# Patient Record
Sex: FEMALE | Race: WHITE | NOT HISPANIC OR LATINO | Employment: OTHER | URBAN - METROPOLITAN AREA
[De-identification: names, ages, dates, MRNs, and addresses within clinical notes are randomized per-mention and may not be internally consistent; named-entity substitution may affect disease eponyms.]

---

## 2023-08-16 ENCOUNTER — TRANSFERRED RECORDS (OUTPATIENT)
Dept: MULTI SPECIALTY CLINIC | Facility: CLINIC | Age: 78
End: 2023-08-16

## 2024-01-22 ENCOUNTER — OFFICE VISIT (OUTPATIENT)
Dept: FAMILY MEDICINE | Facility: CLINIC | Age: 79
End: 2024-01-22
Payer: MEDICARE

## 2024-01-22 VITALS
OXYGEN SATURATION: 99 % | RESPIRATION RATE: 20 BRPM | WEIGHT: 132 LBS | HEART RATE: 93 BPM | TEMPERATURE: 97.7 F | BODY MASS INDEX: 25.91 KG/M2 | SYSTOLIC BLOOD PRESSURE: 126 MMHG | HEIGHT: 60 IN | DIASTOLIC BLOOD PRESSURE: 77 MMHG

## 2024-01-22 DIAGNOSIS — F41.1 GAD (GENERALIZED ANXIETY DISORDER): ICD-10-CM

## 2024-01-22 DIAGNOSIS — Z23 NEED FOR VACCINATION: ICD-10-CM

## 2024-01-22 DIAGNOSIS — D50.8 OTHER IRON DEFICIENCY ANEMIA: ICD-10-CM

## 2024-01-22 DIAGNOSIS — E03.8 OTHER SPECIFIED HYPOTHYROIDISM: ICD-10-CM

## 2024-01-22 DIAGNOSIS — K58.0 IRRITABLE BOWEL SYNDROME WITH DIARRHEA: ICD-10-CM

## 2024-01-22 DIAGNOSIS — Z23 NEED FOR PNEUMOCOCCAL VACCINATION: ICD-10-CM

## 2024-01-22 DIAGNOSIS — R60.0 LOWER EXTREMITY EDEMA: ICD-10-CM

## 2024-01-22 DIAGNOSIS — J43.9 PULMONARY EMPHYSEMA, UNSPECIFIED EMPHYSEMA TYPE (H): Primary | ICD-10-CM

## 2024-01-22 DIAGNOSIS — Z99.81 OXYGEN DEPENDENT: ICD-10-CM

## 2024-01-22 DIAGNOSIS — F01.50 VASCULAR DEMENTIA WITHOUT BEHAVIORAL DISTURBANCE, PSYCHOTIC DISTURBANCE, MOOD DISTURBANCE, OR ANXIETY, UNSPECIFIED DEMENTIA SEVERITY (H): ICD-10-CM

## 2024-01-22 DIAGNOSIS — I10 PRIMARY HYPERTENSION: ICD-10-CM

## 2024-01-22 DIAGNOSIS — R53.81 PHYSICAL DECONDITIONING: ICD-10-CM

## 2024-01-22 PROBLEM — M17.10 ARTHRITIS OF KNEE: Status: ACTIVE | Noted: 2019-06-04

## 2024-01-22 PROBLEM — F41.9 ANXIETY AND DEPRESSION: Status: RESOLVED | Noted: 2023-01-10 | Resolved: 2024-01-22

## 2024-01-22 PROBLEM — D50.9 IRON DEFICIENCY ANEMIA: Status: ACTIVE | Noted: 2023-01-10

## 2024-01-22 PROBLEM — Z91.81 AT RISK FOR FALLS: Status: ACTIVE | Noted: 2023-08-09

## 2024-01-22 PROBLEM — R41.840 ATTENTION DISTURBANCE: Status: ACTIVE | Noted: 2020-01-22

## 2024-01-22 PROBLEM — F41.9 ANXIETY AND DEPRESSION: Status: ACTIVE | Noted: 2023-01-10

## 2024-01-22 PROBLEM — F03.90 DEMENTIA (H): Status: ACTIVE | Noted: 2023-11-28

## 2024-01-22 PROBLEM — R54 FRAIL ELDERLY: Status: ACTIVE | Noted: 2023-12-12

## 2024-01-22 PROBLEM — J44.9 CHRONIC OBSTRUCTIVE PULMONARY DISEASE (H): Status: ACTIVE | Noted: 2023-01-10

## 2024-01-22 PROBLEM — F32.A ANXIETY AND DEPRESSION: Status: RESOLVED | Noted: 2023-01-10 | Resolved: 2024-01-22

## 2024-01-22 PROBLEM — F32.A ANXIETY AND DEPRESSION: Status: ACTIVE | Noted: 2023-01-10

## 2024-01-22 PROBLEM — K21.9 GERD (GASTROESOPHAGEAL REFLUX DISEASE): Status: ACTIVE | Noted: 2023-03-15

## 2024-01-22 LAB
ALBUMIN SERPL BCG-MCNC: 3.9 G/DL (ref 3.5–5.2)
ALP SERPL-CCNC: 65 U/L (ref 40–150)
ALT SERPL W P-5'-P-CCNC: 11 U/L (ref 0–50)
ANION GAP SERPL CALCULATED.3IONS-SCNC: 12 MMOL/L (ref 7–15)
AST SERPL W P-5'-P-CCNC: 23 U/L (ref 0–45)
BASOPHILS # BLD AUTO: 0.1 10E3/UL (ref 0–0.2)
BASOPHILS NFR BLD AUTO: 1 %
BILIRUB SERPL-MCNC: <0.2 MG/DL
BUN SERPL-MCNC: 14 MG/DL (ref 8–23)
CALCIUM SERPL-MCNC: 9.2 MG/DL (ref 8.8–10.2)
CHLORIDE SERPL-SCNC: 104 MMOL/L (ref 98–107)
CREAT SERPL-MCNC: 0.93 MG/DL (ref 0.51–0.95)
DEPRECATED HCO3 PLAS-SCNC: 22 MMOL/L (ref 22–29)
EGFRCR SERPLBLD CKD-EPI 2021: 62 ML/MIN/1.73M2
EOSINOPHIL # BLD AUTO: 0.6 10E3/UL (ref 0–0.7)
EOSINOPHIL NFR BLD AUTO: 7 %
ERYTHROCYTE [DISTWIDTH] IN BLOOD BY AUTOMATED COUNT: 17.5 % (ref 10–15)
GLUCOSE SERPL-MCNC: 88 MG/DL (ref 70–99)
HCT VFR BLD AUTO: 32.4 % (ref 35–47)
HGB BLD-MCNC: 9 G/DL (ref 11.7–15.7)
IMM GRANULOCYTES # BLD: 0.1 10E3/UL
IMM GRANULOCYTES NFR BLD: 1 %
LYMPHOCYTES # BLD AUTO: 1.2 10E3/UL (ref 0.8–5.3)
LYMPHOCYTES NFR BLD AUTO: 14 %
MCH RBC QN AUTO: 23.4 PG (ref 26.5–33)
MCHC RBC AUTO-ENTMCNC: 27.8 G/DL (ref 31.5–36.5)
MCV RBC AUTO: 84 FL (ref 78–100)
MONOCYTES # BLD AUTO: 0.6 10E3/UL (ref 0–1.3)
MONOCYTES NFR BLD AUTO: 7 %
NEUTROPHILS # BLD AUTO: 6 10E3/UL (ref 1.6–8.3)
NEUTROPHILS NFR BLD AUTO: 70 %
PLATELET # BLD AUTO: 360 10E3/UL (ref 150–450)
POTASSIUM SERPL-SCNC: 3.8 MMOL/L (ref 3.4–5.3)
PROT SERPL-MCNC: 6.6 G/DL (ref 6.4–8.3)
RBC # BLD AUTO: 3.85 10E6/UL (ref 3.8–5.2)
SODIUM SERPL-SCNC: 138 MMOL/L (ref 135–145)
TSH SERPL DL<=0.005 MIU/L-ACNC: 2.05 UIU/ML (ref 0.3–4.2)
WBC # BLD AUTO: 8.6 10E3/UL (ref 4–11)

## 2024-01-22 PROCEDURE — 99204 OFFICE O/P NEW MOD 45 MIN: CPT | Mod: 25 | Performed by: FAMILY MEDICINE

## 2024-01-22 PROCEDURE — 36415 COLL VENOUS BLD VENIPUNCTURE: CPT | Performed by: FAMILY MEDICINE

## 2024-01-22 PROCEDURE — 84443 ASSAY THYROID STIM HORMONE: CPT | Performed by: FAMILY MEDICINE

## 2024-01-22 PROCEDURE — 80053 COMPREHEN METABOLIC PANEL: CPT | Performed by: FAMILY MEDICINE

## 2024-01-22 PROCEDURE — 85025 COMPLETE CBC W/AUTO DIFF WBC: CPT | Performed by: FAMILY MEDICINE

## 2024-01-22 PROCEDURE — G0009 ADMIN PNEUMOCOCCAL VACCINE: HCPCS | Performed by: FAMILY MEDICINE

## 2024-01-22 PROCEDURE — 90677 PCV20 VACCINE IM: CPT | Performed by: FAMILY MEDICINE

## 2024-01-22 RX ORDER — FUROSEMIDE 20 MG
20 TABLET ORAL DAILY
COMMUNITY
Start: 2023-12-13 | End: 2024-01-22

## 2024-01-22 RX ORDER — BUSPIRONE HYDROCHLORIDE 10 MG/1
10 TABLET ORAL 3 TIMES DAILY
Status: ON HOLD | COMMUNITY
Start: 2010-12-22 | End: 2024-02-25

## 2024-01-22 RX ORDER — LEVOTHYROXINE SODIUM 50 UG/1
50 TABLET ORAL DAILY
Status: ON HOLD | COMMUNITY
Start: 2022-05-20 | End: 2024-02-25

## 2024-01-22 RX ORDER — OLMESARTAN MEDOXOMIL 20 MG/1
20 TABLET ORAL DAILY
Status: ON HOLD | COMMUNITY
Start: 2023-03-07 | End: 2024-02-25

## 2024-01-22 RX ORDER — TRIAMCINOLONE ACETONIDE 1 MG/G
CREAM TOPICAL 2 TIMES DAILY PRN
Status: ON HOLD | COMMUNITY
Start: 2023-12-13 | End: 2024-02-25

## 2024-01-22 RX ORDER — ALBUTEROL SULFATE 0.83 MG/ML
2.5 SOLUTION RESPIRATORY (INHALATION) 2 TIMES DAILY PRN
Status: ON HOLD | COMMUNITY
Start: 2023-11-20 | End: 2024-02-25

## 2024-01-22 RX ORDER — CALCIUM POLYCARBOPHIL 625 MG 625 MG/1
2 TABLET ORAL DAILY
Qty: 90 TABLET | Refills: 3 | Status: ON HOLD | OUTPATIENT
Start: 2024-01-22 | End: 2024-02-25

## 2024-01-22 RX ORDER — FLUOXETINE 40 MG/1
40 CAPSULE ORAL DAILY
Qty: 90 CAPSULE | Refills: 3 | Status: ON HOLD | OUTPATIENT
Start: 2024-01-22 | End: 2024-02-25

## 2024-01-22 RX ORDER — MIRTAZAPINE 15 MG/1
15 TABLET, FILM COATED ORAL AT BEDTIME
COMMUNITY
End: 2024-01-22

## 2024-01-22 RX ORDER — PANTOPRAZOLE SODIUM 40 MG/1
40 TABLET, DELAYED RELEASE ORAL
Status: ON HOLD | COMMUNITY
Start: 2022-08-22 | End: 2024-02-25

## 2024-01-22 RX ORDER — FERROUS GLUCONATE 324(38)MG
324 TABLET ORAL
Qty: 90 TABLET | Refills: 3 | Status: ON HOLD | OUTPATIENT
Start: 2024-01-22 | End: 2024-02-25

## 2024-01-22 RX ORDER — FUROSEMIDE 20 MG
TABLET ORAL
Qty: 30 TABLET | Refills: 1 | Status: ON HOLD | OUTPATIENT
Start: 2024-01-22 | End: 2024-02-19

## 2024-01-22 RX ORDER — LATANOPROST 50 UG/ML
1 SOLUTION/ DROPS OPHTHALMIC AT BEDTIME
Status: ON HOLD | COMMUNITY
Start: 2023-03-07 | End: 2024-02-25

## 2024-01-22 ASSESSMENT — PAIN SCALES - GENERAL: PAINLEVEL: NO PAIN (0)

## 2024-01-22 NOTE — PROGRESS NOTES
Assessment & Plan     Pulmonary emphysema, unspecified emphysema type (H)    - FLUoxetine (PROZAC) 40 MG capsule; Take 1 capsule (40 mg) by mouth daily  - Comprehensive metabolic panel (BMP + Alb, Alk Phos, ALT, AST, Total. Bili, TP); Future  - Comprehensive metabolic panel (BMP + Alb, Alk Phos, ALT, AST, Total. Bili, TP)    Other iron deficiency anemia    - CBC with platelets and differential; Future  - CBC with platelets and differential  - ferrous gluconate (FERGON) 324 (38 Fe) MG tablet; Take 1 tablet (324 mg) by mouth daily (with breakfast)    Lower extremity edema  - furosemide (LASIX) 20 MG tablet; One tab daily as needed for lower ext edema    AMRIT (generalized anxiety disorder)      Irritable bowel syndrome with diarrhea    - calcium polycarbophil (FIBERCON) 625 MG tablet; Take 2 tablets (1,250 mg) by mouth daily    Need for vaccination      Need for pneumococcal vaccination    - SCREENING QUESTIONS FOR ADULT IMMUNIZATIONS    Vascular dementia without behavioral disturbance, psychotic disturbance, mood disturbance, or anxiety, unspecified dementia severity (H)    Physical deconditioning    Oxygen dependent    Patient came in with daughter to establish care.  History of COPD, history of dementia, patient was admitted to the hospital on December 6, 2023 for pneumonia.  Prior to that November 28, 2023 was admitted for COPD exacerbation.  She was discharged on oxygen.  She has been 3 weeks TCU, currently, she lives at the assisted living.    Patient continues to have lower extremity weakness, she continues to use oxygen.  She is follow-up with PT.  Reviewed her past medical history, reviewed her discharge admission summaries.  Reviewed her workup.  Reviewed and up dated her medication.  In addition, daughter has FMLA form need to be filled out.    She had a 2D echo November 24, 2023 which showed ejection fraction 50 to 55%.  Lung CT scan showed severe emphysema.    Patient lower extremity edema being resolved,  advised with elevation,  She will take furosemide as needed only.  Generalized anxiety disorder she will continue with fluoxetine at 40 mg.  Continue with buspirone 15 mg twice daily, or 10 mg tid.  Discussed with patient and her daughter, buspirone could be causing dizziness, and diarrhea she may need to taper it down in the future.  Mirtazapine has been discontinued.her     Increase fiber intake, for IBS- diarrhea.    History of anemia, started iron supplement.\    Over 45 minutes spent reviewing chart, reviewing test results, talking with and examining patient, formulating plan, and documentation on the day of the encounter.  Reviewing chart, medications  Filling FMLA form.    Patient continue with oxygen, at 1.5 L.  She will follow-up in 3 months to assess her oxygen requirement and need    BMI  Estimated body mass index is 25.78 kg/m  as calculated from the following:    Height as of this encounter: 1.524 m (5').    Weight as of this encounter: 59.9 kg (132 lb).       Work on weight loss  Regular exercise    Pato Friedman is a 78 year old, presenting for the following health issues:  Establish Care        1/22/2024     2:25 PM   Additional Questions   Roomed by Venkata HALE CMA   Accompanied by Daughters         1/22/2024     2:25 PM   Patient Reported Additional Medications   Patient reports taking the following new medications None     Via the Health Maintenance questionnaire, the patient has reported the following services have been completed -DEXA, this information has been sent to the abstraction team.  History of Present Illness       Reason for visit:  New patient and review oxygen and other issues    She eats 0-1 servings of fruits and vegetables daily.She consumes 1 sweetened beverage(s) daily.She exercises with enough effort to increase her heart rate 9 or less minutes per day.  She exercises with enough effort to increase her heart rate 3 or less days per week.   She is taking medications  regularly.         Review of Systems  Constitutional, HEENT, cardiovascular, pulmonary, GI, , musculoskeletal, neuro, skin, endocrine and psych systems are negative, except as otherwise noted.      Objective    /77 (BP Location: Right arm, Patient Position: Chair, Cuff Size: Adult Regular)   Pulse 93   Temp 97.7  F (36.5  C) (Oral)   Resp 20   Ht 1.524 m (5')   Wt 59.9 kg (132 lb)   SpO2 99%   BMI 25.78 kg/m    Body mass index is 25.78 kg/m .  Physical Exam   GENERAL: alert and no distress  NECK: no adenopathy, no asymmetry, masses, or scars  RESP: lungs clear to auscultation - no rales, rhonchi or wheezes  CV: regular rate and rhythm, normal S1 S2, no S3 or S4, no murmur, click or rub, no peripheral edema  ABDOMEN: soft, nontender, no hepatosplenomegaly, no masses and bowel sounds normal  MS: trace edema to bilaterally  SKIN: dry scaling rash, and scratches marks.  PSYCH: mentation appears normal, affect normal/bright    CBC RESULTS:   Recent Labs   Lab Test 01/22/24  1544   WBC 8.6   RBC 3.85   HGB 9.0*   HCT 32.4*   MCV 84   MCH 23.4*   MCHC 27.8*   RDW 17.5*         Orders Placed This Encounter   Procedures    REVIEW OF HEALTH MAINTENANCE PROTOCOL ORDERS    SCREENING QUESTIONS FOR ADULT IMMUNIZATIONS    Pneumococcal 20 Valent Conjugate (Prevnar 20)    Comprehensive metabolic panel (BMP + Alb, Alk Phos, ALT, AST, Total. Bili, TP)    CBC with platelets and differential    CBC with platelets and differential          Signed Electronically by: Gomez Louis MD

## 2024-01-23 ENCOUNTER — MYC MEDICAL ADVICE (OUTPATIENT)
Dept: FAMILY MEDICINE | Facility: CLINIC | Age: 79
End: 2024-01-23
Payer: MEDICARE

## 2024-01-23 DIAGNOSIS — F01.A0 MILD VASCULAR DEMENTIA, UNSPECIFIED WHETHER BEHAVIORAL, PSYCHOTIC, OR MOOD DISTURBANCE OR ANXIETY (H): Primary | ICD-10-CM

## 2024-01-23 RX ORDER — DONEPEZIL HYDROCHLORIDE 10 MG/1
10 TABLET, FILM COATED ORAL AT BEDTIME
Qty: 90 TABLET | Refills: 1 | Status: ON HOLD | OUTPATIENT
Start: 2024-01-23 | End: 2024-02-25

## 2024-01-23 NOTE — TELEPHONE ENCOUNTER
Barbara (consent on file) calling to check status of this message.     Lois Younger RN on 1/23/2024 at 11:37 AM

## 2024-01-24 ENCOUNTER — TELEPHONE (OUTPATIENT)
Dept: FAMILY MEDICINE | Facility: CLINIC | Age: 79
End: 2024-01-24
Payer: MEDICARE

## 2024-01-24 DIAGNOSIS — M81.0 AGE-RELATED OSTEOPOROSIS WITHOUT CURRENT PATHOLOGICAL FRACTURE: Primary | ICD-10-CM

## 2024-01-24 DIAGNOSIS — Z99.81 OXYGEN DEPENDENT: ICD-10-CM

## 2024-01-24 DIAGNOSIS — J43.9 PULMONARY EMPHYSEMA, UNSPECIFIED EMPHYSEMA TYPE (H): Primary | ICD-10-CM

## 2024-01-24 NOTE — TELEPHONE ENCOUNTER
Pt daughter (Barbara, consent to communicate) calling to state that pt is wanting to continue prolia injection. [place] been taking prolia for many years per Barbara. Barbara states DEXA scan was completed 2023.     Prolia injection is overdue and used to get them every 6 months.     Routing to RN team to advise if prolia can be completed in clinic.     Lucinda Lowe, RN on 1/24/2024 at 5:12 PM

## 2024-01-24 NOTE — TELEPHONE ENCOUNTER
Routing to team to fax new signed order to fax number below.     Lucinda Lowe RN on 1/24/2024 at 5:07 PM

## 2024-01-24 NOTE — TELEPHONE ENCOUNTER
custodial nurse calling to request orders for continuous oxygen    Patient currently has order for 3L continuously     Per visit with PCP on 1/22/24 patient should be on 1.5L continuously     custodial is needing very specific orders - cannot be a range     Cued if appropriate    Please route back to team to fax orders to [phone]    Albina Hawkins RN  [facility]

## 2024-01-25 DIAGNOSIS — M81.0 AGE-RELATED OSTEOPOROSIS WITHOUT CURRENT PATHOLOGICAL FRACTURE: Primary | ICD-10-CM

## 2024-01-25 RX ORDER — CHOLECALCIFEROL (VITAMIN D3) 50 MCG
1 TABLET ORAL DAILY
Qty: 90 TABLET | Refills: 3 | Status: ON HOLD | OUTPATIENT
Start: 2024-01-25 | End: 2024-02-25

## 2024-01-25 NOTE — TELEPHONE ENCOUNTER
Printed order and placed in Dr Louis sign me folder for him to sign and date.    GUDELIA Lopez  [facility]

## 2024-01-25 NOTE — TELEPHONE ENCOUNTER
RN called and spoke with patients daughter.    RN scheduled prolia injection and follow up labs.    RN confirmed patient has not had and is not planning any dental work in the past/upcoming month.    Miki Schmid RN, BSN, PHN  [facility]

## 2024-01-25 NOTE — TELEPHONE ENCOUNTER
Keri at [facility] calling stating it appears a DME order was sent to them. She stated this order will work for administration of oxygen at [place]. She just wanted to make sure that  home medical will not be delivering supplies as patient uses [facility].       RN stated usually DME will call beforehand to confirm supplies are needed and delivery address/time. Keri stated there is not always a RN on site. RN called  home medical supply and they confirmed that they do not have a delivery set up for oxygen/supplies.       Allison Christopher RN on 1/25/2024 at 1:43 PM

## 2024-01-25 NOTE — TELEPHONE ENCOUNTER
Routing to Dr. Louis.    Willing to Manage Prolia?    Please enter CAM order and Labs and RN will peruse Auth and scheduling patient    Please let team know timing of labs.    Miki Schmid, RN, BSN, PHN  [facility]

## 2024-01-25 NOTE — TELEPHONE ENCOUNTER
Daughter calling back  She is unsure of the exact date and is unable to access those records anymore.    She states Pt was getting Prolia every 6 months but it has been at least a year since she has gotten an injection.    She was getting them at [facility] in [facility] - moved here March 2023- hasn't had a shot since she has been living in [place] [facility] in [facility] submitted a claim for Prolia and Barbara said it had been approved, this was September 5th 2023-     Dexascan was in August 2023        Chante, RN    Triage Nurse  [facility]

## 2024-01-25 NOTE — TELEPHONE ENCOUNTER
Ordered    Labs ordered,    Need labs 14 days, after injection ( After injections )  Already have labs done

## 2024-01-25 NOTE — TELEPHONE ENCOUNTER
RN left  for patients daughter requesting call back to clinic.    When Daughter Calls back,  Please ask    When was patients last injection and where was it given?        Once we have that information, please route to NE triage.    Ne nurse  will route to Dr. Louis to see if he is willing to manage medication.    Miki Schmid RN, BSN, PHN  [facility]

## 2024-01-25 NOTE — TELEPHONE ENCOUNTER
RN awaiting to hear back from Team for authorization.    Once Authorization is confirmed, RN will call to schedule.    Miki Schmid RN, BSN, PHN  [facility]

## 2024-01-27 ENCOUNTER — MYC MEDICAL ADVICE (OUTPATIENT)
Dept: FAMILY MEDICINE | Facility: CLINIC | Age: 79
End: 2024-01-27
Payer: MEDICARE

## 2024-01-29 ENCOUNTER — TELEPHONE (OUTPATIENT)
Dept: FAMILY MEDICINE | Facility: CLINIC | Age: 79
End: 2024-01-29
Payer: MEDICARE

## 2024-01-29 NOTE — TELEPHONE ENCOUNTER
Faxed forms to Cristine.    Placed in stat scanning on Team Vickey.    GUDELIA Lopez  [facility]

## 2024-01-29 NOTE — TELEPHONE ENCOUNTER
"Patient called today checking up on whether form has been faxed yet.  I found the forms on TC desk but they did not look like they had been faxed yet.  I read what Dr Louis wrote for the \"frequency of flare ups\" to patient and she is ok with the way it was written.   I told patient I would be faxing the form today.    Susan Ramirez [facility]    "

## 2024-01-29 NOTE — TELEPHONE ENCOUNTER
Forms received from Cristine/ [facility] for Dr Louis.  Forms placed in provider 'sign me' folder.  Please fax forms to [phone] after completion.    GUDELIA Lopez  [facility]

## 2024-02-01 ENCOUNTER — HOSPITAL ENCOUNTER (INPATIENT)
Facility: CLINIC | Age: 79
LOS: 25 days | Discharge: NURSING, REHAB OR LONG-TERM CARE | DRG: 193 | End: 2024-02-26
Attending: EMERGENCY MEDICINE | Admitting: HOSPITALIST
Payer: MEDICARE

## 2024-02-01 ENCOUNTER — MYC MEDICAL ADVICE (OUTPATIENT)
Dept: FAMILY MEDICINE | Facility: CLINIC | Age: 79
End: 2024-02-01

## 2024-02-01 ENCOUNTER — NURSE TRIAGE (OUTPATIENT)
Dept: NURSING | Facility: CLINIC | Age: 79
End: 2024-02-01

## 2024-02-01 ENCOUNTER — TELEPHONE (OUTPATIENT)
Dept: FAMILY MEDICINE | Facility: CLINIC | Age: 79
End: 2024-02-01

## 2024-02-01 ENCOUNTER — NURSE TRIAGE (OUTPATIENT)
Dept: FAMILY MEDICINE | Facility: CLINIC | Age: 79
End: 2024-02-01

## 2024-02-01 ENCOUNTER — APPOINTMENT (OUTPATIENT)
Dept: CT IMAGING | Facility: CLINIC | Age: 79
DRG: 193 | End: 2024-02-01
Attending: EMERGENCY MEDICINE
Payer: MEDICARE

## 2024-02-01 DIAGNOSIS — Z91.81 AT RISK FOR FALLS: ICD-10-CM

## 2024-02-01 DIAGNOSIS — K58.0 IRRITABLE BOWEL SYNDROME WITH DIARRHEA: ICD-10-CM

## 2024-02-01 DIAGNOSIS — M81.0 AGE-RELATED OSTEOPOROSIS WITHOUT CURRENT PATHOLOGICAL FRACTURE: ICD-10-CM

## 2024-02-01 DIAGNOSIS — R79.89 TROPONIN LEVEL ELEVATED: ICD-10-CM

## 2024-02-01 DIAGNOSIS — M54.9 BACK PAIN, UNSPECIFIED BACK LOCATION, UNSPECIFIED BACK PAIN LATERALITY, UNSPECIFIED CHRONICITY: ICD-10-CM

## 2024-02-01 DIAGNOSIS — J02.9 SORE THROAT: ICD-10-CM

## 2024-02-01 DIAGNOSIS — H04.123 DRY EYES: ICD-10-CM

## 2024-02-01 DIAGNOSIS — E87.6 HYPOKALEMIA: ICD-10-CM

## 2024-02-01 DIAGNOSIS — J18.9 COMMUNITY ACQUIRED PNEUMONIA OF LEFT UPPER LOBE OF LUNG: ICD-10-CM

## 2024-02-01 DIAGNOSIS — R60.0 LOWER EXTREMITY EDEMA: ICD-10-CM

## 2024-02-01 DIAGNOSIS — K21.9 GASTROESOPHAGEAL REFLUX DISEASE, UNSPECIFIED WHETHER ESOPHAGITIS PRESENT: ICD-10-CM

## 2024-02-01 DIAGNOSIS — K59.00 CONSTIPATION, UNSPECIFIED CONSTIPATION TYPE: ICD-10-CM

## 2024-02-01 DIAGNOSIS — E03.9 HYPOTHYROIDISM, UNSPECIFIED TYPE: ICD-10-CM

## 2024-02-01 DIAGNOSIS — R11.2 NAUSEA AND VOMITING, UNSPECIFIED VOMITING TYPE: ICD-10-CM

## 2024-02-01 DIAGNOSIS — F01.A0 MILD VASCULAR DEMENTIA, UNSPECIFIED WHETHER BEHAVIORAL, PSYCHOTIC, OR MOOD DISTURBANCE OR ANXIETY (H): ICD-10-CM

## 2024-02-01 DIAGNOSIS — L29.9 ITCHING: ICD-10-CM

## 2024-02-01 DIAGNOSIS — R54 FRAIL ELDERLY: ICD-10-CM

## 2024-02-01 DIAGNOSIS — G47.00 INSOMNIA, UNSPECIFIED TYPE: ICD-10-CM

## 2024-02-01 DIAGNOSIS — R41.840 ATTENTION DISTURBANCE: ICD-10-CM

## 2024-02-01 DIAGNOSIS — F41.1 GAD (GENERALIZED ANXIETY DISORDER): Primary | ICD-10-CM

## 2024-02-01 DIAGNOSIS — M17.10 ARTHRITIS OF KNEE: ICD-10-CM

## 2024-02-01 DIAGNOSIS — J43.9 PULMONARY EMPHYSEMA, UNSPECIFIED EMPHYSEMA TYPE (H): ICD-10-CM

## 2024-02-01 LAB
ALBUMIN SERPL BCG-MCNC: 3.5 G/DL (ref 3.5–5.2)
ALP SERPL-CCNC: 47 U/L (ref 40–150)
ALT SERPL W P-5'-P-CCNC: <5 U/L (ref 0–50)
ANION GAP SERPL CALCULATED.3IONS-SCNC: 15 MMOL/L (ref 7–15)
AST SERPL W P-5'-P-CCNC: 22 U/L (ref 0–45)
ATRIAL RATE - MUSE: 86 BPM
BASOPHILS # BLD AUTO: 0.1 10E3/UL (ref 0–0.2)
BASOPHILS NFR BLD AUTO: 1 %
BILIRUB SERPL-MCNC: 0.2 MG/DL
BUN SERPL-MCNC: 10.1 MG/DL (ref 8–23)
CALCIUM SERPL-MCNC: 7.7 MG/DL (ref 8.8–10.2)
CHLORIDE SERPL-SCNC: 104 MMOL/L (ref 98–107)
CREAT SERPL-MCNC: 0.7 MG/DL (ref 0.51–0.95)
DEPRECATED HCO3 PLAS-SCNC: 18 MMOL/L (ref 22–29)
DIASTOLIC BLOOD PRESSURE - MUSE: NORMAL MMHG
EGFRCR SERPLBLD CKD-EPI 2021: 87 ML/MIN/1.73M2
EOSINOPHIL # BLD AUTO: 0.5 10E3/UL (ref 0–0.7)
EOSINOPHIL NFR BLD AUTO: 5 %
ERYTHROCYTE [DISTWIDTH] IN BLOOD BY AUTOMATED COUNT: 17.7 % (ref 10–15)
FLUAV RNA SPEC QL NAA+PROBE: NEGATIVE
FLUBV RNA RESP QL NAA+PROBE: NEGATIVE
GLUCOSE SERPL-MCNC: 70 MG/DL (ref 70–99)
HCT VFR BLD AUTO: 32.5 % (ref 35–47)
HGB BLD-MCNC: 9.7 G/DL (ref 11.7–15.7)
IMM GRANULOCYTES # BLD: 0.1 10E3/UL
IMM GRANULOCYTES NFR BLD: 1 %
INTERPRETATION ECG - MUSE: NORMAL
LIPASE SERPL-CCNC: 20 U/L (ref 13–60)
LYMPHOCYTES # BLD AUTO: 1.1 10E3/UL (ref 0.8–5.3)
LYMPHOCYTES NFR BLD AUTO: 12 %
MCH RBC QN AUTO: 24.1 PG (ref 26.5–33)
MCHC RBC AUTO-ENTMCNC: 29.8 G/DL (ref 31.5–36.5)
MCV RBC AUTO: 81 FL (ref 78–100)
MONOCYTES # BLD AUTO: 0.8 10E3/UL (ref 0–1.3)
MONOCYTES NFR BLD AUTO: 8 %
NEUTROPHILS # BLD AUTO: 6.5 10E3/UL (ref 1.6–8.3)
NEUTROPHILS NFR BLD AUTO: 73 %
NRBC # BLD AUTO: 0 10E3/UL
NRBC BLD AUTO-RTO: 0 /100
P AXIS - MUSE: NORMAL DEGREES
PLATELET # BLD AUTO: 333 10E3/UL (ref 150–450)
POTASSIUM SERPL-SCNC: 2.8 MMOL/L (ref 3.4–5.3)
PR INTERVAL - MUSE: NORMAL MS
PROCALCITONIN SERPL IA-MCNC: 0.04 NG/ML
PROT SERPL-MCNC: 5.5 G/DL (ref 6.4–8.3)
QRS DURATION - MUSE: 68 MS
QT - MUSE: 428 MS
QTC - MUSE: 526 MS
R AXIS - MUSE: 74 DEGREES
RBC # BLD AUTO: 4.03 10E6/UL (ref 3.8–5.2)
RSV RNA SPEC NAA+PROBE: NEGATIVE
SARS-COV-2 RNA RESP QL NAA+PROBE: NEGATIVE
SODIUM SERPL-SCNC: 137 MMOL/L (ref 135–145)
SYSTOLIC BLOOD PRESSURE - MUSE: NORMAL MMHG
T AXIS - MUSE: 59 DEGREES
TROPONIN T SERPL HS-MCNC: 45 NG/L
TSH SERPL DL<=0.005 MIU/L-ACNC: 2.04 UIU/ML (ref 0.3–4.2)
VENTRICULAR RATE- MUSE: 91 BPM
WBC # BLD AUTO: 9 10E3/UL (ref 4–11)

## 2024-02-01 PROCEDURE — 96374 THER/PROPH/DIAG INJ IV PUSH: CPT | Mod: 59 | Performed by: EMERGENCY MEDICINE

## 2024-02-01 PROCEDURE — 80053 COMPREHEN METABOLIC PANEL: CPT | Performed by: EMERGENCY MEDICINE

## 2024-02-01 PROCEDURE — 71260 CT THORAX DX C+: CPT | Mod: MG

## 2024-02-01 PROCEDURE — 74177 CT ABD & PELVIS W/CONTRAST: CPT | Mod: 26 | Performed by: RADIOLOGY

## 2024-02-01 PROCEDURE — 85025 COMPLETE CBC W/AUTO DIFF WBC: CPT | Performed by: EMERGENCY MEDICINE

## 2024-02-01 PROCEDURE — 120N000002 HC R&B MED SURG/OB UMMC

## 2024-02-01 PROCEDURE — 71260 CT THORAX DX C+: CPT | Mod: 26 | Performed by: RADIOLOGY

## 2024-02-01 PROCEDURE — 250N000011 HC RX IP 250 OP 636: Performed by: EMERGENCY MEDICINE

## 2024-02-01 PROCEDURE — 36415 COLL VENOUS BLD VENIPUNCTURE: CPT | Performed by: EMERGENCY MEDICINE

## 2024-02-01 PROCEDURE — 99285 EMERGENCY DEPT VISIT HI MDM: CPT | Mod: 25 | Performed by: EMERGENCY MEDICINE

## 2024-02-01 PROCEDURE — 84484 ASSAY OF TROPONIN QUANT: CPT | Performed by: EMERGENCY MEDICINE

## 2024-02-01 PROCEDURE — 83690 ASSAY OF LIPASE: CPT | Performed by: EMERGENCY MEDICINE

## 2024-02-01 PROCEDURE — 83735 ASSAY OF MAGNESIUM: CPT | Performed by: EMERGENCY MEDICINE

## 2024-02-01 PROCEDURE — 87637 SARSCOV2&INF A&B&RSV AMP PRB: CPT | Performed by: EMERGENCY MEDICINE

## 2024-02-01 PROCEDURE — G1010 CDSM STANSON: HCPCS | Performed by: RADIOLOGY

## 2024-02-01 PROCEDURE — 93005 ELECTROCARDIOGRAM TRACING: CPT | Performed by: EMERGENCY MEDICINE

## 2024-02-01 PROCEDURE — 93010 ELECTROCARDIOGRAM REPORT: CPT | Performed by: EMERGENCY MEDICINE

## 2024-02-01 PROCEDURE — 84145 PROCALCITONIN (PCT): CPT | Performed by: EMERGENCY MEDICINE

## 2024-02-01 PROCEDURE — 84443 ASSAY THYROID STIM HORMONE: CPT | Performed by: EMERGENCY MEDICINE

## 2024-02-01 RX ORDER — POTASSIUM CHLORIDE 20MEQ/15ML
20 LIQUID (ML) ORAL ONCE
Status: COMPLETED | OUTPATIENT
Start: 2024-02-01 | End: 2024-02-02

## 2024-02-01 RX ORDER — IOPAMIDOL 755 MG/ML
81 INJECTION, SOLUTION INTRAVASCULAR ONCE
Status: COMPLETED | OUTPATIENT
Start: 2024-02-01 | End: 2024-02-01

## 2024-02-01 RX ORDER — CEFTRIAXONE 1 G/1
1 INJECTION, POWDER, FOR SOLUTION INTRAMUSCULAR; INTRAVENOUS ONCE
Qty: 10 ML | Refills: 0 | Status: COMPLETED | OUTPATIENT
Start: 2024-02-02 | End: 2024-02-02

## 2024-02-01 RX ORDER — ONDANSETRON 2 MG/ML
4 INJECTION INTRAMUSCULAR; INTRAVENOUS
Status: COMPLETED | OUTPATIENT
Start: 2024-02-01 | End: 2024-02-01

## 2024-02-01 RX ORDER — POTASSIUM CHLORIDE 7.45 MG/ML
10 INJECTION INTRAVENOUS
Status: COMPLETED | OUTPATIENT
Start: 2024-02-02 | End: 2024-02-02

## 2024-02-01 RX ADMIN — ONDANSETRON 4 MG: 2 INJECTION INTRAMUSCULAR; INTRAVENOUS at 21:26

## 2024-02-01 RX ADMIN — IOPAMIDOL 81 ML: 755 INJECTION, SOLUTION INTRAVENOUS at 22:30

## 2024-02-01 ASSESSMENT — ACTIVITIES OF DAILY LIVING (ADL)
ADLS_ACUITY_SCORE: 35
ADLS_ACUITY_SCORE: 35

## 2024-02-01 NOTE — TELEPHONE ENCOUNTER
Provider Response to 2nd Level Triage Request    I have reviewed the RN documentation. My recommendation is:  Refer to ED    Due to severe abdominal pain and diaphoresis, it is highly recommended that patient be evaluated in emergency department right away.     I understand that they have so far declined ED evaluation.  Notify daughter/patient that if case is emergent they will be sent from clinic to the ED.     Flower Bennett PA-C on 2/1/2024 at 3:39 PM     Yes

## 2024-02-01 NOTE — TELEPHONE ENCOUNTER
Flower     Pt scheduled to see you tomorrow. Unsure who scheduled her but I wanted you to know about the situation.     Thanks,  JIMI Del Rio  [facility]

## 2024-02-01 NOTE — TELEPHONE ENCOUNTER
Attempted to call patient daughter (Barbara, consent to communicate on file) with number on file to relay provider's message below with no answer, left voicemail to call clinic back at [phone].    Lucinda Lowe, RN on 2/1/2024 at 3:45 PM

## 2024-02-01 NOTE — TELEPHONE ENCOUNTER
"Patient daughter and patient called to talk to a nurse about concerning symptoms.     Onset of abdominal pain was 1/25/24 and worsening. Pain is noted in upper stomach, and pain is intermittent. Patient has not described sensation of pain and has a hard time explaining characteristics. Unsure about alleviating factors. Unsure of aggravating symptoms. Patient does not know if there is a particular time during the day when it is worse. Current pain level is 8/10.     Per patient she is not able to eat, does not have an appetite and when she does eat she feels full quickly. Per pt dtr \"lost 5 lbs this week\". Patient has nausea but has not vomited and \"I wish I could just throw up\". Patient is also experiencing sweating but no fever 98.7F. Patient stated continues to have normal BM's but since starting iron pill on the 25th stools did get a bit darker but this is an expected effect of med.     Pt has hx of non bleeding Seven's gastric ulcers and endorses hx of IBS-D.     Per protocol to go to ED. Dtr does not want to take pt into ED due to recently having been to the ED and experience was \"horrible\". I edu on resources such as certain imaging and possible scopes that clinics and UC cannot do. Dtr verbalized understanding and stated she would still like to keep appt for tomorrow and if symptoms get worse tonight she will take pt to ED if she has to.     Reason for Disposition   MILD TO MODERATE pain and not relieved by antacid medicine    Protocols used: Abdominal Pain - Upper-A-OH      Eliane Cason RN  [facility]   "

## 2024-02-02 LAB
ALBUMIN UR-MCNC: 30 MG/DL
ANION GAP SERPL CALCULATED.3IONS-SCNC: 13 MMOL/L (ref 7–15)
APPEARANCE UR: CLEAR
BACTERIA #/AREA URNS HPF: ABNORMAL /HPF
BASOPHILS # BLD AUTO: 0.1 10E3/UL (ref 0–0.2)
BASOPHILS NFR BLD AUTO: 1 %
BILIRUB UR QL STRIP: NEGATIVE
BUN SERPL-MCNC: 9.3 MG/DL (ref 8–23)
CALCIUM SERPL-MCNC: 9.3 MG/DL (ref 8.8–10.2)
CHLORIDE SERPL-SCNC: 101 MMOL/L (ref 98–107)
COLOR UR AUTO: ABNORMAL
CREAT SERPL-MCNC: 0.83 MG/DL (ref 0.51–0.95)
DEPRECATED HCO3 PLAS-SCNC: 23 MMOL/L (ref 22–29)
EGFRCR SERPLBLD CKD-EPI 2021: 71 ML/MIN/1.73M2
EOSINOPHIL # BLD AUTO: 0.5 10E3/UL (ref 0–0.7)
EOSINOPHIL NFR BLD AUTO: 6 %
ERYTHROCYTE [DISTWIDTH] IN BLOOD BY AUTOMATED COUNT: 17.8 % (ref 10–15)
GLUCOSE SERPL-MCNC: 118 MG/DL (ref 70–99)
GLUCOSE UR STRIP-MCNC: NEGATIVE MG/DL
HCT VFR BLD AUTO: 36.5 % (ref 35–47)
HGB BLD-MCNC: 10.8 G/DL (ref 11.7–15.7)
HGB UR QL STRIP: ABNORMAL
IMM GRANULOCYTES # BLD: 0.1 10E3/UL
IMM GRANULOCYTES NFR BLD: 1 %
IRON BINDING CAPACITY (ROCHE): ABNORMAL
IRON SATN MFR SERPL: ABNORMAL %
IRON SERPL-MCNC: 26 UG/DL (ref 37–145)
KETONES UR STRIP-MCNC: 20 MG/DL
LEUKOCYTE ESTERASE UR QL STRIP: ABNORMAL
LYMPHOCYTES # BLD AUTO: 1 10E3/UL (ref 0.8–5.3)
LYMPHOCYTES NFR BLD AUTO: 13 %
MAGNESIUM SERPL-MCNC: 1.6 MG/DL (ref 1.7–2.3)
MAGNESIUM SERPL-MCNC: 2.9 MG/DL (ref 1.7–2.3)
MCH RBC QN AUTO: 24.5 PG (ref 26.5–33)
MCHC RBC AUTO-ENTMCNC: 29.6 G/DL (ref 31.5–36.5)
MCV RBC AUTO: 83 FL (ref 78–100)
MONOCYTES # BLD AUTO: 0.6 10E3/UL (ref 0–1.3)
MONOCYTES NFR BLD AUTO: 8 %
MUCOUS THREADS #/AREA URNS LPF: PRESENT /LPF
NEUTROPHILS # BLD AUTO: 5.8 10E3/UL (ref 1.6–8.3)
NEUTROPHILS NFR BLD AUTO: 71 %
NITRATE UR QL: NEGATIVE
NRBC # BLD AUTO: 0 10E3/UL
NRBC BLD AUTO-RTO: 0 /100
PH UR STRIP: 6.5 [PH] (ref 5–7)
PLATELET # BLD AUTO: 347 10E3/UL (ref 150–450)
POTASSIUM SERPL-SCNC: 4.7 MMOL/L (ref 3.4–5.3)
RBC # BLD AUTO: 4.41 10E6/UL (ref 3.8–5.2)
RBC URINE: 9 /HPF
SODIUM SERPL-SCNC: 137 MMOL/L (ref 135–145)
SP GR UR STRIP: 1.03 (ref 1–1.03)
SQUAMOUS EPITHELIAL: 2 /HPF
TROPONIN T SERPL HS-MCNC: 51 NG/L
TROPONIN T SERPL HS-MCNC: 51 NG/L
UROBILINOGEN UR STRIP-MCNC: NORMAL MG/DL
WBC # BLD AUTO: 8 10E3/UL (ref 4–11)
WBC URINE: 17 /HPF

## 2024-02-02 PROCEDURE — 250N000011 HC RX IP 250 OP 636: Performed by: HOSPITALIST

## 2024-02-02 PROCEDURE — 87086 URINE CULTURE/COLONY COUNT: CPT | Performed by: EMERGENCY MEDICINE

## 2024-02-02 PROCEDURE — 120N000002 HC R&B MED SURG/OB UMMC

## 2024-02-02 PROCEDURE — 36415 COLL VENOUS BLD VENIPUNCTURE: CPT | Performed by: HOSPITALIST

## 2024-02-02 PROCEDURE — 83735 ASSAY OF MAGNESIUM: CPT | Performed by: STUDENT IN AN ORGANIZED HEALTH CARE EDUCATION/TRAINING PROGRAM

## 2024-02-02 PROCEDURE — 250N000011 HC RX IP 250 OP 636: Performed by: EMERGENCY MEDICINE

## 2024-02-02 PROCEDURE — 83550 IRON BINDING TEST: CPT | Performed by: HOSPITALIST

## 2024-02-02 PROCEDURE — 85025 COMPLETE CBC W/AUTO DIFF WBC: CPT | Performed by: HOSPITALIST

## 2024-02-02 PROCEDURE — 82374 ASSAY BLOOD CARBON DIOXIDE: CPT | Performed by: HOSPITALIST

## 2024-02-02 PROCEDURE — 250N000013 HC RX MED GY IP 250 OP 250 PS 637: Performed by: HOSPITALIST

## 2024-02-02 PROCEDURE — 99223 1ST HOSP IP/OBS HIGH 75: CPT | Mod: AI | Performed by: HOSPITALIST

## 2024-02-02 PROCEDURE — 87186 SC STD MICRODIL/AGAR DIL: CPT | Performed by: EMERGENCY MEDICINE

## 2024-02-02 PROCEDURE — 81001 URINALYSIS AUTO W/SCOPE: CPT | Performed by: EMERGENCY MEDICINE

## 2024-02-02 PROCEDURE — 250N000013 HC RX MED GY IP 250 OP 250 PS 637: Performed by: EMERGENCY MEDICINE

## 2024-02-02 PROCEDURE — 84484 ASSAY OF TROPONIN QUANT: CPT | Performed by: EMERGENCY MEDICINE

## 2024-02-02 PROCEDURE — 36415 COLL VENOUS BLD VENIPUNCTURE: CPT | Performed by: EMERGENCY MEDICINE

## 2024-02-02 PROCEDURE — 258N000003 HC RX IP 258 OP 636: Performed by: EMERGENCY MEDICINE

## 2024-02-02 RX ORDER — LOSARTAN POTASSIUM 50 MG/1
50 TABLET ORAL DAILY
Status: DISCONTINUED | OUTPATIENT
Start: 2024-02-02 | End: 2024-02-16

## 2024-02-02 RX ORDER — PIPERACILLIN SODIUM, TAZOBACTAM SODIUM 2; .25 G/10ML; G/10ML
2.25 INJECTION, POWDER, LYOPHILIZED, FOR SOLUTION INTRAVENOUS EVERY 8 HOURS
Status: DISCONTINUED | OUTPATIENT
Start: 2024-02-02 | End: 2024-02-02

## 2024-02-02 RX ORDER — ALBUTEROL SULFATE 0.83 MG/ML
2.5 SOLUTION RESPIRATORY (INHALATION)
Status: DISCONTINUED | OUTPATIENT
Start: 2024-02-02 | End: 2024-02-26 | Stop reason: HOSPADM

## 2024-02-02 RX ORDER — LEVOTHYROXINE SODIUM 50 UG/1
50 TABLET ORAL
Status: DISCONTINUED | OUTPATIENT
Start: 2024-02-02 | End: 2024-02-26 | Stop reason: HOSPADM

## 2024-02-02 RX ORDER — ACETAMINOPHEN 325 MG/1
650 TABLET ORAL EVERY 4 HOURS PRN
Status: DISCONTINUED | OUTPATIENT
Start: 2024-02-02 | End: 2024-02-22

## 2024-02-02 RX ORDER — POTASSIUM CHLORIDE 20MEQ/15ML
40 LIQUID (ML) ORAL ONCE
Status: COMPLETED | OUTPATIENT
Start: 2024-02-02 | End: 2024-02-02

## 2024-02-02 RX ORDER — OLMESARTAN MEDOXOMIL 20 MG/1
20 TABLET ORAL DAILY
Status: DISCONTINUED | OUTPATIENT
Start: 2024-02-02 | End: 2024-02-02

## 2024-02-02 RX ORDER — BUSPIRONE HYDROCHLORIDE 10 MG/1
10 TABLET ORAL 3 TIMES DAILY
Status: DISCONTINUED | OUTPATIENT
Start: 2024-02-02 | End: 2024-02-26 | Stop reason: HOSPADM

## 2024-02-02 RX ORDER — ACETAMINOPHEN 650 MG/1
650 SUPPOSITORY RECTAL EVERY 4 HOURS PRN
Status: DISCONTINUED | OUTPATIENT
Start: 2024-02-02 | End: 2024-02-22

## 2024-02-02 RX ORDER — LATANOPROST 50 UG/ML
1 SOLUTION/ DROPS OPHTHALMIC AT BEDTIME
Status: DISCONTINUED | OUTPATIENT
Start: 2024-02-02 | End: 2024-02-26 | Stop reason: HOSPADM

## 2024-02-02 RX ORDER — PANTOPRAZOLE SODIUM 40 MG/1
40 TABLET, DELAYED RELEASE ORAL
Status: DISCONTINUED | OUTPATIENT
Start: 2024-02-02 | End: 2024-02-14

## 2024-02-02 RX ORDER — ENOXAPARIN SODIUM 100 MG/ML
40 INJECTION SUBCUTANEOUS EVERY 24 HOURS
Status: DISCONTINUED | OUTPATIENT
Start: 2024-02-02 | End: 2024-02-12

## 2024-02-02 RX ORDER — ONDANSETRON 2 MG/ML
4 INJECTION INTRAMUSCULAR; INTRAVENOUS EVERY 6 HOURS PRN
Status: DISCONTINUED | OUTPATIENT
Start: 2024-02-02 | End: 2024-02-10

## 2024-02-02 RX ORDER — TRIAMCINOLONE ACETONIDE 1 MG/G
CREAM TOPICAL 2 TIMES DAILY PRN
Status: DISCONTINUED | OUTPATIENT
Start: 2024-02-02 | End: 2024-02-26 | Stop reason: HOSPADM

## 2024-02-02 RX ORDER — CALCIUM CARBONATE 500 MG/1
1000 TABLET, CHEWABLE ORAL 4 TIMES DAILY PRN
Status: DISCONTINUED | OUTPATIENT
Start: 2024-02-02 | End: 2024-02-26 | Stop reason: HOSPADM

## 2024-02-02 RX ORDER — LIDOCAINE 40 MG/G
CREAM TOPICAL
Status: DISCONTINUED | OUTPATIENT
Start: 2024-02-02 | End: 2024-02-26 | Stop reason: HOSPADM

## 2024-02-02 RX ORDER — MAGNESIUM SULFATE HEPTAHYDRATE 40 MG/ML
4 INJECTION, SOLUTION INTRAVENOUS ONCE
Status: COMPLETED | OUTPATIENT
Start: 2024-02-02 | End: 2024-02-02

## 2024-02-02 RX ORDER — DONEPEZIL HYDROCHLORIDE 5 MG/1
10 TABLET, FILM COATED ORAL AT BEDTIME
Status: DISCONTINUED | OUTPATIENT
Start: 2024-02-02 | End: 2024-02-26 | Stop reason: HOSPADM

## 2024-02-02 RX ORDER — FLUTICASONE FUROATE AND VILANTEROL 200; 25 UG/1; UG/1
1 POWDER RESPIRATORY (INHALATION) DAILY
Status: DISCONTINUED | OUTPATIENT
Start: 2024-02-02 | End: 2024-02-12

## 2024-02-02 RX ORDER — AMOXICILLIN 250 MG
1 CAPSULE ORAL 2 TIMES DAILY PRN
Status: DISCONTINUED | OUTPATIENT
Start: 2024-02-02 | End: 2024-02-26 | Stop reason: HOSPADM

## 2024-02-02 RX ORDER — CALCIUM POLYCARBOPHIL 625 MG 625 MG/1
1250 TABLET ORAL DAILY
Status: DISCONTINUED | OUTPATIENT
Start: 2024-02-02 | End: 2024-02-16

## 2024-02-02 RX ORDER — CEFTRIAXONE 1 G/1
1 INJECTION, POWDER, FOR SOLUTION INTRAMUSCULAR; INTRAVENOUS EVERY 24 HOURS
Qty: 50 ML | Refills: 0 | Status: DISCONTINUED | OUTPATIENT
Start: 2024-02-03 | End: 2024-02-04

## 2024-02-02 RX ORDER — AMOXICILLIN 250 MG
2 CAPSULE ORAL 2 TIMES DAILY PRN
Status: DISCONTINUED | OUTPATIENT
Start: 2024-02-02 | End: 2024-02-26 | Stop reason: HOSPADM

## 2024-02-02 RX ORDER — ONDANSETRON 4 MG/1
4 TABLET, ORALLY DISINTEGRATING ORAL EVERY 6 HOURS PRN
Status: DISCONTINUED | OUTPATIENT
Start: 2024-02-02 | End: 2024-02-10

## 2024-02-02 RX ADMIN — BUSPIRONE HYDROCHLORIDE 10 MG: 10 TABLET ORAL at 13:52

## 2024-02-02 RX ADMIN — CEFTRIAXONE SODIUM 1 G: 1 INJECTION, POWDER, FOR SOLUTION INTRAMUSCULAR; INTRAVENOUS at 00:26

## 2024-02-02 RX ADMIN — POTASSIUM CHLORIDE 10 MEQ: 7.46 INJECTION, SOLUTION INTRAVENOUS at 00:26

## 2024-02-02 RX ADMIN — CALCIUM POLYCARBOPHIL 1250 MG: 625 TABLET, FILM COATED ORAL at 09:58

## 2024-02-02 RX ADMIN — BUSPIRONE HYDROCHLORIDE 10 MG: 10 TABLET ORAL at 08:42

## 2024-02-02 RX ADMIN — ONDANSETRON 4 MG: 2 INJECTION INTRAMUSCULAR; INTRAVENOUS at 06:06

## 2024-02-02 RX ADMIN — PANTOPRAZOLE SODIUM 40 MG: 40 TABLET, DELAYED RELEASE ORAL at 08:42

## 2024-02-02 RX ADMIN — FLUTICASONE FUROATE AND VILANTEROL TRIFENATATE 1 PUFF: 200; 25 POWDER RESPIRATORY (INHALATION) at 09:59

## 2024-02-02 RX ADMIN — ONDANSETRON 4 MG: 4 TABLET, ORALLY DISINTEGRATING ORAL at 22:50

## 2024-02-02 RX ADMIN — LEVOTHYROXINE SODIUM 50 MCG: 50 TABLET ORAL at 08:42

## 2024-02-02 RX ADMIN — DONEPEZIL HYDROCHLORIDE 10 MG: 10 TABLET, FILM COATED ORAL at 06:49

## 2024-02-02 RX ADMIN — DONEPEZIL HYDROCHLORIDE 10 MG: 10 TABLET, FILM COATED ORAL at 21:15

## 2024-02-02 RX ADMIN — POTASSIUM CHLORIDE 40 MEQ: 1.5 SOLUTION ORAL at 06:40

## 2024-02-02 RX ADMIN — FLUOXETINE HYDROCHLORIDE 40 MG: 20 CAPSULE ORAL at 08:42

## 2024-02-02 RX ADMIN — BUSPIRONE HYDROCHLORIDE 10 MG: 10 TABLET ORAL at 21:15

## 2024-02-02 RX ADMIN — POTASSIUM CHLORIDE 20 MEQ: 1.5 SOLUTION ORAL at 00:26

## 2024-02-02 RX ADMIN — MAGNESIUM SULFATE 4 G: 4 INJECTION INTRAVENOUS at 05:57

## 2024-02-02 RX ADMIN — LOSARTAN POTASSIUM 50 MG: 50 TABLET, FILM COATED ORAL at 08:42

## 2024-02-02 RX ADMIN — ENOXAPARIN SODIUM 40 MG: 40 INJECTION SUBCUTANEOUS at 08:42

## 2024-02-02 RX ADMIN — LATANOPROST 1 DROP: 50 SOLUTION OPHTHALMIC at 21:21

## 2024-02-02 RX ADMIN — AZITHROMYCIN MONOHYDRATE 500 MG: 500 INJECTION, POWDER, LYOPHILIZED, FOR SOLUTION INTRAVENOUS at 05:56

## 2024-02-02 RX ADMIN — POTASSIUM CHLORIDE 10 MEQ: 7.46 INJECTION, SOLUTION INTRAVENOUS at 02:13

## 2024-02-02 ASSESSMENT — ACTIVITIES OF DAILY LIVING (ADL)
ADLS_ACUITY_SCORE: 30
ADLS_ACUITY_SCORE: 28
ADLS_ACUITY_SCORE: 35
ADLS_ACUITY_SCORE: 30
ADLS_ACUITY_SCORE: 35
ADLS_ACUITY_SCORE: 35
ADLS_ACUITY_SCORE: 33
ADLS_ACUITY_SCORE: 30
ADLS_ACUITY_SCORE: 35
ADLS_ACUITY_SCORE: 29

## 2024-02-02 NOTE — TELEPHONE ENCOUNTER
"Patient's daughter calling. Consent to communicate is in the chart. Patient is with her. Patient has an appointment tomorrow morning. Daughter received a call asking for a call back.     \"Provider Response to 2nd Level Triage Request     I have reviewed the RN documentation. My recommendation is:  Refer to ED     Due to severe abdominal pain and diaphoresis, it is highly recommended that patient be evaluated in emergency department right away.      I understand that they have so far declined ED evaluation.  Notify daughter/patient that if case is emergent they will be sent from clinic to the ED.      Flower Bennett PA-C on 2/1/2024 at 3:39 PM\"    Patient's daughter states that she and her sister will take patient to [facility] emergency department.     Felicia Norton RN  [facility]  February 1, 2024, 6:32 PM    Reason for Disposition   Health Information question, no triage required and triager able to answer question   [1] Other NON-URGENT information for PCP AND [2] does not require PCP response    Additional Information   Negative: [1] Caller is not with the adult (patient) AND [2] reporting urgent symptoms   Negative: Lab result questions   Negative: Medication questions   Negative: Caller can't be reached by phone   Negative: Caller has already spoken to PCP or another triager   Negative: RN needs further essential information from caller in order to complete triage   Negative: Requesting regular office appointment   Negative: [1] Caller requesting NON-URGENT health information AND [2] PCP's office is the best resource   Negative: Lab calling with strep throat test results and triager can call in prescription   Negative: Lab calling with urinalysis test results and triager can call in prescription   Negative: Medication questions   Negative: Medication renewal and refill questions   Negative: Pre-operative or pre-procedural questions   Negative: ED call to PCP (i.e., primary care provider; doctor, NP, " or PA)   Negative: Doctor (or NP/PA) call to PCP   Negative: Call about patient who is currently hospitalized   Negative: Lab or radiology calling with CRITICAL test results   Negative: [1] Follow-up call from patient regarding patient's clinical status AND [2] information urgent   Negative: [1] Caller requests to speak ONLY to PCP AND [2] URGENT question   Negative: [1] Caller requests to speak to PCP now AND [2] won't tell us reason for call  (Exception: If 10 pm to 6 am, caller must first discuss reason for the call.)   Negative: Notification of hospital admission   Negative: Notification of death   Negative: Caller requesting lab results  (Exception: Routine or non-urgent lab result.)   Negative: Lab or radiology calling with test results   Negative: [1] Follow-up call from patient regarding patient's clinical status AND [2] information NON-URGENT   Negative: [1] Caller requests to speak ONLY to PCP AND [2] NON-URGENT question   Negative: Caller requesting an appointment, triage offered and declined   Negative: Caller requesting routine or non-urgent lab result    Protocols used: Information Only Call - No Triage-A-AH, PCP Call - No Triage-A-

## 2024-02-02 NOTE — PROGRESS NOTES
6MS ADMISSION    D: Patient admitted/transferred from [place] via wheelchair for Hypokalemia and Community acquired pneumonia of left upper lobe of lung.     I: Upon arrival to the unit patient was oriented to room, unit, and call light. Patient s height, weight, and vital signs were obtained. Allergies reviewed and allergy band applied. Provider notified of patient s arrival on the unit. Adult AVS completed. Head to toe assessment completed. Education assessment completed. Care plan initiated.    A: Vital signs stable upon admission. Patient rates pain at 0/10. Two RN skin assessment completed Yes. Second RN was Mimi Ann. Significant Skin Findings include multiple scabs on bilateral upper/lower legs and upper back. Patient states she picks at her skin. Bed Algorithm can be found in PCS flow sheets (Support Surface Algorithm) and on IP [facility] NURSE RESOURCE TAB, was this used during this assessment; No. Was a pulsate mattress ordered; No.    P: Continue to monitor patient and intervene as needed. Continue with plan of care. Notify provider with any concerns or changes in patient status.

## 2024-02-02 NOTE — ED TRIAGE NOTES
Patient presents to ED with CC of upper abdominal pain x 2 days.   +nausea   denies diarrhea , chest pain, sob  .

## 2024-02-02 NOTE — TELEPHONE ENCOUNTER
RN replied to patient via [facility]. See message for details.     Miki Schmid RN, BSN, PHN  [facility] [place]

## 2024-02-02 NOTE — ED PROVIDER NOTES
[place] [facility]MENT ([facility])    2/01/24       ED PROVIDER NOTE   History     Chief Complaint   Patient presents with    Abdominal Pain     The history is provided by the patient and medical records.     Idalia Bob is a 79 year old female with history of COPD, left lower lobe Pseudomonas who presents with abdominal pain  x 2 days with nausea. No chest pain, shortness of breath, or diarrhea. Denies fevers. By chart review patient had recent long hospital stay in December for shortness of breath and weakness related to pneumonia.  Patient was doing relatively well at home however has had increased episodes over the last 2 days, prompting evaluation in the emergency department.  Patient is not tolerating p.o. intake at home well due to nausea and vomiting.    Past Medical History  History reviewed. No pertinent past medical history.  Past Surgical History:   Procedure Laterality Date    ESOPHAGOSCOPY, GASTROSCOPY, DUODENOSCOPY (EGD), COMBINED N/A 2/7/2024    Procedure: ESOPHAGOGASTRODUODENOSCOPY, WITH BIOPSY;  Surgeon: Felton James MD;  Location:  GI     No current outpatient medications on file.    Allergies   Allergen Reactions    Aspirin Nausea and Nausea and Vomiting     Stomach ache    Penicillins Itching     Has tolerated ceftriaxone, piperacillin, and amoxicillin     Family History  History reviewed. No pertinent family history.  Social History   Social History     Tobacco Use    Smoking status: Former     Packs/day: 1.00     Years: 40.00     Additional pack years: 0.00     Total pack years: 40.00     Types: Cigarettes     Passive exposure: Past    Smokeless tobacco: Never   Vaping Use    Vaping Use: Never used   Substance Use Topics    Alcohol use: Not Currently    Drug use: Never      Past medical history, past surgical history, medications, allergies, family history, and social history were reviewed with the patient. No additional pertinent items.      A  medically appropriate review of systems was performed with pertinent positives and negatives noted in the HPI, and all other systems negative.    Physical Exam   BP: 133/89  Pulse: 106  Temp: 97.4  F (36.3  C)  Resp: 17  Height: 152.4 cm (5')  Weight: 57.3 kg (126 lb 6.4 oz)  SpO2: 99 %  Physical Exam  Vitals and nursing note reviewed.   Constitutional:       General: She is not in acute distress.     Appearance: Normal appearance. She is well-developed. She is not diaphoretic.   HENT:      Head: Normocephalic and atraumatic.      Mouth/Throat:      Mouth: Mucous membranes are moist.   Eyes:      General: No scleral icterus.     Conjunctiva/sclera: Conjunctivae normal.   Cardiovascular:      Rate and Rhythm: Regular rhythm. Tachycardia present.      Heart sounds: Normal heart sounds.   Pulmonary:      Effort: Pulmonary effort is normal. No respiratory distress.   Abdominal:      General: Abdomen is flat. There is distension.      Tenderness: There is no abdominal tenderness. There is no guarding or rebound.   Musculoskeletal:      Cervical back: Neck supple.   Skin:     General: Skin is warm.      Findings: No rash.   Neurological:      General: No focal deficit present.      Mental Status: She is alert and oriented to person, place, and time.   Psychiatric:         Mood and Affect: Mood normal.         Behavior: Behavior normal.           ED Course, Procedures, & Data      Procedures            EKG Interpretation:      Interpreted by Valentin Arriaza MD  Time reviewed: 2100  Symptoms at time of EKG: chest pain   Rhythm: irregularly irregular (afib)  Rate: normal  Axis: normal  Ectopy: none  Conduction: normal  ST Segments/ T Waves: No ST-T wave changes  Q Waves: none  Comparison to prior: No old EKG available    Clinical Impression: atrial fibrillation, normal rate, no ST elevations                 Results for orders placed or performed during the hospital encounter of 02/01/24   UPPER GI ENDOSCOPY     Status:  None   Result Value Ref Range    Upper GI Endoscopy       [address] [place] [address] [phone]     Endoscopy Department  _______________________________________________________________________________  Patient Name: Idalia Bob         Procedure Date: 2/7/2024 9:49 AM  MRN: [id number]                       Account Number: [id number]  YOB: 1945              Admit Type: Inpatient  Age: 79                               Room: [place] [address]  Gender: Female                        Note Status: Finalized  Attending MD: BLAYNE BURKETT MD,   Total Sedation Time:   _______________________________________________________________________________     Procedure:             Upper GI endoscopy  Indications:           Generalized abdominal pain, Early satiety, Nausea,                          Weight loss, diarrhea  Providers:             BLAYNE BURKETT MD, Ting Belle RN  Referring MD:            Medicines:             Midazolam 2 mg IV, Fentanyl 100 micrograms  IV,                          Benzocaine spray  Complications:         No immediate complications.  _______________________________________________________________________________  Procedure:             Pre-Anesthesia Assessment:                         - Prior to the procedure, a History and Physical was                          performed, and patient medications and allergies were                          reviewed. The patient is competent. The risks and                          benefits of the procedure and the sedation options and                          risks were discussed with the patient. All questions                          were answered and informed consent was obtained.                          Patient identification and proposed procedure were                          verified by the physician and the nurse in the                          pre-procedure area in  the endoscopy suite. Mental                          Status Examination: alert and oriented. Airway                           Examination: normal oropharyngeal airway and neck                          mobility. Respiratory Examination: clear to                          auscultation. CV Examination: normal. Prophylactic                          Antibiotics: The patient does not require prophylactic                          antibiotics. Prior Anticoagulants: The patient has                          taken no anticoagulant or antiplatelet agents. ASA                          Grade Assessment: III - A patient with severe systemic                          disease. After reviewing the risks and benefits, the                          patient was deemed in satisfactory condition to                          undergo the procedure. The anesthesia plan was to use                          moderate sedation / analgesia (conscious sedation).                          Immediately prior to administration of medications,                          the patient was re-assessed for adequacy to receive                           sedatives. The heart rate, respiratory rate, oxygen                          saturations, blood pressure, adequacy of pulmonary                          ventilation, and response to care were monitored                          throughout the procedure. The physical status of the                          patient was re-assessed after the procedure.                         After obtaining informed consent, the endoscope was                          passed under direct vision. Throughout the procedure,                          the patient's blood pressure, pulse, and oxygen                          saturations were monitored continuously. The Endoscope                          was introduced through the mouth, and advanced to the                          second part of duodenum. The upper GI endoscopy was                           accomplished without difficulty. The patient tolerated                          the procedure well.                                                                                    Findings:       The gastroesophageal flap valve was visualized endoscopically and        classified as Hill Grade IV (no fold, wide open lumen, hiatal hernia        present).       Esophagogastric landmarks were identified: the Z-line was found at 34        cm, the gastroesophageal junction was found at 34 cm and the site of the        diaphragmatic hiatus was found at 42 cm from the incisors.       An 8 cm hiatal hernia was present.       The examined esophagus was otherwise normal.       Localized moderate mucosal changes characterized by congestion and        erythema with mild luminal deformity were found in the gastric antrum.        Biopsies were taken with a cold forceps for histology. Verification of        patient identification for the specimen was done. Estimated blood loss        was minimal.       The exam of the stomach was otherwise normal.       Biopsies were taken with a cold forceps in the gastric body for        Helicobacter pylori testing . Verification of patient identification for        the specimen was done. Estimated blood loss was minimal.       The duodenal bulb, first portion of the duodenum, second portion of the        duodenum and area of the papilla were normal.       Biopsies for histology were taken with a cold forceps in the duodenal        bulb and in the second portion of the duodenum for evaluation of celiac        disease. Verification of patient identification for the specimen was        done. Estimated blood loss was minimal.                                                                                   Moderate Sedation:       Moderate (conscious) sedation was administered by the nurse and        supervised by the endoscopist. The patient's oxygen saturation, heart        rate, blood  pressure and response to care were monitored. Total        physician intraservice time was 34 minutes.  Impression:            - Large 8 cm hiatal hernia. This could contribute to                          symptoms o f nausea and early satiety.                         - Gastroesophageal flap valve classified as Hill Grade                          IV (no fold, wide open lumen, hiatal hernia present).                         - Otherwise normal esophagus.                         - Congestion and erythema with mild luminal deformity                          mucosa in the antrum. Biopsied. This could represent                          mild gastric antral vascular ectasia though other                          causes such as gastritis or dysplasia/neoplasia should                          also be evaluated on biopsies.                         - Exam of the stomach was otherwise normal. No                          evidence of gastric outlet obstruction.                         - Biopsies were taken with a cold forceps for                          Helicobacter pylori testing.                         - Normal duodenal bulb, first portion of the duodenum,                          second portion of the duodenu m and area of the papilla.                         - Biopsies were taken with a cold forceps for                          evaluation of celiac disease.  Recommendation:        - Return patient to [facility] for ongoing care.                         - Advance diet as tolerated.                         - Continue present medications.                         - Await pathology results.                         - Continue with proton pump inhibitor therapy and                          antiemetics. If symptoms persist, could consider                          surgical consult for the hiatal hernia though unclear                          that she is an optimal surgical candidate given                          multiple medical  co-morbidities.                                                                                       _____________________________  BLAYNE BURKETT MD  2/7/2024 10:56:29 AM  I was physically present for the entire viewing portion of the exam.  ______________ ____________  Signature of teaching physician  [id number] BURKETT MD  Number of Addenda: 0    Note Initiated On: 2/7/2024 9:49 AM  Scope In:  Scope Out:     FLEXIBLE SIGMOIDOSCOPY     Status: None   Result Value Ref Range    Flex Sig       [facility] [address] [place] [address] [phone]     Endoscopy Department  _______________________________________________________________________________  Patient Name: Idalia Bob         Procedure Date: 2/7/2024 9:45 AM  MRN: [id number]                       Account Number: [id number]  YOB: 1945              Admit Type: Inpatient  Age: 79                               Room: [place] [address]  Gender: Female                        Note Status: Finalized  Attending MD: BLAYNE BURKETT MD,   Total Sedation Time:   _______________________________________________________________________________     Procedure:             Flexible Sigmoidoscopy  Indications:           Diarrhea, mucous in the stool, weight loss, abdominal                          pain  Providers:             BLAYNE BURKETT MD, Ting Belle, RN  Referring MD:            Medicines:             See the other procedure note for documentation of th e                          administered medications  Complications:         No immediate complications.  _______________________________________________________________________________  Procedure:             Pre-Anesthesia Assessment:                         - Prior to the procedure, a History and Physical was                          performed, and patient medications and allergies were                          reviewed.  The patient is competent. The risks and                          benefits of the procedure and the sedation options and                          risks were discussed with the patient. All questions                          were answered and informed consent was obtained.                          Patient identification and proposed procedure were                          verified by the physician and the nurse in the                          pre-procedure area in the endoscopy suite. Mental                          Status Examination: alert and oriented. Airway                           Examination: normal oropharyngeal airway and neck                          mobility. Respiratory Examination: clear to                          auscultation. CV Examination: normal. Prophylactic                          Antibiotics: The patient does not require prophylactic                          antibiotics. Prior Anticoagulants: The patient has                          taken no anticoagulant or antiplatelet agents. ASA                          Grade Assessment: III - A patient with severe systemic                          disease. After reviewing the risks and benefits, the                          patient was deemed in satisfactory condition to                          undergo the procedure. The anesthesia plan was to use                          moderate sedation / analgesia (conscious sedation).                          Immediately prior to administration of medications,                          the patient was re-assessed for adequacy to receive                           sedatives. The heart rate, respiratory rate, oxygen                          saturations, blood pressure, adequacy of pulmonary                          ventilation, and response to care were monitored                          throughout the procedure. The physical status of the                          patient was re-assessed after the procedure.                          After obtaining informed consent, the scope was passed                          under direct vision. The Colonoscope was introduced                          through the anus and advanced to the splenic flexure.                          The flexible sigmoidoscopy was accomplished without                          difficulty. The patient tolerated the procedure well.                          The quality of the bowel preparation was good.                                                                                   Findings:       The perianal and digital rectal examinations were normal.       Multiple smal l-mouthed diverticula were found in the sigmoid colon.       An area of mildly congested mucosa was found in the rectum, in the        sigmoid colon and in the descending colon.       Biopsies were taken with a cold forceps from left colon and rectum for        histology, evalaute for microscopic colitis or proctitis. Verification        of patient identification for the specimen was done. Estimated blood        loss was minimal.       A localized area of flat, well circumscribed 3cm area moderately altered        vascular, erythematous and granular mucosa was found at 5 cm proximal to        the anus. Biopsies were taken with a cold forceps for histology.        Verification of patient identification for the specimen was done.        Estimated blood loss was minimal.       No additional abnormalities were found on retroflexion.                                                                                   Moderate Sedation:       See the other procedure note for documentation of mo derate sedation with        intraservice time.  Impression:            - Diverticulosis in the sigmoid colon.                         - Congested mucosa in the rectum, in the sigmoid colon                          and in the descending colon.                         - Biopsies were taken with a cold forceps from left                           colon and rectum histology, evalaute for microscopic                          colitis or proctitis..                         - Altered vascular, erythematous and granular mucosa                          at 5 cm proximal to the anus. Biopsied to evalaute for                          inflammation, adenoma, dysplasia. It is possible this                          finding is related to the mucous in the stool.  Recommendation:        - Return patient to hospital [facility] for ongoing care.                         - Advance diet as tolerated.                         - Await pathology results.                         - If the rectal lesion is noted to be adeno ma or have                          dysplasia then I recommend consideration of full                          colonoscopy with EMR or ESD of the rectal lesion. This                          could be done as an outpatient, hospital setting with                          moderate sedation.                         - Try adding a powder fiber supplement such as                          psyllium. If you use a commercial product that                          contains psyllium, follow the package directions. If                          you are not used to taking psyllium, it is best to                          begin with a low dose (such as 1/2 tsp. in an 8 oz.                          glass of water once a day), then gradually increase                          the dose as needed. It is very safe and not absorbed                          so you can increase over time up to approximately 1                          tablespoon twice daily.                                                                                        _____________________________  BLAYNE BURKETT MD  2/7/2024 11:07:48 AM  I was physically present for the entire viewing portion of the exam.  __________________________  Signature of teaching physician  Nina/Brandin  MD KWADWO  Number of Addenda: 0    Note Initiated On: 2/7/2024 9:45 AM  Scope In:  Scope Out:     CT Chest/Abdomen/Pelvis w Contrast     Status: None    Narrative    EXAM: CT CHEST/ABDOMEN/PELVIS W CONTRAST  LOCATION: [facility]  DATE: 2/1/2024    INDICATION: chest and abd pain, 2 days duration, mostly mid upper abdomen radiating towards chest; hx of lower lobe pseudomonas in december  COMPARISON: None.  TECHNIQUE: CT scan of the chest, abdomen, and pelvis was performed following injection of IV contrast. Multiplanar reformats were obtained. Dose reduction techniques were used.   CONTRAST: IsoVue 370: 81mL    FINDINGS:   LUNGS AND PLEURA: Advanced centrilobular emphysema. Airspace consolidation within the posterior aspect of the left upper lobe along the major fissure minimal scarring left base. Fat-containing Bochdalek hernia right lung base.    MEDIASTINUM/AXILLAE: A few borderline-enlarged mediastinal nodes. Large esophageal hiatal hernia.    CORONARY ARTERY CALCIFICATION: Moderate.    HEPATOBILIARY: Normal.    PANCREAS: Normal.    SPLEEN: Normal.    ADRENAL GLANDS: Slight nodular thickening both adrenal glands. No discrete lesions.    KIDNEYS/BLADDER: Normal. No stones or hydronephrosis.    BOWEL: Small left inguinal hernia containing a knuckle of nondilated small bowel. No evidence for bowel obstruction. No bowel wall thickening or inflammatory changes.    LYMPH NODES: Normal.    VASCULATURE: Atherosclerotic disease abdominal aorta and iliac arteries.    PELVIC ORGANS: Normal.    MUSCULOSKELETAL: Mild scoliosis. Degenerative disc disease lumbar spine.      Impression    IMPRESSION:  1.  Airspace consolidation left upper lobe concerning for pneumonia. Follow-up CT exam after therapy recommended to confirm resolution and exclude underlying mass.  2.  Advanced emphysema.  3.  Large esophageal hiatal hernia.  4.  Atherosclerotic disease.  5.  Small left inguinal  hernia.   XR Chest 1 View     Status: None    Narrative    Examination:  XR CHEST 1 VIEW    Date:  2/11/2024 12:30 PM     Clinical Information: increased SOB, cough     Comparison: 2/1/2024.    Findings:   AP upright chest radiograph. Stable cardiac silhouette. Interval  reduced left lung aeration with increased diffuse mixed left-sided  pulmonary opacities. Increased streaky right perihilar and basilar  opacities likely representing edema/atelectasis. New small left  pleural effusion. No right pleural effusion or pneumothorax. No acute  osseous abnormalities.      Impression    Impression:  1. Interval reduced left lung aeration with increased diffuse mixed  left-sided pulmonary opacities, possibly edema/atelectasis or  infection.  2. Small left pleural effusion.     CARLEEN DON MD         SYSTEM ID:  [id number]   CT Chest w/o Contrast     Status: None    Narrative    EXAMINATION: Chest CT  2/12/2024 8:45 AM    CLINICAL HISTORY: Worsening dyspnea, reevaluate L lung opacity vs new  process    COMPARISON: CT, 2/1/2024.    TECHNIQUE: CT imaging obtained through the chest without contrast.  Axial, coronal, and sagittal reconstructions and axial MIP reformatted  images are reviewed.     CONTRAST: No contrast    FINDINGS:  Lungs: The trachea and central airways are patent. Extensive  emphysematous changes. Bilateral small pleural effusions, new compared  to prior. Increased consolidative changes in the bilateral upper  lobes. Septal thickening also noted bilaterally suggestive of edema.    Mediastinum: The visualized thyroid gland is unremarkable. The heart  size is within normal limits. No pericardial effusion. The ascending  aorta and main pulmonary artery diameters are within normal limits.  Coronary artery calcifications. Normal appearance and configuration of  the great vessels off of the aortic arch. Enlarged pretracheal lymph  node image 22, series 2 mm up to 1.2 cm in short axis. Other prominent  aortopulmonary  window lymph nodes just under a centimeter in short  axis. Moderate sliding hiatal hernia.    Bones and soft tissues: No suspicious bone findings. Chest wall  subcutaneous edema.    Upper Abdomen: Unremarkable appearance of the adrenal glands.      Impression    IMPRESSION:   1. Increased consolidative changes in bilateral upper lobes. Bilateral  new small pleural effusions. Findings are suggestive of worsening  infection with or without superimposed atelectasis. Mild associated  pulmonary edema as well.  2. Enlarged mediastinal lymph nodes, most likely reactive.    I have personally reviewed the examination and initial interpretation  and I agree with the findings.    SYLVIA GREEN MD         SYSTEM ID:  [id number]   US Lower Extremity Venous Duplex Bilateral     Status: Abnormal   Result Value Ref Range    Radiologist flags DVT (Urgent)     Narrative    EXAMINATION: DOPPLER VENOUS ULTRASOUND OF BILATERAL LOWER EXTREMITIES,  2/12/2024 8:01 PM     COMPARISON: None.    HISTORY: Evaluate for clot    TECHNIQUE:  Gray-scale evaluation with compression, spectral flow and  color Doppler assessment of the deep venous system of both legs from  groin to knee, and then at the ankles.    FINDINGS:  Right lower extremity: the common femoral, femoral, popliteal  demonstrate normal compressibility and blood flow. The peroneal vein  is not visualized. Occlusive thrombus in the posterior tibial vein in  the proximal-mid calf.    Left lower extremity: the common femoral, femoral, popliteal and  posterior tibial veins demonstrate normal compressibility and blood  flow. The peroneal vein is not visualized.    Avascular hypoechoic collection in the left popliteal fossa measuring  5.5 x 1.9 x 2.4 cm.      Impression    IMPRESSION:  1. Occlusive DVT in the right posterior tibial vein at the  proximal-mid calf.  2.  No evidence of deep venous thrombosis in the left lower extremity.  3. Left popliteal cyst.    [Urgent Result:  DVT]    Finding was identified on 2/12/2024 8:23 PM.     Dr. Torrez was contacted by Dr. Holbrook at 2/12/2024 8:31 PM and  verbalized understanding of the urgent finding.     I have personally reviewed the examination and initial interpretation  and I agree with the findings.    NIELS ROMERO MD         SYSTEM ID:  [id number]   Comprehensive metabolic panel     Status: Abnormal   Result Value Ref Range    Sodium 137 135 - 145 mmol/L    Potassium 2.8 (L) 3.4 - 5.3 mmol/L    Carbon Dioxide (CO2) 18 (L) 22 - 29 mmol/L    Anion Gap 15 7 - 15 mmol/L    Urea Nitrogen 10.1 8.0 - 23.0 mg/dL    Creatinine 0.70 0.51 - 0.95 mg/dL    GFR Estimate 87 >60 mL/min/1.73m2    Calcium 7.7 (L) 8.8 - 10.2 mg/dL    Chloride 104 98 - 107 mmol/L    Glucose 70 70 - 99 mg/dL    Alkaline Phosphatase 47 40 - 150 U/L    AST 22 0 - 45 U/L    ALT <5 0 - 50 U/L    Protein Total 5.5 (L) 6.4 - 8.3 g/dL    Albumin 3.5 3.5 - 5.2 g/dL    Bilirubin Total 0.2 <=1.2 mg/dL   Lipase     Status: Normal   Result Value Ref Range    Lipase 20 13 - 60 U/L   Troponin T, High Sensitivity     Status: Abnormal   Result Value Ref Range    Troponin T, High Sensitivity 45 (H) <=14 ng/L   TSH with free T4 reflex     Status: Normal   Result Value Ref Range    TSH 2.04 0.30 - 4.20 uIU/mL   UA with Microscopic reflex to Culture     Status: Abnormal    Specimen: Urine, Clean Catch   Result Value Ref Range    Color Urine Light Yellow Colorless, Straw, Light Yellow, Yellow    Appearance Urine Clear Clear    Glucose Urine Negative Negative mg/dL    Bilirubin Urine Negative Negative    Ketones Urine 20 (A) Negative mg/dL    Specific Gravity Urine 1.029 1.003 - 1.035    Blood Urine Trace (A) Negative    pH Urine 6.5 5.0 - 7.0    Protein Albumin Urine 30 (A) Negative mg/dL    Urobilinogen Urine Normal Normal, 2.0 mg/dL    Nitrite Urine Negative Negative    Leukocyte Esterase Urine Large (A) Negative    Bacteria Urine Moderate (A) None Seen /HPF    Mucus Urine Present (A) None Seen  /LPF    RBC Urine 9 (H) <=2 /HPF    WBC Urine 17 (H) <=5 /HPF    Squamous Epithelials Urine 2 (H) <=1 /HPF    Narrative    Urine Culture ordered based on laboratory criteria   Procalcitonin     Status: Normal   Result Value Ref Range    Procalcitonin 0.04 <0.50 ng/mL   Asymptomatic Influenza A/B, RSV, & SARS-CoV2 PCR (COVID-19) Nasopharyngeal     Status: Normal    Specimen: Nasopharyngeal; Swab   Result Value Ref Range    Influenza A PCR Negative Negative    Influenza B PCR Negative Negative    RSV PCR Negative Negative    SARS CoV2 PCR Negative Negative    Narrative    Testing was performed using the Xpert Xpress CoV2/Flu/RSV Assay on the Cepheid GeneXpert Instrument. This test should be ordered for the detection of SARS-CoV-2, influenza, and RSV viruses in individuals who meet clinical and/or epidemiological criteria. Test performance is unknown in asymptomatic patients. This test is for in vitro diagnostic use under the FDA EUA for laboratories certified under CLIA to perform high or moderate complexity testing. This test has not been FDA cleared or approved. A negative result does not rule out the presence of PCR inhibitors in the specimen or target RNA in concentration below the limit of detection for the assay. If only one viral target is positive but coinfection with multiple targets is suspected, the sample should be re-tested with another FDA cleared, approved, or authorized test, if coinfection would change clinical management. This test was validated by the [facility]. These laboratories are certified under the Clinical Laboratory Improvement Amendments of 1988 (CLIA-88) as qualified to perform high complexity laboratory testing.   CBC with platelets and differential     Status: Abnormal   Result Value Ref Range    WBC Count 9.0 4.0 - 11.0 10e3/uL    RBC Count 4.03 3.80 - 5.20 10e6/uL    Hemoglobin 9.7 (L) 11.7 - 15.7 g/dL    Hematocrit 32.5 (L) 35.0 - 47.0 %    MCV 81 78 - 100 fL     MCH 24.1 (L) 26.5 - 33.0 pg    MCHC 29.8 (L) 31.5 - 36.5 g/dL    RDW 17.7 (H) 10.0 - 15.0 %    Platelet Count 333 150 - 450 10e3/uL    % Neutrophils 73 %    % Lymphocytes 12 %    % Monocytes 8 %    % Eosinophils 5 %    % Basophils 1 %    % Immature Granulocytes 1 %    NRBCs per 100 WBC 0 <1 /100    Absolute Neutrophils 6.5 1.6 - 8.3 10e3/uL    Absolute Lymphocytes 1.1 0.8 - 5.3 10e3/uL    Absolute Monocytes 0.8 0.0 - 1.3 10e3/uL    Absolute Eosinophils 0.5 0.0 - 0.7 10e3/uL    Absolute Basophils 0.1 0.0 - 0.2 10e3/uL    Absolute Immature Granulocytes 0.1 <=0.4 10e3/uL    Absolute NRBCs 0.0 10e3/uL   Troponin T, High Sensitivity     Status: Abnormal   Result Value Ref Range    Troponin T, High Sensitivity 51 (H) <=14 ng/L   Magnesium     Status: Abnormal   Result Value Ref Range    Magnesium 1.6 (L) 1.7 - 2.3 mg/dL   Troponin T, High Sensitivity     Status: Abnormal   Result Value Ref Range    Troponin T, High Sensitivity 51 (H) <=14 ng/L   Iron and iron binding capacity     Status: Abnormal   Result Value Ref Range    Iron 26 (L) 37 - 145 ug/dL    Iron Binding Capacity      Iron Sat Index     Basic metabolic panel     Status: Abnormal   Result Value Ref Range    Sodium 137 135 - 145 mmol/L    Potassium 4.7 3.4 - 5.3 mmol/L    Chloride 101 98 - 107 mmol/L    Carbon Dioxide (CO2) 23 22 - 29 mmol/L    Anion Gap 13 7 - 15 mmol/L    Urea Nitrogen 9.3 8.0 - 23.0 mg/dL    Creatinine 0.83 0.51 - 0.95 mg/dL    GFR Estimate 71 >60 mL/min/1.73m2    Calcium 9.3 8.8 - 10.2 mg/dL    Glucose 118 (H) 70 - 99 mg/dL   CBC with platelets and differential     Status: Abnormal   Result Value Ref Range    WBC Count 8.0 4.0 - 11.0 10e3/uL    RBC Count 4.41 3.80 - 5.20 10e6/uL    Hemoglobin 10.8 (L) 11.7 - 15.7 g/dL    Hematocrit 36.5 35.0 - 47.0 %    MCV 83 78 - 100 fL    MCH 24.5 (L) 26.5 - 33.0 pg    MCHC 29.6 (L) 31.5 - 36.5 g/dL    RDW 17.8 (H) 10.0 - 15.0 %    Platelet Count 347 150 - 450 10e3/uL    % Neutrophils 71 %    %  Lymphocytes 13 %    % Monocytes 8 %    % Eosinophils 6 %    % Basophils 1 %    % Immature Granulocytes 1 %    NRBCs per 100 WBC 0 <1 /100    Absolute Neutrophils 5.8 1.6 - 8.3 10e3/uL    Absolute Lymphocytes 1.0 0.8 - 5.3 10e3/uL    Absolute Monocytes 0.6 0.0 - 1.3 10e3/uL    Absolute Eosinophils 0.5 0.0 - 0.7 10e3/uL    Absolute Basophils 0.1 0.0 - 0.2 10e3/uL    Absolute Immature Granulocytes 0.1 <=0.4 10e3/uL    Absolute NRBCs 0.0 10e3/uL   Magnesium     Status: Abnormal   Result Value Ref Range    Magnesium 2.9 (H) 1.7 - 2.3 mg/dL   Magnesium     Status: Normal   Result Value Ref Range    Magnesium 2.0 1.7 - 2.3 mg/dL   Basic metabolic panel     Status: Abnormal   Result Value Ref Range    Sodium 134 (L) 135 - 145 mmol/L    Potassium 4.2 3.4 - 5.3 mmol/L    Chloride 98 98 - 107 mmol/L    Carbon Dioxide (CO2) 28 22 - 29 mmol/L    Anion Gap 8 7 - 15 mmol/L    Urea Nitrogen 7.7 (L) 8.0 - 23.0 mg/dL    Creatinine 0.87 0.51 - 0.95 mg/dL    GFR Estimate 67 >60 mL/min/1.73m2    Calcium 8.6 (L) 8.8 - 10.2 mg/dL    Glucose 100 (H) 70 - 99 mg/dL   CBC with platelets     Status: Abnormal   Result Value Ref Range    WBC Count 8.1 4.0 - 11.0 10e3/uL    RBC Count 3.98 3.80 - 5.20 10e6/uL    Hemoglobin 9.6 (L) 11.7 - 15.7 g/dL    Hematocrit 33.3 (L) 35.0 - 47.0 %    MCV 84 78 - 100 fL    MCH 24.1 (L) 26.5 - 33.0 pg    MCHC 28.8 (L) 31.5 - 36.5 g/dL    RDW 17.8 (H) 10.0 - 15.0 %    Platelet Count 309 150 - 450 10e3/uL   CRP inflammation     Status: Abnormal   Result Value Ref Range    CRP Inflammation 7.49 (H) <5.00 mg/L   Ferritin     Status: Normal   Result Value Ref Range    Ferritin 85 11 - 328 ng/mL   Histoplasma Galactomannan Antigen Quant by EIA, Urine     Status: None   Result Value Ref Range    Histoplasma Galactomannan Ag Quant, Urn Not Detected ng/mL    Histoplasma Galactomannan Ag Interp, Urn Not Detected Not Detected   Magnesium     Status: Normal   Result Value Ref Range    Magnesium 1.8 1.7 - 2.3 mg/dL   Basic  metabolic panel     Status: Abnormal   Result Value Ref Range    Sodium 134 (L) 135 - 145 mmol/L    Potassium 4.0 3.4 - 5.3 mmol/L    Chloride 97 (L) 98 - 107 mmol/L    Carbon Dioxide (CO2) 27 22 - 29 mmol/L    Anion Gap 10 7 - 15 mmol/L    Urea Nitrogen 5.8 (L) 8.0 - 23.0 mg/dL    Creatinine 0.84 0.51 - 0.95 mg/dL    GFR Estimate 70 >60 mL/min/1.73m2    Calcium 8.7 (L) 8.8 - 10.2 mg/dL    Glucose 86 70 - 99 mg/dL   CBC with platelets     Status: Abnormal   Result Value Ref Range    WBC Count 8.0 4.0 - 11.0 10e3/uL    RBC Count 3.96 3.80 - 5.20 10e6/uL    Hemoglobin 9.7 (L) 11.7 - 15.7 g/dL    Hematocrit 33.0 (L) 35.0 - 47.0 %    MCV 83 78 - 100 fL    MCH 24.5 (L) 26.5 - 33.0 pg    MCHC 29.4 (L) 31.5 - 36.5 g/dL    RDW 17.7 (H) 10.0 - 15.0 %    Platelet Count 318 150 - 450 10e3/uL   1,3 Beta D glucan fungitell     Status: None   Result Value Ref Range    (1,3)-Beta-D-Glucan <31 pg/mL    B-D GLUCAN INTERPRETATION (1,3) Negative Negative   Calprotectin Feces     Status: Abnormal   Result Value Ref Range    Calprotectin Feces 110.0 (H) 0.0 - 49.9 mg/kg   Magnesium     Status: Normal   Result Value Ref Range    Magnesium 2.2 1.7 - 2.3 mg/dL   Basic metabolic panel     Status: Abnormal   Result Value Ref Range    Sodium 134 (L) 135 - 145 mmol/L    Potassium 3.9 3.4 - 5.3 mmol/L    Chloride 97 (L) 98 - 107 mmol/L    Carbon Dioxide (CO2) 27 22 - 29 mmol/L    Anion Gap 10 7 - 15 mmol/L    Urea Nitrogen 8.5 8.0 - 23.0 mg/dL    Creatinine 0.83 0.51 - 0.95 mg/dL    GFR Estimate 71 >60 mL/min/1.73m2    Calcium 8.9 8.8 - 10.2 mg/dL    Glucose 89 70 - 99 mg/dL   CBC with platelets     Status: Abnormal   Result Value Ref Range    WBC Count 7.8 4.0 - 11.0 10e3/uL    RBC Count 3.94 3.80 - 5.20 10e6/uL    Hemoglobin 9.6 (L) 11.7 - 15.7 g/dL    Hematocrit 32.8 (L) 35.0 - 47.0 %    MCV 83 78 - 100 fL    MCH 24.4 (L) 26.5 - 33.0 pg    MCHC 29.3 (L) 31.5 - 36.5 g/dL    RDW 18.2 (H) 10.0 - 15.0 %    Platelet Count 332 150 - 450 10e3/uL    Creatinine     Status: Normal   Result Value Ref Range    Creatinine 0.83 0.51 - 0.95 mg/dL    GFR Estimate 71 >60 mL/min/1.73m2   IgG     Status: Abnormal   Result Value Ref Range    Immunoglobulin G 609 (L) 610 - 1,616 mg/dL   IgE     Status: Normal   Result Value Ref Range    Immunoglobulin E 20 0 - 114 kU/L   Allergen aspergillus fumigatus IgE     Status: Abnormal   Result Value Ref Range    Aspergillis fumigatus IgE 0.18 (H) <0.10 KU(A)/L    Narrative    ImmunoCAP Specific IgE Blood Test Quantitative Scoring  <0.10 kU(A)/L        Absent/undetectable  0.10-0.69 kU(A)/L    Low  0.70-3.49 kU(A)/L    Moderate  3.50-17.50 kU(A)/L   High  >17.50 kU(A)/L      Very High  Please note: In general, low IgE antibody levels indicate a low probability of clinical disease, whereas high antibody levels to an allergen show good correlation with clinical disease.   Basic metabolic panel     Status: Abnormal   Result Value Ref Range    Sodium 134 (L) 135 - 145 mmol/L    Potassium 3.9 3.4 - 5.3 mmol/L    Chloride 98 98 - 107 mmol/L    Carbon Dioxide (CO2) 28 22 - 29 mmol/L    Anion Gap 8 7 - 15 mmol/L    Urea Nitrogen 10.0 8.0 - 23.0 mg/dL    Creatinine 0.83 0.51 - 0.95 mg/dL    GFR Estimate 71 >60 mL/min/1.73m2    Calcium 9.2 8.8 - 10.2 mg/dL    Glucose 124 (H) 70 - 99 mg/dL   CBC with platelets     Status: Abnormal   Result Value Ref Range    WBC Count 7.1 4.0 - 11.0 10e3/uL    RBC Count 3.88 3.80 - 5.20 10e6/uL    Hemoglobin 9.5 (L) 11.7 - 15.7 g/dL    Hematocrit 31.9 (L) 35.0 - 47.0 %    MCV 82 78 - 100 fL    MCH 24.5 (L) 26.5 - 33.0 pg    MCHC 29.8 (L) 31.5 - 36.5 g/dL    RDW 18.2 (H) 10.0 - 15.0 %    Platelet Count 311 150 - 450 10e3/uL   Magnesium     Status: Normal   Result Value Ref Range    Magnesium 2.2 1.7 - 2.3 mg/dL   Potassium     Status: Normal   Result Value Ref Range    Potassium 4.1 3.4 - 5.3 mmol/L   Magnesium     Status: Normal   Result Value Ref Range    Magnesium 2.2 1.7 - 2.3 mg/dL   Platelet count      Status: Normal   Result Value Ref Range    Platelet Count 297 150 - 450 10e3/uL   Creatinine     Status: Normal   Result Value Ref Range    Creatinine 0.79 0.51 - 0.95 mg/dL    GFR Estimate 75 >60 mL/min/1.73m2   Potassium     Status: Normal   Result Value Ref Range    Potassium 3.9 3.4 - 5.3 mmol/L   Magnesium     Status: Abnormal   Result Value Ref Range    Magnesium 1.6 (L) 1.7 - 2.3 mg/dL   Magnesium     Status: Normal   Result Value Ref Range    Magnesium 2.3 1.7 - 2.3 mg/dL   Extra Tube     Status: None    Narrative    The following orders were created for panel order Extra Tube.  Procedure                               Abnormality         Status                     ---------                               -----------         ------                     Extra Purple Top Tube[[id number]]                            Final result                 Please view results for these tests on the individual orders.   Extra Purple Top Tube     Status: None   Result Value Ref Range    Hold Specimen JIC    CBC with platelets     Status: Abnormal   Result Value Ref Range    WBC Count 9.7 4.0 - 11.0 10e3/uL    RBC Count 4.01 3.80 - 5.20 10e6/uL    Hemoglobin 9.7 (L) 11.7 - 15.7 g/dL    Hematocrit 33.4 (L) 35.0 - 47.0 %    MCV 83 78 - 100 fL    MCH 24.2 (L) 26.5 - 33.0 pg    MCHC 29.0 (L) 31.5 - 36.5 g/dL    RDW 18.2 (H) 10.0 - 15.0 %    Platelet Count 340 150 - 450 10e3/uL   Basic metabolic panel     Status: Abnormal   Result Value Ref Range    Sodium 134 (L) 135 - 145 mmol/L    Potassium 3.6 3.4 - 5.3 mmol/L    Chloride 95 (L) 98 - 107 mmol/L    Carbon Dioxide (CO2) 26 22 - 29 mmol/L    Anion Gap 13 7 - 15 mmol/L    Urea Nitrogen 6.6 (L) 8.0 - 23.0 mg/dL    Creatinine 0.79 0.51 - 0.95 mg/dL    GFR Estimate 75 >60 mL/min/1.73m2    Calcium 9.2 8.8 - 10.2 mg/dL    Glucose 105 (H) 70 - 99 mg/dL   Magnesium     Status: Normal   Result Value Ref Range    Magnesium 1.7 1.7 - 2.3 mg/dL   Extra Tube *Canceled*     Status: None ()     Narrative    The following orders were created for panel order Extra Tube.  Procedure                               Abnormality         Status                     ---------                               -----------         ------                     Extra Green Top (Lithium...[[id number]]                                                   Please view results for these tests on the individual orders.   Extra Tube     Status: None    Narrative    The following orders were created for panel order Extra Tube.  Procedure                               Abnormality         Status                     ---------                               -----------         ------                     Extra Purple Top Tube[[id number]]                            Final result                 Please view results for these tests on the individual orders.   Extra Purple Top Tube     Status: None   Result Value Ref Range    Hold Specimen [facility]    Platelet count     Status: Normal   Result Value Ref Range    Platelet Count 269 150 - 450 10e3/uL   Creatinine     Status: Normal   Result Value Ref Range    Creatinine 0.80 0.51 - 0.95 mg/dL    GFR Estimate 74 >60 mL/min/1.73m2   Magnesium     Status: Abnormal   Result Value Ref Range    Magnesium 1.6 (L) 1.7 - 2.3 mg/dL   Basic metabolic panel     Status: Abnormal   Result Value Ref Range    Sodium 134 (L) 135 - 145 mmol/L    Potassium 4.3 3.4 - 5.3 mmol/L    Chloride 97 (L) 98 - 107 mmol/L    Carbon Dioxide (CO2) 23 22 - 29 mmol/L    Anion Gap 14 7 - 15 mmol/L    Urea Nitrogen 6.8 (L) 8.0 - 23.0 mg/dL    Creatinine 0.80 0.51 - 0.95 mg/dL    GFR Estimate 74 >60 mL/min/1.73m2    Calcium 9.1 8.8 - 10.2 mg/dL    Glucose 144 (H) 70 - 99 mg/dL   CBC with platelets     Status: Abnormal   Result Value Ref Range    WBC Count 9.2 4.0 - 11.0 10e3/uL    RBC Count 3.37 (L) 3.80 - 5.20 10e6/uL    Hemoglobin 8.3 (L) 11.7 - 15.7 g/dL    Hematocrit 28.7 (L) 35.0 - 47.0 %    MCV 85 78 - 100 fL    MCH 24.6 (L) 26.5 - 33.0  pg    MCHC 28.9 (L) 31.5 - 36.5 g/dL    RDW 18.5 (H) 10.0 - 15.0 %    Platelet Count 269 150 - 450 10e3/uL   CRP inflammation     Status: Abnormal   Result Value Ref Range    CRP Inflammation 64.83 (H) <5.00 mg/L   Glucose by meter     Status: Abnormal   Result Value Ref Range    GLUCOSE BY METER POCT 119 (H) 70 - 99 mg/dL   Magnesium     Status: Abnormal   Result Value Ref Range    Magnesium 2.4 (H) 1.7 - 2.3 mg/dL   Basic metabolic panel     Status: Abnormal   Result Value Ref Range    Sodium 131 (L) 135 - 145 mmol/L    Potassium 5.4 (H) 3.4 - 5.3 mmol/L    Chloride 95 (L) 98 - 107 mmol/L    Carbon Dioxide (CO2) 26 22 - 29 mmol/L    Anion Gap 10 7 - 15 mmol/L    Urea Nitrogen 7.4 (L) 8.0 - 23.0 mg/dL    Creatinine 0.92 0.51 - 0.95 mg/dL    GFR Estimate 63 >60 mL/min/1.73m2    Calcium 9.1 8.8 - 10.2 mg/dL    Glucose 123 (H) 70 - 99 mg/dL   Phosphorus     Status: Normal   Result Value Ref Range    Phosphorus 3.3 2.5 - 4.5 mg/dL   CBC with platelets and differential     Status: Abnormal   Result Value Ref Range    WBC Count 9.2 4.0 - 11.0 10e3/uL    RBC Count 3.50 (L) 3.80 - 5.20 10e6/uL    Hemoglobin 8.5 (L) 11.7 - 15.7 g/dL    Hematocrit 28.7 (L) 35.0 - 47.0 %    MCV 82 78 - 100 fL    MCH 24.3 (L) 26.5 - 33.0 pg    MCHC 29.6 (L) 31.5 - 36.5 g/dL    RDW 18.5 (H) 10.0 - 15.0 %    Platelet Count 324 150 - 450 10e3/uL    % Neutrophils 82 %    % Lymphocytes 6 %    % Monocytes 10 %    % Eosinophils 1 %    % Basophils 0 %    % Immature Granulocytes 1 %    NRBCs per 100 WBC 0 <1 /100    Absolute Neutrophils 7.4 1.6 - 8.3 10e3/uL    Absolute Lymphocytes 0.6 (L) 0.8 - 5.3 10e3/uL    Absolute Monocytes 0.9 0.0 - 1.3 10e3/uL    Absolute Eosinophils 0.1 0.0 - 0.7 10e3/uL    Absolute Basophils 0.0 0.0 - 0.2 10e3/uL    Absolute Immature Granulocytes 0.1 <=0.4 10e3/uL    Absolute NRBCs 0.0 10e3/uL   Procalcitonin     Status: Normal   Result Value Ref Range    Procalcitonin 0.11 <0.50 ng/mL   Nt probnp inpatient     Status:  Abnormal   Result Value Ref Range    N terminal Pro BNP Inpatient 18,456 (H) 0 - 1,800 pg/mL   Potassium     Status: Normal   Result Value Ref Range    Potassium 4.8 3.4 - 5.3 mmol/L   Renal panel     Status: Abnormal   Result Value Ref Range    Sodium 134 (L) 135 - 145 mmol/L    Potassium 4.2 3.4 - 5.3 mmol/L    Chloride 92 (L) 98 - 107 mmol/L    Carbon Dioxide (CO2) 28 22 - 29 mmol/L    Anion Gap 14 7 - 15 mmol/L    Glucose 97 70 - 99 mg/dL    Urea Nitrogen 9.3 8.0 - 23.0 mg/dL    Creatinine 0.97 (H) 0.51 - 0.95 mg/dL    GFR Estimate 59 (L) >60 mL/min/1.73m2    Calcium 9.0 8.8 - 10.2 mg/dL    Albumin 3.5 3.5 - 5.2 g/dL    Phosphorus 4.2 2.5 - 4.5 mg/dL   Magnesium     Status: Normal   Result Value Ref Range    Magnesium 1.7 1.7 - 2.3 mg/dL   CBC with platelets and differential     Status: Abnormal   Result Value Ref Range    WBC Count 10.1 4.0 - 11.0 10e3/uL    RBC Count 3.79 (L) 3.80 - 5.20 10e6/uL    Hemoglobin 9.2 (L) 11.7 - 15.7 g/dL    Hematocrit 30.5 (L) 35.0 - 47.0 %    MCV 81 78 - 100 fL    MCH 24.3 (L) 26.5 - 33.0 pg    MCHC 30.2 (L) 31.5 - 36.5 g/dL    RDW 18.5 (H) 10.0 - 15.0 %    Platelet Count      % Neutrophils 77 %    % Lymphocytes 10 %    % Monocytes 10 %    % Eosinophils 1 %    % Basophils 1 %    % Immature Granulocytes 1 %    NRBCs per 100 WBC 0 <1 /100    Absolute Neutrophils 7.9 1.6 - 8.3 10e3/uL    Absolute Lymphocytes 1.0 0.8 - 5.3 10e3/uL    Absolute Monocytes 1.0 0.0 - 1.3 10e3/uL    Absolute Eosinophils 0.1 0.0 - 0.7 10e3/uL    Absolute Basophils 0.1 0.0 - 0.2 10e3/uL    Absolute Immature Granulocytes 0.1 <=0.4 10e3/uL    Absolute NRBCs 0.0 10e3/uL   Platelet count     Status: Normal   Result Value Ref Range    Platelet Count 341 150 - 450 10e3/uL   Creatinine     Status: Normal   Result Value Ref Range    Creatinine 0.90 0.51 - 0.95 mg/dL    GFR Estimate 64 >60 mL/min/1.73m2   Potassium     Status: Normal   Result Value Ref Range    Potassium 3.9 3.4 - 5.3 mmol/L   Magnesium     Status:  Abnormal   Result Value Ref Range    Magnesium 2.5 (H) 1.7 - 2.3 mg/dL   Low Molecular Weight Heparin Anti Xa Level     Status: Normal   Result Value Ref Range    Anti Xa Low Molecular Weight 0.50 For Reference Range, See Comment IU/mL    Narrative    If collected 4-6 hours after administration:  Adults:  If administered only once daily with a dose of 1.5 mg/k.0-2.0 IU/mL.  If administered twice daily with a dose of 1 mg/k.50-1.0 IU/mL.  Pediatrics:   If administered twice daily: 0.50-1.0 IU/mL.   Sodium     Status: Abnormal   Result Value Ref Range    Sodium 133 (L) 135 - 145 mmol/L   Lipase     Status: Abnormal   Result Value Ref Range    Lipase 9 (L) 13 - 60 U/L   Hepatic panel     Status: Abnormal   Result Value Ref Range    Protein Total 5.6 (L) 6.4 - 8.3 g/dL    Albumin 3.0 (L) 3.5 - 5.2 g/dL    Bilirubin Total 0.4 <=1.2 mg/dL    Alkaline Phosphatase 71 40 - 150 U/L    AST 27 0 - 45 U/L    ALT 28 0 - 50 U/L    Bilirubin Direct <0.20 0.00 - 0.30 mg/dL   EKG 12 lead     Status: None   Result Value Ref Range    Systolic Blood Pressure  mmHg    Diastolic Blood Pressure  mmHg    Ventricular Rate 91 BPM    Atrial Rate 86 BPM    SD Interval  ms    QRS Duration 68 ms     ms    QTc 526 ms    P Axis  degrees    R AXIS 74 degrees    T Axis 59 degrees    Interpretation ECG       Atrial fibrillation  Nonspecific ST and T wave abnormality  Abnormal ECG  Unconfirmed report - interpretation of this ECG is computer generated - see medical record for final interpretation  Confirmed by - EMERGENCY ROOM, PHYSICIAN ([id number]),  DOMINGO PETERSEN ([id number]) on 2024 9:20:46 PM     EKG 12-lead, complete     Status: None   Result Value Ref Range    Systolic Blood Pressure  mmHg    Diastolic Blood Pressure  mmHg    Ventricular Rate 112 BPM    Atrial Rate 112 BPM    SD Interval 156 ms    QRS Duration 68 ms     ms    QTc 453 ms    P Axis 93 degrees    R AXIS 100 degrees    T Axis 67 degrees    Interpretation ECG        ** Suspect arm lead reversal, interpretation assumes no reversal  Sinus tachycardia  Rightward axis  Abnormal ECG  Confirmed by MD MARLENA, RAFITA ([id number]) on 2024 10:26:56 AM     Echo Complete     Status: None   Result Value Ref Range    LVEF  55-60%     Narrative    [id number]  [id number]  [id number]  [id number]^LINDSEY^ARMEN^E     [facility],[place]  Echocardiography Laboratory  [address] [place], [place] [address]     Name: JULIANO HEALY  MRN: [id number]  : 1945  Study Date: 2024 02:29 PM  Age: 79 yrs  Gender: Female  Patient Location: [facility]  Reason For Study: Dyspnea  Ordering Physician: ARMEN PORTILLO  Performed By: Carolina Child     BSA: 1.5 m2  Height: 60 in  Weight: 123 lb  HR: 114  BP: 169/100 mmHg  ______________________________________________________________________________  Procedure  Complete Portable Echo Adult.  ______________________________________________________________________________  Interpretation Summary  A large left pleural effusion is present.  Global and regional left ventricular function is normal with an EF of 55-60%.  Global right ventricular function is normal. The right ventricle is normal  size.  Moderate tricuspid insufficiency is present. The etiology is RA enlargement.  The estimated PA systolic pressure is 60 mmHg.  IVC diameter and respiratory changes fall into an intermediate range  suggesting an RA pressure of 8 mmHg.  There is no prior study for direct comparison.  ______________________________________________________________________________  Left Ventricle  Global and regional left ventricular function is normal with an EF of 55-60%.  Left ventricular size is normal. Relative wall thickness is increased  consistent with concentric remodeling. Left ventricular diastolic function is  normal.     Right Ventricle  Global right ventricular function is normal. The right ventricle is normal  size.     Atria  Mild to moderate  biatrial enlargement is present.     Mitral Valve  Mild mitral annular calcification is present. Trace mitral insufficiency is  present.     Aortic Valve  Mild aortic valve calcification is present. The valve leaflets are not well  visualized. On Doppler interrogation, there is no significant stenosis or  regurgitation.     Tricuspid Valve  Moderate tricuspid insufficiency is present. The right ventricular systolic  pressure is approximated at 51.6 mmHg plus the right atrial pressure. The  etiology is RA enlargement.     Pulmonic Valve  Mild pulmonic insufficiency is present.     Vessels  The aorta root is normal. The thoracic aorta is normal. IVC diameter and  respiratory changes fall into an intermediate range suggesting an RA pressure  of 8 mmHg.     Pericardium  Trivial pericardial effusion is present. Chamber compression is not present;  there is no evidence for tamponade.     Miscellaneous  A left pleural effusion is present.     Compared to Previous Study  There is no prior study for direct comparison.  ______________________________________________________________________________  MMode/2D Measurements & Calculations  IVSd: 0.82 cm  LVIDd: 4.2 cm  LVIDs: 3.2 cm  LVPWd: 1.1 cm  FS: 24.8 %  LV mass(C)d: 131.0 grams  LV mass(C)dI: 86.3 grams/m2  Ao root diam: 2.9 cm  asc Aorta Diam: 3.0 cm  LVOT diam: 1.9 cm  LVOT area: 2.8 cm2  Ao root diam index Ht(cm/m): 1.9  Ao root diam index BSA (cm/m2): 1.9  Asc Ao diam index BSA (cm/m2): 2.0  Asc Ao diam index Ht(cm/m): 2.0  LA Volume (BP): 57.0 ml     LA Volume Index (BP): 37.5 ml/m2  RV Base: 3.7 cm  RWT: 0.53  TAPSE: 1.3 cm     Doppler Measurements & Calculations  MV E max gregory: 92.5 cm/sec  MV A max gregory: 101.0 cm/sec  MV E/A: 0.92  MV dec slope: 825.0 cm/sec2  MV dec time: 0.11 sec  Ao V2 max: 166.0 cm/sec  Ao max P.0 mmHg  Ao V2 mean: 124.0 cm/sec  Ao mean P.0 mmHg  Ao V2 VTI: 29.3 cm  GENEVIEVE(I,D): 2.1 cm2  GENEVIEVE(V,D): 2.2 cm2  LV V1 max P.4 mmHg  LV V1 max:  126.0 cm/sec  LV V1 VTI: 21.8 cm  SV(LVOT): 61.8 ml  SI(LVOT): 40.7 ml/m2     PA acc time: 0.08 sec  PI end-d bryson: 122.0 cm/sec  TR max bryson: 359.0 cm/sec  TR max P.6 mmHg  AV Bryson Ratio (DI): 0.76  GENEVIEVE Index (cm2/m2): 1.4  E/E' av.6  Lateral E/e': 7.5  Medial E/e': 11.6  RV S Bryson: 10.1 cm/sec     ______________________________________________________________________________  Report approved by: Ely Johns 2024 07:27 PM         Surgical Pathology Exam     Status: None   Result Value Ref Range    Case Report       Surgical Pathology Report                         Case: [id number]                                  Authorizing Provider:  Felton James      Collected:           2024 10:11 AM                                 MD Nazario                                                                     Ordering Location:     [facility]          Received:            2024 10:50 AM                                 Endoscopy                                                                    Pathologist:           Maicol Loaiza MD                                                                 Specimens:   A) - Small Intestine, Duodenum, Duodenal biopsy                                                     B) - Stomach, Antrum, Gastric biopsy                                                                C) - Stomach, Body, Gastric biopsy                                                                   D) - Large Intestine, Colon, left                                                                   E) - Rectum, patch at 5 cm                                                                          F) - Rectum, Rectal biopsy                                                                 Final Diagnosis       A.  DUODENUM, BIOPSY:  -Duodenal mucosa with intact villous architecture and no  "significant histologic abnormality  -Negative for celiac sprue and peptic duodenitis    B. GASTRIC ANTRUM, BIOPSY:  -Gastric antral mucosa with reactive gastropathy  -Negative for intestinal metaplasia and dysplasia  -No H. pylori-like organisms identified on routine stain    C. GASTRIC BODY,BIOPSY:  -Gastric oxyntic mucosa with reactive gastropathy  -Negative for intestinal metaplasia and dysplasia  -No H. pylori-like organisms identified on routine stain    D.  LEFT COLON, BIOPSY:  -Colonic mucosa with no significant histologic abnormality  -Negative for active inflammation, lymphocytic colitis and dysplasia    E.  RECTAL PATCH AT 5 CM, BIOPSY:  -Colorectal mucosa with active inflammation, surface erosion, focal atypia favor reactive in nature  -Negative for dysplasia and malignancy    F.  RECTUM, BIOPSY:  -Colorectal mucosa with no significant histologic abnormality  -Negative for active inflammation, lymphocytic colitis and dysplasia      Comment       Part E from the rectal patch shows surface damage characterized by reactive changes, mucin loss, fibrin and active inflammation compatible with site of erosion/superficial ulcer.  No evidence of viral cytopathic effect is seen on routine stain nor is there evidence of significant glandular architectural distortion or granuloma formation.  This histologic pattern could be seen in the setting of solitary rectal ulcer/mucosal prolapse, recommend clinical and endoscopic correlation.  Intradepartmental consultation obtained.      Clinical Information       Fecal urgency      Gross Description       A(1). Small Intestine, Duodenum, Duodenal biopsy:  The specimen is received in formalin with proper patient identification, labeled \"duodenum biopsy\".  The specimen consists of 4 pieces of tan soft tissue ranging from 0.2 to 0.3 cm in greatest dimension.  It is entirely submitted in cassette A1.   B(2). Stomach, Antrum, Gastric biopsy:  The specimen is received in formalin " "with proper patient identification, labeled \"stomach antrum biopsy\".  The specimen consists of 6 pieces of tan soft tissue ranging from 0.2 to 0.5 cm in greatest dimension.  It is entirely submitted in cassette B1.   C(3). Stomach, Body, Gastric biopsy:  The specimen is received in formalin with proper patient identification, labeled \"stomach body biopsy\".  The specimen consists of 3 pieces of tan soft tissue ranging from 0.2 to 0.4 cm in greatest dimension.  It is entirely submitted in cassette C1.   D(4). Large Intestine, Colon, left:  The specimen is received in formalin with proper patient identification, labeled \"left colon biopsy\".  The specimen consists of 7 pieces of tan soft tissue ranging from 0.2 to 0.7 cm in greatest dimension.  It is entirely submitted in cassette D1.   E(5). Rectum, patch at 5 cm:  The specimen is received in formalin with proper patient identification, labeled \"rectum biopsy patch at 5 cm\".  The specimen consists of 2 pieces of tan soft tissue ranging from 0.2 to 0.3 cm in greatest dimension.  It is entirely submitted in cassette E1.   F(6). Rectum, Rectal biopsy:  The specimen is received in formalin with proper patient identification, labeled \"rectum biopsy\".  The specimen consists of 4 pieces of tan soft tissue ranging from 0.2 to 0.4 cm in greatest dimension.  It is entirely submitted in cassette F1.       Microscopic Description       Microscopic examination was performed.        Performing Labs       The technical component of this testing was completed at [facility]      Case Images     Urine Culture     Status: Abnormal    Specimen: Urine, Clean Catch   Result Value Ref Range    Culture 10,000-50,000 CFU/mL Enterococcus faecalis (A)        Susceptibility    Enterococcus faecalis - MELISSA     Ampicillin  Susceptible ug/mL     Vancomycin  Susceptible ug/mL     Nitrofurantoin  Susceptible ug/mL   C. difficile Toxin B PCR " with reflex to C. difficile Antigen and Toxins A/B EIA     Status: Normal    Specimen: Per Rectum; Stool   Result Value Ref Range    C Difficile Toxin B by PCR Negative Negative    Narrative    The Omni-ID Xpert C. difficile Assay, performed on the [employer], is a qualitative in vitro diagnostic test for rapid detection of toxin B gene sequences from unformed (liquid or soft) stool specimens collected from patients suspected of having Clostridioides difficile infection (CDI). The test utilizes automated real-time polymerase chain reaction (PCR) to detect toxin gene sequences associated with toxin producing C. difficile. The Xpert C. difficile Assay is intended as an aid in the diagnosis of CDI.   Enteric Bacteria and Virus Panel PCR     Status: Normal    Specimen: Per Rectum; Stool   Result Value Ref Range    Campylobacter species Negative Negative    Salmonella species Negative Negative    Vibrio species Negative Negative    Vibrio cholerae Negative Negative    Yersinia enterocolitica Negative Negative    Enteropathogenic E. coli (EPEC) Negative Negative, NA    Shiga-like toxin-producing E. coli (STEC) Negative Negative    Shigella/Enteroinvasive E. coli (EIEC) Negative Negative    Cryptosporidium species Negative Negative    Giardia lamblia Negative Negative    Norovirus Gl/Gll Negative Negative    Rotavirus A Negative Negative    Plesiomonas shigelloides Negative Negative    Enteroaggregative E. coli (EAEC) Negative Negative    Enterotoxigenic E. coli (ETEC) Negative Negative    E. coli O157 NA Negative, NA    Cyclospora cayetanensis Negative Negative    Entamoeba histolytica Negative Negative    Adenovirus F40/41 Negative Negative    Astrovirus Negative Negative    Sapovirus Negative Negative    Narrative    Assay performed using the FDA-cleared Progression Labs GI Panel from [employer]  A negative result should not rule out infection in patients with a probability for  gastrointestinal infection. The assay does not test for all potential infectious agents of diarrheal disease.  Positive results do not distinguish between a viable or replicating organism and the presence of a nonviable organism or nucleic acid, nor do they exclude the possibility of coinfection by organisms not in the panel.  Results are intended to aid in the diagnosis of illness and are meant to be used in conjunction with other clinical findings.  This test has been verified and is performed by the Infectious Diseases Diagnostic Laboratory at [facility]. This laboratory is certified under the Clinical Laboratory Improvement Amendments of 1988 (CLIA-88) as qualified to perform high complexity clinical laboratory testing.   Blastomyces Agn Quant EIA Non Blood     Status: None    Specimen: Urine, Clean Catch   Result Value Ref Range    See Scanned Result BLASTOMYCES AGN QUANT EIA NON BLOOD-Scanned    Aspergillus Galactomannan Antigen     Status: None    Specimen: Arm, Left; Blood   Result Value Ref Range    Aspergillus Galactomannan Index 0.06     Aspergillus Galact AG Negative Negative   Legionella pneumophila antigen urine     Status: Normal    Specimen: Urine, Midstream   Result Value Ref Range    Legionella pneumophila serogroup 1 urinary antigen Negative Negative   MRSA MSSA PCR, Nasal Swab     Status: None    Specimen: Nares, Bilateral; Swab   Result Value Ref Range    MRSA Target DNA Negative Negative    SA Target DNA Negative     Narrative    The [employer]  Xpert SA Nasal Complete assay performed in the "[employer]  Dx System is a qualitative in vitro diagnostic test designed for rapid detection of Staphylococcus aureus (SA) and methicillin-resistant Staphylococcus aureus (MRSA) from nasal swabs in patients at risk for nasal colonization. The test utilizes automated real-time polymerase chain reaction (PCR) to detect MRSA/SA DNA. The Xpert SA Nasal Complete assay is intended to aid in the prevention and  control of MRSA/SA infections in healthcare settings. The assay is not intended to diagnose, guide or monitor treatment for MRSA/SA infections, or provide results of susceptibility to methicillin. A negative result does not preclude MRSA/SA nasal colonization.    CBC with Platelets & Differential     Status: Abnormal    Narrative    The following orders were created for panel order CBC with Platelets & Differential.  Procedure                               Abnormality         Status                     ---------                               -----------         ------                     CBC with platelets and d...[[id number]]  Abnormal            Final result                 Please view results for these tests on the individual orders.   CBC with platelets differential     Status: Abnormal    Narrative    The following orders were created for panel order CBC with platelets differential.  Procedure                               Abnormality         Status                     ---------                               -----------         ------                     CBC with platelets and d...[[id number]]  Abnormal            Final result                 Please view results for these tests on the individual orders.   CBC with Platelets & Differential     Status: Abnormal    Narrative    The following orders were created for panel order CBC with Platelets & Differential.  Procedure                               Abnormality         Status                     ---------                               -----------         ------                     CBC with platelets and d...[[id number]]  Abnormal            Final result                 Please view results for these tests on the individual orders.   CBC with Platelets & Differential     Status: Abnormal    Narrative    The following orders were created for panel order CBC with Platelets & Differential.  Procedure                               Abnormality         Status                      ---------                               -----------         ------                     CBC with platelets and d...[[id number]]  Abnormal            Final result                 Please view results for these tests on the individual orders.     Medications   albuterol (PROVENTIL) neb solution 2.5 mg (2.5 mg Nebulization $Given 2/13/24 0304)   busPIRone (BUSPAR) tablet 10 mg (10 mg Oral $Given 2/14/24 1542)   calcium polycarbophil (FIBERCON) tablet 1,250 mg ( Oral Automatically Held 2/17/24 0800)   donepezil (ARICEPT) tablet 10 mg (10 mg Oral $Given 2/13/24 2131)   FLUoxetine (PROzac) capsule 40 mg (40 mg Oral $Given 2/14/24 0820)   latanoprost (XALATAN) 0.005 % ophthalmic solution 1 drop (1 drop Both Eyes $Given 2/13/24 2131)   levothyroxine (SYNTHROID/LEVOTHROID) tablet 50 mcg (50 mcg Oral $Given 2/14/24 0820)   triamcinolone (KENALOG) 0.1 % cream ( Topical $Given 2/13/24 1627)   lidocaine 1 % 0.1-1 mL ( Other Unhold 2/7/24 1207)   lidocaine (LMX4) cream ( Topical Unhold 2/7/24 1207)   sodium chloride (PF) 0.9% PF flush 3 mL (3 mLs Intracatheter $Given 2/14/24 1303)   sodium chloride (PF) 0.9% PF flush 3 mL ( Intracatheter Unhold 2/7/24 1207)   senna-docusate (SENOKOT-S/PERICOLACE) 8.6-50 MG per tablet 1 tablet ( Oral Unhold 2/7/24 1207)     Or   senna-docusate (SENOKOT-S/PERICOLACE) 8.6-50 MG per tablet 2 tablet ( Oral Unhold 2/7/24 1207)   calcium carbonate (TUMS) chewable tablet 1,000 mg (1,000 mg Oral $Given 2/9/24 1653)   acetaminophen (TYLENOL) tablet 650 mg (325 mg Oral $Given 2/14/24 1542)     Or   acetaminophen (TYLENOL) Suppository 650 mg ( Rectal See Alternative 2/14/24 8442)   melatonin tablet 1 mg (1 mg Oral $Given 2/12/24 2131)   losartan (COZAAR) tablet 50 mg ( Oral Automatically Held 2/17/24 0800)   dicyclomine (BENTYL) capsule 10 mg (10 mg Oral $Given 2/14/24 1254)   dextrose 5% in lactated ringers + KCl 20 mEq/L infusion ( Intravenous Held by provider 2/12/24 0853)   diphenhydrAMINE-zinc  acetate (BENADRYL) 2-0.1 % cream ( Topical $Given 2/14/24 1254)   prochlorperazine (COMPAZINE) injection 5 mg (5 mg Intravenous $Given 2/14/24 1611)     Or   prochlorperazine (COMPAZINE) tablet 5 mg ( Oral See Alternative 2/14/24 1611)     Or   prochlorperazine (COMPAZINE) suppository 12.5 mg ( Rectal See Alternative 2/14/24 1611)   ondansetron (ZOFRAN ODT) ODT tab 4 mg ( Oral See Alternative 2/14/24 1254)     Or   ondansetron (ZOFRAN) injection 4 mg (4 mg Intravenous $Given 2/14/24 1254)   hydrOXYzine HCl (ATARAX) tablet 10 mg (10 mg Oral $Given 2/14/24 1254)   ceFEPIme (MAXIPIME) 2 g vial to attach to  mL bag for ADULTS or 50 mL bag for PEDS (2 g Intravenous $New Bag 2/14/24 1303)   ipratropium - albuterol 0.5 mg/2.5 mg/3 mL (DUONEB) neb solution 3 mL (3 mLs Nebulization $Given 2/14/24 2002)   budesonide (PULMICORT) neb solution 1 mg (1 mg Nebulization $Given 2/14/24 2002)   OLANZapine zydis (zyPREXA) ODT half-tab 2.5 mg (2.5 mg Oral $Given 2/13/24 2131)   pantoprazole (PROTONIX) IV push injection 40 mg (has no administration in time range)   enoxaparin ANTICOAGULANT (LOVENOX) injection 50 mg (has no administration in time range)   LORazepam (ATIVAN) 2 MG/ML (HIGH CONC) oral solution 1 mg (1 mg Oral $Given 2/14/24 1611)   ondansetron (ZOFRAN) injection 4 mg (4 mg Intravenous $Given 2/1/24 2126)   sodium chloride (PF) 0.9% PF flush 70 mL (70 mLs Intravenous $Given 2/1/24 2230)   iopamidol (ISOVUE-370) solution 81 mL (81 mLs Intravenous $Given 2/1/24 2230)   cefTRIAXone (ROCEPHIN) 1 g vial to attach to  mL bag for ADULTS or NS 50 mL bag for PEDS (0 g Intravenous Stopped 2/2/24 0100)   azithromycin (ZITHROMAX) 500 mg in sodium chloride 0.9 % 250 mL intermittent infusion (500 mg Intravenous $New Bag 2/2/24 0556)   potassium chloride (KAYCIEL) solution 20 mEq (20 mEq Oral $Given 2/2/24 0026)   potassium chloride 10 mEq in 100 mL sterile water infusion (0 mEq Intravenous Stopped 2/2/24 0313)   magnesium  sulfate 4 g in 100 mL sterile water intermittent infusion (4 g Intravenous $New Bag 2/2/24 0557)   potassium chloride (KAYCIEL) solution 40 mEq (40 mEq Oral $Given 2/2/24 0640)   magnesium sulfate 2 g in 50 mL sterile water intermittent infusion (2 g Intravenous $New Bag 2/8/24 1232)   magnesium sulfate 2 g in 50 mL sterile water intermittent infusion (2 g Intravenous $New Bag 2/10/24 2249)   magnesium sulfate 2 g in 50 mL sterile water intermittent infusion (2 g Intravenous $New Bag 2/11/24 1741)   furosemide (LASIX) injection 40 mg (40 mg Intravenous $Given 2/12/24 1054)   furosemide (LASIX) injection 40 mg (40 mg Intravenous $Given 2/12/24 2131)   diphenhydrAMINE (BENADRYL) capsule 25 mg (25 mg Oral $Given 2/13/24 0314)   magnesium sulfate 2 g in 50 mL sterile water intermittent infusion (2 g Intravenous $New Bag 2/13/24 1206)   LORazepam (ATIVAN) half-tab 0.25 mg (0.25 mg Oral $Given 2/14/24 1030)     Labs Ordered and Resulted from Time of ED Arrival to Time of ED Departure   COMPREHENSIVE METABOLIC PANEL - Abnormal       Result Value    Sodium 137      Potassium 2.8 (*)     Carbon Dioxide (CO2) 18 (*)     Anion Gap 15      Urea Nitrogen 10.1      Creatinine 0.70      GFR Estimate 87      Calcium 7.7 (*)     Chloride 104      Glucose 70      Alkaline Phosphatase 47      AST 22      ALT <5      Protein Total 5.5 (*)     Albumin 3.5      Bilirubin Total 0.2     TROPONIN T, HIGH SENSITIVITY - Abnormal    Troponin T, High Sensitivity 45 (*)    CBC WITH PLATELETS AND DIFFERENTIAL - Abnormal    WBC Count 9.0      RBC Count 4.03      Hemoglobin 9.7 (*)     Hematocrit 32.5 (*)     MCV 81      MCH 24.1 (*)     MCHC 29.8 (*)     RDW 17.7 (*)     Platelet Count 333      % Neutrophils 73      % Lymphocytes 12      % Monocytes 8      % Eosinophils 5      % Basophils 1      % Immature Granulocytes 1      NRBCs per 100 WBC 0      Absolute Neutrophils 6.5      Absolute Lymphocytes 1.1      Absolute Monocytes 0.8      Absolute  Eosinophils 0.5      Absolute Basophils 0.1      Absolute Immature Granulocytes 0.1      Absolute NRBCs 0.0     TROPONIN T, HIGH SENSITIVITY - Abnormal    Troponin T, High Sensitivity 51 (*)    MAGNESIUM - Abnormal    Magnesium 1.6 (*)    TROPONIN T, HIGH SENSITIVITY - Abnormal    Troponin T, High Sensitivity 51 (*)    IRON AND IRON BINDING CAPACITY - Abnormal    Iron 26 (*)     Iron Binding Capacity        Iron Sat Index       BASIC METABOLIC PANEL - Abnormal    Sodium 137      Potassium 4.7      Chloride 101      Carbon Dioxide (CO2) 23      Anion Gap 13      Urea Nitrogen 9.3      Creatinine 0.83      GFR Estimate 71      Calcium 9.3      Glucose 118 (*)    CBC WITH PLATELETS AND DIFFERENTIAL - Abnormal    WBC Count 8.0      RBC Count 4.41      Hemoglobin 10.8 (*)     Hematocrit 36.5      MCV 83      MCH 24.5 (*)     MCHC 29.6 (*)     RDW 17.8 (*)     Platelet Count 347      % Neutrophils 71      % Lymphocytes 13      % Monocytes 8      % Eosinophils 6      % Basophils 1      % Immature Granulocytes 1      NRBCs per 100 WBC 0      Absolute Neutrophils 5.8      Absolute Lymphocytes 1.0      Absolute Monocytes 0.6      Absolute Eosinophils 0.5      Absolute Basophils 0.1      Absolute Immature Granulocytes 0.1      Absolute NRBCs 0.0     LIPASE - Normal    Lipase 20     TSH WITH FREE T4 REFLEX - Normal    TSH 2.04     PROCALCITONIN - Normal    Procalcitonin 0.04     INFLUENZA A/B, RSV, & SARS-COV2 PCR - Normal    Influenza A PCR Negative      Influenza B PCR Negative      RSV PCR Negative      SARS CoV2 PCR Negative       US Lower Extremity Venous Duplex Bilateral   Final Result   Abnormal   IMPRESSION:   1. Occlusive DVT in the right posterior tibial vein at the   proximal-mid calf.   2.  No evidence of deep venous thrombosis in the left lower extremity.   3. Left popliteal cyst.      [Urgent Result: DVT]      Finding was identified on 2/12/2024 8:23 PM.       Dr. Torrez was contacted by Dr. Holbrook at 2/12/2024 8:31  PM and   verbalized understanding of the urgent finding.       I have personally reviewed the examination and initial interpretation   and I agree with the findings.      NIELS ROMERO MD            SYSTEM ID:  [id number]      Echo Complete   Final Result      CT Chest w/o Contrast   Final Result   IMPRESSION:    1. Increased consolidative changes in bilateral upper lobes. Bilateral   new small pleural effusions. Findings are suggestive of worsening   infection with or without superimposed atelectasis. Mild associated   pulmonary edema as well.   2. Enlarged mediastinal lymph nodes, most likely reactive.      I have personally reviewed the examination and initial interpretation   and I agree with the findings.      SYLVIA GREEN MD            SYSTEM ID:  [id number]      XR Chest 1 View   Final Result   Impression:   1. Interval reduced left lung aeration with increased diffuse mixed   left-sided pulmonary opacities, possibly edema/atelectasis or   infection.   2. Small left pleural effusion.       CARLEEN DON MD            SYSTEM ID:  [id number]      CT Chest/Abdomen/Pelvis w Contrast   Final Result   IMPRESSION:   1.  Airspace consolidation left upper lobe concerning for pneumonia. Follow-up CT exam after therapy recommended to confirm resolution and exclude underlying mass.   2.  Advanced emphysema.   3.  Large esophageal hiatal hernia.   4.  Atherosclerotic disease.   5.  Small left inguinal hernia.             Critical care was not performed.     Medical Decision Making  The patient's presentation was of moderate complexity (an undiagnosed new problem with uncertain diagnosis).    The patient's evaluation involved:  review of external note(s) from 1 sources (see separate area of note for details)  review of 3+ test result(s) ordered prior to this encounter (see separate area of note for details)  ordering and/or review of 3+ test(s) in this encounter (see separate area of note for details)    The patient's  management necessitated high risk (a decision regarding hospitalization).    Assessment & Plan    Idalia Bob is a 79 year old female with history of COPD, left lower lobe Pseudomonas who presents with abdominal pain  x 2 days with nausea and vomiting.  Lab workup obtained and demonstrated hypokalemia which makes sense in the setting of recent vomiting and decreased p.o. intake.  CT abdomen and pelvis obtained due to concern for nausea and vomiting, was negative for acute intra-abdominal pathology however there was a noted consolidation in the left upper lobe concerning for pneumonia.  Patient started on antibiotics and admitted to the hospital for further management. Initial trop 45 with subsequent at 51; checked third troponin which came back at 51, likely not related to NSTEMI and more likely secondary to underlying process. Discussed with medicine. EKG non-ischemic.     I have reviewed the nursing notes. I have reviewed the findings, diagnosis, plan and need for follow up with the patient.    Current Discharge Medication List          Final diagnoses:   Community acquired pneumonia of left upper lobe of lung   Hypokalemia   Nausea and vomiting, unspecified vomiting type   Troponin level elevated       Valentin Arriaza MD  [facility] EMERGENCY DEPARTMENT  2/1/2024     Valentin Arriaza MD  02/14/24 [id number]

## 2024-02-02 NOTE — TELEPHONE ENCOUNTER
RN sees patient went to ED yesterday 2/1/24 and is currently admitted.     Allison Christopher RN on 2/2/2024 at 3:19 PM

## 2024-02-03 LAB
ADV 40+41 DNA STL QL NAA+NON-PROBE: NEGATIVE
ANION GAP SERPL CALCULATED.3IONS-SCNC: 8 MMOL/L (ref 7–15)
ASTRO TYP 1-8 RNA STL QL NAA+NON-PROBE: NEGATIVE
BUN SERPL-MCNC: 7.7 MG/DL (ref 8–23)
C CAYETANENSIS DNA STL QL NAA+NON-PROBE: NEGATIVE
C DIFF TOX B STL QL: NEGATIVE
CALCIUM SERPL-MCNC: 8.6 MG/DL (ref 8.8–10.2)
CAMPYLOBACTER DNA SPEC NAA+PROBE: NEGATIVE
CHLORIDE SERPL-SCNC: 98 MMOL/L (ref 98–107)
CREAT SERPL-MCNC: 0.87 MG/DL (ref 0.51–0.95)
CRP SERPL-MCNC: 7.49 MG/L
CRYPTOSP DNA STL QL NAA+NON-PROBE: NEGATIVE
DEPRECATED HCO3 PLAS-SCNC: 28 MMOL/L (ref 22–29)
E COLI O157 DNA STL QL NAA+NON-PROBE: NORMAL
E HISTOLYT DNA STL QL NAA+NON-PROBE: NEGATIVE
EAEC ASTA GENE ISLT QL NAA+PROBE: NEGATIVE
EC STX1+STX2 GENES STL QL NAA+NON-PROBE: NEGATIVE
EGFRCR SERPLBLD CKD-EPI 2021: 67 ML/MIN/1.73M2
EPEC EAE GENE STL QL NAA+NON-PROBE: NEGATIVE
ERYTHROCYTE [DISTWIDTH] IN BLOOD BY AUTOMATED COUNT: 17.8 % (ref 10–15)
ETEC LTA+ST1A+ST1B TOX ST NAA+NON-PROBE: NEGATIVE
FERRITIN SERPL-MCNC: 85 NG/ML (ref 11–328)
G LAMBLIA DNA STL QL NAA+NON-PROBE: NEGATIVE
GLUCOSE SERPL-MCNC: 100 MG/DL (ref 70–99)
HCT VFR BLD AUTO: 33.3 % (ref 35–47)
HGB BLD-MCNC: 9.6 G/DL (ref 11.7–15.7)
MAGNESIUM SERPL-MCNC: 2 MG/DL (ref 1.7–2.3)
MCH RBC QN AUTO: 24.1 PG (ref 26.5–33)
MCHC RBC AUTO-ENTMCNC: 28.8 G/DL (ref 31.5–36.5)
MCV RBC AUTO: 84 FL (ref 78–100)
NOROVIRUS GI+II RNA STL QL NAA+NON-PROBE: NEGATIVE
P SHIGELLOIDES DNA STL QL NAA+NON-PROBE: NEGATIVE
PLATELET # BLD AUTO: 309 10E3/UL (ref 150–450)
POTASSIUM SERPL-SCNC: 4.2 MMOL/L (ref 3.4–5.3)
RBC # BLD AUTO: 3.98 10E6/UL (ref 3.8–5.2)
RVA RNA STL QL NAA+NON-PROBE: NEGATIVE
SALMONELLA SP RPOD STL QL NAA+PROBE: NEGATIVE
SAPO I+II+IV+V RNA STL QL NAA+NON-PROBE: NEGATIVE
SHIGELLA SP+EIEC IPAH ST NAA+NON-PROBE: NEGATIVE
SODIUM SERPL-SCNC: 134 MMOL/L (ref 135–145)
V CHOLERAE DNA SPEC QL NAA+PROBE: NEGATIVE
VIBRIO DNA SPEC NAA+PROBE: NEGATIVE
WBC # BLD AUTO: 8.1 10E3/UL (ref 4–11)
Y ENTEROCOL DNA STL QL NAA+PROBE: NEGATIVE

## 2024-02-03 PROCEDURE — 99233 SBSQ HOSP IP/OBS HIGH 50: CPT | Performed by: STUDENT IN AN ORGANIZED HEALTH CARE EDUCATION/TRAINING PROGRAM

## 2024-02-03 PROCEDURE — 999N000040 HC STATISTIC CONSULT NO CHARGE VASC ACCESS

## 2024-02-03 PROCEDURE — 999N000127 HC STATISTIC PERIPHERAL IV START W US GUIDANCE

## 2024-02-03 PROCEDURE — 250N000011 HC RX IP 250 OP 636: Performed by: HOSPITALIST

## 2024-02-03 PROCEDURE — 80048 BASIC METABOLIC PNL TOTAL CA: CPT | Performed by: STUDENT IN AN ORGANIZED HEALTH CARE EDUCATION/TRAINING PROGRAM

## 2024-02-03 PROCEDURE — 87493 C DIFF AMPLIFIED PROBE: CPT | Performed by: STUDENT IN AN ORGANIZED HEALTH CARE EDUCATION/TRAINING PROGRAM

## 2024-02-03 PROCEDURE — 250N000013 HC RX MED GY IP 250 OP 250 PS 637: Performed by: HOSPITALIST

## 2024-02-03 PROCEDURE — 86140 C-REACTIVE PROTEIN: CPT | Performed by: STUDENT IN AN ORGANIZED HEALTH CARE EDUCATION/TRAINING PROGRAM

## 2024-02-03 PROCEDURE — 87507 IADNA-DNA/RNA PROBE TQ 12-25: CPT | Performed by: STUDENT IN AN ORGANIZED HEALTH CARE EDUCATION/TRAINING PROGRAM

## 2024-02-03 PROCEDURE — 36415 COLL VENOUS BLD VENIPUNCTURE: CPT | Performed by: STUDENT IN AN ORGANIZED HEALTH CARE EDUCATION/TRAINING PROGRAM

## 2024-02-03 PROCEDURE — 85027 COMPLETE CBC AUTOMATED: CPT | Performed by: STUDENT IN AN ORGANIZED HEALTH CARE EDUCATION/TRAINING PROGRAM

## 2024-02-03 PROCEDURE — 120N000002 HC R&B MED SURG/OB UMMC

## 2024-02-03 PROCEDURE — 258N000003 HC RX IP 258 OP 636: Performed by: HOSPITALIST

## 2024-02-03 PROCEDURE — 82728 ASSAY OF FERRITIN: CPT | Performed by: STUDENT IN AN ORGANIZED HEALTH CARE EDUCATION/TRAINING PROGRAM

## 2024-02-03 PROCEDURE — 83735 ASSAY OF MAGNESIUM: CPT | Performed by: STUDENT IN AN ORGANIZED HEALTH CARE EDUCATION/TRAINING PROGRAM

## 2024-02-03 RX ADMIN — BUSPIRONE HYDROCHLORIDE 10 MG: 10 TABLET ORAL at 21:03

## 2024-02-03 RX ADMIN — ACETAMINOPHEN 650 MG: 325 TABLET, FILM COATED ORAL at 19:09

## 2024-02-03 RX ADMIN — CEFTRIAXONE SODIUM 1 G: 1 INJECTION, POWDER, FOR SOLUTION INTRAMUSCULAR; INTRAVENOUS at 00:50

## 2024-02-03 RX ADMIN — ACETAMINOPHEN 650 MG: 325 TABLET, FILM COATED ORAL at 15:59

## 2024-02-03 RX ADMIN — CALCIUM POLYCARBOPHIL 1250 MG: 625 TABLET, FILM COATED ORAL at 07:50

## 2024-02-03 RX ADMIN — ONDANSETRON 4 MG: 4 TABLET, ORALLY DISINTEGRATING ORAL at 08:05

## 2024-02-03 RX ADMIN — BUSPIRONE HYDROCHLORIDE 10 MG: 10 TABLET ORAL at 14:44

## 2024-02-03 RX ADMIN — DONEPEZIL HYDROCHLORIDE 10 MG: 10 TABLET, FILM COATED ORAL at 21:03

## 2024-02-03 RX ADMIN — LATANOPROST 1 DROP: 50 SOLUTION OPHTHALMIC at 21:04

## 2024-02-03 RX ADMIN — LOSARTAN POTASSIUM 50 MG: 50 TABLET, FILM COATED ORAL at 07:52

## 2024-02-03 RX ADMIN — LEVOTHYROXINE SODIUM 50 MCG: 50 TABLET ORAL at 07:51

## 2024-02-03 RX ADMIN — FLUOXETINE HYDROCHLORIDE 40 MG: 20 CAPSULE ORAL at 07:51

## 2024-02-03 RX ADMIN — PANTOPRAZOLE SODIUM 40 MG: 40 TABLET, DELAYED RELEASE ORAL at 07:51

## 2024-02-03 RX ADMIN — BUSPIRONE HYDROCHLORIDE 10 MG: 10 TABLET ORAL at 07:51

## 2024-02-03 RX ADMIN — FLUTICASONE FUROATE AND VILANTEROL TRIFENATATE 1 PUFF: 200; 25 POWDER RESPIRATORY (INHALATION) at 07:50

## 2024-02-03 RX ADMIN — AZITHROMYCIN MONOHYDRATE 500 MG: 500 INJECTION, POWDER, LYOPHILIZED, FOR SOLUTION INTRAVENOUS at 06:03

## 2024-02-03 RX ADMIN — UMECLIDINIUM 1 PUFF: 62.5 AEROSOL, POWDER ORAL at 07:50

## 2024-02-03 RX ADMIN — ENOXAPARIN SODIUM 40 MG: 40 INJECTION SUBCUTANEOUS at 07:50

## 2024-02-03 ASSESSMENT — ACTIVITIES OF DAILY LIVING (ADL)
ADLS_ACUITY_SCORE: 28
ADLS_ACUITY_SCORE: 30
ADLS_ACUITY_SCORE: 28
ADLS_ACUITY_SCORE: 30
ADLS_ACUITY_SCORE: 30
DEPENDENT_IADLS:: CLEANING;COOKING;LAUNDRY;SHOPPING;MEAL PREPARATION;TRANSPORTATION
ADLS_ACUITY_SCORE: 30
ADLS_ACUITY_SCORE: 28

## 2024-02-03 NOTE — PLAN OF CARE
Goal Outcome Evaluation:      Plan of Care Reviewed With: patient, child    Overall Patient Progress: no changeOverall Patient Progress: no change     SW will continue to work with IDT to determine discharge needs and facilitate placement.

## 2024-02-03 NOTE — PROGRESS NOTES
[facility]    Medicine Progress Note - Hospitalist Service, GOLD TEAM 21    Date of Admission:  2/1/2024    Assessment & Plan      Idalia Bob is a 79 year old female admitted on 2/1/2024. Pt has history of COPD, pneumonia, hypertension, dementia and hypothyroidism among others, presented with abdominal pain and found to have consolidation on CT.     Left upper lobe pneumonia vs persistent consolidation vs mass  COPD    Patient with pseudomonal upper lobe pneumonia in December and has had ongoing consolidation since.  While this finding was present on imaging she has a paucity of other pathology consistent with pneumonia.  Certainly this could be related to delayed clearance of the consolidation versus another underlying process such as a mass.  She does have a history of tobacco use and has been losing weight in recent months.    -Repeat CRP.  Procalcitonin was negative at the time of admission.  Given the fact that she had a recent pseudomonal infection but has not been being treated for Pseudomonas during this hospitalization, I do not think that she likely actually has typical pneumonia..  Differential diagnosis includes mass, fungal infection, nontuberculous mycobacterial, or other cause.   Of note, she did have negative fungal cultures of sputum in December.   -Continue azithromycin and ceftriaxone for now but likely stop tomorrow  -Continue home COPD regimen  -Will get previous imaging pushed to PACS  -Plan for CT-guided biopsy versus BAL versus outpatient pulmonary follow-up.      Elevated Troponin    Likely type II MI in the setting of acute illness.  No rising troponin, continue to follow clinically      Hypokalemia  Hypomagnesemia    In the setting of poor oral intake as well as PPI use.  Will continue to follow closely.        Iron deficiency anemia    Not tolerating Iron replacement.  Add on ferritin given low iron saturation     Abdominal Pain  Loose  stools  Tenesmus  Unintentional weight loss    This seems to be the most problematic problem for the patient at present and she has multiple times a day where she has a bowel movement with very little solid output and significant mount of mucus.  No significant bleeding.  She has been losing weight in recent months.    Differential diagnosis includes atypical infection, late onset inflammatory bowel disease, colon cancer, and others.  CT was reassuring however this does not exclude many of these conditions    -Send C. difficile and enteric panel  -Consider endoscopy either inpatient or outpatient    Hypertension  Takes Olmesartan 20 mg daily.  - Not available, pharmacy will start alternate per [facility] formulary    Anxiety and depression  Takes BuSpar, Prozac  -Continue home regimen     Hypothyroidism     Takes Synthroid 50 mcg. TSH normal  -Continue    Dementia   Takes Aricept   -Continue          Diet: Combination Diet Regular Diet Adult    DVT Prophylaxis: Enoxaparin (Lovenox) SQ  Ruiz Catheter: Not present  Lines: None     Cardiac Monitoring: None  Code Status: Full Code      Clinically Significant Risk Factors        # Hypokalemia: Lowest K = 2.8 mmol/L in last 2 days, will replace as needed   # Hypocalcemia: Lowest Ca = 7.7 mg/dL in last 2 days, will monitor and replace as appropriate   # Hypomagnesemia: Lowest Mg = 1.6 mg/dL in last 2 days, will replace as needed       # Hypertension: Noted on problem list     # Dementia: noted on problem list               Disposition Plan      Expected Discharge Date: 02/05/2024                    Pavel Kumar MD  Hospitalist Service, GOLD TEAM 21  [facility]  Securely message with [employer] (more info)  Text page via [facility] Paging/Directory   See signed in provider for up to date coverage information  ______________________________________________________________________    Interval History     Continues to have significant  mucousy stools.  No significant appetite.  States her breathing is at baseline    Physical Exam   Vital Signs: Temp: 97.6  F (36.4  C) Temp src: Oral BP: (!) 150/71 Pulse: 79   Resp: 16 SpO2: 98 % O2 Device: Nasal cannula Oxygen Delivery: 1 LPM  Weight: 126 lbs 6.4 oz    General Appearance: Lying in bed, comfortable  Respiratory: Diminished air entry with early inspiratory crackles  Cardiovascular: Normal rate, regular rhythm.  No murmurs, rubs, gallops  GI: Soft, mildly distended, nontender.  No hepatomegaly or mass  Skin: No rash  Other: No lower extremity edema    Medical Decision Making             Data     I have personally reviewed the following data over the past 24 hrs:    8.1  \   9.6 (L)   / 309     134 (L) 98 7.7 (L) /  100 (H)   4.2 28 0.87 \       Imaging results reviewed over the past 24 hrs:   No results found for this or any previous visit (from the past 24 hour(s)).

## 2024-02-03 NOTE — PLAN OF CARE
Shift: 6329-0268     VS: BP (!) 146/80 (BP Location: Left arm)   Pulse 82   Temp 98.2  F (36.8  C) (Oral)   Resp 16   Wt 57.3 kg (126 lb 6.4 oz)   SpO2 98%   BMI 24.69 kg/m        Pain: C/o lower abdominal pain; pain managed with PRN Tylenol and warm packs.   Neuro: A&Ox2; family at bedside. Able to make needs known.   Resp:On 1 liter of oxygen; patient gets short of breath and desats during activity/repositioning.   Diet:  Regular; poor appetite. Refused to eat throughout shift. Had one ensure family brought from home. C/o nausea; prn Zofran utilized.      Activity: SBA with walker/gait belt.   Output: Continent of bowel and bladder.   Additional info: Patient is negative for C-diff. No acute events this shift. Call light within reach. Bed alarm in place for safety.         Plan: continue with POC

## 2024-02-03 NOTE — CONSULTS
Care Management Initial Consult    General Information  Assessment completed with: Patient, Children, [place]  Type of CM/SW Visit: Initial Assessment  Primary Care Provider verified and updated as needed: Yes   Readmission within the last 30 days: no previous admission in last 30 days   Reason for Consult: utilization management concerns  Advance Care Planning: Advance Care Planning Reviewed: verified with patient, no concerns identified          Communication Assessment  Patient's communication style: spoken language (English or Bilingual)    Hearing Difficulty or Deaf: no   Wear Glasses or Blind: yes    Cognitive  Cognitive/Neuro/Behavioral: .WDL except, orientation  Level of Consciousness: alert  Arousal Level: opens eyes spontaneously  Orientation: person, place  Mood/Behavior: calm, cooperative  Best Language: 0 - No aphasia  Speech: clear, spontaneous    Living Environment:   People in home: spouse, facility resident     Current living Arrangements: assisted living  Name of Facility: [facility]   Able to return to prior arrangements: yes       Family/Social Support:  Care provided by: self, other (see comments), child(yany) (facility staff)  Provides care for: no one, unable/limited ability to care for self  Marital Status:   , Children, Facility resident(s)/Staff          Description of Support System: Supportive, Involved    Support Assessment: Adequate family and caregiver support, Adequate social supports    Current Resources:   Patient receiving home care services: Yes  Skilled Home Care Services: Physical Therapy  Community Resources: None  Equipment currently used at home: walker, standard, wheelchair, manual, shower chair, raised toilet seat  Supplies currently used at home: Oxygen Tubing/Supplies    Employment/Financial:  Employment Status: retired     Financial Concerns: none   Referral to Financial Worker: No       Does the patient's insurance plan have a 3 day  qualifying hospital stay waiver?  Yes     Which insurance plan 3 day waiver is available? ACO REACH    Will the waiver be used for post-acute placement? Undetermined at this time    Lifestyle & Psychosocial Needs:  Social Determinants of Health     Food Insecurity: Low Risk  (1/22/2024)    Food Insecurity     Within the past 12 months, did you worry that your food would run out before you got money to buy more?: No     Within the past 12 months, did the food you bought just not last and you didn t have money to get more?: No   Depression: Not at risk (1/22/2024)    PHQ-2     PHQ-2 Score: 0   Housing Stability: Low Risk  (1/22/2024)    Housing Stability     Do you have housing? : Yes     Are you worried about losing your housing?: No   Tobacco Use: Medium Risk (2/1/2024)    Patient History     Smoking Tobacco Use: Former     Smokeless Tobacco Use: Never     Passive Exposure: Past   Financial Resource Strain: Low Risk  (1/22/2024)    Financial Resource Strain     Within the past 12 months, have you or your family members you live with been unable to get utilities (heat, electricity) when it was really needed?: No   Alcohol Use: Not on file   Transportation Needs: Low Risk  (1/22/2024)    Transportation Needs     Within the past 12 months, has lack of transportation kept you from medical appointments, getting your medicines, non-medical meetings or appointments, work, or from getting things that you need?: No   Physical Activity: Not on file   Interpersonal Safety: Not on file   Stress: Not on file   Social Connections: Not on file       Functional Status:  Prior to admission patient needed assistance:   Dependent ADLs:: Ambulation-walker, Bathing  Dependent IADLs:: Cleaning, Cooking, Laundry, Shopping, Meal Preparation, Transportation  Assesssment of Functional Status: Not at baseline with mobility, Not at baseline with ADL Functioning    Mental Health Status:  Mental Health Status: Past Concern  Mental Health  "Management: Medication    Chemical Dependency Status:  Chemical Dependency Status: Past Concern  Chemical Dependency Management: Other (see comment) (Independently decided to make a change)          Values/Beliefs:  Spiritual, Cultural Beliefs, Scientology Practices, Values that affect care: no       Cultural/Scientology Practices Patient Routinely Participates In: prayer       Additional Information:    SW met with patient and her daughter, Barbara, at bedside to complete CMA for elevated risk score. Patient states she is a resident at [facility] assisted living and has been living there for about a year with her . Since December, patient states she has lost some independence due to her health and it has been more difficult to get around and take care of herself. Barbara says that she or her sister (Corie) regularly go in to assist patient in taking a shower. Patient says that facility staff cook and clean for her and her .     When patient was last discharged from the hospital, Barbara says that in home PT through [facility] was set up to regularly come meet with patient. Patient says that she hasn't quite felt stronger yet from that PT because of her ongoing struggles with pneumonia.     Patient expressed no concerns with finances or need for outside community resources. Patient shared that 5-6 years ago, she struggled with her alcohol use but solved her issues on her own when her sister became sick and she had to take care of her. Patient says she regularly takes anti depressants and expressed no further concerns with her mental health other than \"feeling down\" about her physical limitations.     PCP was updated per conversation with patient. Barbara says that patient already has a HCD on file with [facility]. Barbara and patient both requested that any conversations regarding discharge planning involved Barbara and/or her sister Corie.     SW will continue to follow patient for further " discharge planning.     Meli Miramontes, MSW  2/3/2024       Social Work and Care Management Department       SEARCHABLE in [employer] - search SOCIAL WORK       [place] (0800 - 1630) Saturday and Sunday     Units: 4A, 4C, & 4E Pager: [phone]     Units: 5A & 5C Pager: [phone]     Units: 6A & 6B   Pager: [phone]     Units: 6C Pager: [phone]     Units: 7A & 7B  Pager: [phone]     Units: 7C Pager: [phone]     Unit: [place] ED Pager: [phone]      [facility] (0314-2096) Saturday and Sunday      Units: 5 Ortho, 5 Med/Surg & WB ED  Pager:[phone]     Units: 6 Med/Surg, 8A, & 10A ICU  Pager: [phone]        After hours (1630 - 0000) Saturday & Sunday; (1799-6127) Mon-Fri; (7508-5340) [facility] Recognized Holidays     Units: ALL  Pager: [phone]

## 2024-02-03 NOTE — PLAN OF CARE
4036-1650    Pt alert and oriented times 2 and able to make needs known     On 1 L NC and R PIV SL     C/O Nausea and prn Zofran given one time.     Able to use bathroom frequently and stand by assist.     No c/o of pain and continue with POC    Blood pressure 131/65, pulse 83, temperature 97.5  F (36.4  C), temperature source Oral, resp. rate 16, weight 57.3 kg (126 lb 6.4 oz), SpO2 97%.

## 2024-02-04 LAB
ANION GAP SERPL CALCULATED.3IONS-SCNC: 10 MMOL/L (ref 7–15)
BACTERIA UR CULT: ABNORMAL
BUN SERPL-MCNC: 5.8 MG/DL (ref 8–23)
CALCIUM SERPL-MCNC: 8.7 MG/DL (ref 8.8–10.2)
CHLORIDE SERPL-SCNC: 97 MMOL/L (ref 98–107)
CREAT SERPL-MCNC: 0.84 MG/DL (ref 0.51–0.95)
DEPRECATED HCO3 PLAS-SCNC: 27 MMOL/L (ref 22–29)
EGFRCR SERPLBLD CKD-EPI 2021: 70 ML/MIN/1.73M2
ERYTHROCYTE [DISTWIDTH] IN BLOOD BY AUTOMATED COUNT: 17.7 % (ref 10–15)
GLUCOSE SERPL-MCNC: 86 MG/DL (ref 70–99)
HCT VFR BLD AUTO: 33 % (ref 35–47)
HGB BLD-MCNC: 9.7 G/DL (ref 11.7–15.7)
MAGNESIUM SERPL-MCNC: 1.8 MG/DL (ref 1.7–2.3)
MCH RBC QN AUTO: 24.5 PG (ref 26.5–33)
MCHC RBC AUTO-ENTMCNC: 29.4 G/DL (ref 31.5–36.5)
MCV RBC AUTO: 83 FL (ref 78–100)
PLATELET # BLD AUTO: 318 10E3/UL (ref 150–450)
POTASSIUM SERPL-SCNC: 4 MMOL/L (ref 3.4–5.3)
RBC # BLD AUTO: 3.96 10E6/UL (ref 3.8–5.2)
SODIUM SERPL-SCNC: 134 MMOL/L (ref 135–145)
WBC # BLD AUTO: 8 10E3/UL (ref 4–11)

## 2024-02-04 PROCEDURE — 250N000011 HC RX IP 250 OP 636: Performed by: HOSPITALIST

## 2024-02-04 PROCEDURE — 85041 AUTOMATED RBC COUNT: CPT | Performed by: STUDENT IN AN ORGANIZED HEALTH CARE EDUCATION/TRAINING PROGRAM

## 2024-02-04 PROCEDURE — 999N000040 HC STATISTIC CONSULT NO CHARGE VASC ACCESS

## 2024-02-04 PROCEDURE — 87449 NOS EACH ORGANISM AG IA: CPT | Performed by: STUDENT IN AN ORGANIZED HEALTH CARE EDUCATION/TRAINING PROGRAM

## 2024-02-04 PROCEDURE — 87305 ASPERGILLUS AG IA: CPT | Performed by: STUDENT IN AN ORGANIZED HEALTH CARE EDUCATION/TRAINING PROGRAM

## 2024-02-04 PROCEDURE — 250N000013 HC RX MED GY IP 250 OP 250 PS 637: Performed by: HOSPITALIST

## 2024-02-04 PROCEDURE — 83735 ASSAY OF MAGNESIUM: CPT | Performed by: STUDENT IN AN ORGANIZED HEALTH CARE EDUCATION/TRAINING PROGRAM

## 2024-02-04 PROCEDURE — 80048 BASIC METABOLIC PNL TOTAL CA: CPT | Performed by: STUDENT IN AN ORGANIZED HEALTH CARE EDUCATION/TRAINING PROGRAM

## 2024-02-04 PROCEDURE — 83993 ASSAY FOR CALPROTECTIN FECAL: CPT | Performed by: STUDENT IN AN ORGANIZED HEALTH CARE EDUCATION/TRAINING PROGRAM

## 2024-02-04 PROCEDURE — 36415 COLL VENOUS BLD VENIPUNCTURE: CPT | Performed by: STUDENT IN AN ORGANIZED HEALTH CARE EDUCATION/TRAINING PROGRAM

## 2024-02-04 PROCEDURE — 999N000007 HC SITE CHECK

## 2024-02-04 PROCEDURE — 250N000013 HC RX MED GY IP 250 OP 250 PS 637: Performed by: STUDENT IN AN ORGANIZED HEALTH CARE EDUCATION/TRAINING PROGRAM

## 2024-02-04 PROCEDURE — 87385 HISTOPLASMA CAPSUL AG IA: CPT | Performed by: STUDENT IN AN ORGANIZED HEALTH CARE EDUCATION/TRAINING PROGRAM

## 2024-02-04 PROCEDURE — 258N000003 HC RX IP 258 OP 636: Performed by: HOSPITALIST

## 2024-02-04 PROCEDURE — 99233 SBSQ HOSP IP/OBS HIGH 50: CPT | Performed by: STUDENT IN AN ORGANIZED HEALTH CARE EDUCATION/TRAINING PROGRAM

## 2024-02-04 PROCEDURE — 120N000002 HC R&B MED SURG/OB UMMC

## 2024-02-04 RX ORDER — AMOXICILLIN 500 MG/1
500 CAPSULE ORAL EVERY 12 HOURS SCHEDULED
Status: DISCONTINUED | OUTPATIENT
Start: 2024-02-04 | End: 2024-02-08

## 2024-02-04 RX ADMIN — BUSPIRONE HYDROCHLORIDE 10 MG: 10 TABLET ORAL at 08:42

## 2024-02-04 RX ADMIN — CALCIUM POLYCARBOPHIL 1250 MG: 625 TABLET, FILM COATED ORAL at 08:42

## 2024-02-04 RX ADMIN — AMOXICILLIN 500 MG: 500 CAPSULE ORAL at 11:41

## 2024-02-04 RX ADMIN — ONDANSETRON 4 MG: 4 TABLET, ORALLY DISINTEGRATING ORAL at 03:37

## 2024-02-04 RX ADMIN — LOSARTAN POTASSIUM 50 MG: 50 TABLET, FILM COATED ORAL at 08:42

## 2024-02-04 RX ADMIN — CEFTRIAXONE SODIUM 1 G: 1 INJECTION, POWDER, FOR SOLUTION INTRAMUSCULAR; INTRAVENOUS at 00:01

## 2024-02-04 RX ADMIN — BUSPIRONE HYDROCHLORIDE 10 MG: 10 TABLET ORAL at 14:01

## 2024-02-04 RX ADMIN — FLUTICASONE FUROATE AND VILANTEROL TRIFENATATE 1 PUFF: 200; 25 POWDER RESPIRATORY (INHALATION) at 08:45

## 2024-02-04 RX ADMIN — LEVOTHYROXINE SODIUM 50 MCG: 50 TABLET ORAL at 08:42

## 2024-02-04 RX ADMIN — TRIAMCINOLONE ACETONIDE: 1 CREAM TOPICAL at 08:55

## 2024-02-04 RX ADMIN — BUSPIRONE HYDROCHLORIDE 10 MG: 10 TABLET ORAL at 21:44

## 2024-02-04 RX ADMIN — TRIAMCINOLONE ACETONIDE: 1 CREAM TOPICAL at 18:08

## 2024-02-04 RX ADMIN — AZITHROMYCIN MONOHYDRATE 500 MG: 500 INJECTION, POWDER, LYOPHILIZED, FOR SOLUTION INTRAVENOUS at 05:44

## 2024-02-04 RX ADMIN — ACETAMINOPHEN 650 MG: 325 TABLET, FILM COATED ORAL at 18:08

## 2024-02-04 RX ADMIN — UMECLIDINIUM 1 PUFF: 62.5 AEROSOL, POWDER ORAL at 08:45

## 2024-02-04 RX ADMIN — LATANOPROST 1 DROP: 50 SOLUTION OPHTHALMIC at 21:44

## 2024-02-04 RX ADMIN — FLUOXETINE HYDROCHLORIDE 40 MG: 20 CAPSULE ORAL at 08:42

## 2024-02-04 RX ADMIN — PANTOPRAZOLE SODIUM 40 MG: 40 TABLET, DELAYED RELEASE ORAL at 08:42

## 2024-02-04 RX ADMIN — AMOXICILLIN 500 MG: 500 CAPSULE ORAL at 21:47

## 2024-02-04 RX ADMIN — ACETAMINOPHEN 650 MG: 325 TABLET, FILM COATED ORAL at 06:28

## 2024-02-04 RX ADMIN — ENOXAPARIN SODIUM 40 MG: 40 INJECTION SUBCUTANEOUS at 08:42

## 2024-02-04 RX ADMIN — DONEPEZIL HYDROCHLORIDE 10 MG: 10 TABLET, FILM COATED ORAL at 21:44

## 2024-02-04 ASSESSMENT — ACTIVITIES OF DAILY LIVING (ADL)
ADLS_ACUITY_SCORE: 30

## 2024-02-04 NOTE — PROGRESS NOTES
"Care Management Follow Up    Length of Stay (days): 3    Expected Discharge Date: 02/05/2024     Concerns to be Addressed: adjustment to diagnosis/illness; discharge planning  Patient plan of care discussed at interdisciplinary rounds: No - only discussed anticipated weekend/Monday discharges    Anticipated Discharge Disposition:  Assisted Living Facility     Anticipated Discharge Services:  [facility]  Anticipated Discharge DME:  No new DME anticipated    Patient/family educated on Medicare website which has current facility and service quality ratings:  not by this writer  Education Provided on the Discharge Plan:  not by this writer  Patient/Family in Agreement with the Plan: JOAO    Referrals Placed by CM/SW:  None currently  Private pay costs discussed: Not applicable    Additional Information:  Per discussion in rounds yesterday (2/3), provider had mentioned ALEX possibly today (2/4).  Will receive updated ALEX later this AM during rounds.    Review of CMA note from Meli SHARMA filed on 2/3/24 indicates pt may be open to home care for PT and that pt has home O2 at baseline.  Additionally, pt is from an [place].    8:51am - Called [facility] at [phone], spoke w/ answering , Erick.  Attempted to confirm services and updated rep to ALEX possibly today.  Rep took down this writer's contact info and pt's name, stated he would pass the request/ALEX update along and that this writer should expect a call back.    9:02am - Called [facility] at number listed on website: [phone].  Went to  for Saúl Ashlee (out of office until 2/12, will not receive ).  Her  suggested calling Corina at [phone].  Subsequently called Corina, it went to , which stated she is the .  Did not leave  at this time.    Received call from \"answering service\", was transferred to on-call nurse w/ [facility], requested to confirm services and provided ALEX possibly today, she " "suggested reaching out tomorrow to confirm services, because \"We don't do that today\".    Further review of contact info on [facility]'s website indicates Corina is likely the best contact at this time to coordinate w/ for discharge back to [facility].    9:30am - Pt was not discussed in weekend rounds this AM, indicating pt is not expected to discharge today.    To Do During Weekday:  - Contact [facility] to coordinate pt's return when she is ready for discharge and to get fax number to send orders to, if applicable  - Contact [facility] to confirm services/resume them  - Ensure pt has portable oxygen tank available for discharge transport in the event pt needs it for transport home  - Assess for any additional needs that may develop          Care mgmt will continue to follow.    Edinson Martin RNCC    Social Work and Care Management Department     SEARCHABLE in [facility] - search CARE COORDINATOR     [place] (0501-6282) Saturday & Sunday; (2288-7636) [facility] Recognized Holidays    Units: 5A & 5C  Pager: [phone]    Units: 6B, 6C & 6D    Pager: [phone]    Units: 7A, 7B, & 7C   Pager: [phone]    Units: 6A & ICU   Pager: [phone]    Units: 5 Ortho, 5MS & WB ED Pager: [phone]    Units: 6MS, 8A & 10 ICU  Pager [phone]    "

## 2024-02-04 NOTE — PLAN OF CARE
BP (!) 146/70 (BP Location: Left arm)   Pulse 84   Temp 97.5  F (36.4  C) (Oral)   Resp 18   Wt 57.3 kg (126 lb 6.4 oz)   SpO2 96%   BMI 24.69 kg/m       Alert x 4 with intermittent confusion. Stand by assist with walker. C/o abd pain. Warm packs applied to abdomen. Scabs on bilateral legs. Denies itching. Poor appetite. Has Ensure.

## 2024-02-04 NOTE — PROGRESS NOTES
Shift: 3873-3230     VS: /72 (BP Location: Left arm)   Pulse 95   Temp 98  F (36.7  C) (Oral)   Resp 16   Wt 57.3 kg (126 lb 6.4 oz)   SpO2 95%   BMI 24.69 kg/m        Pain: C/o lower abdominal pain; warm packs applied for comfort.   Neuro: A&O; intermittent confusion. Family at bedside. Able to make needs known.   Resp:On 1 liter of oxygen; patient gets short of breath and desats during activity. No cough noted.   Diet: Regular; poor appetite. Refused to eat throughout shift.  Tolerating ensure supplement drinks. Denies n/v.      Activity: SBA with walker.   Output: Continent of bowel and bladder. LBM today.   Additional info: Unable to collect sputum this shift. No acute events this shift. Call light within reach. Bed alarm in place for safety.          Plan: TBD; continue with POC

## 2024-02-04 NOTE — PLAN OF CARE
Goal Outcome Evaluation:      Plan of Care Reviewed With: patient    Overall Patient Progress: no changeOverall Patient Progress: no change    Pt alert and oriented times 2 and able to make needs known      On 1 L NC and R PIV SL      Denies chest pain, numbness and tingling     stand by assist and able to use bathroom frequently but no pee at this this time     No c/o of pain, no acute event happened at this time, call light with in reach and continue with POC    Blood pressure 135/75, pulse 93, temperature 98  F (36.7  C), temperature source Oral, resp. rate 18, weight 57.3 kg (126 lb 6.4 oz), SpO2 97%.

## 2024-02-04 NOTE — PROGRESS NOTES
[facility]    Medicine Progress Note - Hospitalist Service, GOLD TEAM 21    Date of Admission:  2/1/2024    Assessment & Plan   Idalia Bob is a 79 year old female admitted on 2/1/2024. Pt has history of COPD, pneumonia, hypertension, dementia and hypothyroidism among others, presented with abdominal pain and found to have consolidation on CT as well as frequent mucousy bowel movements and anorexia    Left upper lobe pneumonia vs persistent consolidation vs mass  COPD  Chronic hypoxemic respiratory failure    Patient with pseudomonal upper lobe pneumonia in December and has had ongoing consolidation since.  While this finding was present on imaging she has a paucity of other pathology consistent with pneumonia at the present time.  She appears to be breathing at her baseline.  It is possible that this is simply a slow to resolve infiltrate or there could be an underlying mass.    -Stop azithromycin and ceftriaxone  -Continue home oxygen  -Get images pushed from [facility] to PACS  -Continue home COPD regimen  -Follow-up Fungitell, galactomannan, and sputum cultures.  -Consider transcutaneous biopsy versus BAL versus bronchoscopy; once images are pushed we will get a formal pulmonary consult tomorrow    Abdominal Pain  Loose stools with mucus  Tenesmus  Unintentional weight loss  Anorexia    The patient has had weeks of worsening anorexia along with crampy abdominal pain but no evidence of gastrointestinal bleeding.  She has frequent (many many times per day) mucousy bowel movements with very little stool production.  C. difficile and enteric panel are negative.  While her symptoms could simply be related to a benign etiology such as irritable bowel syndrome I am a bit concerned about etiologies such as colonic ischemia, cancer, late onset inflammatory bowel disease, or another cause.    -Plan to discuss with GI regarding next steps either inpatient or  outpatient; likely requires either full colonoscopy or flexible sigmoidoscopy  -Encourage oral intake, start supplements  -Physical and Occupational Therapy consults    Urinary tract infection    Urine culture with 10-50,000 colonies of Enterococcus faecalis.    -Start amoxicillin and monitor for tolerance given potential history of not anaphylactic penicillin allergy; will treat for total of 5 days    Elevated Troponin    Likely type II MI in the setting of acute illness.  Continue to follow      Hypokalemia  Hypomagnesemia    In the setting of poor oral intake as well as PPI use.  Will continue to follow closely.        Iron deficiency anemia    Not tolerating Iron replacement.  Her ferritin is 85 which likely reflects a degree of iron deficiency given some presence of inflammation.    -Consider IV iron       Hypertension  Continue losartan 50 mg p.o. daily as a substitute for her home medication    Anxiety and depression  Takes BuSpar, Prozac  -Continue home regimen     Hypothyroidism     Takes Synthroid 50 mcg. TSH normal  -Continue    Dementia   - continue donepezil          Diet: Combination Diet Regular Diet Adult    DVT Prophylaxis: Enoxaparin (Lovenox) SQ  Ruiz Catheter: Not present  Lines: None     Cardiac Monitoring: None  Code Status: Full Code      Clinically Significant Risk Factors                  # Hypertension: Noted on problem list     # Dementia: noted on problem list        # Financial/Environmental Concerns: none         Disposition Plan     Expected Discharge Date: 02/05/2024      Destination: home with help/services              Pavel Kumar MD  Hospitalist Service, GOLD TEAM [address] [place] [address]  Securely message with [employer] (more info)  Text page via [facility] Paging/Directory   See signed in provider for up to date coverage information  ______________________________________________________________________    Interval History   No major events.   States she feels about the same, continues to have significant anorexia with crampy abdominal pain, having frequent mucousy bowel movements.  Breathing feels about the same and is at baseline.    Physical Exam   Vital Signs: Temp: 97.5  F (36.4  C) Temp src: Oral BP: (!) 146/70 Pulse: 84   Resp: 18 SpO2: 96 % O2 Device: Nasal cannula Oxygen Delivery: 1 LPM  Weight: 126 lbs 6.4 oz    General Appearance: Lying in bed, concerned but not uncomfortable  Respiratory: Diminished air entry bilaterally with a few basilar early aspiratory crackles  Cardiovascular: Normal rate, regular rhythm.  No murmurs, rubs, gallops  GI: Soft, nontender, nondistended.  No hepatosplenomegaly or mass  Skin: No rash  Other: Trace lower extremity edema    Medical Decision Making             Data     I have personally reviewed the following data over the past 24 hrs:    8.0  \   9.7 (L)   / 318     134 (L) 97 (L) 5.8 (L) /  86   4.0 27 0.84 \     Procal: N/A CRP: N/A Lactic Acid: N/A       Ferritin:  N/A % Retic:  N/A LDH:  N/A

## 2024-02-05 LAB
1,3 BETA GLUCAN SER-MCNC: <31 PG/ML
ANION GAP SERPL CALCULATED.3IONS-SCNC: 10 MMOL/L (ref 7–15)
BUN SERPL-MCNC: 8.5 MG/DL (ref 8–23)
CALCIUM SERPL-MCNC: 8.9 MG/DL (ref 8.8–10.2)
CALPROTECTIN STL-MCNT: 110 MG/KG (ref 0–49.9)
CHLORIDE SERPL-SCNC: 97 MMOL/L (ref 98–107)
CREAT SERPL-MCNC: 0.83 MG/DL (ref 0.51–0.95)
CREAT SERPL-MCNC: 0.83 MG/DL (ref 0.51–0.95)
DEPRECATED HCO3 PLAS-SCNC: 27 MMOL/L (ref 22–29)
EGFRCR SERPLBLD CKD-EPI 2021: 71 ML/MIN/1.73M2
EGFRCR SERPLBLD CKD-EPI 2021: 71 ML/MIN/1.73M2
ERYTHROCYTE [DISTWIDTH] IN BLOOD BY AUTOMATED COUNT: 18.2 % (ref 10–15)
GLUCOSE SERPL-MCNC: 89 MG/DL (ref 70–99)
H CAPSUL AG UR QL IA: NOT DETECTED
H CAPSUL AG UR-MCNC: NOT DETECTED NG/ML
HCT VFR BLD AUTO: 32.8 % (ref 35–47)
HGB BLD-MCNC: 9.6 G/DL (ref 11.7–15.7)
MAGNESIUM SERPL-MCNC: 2.2 MG/DL (ref 1.7–2.3)
MCH RBC QN AUTO: 24.4 PG (ref 26.5–33)
MCHC RBC AUTO-ENTMCNC: 29.3 G/DL (ref 31.5–36.5)
MCV RBC AUTO: 83 FL (ref 78–100)
OBSERVATION IMP: NEGATIVE
PLATELET # BLD AUTO: 332 10E3/UL (ref 150–450)
POTASSIUM SERPL-SCNC: 3.9 MMOL/L (ref 3.4–5.3)
RBC # BLD AUTO: 3.94 10E6/UL (ref 3.8–5.2)
SODIUM SERPL-SCNC: 134 MMOL/L (ref 135–145)
WBC # BLD AUTO: 7.8 10E3/UL (ref 4–11)

## 2024-02-05 PROCEDURE — 83735 ASSAY OF MAGNESIUM: CPT | Performed by: STUDENT IN AN ORGANIZED HEALTH CARE EDUCATION/TRAINING PROGRAM

## 2024-02-05 PROCEDURE — 99223 1ST HOSP IP/OBS HIGH 75: CPT | Performed by: INTERNAL MEDICINE

## 2024-02-05 PROCEDURE — 99223 1ST HOSP IP/OBS HIGH 75: CPT | Mod: GC | Performed by: INTERNAL MEDICINE

## 2024-02-05 PROCEDURE — 36415 COLL VENOUS BLD VENIPUNCTURE: CPT | Performed by: STUDENT IN AN ORGANIZED HEALTH CARE EDUCATION/TRAINING PROGRAM

## 2024-02-05 PROCEDURE — 99233 SBSQ HOSP IP/OBS HIGH 50: CPT | Performed by: STUDENT IN AN ORGANIZED HEALTH CARE EDUCATION/TRAINING PROGRAM

## 2024-02-05 PROCEDURE — 85027 COMPLETE CBC AUTOMATED: CPT | Performed by: STUDENT IN AN ORGANIZED HEALTH CARE EDUCATION/TRAINING PROGRAM

## 2024-02-05 PROCEDURE — 120N000002 HC R&B MED SURG/OB UMMC

## 2024-02-05 PROCEDURE — 250N000013 HC RX MED GY IP 250 OP 250 PS 637: Performed by: STUDENT IN AN ORGANIZED HEALTH CARE EDUCATION/TRAINING PROGRAM

## 2024-02-05 PROCEDURE — 250N000011 HC RX IP 250 OP 636: Performed by: HOSPITALIST

## 2024-02-05 PROCEDURE — 250N000011 HC RX IP 250 OP 636: Performed by: STUDENT IN AN ORGANIZED HEALTH CARE EDUCATION/TRAINING PROGRAM

## 2024-02-05 PROCEDURE — 80048 BASIC METABOLIC PNL TOTAL CA: CPT | Performed by: STUDENT IN AN ORGANIZED HEALTH CARE EDUCATION/TRAINING PROGRAM

## 2024-02-05 PROCEDURE — 250N000013 HC RX MED GY IP 250 OP 250 PS 637: Performed by: HOSPITALIST

## 2024-02-05 RX ORDER — DICYCLOMINE HYDROCHLORIDE 10 MG/1
10 CAPSULE ORAL EVERY 6 HOURS PRN
Status: DISCONTINUED | OUTPATIENT
Start: 2024-02-05 | End: 2024-02-26 | Stop reason: HOSPADM

## 2024-02-05 RX ORDER — DEXTROSE, SODIUM CHLORIDE, SODIUM LACTATE, POTASSIUM CHLORIDE, AND CALCIUM CHLORIDE 5; .6; .31; .03; .02 G/100ML; G/100ML; G/100ML; G/100ML; G/100ML
INJECTION, SOLUTION INTRAVENOUS CONTINUOUS
Status: DISCONTINUED | OUTPATIENT
Start: 2024-02-05 | End: 2024-02-09

## 2024-02-05 RX ADMIN — LOSARTAN POTASSIUM 50 MG: 50 TABLET, FILM COATED ORAL at 08:06

## 2024-02-05 RX ADMIN — FLUTICASONE FUROATE AND VILANTEROL TRIFENATATE 1 PUFF: 200; 25 POWDER RESPIRATORY (INHALATION) at 08:08

## 2024-02-05 RX ADMIN — DICYCLOMINE HYDROCHLORIDE 10 MG: 10 CAPSULE ORAL at 14:21

## 2024-02-05 RX ADMIN — LATANOPROST 1 DROP: 50 SOLUTION OPHTHALMIC at 22:17

## 2024-02-05 RX ADMIN — BUSPIRONE HYDROCHLORIDE 10 MG: 10 TABLET ORAL at 20:32

## 2024-02-05 RX ADMIN — ACETAMINOPHEN 650 MG: 325 TABLET, FILM COATED ORAL at 20:32

## 2024-02-05 RX ADMIN — SODIUM CHLORIDE, SODIUM LACTATE, POTASSIUM CHLORIDE, CALCIUM CHLORIDE AND DEXTROSE MONOHYDRATE: 5; 600; 310; 30; 20 INJECTION, SOLUTION INTRAVENOUS at 20:33

## 2024-02-05 RX ADMIN — UMECLIDINIUM 1 PUFF: 62.5 AEROSOL, POWDER ORAL at 08:08

## 2024-02-05 RX ADMIN — PANTOPRAZOLE SODIUM 40 MG: 40 TABLET, DELAYED RELEASE ORAL at 08:05

## 2024-02-05 RX ADMIN — CALCIUM POLYCARBOPHIL 1250 MG: 625 TABLET, FILM COATED ORAL at 08:05

## 2024-02-05 RX ADMIN — AMOXICILLIN 500 MG: 500 CAPSULE ORAL at 20:32

## 2024-02-05 RX ADMIN — DICYCLOMINE HYDROCHLORIDE 10 MG: 10 CAPSULE ORAL at 20:32

## 2024-02-05 RX ADMIN — AMOXICILLIN 500 MG: 500 CAPSULE ORAL at 08:04

## 2024-02-05 RX ADMIN — ONDANSETRON 4 MG: 4 TABLET, ORALLY DISINTEGRATING ORAL at 22:17

## 2024-02-05 RX ADMIN — BUSPIRONE HYDROCHLORIDE 10 MG: 10 TABLET ORAL at 13:22

## 2024-02-05 RX ADMIN — ENOXAPARIN SODIUM 40 MG: 40 INJECTION SUBCUTANEOUS at 08:07

## 2024-02-05 RX ADMIN — FLUOXETINE HYDROCHLORIDE 40 MG: 20 CAPSULE ORAL at 08:05

## 2024-02-05 RX ADMIN — LEVOTHYROXINE SODIUM 50 MCG: 50 TABLET ORAL at 08:06

## 2024-02-05 RX ADMIN — DONEPEZIL HYDROCHLORIDE 10 MG: 10 TABLET, FILM COATED ORAL at 22:17

## 2024-02-05 RX ADMIN — ACETAMINOPHEN 650 MG: 325 TABLET, FILM COATED ORAL at 15:06

## 2024-02-05 RX ADMIN — BUSPIRONE HYDROCHLORIDE 10 MG: 10 TABLET ORAL at 08:07

## 2024-02-05 RX ADMIN — ONDANSETRON 4 MG: 4 TABLET, ORALLY DISINTEGRATING ORAL at 10:26

## 2024-02-05 RX ADMIN — SODIUM CHLORIDE, SODIUM LACTATE, POTASSIUM CHLORIDE, CALCIUM CHLORIDE AND DEXTROSE MONOHYDRATE: 5; 600; 310; 30; 20 INJECTION, SOLUTION INTRAVENOUS at 20:32

## 2024-02-05 ASSESSMENT — ACTIVITIES OF DAILY LIVING (ADL)
ADLS_ACUITY_SCORE: 30
ADLS_ACUITY_SCORE: 33
ADLS_ACUITY_SCORE: 33
ADLS_ACUITY_SCORE: 30
ADLS_ACUITY_SCORE: 30
ADLS_ACUITY_SCORE: 33
ADLS_ACUITY_SCORE: 30
ADLS_ACUITY_SCORE: 33
ADLS_ACUITY_SCORE: 30

## 2024-02-05 NOTE — PROGRESS NOTES
"Care Management Follow Up    Length of Stay (days): 4    Expected Discharge Date: 2024     Concerns to be Addressed: adjustment to diagnosis/illness     Patient plan of care discussed at interdisciplinary rounds: Yes    Anticipated Discharge Disposition:  Home to [facility]     Anticipated Discharge Services:    Anticipated Discharge DME:      Patient/family educated on Medicare website which has current facility and service quality ratings:    Education Provided on the Discharge Plan:    Patient/Family in Agreement with the Plan: yes    Referrals Placed by CM/SW:    Private pay costs discussed: Not applicable    Additional Information:    Per patient care rounds Idalia will either discharge home to [facility] today or tomorrow.  PT/OT rec's are pending.    Per Weekend RNCC:    \"To Do During Weekday:  - Contact [facility] to coordinate pt's return when she is ready for discharge and to get fax number to send orders to, if applicable  - Contact [facility] to confirm services/resume them  - Ensure pt has portable oxygen tank available for discharge transport in the event pt needs it for transport home\"    Call to [facility] who confirmed that she is open to them for home care.  Her  phone number is [phone], orders to be faxed to [phone].    Call to [facility], nurse line is [phone], fax [phone].  Spoke with Albina.  Idalia's oxygen is through [facility].  She said that her daughters are very attentive and transport her in their cars when they go off site.    Idalia's Contacts:    Oxygen  [facility]  [phone]     [facility]  [phone]   : [phone]  fax [phone].    [phone]  Fax [phone]      Call to [facility] and they will deliver a tank today.  Takes up to 3 hours to deliver, so by 2:30 pm.    11:57 AM  Taylor, RNCC received voice mail that [facility] closed her to home care.  " Call to [facility] CM and they are keeping her open and planning to do KATIE when she discharges.    1:19 PM  Voice mail was transferred to me from the  voice mail that they need information in order to evaluate her before she returns.  Returned call and left voice mail Explained that we do not get discharge orders until day of discharge.   Explained that right now she is a SBA of 1 with walker.  Explained that PT will eval to see if she can be independent with walker.    Spoke with therapy and she refused PT today they will eval tomorrow morning.    Care Management to continue to working with patient/family toward safe discharge plan.      Emani Tao RN  Inpatient Care Coordinator  6 Med Surg  [phone], pager [phone]      For weekend social work needs, contact information below and available on [facility]:      Weekend [place] Pager ED, 5 MS, 5 ortho : [phone]   Weekend 6MS, 8A, 10ICU- Pager: [phone]     For weekend RN care coordinator needs (home discharge with needs including home care, assisted living facility returns, durable medical equip, IV antibiotics)   5 med/surg, 5 ortho, ED pager: [phone]  6 med/surg, 8 med/surg, 10 ICU pager: [phone]

## 2024-02-05 NOTE — CONSULTS
GASTROENTEROLOGY CONSULTATION      Date of Admission:  2/1/2024  Requesting physician: Pavel Kumar MD            Reason for Consultation:   mucousy stools, abd pain            ASSESSMENT AND RECOMMENDATIONS:   Assessment:  Idalia Bob is a 79 year old female who presented on 2/1/2024 for upper abdominal pain and found to have lung consolidation concerning for pneumonia. The patient has a history of COPD on home oxygen, pneumonia, hypertension, dementia and hypothyroidism. GI was consulted for abdominal pian and frequent mucous stools.        # Mucous stools  # Bowel urgency and frequency   # Lower abdominal pain   Increased BM urgency with frequency with associated lower abdominal pain. She had a yellow/brown BM without blood at my visit today. No evidence of bowel inflammation on CT. CRP mildly elevated which could be due to pneumonia. Question if urgency could be due to gastroenteritis vs antibiotic induced dysbiosis. Negative enteric panel and C diff. IBD is possible but usually does not have an onset in the eighth decade of life.     # Nausea   # A large hiatal hernia   # Poor appetite   Unclear if nausea is related to gastroenteritis, antibiotics vs the large hiatal hernia (without volvulus) as shown on CT. Low suspicion for gastric volvulus in the absence of epigastric pain. Per medicine team, there is a concern for lung malignancy given ongoing consolidation on CT.        Recommendations  - Continue supportive care with anti-nausea medication  - We will arrange for GI follow up in 4 weeks. If symptoms do not improve, we will consider endoscopic procedures in the outpatient setting.       Thank you for involving us in this patient's care. Please do not hesitate to contact the GI service with any questions or concerns.     Pt care plan discussed with Dr. James, GI staff physician.      Lisa Gil MD PhD  GI Fellow   [phone]    -------------------------------------------------------------------------------------------------------------------           History of Present Illness:   Idalia Bob is a 79 year old female who presented on 2/1/2024 for upper abdominal pain and found to have lung consolidation concerning for pneumonia. The patient has a history of COPD on home oxygen, pneumonia, hypertension, dementia and hypothyroidism. GI was consulted for abdominal pian and frequent mucous stools.     At my visit today, she reports not feeling well due to stool urgency, frequency and associated lower abdominal cramping in the last few days. She does have memory problems related to dementia. So she does not seem to remember where she was hospitalized recently and where she is now. So I do not seem to get a clear history regarding her abdominal symptoms. She told me that she came to the hospital this time is because of pneumonia however reviewing of ED and admission notes showed that her CC was abdominal pain. She told me that she had dark stools and the stool that she had today was witnessed by me and was brown. She also told me that she has not been able to eat for 4 days because she has no appetite and not nausea. Later at my visit, she appeared quite nauseous. She told me the nausea was new and not chronic. Denies epigastric pain or heart burn.     No FHx of colon cancer. She reports that she had colonoscopy many years ago but does not remember where and when.               Past Medical History:   Reviewed and edited as appropriate  History reviewed. No pertinent past medical history.         Past Surgical History:   Reviewed and edited as appropriate   History reviewed. No pertinent surgical history.         Social History:   Reviewed and edited as appropriate  Social History     Socioeconomic History    Marital status:      Spouse name: Not on file    Number of children: Not on file    Years of education: Not on file     Highest education level: Not on file   Occupational History    Not on file   Tobacco Use    Smoking status: Former     Packs/day: 1.00     Years: 40.00     Additional pack years: 0.00     Total pack years: 40.00     Types: Cigarettes     Passive exposure: Past    Smokeless tobacco: Never   Vaping Use    Vaping Use: Never used   Substance and Sexual Activity    Alcohol use: Not Currently    Drug use: Never    Sexual activity: Not Currently   Other Topics Concern    Not on file   Social History Narrative    Not on file     Social Determinants of Health     Financial Resource Strain: Low Risk  (1/22/2024)    Financial Resource Strain     Within the past 12 months, have you or your family members you live with been unable to get utilities (heat, electricity) when it was really needed?: No   Food Insecurity: Low Risk  (1/22/2024)    Food Insecurity     Within the past 12 months, did you worry that your food would run out before you got money to buy more?: No     Within the past 12 months, did the food you bought just not last and you didn t have money to get more?: No   Transportation Needs: Low Risk  (1/22/2024)    Transportation Needs     Within the past 12 months, has lack of transportation kept you from medical appointments, getting your medicines, non-medical meetings or appointments, work, or from getting things that you need?: No   Physical Activity: Not on file   Stress: Not on file   Social Connections: Not on file   Interpersonal Safety: Not on file   Housing Stability: Low Risk  (1/22/2024)    Housing Stability     Do you have housing? : Yes     Are you worried about losing your housing?: No            Family History:   Reviewed and edited as appropriate  History reviewed. No pertinent family history.   No known history of colorectal cancer, liver disease, or inflammatory bowel disease.         Allergies:   Reviewed and edited as appropriate     Allergies   Allergen Reactions    Aspirin Nausea and Nausea and  Vomiting     Stomach ache    Penicillins Itching            Medications:     Facility-Administered Medications Prior to Admission   Medication Dose Route Frequency Provider Last Rate Last Admin    [START ON 2/8/2024] denosumab (PROLIA) injection 60 mg  60 mg Subcutaneous Once Gomez Louis MD         Medications Prior to Admission   Medication Sig Dispense Refill Last Dose    acetylcysteine (N-ACETYL CYSTEINE) 600 MG CAPS capsule Take 1,200 mg by mouth 2 times daily       albuterol (PROVENTIL) (2.5 MG/3ML) 0.083% neb solution Take 2.5 mg by nebulization 2 times daily as needed (COPD)       busPIRone (BUSPAR) 10 MG tablet Take 10 mg by mouth 3 times daily       calcium polycarbophil (FIBERCON) 625 MG tablet Take 2 tablets (1,250 mg) by mouth daily 90 tablet 3     denosumab (PROLIA) 60 MG/ML SOSY injection Inject 60 mg Subcutaneous every 6 months       donepezil (ARICEPT) 10 MG tablet Take 1 tablet (10 mg) by mouth at bedtime 90 tablet 1     ferrous gluconate (FERGON) 324 (38 Fe) MG tablet Take 1 tablet (324 mg) by mouth daily (with breakfast) 90 tablet 3     FLUoxetine (PROZAC) 40 MG capsule Take 1 capsule (40 mg) by mouth daily 90 capsule 3     Fluticasone-Umeclidin-Vilanterol (TRELEGY ELLIPTA) 200-62.5-25 MCG/ACT oral inhaler Inhale 1 puff into the lungs daily       furosemide (LASIX) 20 MG tablet One tab daily as needed for lower ext edema 30 tablet 1     latanoprost (XALATAN) 0.005 % ophthalmic solution Place 1 drop into both eyes at bedtime       levothyroxine (SYNTHROID/LEVOTHROID) 50 MCG tablet Take 50 mcg by mouth daily       olmesartan (BENICAR) 20 MG tablet Take 20 mg by mouth       pantoprazole (PROTONIX) 40 MG EC tablet Take 40 mg by mouth       triamcinolone (KENALOG) 0.1 % external cream Apply topically 2 times daily       vitamin D3 (CHOLECALCIFEROL) 50 mcg (2000 units) tablet Take 1 tablet (50 mcg) by mouth daily 90 tablet 3              Review of Systems:     A complete 10 point review of systems  was performed and is negative except as noted in the HPI           Physical Exam:   BP (!) 153/85 (BP Location: Left arm)   Pulse 87   Temp 98.1  F (36.7  C) (Oral)   Resp 16   Wt 57.3 kg (126 lb 6.4 oz)   SpO2 98%   BMI 24.69 kg/m    Wt:   Wt Readings from Last 2 Encounters:   02/02/24 57.3 kg (126 lb 6.4 oz)   01/22/24 59.9 kg (132 lb)      Constitutional: In mild distress,lying in bed  Eyes: Sclera anicteric  Ears/nose/mouth/throat: Moist mucus membranes, hearing intact  Neck: supple  CV: No edema  Respiratory: Breathing on NC, prolonged expiration   Abd: Soft, tender in lower abdomen, nondistended, bowel sounds present  Skin: warm, perfused, no jaundice  Neuro: AAO x 3  Psych: Normal affect  MSK: No gross deformities, walk with a walker          Data:   Labs and imaging below were independently reviewed and interpreted    BMP  Recent Labs   Lab 02/05/24  0518 02/04/24  0649 02/03/24  0856 02/02/24  0715   * 134* 134* 137   POTASSIUM 3.9 4.0 4.2 4.7   CHLORIDE 97* 97* 98 101   AYUSH 8.9 8.7* 8.6* 9.3   CO2 27 27 28 23   BUN 8.5 5.8* 7.7* 9.3   CR 0.83  0.83 0.84 0.87 0.83   GLC 89 86 100* 118*     CBC  Recent Labs   Lab 02/05/24  0518 02/04/24  0649 02/03/24  0856 02/02/24  0715   WBC 7.8 8.0 8.1 8.0   RBC 3.94 3.96 3.98 4.41   HGB 9.6* 9.7* 9.6* 10.8*   HCT 32.8* 33.0* 33.3* 36.5   MCV 83 83 84 83   MCH 24.4* 24.5* 24.1* 24.5*   MCHC 29.3* 29.4* 28.8* 29.6*   RDW 18.2* 17.7* 17.8* 17.8*    318 309 347     INRNo lab results found in last 7 days.  LFTs  Recent Labs   Lab 02/01/24  2120   ALKPHOS 47   AST 22   ALT <5   BILITOTAL 0.2   PROTTOTAL 5.5*   ALBUMIN 3.5      PANC  Recent Labs   Lab 02/01/24 2120   LIPASE 20       Imaging: Reviewed    Endoscopy: NA

## 2024-02-05 NOTE — PROGRESS NOTES
"VS: BP (!) 153/85 (BP Location: Left arm)   Pulse 87   Temp 98.1  F (36.7  C) (Oral)   Resp 16   Wt 57.3 kg (126 lb 6.4 oz)   SpO2 98%   BMI 24.69 kg/m     O2:  1 Liter O2/ 95 percent    Output:  Independent to Toilet    Last BM:  02/05/2024   Activity:  Independent / SBA / Walker    Up for meals?  Yes    Skin:  No wounds and or Injuries    Pain:  Yes    CMS:  A&OX 3-4   Dressing:  None    Diet:  Regular    LDA:  Right PIV SL   Equipment:  Personal Belongings    Plan:  POC   Additional Info:  Desats with Activity / SOB        Lower Abdominal pain and Nausea; received Zofran.   Patient stated that they feel much better encouraged to order lunch.   Patient did not eat breakfast / appetite is very poor  Patient stated; I have not consumed  any food in the past 3 to 4 day \" I have no appetite\" patient is becoming very weak. Patient is up to toilet 4 to 5 times during AM shift  BM yellowish mucous.   "

## 2024-02-05 NOTE — PROGRESS NOTES
[facility]    Medicine Progress Note - Hospitalist Service, GOLD TEAM 21    Date of Admission:  2/1/2024    Assessment & Plan   Idalia Bob is a 79 year old female admitted on 2/1/2024. Pt has history of COPD, pneumonia, hypertension, dementia and hypothyroidism among others, presented with abdominal pain and found to have consolidation on CT as well as frequent mucousy bowel movements and anorexia.    Left upper lobe pneumonia vs persistent consolidation vs mass  COPD  Chronic hypoxemic respiratory failure    Patient with pseudomonal upper lobe pneumonia in December and has had ongoing consolidation since.  While this finding was present on imaging she has a paucity of other pathology consistent with pneumonia at the present time.  She appears to be breathing at her baseline.  It is possible that this is simply a slow to resolve infiltrate or another process.    -Stop azithromycin and ceftriaxone  -Continue home oxygen  -David consultation today.  -Continue home COPD regimen  -Follow-up Fungitell, galactomannan, and sputum cultures.  -Consider either following for resolution as an outpatient, bronchoscopy, or sampling percutaneously; discussed with pulmonology    Abdominal Pain  Loose stools with mucus  Tenesmus  Unintentional weight loss  Anorexia    The patient has had weeks of worsening anorexia along with crampy abdominal pain but no evidence of gastrointestinal bleeding.  She has frequent (many many times per day) mucousy bowel movements with very little stool production.  C. difficile and enteric panel are negative.  She does have normocytic anemia and a hiatal hernia.    -Appreciate GI consultation  -Continue to evaluate, likely needs upper and or lower endoscopies either outpatient or in the hospital.  Her mild cognitive impairment could limit the success of a prep as an outpatient  -Encourage oral intake, start supplements  -Add Bentyl  -Physical and  Occupational Therapy consults    Urinary tract infection    Urine culture with 10-50,000 colonies of Enterococcus faecalis.    -Start amoxicillin and monitor for tolerance given potential history of not anaphylactic penicillin allergy; will treat for total of 5 days (through 2/9)    Elevated Troponin    Likely type II MI in the setting of acute illness.  Continue to follow      Hypokalemia  Hypomagnesemia    In the setting of poor oral intake as well as PPI use.  Will continue to follow closely.        Iron deficiency anemia    Not tolerating Iron replacement.  Her ferritin is 85 which likely reflects a degree of iron deficiency given some presence of inflammation.    -Consider IV iron depending on GI workup       Hypertension  Continue losartan 50 mg p.o. daily as a substitute for her home medication    Anxiety and depression  Takes BuSpar, Prozac  -Continue home regimen     Hypothyroidism     Takes Synthroid 50 mcg. TSH normal  -Continue    Dementia   - continue donepezil          Diet: Combination Diet Regular Diet Adult  Snacks/Supplements Adult: Ensure Enlive; Between Meals    DVT Prophylaxis: Enoxaparin (Lovenox) SQ  Ruiz Catheter: Not present  Lines: None     Cardiac Monitoring: None  Code Status: Full Code      Clinically Significant Risk Factors                  # Hypertension: Noted on problem list     # Dementia: noted on problem list        # Financial/Environmental Concerns: none         Disposition Plan      Expected Discharge Date: 02/07/2024      Destination: home with help/services              Pavel Kumar MD  Hospitalist Service, GOLD TEAM [address] [place] [address]  Securely message with [employer] (more info)  Text page via [facility] Paging/Directory   See signed in provider for up to date coverage information  ______________________________________________________________________    Interval History   No major events, continuing to have frequent mucousy stools.   Breathing feels okay.  Continues to have significant cramping abdominal pain.    Physical Exam   Vital Signs: Temp: 98.1  F (36.7  C) Temp src: Oral BP: (!) 153/85 Pulse: 87   Resp: 16 SpO2: 98 % O2 Device: Nasal cannula Oxygen Delivery: 1 LPM  Weight: 126 lbs 6.4 oz    General Appearance: Lying in bed  Respiratory: Diminished air entry bilaterally, no wheezing  Cardiovascular: Normal rate, regular rhythm.  No murmurs, rubs, gallop  GI: Soft, very mild tenderness in the lower quadrants, no distention  Skin: No rash  Other: No lower extremity edema    Medical Decision Making       MANAGEMENT DISCUSSED with the following over the past 24 hours: GI and pulmonary       Data     I have personally reviewed the following data over the past 24 hrs:    7.8  \   9.6 (L)   / 332     134 (L) 97 (L) 8.5 /  89   3.9 27 0.83; 0.83 \

## 2024-02-05 NOTE — PLAN OF CARE
[id number]   Blood pressure (!) 149/82, pulse 82, temperature 98.1  F (36.7  C), temperature source Oral, resp. rate 18, weight 57.3 kg (126 lb 6.4 oz), SpO2 98%.      Pt alert and oriented times 2, able to use bathroom and make needs known      Pt use bathroom frequently and able to use call light for help.     On 1 L NC and R PIV SL      Denies chest pain, numbness and tingling     stand by assist, all visible skin intact.     Could not get sputum culture during this shift.      No c/o of pain, no acute event happened at this time, call light with in reach and continue with POC     Problem: Adult Inpatient Plan of Care  Goal: Plan of Care Review  Description: The Plan of Care Review/Shift note should be completed every shift.  The Outcome Evaluation is a brief statement about your assessment that the patient is improving, declining, or no change.  This information will be displayed automatically on your shift  note.  Flowsheets (Taken 2/5/2024 0321)  Plan of Care Reviewed With: patient  Overall Patient Progress: improving  Goal: Absence of Hospital-Acquired Illness or Injury  Intervention: Identify and Manage Fall Risk  Recent Flowsheet Documentation  Taken 2/4/2024 2343 by Kervin Huggins, RN  Safety Promotion/Fall Prevention: activity supervised  Intervention: Prevent Infection  Recent Flowsheet Documentation  Taken 2/4/2024 2343 by Kervin Huggins, RN  Infection Prevention: hand hygiene promoted     Problem: Fall Injury Risk  Goal: Absence of Fall and Fall-Related Injury  Intervention: Promote Injury-Free Environment  Recent Flowsheet Documentation  Taken 2/4/2024 2343 by Kervin Huggins, RN  Safety Promotion/Fall Prevention: activity supervised

## 2024-02-05 NOTE — PROGRESS NOTES
"CLINICAL NUTRITION SERVICES - ASSESSMENT NOTE     Nutrition Prescription    RECOMMENDATIONS FOR MDs/PROVIDERS TO ORDER:  - Bedside and I are concerned that pt continues to have minimal po intake with continued frequent small BMS or only mucous.  If discharged we are concerned she may be readmitted. With h/o antibiotics and possible disruption to microbiota (mentioned by GI provider) would recommend probiotic such as Culturelle BID or florastor q day.  If pt's abdominal pain is more crampy in nature, she may benefit from bentyl and possibly simethicone if also having some gas pains.     Malnutrition Status:    Severe malnutrition in the context of acute illness    Recommendations already ordered by Registered Dietitian (RD):  - Adjust Vanilla Ensure Enlive to 1x/d w/ breakfast (encourage staff to use with Rx pass), trial cherry Gelatein Plus (high protein jello) @ 2pm plus additional Ensure or Gelatein prn.   - Patient care order that pt should be sitting upright with all po intake and for 1 hr after d/t large hiatal hernia and reflux.   - Collaborate with other providers--attending and bedside RN.    Future/Additional Recommendations:  Follow intake, GI Sx with use of bentyl, acceptance of ONS.      Spoke with pt and bedside RN.  Later called and spoke with daughter Barbara and her concerns about her mom's poor intake and ungoing abdominal pain.      REASON FOR ASSESSMENT  Idalia Bob is a/an 79 year old female assessed by the dietitian for Provider Order - \"Patient/Family request.\"  Weekend RD deferred consult until Monday as working remotely and pt w/ dementia.      Per chart review, PMH includes COPD, pneumonia, hypertension, dementia and hypothyroidism among others, presented with abdominal pain and found to have consolidation on CT as well as frequent mucousy bowel movements and anorexia     NUTRITION HISTORY  Pt notes long h/o of IBS--when asked whether it was diarrhea/constipation or a combination, she " thought it was a combination.  She states she's never had Sx with such frequent loose stools as she is having now.  She lives in an [employer] and goes to the congregate dining for meals.  She had some chocolate Ensure from home in the room but didn't think she was taking anything like it at home.      CURRENT NUTRITION ORDERS  Diet: Regular  ONS: Ensure Enlive--vanilla between meals started 2/4  Intake/Tolerance: Pt has taken about 50% of one ensure throughout the morning and didn't want to order breakfast.    Pt has 3 other unopened vanilla Ensure enlive on tray table.     Is only willing to try beef broth and jello with a late lunch.  Pt ate 0% of meals/po offered since admission.  She hasn't had breakfast ordered over the past 3 days and apparently didn't eat any of the lunch or dinner ordered.     GI Noted pt has had antibiotics for PNA and/or urinary infections for the past several months.  Pt having frequent small stools--described as green/black over w/e.  Formed/soft w/ mucuous 2/3.  Then loose 2/4 overnight, soft in AM.  Now only passing yellow mucous. GI consult noted w/ info only from pt who has dementia. Mentioned possible loose stools related to antibiotics and dysbiosis.   Diarrhea was noted with 11/20/23 admission as well.     LABS  Labs reviewed  Na 134 likely with low solute load, Low BUN w/ low protein intake.   Urinary Ketones: 20    MEDICATIONS  Medications reviewed    ANTHROPOMETRICS  Height: 5'  Most Recent Weight: 57.3 kg (126 lb 6.4 oz)    IBW: 45.5 kg (Pt @ 126% IBW)  BMI: Normal BMI  Weight History:   Wt Readings from Last 20 Encounters:    02/02/24 57.3 kg (126 lb 6.4 oz) Standing   01/22/24 59.9 kg (132 lb)    12/06/23 63 kg (139 lb) [facility]   12/04/23 63.5 g (139 lg 14.4 oz [facility]    11/26/23 67.4 kg (148 lb 8 oz) [facility]   11/18/23 59 kg (130 lb) Clinic    08/09/23 61.7 kg (136 lb) clinic     Weight assessment: Limited weight hx.  Weights not noted with provider  Geriatric visits to care home.  9% wt loss in past month based on available weights--although suspect hospital weights were affected by bed scales or IVFs. Wt loss of 2.9% within past 3 months.   Per daughter, pt's weight is usually 130-135 lb. Daughter Barbara weighed her mom last week and said it was about 133 lb.  This would make a wt loss of 5.3% in 1 week--significant.     Dosing Weight: 48.5 kg (adjusted wt based on 2/2 wt and IBW)    ASSESSED NUTRITION NEEDS  Estimated Energy Needs: 1211 - 1454 kcals/day (25 - 30 kcals/kg)  Justification: Maintenance  Estimated Protein Needs: 49 -58 grams protein/day (1 - 1.2 grams of pro/kg)  Justification: Repletion-- to maintain LBM  Estimated Fluid Needs: 1 mL/kcal   Justification: Maintenance and Per provider pending fluid status    PHYSICAL FINDINGS  See malnutrition section below.    MALNUTRITION  % Intake: </= 50% for >/= 5 days (severe)  % Weight Loss: >2% in 1 week (severe)--(5.3%)  Subcutaneous Fat Loss: Unable to assess--pt fully clothed with long sleeves and unable to  loss w/ advanced age vs illness  Muscle Loss: Unable to assess--pt fully clothed with long sleeves and unable to  loss w/ advanced age vs illness  Fluid Accumulation/Edema: None noted per RN charting  Malnutrition Diagnosis: Severe malnutrition in the context of acute illness    NUTRITION DIAGNOSIS  Inadequate oral intake related to poor appetite, reflux, nausea, abdominal pain and frequent stooling as evidenced by minimal po intake since admission and elevated urinary ketones on 2/2.       INTERVENTIONS  Implementation  Nutrition Education: Education--encouraged pt to try to stay more upright especially with any po intake, and for one hr after d/t her HH and reflux.  Encouraged use of Ensure w/ Rx pass instead of water to increase po intake.  Discussed poor intake and daughter's concerns.  Noted that antibiotics can exacerbate pre-existing IBS Sx.  She was just told by RN that bentyl was added  "and I explained that this may help with abdominal from cramping.   Collaboration with other providers  Medical food supplement therapy     Goals  Patient to consume % of nutritionally adequate meal trays TID, or the equivalent with supplements/snacks.     Monitoring/Evaluation  Progress toward goals will be monitored and evaluated per protocol.  Janae Case) Juan AGUILA, LD   6B and 8A Med/surg (M-F)   Weekday Vocera contact: \"6 Med Surg Clinical Dietitian\" or \"8 Med Surg Clinical Dietitian\"  M-F Pager: [phone]  Weekend/holiday pager: [phone]    "

## 2024-02-05 NOTE — CONSULTS
Pulmonary consult    HPI:  78 YO with history of COPD, HTN, hypothyroidism and dementia admitted with abdominal pain and diarrhea.  Admitted with left sided pneumonia in 11-23; sputum culture was positive for Pseudomonas; was initially treated with ceftriaxone until sputum culture returned positive for Pseudomonas, was then transitioned to Cefepime/levofloxacin and finally discharged on Levofloxacin.  Re-admitted on 11-28 and then again on 12-6 to 12-13 with SOB and fatigue and continued infiltrate on imaging; was treated with cefepime/doxycycline and then transitioned over to Zosyn, per discharge summary was then sent out with 5 additional days of Augmentin. Asked to see patient for persistent NIRAV infiltrate seen on chest CT. Admitted currently with abdominal cramping and mucous stools.  Denies any cough or SOB at present.  No sputum production not coughing up any blood.  Recent weight loss of 10 pounds over the past few weeks, weight stable prior to that.  No prior history of pneumonia, no asthma, chronic cough, or frequent bronchitis in younger years.  History of GERD but no history of aspiration.  On Trelegy and albuterol prn.  Was discharged on oxygen at 3L/NC from admission in 11-23, now decreased to 1.5 L.  Able to walk in assisted living facility, can bathe and dress at her own pace. Quit tobacco, 1 pk per day for 40 years.  On Trelegy and albuterol at home.  Sister with family history of lung cancer.    PMH:  All: ASA, penicillins  Med Prob:  COPD, hypothyroidism, GERD, dementia, HTN  PSH:  None  Meds:  Reviewed in EPIC    SH:  Quit tobacco in past, 40 pk yr history   FH: Twin Sister with lung cancer    ROS:  10 point ROS performed. All systems negative except as stated in HPI    PE:  Gen:  Sitting up in bed. Comfortable.  No SOB  V: AF P82-95  -153/72-88 1L 98%  HEENT:  PERRL, EOMIB, sclera clear, no sinus tenderness, no JVD  Lungs: Diffusely decreased BS, no wheezes, no rhonchi  Ht: RRR, no  mgr  Abd: Hyperactive BS, mild diffuse tenderness  Ext: no edema    Labs:  WBC 7.8, Hb 9.6, plt 332.  CRP 7.49.  Pro-calc 0.04    CT chest:  Posterior segment NIRAV infiltrate, difficult to follow airway proximally.  No adenopathy. Scattered nodularity on right.  In comparison to CT from 11-23    I am unable to locate her previous CXR and chest CT      Assessment and Plan:  80 YO with history of COPD with emphysema seen on CT.  Recent admissions for pneumonia in NIRAV with sputum positive for Pseudomonas- adequately treated with Levofloxacin.  Per chart, NIRAV infiltrate has not resolved (I am not able to personally review the old images to assess the response or lack thereof in the NIRAV infiltrate).  If the infiltrate has not resolved the concerns are for an untreated pathogen- fungus, Nocardia, atypical Mycobacteria; organizing pneumonia due to recent infection, hypogammaglobinemia with lack of full clearance, or an endobronchial lesion- foreign body or mass.  These wear discussed with the patient and her daughter at the bedside. We discussed the option of bronchoscopy to exclude and endobronchial lesion, obtain direct cultures, and possibly perform transbronchial biopsy.  Per the past hospitalization it states the patient wanted to be a do not intubate; I discussed this with the patient and she is agreeable to to intubation if necessary after a bronchoscopy.  Needs to have GI issues resolving prior to undergoing bronchoscopy.  Can be done as an inpatient or outpatient.    I will further discuss with her after I review the images.      Priya Schulz MD  [phone]

## 2024-02-05 NOTE — PLAN OF CARE
Goal Outcome Evaluation:      Plan of Care Reviewed With: patient, child    Overall Patient Progress: no changeOverall Patient Progress: no change    Outcome Evaluation: Adjusted ONS--ensure q day, trial Gelatein 20. Encourage pt to stay upright to avoid nausea from hiatal hernia, reflux.

## 2024-02-06 ENCOUNTER — APPOINTMENT (OUTPATIENT)
Dept: PHYSICAL THERAPY | Facility: CLINIC | Age: 79
DRG: 193 | End: 2024-02-06
Attending: STUDENT IN AN ORGANIZED HEALTH CARE EDUCATION/TRAINING PROGRAM
Payer: MEDICARE

## 2024-02-06 LAB
ANION GAP SERPL CALCULATED.3IONS-SCNC: 8 MMOL/L (ref 7–15)
BUN SERPL-MCNC: 10 MG/DL (ref 8–23)
CALCIUM SERPL-MCNC: 9.2 MG/DL (ref 8.8–10.2)
CHLORIDE SERPL-SCNC: 98 MMOL/L (ref 98–107)
CREAT SERPL-MCNC: 0.83 MG/DL (ref 0.51–0.95)
DEPRECATED HCO3 PLAS-SCNC: 28 MMOL/L (ref 22–29)
EGFRCR SERPLBLD CKD-EPI 2021: 71 ML/MIN/1.73M2
ERYTHROCYTE [DISTWIDTH] IN BLOOD BY AUTOMATED COUNT: 18.2 % (ref 10–15)
GALACTOMANNAN AG SERPL QL IA: NEGATIVE
GALACTOMANNAN AG SPEC IA-ACNC: 0.06
GLUCOSE SERPL-MCNC: 124 MG/DL (ref 70–99)
HCT VFR BLD AUTO: 31.9 % (ref 35–47)
HGB BLD-MCNC: 9.5 G/DL (ref 11.7–15.7)
IGG SERPL-MCNC: 609 MG/DL (ref 610–1616)
MAGNESIUM SERPL-MCNC: 2.2 MG/DL (ref 1.7–2.3)
MCH RBC QN AUTO: 24.5 PG (ref 26.5–33)
MCHC RBC AUTO-ENTMCNC: 29.8 G/DL (ref 31.5–36.5)
MCV RBC AUTO: 82 FL (ref 78–100)
PLATELET # BLD AUTO: 311 10E3/UL (ref 150–450)
POTASSIUM SERPL-SCNC: 3.9 MMOL/L (ref 3.4–5.3)
RBC # BLD AUTO: 3.88 10E6/UL (ref 3.8–5.2)
SODIUM SERPL-SCNC: 134 MMOL/L (ref 135–145)
WBC # BLD AUTO: 7.1 10E3/UL (ref 4–11)

## 2024-02-06 PROCEDURE — 36415 COLL VENOUS BLD VENIPUNCTURE: CPT | Performed by: STUDENT IN AN ORGANIZED HEALTH CARE EDUCATION/TRAINING PROGRAM

## 2024-02-06 PROCEDURE — 97530 THERAPEUTIC ACTIVITIES: CPT | Mod: GP | Performed by: PHYSICAL THERAPIST

## 2024-02-06 PROCEDURE — 86003 ALLG SPEC IGE CRUDE XTRC EA: CPT | Performed by: INTERNAL MEDICINE

## 2024-02-06 PROCEDURE — 97161 PT EVAL LOW COMPLEX 20 MIN: CPT | Mod: GP | Performed by: PHYSICAL THERAPIST

## 2024-02-06 PROCEDURE — 250N000013 HC RX MED GY IP 250 OP 250 PS 637: Performed by: STUDENT IN AN ORGANIZED HEALTH CARE EDUCATION/TRAINING PROGRAM

## 2024-02-06 PROCEDURE — 80048 BASIC METABOLIC PNL TOTAL CA: CPT | Performed by: STUDENT IN AN ORGANIZED HEALTH CARE EDUCATION/TRAINING PROGRAM

## 2024-02-06 PROCEDURE — 250N000011 HC RX IP 250 OP 636: Performed by: STUDENT IN AN ORGANIZED HEALTH CARE EDUCATION/TRAINING PROGRAM

## 2024-02-06 PROCEDURE — 120N000002 HC R&B MED SURG/OB UMMC

## 2024-02-06 PROCEDURE — 82785 ASSAY OF IGE: CPT | Performed by: INTERNAL MEDICINE

## 2024-02-06 PROCEDURE — 250N000011 HC RX IP 250 OP 636: Performed by: HOSPITALIST

## 2024-02-06 PROCEDURE — 85027 COMPLETE CBC AUTOMATED: CPT | Performed by: STUDENT IN AN ORGANIZED HEALTH CARE EDUCATION/TRAINING PROGRAM

## 2024-02-06 PROCEDURE — 250N000013 HC RX MED GY IP 250 OP 250 PS 637: Performed by: HOSPITALIST

## 2024-02-06 PROCEDURE — 99232 SBSQ HOSP IP/OBS MODERATE 35: CPT | Performed by: STUDENT IN AN ORGANIZED HEALTH CARE EDUCATION/TRAINING PROGRAM

## 2024-02-06 PROCEDURE — 82784 ASSAY IGA/IGD/IGG/IGM EACH: CPT | Performed by: INTERNAL MEDICINE

## 2024-02-06 PROCEDURE — 83735 ASSAY OF MAGNESIUM: CPT | Performed by: STUDENT IN AN ORGANIZED HEALTH CARE EDUCATION/TRAINING PROGRAM

## 2024-02-06 RX ADMIN — ONDANSETRON 4 MG: 4 TABLET, ORALLY DISINTEGRATING ORAL at 08:12

## 2024-02-06 RX ADMIN — BUSPIRONE HYDROCHLORIDE 10 MG: 10 TABLET ORAL at 21:19

## 2024-02-06 RX ADMIN — ONDANSETRON 4 MG: 4 TABLET, ORALLY DISINTEGRATING ORAL at 02:24

## 2024-02-06 RX ADMIN — BUSPIRONE HYDROCHLORIDE 10 MG: 10 TABLET ORAL at 14:49

## 2024-02-06 RX ADMIN — AMOXICILLIN 500 MG: 500 CAPSULE ORAL at 21:19

## 2024-02-06 RX ADMIN — PANTOPRAZOLE SODIUM 40 MG: 40 TABLET, DELAYED RELEASE ORAL at 08:15

## 2024-02-06 RX ADMIN — DICYCLOMINE HYDROCHLORIDE 10 MG: 10 CAPSULE ORAL at 08:12

## 2024-02-06 RX ADMIN — ENOXAPARIN SODIUM 40 MG: 40 INJECTION SUBCUTANEOUS at 08:11

## 2024-02-06 RX ADMIN — Medication 1 MG: at 21:19

## 2024-02-06 RX ADMIN — LOSARTAN POTASSIUM 50 MG: 50 TABLET, FILM COATED ORAL at 08:15

## 2024-02-06 RX ADMIN — LEVOTHYROXINE SODIUM 50 MCG: 50 TABLET ORAL at 08:15

## 2024-02-06 RX ADMIN — LATANOPROST 1 DROP: 50 SOLUTION OPHTHALMIC at 21:19

## 2024-02-06 RX ADMIN — DICYCLOMINE HYDROCHLORIDE 10 MG: 10 CAPSULE ORAL at 14:49

## 2024-02-06 RX ADMIN — BUSPIRONE HYDROCHLORIDE 10 MG: 10 TABLET ORAL at 08:15

## 2024-02-06 RX ADMIN — Medication 1 MG: at 03:07

## 2024-02-06 RX ADMIN — ACETAMINOPHEN 650 MG: 325 TABLET, FILM COATED ORAL at 03:07

## 2024-02-06 RX ADMIN — AMOXICILLIN 500 MG: 500 CAPSULE ORAL at 08:15

## 2024-02-06 RX ADMIN — ACETAMINOPHEN 650 MG: 325 TABLET, FILM COATED ORAL at 12:00

## 2024-02-06 RX ADMIN — DONEPEZIL HYDROCHLORIDE 10 MG: 10 TABLET, FILM COATED ORAL at 21:19

## 2024-02-06 RX ADMIN — FLUOXETINE HYDROCHLORIDE 40 MG: 20 CAPSULE ORAL at 08:15

## 2024-02-06 RX ADMIN — SODIUM CHLORIDE, SODIUM LACTATE, POTASSIUM CHLORIDE, CALCIUM CHLORIDE AND DEXTROSE MONOHYDRATE: 5; 600; 310; 30; 20 INJECTION, SOLUTION INTRAVENOUS at 12:00

## 2024-02-06 RX ADMIN — TRIAMCINOLONE ACETONIDE: 1 CREAM TOPICAL at 08:18

## 2024-02-06 RX ADMIN — FLUTICASONE FUROATE AND VILANTEROL TRIFENATATE 1 PUFF: 200; 25 POWDER RESPIRATORY (INHALATION) at 08:11

## 2024-02-06 RX ADMIN — ACETAMINOPHEN 650 MG: 325 TABLET, FILM COATED ORAL at 21:19

## 2024-02-06 RX ADMIN — DICYCLOMINE HYDROCHLORIDE 10 MG: 10 CAPSULE ORAL at 21:19

## 2024-02-06 RX ADMIN — UMECLIDINIUM 1 PUFF: 62.5 AEROSOL, POWDER ORAL at 08:11

## 2024-02-06 RX ADMIN — ACETAMINOPHEN 650 MG: 325 TABLET, FILM COATED ORAL at 17:41

## 2024-02-06 ASSESSMENT — ACTIVITIES OF DAILY LIVING (ADL)
ADLS_ACUITY_SCORE: 35
ADLS_ACUITY_SCORE: 33
ADLS_ACUITY_SCORE: 35
ADLS_ACUITY_SCORE: 33
ADLS_ACUITY_SCORE: 35
ADLS_ACUITY_SCORE: 33

## 2024-02-06 NOTE — PLAN OF CARE
Occupational Therapy: Orders received. Chart reviewed and discussed with care team.? Occupational Therapy not indicated due to pt near baseline, deconditioned and limited by pain. Pt gets assist for IADL. Pt appropriate for single discipline to follow while IP.? Defer discharge recommendations to physical therapy.? Will complete orders.

## 2024-02-06 NOTE — PROGRESS NOTES
GASTROENTEROLOGY PROGRESS NOTE    Date: 02/06/2024     ASSESSMENT:  Idalia Bob is a 79 year old female who presented on 2/1/2024 for upper abdominal pain and found to have lung consolidation concerning for pneumonia. The patient has a history of COPD on home oxygen, pneumonia, hypertension, dementia and hypothyroidism. GI was consulted for lower abdominal pian and frequent mucous stools.         # Mucous stools  # Bowel urgency and frequency   # Lower abdominal pain   Increased BM urgency with frequency with associated lower abdominal pain. She had a yellow/brown BM without blood at my visit today. No evidence of bowel inflammation on CT. CRP mildly elevated which could be due to pneumonia. Question if urgency could be due to gastroenteritis vs antibiotic induced dysbiosis. Negative enteric panel and C diff. IBD is possible but usually does not have an onset in the eighth decade of life. Given lack of improvement, will plan for flex sigmoidoscope to evaluate for etiology.      # Nausea   # A large hiatal hernia   # Poor appetite with weight loss  Unclear if nausea is related to gastroenteritis, antibiotics vs the large hiatal hernia (without volvulus) as shown on CT. Low suspicion for gastric volvulus in the absence of epigastric pain and vomiting with eating. However, given persistent nausea and poor appetite, will plan for EGD. Family is concerned about cancer and agree that EGD will be helpful in r/o UGI malignancy.     I discussed both EGD/Flex sig extensively with patient, her daughters Corie and Barbara and granddaughter with regards to risks and benefits. They all agree to the procedures.       RECOMMENDATIONS:  - EGD and Flex Sig at [facility] at 10am, 2/7   - NPO at [facility] and two water tap enema at 7:30 and 8:00 am 2/7 (ordered)  - Hold Lovenox (ordered)  - Discussed with unit charge RN about transports needs        Thank you for involving us in this patient's care. Please do not hesitate to contact the  GI service with any questions or concerns.      Pt care plan discussed with Dr. James, GI staff physician.      Lisa Gli MD, PhD  Gastroenterology Fellow  Division of Gastroenterology, Hepatology and Nutrition  [facility]  Pager: [phone]  _______________________________________________________________      Subjective:  RN notes reviewed. She continues to have frequent bowel movements 5-6 times overnight with urgency. She continues to feel no appetite and has poor oral intake.     Objective:  Blood pressure (!) 150/86, pulse 85, temperature 98.1  F (36.7  C), temperature source Oral, resp. rate 16, weight 57.3 kg (126 lb 6.4 oz), SpO2 98%.    Gen: A&Ox3, NAD  HEENT: sclera anicteric  CV: RRR  Lungs: breathing comfortably on 1L of O2  Abd:  soft, tender in lower abdomen nondistended, bowel sounds present  Skin: no jaundice, no stigmata of chronic liver disease  MS: appropriate muscle mass for age  Neuro: non focal       LABS:  BMP  Recent Labs   Lab 02/06/24  0653 02/05/24  0518 02/04/24  0649 02/03/24  0856   * 134* 134* 134*   POTASSIUM 3.9 3.9 4.0 4.2   CHLORIDE 98 97* 97* 98   AUYSH 9.2 8.9 8.7* 8.6*   CO2 28 27 27 28   BUN 10.0 8.5 5.8* 7.7*   CR 0.83 0.83  0.83 0.84 0.87   * 89 86 100*     CBC  Recent Labs   Lab 02/06/24  0653 02/05/24  0518 02/04/24  0649 02/03/24  0856   WBC 7.1 7.8 8.0 8.1   RBC 3.88 3.94 3.96 3.98   HGB 9.5* 9.6* 9.7* 9.6*   HCT 31.9* 32.8* 33.0* 33.3*   MCV 82 83 83 84   MCH 24.5* 24.4* 24.5* 24.1*   MCHC 29.8* 29.3* 29.4* 28.8*   RDW 18.2* 18.2* 17.7* 17.8*    332 318 309     INRNo lab results found in last 7 days.  LFTs  Recent Labs   Lab 02/01/24 2120   ALKPHOS 47   AST 22   ALT <5   BILITOTAL 0.2   PROTTOTAL 5.5*   ALBUMIN 3.5      PANC  Recent Labs   Lab 02/01/24 2120   LIPASE 20       IMAGING:  Reviewed

## 2024-02-06 NOTE — PHARMACY-CONSULT NOTE
"Writer contacted pharmacist regarding amoxicillin given with penicillin allergy. Pharmacist stated \" she has tolerated a few doses with no reaction by now it is most likely not a true allergy\".     Writer spoke with patient and patient stated \" Im not sure I have taken so many pills I don't know if I am or not\"        Writer continue with plan of care and monitor for adverse reaction.     "

## 2024-02-06 NOTE — PROGRESS NOTES
Care Management Follow Up    Length of Stay (days): 5    Expected Discharge Date: 02/07/2024     Concerns to be Addressed: adjustment to diagnosis/illness     Patient plan of care discussed at interdisciplinary rounds: Yes    Anticipated Discharge Disposition:  residential     Anticipated Discharge Services:  home care  Anticipated Discharge DME:      Patient/family educated on Medicare website which has current facility and service quality ratings:    Education Provided on the Discharge Plan:    Patient/Family in Agreement with the Plan: yes    Referrals Placed by CM/SW:    Private pay costs discussed: Not applicable    Additional Information:    Called nurse line is [phone] at [facility] and updated on plan for upper endoscopy today.  Will  update tomorrow when ALEX is known.    Call to [facility] to update that Idalia is not leaving today.  Left voice mail on [place] [facility] care nurse's phone. [phone] .    Confirmed with bedside nurse that portable O2 tank was delivered yesterday by [place] [id number].    Spoke with [facility] ([facility]) and she lives in her own apartment and will need to be independent with her walker.    Call to PT as they have SBA with walker. They will assess her today for independence with walker.    Care Management to continue to working with patient/family toward safe discharge plan.       Emani Tao, RN  Inpatient Care Coordinator  6 Med Surg  [phone], pager [phone]      For weekend social work needs, contact information below and available on [facility]:     SW Weekend [place] Pager ED, 5 MS, 5 ortho : [phone]   Weekend 6MS, 8A, 10ICU- Pager: [phone]     For weekend RN care coordinator needs (home discharge with needs including home care, assisted living facility returns, durable medical equip, IV antibiotics)   5 med/surg, 5 ortho, ED pager: [phone]  6 med/surg, 8 med/surg, 10 ICU pager: [phone]

## 2024-02-06 NOTE — PLAN OF CARE
Problem: Adult Inpatient Plan of Care  Goal: Plan of Care Review  Description: The Plan of Care Review/Shift note should be completed every shift.  The Outcome Evaluation is a brief statement about your assessment that the patient is improving, declining, or no change.  This information will be displayed automatically on your shift  note.  Outcome: Progressing  Flowsheets (Taken 2/5/2024 2128)  Outcome Evaluation: Pt still unable to eat meals without stomach getting up set. Gelattein 20 and ensure supplementation ordered. Pt taking bentyl and tylenol for abdominal discomfort and cramping. Iv fluids started this shift. Pt given bedside commode for overnight and bed alarm on as pt has frequent loose stools. Rn managed labs next am. pt on home oxygen dose at 1L via N/C. No c/o SOB no c/o chest pain.  Plan of Care Reviewed With: patient  Overall Patient Progress: no change     Problem: Adult Inpatient Plan of Care  Goal: Absence of Hospital-Acquired Illness or Injury  Intervention: Identify and Manage Fall Risk  Recent Flowsheet Documentation  Taken 2/5/2024 1610 by Evelin Alvares RN  Safety Promotion/Fall Prevention:   activity supervised   assistive device/personal items within reach   clutter free environment maintained   increase visualization of patient   nonskid shoes/slippers when out of bed   patient and family education     Problem: Adult Inpatient Plan of Care  Goal: Absence of Hospital-Acquired Illness or Injury  Intervention: Prevent Infection  Recent Flowsheet Documentation  Taken 2/5/2024 1610 by Evelin Alvares, RN  Infection Prevention:   single patient room provided   rest/sleep promoted   hand hygiene promoted     Problem: Adult Inpatient Plan of Care  Goal: Absence of Hospital-Acquired Illness or Injury  Intervention: Prevent Skin Injury  Recent Flowsheet Documentation  Taken 2/5/2024 1610 by Evelin Alvares RN  Body Position: position changed independently     Problem: Fall Injury Risk  Goal:  Absence of Fall and Fall-Related Injury  Intervention: Promote Injury-Free Environment  Recent Flowsheet Documentation  Taken 2/5/2024 1610 by Evelin Alvares, RN  Safety Promotion/Fall Prevention:   activity supervised   assistive device/personal items within reach   clutter free environment maintained   increase visualization of patient   nonskid shoes/slippers when out of bed   patient and family education     Problem: Fall Injury Risk  Goal: Absence of Fall and Fall-Related Injury  Intervention: Identify and Manage Contributors  Recent Flowsheet Documentation  Taken 2/5/2024 1610 by Evelin Alvares, RN  Medication Review/Management: medications reviewed   Goal Outcome Evaluation:      Plan of Care Reviewed With: patient    Overall Patient Progress: no changeOverall Patient Progress: no change    Outcome Evaluation: Pt still unable to eat meals without stomach getting up set. Gelattein 20 and ensure supplementation ordered. Pt taking bentyl and tylenol for abdominal discomfort and cramping. PRN zophran for nausea. Iv fluids started this shift. Pt given bedside commode for overnight and bed alarm on as pt has frequent loose stools. Rn managed labs next am. pt on home oxygen dose at 1L via N/C. No c/o SOB no c/o chest pain. Patient is A&O x 3 forgetful.

## 2024-02-06 NOTE — PLAN OF CARE
Shift: 1213-3943     VS: BP (!) 150/86 (BP Location: Left arm)   Pulse 85   Temp 98.1  F (36.7  C) (Oral)   Resp 16   Wt 57.3 kg (126 lb 6.4 oz)   SpO2 98%   BMI 24.69 kg/m        Pain: C/o lower abdominal pain; warm packs applied for comfort.   Neuro: A&O; intermittent confusion. Family at bedside. Able to make needs known.   Resp:On 1 liter of oxygen; patient gets short of breath and desats during activity. No cough noted. Denies chest pain.   Diet: Regular; poor appetite. Tolerating ensure supplement drinks. C/o nausea; gave PRN Zofran once during shift.        Activity: SBA with walker.   Output: Continent of bowel and bladder. LBM today.   Additional info: No acute events this shift. Call light within reach. Bed alarm in place for safety.       NPO starting 02/07/24 @ 0001.     Plan:  EGD procedure tomorrow morning at [place] unit 3C; EMT ride set for 0815. Plan is to have patient return back to unit after procedure.

## 2024-02-06 NOTE — PROGRESS NOTES
[facility]  Medicine Progress Note - Hospitalist Service, GOLD TEAM 21    Date of Admission:  2/1/2024    Assessment & Plan   Idalia Bob is a 79 year old female admitted on 2/1/2024. Pt has history of COPD, pneumonia, hypertension, dementia and hypothyroidism among others, presented with abdominal pain and found to have consolidation on CT as well as frequent mucousy bowel movements and anorexia.    Changes today:  - Gi planning EGD/flex sig tomorrow  - R arm swelling by PIV, remove and replace IV  - pulmonary to clarify plan re: bronch timing     Left upper lobe pneumonia vs persistent consolidation vs mass  COPD  Chronic hypoxemic respiratory failure  Patient with pseudomonal upper lobe pneumonia in December and has had ongoing consolidation since.  While this finding was present on imaging she has a paucity of other pathology consistent with pneumonia at the present time.  She appears to be breathing at her baseline.  It is possible that this is simply a slow to resolve infiltrate or another process.  -s/p azithromycin and ceftriaxone  -Continue home oxygen  -Continue home COPD regimen  -negative Fungitell, galactomannan, and sputum cultures.  -pulm following, recommending bronch inpatient vs outpatient pending course     Abdominal Pain  Loose stools with mucus  Tenesmus  Unintentional weight loss  Anorexia  The patient has had weeks of worsening anorexia along with crampy abdominal pain but no evidence of gastrointestinal bleeding.  She has frequent (many many times per day) mucousy bowel movements with very little stool production.  C. difficile and enteric panel are negative.  She does have normocytic anemia and a hiatal hernia.  -Appreciate GI consultation  -Encourage oral intake, start supplements  -Add Bentyl  -Physical and Occupational Therapy consults     Urinary tract infection  Urine culture with 10-50,000 colonies of Enterococcus faecalis.    -Start  amoxicillin and monitor for tolerance given potential history of not anaphylactic penicillin allergy; will treat for total of 5 days (through 2/9)     Elevated Troponin  Likely type II MI in the setting of acute illness.  Continue to follow     Hypokalemia  Hypomagnesemia  In the setting of poor oral intake as well as PPI use.  Will continue to follow closely.       Iron deficiency anemia    Not tolerating Iron replacement.  Her ferritin is 85 which likely reflects a degree of iron deficiency given some presence of inflammation.  -Consider IV iron depending on GI workup     Hypertension  Continue losartan 50 mg p.o. daily as a substitute for her home medication     Anxiety and depression  Takes BuSpar, Prozac  -Continue home regimen     Hypothyroidism     Takes Synthroid 50 mcg. TSH normal  -Continue     Dementia   - continue donepezil        Diet: Combination Diet Regular Diet Adult  Snacks/Supplements Adult: Ensure Enlive; With Meals  Snacks/Supplements Adult: Gelatein Plus; Between Meals  NPO per Anesthesia Guidelines for Procedure/Surgery Except for: Meds    DVT Prophylaxis: hold for procedure  Ruiz Catheter: Not present  Lines: None     Cardiac Monitoring: None  Code Status: Full Code      Clinically Significant Risk Factors                  # Hypertension: Noted on problem list     # Dementia: noted on problem list     # Severe Malnutrition: based on nutrition assessment    # Financial/Environmental Concerns: none         Disposition Plan     Expected Discharge Date: 02/07/2024      Destination: home with help/services              Erick Mccall MD  Hospitalist Service, GOLD TEAM [address] [place] [address]  Securely message with [employer] (more info)  Text page via [facility] Paging/Directory   See signed in provider for up to date coverage information  ______________________________________________________________________    Interval History   Feeling alright this morning.  Discussed potential planning for GI procedure and patient was agreeable. No other concerns other than R forearm swelling/redness/hematoma that is new overnight.    Physical Exam   Vital Signs: Temp: 98.2  F (36.8  C) Temp src: Oral BP: (!) 143/84 Pulse: 89   Resp: 18 SpO2: 98 % O2 Device: None (Room air) Oxygen Delivery: 1 LPM  Weight: 126 lbs 6.4 oz    General Appearance: Alert, interactive, no distress  Respiratory: normal wob on 1L  Cardiovascular: rr no m  GI: s, nt, nd, bs present  Skin: 4cm by 2 cm ecchymosis over R forearm near PIV, sensation and movement intact. Normal perfusion distal.     Medical Decision Making       45 MINUTES SPENT BY ME on the date of service doing chart review, history, exam, documentation & further activities per the note.      Data

## 2024-02-07 LAB
A FUMIGATUS IGE QN: 0.18 KU(A)/L
FLEXIBLE SIGMOIDOSCOPY: NORMAL
IGE SERPL-ACNC: 20 KU/L (ref 0–114)
MAGNESIUM SERPL-MCNC: 2.2 MG/DL (ref 1.7–2.3)
POTASSIUM SERPL-SCNC: 4.1 MMOL/L (ref 3.4–5.3)
SCANNED LAB RESULT: NORMAL
UPPER GI ENDOSCOPY: NORMAL

## 2024-02-07 PROCEDURE — 99153 MOD SED SAME PHYS/QHP EA: CPT | Performed by: INTERNAL MEDICINE

## 2024-02-07 PROCEDURE — 0DB78ZX EXCISION OF STOMACH, PYLORUS, VIA NATURAL OR ARTIFICIAL OPENING ENDOSCOPIC, DIAGNOSTIC: ICD-10-PCS | Performed by: INTERNAL MEDICINE

## 2024-02-07 PROCEDURE — 36415 COLL VENOUS BLD VENIPUNCTURE: CPT | Performed by: STUDENT IN AN ORGANIZED HEALTH CARE EDUCATION/TRAINING PROGRAM

## 2024-02-07 PROCEDURE — 120N000002 HC R&B MED SURG/OB UMMC

## 2024-02-07 PROCEDURE — 88305 TISSUE EXAM BY PATHOLOGIST: CPT | Mod: 26 | Performed by: PATHOLOGY

## 2024-02-07 PROCEDURE — 88305 TISSUE EXAM BY PATHOLOGIST: CPT | Mod: TC | Performed by: INTERNAL MEDICINE

## 2024-02-07 PROCEDURE — 250N000011 HC RX IP 250 OP 636: Performed by: STUDENT IN AN ORGANIZED HEALTH CARE EDUCATION/TRAINING PROGRAM

## 2024-02-07 PROCEDURE — 250N000013 HC RX MED GY IP 250 OP 250 PS 637: Performed by: STUDENT IN AN ORGANIZED HEALTH CARE EDUCATION/TRAINING PROGRAM

## 2024-02-07 PROCEDURE — 250N000009 HC RX 250: Performed by: INTERNAL MEDICINE

## 2024-02-07 PROCEDURE — 83735 ASSAY OF MAGNESIUM: CPT | Performed by: STUDENT IN AN ORGANIZED HEALTH CARE EDUCATION/TRAINING PROGRAM

## 2024-02-07 PROCEDURE — 43239 EGD BIOPSY SINGLE/MULTIPLE: CPT | Performed by: INTERNAL MEDICINE

## 2024-02-07 PROCEDURE — G0500 MOD SEDAT ENDO SERVICE >5YRS: HCPCS | Performed by: INTERNAL MEDICINE

## 2024-02-07 PROCEDURE — 250N000011 HC RX IP 250 OP 636: Performed by: INTERNAL MEDICINE

## 2024-02-07 PROCEDURE — 0DBP8ZX EXCISION OF RECTUM, VIA NATURAL OR ARTIFICIAL OPENING ENDOSCOPIC, DIAGNOSTIC: ICD-10-PCS | Performed by: INTERNAL MEDICINE

## 2024-02-07 PROCEDURE — 0DB98ZX EXCISION OF DUODENUM, VIA NATURAL OR ARTIFICIAL OPENING ENDOSCOPIC, DIAGNOSTIC: ICD-10-PCS | Performed by: INTERNAL MEDICINE

## 2024-02-07 PROCEDURE — 250N000013 HC RX MED GY IP 250 OP 250 PS 637: Performed by: HOSPITALIST

## 2024-02-07 PROCEDURE — 0DBG8ZX EXCISION OF LEFT LARGE INTESTINE, VIA NATURAL OR ARTIFICIAL OPENING ENDOSCOPIC, DIAGNOSTIC: ICD-10-PCS | Performed by: INTERNAL MEDICINE

## 2024-02-07 PROCEDURE — 99232 SBSQ HOSP IP/OBS MODERATE 35: CPT | Performed by: INTERNAL MEDICINE

## 2024-02-07 PROCEDURE — 84132 ASSAY OF SERUM POTASSIUM: CPT | Performed by: STUDENT IN AN ORGANIZED HEALTH CARE EDUCATION/TRAINING PROGRAM

## 2024-02-07 PROCEDURE — 250N000011 HC RX IP 250 OP 636: Performed by: HOSPITALIST

## 2024-02-07 PROCEDURE — 0DB68ZX EXCISION OF STOMACH, VIA NATURAL OR ARTIFICIAL OPENING ENDOSCOPIC, DIAGNOSTIC: ICD-10-PCS | Performed by: INTERNAL MEDICINE

## 2024-02-07 PROCEDURE — 45331 SIGMOIDOSCOPY AND BIOPSY: CPT | Performed by: INTERNAL MEDICINE

## 2024-02-07 PROCEDURE — 99231 SBSQ HOSP IP/OBS SF/LOW 25: CPT | Performed by: STUDENT IN AN ORGANIZED HEALTH CARE EDUCATION/TRAINING PROGRAM

## 2024-02-07 RX ORDER — FENTANYL CITRATE 50 UG/ML
INJECTION, SOLUTION INTRAMUSCULAR; INTRAVENOUS PRN
Status: DISCONTINUED | OUTPATIENT
Start: 2024-02-07 | End: 2024-02-07 | Stop reason: HOSPADM

## 2024-02-07 RX ADMIN — FLUTICASONE FUROATE AND VILANTEROL TRIFENATATE 1 PUFF: 200; 25 POWDER RESPIRATORY (INHALATION) at 07:57

## 2024-02-07 RX ADMIN — DONEPEZIL HYDROCHLORIDE 10 MG: 10 TABLET, FILM COATED ORAL at 21:53

## 2024-02-07 RX ADMIN — BUSPIRONE HYDROCHLORIDE 10 MG: 10 TABLET ORAL at 21:53

## 2024-02-07 RX ADMIN — CALCIUM POLYCARBOPHIL 1250 MG: 625 TABLET, FILM COATED ORAL at 07:56

## 2024-02-07 RX ADMIN — PANTOPRAZOLE SODIUM 40 MG: 40 TABLET, DELAYED RELEASE ORAL at 07:56

## 2024-02-07 RX ADMIN — AMOXICILLIN 500 MG: 500 CAPSULE ORAL at 21:53

## 2024-02-07 RX ADMIN — FLUOXETINE HYDROCHLORIDE 40 MG: 20 CAPSULE ORAL at 07:57

## 2024-02-07 RX ADMIN — AMOXICILLIN 500 MG: 500 CAPSULE ORAL at 07:56

## 2024-02-07 RX ADMIN — LEVOTHYROXINE SODIUM 50 MCG: 50 TABLET ORAL at 07:56

## 2024-02-07 RX ADMIN — LATANOPROST 1 DROP: 50 SOLUTION OPHTHALMIC at 21:53

## 2024-02-07 RX ADMIN — LOSARTAN POTASSIUM 50 MG: 50 TABLET, FILM COATED ORAL at 07:56

## 2024-02-07 RX ADMIN — DICYCLOMINE HYDROCHLORIDE 10 MG: 10 CAPSULE ORAL at 18:49

## 2024-02-07 RX ADMIN — ONDANSETRON 4 MG: 4 TABLET, ORALLY DISINTEGRATING ORAL at 18:49

## 2024-02-07 RX ADMIN — SODIUM CHLORIDE, SODIUM LACTATE, POTASSIUM CHLORIDE, CALCIUM CHLORIDE AND DEXTROSE MONOHYDRATE: 5; 600; 310; 30; 20 INJECTION, SOLUTION INTRAVENOUS at 13:41

## 2024-02-07 RX ADMIN — Medication 1 MG: at 21:53

## 2024-02-07 RX ADMIN — SODIUM CHLORIDE, SODIUM LACTATE, POTASSIUM CHLORIDE, CALCIUM CHLORIDE AND DEXTROSE MONOHYDRATE: 5; 600; 310; 30; 20 INJECTION, SOLUTION INTRAVENOUS at 01:20

## 2024-02-07 RX ADMIN — BUSPIRONE HYDROCHLORIDE 10 MG: 10 TABLET ORAL at 13:41

## 2024-02-07 RX ADMIN — UMECLIDINIUM 1 PUFF: 62.5 AEROSOL, POWDER ORAL at 07:57

## 2024-02-07 RX ADMIN — ONDANSETRON 4 MG: 4 TABLET, ORALLY DISINTEGRATING ORAL at 04:49

## 2024-02-07 RX ADMIN — BUSPIRONE HYDROCHLORIDE 10 MG: 10 TABLET ORAL at 07:57

## 2024-02-07 ASSESSMENT — ACTIVITIES OF DAILY LIVING (ADL)
ADLS_ACUITY_SCORE: 35

## 2024-02-07 NOTE — OR NURSING
EGD and Flex Sig with biopsies performed under moderate sedation, patient tolerated well. Transferred to  and report given to recovery RN. [facility] JIMI Su called, awaiting return call for report.

## 2024-02-07 NOTE — PROGRESS NOTES
[facility]  Medicine Progress Note - Hospitalist Service, GOLD TEAM 21    Date of Admission:  2/1/2024  Assessment & Plan   Idalia Bob is a 79 year old female admitted on 2/1/2024. Pt has history of COPD, pneumonia, hypertension, dementia and hypothyroidism among others, presented with abdominal pain and found to have consolidation on CT as well as frequent mucousy bowel movements and anorexia.    Changes today:  - Gi EGD/flex sig today with large hiatal hernia and ? GAVE/gastritis and biopsied rectal lesion  - continue PPI and adat  - pulmonary to clarify plan re: bronch timing - deferring to outpatient     Left upper lobe pneumonia vs persistent consolidation vs mass  COPD  Chronic hypoxemic respiratory failure  Patient with pseudomonal upper lobe pneumonia in December and has had ongoing consolidation since.  While this finding was present on imaging she has a paucity of other pathology consistent with pneumonia at the present time.  She appears to be breathing at her baseline.  It is possible that this is simply a slow to resolve infiltrate or another process.  -s/p azithromycin and ceftriaxone  -Continue home oxygen  -Continue home COPD regimen  -negative Fungitell, galactomannan, and sputum cultures.  -pulm following, recommending bronch with biopsy     Abdominal Pain  Loose stools with mucus  Tenesmus  Unintentional weight loss  Anorexia  The patient has had weeks of worsening anorexia along with crampy abdominal pain but no evidence of gastrointestinal bleeding.  She has frequent (many many times per day) mucousy bowel movements with very little stool production.  C. difficile and enteric panel are negative.  She does have normocytic anemia and a hiatal hernia.  -Appreciate GI consultation  -Encourage oral intake, start supplements  -Add Bentyl  -Physical and Occupational Therapy consults     Urinary tract infection  Urine culture with 10-50,000 colonies  of Enterococcus faecalis.    -amoxicillin and monitor for tolerance given potential history of not anaphylactic penicillin allergy; will treat for total of 5 days (through 2/9)     Elevated Troponin  Likely type II MI in the setting of acute illness.  Continue to follow     Hypokalemia  Hypomagnesemia  In the setting of poor oral intake as well as PPI use.  Will continue to follow closely.       Iron deficiency anemia    Not tolerating Iron replacement.  Her ferritin is 85 which likely reflects a degree of iron deficiency given some presence of inflammation.  -Consider IV iron depending on GI workup     Hypertension  Continue losartan 50 mg p.o. daily as a substitute for her home medication     Anxiety and depression  Takes BuSpar, Prozac  -Continue home regimen     Hypothyroidism     Takes Synthroid 50 mcg. TSH normal  -Continue     Dementia   - continue donepezil        Diet: Snacks/Supplements Adult: Ensure Enlive; With Meals  Snacks/Supplements Adult: Gelatein Plus; Between Meals  Regular Diet Adult    DVT Prophylaxis: hold for procedure  Ruiz Catheter: Not present  Lines: None     Cardiac Monitoring: None  Code Status: Full Code      Clinically Significant Risk Factors                  # Hypertension: Noted on problem list     # Dementia: noted on problem list     # Severe Malnutrition: based on nutrition assessment    # Financial/Environmental Concerns: none         Disposition Plan     Expected Discharge Date: 02/07/2024      Destination: home with help/services              Erick Mccall MD  Hospitalist Service, GOLD TEAM [address] [place] [address]  Securely message with [facility] (more info)  Text page via [facility] Paging/Directory   See signed in provider for up to date coverage information  ______________________________________________________________________    Interval History   Feeling alright postprocedure, though limited appetite/energy. Family updated at  bedside.   Physical Exam   Vital Signs: Temp: 97.6  F (36.4  C) Temp src: Oral BP: (!) 141/80 Pulse: 80   Resp: 16 SpO2: 100 % O2 Device: Nasal cannula Oxygen Delivery: 2 LPM  Weight: 126 lbs 6.4 oz    General Appearance: Alert, interactive, no distress  Respiratory: normal wob on 1L  Cardiovascular: rr no m  GI: s, nt, nd, bs present    Medical Decision Making       45 MINUTES SPENT BY ME on the date of service doing chart review, history, exam, documentation & further activities per the note.      Data

## 2024-02-07 NOTE — PROGRESS NOTES
02/06/24 1638   Appointment Info   Signing Clinician's Name / Credentials (PT) Jessica Zimmerman, PT       Present no   Language English   Living Environment   People in Home alone   Current Living Arrangements assisted living   Home Accessibility wheelchair accessible   Transportation Anticipated family or friend will provide   Self-Care   Usual Activity Tolerance fair   Current Activity Tolerance poor   Regular Exercise Other (see comments)  (Was going to OP PT prior to admission)   Equipment Currently Used at Home walker, rolling   Fall history within last six months no   Activity/Exercise/Self-Care Comment FROYLAN provides meals and was assisting with bringing her to dinning room.  Pt daughter assist pt with showering but pt is able to complete dressing independently.   General Information   Onset of Illness/Injury or Date of Surgery 02/01/24   Referring Physician Pavel Kumar MD   Patient/Family Therapy Goals Statement (PT) Goal not verbalized.   Pertinent History of Current Problem (include personal factors and/or comorbidities that impact the POC) Per chart pt is a 79 year old female admitted on 2/1/2024. Pt has history of COPD, pneumonia, hypertension, dementia and hypothyroidism among others, presented with abdominal pain and found to have consolidation on CT as well as frequent mucousy bowel movements and anorexia.   Existing Precautions/Restrictions fall   General Observations Pt sitting up at EOB at start of PT evaluation, agreeable to PT eval.   Cognition   Affect/Mental Status (Cognition) WNL   Orientation Status (Cognition) other (see comments)  (Not formally tested)   Follows Commands (Cognition) WNL   Pain Assessment   Patient Currently in Pain Yes, see Vital Sign flowsheet   Integumentary/Edema   Integumentary/Edema other (describe)   Integumentary/Edema Comments Multiple abrasions that are scabbed over along bilateral LE's.   Posture    Posture Forward head  position;Protracted shoulders   Range of Motion (ROM)   Range of Motion ROM is WFL   Strength (Manual Muscle Testing)   Strength (Manual Muscle Testing) Deficits observed during functional mobility   Bed Mobility   Comment, (Bed Mobility) Bed mobility not observed, pt sitting at EOB at start and end of PT session.   Transfers   Comment, (Transfers) Sit to/from standing from EOB with FWW and SBA x 1.   Gait/Stairs (Locomotion)   Comment, (Gait/Stairs) Pt ambulated approximately 15' with FWW and CGA x 1.   Balance   Balance Comments Pt demonstrated good sitting balance at EOB and fair standing balance with AD.   Sensory Examination   Sensory Perception Comments Not formally tested.   Clinical Impression   Criteria for Skilled Therapeutic Intervention Yes, treatment indicated   PT Diagnosis (PT) Decreased strength, deconditioned, and impaired functional mobility.   Influenced by the following impairments Deconditioned, abdominal pain   Functional limitations due to impairments Impaired bed mobility, transfers, and gait.   Clinical Presentation (PT Evaluation Complexity) stable   Clinical Presentation Rationale Per clincial judgement.   Clinical Decision Making (Complexity) low complexity   Planned Therapy Interventions (PT) bed mobility training;gait training;transfer training;strengthening   Risk & Benefits of therapy have been explained evaluation/treatment results reviewed;care plan/treatment goals reviewed;risks/benefits reviewed;patient   PT Total Evaluation Time   PT Eval, Low Complexity Minutes (19630) 11   Physical Therapy Goals   PT Frequency Daily   PT Predicted Duration/Target Date for Goal Attainment 02/13/24   PT Goals Bed Mobility;Transfers;Gait;PT Goal 1   PT: Bed Mobility Independent;Supine to/from sit   PT: Transfers Modified independent;Sit to/from stand;Bed to/from chair;Assistive device   PT: Gait Modified independent;Rolling walker;150 feet   PT: Goal 1 Pt will be independent with LE strengthening  HEP.   Therapeutic Activity   Therapeutic Activities: dynamic activities to improve functional performance Minutes (37831) 24   Symptoms Noted During/After Treatment Fatigue;Increased pain   Treatment Detail/Skilled Intervention Pt completed 3 additional STS from EOB and toilet with FWW and CGA to SBA x 1.  Pt demonstrated steady transfer with no LOB.  Pt reported needing to use the bathroom and has not been wearing O2 to bathroom, currently on 1L.  Pt ambulated to/from bathrom with FWW and CGA to SBA x 1, no LOB observed.  Pt needed min assist with donning/doffing briefs and pants.  Pt was able to complete pericares independently.  Pt O2 sats decreased to 81% on RA, educated pt that she needs to wear oxygen when ambulating to/from bathroom or to use bedside commode.  Pt declined bed side commode and prefers to use bathroom, extension tubing added to her oxygen to all her to use the bathoom.  Pt ambulated to/from bathroom again to make sure line will reach, O2 sats greater then 90% on 1L.  Pt reported feeling fatigued after activity.  Pt sitting at EOB at end of PT session with all needs med and family in room.   PT Discharge Planning   PT Plan Continue skilled PT for bed mobility, transfers, gait, and strengthening.   PT Discharge Recommendation (DC Rec) home with home care physical therapy   PT Rationale for DC Rec Anticipate pt will able to discharge to home and would benefit from home PT for strengthening and endurance.   PT Brief overview of current status Pt is SBA for mobility with FWW.   Total Session Time   Timed Code Treatment Minutes 24   Total Session Time (sum of timed and untimed services) 35

## 2024-02-07 NOTE — PROGRESS NOTES
VS: Blood pressure 127/74, pulse 98, temperature 98.3  F (36.8  C), temperature source Oral, resp. rate 16, weight 57.3 kg (126 lb 6.4 oz), SpO2 97%.     O2:  1 Liter O2 with sats > 95%   Output:  SBA to the BR   Last BM:  02/07/2024   Activity:  SBA  with Walker    Up for meals?  Yes    Skin: Grossly intact   Pain:  Complained of abdominal pain and was medicated with Bentyl.   CMS:  A&OX 3-4   Dressing:  None    Diet:  Regular with thins. Was NPO after midnight   LDA:  Right PIV SL infusing maintenance fluids   Equipment:  Personal Belongings    Plan:  POC   Additional Info:  Desats with Activity / SOB              Has a planned EGD and flex sig at [place] at 1030. Has been NPO since MN. Tap water enema given at 0645 and 0745 with transport planned at ~0815.

## 2024-02-07 NOTE — PROGRESS NOTES
I have seen and evaluated the patient today.  There are no significant changes in the patient's history or physical exam from the prior date of service.  Procedure was discussed with the patient, also previously discussed with both daughters.  Plan is for upper endoscopy and flexible sigmoidoscopy to evaluate ongoing GI symptoms.  Sedation risk is above average due to pulmonary issues.  Patient and family have been in agreement to proceed.  The patient is stable and appropriate to undergo the proposed endoscopic procedure today.  Felton James MD

## 2024-02-07 NOTE — PROGRESS NOTES
Care Management Follow Up    Length of Stay (days): 6    Expected Discharge Date: 02/07/2024     Concerns to be Addressed: adjustment to diagnosis/illness     Patient plan of care discussed at interdisciplinary rounds: Yes    Anticipated Discharge Disposition: snf      Anticipated Discharge Services:  home care  Anticipated Discharge DME:      Patient/family educated on Medicare website which has current facility and service quality ratings:    Education Provided on the Discharge Plan:    Patient/Family in Agreement with the Plan: yes    Referrals Placed by CM/SW:    Private pay costs discussed: Not applicable    Additional Information:       Called [facility] nurse line [phone] at [facility] and updated left voice mail with request for return call. Return call.  Updated that patient will not be discharging today.     Call to [facility] and spoke with JIMI Figueroa Case Manager, [phone], updated that Idalia is getting upper Endoscopy today, may discharge tomorrow.    Call to PT as they have SBA with walker. They will assess her today for independence with walker.     Emani Tao RN  Inpatient Care Coordinator  6 Med Surg  [phone], pager [phone]      For weekend social work needs, contact information below and available on [employer]:     SW Weekend [place] Pager ED, 5 MS, 5 ortho : [phone]  SW Weekend 6MS, 8A, 10ICU- Pager: [phone]     For weekend RN care coordinator needs (home discharge with needs including home care, assisted living facility returns, durable medical equip, IV antibiotics)   5 med/surg, 5 ortho, ED pager: [phone]  6 med/surg, 8 med/surg, 10 ICU pager: [phone]

## 2024-02-07 NOTE — PROGRESS NOTES
Pulmonary Progress note    HPI:  78 YO with history of COPD, HTN, hypothyroidism and dementia admitted with abdominal pain and diarrhea.  Admitted with left sided pneumonia in 11-23; sputum culture was positive for Pseudomonas; was initially treated with ceftriaxone until sputum culture returned positive for Pseudomonas, was then transitioned to Cefepime/levofloxacin and finally discharged on Levofloxacin.  Re-admitted on 11-28 and then again on 12-6 to 12-13 with SOB and fatigue and continued infiltrate on imaging; was treated with cefepime/doxycycline and then transitioned over to Zosyn, per discharge summary was then sent out with 5 additional days of Augmentin. Asked to see patient for persistent NIRAV infiltrate seen on chest CT. Admitted currently with abdominal cramping and mucous stools.  Denies any cough or SOB at present.  No sputum production not coughing up any blood.  Recent weight loss of 10 pounds over the past few weeks, weight stable prior to that.  No prior history of pneumonia, no asthma, chronic cough, or frequent bronchitis in younger years.  History of GERD but no history of aspiration.  On Trelegy and albuterol prn.  Was discharged on oxygen at 3L/NC from admission in 11-23, now decreased to 1.5 L.  Able to walk in assisted living facility, can bathe and dress at her own pace. Quit tobacco, 1 pk per day for 40 years.  On Trelegy and albuterol at home.  Sister with family history of lung cancer.    24 hour events:  Continued nausea and mucus stools; states is difficult to eat anything without an increase in her symptoms.  Is scheduled for a flex-sig this morning.  Some cough but no SOB.      PE:  Gen:  Laying in bed. Comfortable.  No SOB, speaking in full sentences  V: AF P 94-98  -143/73-84 1L 97%  HEENT:  PERRL, EOMIB, sclera clear, no sinus tenderness, no JVD  Lungs: Diffusely decreased BS, no wheezes, no rhonchi  Ht: RRR, no mgr  Abd: Hyperactive BS, mild diffuse tenderness  Ext: no  edema    Labs:  None today    CT chest:  Posterior segment NIRAV infiltrate, difficult to follow airway proximally.  No adenopathy. Scattered nodularity on right.  In comparison to CT from 11-23    I am still unable to locate her previous CXR and chest CT      Assessment and Plan:  78 YO with history of COPD with emphysema seen on CT.  Recent admissions for pneumonia in NIRAV with sputum positive for Pseudomonas- adequately treated with Levofloxacin.  Per chart, NIRAV infiltrate has not resolved (I am not able to personally review the old images to assess the response or lack thereof in the NIRAV infiltrate).  If the infiltrate has not resolved the concerns are for an untreated pathogen- fungus, Nocardia, atypical Mycobacteria; organizing pneumonia due to recent infection, hypogammaglobinemia with lack of full clearance, or an endobronchial lesion- foreign body or mass.  These wear discussed with the patient and her daughter at the bedside. We discussed the option of bronchoscopy to exclude and endobronchial lesion, obtain direct cultures, and possibly perform transbronchial biopsy.  Per the past hospitalization it states the patient wanted to be a do not intubate; I discussed this with the patient and she is agreeable to to intubation if necessary after a bronchoscopy.  Needs to have GI issues resolving prior to undergoing bronchoscopy.  Can be done as an inpatient or outpatient.    With concerns of possible endobronchial lesion and non-resolving infiltrate patient may need endobronchial biopsies in addition to BAL; with non-resolving infiltrate it would be optimal to also perform TBBX.  As she continues to have GI symptoms and bronchoscopy is not emergent, I think it is best to perform the procedure as an outpatient at the [facility] due to need for biopsies.  I will contact my  to contact the patient after discharge to set-up a bronchoscopy to be done by inpatient consult service.      Priya Schulz MD  [phone]

## 2024-02-08 ENCOUNTER — APPOINTMENT (OUTPATIENT)
Dept: PHYSICAL THERAPY | Facility: CLINIC | Age: 79
DRG: 193 | End: 2024-02-08
Payer: MEDICARE

## 2024-02-08 LAB
CREAT SERPL-MCNC: 0.79 MG/DL (ref 0.51–0.95)
EGFRCR SERPLBLD CKD-EPI 2021: 75 ML/MIN/1.73M2
MAGNESIUM SERPL-MCNC: 1.6 MG/DL (ref 1.7–2.3)
PATH REPORT.COMMENTS IMP SPEC: NORMAL
PATH REPORT.FINAL DX SPEC: NORMAL
PATH REPORT.GROSS SPEC: NORMAL
PATH REPORT.MICROSCOPIC SPEC OTHER STN: NORMAL
PATH REPORT.RELEVANT HX SPEC: NORMAL
PHOTO IMAGE: NORMAL
PLATELET # BLD AUTO: 297 10E3/UL (ref 150–450)
POTASSIUM SERPL-SCNC: 3.9 MMOL/L (ref 3.4–5.3)

## 2024-02-08 PROCEDURE — 97530 THERAPEUTIC ACTIVITIES: CPT | Mod: GP

## 2024-02-08 PROCEDURE — 250N000013 HC RX MED GY IP 250 OP 250 PS 637: Performed by: STUDENT IN AN ORGANIZED HEALTH CARE EDUCATION/TRAINING PROGRAM

## 2024-02-08 PROCEDURE — 120N000002 HC R&B MED SURG/OB UMMC

## 2024-02-08 PROCEDURE — 250N000011 HC RX IP 250 OP 636: Performed by: STUDENT IN AN ORGANIZED HEALTH CARE EDUCATION/TRAINING PROGRAM

## 2024-02-08 PROCEDURE — 82565 ASSAY OF CREATININE: CPT | Performed by: HOSPITALIST

## 2024-02-08 PROCEDURE — 85049 AUTOMATED PLATELET COUNT: CPT | Performed by: HOSPITALIST

## 2024-02-08 PROCEDURE — 99233 SBSQ HOSP IP/OBS HIGH 50: CPT | Performed by: STUDENT IN AN ORGANIZED HEALTH CARE EDUCATION/TRAINING PROGRAM

## 2024-02-08 PROCEDURE — 99232 SBSQ HOSP IP/OBS MODERATE 35: CPT | Mod: GC | Performed by: INTERNAL MEDICINE

## 2024-02-08 PROCEDURE — 36415 COLL VENOUS BLD VENIPUNCTURE: CPT | Performed by: HOSPITALIST

## 2024-02-08 PROCEDURE — 250N000011 HC RX IP 250 OP 636: Performed by: HOSPITALIST

## 2024-02-08 PROCEDURE — 250N000013 HC RX MED GY IP 250 OP 250 PS 637: Performed by: INTERNAL MEDICINE

## 2024-02-08 PROCEDURE — 84132 ASSAY OF SERUM POTASSIUM: CPT | Performed by: STUDENT IN AN ORGANIZED HEALTH CARE EDUCATION/TRAINING PROGRAM

## 2024-02-08 PROCEDURE — 83735 ASSAY OF MAGNESIUM: CPT | Performed by: STUDENT IN AN ORGANIZED HEALTH CARE EDUCATION/TRAINING PROGRAM

## 2024-02-08 PROCEDURE — 250N000013 HC RX MED GY IP 250 OP 250 PS 637: Performed by: HOSPITALIST

## 2024-02-08 RX ORDER — PSYLLIUM SEED (WITH DEXTROSE)
2 POWDER (GRAM) ORAL DAILY
Status: DISCONTINUED | OUTPATIENT
Start: 2024-02-08 | End: 2024-02-08

## 2024-02-08 RX ORDER — LOPERAMIDE HCL 2 MG
2 CAPSULE ORAL 2 TIMES DAILY
Status: DISCONTINUED | OUTPATIENT
Start: 2024-02-08 | End: 2024-02-14

## 2024-02-08 RX ORDER — LOPERAMIDE HCL 2 MG
2 CAPSULE ORAL 4 TIMES DAILY PRN
Status: DISCONTINUED | OUTPATIENT
Start: 2024-02-08 | End: 2024-02-08

## 2024-02-08 RX ORDER — MAGNESIUM SULFATE HEPTAHYDRATE 40 MG/ML
2 INJECTION, SOLUTION INTRAVENOUS ONCE
Status: COMPLETED | OUTPATIENT
Start: 2024-02-08 | End: 2024-02-08

## 2024-02-08 RX ADMIN — Medication 1 CAPSULE: at 15:52

## 2024-02-08 RX ADMIN — TRIAMCINOLONE ACETONIDE: 1 CREAM TOPICAL at 16:00

## 2024-02-08 RX ADMIN — BUSPIRONE HYDROCHLORIDE 10 MG: 10 TABLET ORAL at 14:57

## 2024-02-08 RX ADMIN — AMOXICILLIN 500 MG: 500 CAPSULE ORAL at 07:56

## 2024-02-08 RX ADMIN — LATANOPROST 1 DROP: 50 SOLUTION OPHTHALMIC at 21:56

## 2024-02-08 RX ADMIN — FLUOXETINE HYDROCHLORIDE 40 MG: 20 CAPSULE ORAL at 07:56

## 2024-02-08 RX ADMIN — TRIAMCINOLONE ACETONIDE: 1 CREAM TOPICAL at 14:50

## 2024-02-08 RX ADMIN — LOSARTAN POTASSIUM 50 MG: 50 TABLET, FILM COATED ORAL at 07:56

## 2024-02-08 RX ADMIN — DICYCLOMINE HYDROCHLORIDE 10 MG: 10 CAPSULE ORAL at 07:56

## 2024-02-08 RX ADMIN — ACETAMINOPHEN 650 MG: 325 TABLET, FILM COATED ORAL at 21:54

## 2024-02-08 RX ADMIN — SODIUM CHLORIDE, SODIUM LACTATE, POTASSIUM CHLORIDE, CALCIUM CHLORIDE AND DEXTROSE MONOHYDRATE: 5; 600; 310; 30; 20 INJECTION, SOLUTION INTRAVENOUS at 03:17

## 2024-02-08 RX ADMIN — LOPERAMIDE HYDROCHLORIDE 2 MG: 2 CAPSULE ORAL at 11:45

## 2024-02-08 RX ADMIN — LOPERAMIDE HYDROCHLORIDE 2 MG: 2 CAPSULE ORAL at 20:01

## 2024-02-08 RX ADMIN — SODIUM CHLORIDE, SODIUM LACTATE, POTASSIUM CHLORIDE, CALCIUM CHLORIDE AND DEXTROSE MONOHYDRATE: 5; 600; 310; 30; 20 INJECTION, SOLUTION INTRAVENOUS at 20:00

## 2024-02-08 RX ADMIN — DICYCLOMINE HYDROCHLORIDE 10 MG: 10 CAPSULE ORAL at 17:12

## 2024-02-08 RX ADMIN — ACETAMINOPHEN 650 MG: 325 TABLET, FILM COATED ORAL at 17:11

## 2024-02-08 RX ADMIN — PANTOPRAZOLE SODIUM 40 MG: 40 TABLET, DELAYED RELEASE ORAL at 07:56

## 2024-02-08 RX ADMIN — FLUTICASONE FUROATE AND VILANTEROL TRIFENATATE 1 PUFF: 200; 25 POWDER RESPIRATORY (INHALATION) at 07:55

## 2024-02-08 RX ADMIN — LEVOTHYROXINE SODIUM 50 MCG: 50 TABLET ORAL at 07:56

## 2024-02-08 RX ADMIN — ACETAMINOPHEN 650 MG: 325 TABLET, FILM COATED ORAL at 07:56

## 2024-02-08 RX ADMIN — ONDANSETRON 4 MG: 2 INJECTION INTRAMUSCULAR; INTRAVENOUS at 19:56

## 2024-02-08 RX ADMIN — BUSPIRONE HYDROCHLORIDE 10 MG: 10 TABLET ORAL at 20:01

## 2024-02-08 RX ADMIN — BUSPIRONE HYDROCHLORIDE 10 MG: 10 TABLET ORAL at 07:56

## 2024-02-08 RX ADMIN — Medication 2 WAFER: at 11:46

## 2024-02-08 RX ADMIN — UMECLIDINIUM 1 PUFF: 62.5 AEROSOL, POWDER ORAL at 07:55

## 2024-02-08 RX ADMIN — DONEPEZIL HYDROCHLORIDE 10 MG: 10 TABLET, FILM COATED ORAL at 21:54

## 2024-02-08 RX ADMIN — MAGNESIUM SULFATE HEPTAHYDRATE 2 G: 40 INJECTION, SOLUTION INTRAVENOUS at 12:32

## 2024-02-08 RX ADMIN — ONDANSETRON 4 MG: 4 TABLET, ORALLY DISINTEGRATING ORAL at 07:56

## 2024-02-08 RX ADMIN — DICYCLOMINE HYDROCHLORIDE 10 MG: 10 CAPSULE ORAL at 21:54

## 2024-02-08 ASSESSMENT — ACTIVITIES OF DAILY LIVING (ADL)
ADLS_ACUITY_SCORE: 35

## 2024-02-08 NOTE — PROGRESS NOTES
GASTROENTEROLOGY PROGRESS NOTE    Date: 02/08/2024     ASSESSMENT:  Idalia Bob is a 79 year old female who presented on 2/1/2024 for upper abdominal pain and found to have lung consolidation concerning for pneumonia. The patient has a history of COPD on home oxygen, pneumonia, hypertension, dementia and hypothyroidism. GI was consulted for lower abdominal pian and frequent mucous stools.         # Mucous stools  # Bowel urgency and frequency   # Lower abdominal pain   Increased BM urgency with frequency with associated lower abdominal pain. No evidence of bowel inflammation on CT. CRP mildly elevated which could be due to pneumonia.  Stool studies were negative for infection.  Flex sig 2/7 showed active inflammation in the rectum patch with reactive changes and negative for malignancy.  This inflammation could explain stool frequency and mucus discharge however it should be self-limited.      # Nausea   # A large hiatal hernia   # Anorexia  Unclear if nausea is related to gastroenteritis, antibiotics vs the large hiatal hernia (without volvulus) as shown on CT. EGD 2/7 demonstrated a large hiatal hernia and reactive gastropathy in the stomach.  Discussed with the patient and daughter Corie that these changes could be from medication, nonspecific viral infection.  Gastropathy typically does not cause anorexia. The hiatal hernia also should not cause anorexia.  Will defer to primary team to investigate for other etiologies of anorexia.       RECOMMENDATIONS:  -Imodium 2 mg twice daily and Metamucil daily for stool frequency.  Hold if constipation  -Ongoing supportive care      GI will sign off.      Thank you for involving us in this patient's care. Please do not hesitate to contact the GI service with any questions or concerns.      Pt care plan discussed with Dr. James, GI staff physician.      Lisa Gil MD, PhD  Gastroenterology Fellow  Division of Gastroenterology, Hepatology and  Nutrition  [facility]  Pager: [phone]  _______________________________________________________________      Subjective:  Daughter Corie is at bedside with patient this morning.  Idalia is trying to drink Ensure and continues to endorse no appetite at all.  Discussion of food makes her feel bad.  Continue to have frequent mucus stools.    Objective:  Blood pressure 115/66, pulse 86, temperature 98  F (36.7  C), temperature source Oral, resp. rate 18, weight 57.3 kg (126 lb 6.4 oz), SpO2 99%.    Gen: A&Ox3, NAD  HEENT: sclera anicteric  CV: RRR  Lungs: breathing comfortably on 1L of O2  Abd:  soft, tender in lower abdomen nondistended, bowel sounds present  Skin: no jaundice, no stigmata of chronic liver disease  MS: appropriate muscle mass for age  Neuro: non focal       LABS:  BMP  Recent Labs   Lab 02/08/24  0544 02/07/24  0612 02/06/24  0653 02/05/24  0518 02/04/24  0649 02/03/24  0856   NA  --   --  134* 134* 134* 134*   POTASSIUM 3.9 4.1 3.9 3.9 4.0 4.2   CHLORIDE  --   --  98 97* 97* 98   AYUSH  --   --  9.2 8.9 8.7* 8.6*   CO2  --   --  28 27 27 28   BUN  --   --  10.0 8.5 5.8* 7.7*   CR 0.79  --  0.83 0.83  0.83 0.84 0.87   GLC  --   --  124* 89 86 100*     CBC  Recent Labs   Lab 02/08/24  0837 02/06/24  0653 02/05/24  0518 02/04/24  0649 02/03/24  0856   WBC  --  7.1 7.8 8.0 8.1   RBC  --  3.88 3.94 3.96 3.98   HGB  --  9.5* 9.6* 9.7* 9.6*   HCT  --  31.9* 32.8* 33.0* 33.3*   MCV  --  82 83 83 84   MCH  --  24.5* 24.4* 24.5* 24.1*   MCHC  --  29.8* 29.3* 29.4* 28.8*   RDW  --  18.2* 18.2* 17.7* 17.8*    311 332 318 309     INRNo lab results found in last 7 days.  LFTs  Recent Labs   Lab 02/01/24 2120   ALKPHOS 47   AST 22   ALT <5   BILITOTAL 0.2   PROTTOTAL 5.5*   ALBUMIN 3.5      PANC  Recent Labs   Lab 02/01/24 2120   LIPASE 20

## 2024-02-08 NOTE — PLAN OF CARE
Please refer to flowsheet for full patient assessment and MAR for all medications administered during shift.        Pt A&O w intermittent confusion, VSS, Fall precautions maintained with bed alarm on, bed locked and in lowest position. No complaint of pain. POC discussed, questions encouraged and answered. No acute events during shift. Frequent round and checks done. POC continues.

## 2024-02-08 NOTE — PROGRESS NOTES
CLINICAL NUTRITION SERVICES - BRIEF NOTE      Nutrition Prescription     RECOMMENDATIONS FOR MDs/PROVIDERS TO ORDER:  Continue to encourage oral intake     Recommendations already ordered by Registered Dietitian (RD):  - Increase Ensure Enlive frequency to BID  - Calorie counts 2/8-2/10     Future/Additional Recommendations:  Monitor calorie counts, nutrition POC, pt need for/agreeableness to TF, wt trends.    *Please see full assessment note from 2/5/24    New Findings:  Met with pt and daughter Corie in room today. Pt's appetite continues to be poor and has been for ~3 weeks now. Pt has been having 1.5 Ensures/day and 1/2 of the gelatein cup, but usually not much other intake. Discussed importance of nutrition, and potential need for a FT. Discussed tube types and when they are used. Pt would prefer not to have a FT, but if her intake has not improved by Monday, she would consider having FT placed. RD discussed initiating calorie counts and increasing food/ONS intake to optimize nutrition.    Interventions  Medical food supplement therapy  Nutrition education for nutrition relationship to health/disease    RD to follow per protocol.    VANESSA Sanford, RD, LD  6B and 8A Med/surg (M-F) Pager: [phone]  Weekend/holiday pager: [phone]

## 2024-02-08 NOTE — PLAN OF CARE
Shift: 1880-1002     VS: /66 (BP Location: Right arm)   Pulse 86   Temp 98  F (36.7  C) (Oral)   Resp 18   Wt 57.3 kg (126 lb 6.4 oz)   SpO2 99%   BMI 24.69 kg/m        Pain: C/o abdominal pain; warm packs applied for comfort. PRN Tylenol and Bentyl given.   Neuro: A&O; intermittent confusion. Family at bedside. Able to make needs known.   Resp:On 1 liter of oxygen at baseline. No cough noted. Denies chest pain. SOB with activity.   Diet: Regular; poor appetite. Tolerating ensure supplement drinks. C/o nausea; gave PRN Zofran once during shift.        Activity: SBA with walker.   Output: Continent of bowel and bladder. LBM 2/7. Mucoid, loose stool.   Additional info: No acute events this shift. Call light within reach. Bed alarm in place for safety.            Plan: TBD; continue with POC.

## 2024-02-08 NOTE — PROGRESS NOTES
[facility]  Medicine Progress Note - Hospitalist Service, GOLD TEAM 21    Date of Admission:  2/1/2024  Assessment & Plan   Idalia Bob is a 79 year old female admitted on 2/1/2024. Pt has history of COPD, pneumonia, hypertension, dementia and hypothyroidism among others, presented with abdominal pain and found to have consolidation on CT as well as frequent mucousy bowel movements and anorexia.    Changes today:  - discussed with GI - unlikely that pathology seen on endoscopy/flex sig is causing her nausea/pain.  Trial PPI, nausea meds, immodium, and psyllium.  Pulm to consider biopsy in next 1-2 weeks  - dietician to see to discuss nutrition next steps  - patient to benefit from palliative for GOC/FT discussion  - discontinue amox for UTI (day 7 of total abx today)     Left upper lobe pneumonia vs persistent consolidation vs mass  COPD  Chronic hypoxemic respiratory failure  Patient with pseudomonal upper lobe pneumonia in December and has had ongoing consolidation since.  While this finding was present on imaging she has a paucity of other pathology consistent with pneumonia at the present time.  She appears to be breathing at her baseline.  It is possible that this is simply a slow to resolve infiltrate or another process.  -s/p azithromycin and ceftriaxone  -Continue home oxygen  -Continue home COPD regimen  -negative Fungitell, galactomannan, and sputum cultures.  -pulm following, recommending bronch with biopsy in next 1-2 weeks     Abdominal Pain  Loose stools with mucus  Tenesmus  Unintentional weight loss  Anorexia  The patient has had weeks of worsening anorexia along with crampy abdominal pain but no evidence of gastrointestinal bleeding.  She has frequent (many many times per day) mucousy bowel movements with very little stool production.  C. difficile and enteric panel are negative.  She does have normocytic anemia and a hiatal hernia.  -Appreciate GI  consultation  -Encourage oral intake, start supplements  -Add Bentyl  -Physical and Occupational Therapy consults     Urinary tract infection  Urine culture with 10-50,000 colonies of Enterococcus faecalis.    -s/p treatment     Elevated Troponin  Likely type II MI in the setting of acute illness.  Continue to follow     Hypokalemia  Hypomagnesemia  In the setting of poor oral intake as well as PPI use.  Will continue to follow closely.       Iron deficiency anemia    Not tolerating Iron replacement.  Her ferritin is 85 which likely reflects a degree of iron deficiency given some presence of inflammation.  -Consider IV iron depending on GI workup     Hypertension  Continue losartan 50 mg p.o. daily as a substitute for her home medication     Anxiety and depression  Takes BuSpar, Prozac  -Continue home regimen     Hypothyroidism     Takes Synthroid 50 mcg. TSH normal  -Continue     Dementia   - continue donepezil        Diet: Snacks/Supplements Adult: Gelatein Plus; Between Meals  Regular Diet Adult  Snacks/Supplements Adult: Ensure Enlive; With Meals  Calorie Counts    DVT Prophylaxis: hold for procedure  Ruiz Catheter: Not present  Lines: None     Cardiac Monitoring: None  Code Status: Full Code      Clinically Significant Risk Factors            # Hypomagnesemia: Lowest Mg = 1.6 mg/dL in last 2 days, will replace as needed       # Hypertension: Noted on problem list     # Dementia: noted on problem list     # Severe Malnutrition: based on nutrition assessment    # Financial/Environmental Concerns: none       Disposition Plan    pending stool and dietician recs         Erick Mccall MD  Hospitalist Service, GOLD TEAM 21  [facility]  Securely message with [employer] (more info)  Text page via [employer] Paging/Directory   See signed in provider for up to date coverage information  ______________________________________________________________________    Interval  History   Feeling alright but having frequent mucousy stools (every 10 minutes). Still with no appetite and ongoing nausea.  Physical Exam   Vital Signs: Temp: 98  F (36.7  C) Temp src: Oral BP: 115/66 Pulse: 86   Resp: 18 SpO2: 99 % O2 Device: Nasal cannula Oxygen Delivery: 2 LPM  Weight: 126 lbs 6.4 oz    General Appearance: Alert, interactive, no distress  Respiratory: normal wob on 1L  Cardiovascular: rr no m  GI: s, nt, nd, bs present    Medical Decision Making       50 MINUTES SPENT BY ME on the date of service doing chart review, history, exam, documentation & further activities per the note.      Data

## 2024-02-08 NOTE — PHARMACY-ADMISSION MEDICATION HISTORY
Pharmacist Admission Medication History    Admission medication history is complete. The information provided in this note is only as accurate as the sources available at the time of the update.    Information Source(s): Patient and Family member (daughter Barbara) via phone, medication fill history and clinic note on 1/22/24    Pertinent Information:   Patient stated that I should call her daughter to get the home medication list.  Buspirone: either 10 mg po tid or 15 mg po bid. Pt was on 15 mg po bid at home.  Denosumab injection: pt is about 6 month overdue.    Changes made to PTA medication list:  Added: None  Deleted: None  Changed: None    Allergies reviewed with patient and updates made in EHR: yes    Medication History Completed By: Fara Mcdonald [facility] 2/7/2024 6:28 PM    Prior to Admission medications    Medication Sig Last Dose Taking? Auth Provider Long Term End Date   acetylcysteine (N-ACETYL CYSTEINE) 600 MG CAPS capsule Take 1,200 mg by mouth 2 times daily Past Week Yes Reported, Patient     albuterol (PROVENTIL) (2.5 MG/3ML) 0.083% neb solution Take 2.5 mg by nebulization 2 times daily as needed (COPD) Past Week Yes Reported, Patient Yes    busPIRone (BUSPAR) 10 MG tablet Take 10 mg by mouth 3 times daily Either 10 mg po tid or 15 mg po bid. Pt was on 15 mg po bid at home. Past Week Yes Reported, Patient     calcium polycarbophil (FIBERCON) 625 MG tablet Take 2 tablets (1,250 mg) by mouth daily Past Week Yes Gomez Louis MD     denosumab (PROLIA) 60 MG/ML SOSY injection Inject 60 mg Subcutaneous every 6 months overdue Unknown Yes Reported, Patient Yes    donepezil (ARICEPT) 10 MG tablet Take 1 tablet (10 mg) by mouth at bedtime Past Week Yes Gomez Louis MD     ferrous gluconate (FERGON) 324 (38 Fe) MG tablet Take 1 tablet (324 mg) by mouth daily (with breakfast) Past Week Yes Gomez Louis MD     FLUoxetine (PROZAC) 40 MG capsule Take 1 capsule (40 mg) by mouth daily Past Week Yes Gomez Louis MD  Yes    Fluticasone-Umeclidin-Vilanterol (TRELEGY ELLIPTA) 200-62.5-25 MCG/ACT oral inhaler Inhale 1 puff into the lungs daily Past Week Yes Reported, Patient     furosemide (LASIX) 20 MG tablet One tab daily as needed for lower ext edema Past Month Yes Gomez Louis MD Yes    latanoprost (XALATAN) 0.005 % ophthalmic solution Place 1 drop into both eyes at bedtime Past Week Yes Reported, Patient     levothyroxine (SYNTHROID/LEVOTHROID) 50 MCG tablet Take 50 mcg by mouth daily Past Week Yes Reported, Patient     olmesartan (BENICAR) 20 MG tablet Take 20 mg by mouth daily Past Week Yes Reported, Patient     pantoprazole (PROTONIX) 40 MG EC tablet Take 40 mg by mouth Past Week Yes Reported, Patient     triamcinolone (KENALOG) 0.1 % external cream Apply topically 2 times daily as needed for irritation Past Week Yes Reported, Patient     vitamin D3 (CHOLECALCIFEROL) 50 mcg (2000 units) tablet Take 1 tablet (50 mcg) by mouth daily Past Week Yes Gomez Louis MD

## 2024-02-08 NOTE — PLAN OF CARE
VS: BP (!) 149/91 (BP Location: Right arm)   Pulse 93   Temp 97.9  F (36.6  C) (Oral)   Resp 18   Wt 57.3 kg (126 lb 6.4 oz)   SpO2 100%   BMI 24.69 kg/m       O2: On R/A   Output: Voids adequately   Last BM: Today   Activity: SBA   Up for meals? Yes   Skin: Bruised   Pain: Mild pain, tolerable per pt   CMS: Intact   Dressing: N/A   Diet: Reg   LDA: L PIV   Equipment: Walker, IV   Plan: Continue current POC   Additional Info:

## 2024-02-09 LAB
ANION GAP SERPL CALCULATED.3IONS-SCNC: 13 MMOL/L (ref 7–15)
BUN SERPL-MCNC: 6.6 MG/DL (ref 8–23)
CALCIUM SERPL-MCNC: 9.2 MG/DL (ref 8.8–10.2)
CHLORIDE SERPL-SCNC: 95 MMOL/L (ref 98–107)
CREAT SERPL-MCNC: 0.79 MG/DL (ref 0.51–0.95)
DEPRECATED HCO3 PLAS-SCNC: 26 MMOL/L (ref 22–29)
EGFRCR SERPLBLD CKD-EPI 2021: 75 ML/MIN/1.73M2
ERYTHROCYTE [DISTWIDTH] IN BLOOD BY AUTOMATED COUNT: 18.2 % (ref 10–15)
GLUCOSE SERPL-MCNC: 105 MG/DL (ref 70–99)
HCT VFR BLD AUTO: 33.4 % (ref 35–47)
HGB BLD-MCNC: 9.7 G/DL (ref 11.7–15.7)
HOLD SPECIMEN: NORMAL
MAGNESIUM SERPL-MCNC: 2.3 MG/DL (ref 1.7–2.3)
MCH RBC QN AUTO: 24.2 PG (ref 26.5–33)
MCHC RBC AUTO-ENTMCNC: 29 G/DL (ref 31.5–36.5)
MCV RBC AUTO: 83 FL (ref 78–100)
PLATELET # BLD AUTO: 340 10E3/UL (ref 150–450)
POTASSIUM SERPL-SCNC: 3.6 MMOL/L (ref 3.4–5.3)
RBC # BLD AUTO: 4.01 10E6/UL (ref 3.8–5.2)
SODIUM SERPL-SCNC: 134 MMOL/L (ref 135–145)
WBC # BLD AUTO: 9.7 10E3/UL (ref 4–11)

## 2024-02-09 PROCEDURE — 80048 BASIC METABOLIC PNL TOTAL CA: CPT | Performed by: STUDENT IN AN ORGANIZED HEALTH CARE EDUCATION/TRAINING PROGRAM

## 2024-02-09 PROCEDURE — 85027 COMPLETE CBC AUTOMATED: CPT | Performed by: STUDENT IN AN ORGANIZED HEALTH CARE EDUCATION/TRAINING PROGRAM

## 2024-02-09 PROCEDURE — 258N000001 HC RX 258: Performed by: STUDENT IN AN ORGANIZED HEALTH CARE EDUCATION/TRAINING PROGRAM

## 2024-02-09 PROCEDURE — 250N000013 HC RX MED GY IP 250 OP 250 PS 637: Performed by: HOSPITALIST

## 2024-02-09 PROCEDURE — 250N000011 HC RX IP 250 OP 636: Performed by: HOSPITALIST

## 2024-02-09 PROCEDURE — 250N000013 HC RX MED GY IP 250 OP 250 PS 637: Performed by: INTERNAL MEDICINE

## 2024-02-09 PROCEDURE — 250N000013 HC RX MED GY IP 250 OP 250 PS 637: Performed by: STUDENT IN AN ORGANIZED HEALTH CARE EDUCATION/TRAINING PROGRAM

## 2024-02-09 PROCEDURE — 120N000002 HC R&B MED SURG/OB UMMC

## 2024-02-09 PROCEDURE — 83735 ASSAY OF MAGNESIUM: CPT | Performed by: STUDENT IN AN ORGANIZED HEALTH CARE EDUCATION/TRAINING PROGRAM

## 2024-02-09 PROCEDURE — 250N000011 HC RX IP 250 OP 636: Performed by: STUDENT IN AN ORGANIZED HEALTH CARE EDUCATION/TRAINING PROGRAM

## 2024-02-09 PROCEDURE — 36415 COLL VENOUS BLD VENIPUNCTURE: CPT | Performed by: STUDENT IN AN ORGANIZED HEALTH CARE EDUCATION/TRAINING PROGRAM

## 2024-02-09 PROCEDURE — 99232 SBSQ HOSP IP/OBS MODERATE 35: CPT | Performed by: STUDENT IN AN ORGANIZED HEALTH CARE EDUCATION/TRAINING PROGRAM

## 2024-02-09 RX ORDER — DEXTROSE MONOHYDRATE, SODIUM CHLORIDE, SODIUM LACTATE, POTASSIUM CHLORIDE, CALCIUM CHLORIDE 5; 600; 310; 179; 20 G/100ML; MG/100ML; MG/100ML; MG/100ML; MG/100ML
INJECTION, SOLUTION INTRAVENOUS
Status: COMPLETED
Start: 2024-02-09 | End: 2024-02-09

## 2024-02-09 RX ORDER — DIPHENHYDRAMINE HYDROCHLORIDE, ZINC ACETATE 2; .1 G/100G; G/100G
CREAM TOPICAL 3 TIMES DAILY PRN
Status: DISCONTINUED | OUTPATIENT
Start: 2024-02-09 | End: 2024-02-26 | Stop reason: HOSPADM

## 2024-02-09 RX ORDER — DEXTROSE MONOHYDRATE, SODIUM CHLORIDE, SODIUM LACTATE, POTASSIUM CHLORIDE, CALCIUM CHLORIDE 5; 600; 310; 179; 20 G/100ML; MG/100ML; MG/100ML; MG/100ML; MG/100ML
INJECTION, SOLUTION INTRAVENOUS CONTINUOUS
Status: DISCONTINUED | OUTPATIENT
Start: 2024-02-09 | End: 2024-02-17

## 2024-02-09 RX ADMIN — FLUOXETINE HYDROCHLORIDE 40 MG: 20 CAPSULE ORAL at 08:56

## 2024-02-09 RX ADMIN — Medication 1 CAPSULE: at 08:57

## 2024-02-09 RX ADMIN — ACETAMINOPHEN 650 MG: 325 TABLET, FILM COATED ORAL at 17:53

## 2024-02-09 RX ADMIN — DEXTROSE MONOHYDRATE, SODIUM CHLORIDE, SODIUM LACTATE, POTASSIUM CHLORIDE, CALCIUM CHLORIDE: 5; 600; 310; 179; 20 INJECTION, SOLUTION INTRAVENOUS at 21:41

## 2024-02-09 RX ADMIN — LOPERAMIDE HYDROCHLORIDE 2 MG: 2 CAPSULE ORAL at 19:25

## 2024-02-09 RX ADMIN — ACETAMINOPHEN 650 MG: 325 TABLET, FILM COATED ORAL at 08:56

## 2024-02-09 RX ADMIN — BUSPIRONE HYDROCHLORIDE 10 MG: 10 TABLET ORAL at 08:57

## 2024-02-09 RX ADMIN — PANTOPRAZOLE SODIUM 40 MG: 40 TABLET, DELAYED RELEASE ORAL at 08:56

## 2024-02-09 RX ADMIN — LOSARTAN POTASSIUM 50 MG: 50 TABLET, FILM COATED ORAL at 08:57

## 2024-02-09 RX ADMIN — LEVOTHYROXINE SODIUM 50 MCG: 50 TABLET ORAL at 08:57

## 2024-02-09 RX ADMIN — BUSPIRONE HYDROCHLORIDE 10 MG: 10 TABLET ORAL at 13:44

## 2024-02-09 RX ADMIN — CALCIUM CARBONATE (ANTACID) CHEW TAB 500 MG 1000 MG: 500 CHEW TAB at 16:53

## 2024-02-09 RX ADMIN — CALCIUM POLYCARBOPHIL 1250 MG: 625 TABLET, FILM COATED ORAL at 08:55

## 2024-02-09 RX ADMIN — ENOXAPARIN SODIUM 40 MG: 40 INJECTION SUBCUTANEOUS at 08:57

## 2024-02-09 RX ADMIN — LOPERAMIDE HYDROCHLORIDE 2 MG: 2 CAPSULE ORAL at 08:57

## 2024-02-09 RX ADMIN — FLUTICASONE FUROATE AND VILANTEROL TRIFENATATE 1 PUFF: 200; 25 POWDER RESPIRATORY (INHALATION) at 08:58

## 2024-02-09 RX ADMIN — TRIAMCINOLONE ACETONIDE: 1 CREAM TOPICAL at 21:21

## 2024-02-09 RX ADMIN — UMECLIDINIUM 1 PUFF: 62.5 AEROSOL, POWDER ORAL at 08:58

## 2024-02-09 RX ADMIN — ACETAMINOPHEN 650 MG: 325 TABLET, FILM COATED ORAL at 13:44

## 2024-02-09 RX ADMIN — ONDANSETRON 4 MG: 4 TABLET, ORALLY DISINTEGRATING ORAL at 14:53

## 2024-02-09 RX ADMIN — DONEPEZIL HYDROCHLORIDE 10 MG: 10 TABLET, FILM COATED ORAL at 21:19

## 2024-02-09 RX ADMIN — LATANOPROST 1 DROP: 50 SOLUTION OPHTHALMIC at 21:19

## 2024-02-09 RX ADMIN — BUSPIRONE HYDROCHLORIDE 10 MG: 10 TABLET ORAL at 19:25

## 2024-02-09 ASSESSMENT — ACTIVITIES OF DAILY LIVING (ADL)
ADLS_ACUITY_SCORE: 35

## 2024-02-09 NOTE — PROGRESS NOTES
[facility]  Medicine Progress Note - Hospitalist Service, GOLD TEAM 21    Date of Admission:  2/1/2024  Assessment & Plan   Idalia Bob is a 79 year old female admitted on 2/1/2024. Pt has history of COPD, pneumonia, hypertension, dementia and hypothyroidism among others, presented with abdominal pain and found to have consolidation on CT as well as frequent mucousy bowel movements and anorexia.    Changes today:  - discussed with GI and pulm - given ongoing symptoms of unclear etiology vs multifactorial, would be reasonable to pursue bronchoscopy with biopsy to help guide next steps and GOC/FT discussion   - bronch tentatively scheduled for 10am Monday morning, plan for NPO night before and hold lovenox evening before  - patient to benefit from palliative for GOC/FT discussion, plan for consult in next 1-2 days  - holding IVF this morning, but will likely need to restart if PO is poor throughout the day     Left upper lobe pneumonia vs persistent consolidation vs mass  COPD  Chronic hypoxemic respiratory failure  Patient with pseudomonal upper lobe pneumonia in December and has had ongoing consolidation since.  While this finding was present on imaging she has a paucity of other pathology consistent with pneumonia at the present time.  She appears to be breathing at her baseline.  It is possible that this is simply a slow to resolve infiltrate or another process.  -s/p azithromycin and ceftriaxone  -Continue home oxygen  -Continue home COPD regimen  -negative Fungitell, galactomannan, and sputum cultures.  -pulm following     Abdominal Pain  Loose stools with mucus  Tenesmus  Unintentional weight loss  Anorexia  The patient has had weeks of worsening anorexia along with crampy abdominal pain but no evidence of gastrointestinal bleeding.  She has frequent (many many times per day) mucousy bowel movements with very little stool production.  C. difficile and enteric  panel are negative.  She does have normocytic anemia and a hiatal hernia. S/p flex sig and endoscopy 2/7. Noted hiatal hernia, mild inflammatory changes in distal colon/rectum s/p biopsy  -Appreciate GI consultation  -Encourage oral intake, supplements  -Add Bentyl, psyllium, immodium  -Physical and Occupational Therapy consults     Urinary tract infection  Urine culture with 10-50,000 colonies of Enterococcus faecalis.    -s/p treatment     Elevated Troponin  Likely type II MI in the setting of acute illness.  Continue to follow     Hypokalemia  Hypomagnesemia  In the setting of poor oral intake as well as PPI use.  Will continue to follow closely.       Iron deficiency anemia    Not tolerating Iron replacement.  Her ferritin is 85 which likely reflects a degree of iron deficiency given some presence of inflammation.  -Consider IV iron depending on GI workup     Hypertension  Continue losartan 50 mg p.o. daily as a substitute for her home medication     Anxiety and depression  Takes BuSpar, Prozac  -Continue home regimen     Hypothyroidism     Takes Synthroid 50 mcg. TSH normal  -Continue     Dementia   - continue donepezil        Diet: Snacks/Supplements Adult: Gelatein Plus; Between Meals  Regular Diet Adult  Snacks/Supplements Adult: Ensure Enlive; With Meals  Calorie Counts    DVT Prophylaxis: hold for procedure  Ruiz Catheter: Not present  Lines: None     Cardiac Monitoring: None  Code Status: Full Code      Clinically Significant Risk Factors            # Hypomagnesemia: Lowest Mg = 1.6 mg/dL in last 2 days, will replace as needed       # Hypertension: Noted on problem list     # Dementia: noted on problem list     # Severe Malnutrition: based on nutrition assessment    # Financial/Environmental Concerns: none       Disposition Plan    pending stool and dietician recs         Erick Mccall MD  Hospitalist Service, GOLD TEAM 21  [facility]  Securely  message with [employer] M (more info)  Text page via [employer] Paging/Directory   See signed in provider for up to date coverage information  ______________________________________________________________________    Interval History   Ongoing abdominal pain and mild nausea. Stool frequency has decreased some, but had one more watery stool this am. Still with no appetite. Breathing remains stable    Physical Exam   Vital Signs: Temp: 97.6  F (36.4  C) Temp src: Oral BP: (!) 166/103 Pulse: 84   Resp: 19 SpO2: 95 % O2 Device: Nasal cannula Oxygen Delivery: 1 LPM  Weight: 126 lbs 6.4 oz    General Appearance: Alert, interactive, no distress  Respiratory: normal wob on 1L  Cardiovascular: rr no m  GI: s, nt, nd, bs present    Medical Decision Making       50 MINUTES SPENT BY ME on the date of service doing chart review, history, exam, documentation & further activities per the note.      Data

## 2024-02-09 NOTE — PROGRESS NOTES
Pulmonary brief note    Ms. Bob is scheduled for bronchoscopy with BAL and TBBX of the posterior segment of the NIRAV on Monday at 1000 to evaluate for an endobronchial lesion or organizing pneumonia as a cause for the persistent NIRAV infiltrate.  She needs to be NPO after midnight on Sunday.      Today she continues complain of abdominal cramping and pain, unable to take PO.  I discussed with her that if her abdominal symptoms continue the bronchoscopy could be delayed to a later date.    Priya Schulz MD  [phone]

## 2024-02-09 NOTE — PLAN OF CARE
Pt alert and ox4, vss. ON 1L o2 ( baseline),     C/o nausea, well managed with Zofran.   Poor appetite, no eating at dinner time.   Calorie counted x3 days.     Cont of B/B, frequently get up for B/B, using Bedside commode, SBA with walker to transfer.     L pIV, infusing.     Cont of POC.

## 2024-02-09 NOTE — PROGRESS NOTES
Care Management Follow Up    Length of Stay (days): 8    Expected Discharge Date:       Concerns to be Addressed: adjustment to diagnosis/illness     Patient plan of care discussed at interdisciplinary rounds: Yes    Anticipated Discharge Disposition: Assisted Living     Anticipated Discharge Services: None (Home Care)  Anticipated Discharge DME:      Patient/family educated on Medicare website which has current facility and service quality ratings:    Education Provided on the Discharge Plan:    Patient/Family in Agreement with the Plan: yes    Referrals Placed by CM/SW:    Private pay costs discussed: Not applicable    Additional Information:    Call  to  [facility] nurse line [phone] and updated on plan for procedure with biopsy on Monday so not discharging until Tuesday at the earliest.     Call to [facility] and spoke with JIMI Figueroa Case Manager, [phone] . Updated on delay of discharge.    Emani Tao RN  Inpatient Care Coordinator  6 Med Surg  [phone], pager [phone]      For weekend social work needs, contact information below and [facility]:     SW Weekend [place] Pager ED, 5 MS, 5 ortho : [phone]   Weekend 6MS, 8A, 10ICU- Pager: [phone]     For weekend RN care coordinator needs (home discharge with needs including home care, assisted living facility returns, durable medical equip, IV antibiotics)   5 med/surg, 5 ortho, ED pager: [phone]  6 med/surg, 8 med/surg, 10 ICU pager: [phone]

## 2024-02-09 NOTE — PROGRESS NOTES
[id number]    Pt alert and oriented times 4 and stand by assist, use commode at bed time and frequently using it. On 1 L NC and L PIV infusing with D5%potasium chloride at 75ml/hr   Continent bowel and bladder. Pt is on calorie count for three days and did not ate during this shift.     Blood pressure (!) 156/92, pulse 95, temperature 98.5  F (36.9  C), temperature source Oral, resp. rate 16, weight 57.3 kg (126 lb 6.4 oz), SpO2 96%.

## 2024-02-09 NOTE — PLAN OF CARE
Major Shift Events:    - alert and oriented x3-4, vss. ON 1L o2 ( baseline),   -C/o nausea, well managed with Zofran,Poor appetite, Calorie counted x3 days,Cont of B/B, frequently get up for B/B, using Bedside commode, SBA with walker to transfer.   L pIV, infusing.   - 10:00 am received a phone call from nursing aid patient is saying she's bleeding from rectum, I went to assess the patient rectum and patient is not bleeding actively, it appears to me that due to diarrhea patient has excoriated rectum which scant dried blood is noted. Placed 4x4 gauze padding , well continue to monitor and assess. Hemoglobin is 9.5 taken 9/6/24- MD notified about above findings at 1051 am  - seen and examined by hospitalist  - declined to eat as per daughter at the bedside she patient was able to drink about half half of the ensure daughter offered  Plan: caloric count          : Palliative consult ?          : monitoring and recheck for magnesium          : scheduled for bronchoscopy on Monday at 1000 to evaluate for an endobronchial lesion   For vital signs and complete assessments, please see documentation flowsheets.   Goal Outcome Evaluation:      Plan of Care Reviewed With: patient    Overall Patient Progress: no changeOverall Patient Progress: no change

## 2024-02-10 ENCOUNTER — APPOINTMENT (OUTPATIENT)
Dept: PHYSICAL THERAPY | Facility: CLINIC | Age: 79
DRG: 193 | End: 2024-02-10
Payer: MEDICARE

## 2024-02-10 LAB
HOLD SPECIMEN: NORMAL
MAGNESIUM SERPL-MCNC: 1.7 MG/DL (ref 1.7–2.3)

## 2024-02-10 PROCEDURE — 120N000002 HC R&B MED SURG/OB UMMC

## 2024-02-10 PROCEDURE — 36415 COLL VENOUS BLD VENIPUNCTURE: CPT | Performed by: STUDENT IN AN ORGANIZED HEALTH CARE EDUCATION/TRAINING PROGRAM

## 2024-02-10 PROCEDURE — 250N000013 HC RX MED GY IP 250 OP 250 PS 637: Performed by: HOSPITALIST

## 2024-02-10 PROCEDURE — 250N000009 HC RX 250: Performed by: HOSPITALIST

## 2024-02-10 PROCEDURE — 250N000011 HC RX IP 250 OP 636: Performed by: HOSPITALIST

## 2024-02-10 PROCEDURE — 94640 AIRWAY INHALATION TREATMENT: CPT | Mod: 76

## 2024-02-10 PROCEDURE — 99232 SBSQ HOSP IP/OBS MODERATE 35: CPT | Performed by: STUDENT IN AN ORGANIZED HEALTH CARE EDUCATION/TRAINING PROGRAM

## 2024-02-10 PROCEDURE — 250N000013 HC RX MED GY IP 250 OP 250 PS 637: Performed by: STUDENT IN AN ORGANIZED HEALTH CARE EDUCATION/TRAINING PROGRAM

## 2024-02-10 PROCEDURE — 83735 ASSAY OF MAGNESIUM: CPT | Performed by: STUDENT IN AN ORGANIZED HEALTH CARE EDUCATION/TRAINING PROGRAM

## 2024-02-10 PROCEDURE — 999N000157 HC STATISTIC RCP TIME EA 10 MIN

## 2024-02-10 PROCEDURE — 258N000001 HC RX 258: Performed by: STUDENT IN AN ORGANIZED HEALTH CARE EDUCATION/TRAINING PROGRAM

## 2024-02-10 PROCEDURE — 250N000013 HC RX MED GY IP 250 OP 250 PS 637: Performed by: INTERNAL MEDICINE

## 2024-02-10 PROCEDURE — 250N000011 HC RX IP 250 OP 636: Performed by: STUDENT IN AN ORGANIZED HEALTH CARE EDUCATION/TRAINING PROGRAM

## 2024-02-10 PROCEDURE — 97530 THERAPEUTIC ACTIVITIES: CPT | Mod: GP

## 2024-02-10 RX ORDER — ONDANSETRON 2 MG/ML
4 INJECTION INTRAMUSCULAR; INTRAVENOUS EVERY 6 HOURS PRN
Status: DISCONTINUED | OUTPATIENT
Start: 2024-02-10 | End: 2024-02-26 | Stop reason: HOSPADM

## 2024-02-10 RX ORDER — MAGNESIUM SULFATE HEPTAHYDRATE 40 MG/ML
2 INJECTION, SOLUTION INTRAVENOUS ONCE
Status: COMPLETED | OUTPATIENT
Start: 2024-02-10 | End: 2024-02-10

## 2024-02-10 RX ORDER — PROCHLORPERAZINE 25 MG
12.5 SUPPOSITORY, RECTAL RECTAL EVERY 12 HOURS PRN
Status: DISCONTINUED | OUTPATIENT
Start: 2024-02-10 | End: 2024-02-26 | Stop reason: HOSPADM

## 2024-02-10 RX ORDER — PROCHLORPERAZINE MALEATE 5 MG
5 TABLET ORAL EVERY 6 HOURS PRN
Status: DISCONTINUED | OUTPATIENT
Start: 2024-02-10 | End: 2024-02-26 | Stop reason: HOSPADM

## 2024-02-10 RX ORDER — ONDANSETRON 4 MG/1
4 TABLET, ORALLY DISINTEGRATING ORAL EVERY 6 HOURS PRN
Status: DISCONTINUED | OUTPATIENT
Start: 2024-02-10 | End: 2024-02-26 | Stop reason: HOSPADM

## 2024-02-10 RX ADMIN — BUSPIRONE HYDROCHLORIDE 10 MG: 10 TABLET ORAL at 14:56

## 2024-02-10 RX ADMIN — Medication 1 MG: at 22:49

## 2024-02-10 RX ADMIN — DICYCLOMINE HYDROCHLORIDE 10 MG: 10 CAPSULE ORAL at 09:59

## 2024-02-10 RX ADMIN — ONDANSETRON 4 MG: 4 TABLET, ORALLY DISINTEGRATING ORAL at 04:39

## 2024-02-10 RX ADMIN — LOPERAMIDE HYDROCHLORIDE 2 MG: 2 CAPSULE ORAL at 20:50

## 2024-02-10 RX ADMIN — BUSPIRONE HYDROCHLORIDE 10 MG: 10 TABLET ORAL at 08:22

## 2024-02-10 RX ADMIN — LOSARTAN POTASSIUM 50 MG: 50 TABLET, FILM COATED ORAL at 08:21

## 2024-02-10 RX ADMIN — ENOXAPARIN SODIUM 40 MG: 40 INJECTION SUBCUTANEOUS at 08:23

## 2024-02-10 RX ADMIN — LATANOPROST 1 DROP: 50 SOLUTION OPHTHALMIC at 22:21

## 2024-02-10 RX ADMIN — ACETAMINOPHEN 650 MG: 325 TABLET, FILM COATED ORAL at 09:59

## 2024-02-10 RX ADMIN — DEXTROSE MONOHYDRATE, SODIUM CHLORIDE, SODIUM LACTATE, POTASSIUM CHLORIDE, CALCIUM CHLORIDE: 5; 600; 310; 179; 20 INJECTION, SOLUTION INTRAVENOUS at 09:02

## 2024-02-10 RX ADMIN — ACETAMINOPHEN 650 MG: 325 TABLET, FILM COATED ORAL at 14:56

## 2024-02-10 RX ADMIN — LEVOTHYROXINE SODIUM 50 MCG: 50 TABLET ORAL at 08:22

## 2024-02-10 RX ADMIN — PANTOPRAZOLE SODIUM 40 MG: 40 TABLET, DELAYED RELEASE ORAL at 08:20

## 2024-02-10 RX ADMIN — FLUTICASONE FUROATE AND VILANTEROL TRIFENATATE 1 PUFF: 200; 25 POWDER RESPIRATORY (INHALATION) at 08:23

## 2024-02-10 RX ADMIN — ALBUTEROL SULFATE 2.5 MG: 2.5 SOLUTION RESPIRATORY (INHALATION) at 21:34

## 2024-02-10 RX ADMIN — LOPERAMIDE HYDROCHLORIDE 2 MG: 2 CAPSULE ORAL at 08:22

## 2024-02-10 RX ADMIN — CALCIUM POLYCARBOPHIL 1250 MG: 625 TABLET, FILM COATED ORAL at 08:21

## 2024-02-10 RX ADMIN — Medication 1 CAPSULE: at 14:56

## 2024-02-10 RX ADMIN — UMECLIDINIUM 1 PUFF: 62.5 AEROSOL, POWDER ORAL at 08:23

## 2024-02-10 RX ADMIN — DICYCLOMINE HYDROCHLORIDE 10 MG: 10 CAPSULE ORAL at 18:52

## 2024-02-10 RX ADMIN — MAGNESIUM SULFATE HEPTAHYDRATE 2 G: 40 INJECTION, SOLUTION INTRAVENOUS at 22:49

## 2024-02-10 RX ADMIN — DONEPEZIL HYDROCHLORIDE 10 MG: 10 TABLET, FILM COATED ORAL at 22:20

## 2024-02-10 RX ADMIN — ACETAMINOPHEN 650 MG: 325 TABLET, FILM COATED ORAL at 20:50

## 2024-02-10 RX ADMIN — BUSPIRONE HYDROCHLORIDE 10 MG: 10 TABLET ORAL at 20:50

## 2024-02-10 RX ADMIN — FLUOXETINE HYDROCHLORIDE 40 MG: 20 CAPSULE ORAL at 08:21

## 2024-02-10 RX ADMIN — ALBUTEROL SULFATE 2.5 MG: 2.5 SOLUTION RESPIRATORY (INHALATION) at 02:12

## 2024-02-10 RX ADMIN — Medication 1 CAPSULE: at 08:22

## 2024-02-10 RX ADMIN — ONDANSETRON 4 MG: 4 TABLET, ORALLY DISINTEGRATING ORAL at 09:59

## 2024-02-10 RX ADMIN — ONDANSETRON 4 MG: 4 TABLET, ORALLY DISINTEGRATING ORAL at 21:01

## 2024-02-10 ASSESSMENT — ACTIVITIES OF DAILY LIVING (ADL)
ADLS_ACUITY_SCORE: 35

## 2024-02-10 NOTE — PROGRESS NOTES
VS:  BP (!) 143/103 (BP Location: Right arm)   Pulse 89   Temp 97.8  F (36.6  C) (Oral)   Resp 18   Wt 57.3 kg (126 lb 6.4 oz)   SpO2 96%   BMI 24.69 kg/m     O2:  1 Liter O2   Output:  Commode Bedside    Last BM:  02/09/2024   Activity:  SBA / Walker    Up for meals?  No meal intake    Skin:  No wounds and or Injuries    Pain: Yes    CMS:  A&OX 3-4   Dressing:  None    Diet:  Regular / Patient refuses to eat    LDA:  Right PIV /SL   Equipment:  Personal Belongings    Plan:  POC   Additional Info:  Desats with Activity / SOB      Poor appetite, Calorie counted X4 no calories consumed     Patient complained of Nausea during entire day shift/ No vomit    Nurse assessed for bleeding from rectum noted no bleeding or blood    No BM and or Diarrhea  today only urine     Received   PRN Zofran X2, Tylenol X2, and

## 2024-02-10 NOTE — PROGRESS NOTES
[facility]  Medicine Progress Note - Hospitalist Service, GOLD TEAM 21    Date of Admission:  2/1/2024  Assessment & Plan   Idalia Bob is a 79 year old female admitted on 2/1/2024. Pt has history of COPD, pneumonia, hypertension, dementia and hypothyroidism among others, presented with abdominal pain and found to have consolidation on CT as well as frequent mucousy bowel movements and anorexia.    Changes today:  - discussed with GI and pulm - given ongoing symptoms of unclear etiology vs multifactorial, would be reasonable to pursue bronchoscopy with biopsy to help guide next steps and GOC/FT discussion   - bronch scheduled for 10am Monday morning, plan for NPO night before and hold lovenox evening before  - patient to benefit from palliative for GOC/FT discussion  - will try to expand anti-nausea regimen with hopes of improving PO  - continue IVF with limited PO     Left upper lobe pneumonia vs persistent consolidation vs mass  COPD  Chronic hypoxemic respiratory failure  Patient with pseudomonal upper lobe pneumonia in December and has had ongoing consolidation since.  While this finding was present on imaging she has a paucity of other pathology consistent with pneumonia at the present time.  She appears to be breathing at her baseline.  It is possible that this is simply a slow to resolve infiltrate or another process.  Bronch with biopsy scheduled for 2/12  -s/p azithromycin and ceftriaxone  -Continue home oxygen  -Continue home COPD regimen  -negative Fungitell, galactomannan, and sputum cultures.  -pulm following     Abdominal Pain  Loose stools with mucus  Tenesmus  Unintentional weight loss  Anorexia  The patient has had weeks of worsening anorexia along with crampy abdominal pain but no evidence of gastrointestinal bleeding.  She has frequent (many many times per day) mucousy bowel movements with very little stool production.  C. difficile and enteric  panel are negative.  She does have normocytic anemia and a hiatal hernia. S/p flex sig and endoscopy 2/7. Noted hiatal hernia, mild inflammatory changes in distal colon/rectum s/p biopsy. If symptoms persist beyond 8 weeks repeat flex sig should be completed to eval for chronic inflammatory process per GI.  -Appreciate GI consultation  -Encourage oral intake, supplements  -Add Bentyl, psyllium, immodium  -Physical and Occupational Therapy consults     Urinary tract infection  Urine culture with 10-50,000 colonies of Enterococcus faecalis.    -s/p treatment     Elevated Troponin  Likely type II MI in the setting of acute illness.  Continue to follow     Hypokalemia  Hypomagnesemia  In the setting of poor oral intake as well as PPI use.  Will continue to follow closely.       Iron deficiency anemia    Not tolerating Iron replacement.  Her ferritin is 85 which likely reflects a degree of iron deficiency given some presence of inflammation.     Hypertension  Continue losartan 50 mg p.o. daily as a substitute for her home medication     Anxiety and depression  Takes BuSpar, Prozac  -Continue home regimen     Hypothyroidism     Takes Synthroid 50 mcg. TSH normal  -Continue     Dementia   - continue donepezil        Diet: Snacks/Supplements Adult: Gelatein Plus; Between Meals  Regular Diet Adult  Snacks/Supplements Adult: Ensure Enlive; With Meals  Calorie Counts    DVT Prophylaxis: hold for procedure  Ruiz Catheter: Not present  Lines: None     Cardiac Monitoring: None  Code Status: Full Code      Clinically Significant Risk Factors                  # Hypertension: Noted on problem list     # Dementia: noted on problem list     # Severe Malnutrition: based on nutrition assessment    # Financial/Environmental Concerns: none       Disposition Plan    pending stool and dietician recs         Erick Mccall MD  Hospitalist Service, GOLD TEAM 21  [facility]  Securely  message with Rosa M (more info)  Text page via [facility] Paging/Directory   See signed in provider for up to date coverage information  ______________________________________________________________________    Interval History   Ongoing abdominal pain and nausea. Stool frequency has decreased. Still with no appetite. Breathing remains stable. Daughter at bedside and updated on plan of care. In agreement for bronch and broadening anti-nauea approach.    Physical Exam   Vital Signs: Temp: 97.8  F (36.6  C) Temp src: Oral BP: (!) 143/103 Pulse: 89   Resp: 18 SpO2: 96 % O2 Device: Nasal cannula Oxygen Delivery: 2 LPM  Weight: 126 lbs 6.4 oz    General Appearance: Alert, interactive, no distress  Respiratory: normal wob on 1L  Cardiovascular: rr no m  GI: s, nt, nd, bs present    Medical Decision Making       40 MINUTES SPENT BY ME on the date of service doing chart review, history, exam, documentation & further activities per the note.      Data

## 2024-02-10 NOTE — PROGRESS NOTES
"  CLINICAL NUTRITION SERVICES - BRIEF NOTE    Diet: regular  ONS: Ensure Enlive BID w/ meals. Gelatein 20 q day w/ lunch.  Intake:     Calorie counts for Thursday/Friday:  2/8: drank 2 Ensure Enlive and 50% of one Gelatein Plus and 1 bite of pizza. 3 supplements provided 850 kcal and 60 g protein. Although question if Ensure enlive was counted twice.     2/9: Per RN, daughter was only able to get her to drink 50% of one Ensure Enlive.  Provided 175 kcal and 10 g protein.     Monitoring/Evaluation  Progress toward goals will be monitored and evaluated per protocol.     Janae Case) Juan RD, LD   6B and 8A Med/surg (M-F)   Weekday Vocera contact: \"6 Med Surg Clinical Dietitian\" or \"8 Med Surg Clinical Dietitian\"  M-F Pager: [phone]  Weekend/holiday pager: [phone]    "

## 2024-02-10 NOTE — PLAN OF CARE
Problem: Oral Intake Inadequate  Goal: Improved Oral Intake  Outcome: Not Progressing     Problem: Pain Acute  Goal: Optimal Pain Control and Function  Outcome: Progressing  Intervention: Prevent or Manage Pain  Recent Flowsheet Documentation  Taken 2/10/2024 0000 by Caitie Eid RN  Medication Review/Management: medications reviewed  Intervention: Optimize Psychosocial Wellbeing  Recent Flowsheet Documentation  Taken 2/10/2024 0000 by Caitie Eid RN  Supportive Measures: relaxation techniques promoted   Goal Outcome Evaluation:    [id number]    Patient calm and cooperative on shift. Slept all night on shift. No acute events. Encouraging patient to eat, but did not eat on my shift as it was over night.

## 2024-02-11 ENCOUNTER — APPOINTMENT (OUTPATIENT)
Dept: GENERAL RADIOLOGY | Facility: CLINIC | Age: 79
DRG: 193 | End: 2024-02-11
Attending: STUDENT IN AN ORGANIZED HEALTH CARE EDUCATION/TRAINING PROGRAM
Payer: MEDICARE

## 2024-02-11 LAB
ANION GAP SERPL CALCULATED.3IONS-SCNC: 14 MMOL/L (ref 7–15)
BUN SERPL-MCNC: 6.8 MG/DL (ref 8–23)
CALCIUM SERPL-MCNC: 9.1 MG/DL (ref 8.8–10.2)
CHLORIDE SERPL-SCNC: 97 MMOL/L (ref 98–107)
CREAT SERPL-MCNC: 0.8 MG/DL (ref 0.51–0.95)
CREAT SERPL-MCNC: 0.8 MG/DL (ref 0.51–0.95)
CRP SERPL-MCNC: 64.83 MG/L
DEPRECATED HCO3 PLAS-SCNC: 23 MMOL/L (ref 22–29)
EGFRCR SERPLBLD CKD-EPI 2021: 74 ML/MIN/1.73M2
EGFRCR SERPLBLD CKD-EPI 2021: 74 ML/MIN/1.73M2
ERYTHROCYTE [DISTWIDTH] IN BLOOD BY AUTOMATED COUNT: 18.5 % (ref 10–15)
GLUCOSE BLDC GLUCOMTR-MCNC: 119 MG/DL (ref 70–99)
GLUCOSE SERPL-MCNC: 144 MG/DL (ref 70–99)
HCT VFR BLD AUTO: 28.7 % (ref 35–47)
HGB BLD-MCNC: 8.3 G/DL (ref 11.7–15.7)
MAGNESIUM SERPL-MCNC: 1.6 MG/DL (ref 1.7–2.3)
MCH RBC QN AUTO: 24.6 PG (ref 26.5–33)
MCHC RBC AUTO-ENTMCNC: 28.9 G/DL (ref 31.5–36.5)
MCV RBC AUTO: 85 FL (ref 78–100)
PLATELET # BLD AUTO: 269 10E3/UL (ref 150–450)
PLATELET # BLD AUTO: 269 10E3/UL (ref 150–450)
POTASSIUM SERPL-SCNC: 4.3 MMOL/L (ref 3.4–5.3)
RBC # BLD AUTO: 3.37 10E6/UL (ref 3.8–5.2)
SODIUM SERPL-SCNC: 134 MMOL/L (ref 135–145)
WBC # BLD AUTO: 9.2 10E3/UL (ref 4–11)

## 2024-02-11 PROCEDURE — 85027 COMPLETE CBC AUTOMATED: CPT | Performed by: STUDENT IN AN ORGANIZED HEALTH CARE EDUCATION/TRAINING PROGRAM

## 2024-02-11 PROCEDURE — 250N000013 HC RX MED GY IP 250 OP 250 PS 637: Performed by: PHYSICIAN ASSISTANT

## 2024-02-11 PROCEDURE — 86140 C-REACTIVE PROTEIN: CPT | Performed by: STUDENT IN AN ORGANIZED HEALTH CARE EDUCATION/TRAINING PROGRAM

## 2024-02-11 PROCEDURE — 250N000013 HC RX MED GY IP 250 OP 250 PS 637: Performed by: INTERNAL MEDICINE

## 2024-02-11 PROCEDURE — 250N000011 HC RX IP 250 OP 636: Performed by: STUDENT IN AN ORGANIZED HEALTH CARE EDUCATION/TRAINING PROGRAM

## 2024-02-11 PROCEDURE — 99233 SBSQ HOSP IP/OBS HIGH 50: CPT | Performed by: STUDENT IN AN ORGANIZED HEALTH CARE EDUCATION/TRAINING PROGRAM

## 2024-02-11 PROCEDURE — 250N000013 HC RX MED GY IP 250 OP 250 PS 637: Performed by: STUDENT IN AN ORGANIZED HEALTH CARE EDUCATION/TRAINING PROGRAM

## 2024-02-11 PROCEDURE — 120N000002 HC R&B MED SURG/OB UMMC

## 2024-02-11 PROCEDURE — 83735 ASSAY OF MAGNESIUM: CPT | Performed by: STUDENT IN AN ORGANIZED HEALTH CARE EDUCATION/TRAINING PROGRAM

## 2024-02-11 PROCEDURE — 71045 X-RAY EXAM CHEST 1 VIEW: CPT

## 2024-02-11 PROCEDURE — 85049 AUTOMATED PLATELET COUNT: CPT | Performed by: HOSPITALIST

## 2024-02-11 PROCEDURE — 999N000157 HC STATISTIC RCP TIME EA 10 MIN

## 2024-02-11 PROCEDURE — 71045 X-RAY EXAM CHEST 1 VIEW: CPT | Mod: 26 | Performed by: RADIOLOGY

## 2024-02-11 PROCEDURE — 94640 AIRWAY INHALATION TREATMENT: CPT | Mod: 76

## 2024-02-11 PROCEDURE — 36415 COLL VENOUS BLD VENIPUNCTURE: CPT | Performed by: HOSPITALIST

## 2024-02-11 PROCEDURE — 80048 BASIC METABOLIC PNL TOTAL CA: CPT | Performed by: STUDENT IN AN ORGANIZED HEALTH CARE EDUCATION/TRAINING PROGRAM

## 2024-02-11 PROCEDURE — 94640 AIRWAY INHALATION TREATMENT: CPT

## 2024-02-11 PROCEDURE — 258N000001 HC RX 258: Performed by: STUDENT IN AN ORGANIZED HEALTH CARE EDUCATION/TRAINING PROGRAM

## 2024-02-11 PROCEDURE — 250N000009 HC RX 250: Performed by: HOSPITALIST

## 2024-02-11 PROCEDURE — 82565 ASSAY OF CREATININE: CPT | Performed by: HOSPITALIST

## 2024-02-11 PROCEDURE — 250N000013 HC RX MED GY IP 250 OP 250 PS 637: Performed by: HOSPITALIST

## 2024-02-11 RX ORDER — HYDROXYZINE HYDROCHLORIDE 10 MG/1
10 TABLET, FILM COATED ORAL 3 TIMES DAILY PRN
Status: DISCONTINUED | OUTPATIENT
Start: 2024-02-11 | End: 2024-02-26 | Stop reason: HOSPADM

## 2024-02-11 RX ORDER — MAGNESIUM SULFATE HEPTAHYDRATE 40 MG/ML
2 INJECTION, SOLUTION INTRAVENOUS ONCE
Status: COMPLETED | OUTPATIENT
Start: 2024-02-11 | End: 2024-02-11

## 2024-02-11 RX ADMIN — DICYCLOMINE HYDROCHLORIDE 10 MG: 10 CAPSULE ORAL at 17:41

## 2024-02-11 RX ADMIN — LOPERAMIDE HYDROCHLORIDE 2 MG: 2 CAPSULE ORAL at 19:47

## 2024-02-11 RX ADMIN — ALBUTEROL SULFATE 2.5 MG: 2.5 SOLUTION RESPIRATORY (INHALATION) at 13:44

## 2024-02-11 RX ADMIN — MAGNESIUM SULFATE HEPTAHYDRATE 2 G: 40 INJECTION, SOLUTION INTRAVENOUS at 17:41

## 2024-02-11 RX ADMIN — PANTOPRAZOLE SODIUM 40 MG: 40 TABLET, DELAYED RELEASE ORAL at 09:07

## 2024-02-11 RX ADMIN — LATANOPROST 1 DROP: 50 SOLUTION OPHTHALMIC at 21:56

## 2024-02-11 RX ADMIN — ONDANSETRON 4 MG: 4 TABLET, ORALLY DISINTEGRATING ORAL at 09:54

## 2024-02-11 RX ADMIN — ALBUTEROL SULFATE 2.5 MG: 2.5 SOLUTION RESPIRATORY (INHALATION) at 19:00

## 2024-02-11 RX ADMIN — ALBUTEROL SULFATE 2.5 MG: 2.5 SOLUTION RESPIRATORY (INHALATION) at 03:35

## 2024-02-11 RX ADMIN — BUSPIRONE HYDROCHLORIDE 10 MG: 10 TABLET ORAL at 14:16

## 2024-02-11 RX ADMIN — DIPHENHYDRAMINE HYDROCHLORIDE, ZINC ACETATE: 2; .1 CREAM TOPICAL at 19:48

## 2024-02-11 RX ADMIN — FLUOXETINE HYDROCHLORIDE 40 MG: 20 CAPSULE ORAL at 09:04

## 2024-02-11 RX ADMIN — DEXTROSE MONOHYDRATE, SODIUM CHLORIDE, SODIUM LACTATE, POTASSIUM CHLORIDE, CALCIUM CHLORIDE: 5; 600; 310; 179; 20 INJECTION, SOLUTION INTRAVENOUS at 00:42

## 2024-02-11 RX ADMIN — DONEPEZIL HYDROCHLORIDE 10 MG: 10 TABLET, FILM COATED ORAL at 21:56

## 2024-02-11 RX ADMIN — UMECLIDINIUM 1 PUFF: 62.5 AEROSOL, POWDER ORAL at 09:08

## 2024-02-11 RX ADMIN — ALBUTEROL SULFATE 2.5 MG: 2.5 SOLUTION RESPIRATORY (INHALATION) at 06:25

## 2024-02-11 RX ADMIN — ENOXAPARIN SODIUM 40 MG: 40 INJECTION SUBCUTANEOUS at 09:07

## 2024-02-11 RX ADMIN — LOPERAMIDE HYDROCHLORIDE 2 MG: 2 CAPSULE ORAL at 09:06

## 2024-02-11 RX ADMIN — ALBUTEROL SULFATE 2.5 MG: 2.5 SOLUTION RESPIRATORY (INHALATION) at 10:13

## 2024-02-11 RX ADMIN — BUSPIRONE HYDROCHLORIDE 10 MG: 10 TABLET ORAL at 09:07

## 2024-02-11 RX ADMIN — BUSPIRONE HYDROCHLORIDE 10 MG: 10 TABLET ORAL at 19:47

## 2024-02-11 RX ADMIN — DICYCLOMINE HYDROCHLORIDE 10 MG: 10 CAPSULE ORAL at 09:55

## 2024-02-11 RX ADMIN — LOSARTAN POTASSIUM 50 MG: 50 TABLET, FILM COATED ORAL at 09:06

## 2024-02-11 RX ADMIN — ACETAMINOPHEN 650 MG: 325 TABLET, FILM COATED ORAL at 09:54

## 2024-02-11 RX ADMIN — ACETAMINOPHEN 650 MG: 325 TABLET, FILM COATED ORAL at 17:56

## 2024-02-11 RX ADMIN — FLUTICASONE FUROATE AND VILANTEROL TRIFENATATE 1 PUFF: 200; 25 POWDER RESPIRATORY (INHALATION) at 09:08

## 2024-02-11 RX ADMIN — CALCIUM POLYCARBOPHIL 1250 MG: 625 TABLET, FILM COATED ORAL at 09:05

## 2024-02-11 RX ADMIN — HYDROXYZINE HYDROCHLORIDE 10 MG: 10 TABLET ORAL at 21:56

## 2024-02-11 RX ADMIN — Medication 1 CAPSULE: at 14:16

## 2024-02-11 RX ADMIN — LEVOTHYROXINE SODIUM 50 MCG: 50 TABLET ORAL at 09:05

## 2024-02-11 ASSESSMENT — ACTIVITIES OF DAILY LIVING (ADL)
ADLS_ACUITY_SCORE: 41
ADLS_ACUITY_SCORE: 35
ADLS_ACUITY_SCORE: 41
ADLS_ACUITY_SCORE: 35
ADLS_ACUITY_SCORE: 35
ADLS_ACUITY_SCORE: 41
ADLS_ACUITY_SCORE: 35
ADLS_ACUITY_SCORE: 41

## 2024-02-11 NOTE — PLAN OF CARE
Problem: Adult Inpatient Plan of Care  Goal: Plan of Care Review  Description: The Plan of Care Review/Shift note should be completed every shift.  The Outcome Evaluation is a brief statement about your assessment that the patient is improving, declining, or no change.  This information will be displayed automatically on your shift  note.  Recent Flowsheet Documentation  Taken 2/11/2024 0026 by Evelin Alvares RN  Outcome Evaluation: Pt is still unable to eat meals and is getting tylenol bentyl for abdominal pain and zophran for nausea. pt did not eat any supplements. 2l NC for SOB while up and moving BP high this shift no c/o headache. While patient is lying in bed HR remains normal. magnesium replaced this shift. Patient has purewick in for sleep overnight  Plan of Care Reviewed With: patient  Overall Patient Progress: declining  Goal: Absence of Hospital-Acquired Illness or Injury  Intervention: Identify and Manage Fall Risk  Recent Flowsheet Documentation  Taken 2/10/2024 1620 by Evelin Alvares, RN  Safety Promotion/Fall Prevention:   activity supervised   clutter free environment maintained   increased rounding and observation   increase visualization of patient   lighting adjusted   mobility aid in reach   nonskid shoes/slippers when out of bed   safety round/check completed  Intervention: Prevent Infection  Recent Flowsheet Documentation  Taken 2/10/2024 1620 by Evelin Alvares RN  Infection Prevention: hand hygiene promoted     Problem: Adult Inpatient Plan of Care  Goal: Plan of Care Review  Description: The Plan of Care Review/Shift note should be completed every shift.  The Outcome Evaluation is a brief statement about your assessment that the patient is improving, declining, or no change.  This information will be displayed automatically on your shift  note.  Recent Flowsheet Documentation  Taken 2/11/2024 0026 by Evelin Alvares, RN  Outcome Evaluation: Pt is still unable to eat meals and is getting  tylenol bentyl for abdominal pain and zophran for nausea. pt did not eat any supplements. 2l NC for SOB while up and moving BP high this shift no c/o headache. While patient is lying in bed HR remains normal. magnesium replaced this shift. Patient has purewick in for sleep overnight  Plan of Care Reviewed With: patient  Overall Patient Progress: declining  Goal: Absence of Hospital-Acquired Illness or Injury  Intervention: Identify and Manage Fall Risk  Recent Flowsheet Documentation  Taken 2/10/2024 1620 by Evelin Alvares, RN  Safety Promotion/Fall Prevention:   activity supervised   clutter free environment maintained   increased rounding and observation   increase visualization of patient   lighting adjusted   mobility aid in reach   nonskid shoes/slippers when out of bed   safety round/check completed  Intervention: Prevent Infection  Recent Flowsheet Documentation  Taken 2/10/2024 1620 by Evelin Alvares, RN  Infection Prevention: hand hygiene promoted   Goal Outcome Evaluation:      Plan of Care Reviewed With: patient    Overall Patient Progress: decliningOverall Patient Progress: declining    Outcome Evaluation: Pt is still unable to eat meals and is getting tylenol bentyl for abdominal pain and zophran for nausea. pt did not eat any supplements. 2l NC for SOB while up and moving BP high this shift no c/o headache. While patient is lying in bed HR remains normal. magnesium replaced this shift. Patient has purewick in for sleep overnight

## 2024-02-11 NOTE — PLAN OF CARE
Problem: Adult Inpatient Plan of Care  Goal: Absence of Hospital-Acquired Illness or Injury  Intervention: Identify and Manage Fall Risk  Recent Flowsheet Documentation  Taken 2/11/2024 0100 by Caitie Eid RN  Safety Promotion/Fall Prevention:   activity supervised   clutter free environment maintained   increased rounding and observation   increase visualization of patient   lighting adjusted   mobility aid in reach   nonskid shoes/slippers when out of bed   safety round/check completed  Intervention: Prevent Skin Injury  Recent Flowsheet Documentation  Taken 2/11/2024 0100 by Caitie Eid RN  Body Position: position changed independently  Intervention: Prevent and Manage VTE (Venous Thromboembolism) Risk  Recent Flowsheet Documentation  Taken 2/11/2024 0100 by Caitie Eid RN  VTE Prevention/Management: SCDs (sequential compression devices) off  Intervention: Prevent Infection  Recent Flowsheet Documentation  Taken 2/11/2024 0100 by Caitie Eid RN  Infection Prevention: hand hygiene promoted     Problem: Oral Intake Inadequate  Goal: Improved Oral Intake  2/11/2024 0405 by Caitie Eid RN  Outcome: Not Progressing  2/11/2024 0404 by Caitie Eid RN  Outcome: Not Progressing     Problem: Breathing Pattern Ineffective  Goal: Effective Breathing Pattern  Outcome: Not Progressing  Intervention: Promote Improved Breathing Pattern  Recent Flowsheet Documentation  Taken 2/11/2024 0100 by Caitie Eid RN  Supportive Measures: relaxation techniques promoted  Head of Bed (HOB) Positioning: HOB at 20-30 degrees   Goal Outcome Evaluation:    [id number]    Patient had an acute drop in O2 in the low 80s around 0330 this morning, woke very nervous felt like she couldn't breath and called RT to do a nebulizer. She felt better after treatment. O2 sating <93%. Very poor intake, on calorie counts. Did not have much nausea on shift this evening- did not complain of any. Uses  commode at bedside for toilet. On 1 L NC.

## 2024-02-11 NOTE — PROGRESS NOTES
VS:  BP (!) 147/90 (BP Location: Right arm)   Pulse 91   Temp 97.9  F (36.6  C) (Oral)   Resp 16   Ht 1.524 m (5')   Wt 55.8 kg (123 lb)   SpO2 95%   BMI 24.02 kg/m     O2:  2 Liter O2   Output:  Bedside Commode    Last BM:  02/09/2024 / Continent of Bowel and Bladder    Activity:  Walker/ SBA   Up for meals?  Refuses to Eat    Skin:  Scattered Bruising on upper and lower extremities   Pain:  Yes    CMS:  A&OX 3-4   Dressing:  None    Diet:  Refuses to order meals or eat   LDA:  Right PIV / Infusing    Equipment:  Personal Belongings    Plan:  POC   Additional Info:  Generalized Weakness / Refuses to eat       Patient is on Calorie Count Zero calories consumed during Day Shift     Cough    Patient complained of Nausea during entire day shift/ No vomit     No BM and or Diarrhea  today only urine     Xray at 1230     Received PRN   Zofran X 2  Tylenol X 2  Dicyclomine X 1  Neb X 3

## 2024-02-11 NOTE — PROGRESS NOTES
[facility]  Medicine Progress Note - Hospitalist Service, GOLD TEAM 21    Date of Admission:  2/1/2024  Assessment & Plan   Idalia Bob is a 79 year old female admitted on 2/1/2024. Pt has history of COPD, pneumonia, hypertension, dementia and hypothyroidism among others, presented with abdominal pain and found to have consolidation on CT as well as frequent mucousy bowel movements and anorexia.    Changes today:  - with 1-2 episodes acute SOB overnight, obtain XR chest and follow up labs   - XR with possible worsening infiltrate and CRP noted to be elevated without other infectious symptoms/signs. Would have very low threshold to restart ABX (was recently on amox for UTI) to cover aspiration vs recurrent PNA  - bronch scheduled for 10am Monday morning, plan for NPO night before and hold lovenox evening before  - patient to benefit from palliative for GOC/FT discussion - consulted, would also discuss symptoms management, geriatric depression  - pain, nausea, and stool output all improved today. Still with anorexia  - continue IVF with limited PO     Left upper lobe pneumonia vs persistent consolidation vs mass  COPD  Chronic hypoxemic respiratory failure  Patient with pseudomonal upper lobe pneumonia in December and has had ongoing consolidation since.  While this finding was present on imaging she has a paucity of other pathology consistent with pneumonia at the present time.  She appears to be breathing at her baseline.  It is possible that this is simply a slow to resolve infiltrate or another process.  Bronch with biopsy scheduled for 2/12  -s/p azithromycin and ceftriaxone  -Continue home oxygen  -Continue home COPD regimen  -negative Fungitell, galactomannan, and sputum cultures.  -pulm following     Abdominal Pain  Loose stools with mucus  Tenesmus  Unintentional weight loss  Anorexia  The patient has had weeks of worsening anorexia along with crampy  abdominal pain but no evidence of gastrointestinal bleeding.  She has frequent (many many times per day) mucousy bowel movements with very little stool production.  C. difficile and enteric panel are negative.  She does have normocytic anemia and a hiatal hernia. S/p flex sig and endoscopy 2/7. Noted hiatal hernia, mild inflammatory changes in distal colon/rectum s/p biopsy. If symptoms persist beyond 8 weeks repeat flex sig should be completed to eval for chronic inflammatory process per GI.  -Appreciate GI consultation  -Encourage oral intake, supplements  -Add Bentyl, psyllium, immodium  -Physical and Occupational Therapy consults     Urinary tract infection  Urine culture with 10-50,000 colonies of Enterococcus faecalis.    -s/p treatment     Elevated Troponin  Likely type II MI in the setting of acute illness.  Continue to follow     Hypokalemia  Hypomagnesemia  In the setting of poor oral intake as well as PPI use.  Will continue to follow closely.       Iron deficiency anemia    Not tolerating Iron replacement.  Her ferritin is 85 which likely reflects a degree of iron deficiency given some presence of inflammation.     Hypertension  Continue losartan 50 mg p.o. daily as a substitute for her home medication     Anxiety and depression  Takes BuSpar, Prozac  -Continue home regimen     Hypothyroidism     Takes Synthroid 50 mcg. TSH normal  -Continue     Dementia   - continue donepezil        Diet: Snacks/Supplements Adult: Gelatein Plus; Between Meals  Regular Diet Adult  Snacks/Supplements Adult: Ensure Enlive; With Meals  NPO per Anesthesia Guidelines for Procedure/Surgery Except for: Meds    DVT Prophylaxis: hold for procedure  Ruiz Catheter: Not present  Lines: None     Cardiac Monitoring: None  Code Status: Full Code      Clinically Significant Risk Factors            # Hypomagnesemia: Lowest Mg = 1.6 mg/dL in last 2 days, will replace as needed       # Hypertension: Noted on problem list     # Dementia:  noted on problem list     # Severe Malnutrition: based on nutrition assessment    # Financial/Environmental Concerns: none       Disposition Plan    pending stool and dietician recs         Erick Mccall MD  Hospitalist Service, GOLD TEAM 21  [facility]  Securely message with [employer] (more info)  Text page via [facility] Paging/Directory   See signed in provider for up to date coverage information  ______________________________________________________________________    Interval History   Improved abdominal pain, loose stools, and nausea. Still with no appetite. Breathing mildly worse this morning with one episode of hypoxia following taking some water last evening. Daughters at bedside and updated on plan of care. In agreement for bronch and palliative consult    Physical Exam   Vital Signs: Temp: 97.9  F (36.6  C) Temp src: Oral BP: (!) 143/94 Pulse: 91   Resp: 16 SpO2: 95 % O2 Device: Nasal cannula Oxygen Delivery: 2 LPM  Weight: 123 lbs 0 oz    General Appearance: Alert, interactive, no distress  Respiratory: normal wob on 2L, GI sounds in thorax, otherwise clear with moderate aeration on R and reduced on L  Cardiovascular: rr no m  GI: s, nt, nd, bs present    Medical Decision Making       50 MINUTES SPENT BY ME on the date of service doing chart review, history, exam, documentation & further activities per the note.      Data

## 2024-02-12 ENCOUNTER — APPOINTMENT (OUTPATIENT)
Dept: CARDIOLOGY | Facility: CLINIC | Age: 79
DRG: 193 | End: 2024-02-12
Attending: STUDENT IN AN ORGANIZED HEALTH CARE EDUCATION/TRAINING PROGRAM
Payer: MEDICARE

## 2024-02-12 ENCOUNTER — APPOINTMENT (OUTPATIENT)
Dept: ULTRASOUND IMAGING | Facility: CLINIC | Age: 79
DRG: 193 | End: 2024-02-12
Attending: STUDENT IN AN ORGANIZED HEALTH CARE EDUCATION/TRAINING PROGRAM
Payer: MEDICARE

## 2024-02-12 ENCOUNTER — APPOINTMENT (OUTPATIENT)
Dept: CT IMAGING | Facility: CLINIC | Age: 79
DRG: 193 | End: 2024-02-12
Attending: STUDENT IN AN ORGANIZED HEALTH CARE EDUCATION/TRAINING PROGRAM
Payer: MEDICARE

## 2024-02-12 LAB
ANION GAP SERPL CALCULATED.3IONS-SCNC: 10 MMOL/L (ref 7–15)
BASOPHILS # BLD AUTO: 0 10E3/UL (ref 0–0.2)
BASOPHILS NFR BLD AUTO: 0 %
BUN SERPL-MCNC: 7.4 MG/DL (ref 8–23)
CALCIUM SERPL-MCNC: 9.1 MG/DL (ref 8.8–10.2)
CHLORIDE SERPL-SCNC: 95 MMOL/L (ref 98–107)
CREAT SERPL-MCNC: 0.92 MG/DL (ref 0.51–0.95)
DEPRECATED HCO3 PLAS-SCNC: 26 MMOL/L (ref 22–29)
EGFRCR SERPLBLD CKD-EPI 2021: 63 ML/MIN/1.73M2
EOSINOPHIL # BLD AUTO: 0.1 10E3/UL (ref 0–0.7)
EOSINOPHIL NFR BLD AUTO: 1 %
ERYTHROCYTE [DISTWIDTH] IN BLOOD BY AUTOMATED COUNT: 18.5 % (ref 10–15)
GLUCOSE SERPL-MCNC: 123 MG/DL (ref 70–99)
HCT VFR BLD AUTO: 28.7 % (ref 35–47)
HGB BLD-MCNC: 8.5 G/DL (ref 11.7–15.7)
IMM GRANULOCYTES # BLD: 0.1 10E3/UL
IMM GRANULOCYTES NFR BLD: 1 %
LVEF ECHO: NORMAL
LYMPHOCYTES # BLD AUTO: 0.6 10E3/UL (ref 0.8–5.3)
LYMPHOCYTES NFR BLD AUTO: 6 %
MAGNESIUM SERPL-MCNC: 2.4 MG/DL (ref 1.7–2.3)
MCH RBC QN AUTO: 24.3 PG (ref 26.5–33)
MCHC RBC AUTO-ENTMCNC: 29.6 G/DL (ref 31.5–36.5)
MCV RBC AUTO: 82 FL (ref 78–100)
MONOCYTES # BLD AUTO: 0.9 10E3/UL (ref 0–1.3)
MONOCYTES NFR BLD AUTO: 10 %
NEUTROPHILS # BLD AUTO: 7.4 10E3/UL (ref 1.6–8.3)
NEUTROPHILS NFR BLD AUTO: 82 %
NRBC # BLD AUTO: 0 10E3/UL
NRBC BLD AUTO-RTO: 0 /100
NT-PROBNP SERPL-MCNC: ABNORMAL PG/ML (ref 0–1800)
PHOSPHATE SERPL-MCNC: 3.3 MG/DL (ref 2.5–4.5)
PLATELET # BLD AUTO: 324 10E3/UL (ref 150–450)
POTASSIUM SERPL-SCNC: 4.8 MMOL/L (ref 3.4–5.3)
POTASSIUM SERPL-SCNC: 5.4 MMOL/L (ref 3.4–5.3)
PROCALCITONIN SERPL IA-MCNC: 0.11 NG/ML
RADIOLOGIST FLAGS: ABNORMAL
RBC # BLD AUTO: 3.5 10E6/UL (ref 3.8–5.2)
SODIUM SERPL-SCNC: 131 MMOL/L (ref 135–145)
WBC # BLD AUTO: 9.2 10E3/UL (ref 4–11)

## 2024-02-12 PROCEDURE — 99233 SBSQ HOSP IP/OBS HIGH 50: CPT | Performed by: STUDENT IN AN ORGANIZED HEALTH CARE EDUCATION/TRAINING PROGRAM

## 2024-02-12 PROCEDURE — 80048 BASIC METABOLIC PNL TOTAL CA: CPT | Performed by: STUDENT IN AN ORGANIZED HEALTH CARE EDUCATION/TRAINING PROGRAM

## 2024-02-12 PROCEDURE — 250N000009 HC RX 250: Performed by: HOSPITALIST

## 2024-02-12 PROCEDURE — 999N000157 HC STATISTIC RCP TIME EA 10 MIN

## 2024-02-12 PROCEDURE — 93306 TTE W/DOPPLER COMPLETE: CPT | Mod: 26 | Performed by: STUDENT IN AN ORGANIZED HEALTH CARE EDUCATION/TRAINING PROGRAM

## 2024-02-12 PROCEDURE — 93970 EXTREMITY STUDY: CPT | Mod: 26 | Performed by: RADIOLOGY

## 2024-02-12 PROCEDURE — 120N000002 HC R&B MED SURG/OB UMMC

## 2024-02-12 PROCEDURE — 250N000009 HC RX 250: Performed by: STUDENT IN AN ORGANIZED HEALTH CARE EDUCATION/TRAINING PROGRAM

## 2024-02-12 PROCEDURE — 84145 PROCALCITONIN (PCT): CPT | Performed by: STUDENT IN AN ORGANIZED HEALTH CARE EDUCATION/TRAINING PROGRAM

## 2024-02-12 PROCEDURE — G1010 CDSM STANSON: HCPCS | Mod: GC | Performed by: RADIOLOGY

## 2024-02-12 PROCEDURE — 93010 ELECTROCARDIOGRAM REPORT: CPT | Performed by: INTERNAL MEDICINE

## 2024-02-12 PROCEDURE — 85025 COMPLETE CBC W/AUTO DIFF WBC: CPT | Performed by: STUDENT IN AN ORGANIZED HEALTH CARE EDUCATION/TRAINING PROGRAM

## 2024-02-12 PROCEDURE — 36415 COLL VENOUS BLD VENIPUNCTURE: CPT | Performed by: STUDENT IN AN ORGANIZED HEALTH CARE EDUCATION/TRAINING PROGRAM

## 2024-02-12 PROCEDURE — 94640 AIRWAY INHALATION TREATMENT: CPT | Mod: 76

## 2024-02-12 PROCEDURE — 93005 ELECTROCARDIOGRAM TRACING: CPT

## 2024-02-12 PROCEDURE — 99232 SBSQ HOSP IP/OBS MODERATE 35: CPT | Mod: GC | Performed by: STUDENT IN AN ORGANIZED HEALTH CARE EDUCATION/TRAINING PROGRAM

## 2024-02-12 PROCEDURE — 250N000013 HC RX MED GY IP 250 OP 250 PS 637: Performed by: HOSPITALIST

## 2024-02-12 PROCEDURE — 258N000001 HC RX 258: Performed by: STUDENT IN AN ORGANIZED HEALTH CARE EDUCATION/TRAINING PROGRAM

## 2024-02-12 PROCEDURE — 99222 1ST HOSP IP/OBS MODERATE 55: CPT

## 2024-02-12 PROCEDURE — 250N000011 HC RX IP 250 OP 636: Performed by: INTERNAL MEDICINE

## 2024-02-12 PROCEDURE — 83735 ASSAY OF MAGNESIUM: CPT | Performed by: STUDENT IN AN ORGANIZED HEALTH CARE EDUCATION/TRAINING PROGRAM

## 2024-02-12 PROCEDURE — 84132 ASSAY OF SERUM POTASSIUM: CPT | Performed by: STUDENT IN AN ORGANIZED HEALTH CARE EDUCATION/TRAINING PROGRAM

## 2024-02-12 PROCEDURE — 71250 CT THORAX DX C-: CPT | Mod: 26 | Performed by: RADIOLOGY

## 2024-02-12 PROCEDURE — 94640 AIRWAY INHALATION TREATMENT: CPT

## 2024-02-12 PROCEDURE — 250N000011 HC RX IP 250 OP 636: Performed by: STUDENT IN AN ORGANIZED HEALTH CARE EDUCATION/TRAINING PROGRAM

## 2024-02-12 PROCEDURE — 250N000013 HC RX MED GY IP 250 OP 250 PS 637: Performed by: INTERNAL MEDICINE

## 2024-02-12 PROCEDURE — 71250 CT THORAX DX C-: CPT | Mod: MF

## 2024-02-12 PROCEDURE — 250N000013 HC RX MED GY IP 250 OP 250 PS 637: Performed by: STUDENT IN AN ORGANIZED HEALTH CARE EDUCATION/TRAINING PROGRAM

## 2024-02-12 PROCEDURE — 83880 ASSAY OF NATRIURETIC PEPTIDE: CPT | Performed by: STUDENT IN AN ORGANIZED HEALTH CARE EDUCATION/TRAINING PROGRAM

## 2024-02-12 PROCEDURE — 93306 TTE W/DOPPLER COMPLETE: CPT

## 2024-02-12 PROCEDURE — 93970 EXTREMITY STUDY: CPT

## 2024-02-12 PROCEDURE — 84100 ASSAY OF PHOSPHORUS: CPT | Performed by: STUDENT IN AN ORGANIZED HEALTH CARE EDUCATION/TRAINING PROGRAM

## 2024-02-12 RX ORDER — BUDESONIDE 1 MG/2ML
1 INHALANT ORAL 2 TIMES DAILY
Status: DISCONTINUED | OUTPATIENT
Start: 2024-02-12 | End: 2024-02-26 | Stop reason: HOSPADM

## 2024-02-12 RX ORDER — VANCOMYCIN HYDROCHLORIDE 1 G/200ML
1000 INJECTION, SOLUTION INTRAVENOUS EVERY 24 HOURS
Status: DISCONTINUED | OUTPATIENT
Start: 2024-02-12 | End: 2024-02-13

## 2024-02-12 RX ORDER — IPRATROPIUM BROMIDE AND ALBUTEROL SULFATE 2.5; .5 MG/3ML; MG/3ML
3 SOLUTION RESPIRATORY (INHALATION)
Status: DISCONTINUED | OUTPATIENT
Start: 2024-02-12 | End: 2024-02-26 | Stop reason: HOSPADM

## 2024-02-12 RX ORDER — FUROSEMIDE 10 MG/ML
40 INJECTION INTRAMUSCULAR; INTRAVENOUS ONCE
Status: COMPLETED | OUTPATIENT
Start: 2024-02-12 | End: 2024-02-12

## 2024-02-12 RX ORDER — CEFEPIME HYDROCHLORIDE 2 G/1
2 INJECTION, POWDER, FOR SOLUTION INTRAVENOUS EVERY 12 HOURS
Status: DISCONTINUED | OUTPATIENT
Start: 2024-02-12 | End: 2024-02-15

## 2024-02-12 RX ORDER — ENOXAPARIN SODIUM 100 MG/ML
60 INJECTION SUBCUTANEOUS EVERY 12 HOURS
Status: DISCONTINUED | OUTPATIENT
Start: 2024-02-12 | End: 2024-02-12

## 2024-02-12 RX ORDER — ENOXAPARIN SODIUM 100 MG/ML
40 INJECTION SUBCUTANEOUS EVERY 12 HOURS
Status: DISCONTINUED | OUTPATIENT
Start: 2024-02-12 | End: 2024-02-14

## 2024-02-12 RX ADMIN — UMECLIDINIUM 1 PUFF: 62.5 AEROSOL, POWDER ORAL at 08:59

## 2024-02-12 RX ADMIN — ALBUTEROL SULFATE 2.5 MG: 2.5 SOLUTION RESPIRATORY (INHALATION) at 01:05

## 2024-02-12 RX ADMIN — LOPERAMIDE HYDROCHLORIDE 2 MG: 2 CAPSULE ORAL at 09:04

## 2024-02-12 RX ADMIN — FLUTICASONE FUROATE AND VILANTEROL TRIFENATATE 1 PUFF: 200; 25 POWDER RESPIRATORY (INHALATION) at 08:59

## 2024-02-12 RX ADMIN — CEFEPIME HYDROCHLORIDE 2 G: 2 INJECTION, POWDER, FOR SOLUTION INTRAVENOUS at 14:13

## 2024-02-12 RX ADMIN — PANTOPRAZOLE SODIUM 40 MG: 40 TABLET, DELAYED RELEASE ORAL at 09:03

## 2024-02-12 RX ADMIN — ENOXAPARIN SODIUM 40 MG: 40 INJECTION SUBCUTANEOUS at 21:32

## 2024-02-12 RX ADMIN — FUROSEMIDE 40 MG: 10 INJECTION, SOLUTION INTRAMUSCULAR; INTRAVENOUS at 10:54

## 2024-02-12 RX ADMIN — VANCOMYCIN HYDROCHLORIDE 1000 MG: 1 INJECTION, SOLUTION INTRAVENOUS at 14:54

## 2024-02-12 RX ADMIN — ALBUTEROL SULFATE 2.5 MG: 2.5 SOLUTION RESPIRATORY (INHALATION) at 05:15

## 2024-02-12 RX ADMIN — LEVOTHYROXINE SODIUM 50 MCG: 50 TABLET ORAL at 09:05

## 2024-02-12 RX ADMIN — FLUOXETINE HYDROCHLORIDE 40 MG: 20 CAPSULE ORAL at 09:03

## 2024-02-12 RX ADMIN — FUROSEMIDE 40 MG: 10 INJECTION, SOLUTION INTRAMUSCULAR; INTRAVENOUS at 21:31

## 2024-02-12 RX ADMIN — BUDESONIDE 1 MG: 1 INHALANT ORAL at 20:25

## 2024-02-12 RX ADMIN — CALCIUM POLYCARBOPHIL 1250 MG: 625 TABLET, FILM COATED ORAL at 09:13

## 2024-02-12 RX ADMIN — DEXTROSE MONOHYDRATE, SODIUM CHLORIDE, SODIUM LACTATE, POTASSIUM CHLORIDE, CALCIUM CHLORIDE: 5; 600; 310; 179; 20 INJECTION, SOLUTION INTRAVENOUS at 00:38

## 2024-02-12 RX ADMIN — LATANOPROST 1 DROP: 50 SOLUTION OPHTHALMIC at 21:31

## 2024-02-12 RX ADMIN — LOSARTAN POTASSIUM 50 MG: 50 TABLET, FILM COATED ORAL at 09:05

## 2024-02-12 RX ADMIN — BUSPIRONE HYDROCHLORIDE 10 MG: 10 TABLET ORAL at 21:31

## 2024-02-12 RX ADMIN — BUSPIRONE HYDROCHLORIDE 10 MG: 10 TABLET ORAL at 14:11

## 2024-02-12 RX ADMIN — IPRATROPIUM BROMIDE AND ALBUTEROL SULFATE 3 ML: .5; 3 SOLUTION RESPIRATORY (INHALATION) at 20:25

## 2024-02-12 RX ADMIN — Medication 1 CAPSULE: at 09:14

## 2024-02-12 RX ADMIN — Medication 1 MG: at 21:31

## 2024-02-12 RX ADMIN — DONEPEZIL HYDROCHLORIDE 10 MG: 10 TABLET, FILM COATED ORAL at 21:31

## 2024-02-12 RX ADMIN — BUSPIRONE HYDROCHLORIDE 10 MG: 10 TABLET ORAL at 09:05

## 2024-02-12 RX ADMIN — ALBUTEROL SULFATE 2.5 MG: 2.5 SOLUTION RESPIRATORY (INHALATION) at 16:49

## 2024-02-12 ASSESSMENT — ACTIVITIES OF DAILY LIVING (ADL)
ADLS_ACUITY_SCORE: 41

## 2024-02-12 NOTE — PROGRESS NOTES
Care Management Follow Up    Length of Stay (days): 11    Expected Discharge Date:       Concerns to be Addressed: adjustment to diagnosis/illness     Patient plan of care discussed at interdisciplinary rounds: Yes    Anticipated Discharge Disposition: Assisted Living     Anticipated Discharge Services: None (Home Care)  Anticipated Discharge DME:      Patient/family educated on Medicare website which has current facility and service quality ratings:    Education Provided on the Discharge Plan:    Patient/Family in Agreement with the Plan: yes    Referrals Placed by CM/SW:    Private pay costs discussed: Not applicable    Additional Information:    Per patient care rounds Idalia is medically stable enough for bronch with biopsy today so it was cancelled.     Called to update the [facility] that there is no ALEX at this time.    Recommendations at this time are TCU.  Since she is not med ready and her prognosis is not known will hold off sending referrals at this time as recommendations may change.    Patient contacts:    [facility] ([facility])  nurse line [phone]     [facility]   JIMI Figueroa Case Manager   [phone] .    Care Management to continue to working with patient/family toward safe discharge plan.     Emani Tao, RN  Inpatient Care Coordinator  6 Med Surg  [phone], pager [phone]      For weekend social work needs, contact information below and available on [facility]:     SW Weekend [place] Pager ED, 5 MS, 5 ortho : [phone]  SW Weekend 6MS, 8A, 10ICU- Pager: [phone]     For weekend RN care coordinator needs (home discharge with needs including home care, assisted living facility returns, durable medical equip, IV antibiotics)   5 med/surg, 5 ortho, ED pager: [phone]  6 med/surg, 8 med/surg, 10 ICU pager: [phone]

## 2024-02-12 NOTE — PROGRESS NOTES
VS:  BP (!) 169/100   Pulse 111   Temp 98.2  F (36.8  C) (Oral)   Resp 18   Ht 1.524 m (5')   Wt 55.8 kg (123 lb)   SpO2 97%   BMI 24.02 kg/m     O2:  2 Liter O2   Output:  Bedside Commode     Last BM:  02/09/2024   Activity:  Walker / SBA   Up for meals?  Patient Refuses to Eat   Skin:  Scattered Bruising Upper and Lower Extremitites    Pain:  Yes    CMS:  A&OX 4   Dressing:  None    Diet:  Patient Refuses to Eat    LDA:  Right PIV/ SL   Equipment:  Personal Belongings    Plan:  POC   Additional Info:  Illness Anxiety, and Depression / Generalized Weakness from refusal of food       Patient is on Calorie Count   Zero calories consumed during day shift  Cough  No BM and or Diarrhea     CT Scan  [facility]    Received PRN   Zofran X 2  Tylenol X 2  Dicyclomine X 1  Neb X 3

## 2024-02-12 NOTE — PROGRESS NOTES
"CLINICAL NUTRITION SERVICES - REASSESSMENT NOTE     Nutrition Prescription      Malnutrition Status:    Severe malnutrition in the context of acute illness    Recommendations already ordered by Registered Dietitian (RD):  - Continue current plan.   - Await further work up to determine whether pt would wish possible enteral nutrition. Do not feel she would tolerate nasal FT placement but if FT is attempted recommend post pyloric d/t HH and reflux.     Future/Additional Recommendations:  - Await further work up to determine whether pt would wish possible enteral nutrition. Do not feel she would tolerate nasal FT placement but if FT is attempted recommend post pyloric d/t HH and reflux. Unclear if her HH would make NJ placement with cortrak more challenging.        EVALUATION OF THE PROGRESS TOWARD GOALS   Diet: Regular  ONS: Ensure BID, Gelatein 20 q day.  Intake: poor intake continues and minimal.      Calorie counts:  2/8--2 Ensure Enlive, 50% of Gelatein. One bite of pizza.  2/9--50% of EE all day.  2/10--no intake noted. 0% of two Ensure Enlive supplements.     NEW FINDINGS   -General: Noted POC: Life-prolonging with limits, pending further workup. Noted that pt may not want to pursue more aggressive nutrition plan such as enteral feeding if further work up of lung was not good. Bronch still pending d/t resp status.  DVT in leg also found.   -Wt:   02/13/24 0304 61.4 kg (135 lb 5.8 oz) Bed scale   02/10/24 2100 55.8 kg (123 lb) Standing scale   02/02/24 1915 57.3 kg (126 lb 6.4 oz) Standing scale     Suspect large jump in weight with bed scale related, at least in part to error.  Although, per initial assessment: \"Per daughter, pt's weight is usually 130-135 lb. Daughter Barbara weighed her mom last week and said it was about 133 lb. This would make a wt loss of 5.3% in 1 week--significant.\"     Weights PTA:   01/22/24 59.9 kg (132 lb)     12/06/23 63 kg (139 lb) [facility]   12/04/23 63.5 g (139 lg 14.4 oz " [facility]    11/26/23 67.4 kg (148 lb 8 oz) [facility]   11/18/23 59 kg (130 lb) Clinic    08/09/23 61.7 kg (136 lb) clinic     -Labs:   Latest Reference Range & Units 02/12/24 10:19 02/12/24 20:36 02/13/24 08:06   Sodium 135 - 145 mmol/L 131 (L)  134 (L)   Potassium 3.4 - 5.3 mmol/L 5.4 (H) 4.8 4.2   Chloride 98 - 107 mmol/L 95 (L)  92 (L)   Carbon Dioxide (CO2) 22 - 29 mmol/L 26  28   Urea Nitrogen 8.0 - 23.0 mg/dL 7.4 (L)  9.3   Creatinine 0.51 - 0.95 mg/dL 0.92  0.97 (H)   GFR Estimate >60 mL/min/1.73m2 63  59 (L)   Calcium 8.8 - 10.2 mg/dL 9.1  9.0   Anion Gap 7 - 15 mmol/L 10  14   Magnesium 1.7 - 2.3 mg/dL 2.4 (H)  1.7   Phosphorus 2.5 - 4.5 mg/dL 3.3  4.2   (L): Data is abnormally low  (H): Data is abnormally high    Low Na likely with minimal solute load.    -GI: Mucous per rectum has stopped today.  Only had passed mucous once or twice yesterday.     -Meds: noted    -Diet/SLP: pt couldn't tolerate much orally but no outward signs of aspiration per bedside eval.  Pt not able currently to tolerate barium for any kind of VSS to r/o silent aspiration.    MALNUTRITION  % Intake: </= 50% for >/= 5 days (severe)  % Weight Loss: >2% in 1 week (severe)--(5.3%)  Subcutaneous Fat Loss: Unable to assess--quite nauseated. lMuscle Loss: Unable to assess--quite nausated.  Fluid Accumulation/Edema: mild (+2 BUE)  Malnutrition Diagnosis: Severe malnutrition in the context of acute illness    Previous Goals   Patient to consume % of nutritionally adequate meal trays TID, or the equivalent with supplements/snacks.   Evaluation: Not met    Previous Nutrition Diagnosis  Inadequate oral intake related to poor appetite, reflux, nausea, abdominal pain and frequent stooling as evidenced by minimal po intake since admission and elevated urinary ketones on 2/2.   Evaluation: Declining    CURRENT NUTRITION DIAGNOSIS  Inadequate oral intake related to poor appetite, reflux, nausea, abdominal pain and frequent stooling  "as evidenced by minimal po intake since admission and elevated urinary ketones on 2/2.     INTERVENTIONS  Implementation  - Continue current nutrition POC.  - Will offer TF recommendations once further w/up available.     Goals  Food as desired vs nutrition support pending further w/up.    Monitoring/Evaluation  Progress toward goals will be monitored and evaluated per protocol.    Janae Martinez (Becky) RD, LD   6B and 8A Med/surg (M-F)   Weekday Vocera contact: \"6 Med Surg Clinical Dietitian\" or \"8 Med Surg Clinical Dietitian\"  M-F Pager: [phone]  Weekend/holiday pager: [phone]      "

## 2024-02-12 NOTE — PHARMACY-VANCOMYCIN DOSING SERVICE
Pharmacy Vancomycin Initial Note  Date of Service 2024  Patient's  1945  79 year old, female    Indication: Healthcare-Associated Pneumonia    Current estimated CrCl = Estimated Creatinine Clearance: 38.2 mL/min (based on SCr of 0.92 mg/dL).    Creatinine for last 3 days  2024:  7:48 AM Creatinine 0.80 mg/dL;  7:48 AM Creatinine 0.80 mg/dL  2024: 10:19 AM Creatinine 0.92 mg/dL    Recent Vancomycin Level(s) for last 3 days  No results found for requested labs within last 3 days.      Vancomycin IV Administrations (past 72 hours)        No vancomycin orders with administrations in past 72 hours.                    Nephrotoxins and other renal medications (From now, onward)      Start     Dose/Rate Route Frequency Ordered Stop    24 1300  vancomycin (VANCOCIN) 1,000 mg in 200 mL dextrose intermittent infusion         1,000 mg  200 mL/hr over 1 Hours Intravenous EVERY 24 HOURS 24 1205              Contrast Orders - past 72 hours (72h ago, onward)      None            [employer] Prediction of Planned Initial Vancomycin Regimen  Loading dose: N/A  Regimen: 1000 mg IV every 24 hours.  Start time: 12:04 on 2024  Exposure target: AUC24 (range)400-600 mg/L.hr   AUC24,ss: 471 mg/L.hr  Probability of AUC24 > 400: 69 %  Ctrough,ss: 14 mg/L  Probability of Ctrough,ss > 20: 17 %  Probability of nephrotoxicity (Lodise MAY ): 9 %          Plan:  Start vancomycin  1000 mg IV q24h.   Vancomycin monitoring method: AUC  Vancomycin therapeutic monitoring goal: 400-600 mg*h/L  Pharmacy will check vancomycin levels as appropriate in 1-3 Days.    Serum creatinine levels will be ordered daily for the first week of therapy and at least twice weekly for subsequent weeks.      Lyric Pompa, LTAC, located within [facility]

## 2024-02-12 NOTE — PROGRESS NOTES
[facility] Pulmonology Follow up    Idalia Bob  MRN: [id number]  : 1945    Date of Admission: 2024  Date of Service: 2024    Assessment:  BL pleural effusions, pulm edema suggestive of hypervolemia  Pseudomonas PNA- NIRAV consolidation  GI upset  COPD    Ms. Bob is an 79 year old woman with history of COPD with history of Pseudomonas PNA in 2023, readmission ,  for SOB, fatigue, and continued infiltrate. She received multiple antibiotic courses, but persistent infiltrate remained. Untreated pathogens include atypicals, ROBERT or an inflammatory process such as post-infectious organizing pneumonia or endobronchial lesions. Initial plan included BAL with tbbx on , however with increased SOB and oxygen needs, more diagnostics were obtained and bronch was deferred until clinical improvement. Some hesitation to perform transbronchial biopsies given significant emphysema, we will continue to reassess indications and safety for BAL. Unclear what pulmonary process may be contributing to GI upset, but will keep differential broad.    Recommendations:  -- repeat CT chest on  given worsening respiratory status  -- given increased pleural effusions and pulm edema agree with diuresis and TTE  -- HAP coverage is reasonable while additional workup is in process  -- will schedule nebulizers and stop inhaler (unlikely to be beneficial with current significant dyspnea)  -- if respiratory status improves in coming days, we can reconsider bronchoscopy  -- consider SLP eval for aspiration, though hasn't been taking much PO    Subjective:  breathing feels difficult, worse when lying down and before movement. Oxygen numbers have been fluctuating, hasn't had large desaturations but up titrations for work of breathing. Daughters at bedside.    Review of systems: focused ROS performed and noted as above.    Objective:  BP (!) 151/98 (BP Location: Right arm)   Pulse 100   Temp 98.7   F (37.1  C) (Oral)   Resp 18   Ht 1.524 m (5')   Wt 55.8 kg (123 lb)   SpO2 96%   BMI 24.02 kg/m      Gen: frail appearing, lying in bed  HEENT: MMM, NC in place  CV: RRR, pitting LE edema  Pulm: mild increased work of breathing, crackles BL bases, no wheezing or rhonchi  GI: soft, not distended  Integ: no rashes or lesions appreciated on exposed skin  Neuro: speech clear, alert and oriented  Psych: calm, appropriate    Data:  I have personally reviewed new CT chest, labs including BNP [id number]    Ene Morgan DO  Pulmonary fellow  Patient discussed and seen with Dr. Finney.

## 2024-02-12 NOTE — PLAN OF CARE
Problem: Adult Inpatient Plan of Care  Goal: Plan of Care Review  Description: The Plan of Care Review/Shift note should be completed every shift.  The Outcome Evaluation is a brief statement about your assessment that the patient is improving, declining, or no change.  This information will be displayed automatically on your shift  note.  Recent Flowsheet Documentation  Taken 2/12/2024 0024 by Evelin Alvares, RN  Outcome Evaluation: Pt is having trouble breathing getting PRN nebulizers more frequently. PRN bentyl, tylenol for abdominal pain. PRN aterax for intense itching. Patient has extreme SOB while ambulating and moving. BP elivated and tacheycardic provider notified no new orders. Bronchoscopy for 2/12/24.  Plan of Care Reviewed With: patient  Overall Patient Progress: declining  Goal: Absence of Hospital-Acquired Illness or Injury  Intervention: Identify and Manage Fall Risk  Recent Flowsheet Documentation  Taken 2/11/2024 1548 by Evelin Alvares, RN  Safety Promotion/Fall Prevention:   activity supervised   clutter free environment maintained   increased rounding and observation   increase visualization of patient   lighting adjusted   mobility aid in reach   nonskid shoes/slippers when out of bed   safety round/check completed  Intervention: Prevent Skin Injury  Recent Flowsheet Documentation  Taken 2/11/2024 1548 by Evelin Alvares, RN  Body Position: position changed independently  Intervention: Prevent Infection  Recent Flowsheet Documentation  Taken 2/11/2024 1548 by Evelin Alvares, RN  Infection Prevention:   single patient room provided   rest/sleep promoted   hand hygiene promoted   Goal Outcome Evaluation:      Plan of Care Reviewed With: patient    Overall Patient Progress: decliningOverall Patient Progress: declining    Outcome Evaluation: Pt is having trouble breathing getting PRN nebulizers more frequently. PRN bentyl, tylenol for abdominal pain. PRN aterax for intense itching. Patient has  extreme SOB while ambulating and moving. BP elivated and tacheycardic provider notified no new orders. Bronchoscopy for 2/12/24. Pt is still not eating well and is receiving continuous fluids.

## 2024-02-12 NOTE — PROGRESS NOTES
Provider Notified: Julianna Rodríguez     Writer stated patient is extremely itchy PRN cream not working. Pt getting more frequent neb treatments, and magnesium replaced this shift  other wise no new medication     Provider prescribed Aterax 10mg PRN

## 2024-02-12 NOTE — PROGRESS NOTES
"Provider notified : Erick Mccall MD   Writer stated :Pt /100 P117 Writer requested PRN BP medication     Provider replied \"yes, Ill see if we're missing an outpt med\"                 "

## 2024-02-12 NOTE — CONSULTS
Palliative Care Consultation Note  [facility]      Patient: Idalia Bob  Date of Admission:  2/1/2024    Requesting Clinician / Team: Medicine   Reason for consult: Symptom management  Goals of care     Recommendations & Counseling     GOALS OF CARE:   Life-prolonging with limits   Briefly discussed goals of care with Idalia and her two daughters Barbara and Corie Friedman has known persistent LLL infiltrate of known etiology and remain on 2-3L via NC. Now, ongoing workup; both Idalia and family requested to revisit goals of care discussion pending further workup.  Primary team considered broch today and was held due to ongoing dyspnea      ADVANCE CARE PLANNING:  No health care directive on file. Per  informed consent policy, next of kin should be involved if patient becomes unable.  There is no POLST form on file, defer to patient and/or next of kin for decisions   Code status: Full Code    MEDICAL MANAGEMENT:   #Acute abdominal pain etiology unclear. GI following. Hx hiatal hernia   Abdominal pain; improved today.   Continue Acetaminophen (Tylenol) 650mg q4hrs prn (did not require any prn pain medication in the last 24 hrs)  Consider oxycodone (Roxicodone) IR 2.5 mg q4hrs prn (only if pain worsen)  Heat and Ice     #Nausea with vomiting; unknown etiology  -Pharmacologic management   Ondansetron 4mg ODT q6hrs prn   IV Ondansetron 4mg q6hrs prn   IV Prochlorperazine 5mg q6hrs prn   Prochlorperazine 5mg po q6hrs prn   Consider Zyprexa 2.5mg po daily     -Nonpharmacologic management  Small, frequent intake  Assess and avoid aggravating factors  Accupressure (C-band)  Aromatherapy, alcohol swabs known to be effective    #Anorexia; decreased appetite associated with abdominal pain. Unclear etiology  Idalia reports no appetite in the last 3 weeks  Would also like to review further options pending better clinical picture of above symptoms    PSYCHOSOCIAL/SPIRITUAL  SUPPORT:  Family: Daughters; Barbara Bob and Corie Bob  Yu community: Religious   Spiritual:     Palliative Care will continue to follow Idalia. Thank you for the consult and allowing us to aid in the care of Idalia Bob.    These recommendations have been discussed with Medicine team, family at bedside, primary RN.    KIZZY Miller CNP  Securely message with Vocera (more info)  Text page via [facility] Paging/Directory       Assessment      Idalia Bob is a 79 year old female with a past medical history of COPD, pneumonia, hypertension, dementia and hypothyroidism presented on 02/01/2024 with abdominal pain and found to have consolidation on CT as well as frequent mucousy bowel movements and anorexia. Palliative Care consulted for goals of care discussion as well as symptom management for abdominal pain, nausea with vomiting, and anorexia.     Today, the patient was seen for:  Goals care discussion   Palliative Care encounter   Patient/family support   Symptom management (pain, nausea, anorexia)    Palliative Care Summary:   Patient case discussed with Carina Amaya PA-C    Idalia is a pleasant 79 years old female who unfortunately suffers from multiple symptoms as mentioned above. Met with Idalia and 2 of her daughters Corie and Barbara at bedside. I have introduced myself and the Palliative Care role. I explained the composition of the palliative care team. Palliative care helps patients and families navigate their care while focusing on the whole person; providing emotional, social and spiritual support  Palliative care often assists with symptom management, information sharing about what to expect from the illness, available treatment options and what effect those options may have on the disease course, and provide effective communication and caring support. and Introduced the role of palliative care as an interdisciplinary team.This morning, she endorses increased fatigue, dyspnea on  exertion, and no appetite. Her abdominal pain and nausea improved. Idalia and her family are hopeful to have more answers regarding her symptoms pending further workup including echocardiogram, ultrasound, and CT scan.     Prognosis, Goals, & Planning:    Functional Status just prior to this current hospitalization:  Has been hospitalized around November x3 and required hospitalization for 3 weeks due to pneumonia     Patient's decision making preferences: shared with support from loved ones        Patient has decision-making capacity today for complex decisions: Intact, hx dementia             Coping, Meaning, & Spirituality:   Mood, coping, and/or meaning in the context of serious illness were addressed today: Yes    Social:   Living situation: Resides in assisted living   Important relationships/caregivers: Family and grandchildren   Occupation: Retired, 25 years ago.  [employer]ail; [email or web address]  Areas of fulfillment/bee: Crafting, bingo    Medications:  I have reviewed this patient's medication profile and medications from this hospitalization.    Notable medications:  - Tylenol 650mg q4hrs prn for mild pain   - Hydroxyzine 10mg TID prn for itching   - Ondansetron 4mg ODT q6hrs prn   - IV Ondansetron 4mg q6hrs prn   - IV Prochlorperazine 5mg q6hrs prn   - Prochlorperazine 5mg po q6hrs prn   - Donepezil 10mg at bedtime   - BusPIRone 10mg TID  - Fluoxetine 40mg daily   - Loperamide 2mg BID     ROS:  Comprehensive ROS is reviewed and is negative except as here & per HPI:     Physical Exam   Vital Signs with Ranges  Temp:  [98.1  F (36.7  C)-98.3  F (36.8  C)] 98.2  F (36.8  C)  Pulse:  [109-122] 111  Resp:  [18-20] 18  BP: (132-169)/() 169/100  SpO2:  [94 %-97 %] 97 %  123 lbs 0 oz    PHYSICAL EXAM:  Constitutional: Unwell appearing, pale  Respiratory: Acute respiratory distress, unable to speak in full sentences  Abdomen: Soft, mild tenderness, no distention  NEURO: Alert, awake, no focal deficit  JOINT/EXTREMITIES:  Able to move all extremities    Data reviewed:    CMP  Recent Labs   Lab 02/12/24  1019 02/11/24  2038 02/11/24  0748   *  --  134*   POTASSIUM 5.4*  --  4.3   CHLORIDE 95*  --  97*   CO2 26  --  23   ANIONGAP 10  --  14   * 119* 144*   BUN 7.4*  --  6.8*   CR 0.92  --  0.80  0.80   GFRESTIMATED 63  --  74  74   AYUSH 9.1  --  9.1   MAG 2.4*  --  1.6*   PHOS 3.3  --   --      CBC  Recent Labs   Lab 02/12/24  1019 02/11/24  0748   WBC 9.2 9.2   RBC 3.50* 3.37*   HGB 8.5* 8.3*   HCT 28.7* 28.7*   MCV 82 85   MCH 24.3* 24.6*   MCHC 29.6* 28.9*   RDW 18.5* 18.5*    269  269     Repeat CT chest on 2/12  IMPRESSION:   1. Increased consolidative changes in bilateral upper lobes. Bilateral  new small pleural effusions. Findings are suggestive of worsening  infection with or without superimposed atelectasis. Mild associated  pulmonary edema as well.  2. Enlarged mediastinal lymph nodes, most likely reactive.      Medical Decision Making       MANAGEMENT DISCUSSED with the following over the past 24 hours: Medicine, nursing staff    NOTE(S)/MEDICAL RECORDS REVIEWED over the past 24 hours: GI, pulmonary, medicine, nursing staff   65 MINUTES SPENT BY ME on the date of service doing chart review, history, exam, documentation & further activities per the note.

## 2024-02-12 NOTE — PROGRESS NOTES
[facility] [place]    Medicine Progress Note - Hospitalist Service, GOLD TEAM 21    Date of Admission:  2/1/2024    Assessment & Plan   Idalia Bob is a 79 year old female admitted on 2/1/2024. Pt has history of COPD, pneumonia, hypertension, dementia and hypothyroidism among others, presented with abdominal pain and found to have consolidation on CT as well as frequent mucousy bowel movements and anorexia. Known persistent LLL infiltrate of uncertain etiology. She remains on 2-3NC with symptomatic dyspnea and continues to feel generally unwell.      Changes today:  - Ongoing dyspnea requiring frequent nebs overnight. Up to 3LNC this AM. Discussed with pulm fellow and we will hold off on transfer to  for bronch/EBUS for now given concern for her ability to tolerate this. They will reassess in the afternoon to determine whether bronch with BAL may be reasonable.   - Non-contrast CT chest w increased consolidative changes in the bilateral upper lobes, mild associated edema.   - Lasix 40mg IV BID; monitor K   - Restart antibiotics including vanc/cefepime to cover HAP, narrow as able. MRSA nasal swab pending. Hold off on atypical coverage pending urine legionella.      Left upper lobe pneumonia vs persistent consolidation vs mass  COPD  Acute on chronic hypoxemic respiratory failure  Patient with pseudomonal upper lobe pneumonia in December and has had ongoing consolidation since.  While this finding was present on imaging she has a paucity of other pathology consistent with pneumonia at the present time. She is now having increasing dyspnea x 24 hours, on 3LNC. Repeat CT with worsening upper lobe infiltrates and edema. Differential diagnosis includes ongoing bacterial process despite appropriate CAP treatment vs edema vs ILD such as . Initial plan for bronchoscopy/EBUS 2/12 but holding off for now given increasing O2 requirement and symptoms. Consideration for PE as  contributing, though feel this is less likely with infiltrates present on imaging to account for her symptoms.  -s/p azithromycin and ceftriaxone  - Start vanc/cefepime on 2/12; narrow pending pulm evaluation, MRSA nasal swab, and urine legionella. ?plan for BAL.   - Note she is not on atypical coverage currently, low threshold for Doxycyline and stop vanc if she decompensates.   - Follow up urine legionella  - Lasix 40mg IV BID today; reassess ongoing need for diuresis in the AM  - Strict I/O's   -Continue home oxygen  -Continue home COPD regimen  -negative Fungitell, galactomannan, and sputum cultures.  -pulm following  -No clinical evidence of wheezing or COPD exacerbation currently; low threshold for steroids if she decompensates given question   - Additional workup of pulmonary edema to include EKG, TTE and NTproBNP   - Lower extremity DVT US pending      Abdominal Pain, improved   Loose stools with mucus  Tenesmus  Unintentional weight loss  Anorexia  The patient has had weeks of worsening anorexia along with crampy abdominal pain but no evidence of gastrointestinal bleeding.  She has frequent (many many times per day) mucousy bowel movements with very little stool production.  C. difficile and enteric panel are negative.  She does have normocytic anemia and a hiatal hernia. S/p flex sig and endoscopy 2/7. Noted hiatal hernia, mild inflammatory changes in distal colon/rectum s/p biopsy. If symptoms persist beyond 8 weeks repeat flex sig should be completed to eval for chronic inflammatory process per GI.  -Appreciate GI consultation; since signed off  -Encourage oral intake, supplements  -Add Bentyl, psyllium, immodium  -Physical and Occupational Therapy consults  - Stopped D5 with K on 2/12 given need for diuresis; low threshold to resume if she develops hypoglycemia   - Encourage PO and trial Zofran prior to trial of PO   - Consider Marinol if ongoing issues with appetite     Urinary tract  infection  Urine culture with 10-50,000 colonies of Enterococcus faecalis.    -s/p treatment     Elevated Troponin  Likely type II MI in the setting of acute illness. Plateaued at 51. No chest pain. Repeat EKG on 2/12 without acute ischemic changes.   - TTE per above     Hypokalemia  Hypomagnesemia  In the setting of poor oral intake as well as PPI use.  Will continue to follow closely.       Iron deficiency anemia    Not tolerating Iron replacement.  Her ferritin is 85 which likely reflects a degree of iron deficiency given some presence of inflammation.     Hypertension  Continue losartan 50 mg p.o. daily as a substitute for her home medication     Anxiety and depression  Takes BuSpar, Prozac  -Continue home regimen     Hypothyroidism     Takes Synthroid 50 mcg. TSH normal  -Continue     Dementia   - continue donepezil          Diet: Snacks/Supplements Adult: Gelatein Plus; Between Meals  Snacks/Supplements Adult: Ensure Enlive; With Meals  Regular Diet Adult    DVT Prophylaxis: Lovenox  Ruiz Catheter: Not present  Lines: None     Cardiac Monitoring: None  Code Status: Full Code      Clinically Significant Risk Factors            # Hypomagnesemia: Lowest Mg = 1.6 mg/dL in last 2 days, will replace as needed       # Hypertension: Noted on problem list     # Dementia: noted on problem list     # Severe Malnutrition: based on nutrition assessment    # Financial/Environmental Concerns: none         Disposition Plan             Geetha Tong,   Hospitalist Service, GOLD TEAM 21  [facility]  Securely message with [employer] (more info)  Text page via [facility] Paging/Directory   See signed in provider for up to date coverage information  ______________________________________________________________________    Interval History   No acute events overnight. She is noted to have ongoing dyspnea, worse with minimal exertion, requriing frequent nebs and is now up to 3LNC. She  describes worsening dyspnea when she transfers, improves at rest. Intermittent dry cough with position changes. She denies sputum production or wheezing.     She continues to feel nauseated with minimal appetite, though notes her abdominal pain is improved.     Physical Exam   Vital Signs: Temp: 98.3  F (36.8  C) Temp src: Oral BP: 132/87 Pulse: 109   Resp: 20 SpO2: 95 % O2 Device: Nasal cannula Oxygen Delivery: 3 LPM  Weight: 123 lbs 0 oz    General: appears mildly uncomfortable up to wheelchair.   HEENT: glasses in place, NC in place. MMM  CV: RRR, Extremities are warm and well perfused.   LUNGS: Mild increased WOB, decreased breath sounds bilateral lower and middle lung fields without obvious crackles or wheezing.   ABD: Soft, NT/ND.   SKIN: Warm, well perfused. Scattered ecchymosis and excoriations present over the bilateral lower extremities.   MSK: No deformities. Trace pedal edema. No clubbing, cyanosis.   NEURO: Alert and appropriate. No focal deficits.    Medical Decision Making       60 MINUTES SPENT BY ME on the date of service doing chart review, history, exam, documentation & further activities per the note.      Data     I have personally reviewed the following data over the past 24 hrs:    9.2  \   8.5 (L)   / 324     131 (L) 95 (L) 7.4 (L) /  123 (H)   5.4 (H) 26 0.92 \     Procal: N/A CRP: N/A Lactic Acid: N/A         Imaging results reviewed over the past 24 hrs:   Recent Results (from the past 24 hour(s))   XR Chest 1 View    Narrative    Examination:  XR CHEST 1 VIEW    Date:  2/11/2024 12:30 PM     Clinical Information: increased SOB, cough     Comparison: 2/1/2024.    Findings:   AP upright chest radiograph. Stable cardiac silhouette. Interval  reduced left lung aeration with increased diffuse mixed left-sided  pulmonary opacities. Increased streaky right perihilar and basilar  opacities likely representing edema/atelectasis. New small left  pleural effusion. No right pleural effusion or  pneumothorax. No acute  osseous abnormalities.      Impression    Impression:  1. Interval reduced left lung aeration with increased diffuse mixed  left-sided pulmonary opacities, possibly edema/atelectasis or  infection.  2. Small left pleural effusion.     CARLEEN DON MD         SYSTEM ID:  [id number]   CT Chest w/o Contrast    Narrative    EXAMINATION: Chest CT  2/12/2024 8:45 AM    CLINICAL HISTORY: Worsening dyspnea, reevaluate L lung opacity vs new  process    COMPARISON: CT, 2/1/2024.    TECHNIQUE: CT imaging obtained through the chest without contrast.  Axial, coronal, and sagittal reconstructions and axial MIP reformatted  images are reviewed.     CONTRAST: No contrast    FINDINGS:  Lungs: The trachea and central airways are patent. Extensive  emphysematous changes. Bilateral small pleural effusions, new compared  to prior. Increased consolidative changes in the bilateral upper  lobes. Septal thickening also noted bilaterally suggestive of edema.    Mediastinum: The visualized thyroid gland is unremarkable. The heart  size is within normal limits. No pericardial effusion. The ascending  aorta and main pulmonary artery diameters are within normal limits.  Coronary artery calcifications. Normal appearance and configuration of  the great vessels off of the aortic arch. Enlarged pretracheal lymph  node image 22, series 2 mm up to 1.2 cm in short axis. Other prominent  aortopulmonary window lymph nodes just under a centimeter in short  axis. Moderate sliding hiatal hernia.    Bones and soft tissues: No suspicious bone findings. Chest wall  subcutaneous edema.    Upper Abdomen: Unremarkable appearance of the adrenal glands.      Impression    IMPRESSION:   1. Increased consolidative changes in bilateral upper lobes. Bilateral  new small pleural effusions. Findings are suggestive of worsening  infection with or without superimposed atelectasis. Mild associated  pulmonary edema as well.  2. Enlarged mediastinal  lymph nodes, most likely reactive.    I have personally reviewed the examination and initial interpretation  and I agree with the findings.    SYLVIA GREEN MD         SYSTEM ID:  [id number]

## 2024-02-13 ENCOUNTER — APPOINTMENT (OUTPATIENT)
Dept: SPEECH THERAPY | Facility: CLINIC | Age: 79
DRG: 193 | End: 2024-02-13
Attending: STUDENT IN AN ORGANIZED HEALTH CARE EDUCATION/TRAINING PROGRAM
Payer: MEDICARE

## 2024-02-13 LAB
ALBUMIN SERPL BCG-MCNC: 3.5 G/DL (ref 3.5–5.2)
ANION GAP SERPL CALCULATED.3IONS-SCNC: 14 MMOL/L (ref 7–15)
ATRIAL RATE - MUSE: 112 BPM
BASOPHILS # BLD AUTO: 0.1 10E3/UL (ref 0–0.2)
BASOPHILS NFR BLD AUTO: 1 %
BUN SERPL-MCNC: 9.3 MG/DL (ref 8–23)
CALCIUM SERPL-MCNC: 9 MG/DL (ref 8.8–10.2)
CHLORIDE SERPL-SCNC: 92 MMOL/L (ref 98–107)
CREAT SERPL-MCNC: 0.97 MG/DL (ref 0.51–0.95)
DEPRECATED HCO3 PLAS-SCNC: 28 MMOL/L (ref 22–29)
DIASTOLIC BLOOD PRESSURE - MUSE: NORMAL MMHG
EGFRCR SERPLBLD CKD-EPI 2021: 59 ML/MIN/1.73M2
EOSINOPHIL # BLD AUTO: 0.1 10E3/UL (ref 0–0.7)
EOSINOPHIL NFR BLD AUTO: 1 %
ERYTHROCYTE [DISTWIDTH] IN BLOOD BY AUTOMATED COUNT: 18.5 % (ref 10–15)
GLUCOSE SERPL-MCNC: 97 MG/DL (ref 70–99)
HCT VFR BLD AUTO: 30.5 % (ref 35–47)
HGB BLD-MCNC: 9.2 G/DL (ref 11.7–15.7)
IMM GRANULOCYTES # BLD: 0.1 10E3/UL
IMM GRANULOCYTES NFR BLD: 1 %
INTERPRETATION ECG - MUSE: NORMAL
L PNEUMO1 AG UR QL IA: NEGATIVE
LYMPHOCYTES # BLD AUTO: 1 10E3/UL (ref 0.8–5.3)
LYMPHOCYTES NFR BLD AUTO: 10 %
MAGNESIUM SERPL-MCNC: 1.7 MG/DL (ref 1.7–2.3)
MCH RBC QN AUTO: 24.3 PG (ref 26.5–33)
MCHC RBC AUTO-ENTMCNC: 30.2 G/DL (ref 31.5–36.5)
MCV RBC AUTO: 81 FL (ref 78–100)
MONOCYTES # BLD AUTO: 1 10E3/UL (ref 0–1.3)
MONOCYTES NFR BLD AUTO: 10 %
MRSA DNA SPEC QL NAA+PROBE: NEGATIVE
NEUTROPHILS # BLD AUTO: 7.9 10E3/UL (ref 1.6–8.3)
NEUTROPHILS NFR BLD AUTO: 77 %
NRBC # BLD AUTO: 0 10E3/UL
NRBC BLD AUTO-RTO: 0 /100
P AXIS - MUSE: 93 DEGREES
PHOSPHATE SERPL-MCNC: 4.2 MG/DL (ref 2.5–4.5)
PLATELET # BLD AUTO: ABNORMAL 10*3/UL
POTASSIUM SERPL-SCNC: 4.2 MMOL/L (ref 3.4–5.3)
PR INTERVAL - MUSE: 156 MS
QRS DURATION - MUSE: 68 MS
QT - MUSE: 332 MS
QTC - MUSE: 453 MS
R AXIS - MUSE: 100 DEGREES
RBC # BLD AUTO: 3.79 10E6/UL (ref 3.8–5.2)
SA TARGET DNA: NEGATIVE
SODIUM SERPL-SCNC: 134 MMOL/L (ref 135–145)
SYSTOLIC BLOOD PRESSURE - MUSE: NORMAL MMHG
T AXIS - MUSE: 67 DEGREES
VENTRICULAR RATE- MUSE: 112 BPM
WBC # BLD AUTO: 10.1 10E3/UL (ref 4–11)

## 2024-02-13 PROCEDURE — 999N000157 HC STATISTIC RCP TIME EA 10 MIN

## 2024-02-13 PROCEDURE — 80069 RENAL FUNCTION PANEL: CPT | Performed by: STUDENT IN AN ORGANIZED HEALTH CARE EDUCATION/TRAINING PROGRAM

## 2024-02-13 PROCEDURE — 250N000011 HC RX IP 250 OP 636: Performed by: STUDENT IN AN ORGANIZED HEALTH CARE EDUCATION/TRAINING PROGRAM

## 2024-02-13 PROCEDURE — 250N000013 HC RX MED GY IP 250 OP 250 PS 637: Performed by: PHYSICIAN ASSISTANT

## 2024-02-13 PROCEDURE — 87899 AGENT NOS ASSAY W/OPTIC: CPT | Performed by: STUDENT IN AN ORGANIZED HEALTH CARE EDUCATION/TRAINING PROGRAM

## 2024-02-13 PROCEDURE — 120N000002 HC R&B MED SURG/OB UMMC

## 2024-02-13 PROCEDURE — 250N000013 HC RX MED GY IP 250 OP 250 PS 637: Performed by: HOSPITALIST

## 2024-02-13 PROCEDURE — 250N000009 HC RX 250: Performed by: HOSPITALIST

## 2024-02-13 PROCEDURE — 250N000009 HC RX 250: Performed by: STUDENT IN AN ORGANIZED HEALTH CARE EDUCATION/TRAINING PROGRAM

## 2024-02-13 PROCEDURE — 250N000013 HC RX MED GY IP 250 OP 250 PS 637: Performed by: INTERNAL MEDICINE

## 2024-02-13 PROCEDURE — 87641 MR-STAPH DNA AMP PROBE: CPT | Performed by: STUDENT IN AN ORGANIZED HEALTH CARE EDUCATION/TRAINING PROGRAM

## 2024-02-13 PROCEDURE — 99233 SBSQ HOSP IP/OBS HIGH 50: CPT | Performed by: STUDENT IN AN ORGANIZED HEALTH CARE EDUCATION/TRAINING PROGRAM

## 2024-02-13 PROCEDURE — 94640 AIRWAY INHALATION TREATMENT: CPT | Mod: 76

## 2024-02-13 PROCEDURE — 36415 COLL VENOUS BLD VENIPUNCTURE: CPT | Performed by: STUDENT IN AN ORGANIZED HEALTH CARE EDUCATION/TRAINING PROGRAM

## 2024-02-13 PROCEDURE — 85048 AUTOMATED LEUKOCYTE COUNT: CPT | Performed by: STUDENT IN AN ORGANIZED HEALTH CARE EDUCATION/TRAINING PROGRAM

## 2024-02-13 PROCEDURE — 250N000011 HC RX IP 250 OP 636: Performed by: INTERNAL MEDICINE

## 2024-02-13 PROCEDURE — 250N000013 HC RX MED GY IP 250 OP 250 PS 637

## 2024-02-13 PROCEDURE — 99233 SBSQ HOSP IP/OBS HIGH 50: CPT

## 2024-02-13 PROCEDURE — 92610 EVALUATE SWALLOWING FUNCTION: CPT | Mod: GN

## 2024-02-13 PROCEDURE — 83735 ASSAY OF MAGNESIUM: CPT | Performed by: STUDENT IN AN ORGANIZED HEALTH CARE EDUCATION/TRAINING PROGRAM

## 2024-02-13 PROCEDURE — 94640 AIRWAY INHALATION TREATMENT: CPT

## 2024-02-13 PROCEDURE — 250N000013 HC RX MED GY IP 250 OP 250 PS 637: Performed by: STUDENT IN AN ORGANIZED HEALTH CARE EDUCATION/TRAINING PROGRAM

## 2024-02-13 RX ORDER — DIPHENHYDRAMINE HCL 25 MG
25 CAPSULE ORAL ONCE
Status: COMPLETED | OUTPATIENT
Start: 2024-02-13 | End: 2024-02-13

## 2024-02-13 RX ORDER — MAGNESIUM SULFATE HEPTAHYDRATE 40 MG/ML
2 INJECTION, SOLUTION INTRAVENOUS ONCE
Status: COMPLETED | OUTPATIENT
Start: 2024-02-13 | End: 2024-02-13

## 2024-02-13 RX ORDER — DOXYCYCLINE 100 MG/1
100 CAPSULE ORAL EVERY 12 HOURS SCHEDULED
Status: DISCONTINUED | OUTPATIENT
Start: 2024-02-13 | End: 2024-02-13

## 2024-02-13 RX ORDER — OLANZAPINE 2.5 MG/1
2.5 TABLET, FILM COATED ORAL AT BEDTIME
Status: DISCONTINUED | OUTPATIENT
Start: 2024-02-13 | End: 2024-02-13

## 2024-02-13 RX ORDER — FUROSEMIDE 10 MG/ML
40 INJECTION INTRAMUSCULAR; INTRAVENOUS EVERY 8 HOURS
Qty: 8 ML | Refills: 0 | Status: DISCONTINUED | OUTPATIENT
Start: 2024-02-13 | End: 2024-02-13

## 2024-02-13 RX ADMIN — TRIAMCINOLONE ACETONIDE: 1 CREAM TOPICAL at 16:27

## 2024-02-13 RX ADMIN — Medication 1 CAPSULE: at 09:10

## 2024-02-13 RX ADMIN — DIPHENHYDRAMINE HYDROCHLORIDE, ZINC ACETATE: 2; .1 CREAM TOPICAL at 17:42

## 2024-02-13 RX ADMIN — ENOXAPARIN SODIUM 40 MG: 40 INJECTION SUBCUTANEOUS at 20:36

## 2024-02-13 RX ADMIN — DONEPEZIL HYDROCHLORIDE 10 MG: 10 TABLET, FILM COATED ORAL at 21:31

## 2024-02-13 RX ADMIN — HYDROXYZINE HYDROCHLORIDE 10 MG: 10 TABLET ORAL at 23:27

## 2024-02-13 RX ADMIN — BUSPIRONE HYDROCHLORIDE 10 MG: 10 TABLET ORAL at 14:25

## 2024-02-13 RX ADMIN — IPRATROPIUM BROMIDE AND ALBUTEROL SULFATE 3 ML: .5; 3 SOLUTION RESPIRATORY (INHALATION) at 21:41

## 2024-02-13 RX ADMIN — ONDANSETRON 4 MG: 2 INJECTION INTRAMUSCULAR; INTRAVENOUS at 18:25

## 2024-02-13 RX ADMIN — BUSPIRONE HYDROCHLORIDE 10 MG: 10 TABLET ORAL at 09:10

## 2024-02-13 RX ADMIN — DIPHENHYDRAMINE HYDROCHLORIDE, ZINC ACETATE: 2; .1 CREAM TOPICAL at 12:06

## 2024-02-13 RX ADMIN — CEFEPIME HYDROCHLORIDE 2 G: 2 INJECTION, POWDER, FOR SOLUTION INTRAVENOUS at 23:30

## 2024-02-13 RX ADMIN — CALCIUM POLYCARBOPHIL 1250 MG: 625 TABLET, FILM COATED ORAL at 09:10

## 2024-02-13 RX ADMIN — MAGNESIUM SULFATE HEPTAHYDRATE 2 G: 40 INJECTION, SOLUTION INTRAVENOUS at 12:06

## 2024-02-13 RX ADMIN — PANTOPRAZOLE SODIUM 40 MG: 40 TABLET, DELAYED RELEASE ORAL at 09:10

## 2024-02-13 RX ADMIN — ALBUTEROL SULFATE 2.5 MG: 2.5 SOLUTION RESPIRATORY (INHALATION) at 03:04

## 2024-02-13 RX ADMIN — ENOXAPARIN SODIUM 40 MG: 40 INJECTION SUBCUTANEOUS at 09:10

## 2024-02-13 RX ADMIN — VANCOMYCIN HYDROCHLORIDE 1000 MG: 1 INJECTION, SOLUTION INTRAVENOUS at 14:25

## 2024-02-13 RX ADMIN — BUSPIRONE HYDROCHLORIDE 10 MG: 10 TABLET ORAL at 20:36

## 2024-02-13 RX ADMIN — OLANZAPINE 2.5 MG: 5 TABLET, ORALLY DISINTEGRATING ORAL at 21:31

## 2024-02-13 RX ADMIN — DIPHENHYDRAMINE HYDROCHLORIDE, ZINC ACETATE: 2; .1 CREAM TOPICAL at 03:21

## 2024-02-13 RX ADMIN — DIPHENHYDRAMINE HYDROCHLORIDE 25 MG: 25 CAPSULE ORAL at 03:14

## 2024-02-13 RX ADMIN — ACETAMINOPHEN 650 MG: 325 TABLET, FILM COATED ORAL at 18:21

## 2024-02-13 RX ADMIN — HYDROXYZINE HYDROCHLORIDE 10 MG: 10 TABLET ORAL at 16:27

## 2024-02-13 RX ADMIN — IPRATROPIUM BROMIDE AND ALBUTEROL SULFATE 3 ML: .5; 3 SOLUTION RESPIRATORY (INHALATION) at 11:47

## 2024-02-13 RX ADMIN — IPRATROPIUM BROMIDE AND ALBUTEROL SULFATE 3 ML: .5; 3 SOLUTION RESPIRATORY (INHALATION) at 15:51

## 2024-02-13 RX ADMIN — FUROSEMIDE 40 MG: 10 INJECTION, SOLUTION INTRAMUSCULAR; INTRAVENOUS at 09:10

## 2024-02-13 RX ADMIN — LOPERAMIDE HYDROCHLORIDE 2 MG: 2 CAPSULE ORAL at 09:10

## 2024-02-13 RX ADMIN — FLUOXETINE HYDROCHLORIDE 40 MG: 20 CAPSULE ORAL at 09:10

## 2024-02-13 RX ADMIN — LATANOPROST 1 DROP: 50 SOLUTION OPHTHALMIC at 21:31

## 2024-02-13 RX ADMIN — LEVOTHYROXINE SODIUM 50 MCG: 50 TABLET ORAL at 09:10

## 2024-02-13 RX ADMIN — CEFEPIME HYDROCHLORIDE 2 G: 2 INJECTION, POWDER, FOR SOLUTION INTRAVENOUS at 13:02

## 2024-02-13 RX ADMIN — BUDESONIDE 1 MG: 1 INHALANT ORAL at 07:39

## 2024-02-13 RX ADMIN — BUDESONIDE 1 MG: 1 INHALANT ORAL at 21:41

## 2024-02-13 RX ADMIN — CEFEPIME HYDROCHLORIDE 2 G: 2 INJECTION, POWDER, FOR SOLUTION INTRAVENOUS at 00:09

## 2024-02-13 RX ADMIN — HYDROXYZINE HYDROCHLORIDE 10 MG: 10 TABLET ORAL at 11:09

## 2024-02-13 RX ADMIN — ONDANSETRON 4 MG: 2 INJECTION INTRAMUSCULAR; INTRAVENOUS at 11:09

## 2024-02-13 RX ADMIN — IPRATROPIUM BROMIDE AND ALBUTEROL SULFATE 3 ML: .5; 3 SOLUTION RESPIRATORY (INHALATION) at 07:39

## 2024-02-13 RX ADMIN — Medication 1 CAPSULE: at 13:01

## 2024-02-13 ASSESSMENT — ACTIVITIES OF DAILY LIVING (ADL)
ADLS_ACUITY_SCORE: 45
ADLS_ACUITY_SCORE: 41
ADLS_ACUITY_SCORE: 45

## 2024-02-13 NOTE — PLAN OF CARE
Problem: Adult Inpatient Plan of Care  Goal: Plan of Care Review  Description: The Plan of Care Review/Shift note should be completed every shift.  The Outcome Evaluation is a brief statement about your assessment that the patient is improving, declining, or no change.  This information will be displayed automatically on your shift  note.  Recent Flowsheet Documentation  Taken 2/12/2024 2332 by Evelin Alvares, RN  Outcome Evaluation: Pt did have ultrasound done this shift DVT found lovonox given. Pt c/o0 SOB and is needing more nebs now schudiled. Pt using PURE wick as she is too week and gets too SOB with activity. Pt 1-2L sating 90-93%. lasix given no c/o chest pain. BP remains high as well as pulse. Continuing POC  Plan of Care Reviewed With: patient  Overall Patient Progress: no change  Goal: Absence of Hospital-Acquired Illness or Injury  Intervention: Identify and Manage Fall Risk  Recent Flowsheet Documentation  Taken 2/12/2024 1552 by Evelin Alvares, RN  Safety Promotion/Fall Prevention:   activity supervised   clutter free environment maintained   increased rounding and observation   increase visualization of patient   lighting adjusted   mobility aid in reach   nonskid shoes/slippers when out of bed   safety round/check completed  Intervention: Prevent Skin Injury  Recent Flowsheet Documentation  Taken 2/12/2024 1552 by Evelin Alvares, RN  Body Position: position changed independently  Intervention: Prevent Infection  Recent Flowsheet Documentation  Taken 2/12/2024 1552 by Evelin Alvares, RN  Infection Prevention:   single patient room provided   rest/sleep promoted   hand hygiene promoted   Goal Outcome Evaluation:      Plan of Care Reviewed With: patient    Overall Patient Progress: no changeOverall Patient Progress: no change    Outcome Evaluation: Pt did have ultrasound done this shift DVT found lovonox given. Pt c/o0 SOB and is needing more nebs now schudiled. Pt using PURE wick as she is too week  and gets too SOB with activity. Pt 1-2L sating 90-93%. lasix given no c/o chest pain. BP remains high as well as pulse. Continuing POC

## 2024-02-13 NOTE — PROGRESS NOTES
PALLIATIVE CARE PROGRESS NOTE  [facility]     Patient Name: Idalia Bob  Date of Admission: 2/1/2024   Today the patient was seen for: Symptom Management      Recommendations & Counseling     GOALS OF CARE:   Life-prolonging with limits, pending further workup      ADVANCE CARE PLANNING:  No health care directive on file. Per  informed consent policy, next of kin should be involved if patient becomes unable.  There is no POLST form on file, defer to patient and/or next of kin for decisions   Code status: Full Code     MEDICAL MANAGEMENT:   #Acute abdominal pain, resolved  Continue Acetaminophen (Tylenol) 650mg q4hrs prn (did not require any prn pain medication in the last 24 hrs)    #Nausea; unclear if nausea is related to large hiatal hernia vs gastroenteritis. Last seen by GI 2/8  Nausea symptom associated with oral intake.   Continue ondansetron 4mg ODT q6hrs prn  Continue IV ondansetron 4mg q6hrs prn   Continue IV Prochlorperazine 5mg q6hrs prn   Continue prochlorperazine 5mg po q6hrs prn   Start Zyprexa 2.5mg ODT daily at bedtime (ordered for you)  EKG on 2/12 reviewed, QTc 453, plan to repeat EKG in a.m. per primary     -Nonpharmacologic management  Small, frequent intake  Assess and avoid aggravating factors  Accupressure (C-band)  Aromatherapy, alcohol swabs known to be effective     #Anorexia; decreased appetite associated with nausea  Idalia reports no appetite in the last 3 weeks  Management as per above     PSYCHOSOCIAL/SPIRITUAL SUPPORT:  Family: Daughters; Barbara Bob and Corie Bob  Yu community: [place]   Spiritual:     Palliative Care will continue to follow. Thank you for the consult and allowing us to aid in the care of Idalia Bob.    These recommendations have been discussed with Medicine and primary nurse.    KIZZY Miller CNP  Securely message with [employer] (more info)  Text page via [employer] Paging/Directory          Interval  History:     Multidisciplinary collaboration:  Patient case discussed with Carina Amaya PA-C     Patient/family narrative  Met with Idalia and her daughter at bedside this morning. Idalia was able to tolerate some Jell-O and Ensure yesterday. Idalia had a bedside swallow study this morning, and reports nausea with any oral intake. She endorses anxiety due to the nausea, and is complaining of itchy skin. Overall Idalia and her daughter reports adequate rest last night. I discussed with Idalia and her daughter regarding above medication adjustments and agreed to trial Zyprexa in hopes that it would improve her appetite and reduce the nausea    Palliative Summary/HPI          Idalia Bob is a 79 year old female with a past medical history of COPD, pneumonia, hypertension, dementia and hypothyroidism presented on 02/01/2024 with abdominal pain, frequent mucousy bowel movements and anorexia. Chest CT showed consolidation Increased consolidative changes in bilateral upper lobes. Bilateral new small pleural effusions concerning for worsening infection and mild associated pulmonary edema. Palliative Care consulted for goals of care and symptom management as per above.           Physical Exam:   Temp:  [97.8  F (36.6  C)-98.7  F (37.1  C)] 98.2  F (36.8  C)  Pulse:  [100-127] 109  Resp:  [18] 18  BP: (144-163)/() 144/84  SpO2:  [93 %-96 %] 96 %  135 lbs 5.8 oz    Physical Exam  Gen: Frail, ill-appearing   Pulm: Increased work of breathing  GI: soft non-tender  Neuro: alert and awake   Psych: anxious            Current Problem List:   Principal Problem:    Hypokalemia  Active Problems:    Community acquired pneumonia of left upper lobe of lung      Allergies   Allergen Reactions    Aspirin Nausea and Nausea and Vomiting     Stomach ache    Penicillins Itching     Has tolerated ceftriaxone, piperacillin, and amoxicillin            Data Reviewed:     DOPPLER VENOUS ULTRASOUND OF BILATERAL LOWER  EXTREMITIES,  2/12/2024 8:01 PM     IMPRESSION:  1. Occlusive DVT in the right posterior tibial vein at the  proximal-mid calf.  2.  No evidence of deep venous thrombosis in the left lower extremity.  3. Left popliteal cyst.    [Urgent Result: DVT]  Finding was identified on 2/12/2024 8:23 PM.     CMP  Recent Labs   Lab 02/12/24 2036 02/12/24  1019 02/11/24 2038 02/11/24  0748   NA  --  131*  --  134*   POTASSIUM 4.8 5.4*  --  4.3   CHLORIDE  --  95*  --  97*   CO2  --  26  --  23   ANIONGAP  --  10  --  14   GLC  --  123* 119* 144*   BUN  --  7.4*  --  6.8*   CR  --  0.92  --  0.80  0.80   GFRESTIMATED  --  63  --  74  74   AYUSH  --  9.1  --  9.1   MAG  --  2.4*  --  1.6*   PHOS  --  3.3  --   --      CBC  Recent Labs   Lab 02/13/24  0806 02/12/24 1019 02/11/24  0748   WBC 10.1 9.2 9.2   RBC 3.79* 3.50* 3.37*   HGB 9.2* 8.5* 8.3*   HCT 30.5* 28.7* 28.7*   MCV 81 82 85   MCH 24.3* 24.3* 24.6*   MCHC 30.2* 29.6* 28.9*   RDW 18.5* 18.5* 18.5*   PLT  --  324 269  269     Medical Decision Making       MANAGEMENT DISCUSSED with the following over the past 24 hours: Medicine, primary RN.    NOTE(S)/MEDICAL RECORDS REVIEWED over the past 24 hours: Pulmonary, medicine, nursing staff.  50 MINUTES SPENT BY ME on the date of service doing chart review, history, exam, documentation & further activities per the note.

## 2024-02-13 NOTE — PLAN OF CARE
"  Problem: Adult Inpatient Plan of Care  Goal: Absence of Hospital-Acquired Illness or Injury  Intervention: Identify and Manage Fall Risk  Recent Flowsheet Documentation  Taken 2/13/2024 0216 by Gus Carson, RN  Safety Promotion/Fall Prevention: safety round/check completed     Problem: Adult Inpatient Plan of Care  Goal: Absence of Hospital-Acquired Illness or Injury  Intervention: Identify and Manage Fall Risk  Recent Flowsheet Documentation  Taken 2/13/2024 0216 by Gus Carson, RN  Safety Promotion/Fall Prevention: safety round/check completed     Problem: Fall Injury Risk  Goal: Absence of Fall and Fall-Related Injury  Intervention: Promote Injury-Free Environment  Recent Flowsheet Documentation  Taken 2/13/2024 0216 by Gus Carson, RN  Safety Promotion/Fall Prevention: safety round/check completed     Problem: Oral Intake Inadequate  Goal: Improved Oral Intake  Outcome: Not Progressing (NOT EATING PER REPORT)     Problem: Breathing Pattern Ineffective  Goal: Effective Breathing Pattern  Outcome: Progressing     Problem: Pneumonia  Goal: Fluid Balance  Outcome: Progressing  Goal: Resolution of Infection Signs and Symptoms  Outcome: Progressing  Goal: Effective Oxygenation and Ventilation  Outcome: Progressing     Problem: Comorbidity Management  Goal: Maintenance of COPD Symptom Control  Outcome: Progressing  Goal: Blood Pressure in Desired Range  Outcome: Progressing      VS:  Temp: 98.2  F (36.8  C) Temp src: Oral BP: (!) 144/84 Pulse: 109   Resp: 18 SpO2: 95 % O2 Device: Nasal cannula Oxygen Delivery: 2.5 LPM     O2: SpO2 > 95 and stable on 2.5 LPM NC.   Infrequent cough  Diminished L/S  Denies chest pain and SOB.    Output: purewick   Last BM: \"Few days ago\"   Activity:  On bed   Skin:  +1 edema on bilateral lower extremities   CMS:  Intermittent confusion   Diet: Regular diet.   **NOT EATING** per report   LDA: R PIV SL   Equipment: IV pole, personal belongings, monitor   Plan: " STANDARD precautions maintained / Continue with plan of care. Call light within reach  Plan: O2; on IV antibiotic   Additional Info:  On palliative; on RN managed K and Mag      [id number] called RT June for the PRN nebulization    0258 sent a message to Dr WOLFE; patient is requesting a pill for itchiness on her hands and legs

## 2024-02-13 NOTE — PROGRESS NOTES
"Clinical Swallow Evaluation- Speech Language Pathology       02/13/24 5315   Appointment Info   Signing Clinician's Name / Credentials (SLP) JAMES Bonilla, Student Clinician   Student Supervision Direct supervision provided   General Information   Onset of Illness/Injury or Date of Surgery 02/01/24   Referring Physician Geetha Tong,    Pertinent History of Current Problem Per chart review, \"Idalia Bob is a 79 year old female admitted on 2/1/2024. Pt has history of COPD, pneumonia, hypertension, dementia and hypothyroidism among others, presented with abdominal pain and found to have consolidation on CT.\" Clinical bedside evaluation completed per DO order due to concern for aspiration.   General Observations Pt alert and anxious. Pt's daughter Barbara present.   Type of Evaluation   Type of Evaluation Swallow Evaluation   Oral Motor   Oral Musculature generally intact   Structural Abnormalities none present   Mucosal Quality adequate   Dentition (Oral Motor)   Dentition (Oral Motor) natural dentition;adequate dentition   Facial Symmetry (Oral Motor)   Facial Symmetry (Oral Motor) WNL   Lip Function (Oral Motor)   Lip Range of Motion (Oral Motor) WNL   Tongue Function (Oral Motor)   Tongue Strength (Oral Motor) WNL   Tongue Coordination/Speed (Oral Motor) WNL   Tongue ROM (Oral Motor) WNL   Vocal Quality/Secretion Management (Oral Motor)   Vocal Quality (Oral Motor) other (see comments)  (Pt spoke in whisper throughout session. No true phonation noted.)   Secretion Management (Oral Motor) WNL   General Swallowing Observations   Past History of Dysphagia No previous hx of dysphagia. Pt denies any difficulty with chewing when she was eating (prior to reported appetite loss 2wks ago). Evaluated by Speech 12/8/23 with recommendation for regular textures and thin liquid diet.   Respiratory Support nasal cannula  (3 LPM)   Current Diet/Method of Nutritional Intake (General Swallowing Observations, NIS) " "regular diet   Swallowing Evaluation Clinical swallow evaluation   Clinical Swallow Evaluation   Feeding Assistance minimal assistance required   Additional evaluation(s) completed today Yes   Rationale for completing additional evaluation Recommend VFSS to rule out silent aspiration, however pt would be unable to tolerate drinking barium contrast due to nausea with any PO intake and overall weakness.   Clinical Swallow Evaluation Textures Trialed thin liquids;pureed   Clinical Swallow Eval: Thin Liquid Texture Trial   Mode of Presentation, Thin Liquids cup;straw;self-fed   Volume of Liquid or Food Presented 4 sips   Oral Phase of Swallow WFL   Pharyngeal Phase of Swallow intact   Diagnostic Statement No overt s/sx of aspiration observed. Pt burped 1x after sip from straw. Noted that pt reported feeling nausea after few sips of water and required encouragement from daughter for continue eval. Pt reported SOB during trials.   Clinical Swallow Evaluation: Puree Solid Texture Trial   Mode of Presentation, Puree spoon;self-fed   Volume of Puree Presented 1 teaspoon   Oral Phase, Puree WFL   Pharyngeal Phase, Puree intact   Diagnostic Statement No overt s/sx of aspiration observed. Eval significantly limited due to pt intolerance with PO intake. Pt denied further trials, reporting she felt she was \"going to throw up\" and appeared very nauseous.   Esophageal Phase of Swallow   Patient reports or presents with symptoms of esophageal dysphagia No   Swallowing Recommendations   Diet Consistency Recommendations regular diet   Supervision Level for Intake patient independent   Mode of Delivery Recommendations bolus size, small   Monitoring/Assistance Required (Eating/Swallowing) stop eating activities when fatigue is present;monitor for cough or change in vocal quality with intake   Recommended Feeding/Eating Techniques (Swallow Eval) maintain upright sitting position for eating;minimize distractions during oral intake "   Medication Administration Recommendations, Swallowing (SLP) As tolerated   Instrumental Assessment Recommendations VFSS (videofluoroscopic swallowing study)   Comment, Swallowing Recommendations No overt s/sx of aspiration noted that warrant a downgrade in diet. Recommend VFSS to rule out silent aspiration, although pt does not appear to be able to be able to tolerate barium PO intake required to complete it.   Clinical Impression   Criteria for Skilled Therapeutic Interventions Met (SLP Eval) Evaluation only   Risks & Benefits of therapy have been explained evaluation/treatment results reviewed;care plan/treatment goals reviewed;risks/benefits reviewed;current/potential barriers reviewed;participants voiced agreement with care plan;participants included;patient;daughter   Clinical Impression Comments Clinical eval limited due to pt's generalized weakness (i.e. only using whisper voice), reported feeling SOB and nausea during PO trials. Pt appeared anxious and needed frequent reminders why she was having a swallow evaluation. No overt s/sx of aspiration noted with the limited trials. A VFSS is recommended to rule out silent aspiration concern, however, pt does not appear to be able to tolerate barium PO intake. At this time, pt to continue Regular textures and Thin liquid diet as able with her nausea and reduced appetite.   SLP Total Evaluation Time   Eval: oral/pharyngeal swallow function, clinical swallow Minutes (04599) 20   SLP Discharge Planning   SLP Plan At this time, Speech to sign off but will remain available if new changes/concerns arise.   SLP Discharge Recommendation Transitional Care Facility   SLP Rationale for DC Rec Pt does not require ongoing Speech Therapy at discharge.   SLP Brief overview of current status  Continue Regular textures and Thin liquid diet. Recommend VFSS in future if pt's status improves and appears to tolerate procedure.   Total Session Time   Total Session Time (sum of timed  and untimed services) 20

## 2024-02-13 NOTE — PROGRESS NOTES
"  VS: /72 (BP Location: Left arm)   Pulse 109   Temp 99.4  F (37.4  C) (Oral)   Resp 15   Ht 1.702 m (5' 7\")   Wt 47.8 kg (105 lb 6.4 oz)   SpO2 98%   BMI 16.51 kg/m     O2: 3L via NC with scheduled nebs, endorses SOB   Output: Purewick in place, incontinent of bowel   Last BM: Pt states \"a few days\", bowel regimen encouraged   Activity: Pt not OOB during shift, repositioning offered   Skin: Small scabs to R arm and R upper thigh, pt indicated she's itching. PRN Atarax administered   Pain: Denies              CMS: A&Ox4, denies chest pain, numbness/tingling, and dizziness. Endorses nausea/vomiting, antiemetic administered with other nausea management    Dressing: N/A   Diet: Regular, poor appetite. Pt able to eat one piece of pizza in evening    LDA: L PIV SL    Equipment: IV pole and pump, walker, personal belongings   Plan: Call light in reach, continue POC   Additional Info: Pt on palliative care     C/o itching to BLE, PRN itching cream applied throughout shift       "

## 2024-02-13 NOTE — PROGRESS NOTES
Texted on Vocera by radiology.  Per radiology, ultrasound positive for DVT in right posterior tibial vein.  Patient was previously prescribed Lovenox 40 mg subcutaneous daily for DVT prophylaxis.  Will increase Lovenox to 60 mg subcutaneous twice daily.  Daytime hospitalist can make further adjustments if necessary.    Correction: Pharmacy reminded me that the correct renal dosing would be 40 mg subcutaneous twice daily.  Order updated.

## 2024-02-14 LAB
ALBUMIN SERPL BCG-MCNC: 3 G/DL (ref 3.5–5.2)
ALP SERPL-CCNC: 71 U/L (ref 40–150)
ALT SERPL W P-5'-P-CCNC: 28 U/L (ref 0–50)
AST SERPL W P-5'-P-CCNC: 27 U/L (ref 0–45)
BILIRUB DIRECT SERPL-MCNC: <0.2 MG/DL (ref 0–0.3)
BILIRUB SERPL-MCNC: 0.4 MG/DL
CREAT SERPL-MCNC: 0.9 MG/DL (ref 0.51–0.95)
EGFRCR SERPLBLD CKD-EPI 2021: 64 ML/MIN/1.73M2
LIPASE SERPL-CCNC: 9 U/L (ref 13–60)
LMWH PPP CHRO-ACNC: 0.5 IU/ML
MAGNESIUM SERPL-MCNC: 2.5 MG/DL (ref 1.7–2.3)
PLATELET # BLD AUTO: 341 10E3/UL (ref 150–450)
POTASSIUM SERPL-SCNC: 3.9 MMOL/L (ref 3.4–5.3)
PROT SERPL-MCNC: 5.6 G/DL (ref 6.4–8.3)
SODIUM SERPL-SCNC: 133 MMOL/L (ref 135–145)

## 2024-02-14 PROCEDURE — 250N000011 HC RX IP 250 OP 636: Performed by: INTERNAL MEDICINE

## 2024-02-14 PROCEDURE — 250N000013 HC RX MED GY IP 250 OP 250 PS 637: Performed by: INTERNAL MEDICINE

## 2024-02-14 PROCEDURE — 250N000011 HC RX IP 250 OP 636: Performed by: STUDENT IN AN ORGANIZED HEALTH CARE EDUCATION/TRAINING PROGRAM

## 2024-02-14 PROCEDURE — 83690 ASSAY OF LIPASE: CPT | Performed by: STUDENT IN AN ORGANIZED HEALTH CARE EDUCATION/TRAINING PROGRAM

## 2024-02-14 PROCEDURE — 250N000013 HC RX MED GY IP 250 OP 250 PS 637: Performed by: STUDENT IN AN ORGANIZED HEALTH CARE EDUCATION/TRAINING PROGRAM

## 2024-02-14 PROCEDURE — 94640 AIRWAY INHALATION TREATMENT: CPT | Mod: 76

## 2024-02-14 PROCEDURE — 80076 HEPATIC FUNCTION PANEL: CPT | Performed by: STUDENT IN AN ORGANIZED HEALTH CARE EDUCATION/TRAINING PROGRAM

## 2024-02-14 PROCEDURE — 94640 AIRWAY INHALATION TREATMENT: CPT

## 2024-02-14 PROCEDURE — 36415 COLL VENOUS BLD VENIPUNCTURE: CPT | Performed by: STUDENT IN AN ORGANIZED HEALTH CARE EDUCATION/TRAINING PROGRAM

## 2024-02-14 PROCEDURE — 250N000013 HC RX MED GY IP 250 OP 250 PS 637: Performed by: PHYSICIAN ASSISTANT

## 2024-02-14 PROCEDURE — 93010 ELECTROCARDIOGRAM REPORT: CPT | Performed by: INTERNAL MEDICINE

## 2024-02-14 PROCEDURE — 250N000013 HC RX MED GY IP 250 OP 250 PS 637: Performed by: HOSPITALIST

## 2024-02-14 PROCEDURE — 84132 ASSAY OF SERUM POTASSIUM: CPT | Performed by: STUDENT IN AN ORGANIZED HEALTH CARE EDUCATION/TRAINING PROGRAM

## 2024-02-14 PROCEDURE — 85049 AUTOMATED PLATELET COUNT: CPT | Performed by: HOSPITALIST

## 2024-02-14 PROCEDURE — 999N000157 HC STATISTIC RCP TIME EA 10 MIN

## 2024-02-14 PROCEDURE — 36415 COLL VENOUS BLD VENIPUNCTURE: CPT | Performed by: HOSPITALIST

## 2024-02-14 PROCEDURE — 99232 SBSQ HOSP IP/OBS MODERATE 35: CPT | Mod: GC

## 2024-02-14 PROCEDURE — 83735 ASSAY OF MAGNESIUM: CPT | Performed by: STUDENT IN AN ORGANIZED HEALTH CARE EDUCATION/TRAINING PROGRAM

## 2024-02-14 PROCEDURE — 120N000002 HC R&B MED SURG/OB UMMC

## 2024-02-14 PROCEDURE — 250N000009 HC RX 250: Performed by: STUDENT IN AN ORGANIZED HEALTH CARE EDUCATION/TRAINING PROGRAM

## 2024-02-14 PROCEDURE — 85520 HEPARIN ASSAY: CPT | Performed by: STUDENT IN AN ORGANIZED HEALTH CARE EDUCATION/TRAINING PROGRAM

## 2024-02-14 PROCEDURE — 84295 ASSAY OF SERUM SODIUM: CPT | Performed by: STUDENT IN AN ORGANIZED HEALTH CARE EDUCATION/TRAINING PROGRAM

## 2024-02-14 PROCEDURE — 99233 SBSQ HOSP IP/OBS HIGH 50: CPT

## 2024-02-14 PROCEDURE — 82565 ASSAY OF CREATININE: CPT | Performed by: HOSPITALIST

## 2024-02-14 PROCEDURE — 99233 SBSQ HOSP IP/OBS HIGH 50: CPT | Performed by: STUDENT IN AN ORGANIZED HEALTH CARE EDUCATION/TRAINING PROGRAM

## 2024-02-14 RX ORDER — LORAZEPAM 2 MG/ML
1 CONCENTRATE ORAL EVERY 6 HOURS PRN
Status: DISCONTINUED | OUTPATIENT
Start: 2024-02-14 | End: 2024-02-16

## 2024-02-14 RX ORDER — ENOXAPARIN SODIUM 100 MG/ML
50 INJECTION SUBCUTANEOUS EVERY 12 HOURS
Status: DISCONTINUED | OUTPATIENT
Start: 2024-02-14 | End: 2024-02-16

## 2024-02-14 RX ORDER — LORAZEPAM 0.5 MG/1
0.25 TABLET ORAL ONCE
Status: COMPLETED | OUTPATIENT
Start: 2024-02-14 | End: 2024-02-14

## 2024-02-14 RX ADMIN — ENOXAPARIN SODIUM 40 MG: 40 INJECTION SUBCUTANEOUS at 08:20

## 2024-02-14 RX ADMIN — HYDROXYZINE HYDROCHLORIDE 10 MG: 10 TABLET ORAL at 12:54

## 2024-02-14 RX ADMIN — DIPHENHYDRAMINE HYDROCHLORIDE, ZINC ACETATE: 2; .1 CREAM TOPICAL at 06:06

## 2024-02-14 RX ADMIN — DIPHENHYDRAMINE HYDROCHLORIDE, ZINC ACETATE: 2; .1 CREAM TOPICAL at 12:54

## 2024-02-14 RX ADMIN — Medication 1 CAPSULE: at 08:20

## 2024-02-14 RX ADMIN — LEVOTHYROXINE SODIUM 50 MCG: 50 TABLET ORAL at 08:20

## 2024-02-14 RX ADMIN — DICYCLOMINE HYDROCHLORIDE 10 MG: 10 CAPSULE ORAL at 12:54

## 2024-02-14 RX ADMIN — IPRATROPIUM BROMIDE AND ALBUTEROL SULFATE 3 ML: .5; 3 SOLUTION RESPIRATORY (INHALATION) at 09:18

## 2024-02-14 RX ADMIN — BUSPIRONE HYDROCHLORIDE 10 MG: 10 TABLET ORAL at 15:42

## 2024-02-14 RX ADMIN — LORAZEPAM 1 MG: 2 CONCENTRATE ORAL at 16:11

## 2024-02-14 RX ADMIN — ACETAMINOPHEN 650 MG: 325 TABLET, FILM COATED ORAL at 06:02

## 2024-02-14 RX ADMIN — ACETAMINOPHEN 325 MG: 325 TABLET, FILM COATED ORAL at 15:42

## 2024-02-14 RX ADMIN — CEFEPIME HYDROCHLORIDE 2 G: 2 INJECTION, POWDER, FOR SOLUTION INTRAVENOUS at 13:03

## 2024-02-14 RX ADMIN — ENOXAPARIN SODIUM 60 MG: 60 INJECTION SUBCUTANEOUS at 22:09

## 2024-02-14 RX ADMIN — IPRATROPIUM BROMIDE AND ALBUTEROL SULFATE 3 ML: .5; 3 SOLUTION RESPIRATORY (INHALATION) at 17:22

## 2024-02-14 RX ADMIN — LATANOPROST 1 DROP: 50 SOLUTION OPHTHALMIC at 22:10

## 2024-02-14 RX ADMIN — PANTOPRAZOLE SODIUM 40 MG: 40 TABLET, DELAYED RELEASE ORAL at 08:20

## 2024-02-14 RX ADMIN — BUDESONIDE 1 MG: 1 INHALANT ORAL at 09:17

## 2024-02-14 RX ADMIN — BUSPIRONE HYDROCHLORIDE 10 MG: 10 TABLET ORAL at 08:20

## 2024-02-14 RX ADMIN — LOPERAMIDE HYDROCHLORIDE 2 MG: 2 CAPSULE ORAL at 08:20

## 2024-02-14 RX ADMIN — ONDANSETRON 4 MG: 2 INJECTION INTRAMUSCULAR; INTRAVENOUS at 12:54

## 2024-02-14 RX ADMIN — FLUOXETINE HYDROCHLORIDE 40 MG: 20 CAPSULE ORAL at 08:20

## 2024-02-14 RX ADMIN — IPRATROPIUM BROMIDE AND ALBUTEROL SULFATE 3 ML: .5; 3 SOLUTION RESPIRATORY (INHALATION) at 13:30

## 2024-02-14 RX ADMIN — Medication 0.25 MG: at 10:30

## 2024-02-14 RX ADMIN — IPRATROPIUM BROMIDE AND ALBUTEROL SULFATE 3 ML: .5; 3 SOLUTION RESPIRATORY (INHALATION) at 20:02

## 2024-02-14 RX ADMIN — PROCHLORPERAZINE EDISYLATE 5 MG: 5 INJECTION INTRAMUSCULAR; INTRAVENOUS at 16:11

## 2024-02-14 RX ADMIN — BUDESONIDE 1 MG: 1 INHALANT ORAL at 20:02

## 2024-02-14 ASSESSMENT — ACTIVITIES OF DAILY LIVING (ADL)
ADLS_ACUITY_SCORE: 45

## 2024-02-14 NOTE — PROVIDER NOTIFICATION
"Pt c/o stomach cramping and nausea to writer. Writer administered IV Zofran and attempted to administer her oral medications, a minute after pt taking oral medications, she had one occurrence of emesis. Slow, deep breath and sips of water encouraged as well as nausea triggers minimized.     @[id number] writer paged MD \"Seems like all pill intake is causing her to throw up. Wondering if there are any of her medications we can switch to IV/oral solution.  "

## 2024-02-14 NOTE — PROGRESS NOTES
[facility]    Medicine Progress Note - Hospitalist Service, GOLD TEAM 21    Date of Admission:  2/1/2024    Assessment & Plan   Idalia Bob is a 79 year old female admitted on 2/1/2024. Pt has history of COPD, pneumonia, hypertension, dementia and hypothyroidism among others, presented with abdominal pain and found to have consolidation on CT as well as frequent mucousy bowel movements and anorexia. Known persistent LLL infiltrate of uncertain etiology. She remains on 2-3NC with symptomatic dyspnea and continues to feel generally unwell.      Changes today:  - Narrow antibiotics to cefepime, stop vanc.   - Continue therapeutic lovenox for treatment of DVT  - Ongoing aggressive symptom management of her nausea; appreciate palliative assistance.      Left upper lobe pneumonia vs persistent consolidation vs mass  COPD  Acute on chronic hypoxemic respiratory failure  Patient with pseudomonal upper lobe pneumonia in December and has had ongoing consolidation since.  While this finding was present on imaging she has a paucity of other pathology consistent with pneumonia at the present time. She is now having increasing dyspnea x 24 hours, on 3LNC. Repeat CT with worsening upper lobe infiltrates and edema. Differential diagnosis includes ongoing bacterial process despite appropriate CAP treatment vs edema vs ILD such as . Initial plan for bronchoscopy/EBUS 2/12 but holding off for now given increasing O2 requirement and symptoms. Consideration for PE as contributing, though feel this is less likely with infiltrates present on imaging to account for her symptoms.    Looking back, she has been treated with atypical coverage with doxycycline and completed 5-6 days prior to transition to Zosyn and loss of atypical coverage. Her urine legionella is negative and would not expect mycoplasma to persist beyond this. Consideration for extended atypical course vs resistance but  feel this is less likely. Consideration for trial of macrolide or fluoroquinolone, however, given borderline Qtc and age, would prefer to avoid given low clinical concern. Ongoing consideration for inflammatory process and I do wonder about trial of steroids to see if this improves her symptoms.     -s/p azithromycin and ceftriaxone  - Stop Vancomycin 2/13 in the setting of negative MRSA nasal swab   - No indication for legionella coverage with negative urine legionella  - Continue cefepime for now, given her history of PsA pneumonia  - Consideration for steroids in the setting of ?inflammatory pneumonia and ongoing intractable nausea but will defer to pulmonology   - Given Lasix 40mg IV this AM, holding additional dosing in the setting of uptrending Cr and poor PO intake.   - Strict I/O's   -Continue home oxygen  -Continue home COPD regimen  -negative Fungitell, galactomannan, and sputum cultures.  -pulm following  -No clinical evidence of wheezing or COPD exacerbation currently; low threshold for steroids if she decompensates given question   - TTE without evidence of CHF  - Therapeutic anticoagulation in the setting of DVT     Abdominal Pain, improved   Loose stools with mucus  Tenesmus  Unintentional weight loss  Anorexia  The patient has had weeks of worsening anorexia along with crampy abdominal pain but no evidence of gastrointestinal bleeding.  She has frequent (many many times per day) mucousy bowel movements with very little stool production.  C. difficile and enteric panel are negative.  She does have normocytic anemia and a hiatal hernia. S/p flex sig and endoscopy 2/7. Noted hiatal hernia, mild inflammatory changes in distal colon/rectum s/p biopsy. If symptoms persist beyond 8 weeks repeat flex sig should be completed to eval for chronic inflammatory process per GI.  -Appreciate GI consultation; since signed off  -Encourage oral intake, supplements  -Add Bentyl, psyllium, immodium  -Physical and  Occupational Therapy consults  -Stopped D5 with K on 2/12 given need for diuresis; low threshold to resume if she develops hypoglycemia   -Encourage PO and trial Zofran prior to trial of PO   -Consider Mertazapine vs Marinol if ongoing issues with appetite    RLE DVT  Noted on US 2/13, started on therapeutic lovenox.      Urinary tract infection  Urine culture with 10-50,000 colonies of Enterococcus faecalis.    -s/p treatment     Elevated Troponin  Likely type II MI in the setting of acute illness. Plateaued at 51. No chest pain. Repeat EKG on 2/12 without acute ischemic changes.   - TTE without RWMA     Hypokalemia  Hypomagnesemia  In the setting of poor oral intake as well as PPI use.  Will continue to follow closely.       Iron deficiency anemia    Not tolerating Iron replacement.  Her ferritin is 85 which likely reflects a degree of iron deficiency given some presence of inflammation.     Hypertension  Holding PTA losartan 50 mg p.o. daily in the setting of uptrending Cr, recent aggressive diuresis and poor PO intake     Anxiety and depression  Takes BuSpar, Prozac  -Continue home regimen  - added Zyprexa 2.5mg HS per palliative on 2/13  - Monitor Qtc      Hypothyroidism     Takes Synthroid 50 mcg. TSH normal  -Continue     Dementia   - continue donepezil          Diet: Snacks/Supplements Adult: Gelatein Plus; Between Meals  Snacks/Supplements Adult: Ensure Enlive; With Meals  Regular Diet Adult    DVT Prophylaxis: Enoxaparin (Lovenox) SQ  Ruiz Catheter: Not present  Lines: None     Cardiac Monitoring: None  Code Status: No CPR- Do NOT Intubate      Clinically Significant Risk Factors        # Hyperkalemia: Highest K = 5.4 mmol/L in last 2 days, will monitor as appropriate           # Hypertension: Noted on problem list     # Dementia: noted on problem list    # Overweight: Estimated body mass index is 26.44 kg/m  as calculated from the following:    Height as of this encounter: 1.524 m (5').    Weight as of  this encounter: 61.4 kg (135 lb 5.8 oz).   # Severe Malnutrition: based on nutrition assessment    # Financial/Environmental Concerns: none         Disposition Plan             Geetha Tong DO  Hospitalist Service, GOLD TEAM 21  [facility]  Securely message with [employer] (more info)  Text page via [facility] Paging/Directory   See signed in provider for up to date coverage information  ______________________________________________________________________    Interval History   LE doppler positive for DVT, started on therapeutic lovenox.     She continues to feel dyspneic and nauseated with hesitancy to take PO due to this. She denies abdominal pain.     Physical Exam   Vital Signs: Temp: 97.9  F (36.6  C) Temp src: Oral BP: 120/74 Pulse: 95   Resp: 18 SpO2: 94 % O2 Device: Nasal cannula Oxygen Delivery: 3 LPM  Weight: 135 lbs 5.8 oz    General: resting in bed, no acute distress  HEENT: glasses in place, NC in place. MMM  CV: RRR, Extremities are warm and well perfused.   LUNGS: no increased WOB at rest, clear anteriorly.   ABD: Soft, NT/ND.   SKIN: Warm, well perfused. Scattered ecchymosis and excoriations present over the bilateral lower extremities.   MSK: No deformities. Trace edema to the ankle. No clubbing, cyanosis.   NEURO: Alert and appropriate. No focal deficits.    Medical Decision Making           Data     I have personally reviewed the following data over the past 24 hrs:    10.1  \   9.2 (L)   / N/A     134 (L) 92 (L) 9.3 /  97   4.2 28 0.97 (H) \     ALT: N/A AST: N/A AP: N/A TBILI: N/A   ALB: 3.5 TOT PROTEIN: N/A LIPASE: N/A       Imaging results reviewed over the past 24 hrs:   Recent Results (from the past 24 hour(s))   US Lower Extremity Venous Duplex Bilateral   Result Value    Radiologist flags DVT (Urgent)    Narrative    EXAMINATION: DOPPLER VENOUS ULTRASOUND OF BILATERAL LOWER EXTREMITIES,  2/12/2024 8:01 PM     COMPARISON: None.    HISTORY:  Evaluate for clot    TECHNIQUE:  Gray-scale evaluation with compression, spectral flow and  color Doppler assessment of the deep venous system of both legs from  groin to knee, and then at the ankles.    FINDINGS:  Right lower extremity: the common femoral, femoral, popliteal  demonstrate normal compressibility and blood flow. The peroneal vein  is not visualized. Occlusive thrombus in the posterior tibial vein in  the proximal-mid calf.    Left lower extremity: the common femoral, femoral, popliteal and  posterior tibial veins demonstrate normal compressibility and blood  flow. The peroneal vein is not visualized.    Avascular hypoechoic collection in the left popliteal fossa measuring  5.5 x 1.9 x 2.4 cm.      Impression    IMPRESSION:  1. Occlusive DVT in the right posterior tibial vein at the  proximal-mid calf.  2.  No evidence of deep venous thrombosis in the left lower extremity.  3. Left popliteal cyst.    [Urgent Result: DVT]    Finding was identified on 2/12/2024 8:23 PM.     Dr. Torrez was contacted by Dr. Holbrook at 2/12/2024 8:31 PM and  verbalized understanding of the urgent finding.     I have personally reviewed the examination and initial interpretation  and I agree with the findings.    NIELS ROMERO MD         SYSTEM ID:  [id number]

## 2024-02-14 NOTE — PHARMACY-ANTICOAGULATION SERVICE
Clinical Pharmacy Note- Enoxaparin Anti-Xa Evaluation for ADULT Patients    Date of Service 2024  Patient's  1945   Patient's age:  79 year old  Patient's Weight used for enoxaparin dosin.4 kg (135 lb 5.8 oz)  Patient's BMI: Body mass index is 26.44 kg/m .   Enoxaparin Indication: DVT/PE treatment  Goal LMWH anti-Xa level for ADULT patients: 0.5-1 IU/mL (1 mg/kg or 0.75 mg/kg BID dose)  Current Enoxaparin Regimen: 0.75 mg/kg subcutaneous Q 12 hours      Creatinine for last 3 days:   Creatinine   Date Value Ref Range Status   2024 0.90 0.51 - 0.95 mg/dL Final   2024 0.97 (H) 0.51 - 0.95 mg/dL Final   2024 0.92 0.51 - 0.95 mg/dL Final      Current estimated CrCL:  Serum creatinine: 0.9 mg/dL 24 0537  Estimated creatinine clearance: 40.8 mL/min     Platelet count for last 3 days:   Platelet Count   Date Value Ref Range Status   2024 341 150 - 450 10e3/uL Final   2024   Final     Comment:     Platelets clumped.   Platelet count not available.     2024 324 150 - 450 10e3/uL Final      Recent Enoxaparin anti-Xa Levels (past 3 days):   Recent Labs     24  1231   ALMWH 0.50   ]    ASSESSMENT OF LEVELS AND CURRENT REGIMEN:   1) Enoxaparin anti-XA level was drawn 4 hours post 4th dose at steady state.    2) Current LMWH anti-Xa peak level is: At goal but on very low end of goal    3) Renal Function:  Scr changed > 50% in last 72 hours? No  Urine Output: Good    4) Platelet Counts have been assessed and are: Stable    RECOMMENDATIONS:      Recommended Enoxaparin Anti-Xa Dose Adjustments  VTE Treatment (1 mg/kg or 0.75 mg/kg SQ Q12h dosing)   4h peak Anti-Xa (IU/ml) Hold next dose? Dose change Repeat Anti-Xa levels   <0.35 No INCREASE   25% 4 hours after the dose once at steady state   0.35-0.49 No INCREASE    10-15%    0.5-1 No No change Repeat only if necessary   1.01-1.59 No DECREASE   20% 4 hours after the dose once at steady state   1.6-2 x 3 h  "DECREASE   30%    >2 All further doses should be held and anti-Xa levels   measured every 12 hours until it is <0.5 units/ml.    Decrease previous dose by 40% when restarted.   Non-Standard Treatment (1.5 mg/kg SQ Q24h) AND VTE Prophylaxis Dosing   If LMWH anti-Xa levels are outside goal range, adjust dose up/down proportionally by percentage as pharmacokinetics are linear     1) Enoxaparin Regimen/Dose Plan: Increase dose to 50 units every 12 hours  2) Recheck Enoxaparin anti-Xa levels: 4 hours after the 4th dose of the \"new\" regimen:  2/16 at 1300       The pharmacist will continue to monitor and follow enoxaparin LMWH anti-Xa levels as needed.      Please contact pharmacy if enoxaparin needs to be held for a procedure or if goal levels change.    Lyric Pompa, [facility], Pharm.D    "

## 2024-02-14 NOTE — PROGRESS NOTES
Care Management Follow Up    Length of Stay (days): 13    Expected Discharge Date:       Concerns to be Addressed: adjustment to diagnosis/illness, care conference  Patient plan of care discussed at interdisciplinary rounds: Yes    Anticipated Discharge Disposition: Assisted Living     Anticipated Discharge Services: None (Home Care)  Anticipated Discharge DME:      Patient/family educated on Medicare website which has current facility and service quality ratings:  n/a  Education Provided on the Discharge Plan:    Patient/Family in Agreement with the Plan: yes    Referrals Placed by CM/SW:  Care conference coordination  Private pay costs discussed: Not applicable    Additional Information:  Provider updated writer during rounds that a care conference for pt and family would be beneficial. Provider requested the presence of palliative and pulmonary if possible. Writer reached out to pt's daughters Barbara and Corie. They could both attend a care conference Fri 2/16 at 1pm. Lex Tong with [employer] also reported this time would work for her. Writer paged palliative and pulmonary twice utilizing [facility] and did not receive return phone calls. Per provider Lex Tong, pulm presence is preference though not necessary. Please follow up with Pulm and Palliative and confirm care conference time/date with primary team, pt and family.     Chrissie Macias RNCC  Covering for 6 Med/Surg  Phone [phone]  Pager [phone]      RN Care Coordinator     Social Work and Care Management Department      SEARCHABLE in [facility] - search CARE COORDINATOR       [place] [employer] [place] ([phone]) Saturday & Sunday; ([phone]) [facility] Recognized Holidays     Units: 5A Onc Vocera & 5C Vocera Pager: [phone]    Units: 6B Vocera & 6C Vocera  Pager: [phone]    Units: 7A SOT RNCC Vocera, 7B Med Surg Vocera, & 7C Med Surg Vocera  Pager: [phone]    Units: 6A Vocera & 4A CVICU Vocera, 4C MICU Vocera, and 4E SICU Vocera   Pager:  [phone]    Units: 5 Ortho Vocera & 5 Med Surg Vocera  Pager: [phone]    Units: 6 Med Surg Vocera & 3 Med Surg Vocera  Pager [phone]

## 2024-02-14 NOTE — PROGRESS NOTES
[facility]    Medicine Progress Note - Hospitalist Service, GOLD TEAM 21    Date of Admission:  2/1/2024    Assessment & Plan   Idalia Bob is a 79 year old female admitted on 2/1/2024. Pt has history of COPD, pneumonia, hypertension, dementia and hypothyroidism among others, presented with abdominal pain and found to have consolidation on CT as well as frequent mucousy bowel movements and anorexia. Known persistent LLL infiltrate of uncertain etiology. She remains on 2-3NC with symptomatic dyspnea and continues to feel generally unwell, tolerating minimal PO.      Changes today:  - Trial of low dose ativan given she remains symptomatically short of breath despite stable O2 requirements and wonder if anxiety contributing   - Ongoing aggressive symptom management of her nausea; appreciate palliative assistance.   - Plan for CC later this week     Left upper lobe pneumonia vs persistent consolidation vs mass  COPD  Acute on chronic hypoxemic respiratory failure  Patient with pseudomonal upper lobe pneumonia in December and has had ongoing consolidation since.  While this finding was present on imaging she has a paucity of other pathology consistent with pneumonia at the present time. She is now having increasing dyspnea x 24 hours, on 3LNC. Repeat CT with worsening upper lobe infiltrates and edema. Differential diagnosis includes ongoing bacterial process despite appropriate CAP treatment vs edema vs ILD such as . Initial plan for bronchoscopy/EBUS 2/12 but holding off for now given increasing O2 requirement and symptoms. Consideration for PE as contributing, though feel this is less likely with infiltrates present on imaging to account for her symptoms and she is anticoagulated in the setting of DVT.     Unclear etiology of this persistent consolidation at this time with limited ability to work this up further. I suspect this does not represent bacterial process  given no improvement with antibiotics. Urine legionella negative making this less likely. Hesitate to treat legionella empirically given her borderline QT and age with regards to macrolide or fluoroquinolone. Will discuss with pulm potential use of steroids to see if this improves symptoms with question organizing process. Would be helpful prognostically to get additional information regarding lung pathology given GOC conversations in the setting of her ongoing nausea and dyspnea, if indicated per pulm.   -s/p azithromycin and ceftriaxone  - Stop Vancomycin 2/13 in the setting of negative MRSA nasal swab   - No indication for legionella coverage with negative urine legionella  - Continue cefepime for now, given her history of PsA pneumonia  - Consideration for steroids in the setting of ?inflammatory pneumonia and ongoing intractable nausea but will defer to pulmonology   - Holding diuresis given little symptomatic improvement with this   - Strict I/O's   - Continue home oxygen  - Continue home COPD regimen  - negative Fungitell, galactomannan, and sputum cultures.  -p ulm following  -No clinical evidence of wheezing or COPD exacerbation currently; low threshold for steroids if she decompensates given question   - TTE without evidence of CHF  - Therapeutic anticoagulation in the setting of DVT   - Will ask pulm whether BAL vs thoracentesis may be helpful for symptomatic management and diagnostics in the setting of above.     Abdominal Pain, improved   Intractable nausea   Loose stools with mucus, improved   Tenesmus  Unintentional weight loss  Anorexia  The patient has had weeks of worsening anorexia along with crampy abdominal pain but no evidence of gastrointestinal bleeding.  She has frequent (many many times per day) mucousy bowel movements with very little stool production.  C. difficile and enteric panel are negative.  She does have normocytic anemia and a hiatal hernia. S/p flex sig and endoscopy 2/7.  Noted hiatal hernia, mild inflammatory changes in distal colon/rectum s/p biopsy. If symptoms persist beyond 8 weeks repeat flex sig should be completed to eval for chronic inflammatory process per GI.    Ongoing intractable nausea and inability to tolerate PO. Most bothersome when she anticipates and after she takes PO. Tolerated pizza on 2/13 while eating and for approximately 2 hours before and episode of emesis. Neurologic exam is grossly normal, no diplopia or nystagmus on visual exam, making vertigo and central process less likely. No clinical evidence to suggest increased ICP though low threshold for repeat head imaging.   -Appreciate GI consultation; since signed off  -Encourage oral intake, supplements  -Ok to continue Bentyl prn, stopped psyllium, imodium as she is now constipated  -Physical and Occupational Therapy consults  -Stopped D5 with K on 2/12 given need for diuresis; low threshold to resume if she develops hypoglycemia   -Encourage PO and trial Zofran prior to trial of PO   -Consider Mertazapine vs Marinol if ongoing issues with appetite  -Trial of ativan on 2/14 to see if this improves nausea and anxiety/dyspnea    RLE DVT  Noted on US 2/13, started on therapeutic lovenox.      Urinary tract infection  Urine culture with 10-50,000 colonies of Enterococcus faecalis.    -s/p treatment     Elevated Troponin  Likely type II MI in the setting of acute illness. Plateaued at 51. No chest pain. Repeat EKG on 2/12 without acute ischemic changes. Repeat EKG 2/14 without ischemic changes.   - TTE without RWMA     Hypokalemia  Hypomagnesemia  In the setting of poor oral intake as well as PPI use.  Will continue to follow closely.       Iron deficiency anemia    Not tolerating Iron replacement.  Her ferritin is 85 which likely reflects a degree of iron deficiency given some presence of inflammation.     Hypertension  Holding PTA losartan 50 mg p.o. daily in the setting of uptrending Cr, recent aggressive  diuresis and poor PO intake     Anxiety and depression  Takes BuSpar, Prozac  -Continue home regimen  - added Zyprexa 2.5mg HS per palliative on 2/13  - Monitor Qtc      Hypothyroidism     Takes Synthroid 50 mcg. TSH normal  -Continue     Dementia   - continue donepezil          Diet: Snacks/Supplements Adult: Gelatein Plus; Between Meals  Snacks/Supplements Adult: Ensure Enlive; With Meals  Regular Diet Adult    DVT Prophylaxis: Enoxaparin (Lovenox) SQ  Ruiz Catheter: Not present  Lines: None     Cardiac Monitoring: None  Code Status: No CPR- Do NOT Intubate      Clinically Significant Risk Factors        # Hyperkalemia: Highest K = 5.4 mmol/L in last 2 days, will monitor as appropriate           # Hypertension: Noted on problem list     # Dementia: noted on problem list    # Overweight: Estimated body mass index is 26.44 kg/m  as calculated from the following:    Height as of this encounter: 1.524 m (5').    Weight as of this encounter: 61.4 kg (135 lb 5.8 oz).   # Severe Malnutrition: based on nutrition assessment    # Financial/Environmental Concerns: none         Disposition Plan             Geetha Tong, DO  Hospitalist Service, GOLD TEAM 21  [facility]  Securely message with [employer] (more info)  Text page via [facility] Paging/Directory   See signed in provider for up to date coverage information  ______________________________________________________________________    Interval History   No acute events overnight. She is endorsing dyspnea and sensation of now being able to take a breath in. Unsure whether she is feeling anxious, denies overt worry. She continues to feel nauseated at the thought of taking PO.     Per the patient's daughter, the patient is more agitated today with some possible paranoia.     Physical Exam   Vital Signs: Temp: 97.6  F (36.4  C) Temp src: Oral BP: 118/74 Pulse: (!) 134   Resp: 18 SpO2: 97 % O2 Device: Nasal cannula Oxygen Delivery:  3 LPM  Weight: 135 lbs 5.8 oz      General: resting in bed, intermittent mild distress stating she feels short of breath   HEENT: glasses in place, NC in place. MMM  CV: RRR, Extremities are warm and well perfused.   LUNGS: no increased WOB at rest, clear anteriorly.   ABD: Soft, NT/ND. Nontender throughout.   SKIN: Warm, well perfused. Scattered ecchymosis and excoriations present over the bilateral upper and lower extremities.   MSK: No deformities. Trace edema to the ankle. No clubbing, cyanosis.   NEURO: Alert and appropriate. No focal deficits.    Medical Decision Making           Data     I have personally reviewed the following data over the past 24 hrs:    N/A  \   N/A   / 341     133 (L) N/A N/A /  N/A   3.9 N/A 0.90 \       Imaging results reviewed over the past 24 hrs:   No results found for this or any previous visit (from the past 24 hour(s)).

## 2024-02-14 NOTE — PLAN OF CARE
1900 - 0700    Temp: 98.4  F (36.9  C) Temp src: Oral BP: 118/74 Pulse: (!) 134   Resp: 18 SpO2: 97 % O2 Device: Nasal cannula Oxygen Delivery: 3 LPM    AO x 4, able to make needs known. Regular Diet, uses purewick. No complaints / verbalization of pain or discomfort. PRN medication given as oredered, No acute events during shift. Continue POC.       Goal Outcome Evaluation:    Plan of Care Reviewed With: patient  Overall Patient Progress: no change

## 2024-02-14 NOTE — PROGRESS NOTES
VS: BP (!) 157/103 (BP Location: Left arm)   Pulse 118   Temp 97.5  F (36.4  C) (Oral)   Resp 18   Ht 1.524 m (5')   Wt 61.4 kg (135 lb 5.8 oz)   SpO2 100%   BMI 26.44 kg/m     O2: 100% 3L via NC, endorses SOB   Output: Purewick in place, incontinent of bowel   Last BM:    Activity: Pt not OOB, repositioning encouraged   Skin: Small scabs to BUE and R upper thigh, pt endorses itching to these areas, PRN Atarax administered   Pain: Denies              CMS: A&Ox4, denies chest pain, numbness/tingling, and dizziness. Pt endorses n/v, PRN antiemetic administered    Dressing: N/A   Diet: Regular, poor appetite.    LDA: L PIV SL   Equipment: IV pole and pump, walker, personal belongings   Plan: Call light in reach, continue POC   Additional Info: Oral medications seem to trigger pt nausea/vomiting, antiemetic administered throughout shift and writer paged MD about transitioning oral medications to IV/oral solutions.

## 2024-02-14 NOTE — PROGRESS NOTES
Palliative Care Consultation Note  [facility]      Patient: Idalia Bob  Date of Admission:  2/1/2024    Requesting Clinician / Team: Medicine   Reason for consult: Symptom management  Goals of care     Recommendations & Counseling     MEDICAL MANAGEMENT:   #Acute abdominal pain, improved   Continue Acetaminophen (Tylenol) 650mg q4hrs prn   Consider oxycodone (Roxicodone) IR 2.5 mg q4hrs prn (only if pain worsen)     #Anorexia  #unintentional weight loss   #Anxiety associated with nausea (required one time dose lorazepam)  #Nausea; improving. Tolerating po intake. Oral medication worsens symptoms. Consider switching pills to liquid   -Pharmacologic management   Ondansetron 4mg ODT q6hrs prn   IV Ondansetron 4mg q6hrs prn   IV Prochlorperazine 5mg q6hrs prn   Prochlorperazine 5mg po q6hrs prn   Continue Zyprexa 2.5mg at bedtime started 02/13. (discussed with family and agreed) Seems to be helping, plan to optimize Zyprexa for now  Discussed potential use of Mirtazapine vs Marinol vs Dexamethasone; will hold off on further adjustment at this time and trail Zyprexa. If nausea persist could consider titrating Zyprexa and or adding Mirtazapine which patient tolerated in the past.      -Nonpharmacologic management  Small, frequent intake  Assess and avoid aggravating factors  Accupressure (C-band)  Aromatherapy, alcohol swabs known to be effective      PSYCHOSOCIAL/SPIRITUAL SUPPORT:  Family: Daughters; Barbara Bob and Corie Bob  Yu community: Religious   Spiritual:     Palliative Care will continue to follow Idalia. Thank you for the consult and allowing us to aid in the care of Idalia Bob.    These recommendations have been discussed with Medicine team, family at bedside, primary RN.    KIZZY Miller CNP  Securely message with [employer] (more info)  Text page via [facility] Paging/Directory       Assessment    Idalia Bob is a 79 year old female with a  past medical history of COPD, pneumonia, hypertension, dementia and hypothyroidism presented on 02/01/2024 with abdominal pain and found to have consolidation on CT as well as frequent mucousy bowel movements and anorexia. Palliative Care consulted for goals of care discussion as well as symptom management for abdominal pain, nausea with vomiting, and anorexia.     Today, the patient was seen for:  Goals care discussion   Palliative Care encounter   Patient/family support   Symptom management (pain, nausea, anorexia)    Palliative Care Summary:   Patient case discussed with Carina Amaya PA-C    Idalia was seen laying in bed comfortably this morning. Her daughter is at bedside. Idalia tolerated eating 2 slices of pizza last night and ate almost entire omelette for breakfast this morning.  Her nausea is slowly improving. However, oral medication seems to aggravating her nausea symptoms associated with increased anxiety swallowing pills per nursing staff. I recommended switching oral pills to liquid in hopes this would ease her fears of swallowing pills. She was seen by speech yesterday, and at the time could not tolerate the evaluation. She could benefit the seen speech therapy for swallow study now that she is tolerating p.o. intake. Otherwise, Idalia reports sleeping well last night, denies abdominal pain.    Medications:  I have reviewed this patient's medication profile and medications from this hospitalization.    Physical Exam   Vital Signs with Ranges  Temp:  [97.5  F (36.4  C)-98.4  F (36.9  C)] 97.5  F (36.4  C)  Pulse:  [] 118  Resp:  [18] 18  BP: (118-157)/() 157/103  SpO2:  [94 %-100 %] 100 %  135 lbs 5.8 oz    PHYSICAL EXAM:  Constitutional: Alert, awake, resting in bed, no acute distress   abdomen: Soft, nontender no distention  NEURO: Alert, awake, no focal deficits  JOINT/EXTREMITIES: Moving all extremities    Data reviewed:    CMP  Recent Labs   Lab 02/14/24  0537 02/13/24  0806  02/12/24 2036 02/12/24  1019   * 134*  --  131*   POTASSIUM 3.9 4.2   < > 5.4*   CHLORIDE  --  92*  --  95*   CO2  --  28  --  26   ANIONGAP  --  14  --  10   GLC  --  97  --  123*   BUN  --  9.3  --  7.4*   CR 0.90 0.97*  --  0.92   GFRESTIMATED 64 59*  --  63   AYUSH  --  9.0  --  9.1   MAG 2.5* 1.7  --  2.4*   PHOS  --  4.2  --  3.3   ALBUMIN  --  3.5  --   --     < > = values in this interval not displayed.     CBC  Recent Labs   Lab 02/14/24  0537 02/13/24  0806 02/12/24  1019   WBC  --  10.1 9.2   RBC  --  3.79* 3.50*   HGB  --  9.2* 8.5*   HCT  --  30.5* 28.7*   MCV  --  81 82   MCH  --  24.3* 24.3*   MCHC  --  30.2* 29.6*   RDW  --  18.5* 18.5*     --  324       Medical Decision Making       MANAGEMENT DISCUSSED with the following over the past 24 hours: Medicine, primary RN   NOTE(S)/MEDICAL RECORDS REVIEWED over the past 24 hours: Medicine, nursing, dietitian   50 MINUTES SPENT BY ME on the date of service doing chart review, history, exam, documentation & further activities per the note.

## 2024-02-15 LAB
ALBUMIN SERPL BCG-MCNC: 3.1 G/DL (ref 3.5–5.2)
ALP SERPL-CCNC: 66 U/L (ref 40–150)
ALT SERPL W P-5'-P-CCNC: 20 U/L (ref 0–50)
ANION GAP SERPL CALCULATED.3IONS-SCNC: 9 MMOL/L (ref 7–15)
AST SERPL W P-5'-P-CCNC: 19 U/L (ref 0–45)
ATRIAL RATE - MUSE: 105 BPM
BASOPHILS # BLD AUTO: 0 10E3/UL (ref 0–0.2)
BASOPHILS NFR BLD AUTO: 0 %
BILIRUB SERPL-MCNC: 0.3 MG/DL
BUN SERPL-MCNC: 13.4 MG/DL (ref 8–23)
CALCIUM SERPL-MCNC: 8.7 MG/DL (ref 8.8–10.2)
CHLORIDE SERPL-SCNC: 91 MMOL/L (ref 98–107)
CREAT SERPL-MCNC: 0.91 MG/DL (ref 0.51–0.95)
DEPRECATED HCO3 PLAS-SCNC: 33 MMOL/L (ref 22–29)
DIASTOLIC BLOOD PRESSURE - MUSE: NORMAL MMHG
EGFRCR SERPLBLD CKD-EPI 2021: 63 ML/MIN/1.73M2
EOSINOPHIL # BLD AUTO: 0.4 10E3/UL (ref 0–0.7)
EOSINOPHIL NFR BLD AUTO: 4 %
ERYTHROCYTE [DISTWIDTH] IN BLOOD BY AUTOMATED COUNT: 18.3 % (ref 10–15)
GLUCOSE SERPL-MCNC: 88 MG/DL (ref 70–99)
HCT VFR BLD AUTO: 29.2 % (ref 35–47)
HGB BLD-MCNC: 8.7 G/DL (ref 11.7–15.7)
IMM GRANULOCYTES # BLD: 0.1 10E3/UL
IMM GRANULOCYTES NFR BLD: 1 %
INTERPRETATION ECG - MUSE: NORMAL
LIPASE SERPL-CCNC: 10 U/L (ref 13–60)
LYMPHOCYTES # BLD AUTO: 0.8 10E3/UL (ref 0.8–5.3)
LYMPHOCYTES NFR BLD AUTO: 7 %
MAGNESIUM SERPL-MCNC: 1.9 MG/DL (ref 1.7–2.3)
MCH RBC QN AUTO: 24.2 PG (ref 26.5–33)
MCHC RBC AUTO-ENTMCNC: 29.8 G/DL (ref 31.5–36.5)
MCV RBC AUTO: 81 FL (ref 78–100)
MONOCYTES # BLD AUTO: 1.1 10E3/UL (ref 0–1.3)
MONOCYTES NFR BLD AUTO: 11 %
NEUTROPHILS # BLD AUTO: 8 10E3/UL (ref 1.6–8.3)
NEUTROPHILS NFR BLD AUTO: 77 %
NRBC # BLD AUTO: 0 10E3/UL
NRBC BLD AUTO-RTO: 0 /100
P AXIS - MUSE: NORMAL DEGREES
PLATELET # BLD AUTO: 346 10E3/UL (ref 150–450)
POTASSIUM SERPL-SCNC: 3.3 MMOL/L (ref 3.4–5.3)
POTASSIUM SERPL-SCNC: 3.9 MMOL/L (ref 3.4–5.3)
PR INTERVAL - MUSE: 168 MS
PROT SERPL-MCNC: 5.7 G/DL (ref 6.4–8.3)
QRS DURATION - MUSE: 70 MS
QT - MUSE: 374 MS
QTC - MUSE: 494 MS
R AXIS - MUSE: 64 DEGREES
RBC # BLD AUTO: 3.6 10E6/UL (ref 3.8–5.2)
SODIUM SERPL-SCNC: 133 MMOL/L (ref 135–145)
SYSTOLIC BLOOD PRESSURE - MUSE: NORMAL MMHG
T AXIS - MUSE: 20 DEGREES
VENTRICULAR RATE- MUSE: 105 BPM
WBC # BLD AUTO: 10.4 10E3/UL (ref 4–11)

## 2024-02-15 PROCEDURE — 82374 ASSAY BLOOD CARBON DIOXIDE: CPT | Performed by: STUDENT IN AN ORGANIZED HEALTH CARE EDUCATION/TRAINING PROGRAM

## 2024-02-15 PROCEDURE — 999N000157 HC STATISTIC RCP TIME EA 10 MIN

## 2024-02-15 PROCEDURE — 84132 ASSAY OF SERUM POTASSIUM: CPT | Performed by: STUDENT IN AN ORGANIZED HEALTH CARE EDUCATION/TRAINING PROGRAM

## 2024-02-15 PROCEDURE — 250N000013 HC RX MED GY IP 250 OP 250 PS 637: Performed by: HOSPITALIST

## 2024-02-15 PROCEDURE — 83690 ASSAY OF LIPASE: CPT | Performed by: STUDENT IN AN ORGANIZED HEALTH CARE EDUCATION/TRAINING PROGRAM

## 2024-02-15 PROCEDURE — 250N000013 HC RX MED GY IP 250 OP 250 PS 637: Performed by: STUDENT IN AN ORGANIZED HEALTH CARE EDUCATION/TRAINING PROGRAM

## 2024-02-15 PROCEDURE — 250N000011 HC RX IP 250 OP 636: Performed by: STUDENT IN AN ORGANIZED HEALTH CARE EDUCATION/TRAINING PROGRAM

## 2024-02-15 PROCEDURE — 99233 SBSQ HOSP IP/OBS HIGH 50: CPT | Performed by: STUDENT IN AN ORGANIZED HEALTH CARE EDUCATION/TRAINING PROGRAM

## 2024-02-15 PROCEDURE — 250N000009 HC RX 250: Performed by: STUDENT IN AN ORGANIZED HEALTH CARE EDUCATION/TRAINING PROGRAM

## 2024-02-15 PROCEDURE — 85025 COMPLETE CBC W/AUTO DIFF WBC: CPT | Performed by: STUDENT IN AN ORGANIZED HEALTH CARE EDUCATION/TRAINING PROGRAM

## 2024-02-15 PROCEDURE — 94640 AIRWAY INHALATION TREATMENT: CPT | Mod: 76

## 2024-02-15 PROCEDURE — 93005 ELECTROCARDIOGRAM TRACING: CPT

## 2024-02-15 PROCEDURE — 83735 ASSAY OF MAGNESIUM: CPT | Performed by: STUDENT IN AN ORGANIZED HEALTH CARE EDUCATION/TRAINING PROGRAM

## 2024-02-15 PROCEDURE — 36415 COLL VENOUS BLD VENIPUNCTURE: CPT | Performed by: STUDENT IN AN ORGANIZED HEALTH CARE EDUCATION/TRAINING PROGRAM

## 2024-02-15 PROCEDURE — 250N000013 HC RX MED GY IP 250 OP 250 PS 637: Performed by: PHYSICIAN ASSISTANT

## 2024-02-15 PROCEDURE — 250N000013 HC RX MED GY IP 250 OP 250 PS 637

## 2024-02-15 PROCEDURE — C9113 INJ PANTOPRAZOLE SODIUM, VIA: HCPCS | Performed by: STUDENT IN AN ORGANIZED HEALTH CARE EDUCATION/TRAINING PROGRAM

## 2024-02-15 PROCEDURE — 94640 AIRWAY INHALATION TREATMENT: CPT

## 2024-02-15 PROCEDURE — 120N000002 HC R&B MED SURG/OB UMMC

## 2024-02-15 PROCEDURE — 250N000012 HC RX MED GY IP 250 OP 636 PS 637: Performed by: STUDENT IN AN ORGANIZED HEALTH CARE EDUCATION/TRAINING PROGRAM

## 2024-02-15 RX ORDER — MAGNESIUM SULFATE HEPTAHYDRATE 40 MG/ML
2 INJECTION, SOLUTION INTRAVENOUS ONCE
Status: COMPLETED | OUTPATIENT
Start: 2024-02-15 | End: 2024-02-15

## 2024-02-15 RX ORDER — HYDRALAZINE HYDROCHLORIDE 20 MG/ML
10 INJECTION INTRAMUSCULAR; INTRAVENOUS EVERY 6 HOURS PRN
Status: DISCONTINUED | OUTPATIENT
Start: 2024-02-15 | End: 2024-02-16

## 2024-02-15 RX ORDER — POTASSIUM CHLORIDE 20MEQ/15ML
40 LIQUID (ML) ORAL ONCE
Status: COMPLETED | OUTPATIENT
Start: 2024-02-15 | End: 2024-02-15

## 2024-02-15 RX ORDER — PIPERACILLIN SODIUM, TAZOBACTAM SODIUM 3; .375 G/15ML; G/15ML
3.38 INJECTION, POWDER, LYOPHILIZED, FOR SOLUTION INTRAVENOUS EVERY 6 HOURS
Status: DISCONTINUED | OUTPATIENT
Start: 2024-02-15 | End: 2024-02-16

## 2024-02-15 RX ORDER — PREDNISONE 20 MG/1
40 TABLET ORAL DAILY
Status: COMPLETED | OUTPATIENT
Start: 2024-02-15 | End: 2024-02-19

## 2024-02-15 RX ORDER — PREDNISONE 5 MG/ML
40 SOLUTION ORAL DAILY
Qty: 200 ML | Refills: 0 | Status: DISCONTINUED | OUTPATIENT
Start: 2024-02-15 | End: 2024-02-15

## 2024-02-15 RX ADMIN — IPRATROPIUM BROMIDE AND ALBUTEROL SULFATE 3 ML: .5; 3 SOLUTION RESPIRATORY (INHALATION) at 08:06

## 2024-02-15 RX ADMIN — PREDNISONE 40 MG: 20 TABLET ORAL at 14:09

## 2024-02-15 RX ADMIN — BUSPIRONE HYDROCHLORIDE 10 MG: 10 TABLET ORAL at 08:12

## 2024-02-15 RX ADMIN — DONEPEZIL HYDROCHLORIDE 10 MG: 10 TABLET, FILM COATED ORAL at 21:15

## 2024-02-15 RX ADMIN — BUSPIRONE HYDROCHLORIDE 10 MG: 10 TABLET ORAL at 21:15

## 2024-02-15 RX ADMIN — LEVOTHYROXINE SODIUM 50 MCG: 50 TABLET ORAL at 08:12

## 2024-02-15 RX ADMIN — DIPHENHYDRAMINE HYDROCHLORIDE, ZINC ACETATE: 2; .1 CREAM TOPICAL at 21:14

## 2024-02-15 RX ADMIN — BUDESONIDE 1 MG: 1 INHALANT ORAL at 20:18

## 2024-02-15 RX ADMIN — CEFEPIME HYDROCHLORIDE 2 G: 2 INJECTION, POWDER, FOR SOLUTION INTRAVENOUS at 11:54

## 2024-02-15 RX ADMIN — MAGNESIUM SULFATE HEPTAHYDRATE 2 G: 40 INJECTION, SOLUTION INTRAVENOUS at 08:58

## 2024-02-15 RX ADMIN — LATANOPROST 1 DROP: 50 SOLUTION OPHTHALMIC at 21:14

## 2024-02-15 RX ADMIN — POTASSIUM CHLORIDE 40 MEQ: 40 SOLUTION ORAL at 09:46

## 2024-02-15 RX ADMIN — LORAZEPAM 1 MG: 2 CONCENTRATE ORAL at 09:46

## 2024-02-15 RX ADMIN — FLUOXETINE HYDROCHLORIDE 40 MG: 20 CAPSULE ORAL at 08:12

## 2024-02-15 RX ADMIN — PIPERACILLIN AND TAZOBACTAM 3.38 G: 3; .375 INJECTION, POWDER, FOR SOLUTION INTRAVENOUS at 21:28

## 2024-02-15 RX ADMIN — BUDESONIDE 1 MG: 1 INHALANT ORAL at 08:06

## 2024-02-15 RX ADMIN — HYDROXYZINE HYDROCHLORIDE 10 MG: 10 TABLET ORAL at 21:15

## 2024-02-15 RX ADMIN — CEFEPIME HYDROCHLORIDE 2 G: 2 INJECTION, POWDER, FOR SOLUTION INTRAVENOUS at 01:16

## 2024-02-15 RX ADMIN — ENOXAPARIN SODIUM 50 MG: 60 INJECTION SUBCUTANEOUS at 08:12

## 2024-02-15 RX ADMIN — OLANZAPINE 2.5 MG: 5 TABLET, ORALLY DISINTEGRATING ORAL at 21:15

## 2024-02-15 RX ADMIN — ENOXAPARIN SODIUM 50 MG: 60 INJECTION SUBCUTANEOUS at 21:15

## 2024-02-15 RX ADMIN — BUSPIRONE HYDROCHLORIDE 10 MG: 10 TABLET ORAL at 14:09

## 2024-02-15 RX ADMIN — PROCHLORPERAZINE EDISYLATE 5 MG: 5 INJECTION INTRAMUSCULAR; INTRAVENOUS at 12:27

## 2024-02-15 RX ADMIN — IPRATROPIUM BROMIDE AND ALBUTEROL SULFATE 3 ML: .5; 3 SOLUTION RESPIRATORY (INHALATION) at 20:18

## 2024-02-15 RX ADMIN — PANTOPRAZOLE SODIUM 40 MG: 40 INJECTION, POWDER, FOR SOLUTION INTRAVENOUS at 08:12

## 2024-02-15 RX ADMIN — PIPERACILLIN AND TAZOBACTAM 3.38 G: 3; .375 INJECTION, POWDER, FOR SOLUTION INTRAVENOUS at 17:10

## 2024-02-15 RX ADMIN — ONDANSETRON 4 MG: 2 INJECTION INTRAMUSCULAR; INTRAVENOUS at 08:12

## 2024-02-15 ASSESSMENT — ACTIVITIES OF DAILY LIVING (ADL)
ADLS_ACUITY_SCORE: 45
ADLS_ACUITY_SCORE: 47
ADLS_ACUITY_SCORE: 45
ADLS_ACUITY_SCORE: 47
ADLS_ACUITY_SCORE: 45

## 2024-02-15 NOTE — PROGRESS NOTES
[facility]    Medicine Progress Note - Hospitalist Service, GOLD TEAM 21    Date of Admission:  2/1/2024    Assessment & Plan   Idalia Bob is a 79 year old female admitted on 2/1/2024. Pt has history of COPD, pneumonia, hypertension, dementia and hypothyroidism among others, presented with abdominal pain and found to have consolidation on CT as well as frequent mucousy bowel movements and anorexia. Known persistent LLL infiltrate of uncertain etiology. She remains on 2-3NC with symptomatic dyspnea and continues to feel generally unwell, tolerating minimal PO.      Changes today:  - Plan for CC on 2/16 with family - this will take place outside of the patient's room, at least initially.   - Start Prednisone 40mg PO x 5 days - consider IV dexamethasone if unable to tolerate oral steroid   - Ongoing symptom management   - Continue antibiotics for now      Left upper lobe pneumonia vs persistent consolidation vs mass  COPD  Acute on chronic hypoxemic respiratory failure  Patient with pseudomonal upper lobe pneumonia in December and has had ongoing consolidation since.  While this finding was present on imaging she has a paucity of other pathology consistent with pneumonia at the present time. She is now having increasing dyspnea x 24 hours, on 3LNC. Repeat CT with worsening upper lobe infiltrates and edema. Differential diagnosis includes ongoing bacterial process despite appropriate CAP treatment vs edema vs ILD such as . Initial plan for bronchoscopy/EBUS 2/12 but holding off for now given increasing O2 requirement and symptoms. Consideration for PE as contributing, though feel this is less likely with infiltrates present on imaging to account for her symptoms and she is anticoagulated in the setting of DVT.     Per discussion with pulmonology, it is felt this may represent recurrent/persistent pneumonia in the setting of decrease airway clearance due to her  functional status. Unable to say whether this may represent malignancy, but risk outweighs benefit of biopsy given her underlying lung disease. No plan for BAL or biopsy. Continue airway clearance and antibiotics for now, as long as this remains within GOC.   -s/p azithromycin and ceftriaxone  - Stop Vancomycin 2/13 in the setting of negative MRSA nasal swab   - No indication for legionella coverage with negative urine legionella  - Stop cefepime with concern for neurotoxicity, transition to Zosyn with plan to complete 7 day course, given her history of PsA pneumonia (end 2/16)  - Prednisone 40mg PO daily x 5 days   - Holding diuresis given little symptomatic improvement with this; low threshold to resume if increasing O2 requirements  - Strict I/O's   - Continue home oxygen  - Continue home COPD regimen  - negative Fungitell, galactomannan, and sputum cultures.  -pulm following  -No clinical evidence of wheezing or COPD exacerbation currently; low threshold for steroids if she decompensates given question   - TTE without evidence of CHF  - Therapeutic anticoagulation in the setting of DVT     Abdominal Pain, improved   Intractable nausea   Loose stools with mucus, improved   Tenesmus  Unintentional weight loss  Anorexia  The patient has had weeks of worsening anorexia along with crampy abdominal pain but no evidence of gastrointestinal bleeding.  She has frequent (many many times per day) mucousy bowel movements with very little stool production.  C. difficile and enteric panel are negative.  She does have normocytic anemia and a hiatal hernia. S/p flex sig and endoscopy 2/7. Noted hiatal hernia, mild inflammatory changes in distal colon/rectum s/p biopsy. If symptoms persist beyond 8 weeks repeat flex sig should be completed to eval for chronic inflammatory process per GI.    Ongoing intractable nausea and inability to tolerate PO. Most bothersome when she anticipates and after she takes PO. Neurologic exam is  grossly normal, no diplopia or nystagmus on visual exam, making vertigo and central process less likely. No clinical evidence to suggest increased ICP though low threshold for repeat head imaging.   -Appreciate GI consultation; since signed off  -Encourage oral intake, supplements  -Ok to continue Bentyl prn, stopped psyllium, imodium as she is now constipated  -Physical and Occupational Therapy consults  - Holding IVF   -Encourage PO and trial Zofran prior to trial of PO   - Zyprexa added per palliative  - Steroids per above, consider IV dosing if unable to tolerate oral   -Trial of ativan on 2/14 to see if this improves nausea and anxiety/dyspnea    RLE DVT  Noted on US 2/13, started on therapeutic lovenox.      Urinary tract infection  Urine culture with 10-50,000 colonies of Enterococcus faecalis.    -s/p treatment     Elevated Troponin  Likely type II MI in the setting of acute illness. Plateaued at 51. No chest pain. Repeat EKG on 2/12 without acute ischemic changes. Repeat EKG 2/14 without ischemic changes.   - TTE without RWMA     Hypokalemia  Hypomagnesemia  In the setting of poor oral intake as well as PPI use.  Will continue to follow closely.       Iron deficiency anemia    Not tolerating Iron replacement.  Her ferritin is 85 which likely reflects a degree of iron deficiency given some presence of inflammation.     Hypertension  Holding PTA losartan 50 mg p.o. daily in the setting of uptrending Cr, recent aggressive diuresis and poor PO intake     Anxiety and depression  Takes BuSpar, Prozac  -Continue home regimen  - added Zyprexa 2.5mg HS per palliative on 2/13  - Monitor Qtc      Hypothyroidism     Takes Synthroid 50 mcg. TSH normal  -Continue     Dementia   - continue donepezil          Diet: Snacks/Supplements Adult: Gelatein Plus; Between Meals  Snacks/Supplements Adult: Ensure Enlive; With Meals  Regular Diet Adult    DVT Prophylaxis: Enoxaparin (Lovenox) SQ  Ruiz Catheter: Not present  Lines:  None     Cardiac Monitoring: None  Code Status: No CPR- Do NOT Intubate      Clinically Significant Risk Factors              # Hypoalbuminemia: Lowest albumin = 3 g/dL at 2/14/2024  5:37 AM, will monitor as appropriate     # Hypertension: Noted on problem list     # Dementia: noted on problem list    # Overweight: Estimated body mass index is 26.44 kg/m  as calculated from the following:    Height as of this encounter: 1.524 m (5').    Weight as of this encounter: 61.4 kg (135 lb 5.8 oz).   # Severe Malnutrition: based on nutrition assessment    # Financial/Environmental Concerns: none         Disposition Plan             Geetha Tong, DO  Hospitalist Service, GOLD TEAM 21  [facility]  Securely message with [employer] (more info)  Text page via [facility] Paging/Directory   See signed in provider for up to date coverage information  ______________________________________________________________________    Interval History   Ongoing confusion noted overnight and intermittent sensation of feeling short of breath. I did not wake her as she is resting comfortably at this time. Per daughter, who is present at the bedside, she is having intermittent emesis several hours following PO intake.      Physical Exam   Vital Signs: Temp: 99.4  F (37.4  C) Temp src: Oral BP: (!) 145/80 Pulse: (!) 121   Resp: 20 SpO2: 92 % O2 Device: Nasal cannula Oxygen Delivery: 2.5 LPM  Weight: 135 lbs 5.8 oz    General: resting in bed  HEENT: glasses in place, NC in place.   CV: Extremities are warm and well perfused.   LUNGS: no increased WOB at rest  SKIN: Warm, well perfused. Scattered ecchymosis and excoriations present over the bilateral upper and lower extremities.   MSK: No deformities. Trace edema to the ankle. No clubbing, cyanosis.   NEURO: Unable to assess       Medical Decision Making       60 MINUTES SPENT BY ME on the date of service doing chart review, history, exam, documentation &  further activities per the note.      Data     I have personally reviewed the following data over the past 24 hrs:    10.4  \   8.7 (L)   / 346     133 (L) 91 (L) 13.4 /  88   3.9 33 (H) 0.91 \     ALT: 20 AST: 19 AP: 66 TBILI: 0.3   ALB: 3.1 (L) TOT PROTEIN: 5.7 (L) LIPASE: N/A

## 2024-02-15 NOTE — PLAN OF CARE
Problem: Adult Inpatient Plan of Care  Goal: Absence of Hospital-Acquired Illness or Injury  Intervention: Identify and Manage Fall Risk  Recent Flowsheet Documentation  Taken 2/15/2024 0146 by Caitie Eid RN  Safety Promotion/Fall Prevention: safety round/check completed  Intervention: Prevent Skin Injury  Recent Flowsheet Documentation  Taken 2/15/2024 0146 by Caitie Eid RN  Body Position: position changed independently  Intervention: Prevent and Manage VTE (Venous Thromboembolism) Risk  Recent Flowsheet Documentation  Taken 2/15/2024 0146 by Caitie Eid RN  VTE Prevention/Management: SCDs (sequential compression devices) off  Intervention: Prevent Infection  Recent Flowsheet Documentation  Taken 2/15/2024 0146 by Caitie Eid RN  Infection Prevention:   single patient room provided   rest/sleep promoted   hand hygiene promoted     Problem: Adult Inpatient Plan of Care  Goal: Absence of Hospital-Acquired Illness or Injury  Intervention: Identify and Manage Fall Risk  Recent Flowsheet Documentation  Taken 2/15/2024 0146 by Caitie Eid RN  Safety Promotion/Fall Prevention: safety round/check completed  Intervention: Prevent Skin Injury  Recent Flowsheet Documentation  Taken 2/15/2024 0146 by Caitie Eid RN  Body Position: position changed independently  Intervention: Prevent and Manage VTE (Venous Thromboembolism) Risk  Recent Flowsheet Documentation  Taken 2/15/2024 0146 by Caitie Eid RN  VTE Prevention/Management: SCDs (sequential compression devices) off  Intervention: Prevent Infection  Recent Flowsheet Documentation  Taken 2/15/2024 0146 by Caitie Eid RN  Infection Prevention:   single patient room provided   rest/sleep promoted   hand hygiene promoted     Problem: Fall Injury Risk  Goal: Absence of Fall and Fall-Related Injury  Intervention: Identify and Manage Contributors  Recent Flowsheet Documentation  Taken 2/15/2024 0146 by  Caitie Eid RN  Medication Review/Management: medications reviewed  Intervention: Promote Injury-Free Environment  Recent Flowsheet Documentation  Taken 2/15/2024 0146 by Caitie Eid RN  Safety Promotion/Fall Prevention: safety round/check completed     Problem: Pain Acute  Goal: Optimal Pain Control and Function  Intervention: Prevent or Manage Pain  Recent Flowsheet Documentation  Taken 2/15/2024 0146 by Caitie Eid RN  Sleep/Rest Enhancement: comfort measures  Medication Review/Management: medications reviewed  Intervention: Optimize Psychosocial Wellbeing  Recent Flowsheet Documentation  Taken 2/15/2024 0146 by Caitie Eid RN  Supportive Measures: active listening utilized     Problem: Breathing Pattern Ineffective  Goal: Effective Breathing Pattern  Outcome: Progressing  Intervention: Promote Improved Breathing Pattern  Recent Flowsheet Documentation  Taken 2/15/2024 0146 by Caitie Eid RN  Supportive Measures: active listening utilized  Head of Bed (HOB) Positioning: HOB at 20-30 degrees     Problem: Pneumonia  Goal: Fluid Balance  Outcome: Progressing  Goal: Resolution of Infection Signs and Symptoms  Outcome: Progressing  Goal: Effective Oxygenation and Ventilation  Outcome: Not Progressing  Intervention: Promote Airway Secretion Clearance  Recent Flowsheet Documentation  Taken 2/15/2024 0146 by Caitie Eid RN  Cough And Deep Breathing: done independently per patient  Intervention: Optimize Oxygenation and Ventilation  Recent Flowsheet Documentation  Taken 2/15/2024 0146 by Caitie Eid RN  Head of Bed (HOB) Positioning: HOB at 20-30 degrees     Problem: Comorbidity Management  Goal: Maintenance of COPD Symptom Control  Outcome: Not Progressing  Intervention: Maintain COPD Symptom Control  Recent Flowsheet Documentation  Taken 2/15/2024 0146 by Caitie Eid RN  Supportive Measures: active listening utilized  Medication Review/Management:  medications reviewed  Goal: Blood Pressure in Desired Range  Outcome: Progressing  Intervention: Maintain Blood Pressure Management  Recent Flowsheet Documentation  Taken 2/15/2024 0146 by Caitie Eid RN  Medication Review/Management: medications reviewed   Goal Outcome Evaluation:    [id number]    No acute events on shift tonight. Patient was calm and cooperative and slept most the night. Was very tired on shift. Refused all the oral medications. Was able to take IV meds. Vitals stable throughout shift. O2 sating <92%. On 3 L O2 NC. Vomited early this morning at 0500. Woke up vomiting. Had intermittent nausea after her vomiting episode.

## 2024-02-15 NOTE — PROGRESS NOTES
[facility] Pulmonology Follow up    Idalia Bob  MRN: [id number]  : 1945    Date of Admission: 2024  Date of Service: 2024    Assessment:  BL pleural effusions, pulm edema suggestive of hypervolemia  Pseudomonas PNA- NIRAV consolidation, slow to resolve  Nausea, vomiting  COPD    Ms. Bob is an 79 year old woman with history of COPD with history of Pseudomonas PNA in 2023, readmission ,  for SOB, fatigue, and continued infiltrate. She received multiple antibiotic courses, but persistent infiltrate remained. Untreated pathogens include atypicals- legionella, ROBERT or an inflammatory process such as post-infectious organizing pneumonia, or malignancy.   The lesion is generally unchanged, and imaging reveals narrowed airways making mucous clearance likely difficult for her. We can start the below therapies to help her expectorate. Additionally, a short course of prednisone may help with airway inflammation.  We had a long discussion with Ms. Bob and her daughter today, mostly around how clarity on pulmonary diagnosis may help with decisions about goals of care and next steps. It is unsafe to perform bronchoscopy at this point with nausea and vomiting, and intermittent severe dyspnea. Additionally, she has emphysema and generous pulmonary vasculature in the intended area making transbronchial biopsies an unacceptably high risk. We recommended continued discussions regarding next steps with palliative support and primary team.    Recommendations:  -- prednisone 40mg daily x 5 days for inflammatory bronchitis  -- continue diuresis as tolerated  -- nebulizers + aerobika + external percussion therapy as tolerated for mucous clearance  -- HAP coverage is reasonable, if clinically decompensating consider legionella coverage  -- risk/benefit of bronchoscopy favors risk at this time    Subjective:  nauseated and vomiting since eating a bite of chocolate earlier today.  Breathing still feels very hard, whether oxygen levels are low or not.    Review of systems: focused ROS performed and noted as above.    Objective:  BP (!) 146/85 (BP Location: Left arm)   Pulse 113   Temp 99  F (37.2  C) (Oral)   Resp 18   Ht 1.524 m (5')   Wt 61.4 kg (135 lb 5.8 oz)   SpO2 98%   BMI 26.44 kg/m      Gen: frail appearing, lying in bed, twisted to L side  HEENT: MMM, NC in place  CV: RRR, pitting LE edema  Pulm: mild increased work of breathing, clear bilaterally  Integ: no rashes or lesions appreciated on exposed skin  Neuro: speech clear, alert and oriented  Psych: calm, appropriate    Data:  I have personally reviewed new labs    Ene Morgan DO  Pulmonary fellow  Patient discussed and seen with Dr. Zuñiga

## 2024-02-15 NOTE — PROGRESS NOTES
Care Management Follow Up    Length of Stay (days): 14    Expected Discharge Date:       Concerns to be Addressed: adjustment to diagnosis/illness     Patient plan of care discussed at interdisciplinary rounds: Yes    Anticipated Discharge Disposition: Assisted Living     Anticipated Discharge Services: None (Home Care)  Anticipated Discharge DME:      Patient/family educated on Medicare website which has current facility and service quality ratings:    Education Provided on the Discharge Plan:    Patient/Family in Agreement with the Plan: yes    Referrals Placed by CM/SW:    Private pay costs discussed: Not applicable    Additional Information:    Care Conference is tomorrow at 1 pm.  Will need to confirm with palliative and pulmonology.    Paged  Kacy Haider APRN, CNP to follow up on care conference for tomorrow.  Palliative is confirmed for care conference tomorrow.  No response from pulmonology.    Patient contacts:     [facility] ([facility])  nurse line [phone]      [facility]   JIMI Figueroa Case Manager   [phone] .    Care Management to continue to working with patient/family toward safe discharge plan.      Emani Tao RN  Inpatient Care Coordinator  6 Med Surg  [phone], pager [phone]      For weekend social work needs, contact information below and available on [facility]:     SW Weekend [place] Pager ED, 5 MS, 5 ortho : [phone]  SW Weekend 6MS, 8A, 10ICU- Pager: [phone]     For weekend RN care coordinator needs (home discharge with needs including home care, assisted living facility returns, durable medical equip, IV antibiotics)   5 med/surg, 5 ortho, ED pager: [phone]  6 med/surg, 8 med/surg, 10 ICU pager: [phone]

## 2024-02-15 NOTE — PROGRESS NOTES
Brief Palliative Care Note    Chart reviewed. Spoke with primary RN. Ongoing nausea. Change Zyprexa to Zyprexa 2.5mg BID (ordered for you). Pulmonary is also following and was started on steroid for inflammatory bronchitis; likely this will help with nausea as well. Plan for GOC discussion with patient and family 2/16/24 at 1PM    KIZZY Miller CNP  [facility], Palliative Care  Securely message with the [employer] Web Console (learn more here) or  Text page via [facility] Paging/Directory

## 2024-02-16 LAB
ATRIAL RATE - MUSE: 121 BPM
CREAT SERPL-MCNC: 0.88 MG/DL (ref 0.51–0.95)
DIASTOLIC BLOOD PRESSURE - MUSE: NORMAL MMHG
EGFRCR SERPLBLD CKD-EPI 2021: 66 ML/MIN/1.73M2
HOLD SPECIMEN: NORMAL
HOLD SPECIMEN: NORMAL
INTERPRETATION ECG - MUSE: NORMAL
LMWH PPP CHRO-ACNC: 0.93 IU/ML
MAGNESIUM SERPL-MCNC: 3 MG/DL (ref 1.7–2.3)
P AXIS - MUSE: 80 DEGREES
POTASSIUM SERPL-SCNC: 4.7 MMOL/L (ref 3.4–5.3)
PR INTERVAL - MUSE: 176 MS
QRS DURATION - MUSE: 70 MS
QT - MUSE: 348 MS
QTC - MUSE: 494 MS
R AXIS - MUSE: 99 DEGREES
SYSTOLIC BLOOD PRESSURE - MUSE: NORMAL MMHG
T AXIS - MUSE: 89 DEGREES
VENTRICULAR RATE- MUSE: 121 BPM

## 2024-02-16 PROCEDURE — 120N000002 HC R&B MED SURG/OB UMMC

## 2024-02-16 PROCEDURE — 84132 ASSAY OF SERUM POTASSIUM: CPT | Performed by: STUDENT IN AN ORGANIZED HEALTH CARE EDUCATION/TRAINING PROGRAM

## 2024-02-16 PROCEDURE — 83735 ASSAY OF MAGNESIUM: CPT | Performed by: STUDENT IN AN ORGANIZED HEALTH CARE EDUCATION/TRAINING PROGRAM

## 2024-02-16 PROCEDURE — 250N000013 HC RX MED GY IP 250 OP 250 PS 637: Performed by: HOSPITALIST

## 2024-02-16 PROCEDURE — 250N000012 HC RX MED GY IP 250 OP 636 PS 637: Performed by: STUDENT IN AN ORGANIZED HEALTH CARE EDUCATION/TRAINING PROGRAM

## 2024-02-16 PROCEDURE — 85520 HEPARIN ASSAY: CPT | Performed by: STUDENT IN AN ORGANIZED HEALTH CARE EDUCATION/TRAINING PROGRAM

## 2024-02-16 PROCEDURE — 250N000011 HC RX IP 250 OP 636: Performed by: STUDENT IN AN ORGANIZED HEALTH CARE EDUCATION/TRAINING PROGRAM

## 2024-02-16 PROCEDURE — 94640 AIRWAY INHALATION TREATMENT: CPT

## 2024-02-16 PROCEDURE — 99233 SBSQ HOSP IP/OBS HIGH 50: CPT

## 2024-02-16 PROCEDURE — 250N000013 HC RX MED GY IP 250 OP 250 PS 637: Performed by: PHYSICIAN ASSISTANT

## 2024-02-16 PROCEDURE — 99418 PROLNG IP/OBS E/M EA 15 MIN: CPT

## 2024-02-16 PROCEDURE — 250N000013 HC RX MED GY IP 250 OP 250 PS 637: Performed by: STUDENT IN AN ORGANIZED HEALTH CARE EDUCATION/TRAINING PROGRAM

## 2024-02-16 PROCEDURE — 36415 COLL VENOUS BLD VENIPUNCTURE: CPT | Performed by: STUDENT IN AN ORGANIZED HEALTH CARE EDUCATION/TRAINING PROGRAM

## 2024-02-16 PROCEDURE — 82565 ASSAY OF CREATININE: CPT | Performed by: STUDENT IN AN ORGANIZED HEALTH CARE EDUCATION/TRAINING PROGRAM

## 2024-02-16 PROCEDURE — 999N000157 HC STATISTIC RCP TIME EA 10 MIN

## 2024-02-16 PROCEDURE — 99233 SBSQ HOSP IP/OBS HIGH 50: CPT | Performed by: STUDENT IN AN ORGANIZED HEALTH CARE EDUCATION/TRAINING PROGRAM

## 2024-02-16 PROCEDURE — C9113 INJ PANTOPRAZOLE SODIUM, VIA: HCPCS | Performed by: STUDENT IN AN ORGANIZED HEALTH CARE EDUCATION/TRAINING PROGRAM

## 2024-02-16 PROCEDURE — 94640 AIRWAY INHALATION TREATMENT: CPT | Mod: 76

## 2024-02-16 PROCEDURE — 250N000013 HC RX MED GY IP 250 OP 250 PS 637

## 2024-02-16 PROCEDURE — 250N000009 HC RX 250: Performed by: STUDENT IN AN ORGANIZED HEALTH CARE EDUCATION/TRAINING PROGRAM

## 2024-02-16 RX ORDER — ATROPINE SULFATE 10 MG/ML
2 SOLUTION/ DROPS OPHTHALMIC EVERY 4 HOURS PRN
Status: DISCONTINUED | OUTPATIENT
Start: 2024-02-16 | End: 2024-02-23

## 2024-02-16 RX ORDER — OLANZAPINE 5 MG/1
5 TABLET, ORALLY DISINTEGRATING ORAL 2 TIMES DAILY
Status: DISCONTINUED | OUTPATIENT
Start: 2024-02-16 | End: 2024-02-26 | Stop reason: HOSPADM

## 2024-02-16 RX ORDER — MORPHINE SULFATE 2 MG/ML
2 INJECTION, SOLUTION INTRAMUSCULAR; INTRAVENOUS
Status: DISCONTINUED | OUTPATIENT
Start: 2024-02-16 | End: 2024-02-20

## 2024-02-16 RX ORDER — NALOXONE HYDROCHLORIDE 0.4 MG/ML
0.4 INJECTION, SOLUTION INTRAMUSCULAR; INTRAVENOUS; SUBCUTANEOUS
Status: DISCONTINUED | OUTPATIENT
Start: 2024-02-16 | End: 2024-02-26 | Stop reason: HOSPADM

## 2024-02-16 RX ORDER — NALOXONE HYDROCHLORIDE 0.4 MG/ML
0.2 INJECTION, SOLUTION INTRAMUSCULAR; INTRAVENOUS; SUBCUTANEOUS
Status: DISCONTINUED | OUTPATIENT
Start: 2024-02-16 | End: 2024-02-26 | Stop reason: HOSPADM

## 2024-02-16 RX ORDER — MORPHINE SULFATE 10 MG/5ML
10 SOLUTION ORAL
Status: DISCONTINUED | OUTPATIENT
Start: 2024-02-16 | End: 2024-02-22

## 2024-02-16 RX ORDER — MORPHINE SULFATE 20 MG/ML
10 SOLUTION ORAL
Status: DISCONTINUED | OUTPATIENT
Start: 2024-02-16 | End: 2024-02-22

## 2024-02-16 RX ORDER — MORPHINE SULFATE 20 MG/ML
5 SOLUTION ORAL
Status: DISCONTINUED | OUTPATIENT
Start: 2024-02-16 | End: 2024-02-23

## 2024-02-16 RX ORDER — MORPHINE SULFATE 10 MG/5ML
5 SOLUTION ORAL
Status: DISCONTINUED | OUTPATIENT
Start: 2024-02-16 | End: 2024-02-23

## 2024-02-16 RX ORDER — MORPHINE SULFATE 2 MG/ML
1 INJECTION, SOLUTION INTRAMUSCULAR; INTRAVENOUS
Status: DISCONTINUED | OUTPATIENT
Start: 2024-02-16 | End: 2024-02-20

## 2024-02-16 RX ORDER — LORAZEPAM 2 MG/ML
1 INJECTION INTRAMUSCULAR
Status: DISCONTINUED | OUTPATIENT
Start: 2024-02-16 | End: 2024-02-22

## 2024-02-16 RX ORDER — LORAZEPAM 1 MG/1
1 TABLET ORAL
Status: DISCONTINUED | OUTPATIENT
Start: 2024-02-16 | End: 2024-02-22

## 2024-02-16 RX ADMIN — ONDANSETRON 4 MG: 4 TABLET, ORALLY DISINTEGRATING ORAL at 17:32

## 2024-02-16 RX ADMIN — BUSPIRONE HYDROCHLORIDE 10 MG: 10 TABLET ORAL at 21:19

## 2024-02-16 RX ADMIN — OLANZAPINE 2.5 MG: 5 TABLET, ORALLY DISINTEGRATING ORAL at 09:14

## 2024-02-16 RX ADMIN — PANTOPRAZOLE SODIUM 40 MG: 40 INJECTION, POWDER, FOR SOLUTION INTRAVENOUS at 09:14

## 2024-02-16 RX ADMIN — BUDESONIDE 1 MG: 1 INHALANT ORAL at 07:39

## 2024-02-16 RX ADMIN — LORAZEPAM 1 MG: 1 TABLET ORAL at 22:59

## 2024-02-16 RX ADMIN — IPRATROPIUM BROMIDE AND ALBUTEROL SULFATE 3 ML: .5; 3 SOLUTION RESPIRATORY (INHALATION) at 07:39

## 2024-02-16 RX ADMIN — PIPERACILLIN AND TAZOBACTAM 3.38 G: 3; .375 INJECTION, POWDER, FOR SOLUTION INTRAVENOUS at 09:50

## 2024-02-16 RX ADMIN — PREDNISONE 40 MG: 20 TABLET ORAL at 09:14

## 2024-02-16 RX ADMIN — HYDROXYZINE HYDROCHLORIDE 10 MG: 10 TABLET ORAL at 09:35

## 2024-02-16 RX ADMIN — PIPERACILLIN AND TAZOBACTAM 3.38 G: 3; .375 INJECTION, POWDER, FOR SOLUTION INTRAVENOUS at 16:01

## 2024-02-16 RX ADMIN — FLUOXETINE HYDROCHLORIDE 40 MG: 20 CAPSULE ORAL at 09:14

## 2024-02-16 RX ADMIN — ENOXAPARIN SODIUM 50 MG: 60 INJECTION SUBCUTANEOUS at 09:14

## 2024-02-16 RX ADMIN — HYDROXYZINE HYDROCHLORIDE 10 MG: 10 TABLET ORAL at 15:58

## 2024-02-16 RX ADMIN — LATANOPROST 1 DROP: 50 SOLUTION OPHTHALMIC at 21:21

## 2024-02-16 RX ADMIN — OLANZAPINE 5 MG: 5 TABLET, ORALLY DISINTEGRATING ORAL at 21:18

## 2024-02-16 RX ADMIN — IPRATROPIUM BROMIDE AND ALBUTEROL SULFATE 3 ML: .5; 3 SOLUTION RESPIRATORY (INHALATION) at 20:37

## 2024-02-16 RX ADMIN — PROCHLORPERAZINE EDISYLATE 5 MG: 5 INJECTION INTRAMUSCULAR; INTRAVENOUS at 11:33

## 2024-02-16 RX ADMIN — IPRATROPIUM BROMIDE AND ALBUTEROL SULFATE 3 ML: .5; 3 SOLUTION RESPIRATORY (INHALATION) at 17:20

## 2024-02-16 RX ADMIN — BUSPIRONE HYDROCHLORIDE 10 MG: 10 TABLET ORAL at 09:14

## 2024-02-16 RX ADMIN — TRIAMCINOLONE ACETONIDE: 1 CREAM TOPICAL at 02:31

## 2024-02-16 RX ADMIN — ACETAMINOPHEN 650 MG: 325 TABLET, FILM COATED ORAL at 15:58

## 2024-02-16 RX ADMIN — BUSPIRONE HYDROCHLORIDE 10 MG: 10 TABLET ORAL at 15:58

## 2024-02-16 RX ADMIN — ACETAMINOPHEN 650 MG: 325 TABLET, FILM COATED ORAL at 11:33

## 2024-02-16 RX ADMIN — LORAZEPAM 1 MG: 1 TABLET ORAL at 18:52

## 2024-02-16 RX ADMIN — IPRATROPIUM BROMIDE AND ALBUTEROL SULFATE 3 ML: .5; 3 SOLUTION RESPIRATORY (INHALATION) at 11:25

## 2024-02-16 RX ADMIN — BUDESONIDE 1 MG: 1 INHALANT ORAL at 20:37

## 2024-02-16 RX ADMIN — LEVOTHYROXINE SODIUM 50 MCG: 50 TABLET ORAL at 09:14

## 2024-02-16 RX ADMIN — DONEPEZIL HYDROCHLORIDE 10 MG: 10 TABLET, FILM COATED ORAL at 21:18

## 2024-02-16 RX ADMIN — ONDANSETRON 4 MG: 2 INJECTION INTRAMUSCULAR; INTRAVENOUS at 09:35

## 2024-02-16 RX ADMIN — PROCHLORPERAZINE MALEATE 5 MG: 5 TABLET ORAL at 02:56

## 2024-02-16 RX ADMIN — PIPERACILLIN AND TAZOBACTAM 3.38 G: 3; .375 INJECTION, POWDER, FOR SOLUTION INTRAVENOUS at 03:53

## 2024-02-16 ASSESSMENT — ACTIVITIES OF DAILY LIVING (ADL)
ADLS_ACUITY_SCORE: 47
ADLS_ACUITY_SCORE: 41
ADLS_ACUITY_SCORE: 47
ADLS_ACUITY_SCORE: 41
ADLS_ACUITY_SCORE: 41
ADLS_ACUITY_SCORE: 47
ADLS_ACUITY_SCORE: 41
ADLS_ACUITY_SCORE: 47
ADLS_ACUITY_SCORE: 47
ADLS_ACUITY_SCORE: 41
ADLS_ACUITY_SCORE: 41
ADLS_ACUITY_SCORE: 47

## 2024-02-16 NOTE — PROGRESS NOTES
[facility]    Medicine Progress Note - Hospitalist Service, GOLD TEAM 21    Date of Admission:  2/1/2024    Assessment & Plan   Idalia Bob is a 79 year old female admitted on 2/1/2024. Pt has history of COPD, pneumonia, hypertension, dementia and hypothyroidism among others, presented with abdominal pain and found to have consolidation on CT as well as frequent mucousy bowel movements and anorexia. Known persistent LLL infiltrate of uncertain etiology. She remains on 2-3NC with symptomatic dyspnea and continues to feel generally unwell, tolerating minimal PO.      Changes today:  - CC this afternoon   - Continue antibiotics and steroids      Left upper lobe pneumonia vs persistent consolidation vs mass  COPD  Acute on chronic hypoxemic respiratory failure  Patient with pseudomonal upper lobe pneumonia in December and has had ongoing consolidation since.  While this finding was present on imaging she has a paucity of other pathology consistent with pneumonia at the present time. She is now having increasing dyspnea x 24 hours, on 3LNC. Repeat CT with worsening upper lobe infiltrates and edema. Differential diagnosis includes ongoing bacterial process despite appropriate CAP treatment vs edema vs ILD such as . Initial plan for bronchoscopy/EBUS 2/12 but holding off for now given increasing O2 requirement and symptoms. Consideration for PE as contributing, though feel this is less likely with infiltrates present on imaging to account for her symptoms and she is anticoagulated in the setting of DVT.     Per discussion with pulmonology, it is felt this may represent recurrent/persistent pneumonia in the setting of decrease airway clearance due to her functional status. Unable to say whether this may represent malignancy, but risk outweighs benefit of biopsy given her underlying lung disease. No plan for BAL or biopsy. Continue airway clearance and antibiotics for  now, as long as this remains within C.   -s/p azithromycin and ceftriaxone  - Stop Vancomycin 2/13 in the setting of negative MRSA nasal swab   - No indication for legionella coverage with negative urine legionella  - Stop cefepime with concern for neurotoxicity, transition to Zosyn with plan to complete 7 day course, given her history of PsA pneumonia (end 2/16)  - Prednisone 40mg PO daily x 5 days   - Holding diuresis given little symptomatic improvement with this; low threshold to resume if increasing O2 requirements  - Strict I/O's   - Continue home oxygen  - Continue home COPD regimen  - negative Fungitell, galactomannan, and sputum cultures.  -pulm following  -No clinical evidence of wheezing or COPD exacerbation currently; low threshold for steroids if she decompensates given question   - TTE without evidence of CHF  - Therapeutic anticoagulation in the setting of DVT     Abdominal Pain, improved   Intractable nausea   Loose stools with mucus, improved   Tenesmus  Unintentional weight loss  Anorexia  The patient has had weeks of worsening anorexia along with crampy abdominal pain but no evidence of gastrointestinal bleeding.  She has frequent (many many times per day) mucousy bowel movements with very little stool production.  C. difficile and enteric panel are negative.  She does have normocytic anemia and a hiatal hernia. S/p flex sig and endoscopy 2/7. Noted hiatal hernia, mild inflammatory changes in distal colon/rectum s/p biopsy. If symptoms persist beyond 8 weeks repeat flex sig should be completed to eval for chronic inflammatory process per GI.    Ongoing intractable nausea and inability to tolerate PO. Most bothersome when she anticipates and after she takes PO. Neurologic exam is grossly normal, no diplopia or nystagmus on visual exam, making vertigo and central process less likely. No clinical evidence to suggest increased ICP though low threshold for repeat head imaging.   -Appreciate GI  consultation; since signed off  -Encourage oral intake, supplements  -Ok to continue Bentyl prn, stopped psyllium, imodium as she is now constipated  -Physical and Occupational Therapy consults  - Holding IVF   -Encourage PO and trial Zofran prior to trial of PO   - Zyprexa added per palliative  - Steroids per above, consider IV dosing if unable to tolerate oral   -Trial of ativan on 2/14 to see if this improves nausea and anxiety/dyspnea    RLE DVT  Noted on US 2/13, started on therapeutic lovenox.      Urinary tract infection  Urine culture with 10-50,000 colonies of Enterococcus faecalis.    -s/p treatment     Elevated Troponin  Likely type II MI in the setting of acute illness. Plateaued at 51. No chest pain. Repeat EKG on 2/12 without acute ischemic changes. Repeat EKG 2/14 without ischemic changes.   - TTE without RWMA     Hypokalemia  Hypomagnesemia  In the setting of poor oral intake as well as PPI use.  Will continue to follow closely.       Iron deficiency anemia    Not tolerating Iron replacement.  Her ferritin is 85 which likely reflects a degree of iron deficiency given some presence of inflammation.     Hypertension  Holding PTA losartan 50 mg p.o. daily in the setting of uptrending Cr, recent aggressive diuresis and poor PO intake     Anxiety and depression  Takes BuSpar, Prozac  -Continue home regimen  -Zyprexa 2.5mg BID per palliative on 2/13  -Monitor Qtc      Hypothyroidism     Takes Synthroid 50 mcg. TSH normal  -Continue     Dementia   - continue donepezil          Diet: Snacks/Supplements Adult: Gelatein Plus; Between Meals  Snacks/Supplements Adult: Ensure Enlive; With Meals  Regular Diet Adult    DVT Prophylaxis: Enoxaparin (Lovenox) SQ  Ruiz Catheter: Not present  Lines: None     Cardiac Monitoring: None  Code Status: No CPR- Do NOT Intubate      Clinically Significant Risk Factors        # Hypokalemia: Lowest K = 3.3 mmol/L in last 2 days, will replace as needed       # Hypoalbuminemia:  Lowest albumin = 3 g/dL at 2/14/2024  5:37 AM, will monitor as appropriate     # Hypertension: Noted on problem list     # Dementia: noted on problem list    # Overweight: Estimated body mass index is 26.44 kg/m  as calculated from the following:    Height as of this encounter: 1.524 m (5').    Weight as of this encounter: 61.4 kg (135 lb 5.8 oz).   # Severe Malnutrition: based on nutrition assessment    # Financial/Environmental Concerns: none         Disposition Plan     Expected Discharge Date: 02/20/2024      Destination: home with help/services  Discharge Comments: Pending completion of medical workup and symptomatic improvement. Likely 5-7 days.            Geetha Tong DO  Hospitalist Service, GOLD TEAM 21  [facility]  Securely message with [employer] (more info)  Text page via [address] Paging/Directory   See signed in provider for up to date coverage information  ______________________________________________________________________    Interval History   Remains nauseated with intermittent tolerance of PO. Some abdominal pain this morning. Intermittent dyspnea.     Physical Exam   Vital Signs: Temp: 98.4  F (36.9  C) Temp src: Oral BP: 138/86 Pulse: 110   Resp: 16 SpO2: 94 % O2 Device: Nasal cannula Oxygen Delivery: 2.5 LPM  Weight: 135 lbs 5.8 oz    General: resting comfortably in bed, no acute distress. Eating breakfast.   HEENT: glasses in place, NC in place.   CV: tachycardia, Extremities are warm and well perfused.   LUNGS: no increased WOB at rest, clear to auscultation anteriorly bilaterally.   Abd: soft, bowel sounds present. Initially winces on palpation of epigastrium and LUQ, on repeat pressure with stephoscope, no longer present.   SKIN: Warm, well perfused.   MSK: No deformities. Trace edema to the ankle. No clubbing, cyanosis.   NEURO: awake and interactive, no gross deficits.     Medical Decision Making           Data     I have personally reviewed  the following data over the past 24 hrs:    N/A  \   N/A   / N/A     N/A N/A N/A /  N/A   4.7 N/A 0.88 \     ALT: N/A AST: N/A AP: N/A TBILI: N/A   ALB: N/A TOT PROTEIN: N/A LIPASE: 10 (L)

## 2024-02-16 NOTE — PLAN OF CARE
Goal Outcome Evaluation:      Plan of Care Reviewed With: patient    Overall Patient Progress: no change       VS: Temp: 98.4  F (36.9  C) Temp src: Oral BP: 138/86 Pulse: 110   Resp: 16 SpO2: 96 % O2 Device: Nasal cannula Oxygen Delivery: 2.5 LPM    O2: On 2.5L Nasal cannula. Reports feeling SOB at times. Denies diffiuclty breathing and chest pain   Output: Purewick in place with adequate urine output    Activity: Not oob overnight. Patient declines to be repositioned with pillow support.   Skin: Complains of feeling itchy on both arms and legs. Scabs noted on both arms. PRN Atarax given andtopical cream applied for itchiness.   Pain: Denies pain.    CMS: A&Ox3. Disoriented to situation. Can be forgetful at times. Denies numbness and tingling in all extremities.   Dressing: N/A   Diet: Regular diet. Thin fluids. Whole meds with apple sauce.   LDA: L PIV SL   Plan: Continue POC   Additional Info:

## 2024-02-16 NOTE — PROGRESS NOTES
Care Management Follow Up    Length of Stay (days): 15    Expected Discharge Date: 02/20/2024     Concerns to be Addressed: adjustment to diagnosis/illness     Patient plan of care discussed at interdisciplinary rounds: Yes    Anticipated Discharge Disposition: Assisted Living     Anticipated Discharge Services: None (Home Care)  Anticipated Discharge DME:      Patient/family educated on Medicare website which has current facility and service quality ratings:    Education Provided on the Discharge Plan:    Patient/Family in Agreement with the Plan: yes    Referrals Placed by CM/SW:    Private pay costs discussed: Not applicable    Additional Information:    Care Conference was held to discuss goals of care with  Idalia's 2 daughters Barbara and Corie, Kacy Larios, CNP  (Palliative) and  were present.    Dr. Haywood presented information about Idalia's current status and prognosis.  See their notes for further discussion.   Family questions answered.  Decision was made to move to comfort cares at this time.    Care Management discussed that [facility] is no longer able to care for  her due to her increased care needs, which they are aware of. Discussed that LTC may be needed or a resident hospice but in either case the room and board are not covered by insurance.  Current situation is that their dad also lives at the [facility] in the same apartment and plan would be for him to stay there.  Finances are tight.  Did discuss that now is the time to start thinking about gathering financial paperwork.      Plan is to reassess Idalia's situation on Monday and make referral to financial counseling to see if she qualifies for MA.    Patient contacts:     [facility] ([facility])  nurse line [phone]      [facility]   JIMI Figueroa Case Manager   [phone].  Call to  [place] to let her know that Idalia will not be returning home with home care.     Emani Tao RN  Inpatient Care  Coordinator  6 Med Surg  [phone], pager [phone]      For weekend social work needs, contact information below and available on [facility]:     SW Weekend [place] Pager ED, 5 MS, 5 ortho : [phone]  SW Weekend 6MS, 8A, 10ICU- Pager: [phone]     For weekend RN care coordinator needs (home discharge with needs including home care, assisted living facility returns, durable medical equip, IV antibiotics)   5 med/surg, 5 ortho, ED pager: [phone]  6 med/surg, 8 med/surg, 10 ICU pager: [phone]

## 2024-02-16 NOTE — PHARMACY-ANTICOAGULATION SERVICE
Idalia Bob is a 79 year old female on Enoxaparin 50 mg SubQ q12hr indicated for RLE DVT.  Actual Body Weight: 61.4 kg.    Renal function is Stable.  Estimated Creatinine Clearance: 41.8 mL/min (based on SCr of 0.88 mg/dL). The trend in platelet count is Stable.    Pt is at steady state.  After reaching steady state, the 4 hour post-dose GOAL Heparin 10A is 0.5-1 IU/mL.         Heparin 10A level (IU/mL)    0.93 on February 16, 2024   @ 1231      Last dose given              on February 16, 2024   @ 0914       The level was drawn 3.25 hours after the dose.  Current level is at the goal. Recommend continue current treatment.    The pharmacist will continue to monitor and follow Idalia Bob daily.  Please contact pharmacy if enoxaparin needs to be held for a procedure or if goal levels change.

## 2024-02-16 NOTE — CONSULTS
"SPIRITUAL HEALTH SERVICES Consult Note  [facility] ([facility]) 6B      Referral Source/Reason for Visit: Introduction to palliative  care consult    Summary and Recommendations -  Idalia shared she enjoys visits from her daughters and granddaughter.   Idalia presented forgetful at times   Our visit was cut short when Idalia reported experiencing painful stomach cramping, and a headache.    Plan: Idalia welcomes future [facility] visits so I will continue to follow as needed    naomy ochoa  Chaplain Resident  Pager [phone]    [facility] available 24/7 for emergent requests/referrals, either by paging the on-call  or by entering an ASAP/STAT consult in [facility], which will also page the on-call .    Assessment    Saw pt Idalia JUSTIN Lisbeth per palliative care consult to introduce her to [facility].    Patient/Family Understanding of Illness and Goals of Care - I tried to visit Idalia about 30 minutes earlier but she had just been delivered breakfast so I left so she could eat. When I came back she had only eaten a small portion of her meal. She shared \"I thought I was hungry but I now I can't eat.\" We didn't talk too much about her illness or GOC as she presented forgetful at times and had difficulty tracking the conversation.    Distress and Loss - Idalia shared that her  \"isn't doing very well...both emotionally and physically.\" Our visit was cut short when Idalia reported experiencing shortness of breath/difficulty breathing, painful stomach cramping, and a headache.  I helped her call the nurse for assistance.     Strengths, Coping, and Resources - Idalia shared she has received many visits from her daughters and granddaughter since being in hospital and smiled brightly when talking about them. She shared her granddaughter is very special to her and they have a close relationship.     Meaning, Beliefs, and Spirituality - Idalia shared she grew up going to Christianity a little but doesn't have a " "current spiritual community. She shared \"I believe in something but I am not sure what that is.\" We talked about spirituality being as simple as feeling a connection to someone or ourselves and how that connection can be sacred.      "

## 2024-02-16 NOTE — PROGRESS NOTES
VS: BP (!) 154/100 (BP Location: Left arm)   Pulse 111   Temp 99  F (37.2  C) (Oral)   Resp 17   Ht 1.524 m (5')   Wt 61.4 kg (135 lb 5.8 oz)   SpO2 100%   BMI 26.44 kg/m     O2: 100% 3L via NC, endorses SOB   Output: Purewick in place, incontinent of place   Activity: Pt not OOB, repositioning encouraged   Skin: Small scabs to BUE and R upper thigh, pt endorses itching to these areas   Pain: Denies              CMS: A&Ox4, denies chest pain, numbness/tingling, and dizziness. Pt endorses n/v, PRN antiemetic administered   Dressing: N/A   Diet: Regular, poor appetite    LDA: L PIV SL    Equipment: IV pole and pump, walker, personal belongings   Plan: Call light in reach, continue POC   Additional Info: Care conference tomorrow at 1pm

## 2024-02-16 NOTE — PROGRESS NOTES
Care Management Follow Up    Length of Stay (days): 15    Expected Discharge Date: 02/20/2024     Concerns to be Addressed: adjustment to diagnosis/illness     Patient plan of care discussed at interdisciplinary rounds: Yes    Anticipated Discharge Disposition: Assisted Living     Anticipated Discharge Services: None (Home Care)  Anticipated Discharge DME:      Patient/family educated on Medicare website which has current facility and service quality ratings:    Education Provided on the Discharge Plan:    Patient/Family in Agreement with the Plan: yes    Referrals Placed by CM/SW:    Private pay costs discussed: Not applicable    Additional Information:    Met with patient, daughter's and IZZY Woodruff (Palliative) in Idalia's room since there is not an advanced directive in her chart.  Daughter's said they have form called [employer] that Idalia had filled out naming both of them as her decision maker should she not be able to make decisions for herself.  They did say it is notarized.  Daughter also referred to a POLST a few times. Unclear they have a separate POLST or if she was using the term to refer to the advanced directive.  Unfortunately they did not have a copy on them today.    Kacy asked Idalia who she would like to make her decisions for her if she is not able to make her own decisions and she identified her two daughters Corie and Barbara as her decision makers.      Daughter's were asked to bring in a copy of the advanced directive or POLST tomorrow and give to Care Management to send to [employer] to verify and to be added to chart. Sticky note added for RNCC/SW to follow up with daughters tomorrow.    Emani Tao RN  Inpatient Care Coordinator  6 Med Surg  [phone], pager [phone]      For weekend social work needs, contact information below and available on [facility]:     SW Weekend [place] Pager ED, 5 MS, 5 ortho : [phone]   Weekend 6MS, 8A, 10ICU- Pager: [phone]      For weekend RN care coordinator needs (home discharge with needs including home care, assisted living facility returns, durable medical equip, IV antibiotics)   5 med/surg, 5 ortho, ED pager: [phone]  6 med/surg, 8 med/surg, 10 ICU pager: [phone]

## 2024-02-16 NOTE — CONSULTS
"SPIRITUAL HEALTH SERVICES Consult Note  [facility] ([facility]) 6B      Referral Source/Reason for Visit: Palliative care conference to discuss goals of care    Summary and Recommendations -  Met with pt's daughters Barbara and Corei, hospitalist, palliative team RN, palliative , and 6B care coordinator    Plan: In discussing goals of care daughters decided to move Idalia to comfort cares. [facility] will follow Idalia and her family with support as needed.    naomy ochoa  Chaplain Resident  Pager [phone]    [facility] available 24/7 for emergent requests/referrals, either by paging the on-call  or by entering an ASAP/STAT consult in [facility], which will also page the on-call .    Assessment    Saw pt Idalia Tierneyjeremyusman per palliative care conference. Met with pt's daughters Barbara and Corie, hospitalist, palliative team RN, palliative , and 6B care coordinator.    Patient/Family Understanding of Illness and Goals of Care - Family were given an update of Idalia's medical needs and likely outcome and decided to move Idalia to comfort cares. Their greatest concern is Idalia's comfort at this point.     Distress and Loss - Corie and Barbara are understandably concerned about any distress Idalia might feel knowing she is moving to comfort cares. We talked about honoring what Idalia's body is saying and the comfort found is honoring her body's wisdom.      Strengths, Coping, and Resources - I invited Barbara and Corie to bring in family over the weekend to visit with Idalia and also suggested bringing photos, music, or other items that are meaningful and familiar to Idalia to help calm any anxiety she may feel.     Meaning, Beliefs, and Spirituality - Corie and Barbara shared they believe Idalia \"believes in God\" but that she is not overtly Yazidism. I shared with them how she lit up when she talked about her daughters and granddaughter during our morning care visit and we discuss sacredness can be found in " many places outside of a [employer].

## 2024-02-16 NOTE — PROGRESS NOTES
"Palliative Care Consultation Note  [facility]      Patient: Idalia Bob  Date of Admission:  2024    Requesting Clinician / Team: Medicine   Reason for consult: Symptom management  Goals of care     Recommendations & Counseling   GOALS OF CARE:   Transitioned to Comfort Care 2024  Participated in care conference today outside of Idalia's room with Medicine team, Corie and Barbara (daughters) and .   Key takeaways:   Family and medical team expressed concerns regarding Idalia's ongoing lung disease, declined functional status, poor nutrition with unintentional weight loss.Idalia was treated for pneumonia in December and required hospitalization for 3 weeks and discharged with home oxygen.   Reviewed current challenges with respiratory failure, COPD, persistent consolidation vs mass. Pulmonology consulted during this admission due to low clarity on pulmonary diagnosis. Pulmonology concluded its unsafe to perform bronchoscopy in the setting of ongoing nausea and vomiting and persistent dyspnea. Idalia also has emphysema and pulmonary vasculature thus making transbronchial biopsies \"unacceptably high risk\".Corie and Barbara shared Idalia's twin sister  of lung cancer. Additionally, they expressed concerns regarding Idalia's nutrition decline in the last 3 weeks.   Discussed G-tub placement for nutritional support and what would look like for Idalia in the setting of advanced lung disease and underlying dementia. Both Corie and Barbara expressed this approach will not align with Idalia's wishes. Additionally, Corie and Barbara reported Idalia has repeatedly suspected \"something is wrong with her\" and she expressed worrisome if she has cancer. Family elected not to involve Idalia in today's care conference to avoid further emotional distress. Barbara and Corie expressed desires to keep Idalia comfortable as they felt with her breathing " difficulties and ongoing nausea is worsening her anxiety. With permission I introduced comfort care approach and hospice with Corie and Barbara.   Idalia transitioned to Comfort Care today and plans to discharge with hospice. Unit Case Management plans to sent referrals.   With permission I went back to Idalia's room to confirm code status and her HCA. Idalia named both her daughter Corie and Gus.     ADVANCE CARE PLANNING:  No health care directive on file. Per  informed consent policy, next of kin should be involved if patient becomes unable.  There is no POLST form on file, defer to patient and/or next of kin for decisions   Code status: DNR/DNI.    Comfort Care   Below are common symptoms routinely seen in patients with comfort focused goals and at the end of life. This patient may not currently exhibit all of these symptoms; however, if these were to occur our recommendations are as follows:     #Pain  - 1st choice:Morphine PO/SL 5-10 mg q 3 hour as needed.   - 2nd choice:Morphine IV 1-2 mg q 3 hour as needed   - Adjunct: Tylenol 650mg q4hrs prn     #Air hunger/Dyspnea   - 1st choice:Morphine PO/SL 5-10 mg q 3 hour as needed.   - 2nd choice:Morphine IV 1-2 mg q 3 hour as needed                                    #Anxiety    - 1st choice: Lorazepam PO/SL q 1 mg  q 3 hour as needed.   - 2nd choice: Lorazepam IV q 1 mg  q 3 hour as needed  - Hydroxyzine 10 mg TID daily as needed for anxiety/itching     #Secretion burden   - Atropine 1% q 4 hour as needed      #Nausea   - Continue ondansetron 4mg ODT/IV q6hrs prn   - Continue prochlorperazine po/IV 5mg q6hrs prn   - Zyprexa 5 mg BID    #Agitation  -Aggressive symptoms control first, then  -1st choice: Zyprexa as above   -2nd choice: Increase dose of Zyprexa to 10 mg or consider switch to Haldol   -3rd choice: Lorazepam as above     PSYCHOSOCIAL/SPIRITUAL SUPPORT:  Family: Daughters; Barbara Lisbeth and Corie Bob  Yu community: Jehovah's witness   Spiritual:  "    Palliative Care will continue to follow Idalia. Thank you for the consult and allowing us to aid in the care of Idalia Bob.    These recommendations have been discussed with Medicine team, family at bedside, primary RN.    KIZZY Miller CNP  Securely message with [employer] (more info)  Text page via [facility] Paging/Directory       Assessment    Idalia Bob is a 79 year old female with a past medical history of COPD, pneumonia, hypertension, dementia and hypothyroidism presented on 02/01/2024 with abdominal pain and found to have consolidation on CT as well as frequent mucousy bowel movements and anorexia. Hx of pseudomonal upper lobe pneumonia in December and has ongoing consolidation since. Repeat CT concerning for upper love infiltrates and edema. Initial plan for bronchoscopy/EBUS on hold, Pulmonary consulted. See above goals of care discussion 02/16.     Today, the patient was seen for:  Symptom management (pain, nausea, anorexia)  Goals care discussion   Patient/family support     Palliative Care Summary:   Idalia was seen lying in bed comfortably. Asked Idalia if she had any worries or concerns. Idalia replay \"I am scared of not being able to breath. Informed Idalia will continue to support her symptoms with medication. She stated \"please do\" Due to increased work of breathing, it was uncomfortable for Idalia to continue to have conversation and opted to keep her eyes closed.      Medications:  I have reviewed this patient's medication profile and medications from this hospitalization.    Physical Exam   Vital Signs with Ranges  Temp:  [98  F (36.7  C)-98.4  F (36.9  C)] 98  F (36.7  C)  Pulse:  [105-110] 105  Resp:  [14-16] 14  BP: (135-138)/(76-86) 135/76  SpO2:  [92 %-96 %] 92 %  135 lbs 5.8 oz    PHYSICAL EXAM:  Constitutional: Resting comfortably in bed, eating lunch   Abdomen: Soft non-tender, no distension   NEURO: Alert, awake, engaging in conversation   JOINT/EXTREMITIES: " Moves all extremities     Data reviewed:  CMP  Recent Labs   Lab 02/16/24  0641 02/15/24  1340 02/15/24  0740 02/14/24  0537 02/13/24  0806 02/12/24 2036 02/12/24  1019   NA  --   --  133* 133* 134*  --  131*   POTASSIUM 4.7 3.9 3.3* 3.9 4.2   < > 5.4*   CHLORIDE  --   --  91*  --  92*  --  95*   CO2  --   --  33*  --  28  --  26   ANIONGAP  --   --  9  --  14  --  10   GLC  --   --  88  --  97  --  123*   BUN  --   --  13.4  --  9.3  --  7.4*   CR 0.88  --  0.91 0.90 0.97*  --  0.92   GFRESTIMATED 66  --  63 64 59*  --  63   AYUSH  --   --  8.7*  --  9.0  --  9.1   MAG 3.0*  --  1.9 2.5* 1.7  --  2.4*   PHOS  --   --   --   --  4.2  --  3.3   PROTTOTAL  --   --  5.7* 5.6*  --   --   --    ALBUMIN  --   --  3.1* 3.0* 3.5   < >  --    BILITOTAL  --   --  0.3 0.4  --   --   --    ALKPHOS  --   --  66 71  --   --   --    AST  --   --  19 27  --   --   --    ALT  --   --  20 28  --   --   --     < > = values in this interval not displayed.     CBC  Recent Labs   Lab 02/15/24  0740 02/14/24  0537 02/13/24  0806   WBC 10.4  --  10.1   RBC 3.60*  --  3.79*   HGB 8.7*  --  9.2*   HCT 29.2*  --  30.5*   MCV 81  --  81   MCH 24.2*  --  24.3*   MCHC 29.8*  --  30.2*   RDW 18.3*  --  18.5*    341  --      Medical Decision Making       MANAGEMENT DISCUSSED with the following over the past 24 hours: Medicine, Case management, primary RN   NOTE(S)/MEDICAL RECORDS REVIEWED over the past 24 hours: Medicine, pulmonology, nursing staff,    90 MINUTES SPENT BY ME on the date of service doing chart review, history, exam, documentation & further activities per the note.

## 2024-02-17 PROCEDURE — 250N000013 HC RX MED GY IP 250 OP 250 PS 637

## 2024-02-17 PROCEDURE — 94640 AIRWAY INHALATION TREATMENT: CPT | Mod: 76

## 2024-02-17 PROCEDURE — 250N000011 HC RX IP 250 OP 636: Performed by: STUDENT IN AN ORGANIZED HEALTH CARE EDUCATION/TRAINING PROGRAM

## 2024-02-17 PROCEDURE — 250N000012 HC RX MED GY IP 250 OP 636 PS 637: Performed by: STUDENT IN AN ORGANIZED HEALTH CARE EDUCATION/TRAINING PROGRAM

## 2024-02-17 PROCEDURE — 250N000013 HC RX MED GY IP 250 OP 250 PS 637: Performed by: PHYSICIAN ASSISTANT

## 2024-02-17 PROCEDURE — 99233 SBSQ HOSP IP/OBS HIGH 50: CPT | Performed by: STUDENT IN AN ORGANIZED HEALTH CARE EDUCATION/TRAINING PROGRAM

## 2024-02-17 PROCEDURE — C9113 INJ PANTOPRAZOLE SODIUM, VIA: HCPCS | Performed by: STUDENT IN AN ORGANIZED HEALTH CARE EDUCATION/TRAINING PROGRAM

## 2024-02-17 PROCEDURE — 999N000157 HC STATISTIC RCP TIME EA 10 MIN

## 2024-02-17 PROCEDURE — 120N000002 HC R&B MED SURG/OB UMMC

## 2024-02-17 PROCEDURE — 250N000013 HC RX MED GY IP 250 OP 250 PS 637: Performed by: HOSPITALIST

## 2024-02-17 PROCEDURE — 250N000009 HC RX 250: Performed by: STUDENT IN AN ORGANIZED HEALTH CARE EDUCATION/TRAINING PROGRAM

## 2024-02-17 RX ADMIN — BUSPIRONE HYDROCHLORIDE 10 MG: 10 TABLET ORAL at 16:20

## 2024-02-17 RX ADMIN — ACETAMINOPHEN 650 MG: 325 TABLET, FILM COATED ORAL at 16:20

## 2024-02-17 RX ADMIN — OLANZAPINE 5 MG: 5 TABLET, ORALLY DISINTEGRATING ORAL at 21:21

## 2024-02-17 RX ADMIN — BUSPIRONE HYDROCHLORIDE 10 MG: 10 TABLET ORAL at 21:21

## 2024-02-17 RX ADMIN — PREDNISONE 40 MG: 20 TABLET ORAL at 09:08

## 2024-02-17 RX ADMIN — HYDROXYZINE HYDROCHLORIDE 10 MG: 10 TABLET ORAL at 09:09

## 2024-02-17 RX ADMIN — LATANOPROST 1 DROP: 50 SOLUTION OPHTHALMIC at 21:21

## 2024-02-17 RX ADMIN — IPRATROPIUM BROMIDE AND ALBUTEROL SULFATE 3 ML: .5; 3 SOLUTION RESPIRATORY (INHALATION) at 08:26

## 2024-02-17 RX ADMIN — OLANZAPINE 5 MG: 5 TABLET, ORALLY DISINTEGRATING ORAL at 09:09

## 2024-02-17 RX ADMIN — ONDANSETRON 4 MG: 2 INJECTION INTRAMUSCULAR; INTRAVENOUS at 09:08

## 2024-02-17 RX ADMIN — PANTOPRAZOLE SODIUM 40 MG: 40 INJECTION, POWDER, FOR SOLUTION INTRAVENOUS at 09:08

## 2024-02-17 RX ADMIN — FLUOXETINE HYDROCHLORIDE 40 MG: 20 CAPSULE ORAL at 09:08

## 2024-02-17 RX ADMIN — ACETAMINOPHEN 650 MG: 325 TABLET, FILM COATED ORAL at 22:45

## 2024-02-17 RX ADMIN — IPRATROPIUM BROMIDE AND ALBUTEROL SULFATE 3 ML: .5; 3 SOLUTION RESPIRATORY (INHALATION) at 11:29

## 2024-02-17 RX ADMIN — LEVOTHYROXINE SODIUM 50 MCG: 50 TABLET ORAL at 09:09

## 2024-02-17 RX ADMIN — DIPHENHYDRAMINE HYDROCHLORIDE, ZINC ACETATE: 2; .1 CREAM TOPICAL at 16:20

## 2024-02-17 RX ADMIN — ACETAMINOPHEN 650 MG: 325 TABLET, FILM COATED ORAL at 09:09

## 2024-02-17 RX ADMIN — IPRATROPIUM BROMIDE AND ALBUTEROL SULFATE 3 ML: .5; 3 SOLUTION RESPIRATORY (INHALATION) at 16:50

## 2024-02-17 RX ADMIN — HYDROXYZINE HYDROCHLORIDE 10 MG: 10 TABLET ORAL at 17:49

## 2024-02-17 RX ADMIN — BUDESONIDE 1 MG: 1 INHALANT ORAL at 08:26

## 2024-02-17 RX ADMIN — DONEPEZIL HYDROCHLORIDE 10 MG: 10 TABLET, FILM COATED ORAL at 21:20

## 2024-02-17 RX ADMIN — BUSPIRONE HYDROCHLORIDE 10 MG: 10 TABLET ORAL at 09:09

## 2024-02-17 ASSESSMENT — ACTIVITIES OF DAILY LIVING (ADL)
ADLS_ACUITY_SCORE: 43
ADLS_ACUITY_SCORE: 41
ADLS_ACUITY_SCORE: 43
ADLS_ACUITY_SCORE: 41
ADLS_ACUITY_SCORE: 43
ADLS_ACUITY_SCORE: 43
ADLS_ACUITY_SCORE: 41
ADLS_ACUITY_SCORE: 41
ADLS_ACUITY_SCORE: 43
ADLS_ACUITY_SCORE: 41

## 2024-02-17 NOTE — PLAN OF CARE
"Goal Outcome Evaluation:      Plan of Care Reviewed With: patient    Overall Patient Progress: no change    Outcome Evaluation: no change to pt health this shift. Pt was stable through this shift.      Problem: Pain Acute  Goal: Optimal Pain Control and Function  Outcome: Progressing  Intervention: Prevent or Manage Pain  Recent Flowsheet Documentation  Taken 2/16/2024 2126 by Demetria Gonzalez RN  Medication Review/Management: medications reviewed     Problem: Adult Inpatient Plan of Care  Goal: Plan of Care Review  Description: The Plan of Care Review/Shift note should be completed every shift.  The Outcome Evaluation is a brief statement about your assessment that the patient is improving, declining, or no change.  This information will be displayed automatically on your shift  note.  Outcome: Progressing  Flowsheets (Taken 2/17/2024 0301)  Outcome Evaluation: no change to pt health this shift.  Plan of Care Reviewed With: patient  Overall Patient Progress: no change  Goal: Patient-Specific Goal (Individualized)  Description: You can add care plan individualizations to a care plan. Examples of Individualization might be:  \"Parent requests to be called daily at 9am for status\", \"I have a hard time hearing out of my right ear\", or \"Do not touch me to wake me up as it startles  me\".  Outcome: Progressing  Goal: Absence of Hospital-Acquired Illness or Injury  Outcome: Progressing  Intervention: Identify and Manage Fall Risk  Recent Flowsheet Documentation  Taken 2/16/2024 2126 by Demetria Gonzalez RN  Safety Promotion/Fall Prevention:   assistive device/personal items within reach   clutter free environment maintained   increased rounding and observation   increase visualization of patient   patient and family education   room door open   room near nurse's station   room organization consistent   safety round/check completed  Intervention: Prevent Skin Injury  Recent Flowsheet Documentation  Taken 2/16/2024 2126 by " Demetria Gonzalez RN  Body Position: refuses positioning  Goal: Optimal Comfort and Wellbeing  Outcome: Progressing  Goal: Readiness for Transition of Care  Outcome: Progressing     Problem: Oral Intake Inadequate  Goal: Improved Oral Intake  Outcome: Progressing     Problem: Breathing Pattern Ineffective  Goal: Effective Breathing Pattern  Outcome: Progressing  Intervention: Promote Improved Breathing Pattern  Recent Flowsheet Documentation  Taken 2/16/2024 2126 by Demetria Gonzalez RN  Head of Bed ([facility]) Positioning: HOB at 60-90 degrees     Problem: Pneumonia  Goal: Fluid Balance  Outcome: Progressing  Goal: Resolution of Infection Signs and Symptoms  Outcome: Progressing  Goal: Effective Oxygenation and Ventilation  Outcome: Progressing  Intervention: Optimize Oxygenation and Ventilation  Recent Flowsheet Documentation  Taken 2/16/2024 2126 by Demetria Gonzalez RN  Head of Bed ([facility]) Positioning: HOB at 60-90 degrees     Problem: Comorbidity Management  Goal: Maintenance of COPD Symptom Control  Outcome: Progressing  Intervention: Maintain COPD Symptom Control  Recent Flowsheet Documentation  Taken 2/16/2024 2126 by Demetria Gonzalez RN  Medication Review/Management: medications reviewed  Goal: Blood Pressure in Desired Range  Outcome: Progressing  Intervention: Maintain Blood Pressure Management  Recent Flowsheet Documentation  Taken 2/16/2024 2126 by Demetria Gonzalez RN  Medication Review/Management: medications reviewed

## 2024-02-17 NOTE — PROGRESS NOTES
Care Management Follow Up    Length of Stay (days): 16    Expected Discharge Date: 02/20/2024     Concerns to be Addressed: adjustment to diagnosis/illness     Patient plan of care discussed at interdisciplinary rounds: No    Anticipated Discharge Disposition: Assisted Living     Anticipated Discharge Services: None (Home Care)  Anticipated Discharge DME:      Patient/family educated on Medicare website which has current facility and service quality ratings:  no  Education Provided on the Discharge Plan:  no  Patient/Family in Agreement with the Plan: yes    Referrals Placed by CM/SW:    Private pay costs discussed: Not applicable    Additional Information:    Writer obtained patient's notarized health care directive from patient's daughter. Writer emailed/faxed the directive to [employer] for verification. Original copy of the health care directive was given back to patient's daughter, and a copy was placed into patient's chart.     Care management will continue to follow and assist as needed.         CHONG Bautista   2/17/2024  Nurse Coordinator      Social Work and Care Management Department       SEARCHABLE in [facility] - search CARE COORDINATOR       [place] ([phone]) Saturday & Sunday; (1726-1320) [facility] Recognized Holidays     Units: 5A Onc Vocera & 5C Vocera Pager: [phone]    Units: 6B Vocera & 6C Vocera  Pager: [phone]    Units: 7A SOT RNCC Vocera, 7B Med Surg Vocera, & 7C Med Surg Vocera  Pager: [phone]    Units: 6A Vocera & 4A CVICU Vocera, 4C MICU Vocera, and 4E SICU Vocera   Pager: [phone]    Units: 5 Ortho Vocera & 5 Med Surg Vocera  Pager: [phone]    Units: 6 Med Surg Vocera & 8 Med Surg Vocera  Pager [phone]

## 2024-02-17 NOTE — PROGRESS NOTES
[facility]    Medicine Progress Note - Hospitalist Service, GOLD TEAM 21    Date of Admission:  2/1/2024    Assessment & Plan   Idalia Bob is a 79 year old female admitted on 2/1/2024. Pt has history of COPD, pneumonia, hypertension, dementia and hypothyroidism among others, presented with abdominal pain and found to have consolidation on CT as well as frequent mucousy bowel movements and anorexia. Known persistent LLL infiltrate of uncertain etiology. She remains on 2-3NC with symptomatic dyspnea and continues to feel generally unwell, tolerating minimal PO.    CC on 2/16 with decision to transition to comfort-focused cares. Palliative, SW and CC following. POLST completed. Advanced care directive provided by family and identifies daughters as decision makers. She remains DNR/DNI with no escalation of cares.      Changes today:  - Discussed with Idalia and her daughter, Barbara, medications including Buspar, fluoxetine, and donepezil as these were not specifically addressed during care conference, they feel these are within her goals of care at this time and we will continue to offer them. Discussed with Idalia, she may refuse these if she changes her mind. Discussed if stopping fluoxetine, would recommend taper as she has been on this medication for some time.   - Continue symptom management     Left upper lobe pneumonia vs persistent consolidation vs mass  COPD  Acute on chronic hypoxemic respiratory failure  Patient with pseudomonal upper lobe pneumonia in December and has had ongoing consolidation since.  While this finding was present on imaging she has a paucity of other pathology consistent with pneumonia at the present time. She is now having increasing dyspnea x 24 hours, on 3LNC. Repeat CT with worsening upper lobe infiltrates and edema. Per pulm, felt his may represent persistent pneumonia in the setting of poor airway clearance and mobilization,  less likely inflammatory but cannot be ruled out and ongoing consideration for underlying malignancy. Risk outweighs benefit of biopsy given her underlying lung disease and further workup including imaging, BAL or biopsy are not currently within her GOC. Extensive infectious workup otherwise unrevealing. TTE without evidence of CHF.     CC on 2/17 with decision to transition to comfort focused care. Completed antibiotics on 2/16 and will plan to complete Prednisone course. She remains on supplemental O2 for comfort at this time.   -s/p azithromycin and ceftriaxone, s/p cefepime-> Zosyn (switch due to concern for neurotoxicity) x 7 days for PsA course  - Prednisone 40mg PO daily x 5 days   - Holding diuresis given little symptomatic improvement with this; low threshold to resume if felt this would provide comfort  - Continue home COPD regimen  - Continue home oxygen for now; this may need to be readdressed pending plan for hospice    Abdominal Pain, improved   Intractable nausea   Loose stools with mucus, improved   Tenesmus  Unintentional weight loss  Anorexia  The patient has had weeks of worsening anorexia along with crampy abdominal pain but no evidence of gastrointestinal bleeding.  She has frequent (many many times per day) mucousy bowel movements with very little stool production.  C. difficile and enteric panel negative. S/p flex sig and endoscopy 2/7. Noted hiatal hernia, mild inflammatory changes in distal colon/rectum s/p biopsy. No identified etiology of her symptoms.     Ongoing intractable nausea and inability to tolerate PO intermittently. Will continue aggressive symptom management in the setting of comfort-focused cares.   -Appreciate GI consultation; since signed off  -Ok to continue Bentyl prn, stopped psyllium, imodium as no further diarrhea   - Zyprexa added per palliative  - Steroids per above, consider IV dosing if unable to tolerate oral   - Comfort order set in place including morphine and  ativan     Anxiety and depression  Takes BuSpar, Prozac  -Continue home regimen - remains within GOC   -Zyprexa 2.5mg BID per palliative on 2/13    RLE DVT  Noted on US 2/13, started on therapeutic lovenox.   - stopped AC after discussion with family upon transition to comfort      Urinary tract infection  -s/p treatment   Elevated Troponin  Likely type II MI in the setting of acute illness. EKG stable. TTE without RWMA  Hypokalemia  Hypomagnesemia  Iron deficiency anemia  Hypertension  Hypothyroidism; continue Synthroid 50 mcg, as long as this remains within GO   Dementia; continue donepezil, as long as this remains within GO           Diet: Snacks/Supplements Adult: Gelatein Plus; Between Meals  Snacks/Supplements Adult: Ensure Enlive; With Meals  Regular Diet Adult    DVT Prophylaxis: VTE Prophylaxis contraindicated due to comfort cares  Ruiz Catheter: Not present  Lines: None     Cardiac Monitoring: None  Code Status: No CPR- Do NOT Intubate      Clinically Significant Risk Factors              # Hypoalbuminemia: Lowest albumin = 3 g/dL at 2/14/2024  5:37 AM, will monitor as appropriate     # Hypertension: Noted on problem list     # Dementia: noted on problem list    # Overweight: Estimated body mass index is 26.44 kg/m  as calculated from the following:    Height as of this encounter: 1.524 m (5').    Weight as of this encounter: 61.4 kg (135 lb 5.8 oz).   # Severe Malnutrition: based on nutrition assessment    # Financial/Environmental Concerns: none         Disposition Plan     Expected Discharge Date: 02/20/2024      Destination: home with help/services  Discharge Comments: Pending completion of medical workup and symptomatic improvement. Likely 5-7 days.            Geetha Tong DO  Hospitalist Service, GOLD TEAM [address] [place] [address]  Securely message with [employer] (more info)  Text page via [facility] Paging/Directory   See signed in provider for up to date  coverage information  ______________________________________________________________________    Interval History   No acute events overnight. She feels ok this morning. Ongoing nausea but she is eating breakfast. Breathing feels ok currently. No abdominal pain. Last BM yesterday with no abdominal pain currently.     Physical Exam   Vital Signs: Temp: 98  F (36.7  C) Temp src: Oral BP: 135/76 Pulse: 105   Resp: 14 SpO2: 96 % O2 Device: Nasal cannula Oxygen Delivery: 2.5 LPM  Weight: 135 lbs 5.8 oz    General: awake, sitting upright in bed, eating breakfast. Appears comfortable.   HEENT: sclera clear, NC in place.   LUNGS: No increased WOB   NEURO: Alert and interactive.     Medical Decision Making

## 2024-02-17 NOTE — PLAN OF CARE
[id number]    Plan of Care Reviewed With: patient, family    Overall Patient Progress: no change    Outcome Evaluation: Pt is A & O x3 (not situation) w/ intermittent confusion on 3L of O2 via NC. Pt c/o 4/10 generalized pain - administered PRN Tylenol. Pt also c/o nausea - administered PRN Zofran. Denies chest pain & SOB. New L PIV placed by RN flyer - SL. Upon skin assessment - scattered abrasions & scabs noted. Pt has external catheter in place - changed during shift & incontinence cares provided. Pt is not oob, is assist x1 & uses call light appropriately. Pt repositioned as needed throughout shift.    Vitals: BP (!) 142/81 (BP Location: Left arm)   Pulse 112   Temp 97.9  F (36.6  C) (Oral)   Resp 14   Ht 1.524 m (5')   Wt 61.4 kg (135 lb 5.8 oz)   SpO2 98%   BMI 26.44 kg/m      Plan: TBD, pt is comfort cares. Continue w/ plan of care.

## 2024-02-18 PROCEDURE — 250N000009 HC RX 250: Performed by: STUDENT IN AN ORGANIZED HEALTH CARE EDUCATION/TRAINING PROGRAM

## 2024-02-18 PROCEDURE — 250N000013 HC RX MED GY IP 250 OP 250 PS 637

## 2024-02-18 PROCEDURE — C9113 INJ PANTOPRAZOLE SODIUM, VIA: HCPCS | Performed by: STUDENT IN AN ORGANIZED HEALTH CARE EDUCATION/TRAINING PROGRAM

## 2024-02-18 PROCEDURE — 94640 AIRWAY INHALATION TREATMENT: CPT | Mod: 76

## 2024-02-18 PROCEDURE — 999N000157 HC STATISTIC RCP TIME EA 10 MIN

## 2024-02-18 PROCEDURE — 250N000011 HC RX IP 250 OP 636: Performed by: STUDENT IN AN ORGANIZED HEALTH CARE EDUCATION/TRAINING PROGRAM

## 2024-02-18 PROCEDURE — 99207 PR NO BILLABLE SERVICE THIS VISIT: CPT | Performed by: STUDENT IN AN ORGANIZED HEALTH CARE EDUCATION/TRAINING PROGRAM

## 2024-02-18 PROCEDURE — 99232 SBSQ HOSP IP/OBS MODERATE 35: CPT | Performed by: STUDENT IN AN ORGANIZED HEALTH CARE EDUCATION/TRAINING PROGRAM

## 2024-02-18 PROCEDURE — 120N000002 HC R&B MED SURG/OB UMMC

## 2024-02-18 PROCEDURE — 250N000012 HC RX MED GY IP 250 OP 636 PS 637: Performed by: STUDENT IN AN ORGANIZED HEALTH CARE EDUCATION/TRAINING PROGRAM

## 2024-02-18 PROCEDURE — 250N000011 HC RX IP 250 OP 636

## 2024-02-18 PROCEDURE — 250N000013 HC RX MED GY IP 250 OP 250 PS 637: Performed by: HOSPITALIST

## 2024-02-18 PROCEDURE — 94640 AIRWAY INHALATION TREATMENT: CPT

## 2024-02-18 PROCEDURE — 250N000013 HC RX MED GY IP 250 OP 250 PS 637: Performed by: PHYSICIAN ASSISTANT

## 2024-02-18 RX ADMIN — ACETAMINOPHEN 650 MG: 325 TABLET, FILM COATED ORAL at 13:02

## 2024-02-18 RX ADMIN — LATANOPROST 1 DROP: 50 SOLUTION OPHTHALMIC at 21:39

## 2024-02-18 RX ADMIN — DONEPEZIL HYDROCHLORIDE 10 MG: 10 TABLET, FILM COATED ORAL at 21:39

## 2024-02-18 RX ADMIN — BUDESONIDE 1 MG: 1 INHALANT ORAL at 20:28

## 2024-02-18 RX ADMIN — OLANZAPINE 5 MG: 5 TABLET, ORALLY DISINTEGRATING ORAL at 21:39

## 2024-02-18 RX ADMIN — PREDNISONE 40 MG: 20 TABLET ORAL at 08:20

## 2024-02-18 RX ADMIN — LORAZEPAM 1 MG: 2 INJECTION, SOLUTION INTRAMUSCULAR; INTRAVENOUS at 01:12

## 2024-02-18 RX ADMIN — IPRATROPIUM BROMIDE AND ALBUTEROL SULFATE 3 ML: .5; 3 SOLUTION RESPIRATORY (INHALATION) at 20:28

## 2024-02-18 RX ADMIN — DIPHENHYDRAMINE HYDROCHLORIDE, ZINC ACETATE: 2; .1 CREAM TOPICAL at 13:11

## 2024-02-18 RX ADMIN — PANTOPRAZOLE SODIUM 40 MG: 40 INJECTION, POWDER, FOR SOLUTION INTRAVENOUS at 08:20

## 2024-02-18 RX ADMIN — BUSPIRONE HYDROCHLORIDE 10 MG: 10 TABLET ORAL at 14:16

## 2024-02-18 RX ADMIN — HYDROXYZINE HYDROCHLORIDE 10 MG: 10 TABLET ORAL at 08:20

## 2024-02-18 RX ADMIN — HYDROXYZINE HYDROCHLORIDE 10 MG: 10 TABLET ORAL at 18:09

## 2024-02-18 RX ADMIN — OLANZAPINE 5 MG: 5 TABLET, ORALLY DISINTEGRATING ORAL at 08:20

## 2024-02-18 RX ADMIN — FLUOXETINE HYDROCHLORIDE 40 MG: 20 CAPSULE ORAL at 08:20

## 2024-02-18 RX ADMIN — LEVOTHYROXINE SODIUM 50 MCG: 50 TABLET ORAL at 08:20

## 2024-02-18 RX ADMIN — BUSPIRONE HYDROCHLORIDE 10 MG: 10 TABLET ORAL at 08:20

## 2024-02-18 RX ADMIN — BUSPIRONE HYDROCHLORIDE 10 MG: 10 TABLET ORAL at 21:39

## 2024-02-18 RX ADMIN — IPRATROPIUM BROMIDE AND ALBUTEROL SULFATE 3 ML: .5; 3 SOLUTION RESPIRATORY (INHALATION) at 16:50

## 2024-02-18 RX ADMIN — ACETAMINOPHEN 650 MG: 325 TABLET, FILM COATED ORAL at 18:09

## 2024-02-18 RX ADMIN — LORAZEPAM 1 MG: 2 INJECTION, SOLUTION INTRAMUSCULAR; INTRAVENOUS at 21:40

## 2024-02-18 RX ADMIN — IPRATROPIUM BROMIDE AND ALBUTEROL SULFATE 3 ML: .5; 3 SOLUTION RESPIRATORY (INHALATION) at 09:13

## 2024-02-18 RX ADMIN — BUDESONIDE 1 MG: 1 INHALANT ORAL at 09:13

## 2024-02-18 RX ADMIN — IPRATROPIUM BROMIDE AND ALBUTEROL SULFATE 3 ML: .5; 3 SOLUTION RESPIRATORY (INHALATION) at 12:25

## 2024-02-18 RX ADMIN — LORAZEPAM 1 MG: 1 TABLET ORAL at 13:31

## 2024-02-18 ASSESSMENT — ACTIVITIES OF DAILY LIVING (ADL)
ADLS_ACUITY_SCORE: 43

## 2024-02-18 NOTE — PROGRESS NOTES
[facility]    Medicine Progress Note - Hospitalist Service, GOLD TEAM 21    Date of Admission:  2/1/2024    Assessment & Plan   Idalia Bob is a 79 year old female admitted on 2/1/2024. Pt has history of COPD, pneumonia, hypertension, dementia and hypothyroidism among others, presented with abdominal pain and found to have consolidation on CT as well as frequent mucousy bowel movements and anorexia. Known persistent LLL infiltrate of uncertain etiology. She remains on 2-3NC with symptomatic dyspnea and continues to feel generally unwell, tolerating minimal PO.    CC on 2/16 with decision to transition to comfort-focused cares. Palliative, SW and CC following. POLST completed. Advanced care directive provided by family and identifies daughters as decision makers. She remains DNR/DNI with no escalation of cares.      Changes today:  - Continue symptom management; will work to stabilize the patient on oral regimen as able   - Plan for SW/CC and financial counselor in the AM to determine next steps regarding GIP vs outpatient hospice options.      Left upper lobe pneumonia vs persistent consolidation vs mass  COPD  Acute on chronic hypoxemic respiratory failure  Patient with pseudomonal upper lobe pneumonia in December and has had ongoing consolidation since.  While this finding was present on imaging she has a paucity of other pathology consistent with pneumonia at the present time. She is now having increasing dyspnea x 24 hours, on 3LNC. Repeat CT with worsening upper lobe infiltrates and edema. Per pulm, felt his may represent persistent pneumonia in the setting of poor airway clearance and mobilization, less likely inflammatory but cannot be ruled out and ongoing consideration for underlying malignancy. Risk outweighs benefit of biopsy given her underlying lung disease and further workup including imaging, BAL or biopsy are not currently within her GOC. Extensive  infectious workup otherwise unrevealing. TTE without evidence of CHF.     CC on 2/17 with decision to transition to comfort focused care. Completed antibiotics on 2/16 and will plan to complete Prednisone course. She remains on supplemental O2 for comfort at this time.   -s/p azithromycin and ceftriaxone, s/p cefepime-> Zosyn (switch due to concern for neurotoxicity) x 7 days for PsA course  - Prednisone 40mg PO daily x 5 days (end 2/19)  - Holding diuresis given little symptomatic improvement with this; low threshold to resume if felt this would provide comfort  - Continue home COPD regimen  - Continue home oxygen for now; this may need to be readdressed pending plan for hospice    Abdominal Pain, improved   Intractable nausea   Loose stools with mucus, improved   Tenesmus  Unintentional weight loss  Anorexia  The patient has had weeks of worsening anorexia along with crampy abdominal pain but no evidence of gastrointestinal bleeding.  She has frequent (many many times per day) mucousy bowel movements with very little stool production.  C. difficile and enteric panel negative. S/p flex sig and endoscopy 2/7. Noted hiatal hernia, mild inflammatory changes in distal colon/rectum s/p biopsy. No identified etiology of her symptoms.     Ongoing intractable nausea and inability to tolerate PO intermittently. Will continue aggressive symptom management in the setting of comfort-focused cares.   -Appreciate GI consultation; since signed off  -Ok to continue Bentyl prn, stopped psyllium, imodium as no further diarrhea   -Zyprexa added per palliative  -Steroids per above  -Comfort order set in place including morphine and ativan     Anxiety and depression  Takes BuSpar, Prozac  -Continue home regimen - remains within GOC   -Zyprexa 2.5mg BID per palliative on 2/13    RLE DVT  Noted on US 2/13, started on therapeutic lovenox.   - stopped AC after discussion with family upon transition to comfort      Urinary tract  infection  -s/p treatment   Elevated Troponin  Likely type II MI in the setting of acute illness. EKG stable. TTE without RWMA  Hypokalemia  Hypomagnesemia  Iron deficiency anemia  Hypertension  Hypothyroidism; continue Synthroid 50 mcg, as long as this remains within [facility]   Dementia; continue donepezil, as long as this remains within [facility]           Diet: Snacks/Supplements Adult: Gelatein Plus; Between Meals  Snacks/Supplements Adult: Ensure Enlive; With Meals  Regular Diet Adult    DVT Prophylaxis: VTE Prophylaxis contraindicated due to comfort cares  Ruiz Catheter: Not present  Lines: None     Cardiac Monitoring: None  Code Status: No CPR- Do NOT Intubate      Clinically Significant Risk Factors              # Hypoalbuminemia: Lowest albumin = 3 g/dL at 2/14/2024  5:37 AM, will monitor as appropriate     # Hypertension: Noted on problem list     # Dementia: noted on problem list    # Overweight: Estimated body mass index is 26.44 kg/m  as calculated from the following:    Height as of this encounter: 1.524 m (5').    Weight as of this encounter: 61.4 kg (135 lb 5.8 oz).   # Severe Malnutrition: based on nutrition assessment    # Financial/Environmental Concerns: none         Disposition Plan     Expected Discharge Date: 02/20/2024      Destination: home with help/services  Discharge Comments: Pending completion of medical workup and symptomatic improvement. Likely 5-7 days.            Geetha Tong DO  Hospitalist Service, GOLD TEAM 21  [facility]  Securely message with [employer] (more info)  Text page via [facility] Paging/Directory   See signed in provider for up to date coverage information  ______________________________________________________________________    Interval History   Noted by family to have some anxiety, dyspnea and cough last evening for which she received IV ativan with improvement. She is declining food and drink this morning. She is resting  comfortably at this time.     Physical Exam   Vital Signs: Temp: 97.9  F (36.6  C) Temp src: Oral BP: (!) 142/81 Pulse: 99   Resp: 20 SpO2: 97 % O2 Device: Nasal cannula Oxygen Delivery: 2 LPM  Weight: 135 lbs 5.8 oz    General: sleeping upright in bed, no acute distress. Family at the bedside.   Resp: no increased WOB  Remainder of exam deferred as the patient is on comfort-focused cares.     Medical Decision Making

## 2024-02-18 NOTE — PLAN OF CARE
[id number]    Plan of Care Reviewed With: patient    Overall Patient Progress: no change    Outcome Evaluation: Pt is A & O x3 (not situation) w/ intermittent confusion on 3L of O2 via NC. Pt c/o generalized pain - administered PRN Tylenol. Denies nausea, chest pain & SOB. Pt has L PIV - SL. Upon skin assessment - scattered abrasions & scabs noted. Pt has external catheter in place - changed during shift & incontinence cares provided. Pt is not oob, is assist x1 & uses call light appropriately. Pt repositioned as needed throughout shift.     Vitals: BP (!) 142/81 (BP Location: Left arm)   Pulse 99   Temp 97.9  F (36.6  C) (Oral)   Resp 20   Ht 1.524 m (5')   Wt 61.4 kg (135 lb 5.8 oz)   SpO2 97%   BMI 26.44 kg/m      Plan: TBD. Continue w/ plan of care.

## 2024-02-18 NOTE — PLAN OF CARE
"Goal Outcome Evaluation:      Plan of Care Reviewed With: patient    Overall Patient Progress: no change    Outcome Evaluation: no change overnight. family at beside      Problem: Pain Acute  Goal: Optimal Pain Control and Function  Outcome: Progressing  Intervention: Prevent or Manage Pain  Recent Flowsheet Documentation  Taken 2/17/2024 2128 by Demetria Gonzalez RN  Medication Review/Management: medications reviewed     Problem: Adult Inpatient Plan of Care  Goal: Plan of Care Review  Description: The Plan of Care Review/Shift note should be completed every shift.  The Outcome Evaluation is a brief statement about your assessment that the patient is improving, declining, or no change.  This information will be displayed automatically on your shift  note.  Outcome: Progressing  Flowsheets (Taken 2/18/2024 0144)  Outcome Evaluation: no change overnight. family at beside  Plan of Care Reviewed With: patient  Overall Patient Progress: no change  Goal: Patient-Specific Goal (Individualized)  Description: You can add care plan individualizations to a care plan. Examples of Individualization might be:  \"Parent requests to be called daily at 9am for status\", \"I have a hard time hearing out of my right ear\", or \"Do not touch me to wake me up as it startles  me\".  Outcome: Progressing  Goal: Absence of Hospital-Acquired Illness or Injury  Outcome: Progressing  Intervention: Identify and Manage Fall Risk  Recent Flowsheet Documentation  Taken 2/17/2024 2128 by Demetria Gonzalez RN  Safety Promotion/Fall Prevention:   assistive device/personal items within reach   clutter free environment maintained   increased rounding and observation   increase visualization of patient   patient and family education   room door open   room near nurse's station   room organization consistent   safety round/check completed  Intervention: Prevent Skin Injury  Recent Flowsheet Documentation  Taken 2/17/2024 2128 by Demetria Gonzalez RN  Body " Position: supine, head elevated  Goal: Optimal Comfort and Wellbeing  Outcome: Progressing  Goal: Readiness for Transition of Care  Outcome: Progressing     Problem: Oral Intake Inadequate  Goal: Improved Oral Intake  Outcome: Progressing     Problem: Breathing Pattern Ineffective  Goal: Effective Breathing Pattern  Outcome: Progressing  Intervention: Promote Improved Breathing Pattern  Recent Flowsheet Documentation  Taken 2/17/2024 2128 by Demetria Gonzalez RN  Head of Bed ([facility]) Positioning: HOB at 30-45 degrees     Problem: Pneumonia  Goal: Fluid Balance  Outcome: Progressing  Goal: Resolution of Infection Signs and Symptoms  Outcome: Progressing  Goal: Effective Oxygenation and Ventilation  Outcome: Progressing  Intervention: Promote Airway Secretion Clearance  Recent Flowsheet Documentation  Taken 2/17/2024 2128 by Demetria Gonzalez RN  Cough And Deep Breathing: done independently per patient  Intervention: Optimize Oxygenation and Ventilation  Recent Flowsheet Documentation  Taken 2/17/2024 2128 by Demetria Gonzalez RN  Head of Bed ([facility]) Positioning: [facility] at 30-45 degrees     Problem: Comorbidity Management  Goal: Maintenance of COPD Symptom Control  Outcome: Progressing  Intervention: Maintain COPD Symptom Control  Recent Flowsheet Documentation  Taken 2/17/2024 2128 by Demetria Gonzalez RN  Medication Review/Management: medications reviewed  Goal: Blood Pressure in Desired Range  Outcome: Progressing  Intervention: Maintain Blood Pressure Management  Recent Flowsheet Documentation  Taken 2/17/2024 2128 by Demetria Gonzalez RN  Medication Review/Management: medications reviewed

## 2024-02-19 DIAGNOSIS — R60.0 LOWER EXTREMITY EDEMA: ICD-10-CM

## 2024-02-19 PROCEDURE — 94640 AIRWAY INHALATION TREATMENT: CPT

## 2024-02-19 PROCEDURE — 120N000002 HC R&B MED SURG/OB UMMC

## 2024-02-19 PROCEDURE — 94640 AIRWAY INHALATION TREATMENT: CPT | Mod: 76

## 2024-02-19 PROCEDURE — 250N000013 HC RX MED GY IP 250 OP 250 PS 637: Performed by: HOSPITALIST

## 2024-02-19 PROCEDURE — 250N000009 HC RX 250: Performed by: STUDENT IN AN ORGANIZED HEALTH CARE EDUCATION/TRAINING PROGRAM

## 2024-02-19 PROCEDURE — C9113 INJ PANTOPRAZOLE SODIUM, VIA: HCPCS | Performed by: STUDENT IN AN ORGANIZED HEALTH CARE EDUCATION/TRAINING PROGRAM

## 2024-02-19 PROCEDURE — 250N000013 HC RX MED GY IP 250 OP 250 PS 637

## 2024-02-19 PROCEDURE — 250N000012 HC RX MED GY IP 250 OP 636 PS 637: Performed by: STUDENT IN AN ORGANIZED HEALTH CARE EDUCATION/TRAINING PROGRAM

## 2024-02-19 PROCEDURE — 250N000011 HC RX IP 250 OP 636: Performed by: STUDENT IN AN ORGANIZED HEALTH CARE EDUCATION/TRAINING PROGRAM

## 2024-02-19 PROCEDURE — 999N000157 HC STATISTIC RCP TIME EA 10 MIN

## 2024-02-19 PROCEDURE — 99232 SBSQ HOSP IP/OBS MODERATE 35: CPT | Performed by: STUDENT IN AN ORGANIZED HEALTH CARE EDUCATION/TRAINING PROGRAM

## 2024-02-19 RX ORDER — FUROSEMIDE 20 MG
TABLET ORAL
Qty: 90 TABLET | Refills: 0 | Status: SHIPPED | OUTPATIENT
Start: 2024-02-19 | End: 2024-02-25

## 2024-02-19 RX ADMIN — OLANZAPINE 5 MG: 5 TABLET, ORALLY DISINTEGRATING ORAL at 08:52

## 2024-02-19 RX ADMIN — ACETAMINOPHEN 650 MG: 325 TABLET, FILM COATED ORAL at 13:21

## 2024-02-19 RX ADMIN — LATANOPROST 1 DROP: 50 SOLUTION OPHTHALMIC at 21:27

## 2024-02-19 RX ADMIN — PANTOPRAZOLE SODIUM 40 MG: 40 INJECTION, POWDER, FOR SOLUTION INTRAVENOUS at 08:53

## 2024-02-19 RX ADMIN — IPRATROPIUM BROMIDE AND ALBUTEROL SULFATE 3 ML: .5; 3 SOLUTION RESPIRATORY (INHALATION) at 16:45

## 2024-02-19 RX ADMIN — BUSPIRONE HYDROCHLORIDE 10 MG: 10 TABLET ORAL at 19:32

## 2024-02-19 RX ADMIN — BUSPIRONE HYDROCHLORIDE 10 MG: 10 TABLET ORAL at 08:52

## 2024-02-19 RX ADMIN — FLUOXETINE HYDROCHLORIDE 40 MG: 20 CAPSULE ORAL at 08:52

## 2024-02-19 RX ADMIN — OLANZAPINE 5 MG: 5 TABLET, ORALLY DISINTEGRATING ORAL at 19:32

## 2024-02-19 RX ADMIN — BUDESONIDE 1 MG: 1 INHALANT ORAL at 07:57

## 2024-02-19 RX ADMIN — LEVOTHYROXINE SODIUM 50 MCG: 50 TABLET ORAL at 08:52

## 2024-02-19 RX ADMIN — IPRATROPIUM BROMIDE AND ALBUTEROL SULFATE 3 ML: .5; 3 SOLUTION RESPIRATORY (INHALATION) at 13:35

## 2024-02-19 RX ADMIN — PREDNISONE 40 MG: 20 TABLET ORAL at 08:52

## 2024-02-19 RX ADMIN — DONEPEZIL HYDROCHLORIDE 10 MG: 10 TABLET, FILM COATED ORAL at 21:27

## 2024-02-19 RX ADMIN — IPRATROPIUM BROMIDE AND ALBUTEROL SULFATE 3 ML: .5; 3 SOLUTION RESPIRATORY (INHALATION) at 07:57

## 2024-02-19 RX ADMIN — BUSPIRONE HYDROCHLORIDE 10 MG: 10 TABLET ORAL at 13:22

## 2024-02-19 RX ADMIN — ACETAMINOPHEN 650 MG: 325 TABLET, FILM COATED ORAL at 02:23

## 2024-02-19 RX ADMIN — MORPHINE SULFATE 10 MG: 10 SOLUTION ORAL at 15:51

## 2024-02-19 RX ADMIN — MORPHINE SULFATE 10 MG: 10 SOLUTION ORAL at 11:24

## 2024-02-19 RX ADMIN — IPRATROPIUM BROMIDE AND ALBUTEROL SULFATE 3 ML: .5; 3 SOLUTION RESPIRATORY (INHALATION) at 20:10

## 2024-02-19 RX ADMIN — ACETAMINOPHEN 650 MG: 325 TABLET, FILM COATED ORAL at 08:52

## 2024-02-19 RX ADMIN — BUDESONIDE 1 MG: 1 INHALANT ORAL at 20:10

## 2024-02-19 ASSESSMENT — ACTIVITIES OF DAILY LIVING (ADL)
ADLS_ACUITY_SCORE: 43

## 2024-02-19 NOTE — CONSULTS
"SPIRITUAL HEALTH SERVICES Consult Note  [facility] ([facility] 6B    Visited with Idalia today. She reports she is doing \"OK\" and \"it was a good weekend. I saw my granddaughter.\" I read her a poem \"Benedictio\" by Bret Mckenna and provided her with a candle. The was thankful for the visit and welcomes future [facility] visits.     scottie ochoa  Chaplain Resident  Pager [phone]    * [facility] remains available 24/7 for emergent requests/referrals, either by having the switchboard page the on-call  or by entering an ASAP/STAT consult in Epic (this will also page the on-call ). Routine Epic consults receive an initial response within 24 hours.*   "

## 2024-02-19 NOTE — PROGRESS NOTES
[id number]    Pt is AO with intermittent confusion able to make needs known. Repositioned throughout shift as needed and per patient request. C/O about pain, PRN med's given.Pure wick in place.   Continue with POC

## 2024-02-19 NOTE — PROGRESS NOTES
CLINICAL NUTRITION SERVICES - BRIEF NOTE    Consult in place for: Pt and family would like Ensure supplement  Noted pt is receiving Ensure Enlive at breakfast and dinner meals  Also noted Comfort Cares in place as of 2/16/24.    Findings  Mostly poor intakes    Body mass index is 26.44 kg/m .    Wt Readings from Last 10 Encounters:   02/13/24 61.4 kg (135 lb 5.8 oz)   01/22/24 59.9 kg (132 lb)     INTERVENTIONS  Implementation    Medical food supplement therapy     Follow up/Monitoring  Progress toward goals will be monitored and evaluated per protocol.    Astrid Friedman RDN [facility] 5B/[address] pager: [phone]   On Call Pager (weekends only): [phone]

## 2024-02-19 NOTE — PROGRESS NOTES
[facility]    Medicine Progress Note - Hospitalist Service, GOLD TEAM 21    Date of Admission:  2/1/2024    Assessment & Plan   Idalia Bob is a 79 year old female admitted on 2/1/2024. Pt has history of COPD, pneumonia, hypertension, dementia and hypothyroidism among others, presented with abdominal pain and found to have consolidation on CT as well as frequent mucousy bowel movements and anorexia. Known persistent LLL infiltrate of uncertain etiology. She remains on 2-3NC with symptomatic dyspnea and ongoing intermittent nausea.     CC on 2/16 with decision to transition to comfort-focused cares. Palliative, SW and CC following. POLST completed. Advanced care directive provided by family and identifies daughters as decision makers. She remains DNR/DNI with no escalation of cares.      Changes today:  - Continue symptom management; will work to stabilize the patient on oral regimen as able   - Plan for SW/CC and financial counselor today to determine next steps regarding GIP vs outpatient hospice options.      Left upper lobe pneumonia vs persistent consolidation vs mass  COPD  Acute on chronic hypoxemic respiratory failure  Patient with pseudomonal upper lobe pneumonia in December and has had ongoing consolidation since.  While this finding was present on imaging she has a paucity of other pathology consistent with pneumonia at the present time. She is now having increasing dyspnea x 24 hours, on 3LNC. Repeat CT with worsening upper lobe infiltrates and edema. Per pulm, felt his may represent persistent pneumonia in the setting of poor airway clearance and mobilization, less likely inflammatory but cannot be ruled out and ongoing consideration for underlying malignancy. Risk outweighs benefit of biopsy given her underlying lung disease and further workup including imaging, BAL or biopsy are not currently within her GOC. Extensive infectious workup otherwise  unrevealing. TTE without evidence of CHF.     CC on 2/17 with decision to transition to comfort focused care. Completed antibiotics on 2/16 and Prednisone course on 2/16. She remains on supplemental O2 for comfort at this time but will ask nursing to attempt to wean as able.   -s/p azithromycin and ceftriaxone, s/p cefepime-> Zosyn (switch due to concern for neurotoxicity) x 7 days for PsA course  - S/p Prednisone 40mg PO daily x 5 days (end 2/19)  - Holding diuresis given little symptomatic improvement with this; low threshold to resume if felt this would provide comfort  - Continue home COPD regimen  - Continue home oxygen for now; this may need to be readdressed pending plan for hospice    Abdominal Pain, improved   Intractable nausea   Loose stools with mucus, improved   Tenesmus  Unintentional weight loss  Anorexia  The patient has had weeks of worsening anorexia along with crampy abdominal pain but no evidence of gastrointestinal bleeding.  She has frequent (many many times per day) mucousy bowel movements with very little stool production.  C. difficile and enteric panel negative. S/p flex sig and endoscopy 2/7. Noted hiatal hernia, mild inflammatory changes in distal colon/rectum s/p biopsy. No identified etiology of her symptoms.     Intermittent nausea and inability to tolerate PO, eating breakfast 2/19. Will continue aggressive symptom management in the setting of comfort-focused cares. Given her oral intake, she is not in active dying process and do feel LTC vs home to assisted living or with family likely. SW/CC and financial counselor per above.   -Appreciate GI consultation; since signed off  -Ok to continue Bentyl prn, stopped psyllium, imodium as no further diarrhea   -Zyprexa added per palliative  -Steroids per above  -Comfort order set in place including morphine and ativan   -Palliative following, appreciate recommendations    Anxiety and depression  Takes BuSpar, Prozac  -Continue home regimen -  remains within GOC   -Zyprexa 2.5mg BID per palliative on 2/13    RLE DVT  Noted on US 2/13, started on therapeutic lovenox.   - stopped AC after discussion with family upon transition to comfort      Urinary tract infection  -s/p treatment   Elevated Troponin  Likely type II MI in the setting of acute illness. EKG stable. TTE without RWMA  Hypokalemia  Hypomagnesemia  Iron deficiency anemia  Hypertension  Hypothyroidism; continue Synthroid 50 mcg, as long as this remains within GOC   Dementia; continue donepezil, as long as this remains within GOC           Diet: Snacks/Supplements Adult: Gelatein Plus; Between Meals  Snacks/Supplements Adult: Ensure Enlive; With Meals  Regular Diet Adult    DVT Prophylaxis: VTE Prophylaxis contraindicated due to comfort cares  Ruiz Catheter: Not present  Lines: None     Cardiac Monitoring: None  Code Status: No CPR- Do NOT Intubate      Clinically Significant Risk Factors              # Hypoalbuminemia: Lowest albumin = 3 g/dL at 2/14/2024  5:37 AM, will monitor as appropriate     # Hypertension: Noted on problem list     # Dementia: noted on problem list    # Overweight: Estimated body mass index is 26.44 kg/m  as calculated from the following:    Height as of this encounter: 1.524 m (5').    Weight as of this encounter: 61.4 kg (135 lb 5.8 oz).   # Severe Malnutrition: based on nutrition assessment    # Financial/Environmental Concerns: none         Disposition Plan     Expected Discharge Date: 02/20/2024      Destination: home with help/services  Discharge Comments: Pending completion of medical workup and symptomatic improvement. Likely 5-7 days.            Geetha Tong DO  Hospitalist Service, GOLD TEAM 21  [facility]  Securely message with [employer] (more info)  Text page via [facility] Paging/Directory   See signed in provider for up to date coverage  information  ______________________________________________________________________    Interval History   No acute events overnight. She is feeling well this morning. Denies nausea or abdominal pain and is eating breakfast. Breathing feels stable. Denies constipation.     Physical Exam   Vital Signs:       Pulse: 118   Resp: 22 SpO2: 98 % O2 Device: Nasal cannula Oxygen Delivery: 2 LPM  Weight: 135 lbs 5.8 oz    General: sitting upright in bed, no acute distress, eating breakfast.   HEENT: sclera clear, glasses in place. NC in place.   CV: RRR, no m/r/g. Extremities are warm and well perfused.   LUNGS: CTAB, no w/r/c.  ABD: Soft, NT/ND, NBS.   SKIN: Warm, well perfused.   MSK: No edema.  NEURO: Awake, alert and appropriate.      Medical Decision Making

## 2024-02-19 NOTE — PLAN OF CARE
"Goal Outcome Evaluation:      Plan of Care Reviewed With: patient    Overall Patient Progress: no change    Outcome Evaluation: pt family requested for ativan at bedtime. pt got one dose of tylenol for headache. family at bedside overnight.      Problem: Pain Acute  Goal: Optimal Pain Control and Function  Outcome: Progressing  Intervention: Prevent or Manage Pain  Recent Flowsheet Documentation  Taken 2/18/2024 2100 by Demetria Gonzalez RN  Medication Review/Management: medications reviewed     Problem: Adult Inpatient Plan of Care  Goal: Plan of Care Review  Description: The Plan of Care Review/Shift note should be completed every shift.  The Outcome Evaluation is a brief statement about your assessment that the patient is improving, declining, or no change.  This information will be displayed automatically on your shift  note.  Outcome: Progressing  Flowsheets (Taken 2/19/2024 0316)  Outcome Evaluation: pt family requested for ativan at bedtime. pt got one dose of tylenol for headache. family at bedside overnight.  Plan of Care Reviewed With: patient  Overall Patient Progress: no change  Goal: Patient-Specific Goal (Individualized)  Description: You can add care plan individualizations to a care plan. Examples of Individualization might be:  \"Parent requests to be called daily at 9am for status\", \"I have a hard time hearing out of my right ear\", or \"Do not touch me to wake me up as it startles  me\".  Outcome: Progressing  Goal: Absence of Hospital-Acquired Illness or Injury  Outcome: Progressing  Intervention: Identify and Manage Fall Risk  Recent Flowsheet Documentation  Taken 2/18/2024 2100 by Demetria Gonzalez RN  Safety Promotion/Fall Prevention:   assistive device/personal items within reach   clutter free environment maintained   increased rounding and observation   increase visualization of patient   patient and family education   room door open   room near nurse's station   room organization consistent   " safety round/check completed  Goal: Optimal Comfort and Wellbeing  Outcome: Progressing  Goal: Readiness for Transition of Care  Outcome: Progressing     Problem: Oral Intake Inadequate  Goal: Improved Oral Intake  Outcome: Progressing     Problem: Breathing Pattern Ineffective  Goal: Effective Breathing Pattern  Outcome: Progressing     Problem: Pneumonia  Goal: Fluid Balance  Outcome: Progressing  Goal: Resolution of Infection Signs and Symptoms  Outcome: Progressing  Goal: Effective Oxygenation and Ventilation  Outcome: Progressing  Intervention: Promote Airway Secretion Clearance  Recent Flowsheet Documentation  Taken 2/18/2024 2100 by Demetria Gonzalez RN  Cough And Deep Breathing: done independently per patient     Problem: Comorbidity Management  Goal: Maintenance of COPD Symptom Control  Outcome: Progressing  Intervention: Maintain COPD Symptom Control  Recent Flowsheet Documentation  Taken 2/18/2024 2100 by Demetria Gonzalez RN  Medication Review/Management: medications reviewed  Goal: Blood Pressure in Desired Range  Outcome: Progressing  Intervention: Maintain Blood Pressure Management  Recent Flowsheet Documentation  Taken 2/18/2024 2100 by Demetria Gonzalez RN  Medication Review/Management: medications reviewed

## 2024-02-19 NOTE — PROGRESS NOTES
Care Management Follow Up    Length of Stay (days): 18    Expected Discharge Date: 02/20/2024     Concerns to be Addressed: adjustment to diagnosis/illness     Patient plan of care discussed at interdisciplinary rounds: Yes    Anticipated Discharge Disposition: Assisted Living     Anticipated Discharge Services: None (Home Care)  Anticipated Discharge DME:      Patient/family educated on Medicare website which has current facility and service quality ratings:  yes  Education Provided on the Discharge Plan:    Patient/Family in Agreement with the Plan: yes    Referrals Placed by CM/SW:    Private pay costs discussed: Discussed that Idalia will need a stretcher ride at discharge due to her weakness.  Explained that this will very likely not be covered by insurance and that it will be private pay.  Explained that I can provide a private pay stretcher company option which is usually around $500 which is cheaper than EMS ambulance stretcher ride.   Discussed again that LTC is privately paid for.     Additional Information:    Met with daughter and continued conversation started last week about need for LTC placement.  Idalia is not actively dying and has time to move to a LTC facility where she can receive the care she needs and have hospice care.     Provided list of LTC's from Medicare. Gov and explained rating system.  Also provided number for [employer] to look into private pay home care should they decide that.  Provided list of hospice residences as well.    Per daughter referrals should be sent to [facility], [facility] in [place], [facility] and [facility].  They would like her to have a private room.    Referrals sent:      [place] [id number] [facility] [id number] [phone]  2/19  have a bed but not in a private room.  Would need $5000 down.  Average price for SNF's is $10-12,000 per month.  They prefer [facility] but will work  with any hospice.      [place] [facility]   [address] [facility] [address] [phone]      [facility] [place] [facility] [place]  [address] [facility] [address] [phone]      [facility]   [address]  [place] [address] [phone]      Emani Tao, RN  Inpatient Care Coordinator  6 Med Surg  [phone], pager [phone]      For weekend social work needs, contact information below and available on [employer]:     SW Weekend [place] Pager ED, 5 MS, 5 ortho : [phone]  SW Weekend 6MS, 8A, 10ICU- Pager: [phone]     For weekend RN care coordinator needs (home discharge with needs including home care, assisted living facility returns, durable medical equip, IV antibiotics)   5 med/surg, 5 ortho, ED pager: [phone]  6 med/surg, 8 med/surg, 10 ICU pager: [phone]

## 2024-02-20 PROCEDURE — 94640 AIRWAY INHALATION TREATMENT: CPT | Mod: 76

## 2024-02-20 PROCEDURE — 94640 AIRWAY INHALATION TREATMENT: CPT

## 2024-02-20 PROCEDURE — 250N000011 HC RX IP 250 OP 636: Performed by: STUDENT IN AN ORGANIZED HEALTH CARE EDUCATION/TRAINING PROGRAM

## 2024-02-20 PROCEDURE — C9113 INJ PANTOPRAZOLE SODIUM, VIA: HCPCS | Performed by: STUDENT IN AN ORGANIZED HEALTH CARE EDUCATION/TRAINING PROGRAM

## 2024-02-20 PROCEDURE — 250N000013 HC RX MED GY IP 250 OP 250 PS 637: Performed by: PHYSICIAN ASSISTANT

## 2024-02-20 PROCEDURE — 99233 SBSQ HOSP IP/OBS HIGH 50: CPT | Performed by: INTERNAL MEDICINE

## 2024-02-20 PROCEDURE — 999N000157 HC STATISTIC RCP TIME EA 10 MIN

## 2024-02-20 PROCEDURE — 250N000009 HC RX 250: Performed by: STUDENT IN AN ORGANIZED HEALTH CARE EDUCATION/TRAINING PROGRAM

## 2024-02-20 PROCEDURE — 250N000013 HC RX MED GY IP 250 OP 250 PS 637: Performed by: HOSPITALIST

## 2024-02-20 PROCEDURE — 120N000002 HC R&B MED SURG/OB UMMC

## 2024-02-20 PROCEDURE — 99233 SBSQ HOSP IP/OBS HIGH 50: CPT | Performed by: STUDENT IN AN ORGANIZED HEALTH CARE EDUCATION/TRAINING PROGRAM

## 2024-02-20 PROCEDURE — 250N000013 HC RX MED GY IP 250 OP 250 PS 637

## 2024-02-20 RX ORDER — MORPHINE SULFATE 2 MG/ML
2 INJECTION, SOLUTION INTRAMUSCULAR; INTRAVENOUS
Status: DISCONTINUED | OUTPATIENT
Start: 2024-02-20 | End: 2024-02-22

## 2024-02-20 RX ORDER — MORPHINE SULFATE 2 MG/ML
1 INJECTION, SOLUTION INTRAMUSCULAR; INTRAVENOUS
Status: DISCONTINUED | OUTPATIENT
Start: 2024-02-20 | End: 2024-02-22

## 2024-02-20 RX ADMIN — OLANZAPINE 5 MG: 5 TABLET, ORALLY DISINTEGRATING ORAL at 08:41

## 2024-02-20 RX ADMIN — BUDESONIDE 1 MG: 1 INHALANT ORAL at 08:10

## 2024-02-20 RX ADMIN — OLANZAPINE 5 MG: 5 TABLET, ORALLY DISINTEGRATING ORAL at 19:49

## 2024-02-20 RX ADMIN — DIPHENHYDRAMINE HYDROCHLORIDE, ZINC ACETATE: 2; .1 CREAM TOPICAL at 00:14

## 2024-02-20 RX ADMIN — LATANOPROST 1 DROP: 50 SOLUTION OPHTHALMIC at 22:48

## 2024-02-20 RX ADMIN — LEVOTHYROXINE SODIUM 50 MCG: 50 TABLET ORAL at 08:41

## 2024-02-20 RX ADMIN — DONEPEZIL HYDROCHLORIDE 10 MG: 10 TABLET, FILM COATED ORAL at 22:47

## 2024-02-20 RX ADMIN — BUSPIRONE HYDROCHLORIDE 10 MG: 10 TABLET ORAL at 08:41

## 2024-02-20 RX ADMIN — IPRATROPIUM BROMIDE AND ALBUTEROL SULFATE 3 ML: .5; 3 SOLUTION RESPIRATORY (INHALATION) at 17:00

## 2024-02-20 RX ADMIN — IPRATROPIUM BROMIDE AND ALBUTEROL SULFATE 3 ML: .5; 3 SOLUTION RESPIRATORY (INHALATION) at 12:51

## 2024-02-20 RX ADMIN — BUDESONIDE 1 MG: 1 INHALANT ORAL at 20:24

## 2024-02-20 RX ADMIN — FLUOXETINE HYDROCHLORIDE 40 MG: 20 CAPSULE ORAL at 08:41

## 2024-02-20 RX ADMIN — BUSPIRONE HYDROCHLORIDE 10 MG: 10 TABLET ORAL at 15:24

## 2024-02-20 RX ADMIN — PANTOPRAZOLE SODIUM 40 MG: 40 INJECTION, POWDER, FOR SOLUTION INTRAVENOUS at 08:41

## 2024-02-20 RX ADMIN — BUSPIRONE HYDROCHLORIDE 10 MG: 10 TABLET ORAL at 19:49

## 2024-02-20 RX ADMIN — IPRATROPIUM BROMIDE AND ALBUTEROL SULFATE 3 ML: .5; 3 SOLUTION RESPIRATORY (INHALATION) at 08:10

## 2024-02-20 RX ADMIN — IPRATROPIUM BROMIDE AND ALBUTEROL SULFATE 3 ML: .5; 3 SOLUTION RESPIRATORY (INHALATION) at 20:24

## 2024-02-20 ASSESSMENT — ACTIVITIES OF DAILY LIVING (ADL)
ADLS_ACUITY_SCORE: 43
ADLS_ACUITY_SCORE: 44
ADLS_ACUITY_SCORE: 43
ADLS_ACUITY_SCORE: 43

## 2024-02-20 NOTE — PLAN OF CARE
Goal Outcome Evaluation:    Shift [id number]    Please see flowsheets and MAR for pt full assessment    VS: BP (!) 142/81 (BP Location: Left arm)   Pulse 118   Temp 97.9  F (36.6  C) (Oral)   Resp 22   Ht 1.524 m (5')   Wt 61.4 kg (135 lb 5.8 oz)   SpO2 100%   BMI 26.44 kg/m     O2: 100% 3L via NC, denies SOB and CP. LS clear BL no s/s of respiratory distress   Output: Purewick in place, incontinent of bowel   Last BM:  2/19/24 ABD soft round and non-tender   Activity: Pt not OOB, repositioning encouraged   Skin: Small scabs to BUE and R upper thigh, pt endorses itching to these areas, PRN benadryl cream applied   Pain: Denies              CMS: A&Ox4, denies NT   Dressing: N/A   Diet: Regular, poor appetite.    LDA: L PIV SL   Equipment: IV pole and pump, walker, personal belongings   Plan: TBD Pt is on comfort cares No acute changes. Call light in reach pt is able to make needs known continue POC   Additional Info:             Plan of Care Reviewed With: patient    Overall Patient Progress: no change    Gorge Romero, JIMI

## 2024-02-20 NOTE — PROGRESS NOTES
[facility]    Medicine Progress Note - Hospitalist Service, GOLD TEAM 21    Date of Admission:  2/1/2024    Assessment & Plan   Idalia Bob is a 79 year old female admitted on 2/1/2024. Pt has history of COPD, pneumonia, hypertension, dementia and hypothyroidism among others, presented with abdominal pain and found to have consolidation on CT as well as frequent mucousy bowel movements and anorexia. Known persistent LLL infiltrate of uncertain etiology. She remains on 2-3NC with symptomatic dyspnea and ongoing intermittent nausea.     CC on 2/16 with decision to transition to comfort-focused cares. Palliative, SW and CC following. POLST completed. Advanced care directive provided by family and identifies daughters as decision makers. She remains DNR/DNI with no escalation of cares.      Changes today:  - Given improvement in clinical status and increased oral intake, may pivot back to restorative cares. I think this is appropriate. PT/OT consults placed for dispo and cognitive eval. Nutrition consult placed. Plan for care conference on Thursday at 1300 with Medicine, Palliative Care, and RNCC.      Left upper lobe pneumonia vs persistent consolidation vs mass  COPD  Acute on chronic hypoxemic respiratory failure  Patient with pseudomonal upper lobe pneumonia in December and has had ongoing consolidation since.  While this finding was present on imaging she has a paucity of other pathology consistent with pneumonia at the present time. She is now having increasing dyspnea x 24 hours, on 3LNC. Repeat CT with worsening upper lobe infiltrates and edema. Per pulm, felt his may represent persistent pneumonia in the setting of poor airway clearance and mobilization, less likely inflammatory but cannot be ruled out and ongoing consideration for underlying malignancy. Risk outweighs benefit of biopsy given her underlying lung disease and further workup including imaging,  BAL or biopsy are not currently within her GOC. Extensive infectious workup otherwise unrevealing. TTE without evidence of CHF.     CC on 2/17 with decision to transition to comfort focused care. Completed antibiotics on 2/16 and Prednisone course on 2/16. She remains on supplemental O2 for comfort at this time but will ask nursing to attempt to wean as able.   -s/p azithromycin and ceftriaxone, s/p cefepime-> Zosyn (switch due to concern for neurotoxicity) x 7 days for PsA course  - S/p Prednisone 40mg PO daily x 5 days (end 2/19)  - Holding diuresis given little symptomatic improvement with this; low threshold to resume if felt this would provide comfort  - Continue home COPD regimen  - Continue home oxygen for now; this may need to be readdressed pending plan for hospice    Abdominal Pain, improved   Intractable nausea   Loose stools with mucus, improved   Tenesmus  Unintentional weight loss  Anorexia - improving  The patient has had weeks of worsening anorexia along with crampy abdominal pain but no evidence of gastrointestinal bleeding.  She has frequent (many many times per day) mucousy bowel movements with very little stool production.  C. difficile and enteric panel negative. S/p flex sig and endoscopy 2/7. Noted hiatal hernia, mild inflammatory changes in distal colon/rectum s/p biopsy. No identified etiology of her symptoms.     Intermittent nausea and inability to tolerate PO, eating breakfast 2/19. Will continue aggressive symptom management in the setting of comfort-focused cares. Given her oral intake, she is not in active dying process and do feel LTC vs home to assisted living or with family likely. SW/CC and financial counselor per above.   -Appreciate GI consultation; since signed off  -Ok to continue Bentyl prn, stopped psyllium, imodium as no further diarrhea   -Zyprexa added per palliative  -Steroids per above  -Comfort order set in place including morphine and ativan   -Palliative following,  appreciate recommendations    Anxiety and depression  Takes BuSpar, Prozac  -Continue home regimen - remains within GOC   -Zyprexa 2.5mg BID per palliative on 2/13    RLE DVT  Noted on US 2/13, started on therapeutic lovenox.   - stopped AC after discussion with family upon transition to comfort      Urinary tract infection  -s/p treatment   Elevated Troponin  Likely type II MI in the setting of acute illness. EKG stable. TTE without RWMA  Hypokalemia  Hypomagnesemia  Iron deficiency anemia  Hypertension  Hypothyroidism; continue Synthroid 50 mcg, as long as this remains within [facility]   Dementia; continue donepezil, as long as this remains within [facility]           Diet: Snacks/Supplements Adult: Gelatein Plus; Between Meals  Snacks/Supplements Adult: Ensure Enlive; With Meals  Regular Diet Adult    DVT Prophylaxis: Pneumatic Compression Devices  Ruiz Catheter: Not present  Lines: None     Cardiac Monitoring: None  Code Status: No CPR- Do NOT Intubate      Clinically Significant Risk Factors              # Hypoalbuminemia: Lowest albumin = 3 g/dL at 2/14/2024  5:37 AM, will monitor as appropriate     # Hypertension: Noted on problem list     # Dementia: noted on problem list    # Overweight: Estimated body mass index is 26.44 kg/m  as calculated from the following:    Height as of this encounter: 1.524 m (5').    Weight as of this encounter: 61.4 kg (135 lb 5.8 oz).   # Severe Malnutrition: based on nutrition assessment    # Financial/Environmental Concerns: none         Disposition Plan             Memo Ramírez MD  Hospitalist Service, GOLD TEAM 21  [facility]  Securely message with [employer] (more info)  Text page via [employer] Paging/Directory   See signed in provider for up to date coverage information  ______________________________________________________________________    Interval History   Patient with improved mental status, energy level and oral intake. Discussed  with family, patient, and palliative care; likely plan to continue restorative cares and pursue TCU placement.     Physical Exam   Vital Signs: Temp: 98.1  F (36.7  C) Temp src: Oral BP: (!) 142/88 Pulse: 89   Resp: 17 SpO2: 100 % O2 Device: Nasal cannula Oxygen Delivery: 3 LPM  Weight: 135 lbs 5.8 oz    General: sitting upright in bed, no acute distress, eating breakfast.   HEENT: sclera clear, glasses in place. NC in place.   CV: RRR, no m/r/g. Extremities are warm and well perfused.   LUNGS: CTAB, no w/r/c.  ABD: Soft, NT/ND, NBS.   SKIN: Warm, well perfused.   MSK: No edema.  NEURO: Awake, alert and appropriate. Oriented to person, place, time and situation      Data         Imaging results reviewed over the past 24 hrs:   No results found for this or any previous visit (from the past 24 hour(s)).

## 2024-02-20 NOTE — PROGRESS NOTES
"CLINICAL NUTRITION SERVICES - REASSESSMENT NOTE     Nutrition Prescription    RECOMMENDATIONS FOR MDs/PROVIDERS TO ORDER:  - none at present.     Malnutrition Status:    Severe malnutrition in the context of acute illness    Recommendations already ordered by Registered Dietitian (RD):  - Will discontinue current Ensure Enlive BID and Gelatein Plus as pt not taking very often and prefers food from trays.  Will order \"okay\" for pt/family/RN to request snacks, supplements prn in case she wants something.     Future/Additional Recommendations:  - Continue to follow GOC, POC.   - follow intake, GI funtion, labs.        RD consulted again today--\"Malnutrition in setting of acute illness.\"    Pt discussed in interdisciplinary care rounds.  Noted that pt has been on comfort cares since 2/16. Generally Nutrition signs off once patients elect comfort cares or hopsice.  However she is eating so much better now that they are questioning if they should discontinue comfort cares and pursue TCU.     Spoke with daughter who states that on Monday, patient started eating really well and had 3 meals.  Wednesday she noted that pt had breakfast and then wanted a sandwich, after that she was still hungry and wanted some spaghetti (would be penne pasta).  Daughter notes pt isn't interested in taking the supplements anymore and suggested discontinuing them.  I agreed.     EVALUATION OF THE PROGRESS TOWARD GOALS   Diet: regular  Supplements: Gelatein Plus @ 2pm, vanilla Ensure Enlive with breakfast and dinner (unfortunately order didn't get updated in kitchen software so still sent chocolate Ensure with breakfast and lunch).  Intake: Po intake was generally very poor for most of stay (last noted 2/16) until a few days ago.  No percent intake of meals over past few days except ate 75% of breakfast on 2/19. 2/21, she ate 100% of raisin bran with whole milk. Her three supplements alone were providing 850 kcal and 60 g protein.  Unclear how " many of these she has been taking lately with a little better intake than past few weeks.  With the 3 supplements she has been ordering ~2750- 3050 kcal and > 135 g protein (however likely not eating even half of this very high amount of food).      he has at least 10 Ensure Enlive in chocolate in her room.      NEW FINDINGS   -Wt:   2/13/24 0304 61.4 kg (135 lb 5.8 oz) Bed scale   02/10/24 2100 55.8 kg (123 lb) Standing scale   02/02/24 1915 57.3 kg (126 lb 6.4 oz) Standing scale     Weight PTA:   01/22/24 59.9 kg (132 lb)     12/06/23 63 kg (139 lb) [facility]   12/04/23 63.5 g (139 lg 14.4 oz [facility]    11/26/23 67.4 kg (148 lb 8 oz) [facility]   11/18/23 59 kg (130 lb) Clinic    08/09/23 61.7 kg (136 lb) clinic      Weight assessment: suspect elevated wt on 2/13 related to increased edema and possibly bed not zero'd.  Suspect wt is down now after diuretics last week with evidence of loose skin on shins after drop in swelling. Using 2/10 (last standing wt), wt was down 2.6% in ~ 1 week.   Wt down 11.4% in about 1 month (possibly skewed by past fluid wt gain). Wt loss in < 3 months was 5%--not signficant. Wt loss of 9.6% in past 6 months- not significant    -Labs: No general labs since 2/16. Last Na 133 on 2/15.     -GI: Having more actual stool output--soft and not just mucuous over past few days (first noted 2/16--large).  1 formed/soft stool 1/17 and 1/18.  2/19 stool was loose black.    -Meds: levothyroxine, protonix IV  PRN: bentyl q 6 hr prn, ativan, morphine,zofran, compazine, senna-docusate 1-2 tablets 2x/d     MALNUTRITION  % Intake: < 75% in > 7 days (moderate)  % Weight Loss: > 2% in 1 week (severe)  Subcutaneous Fat Loss: None observed  Muscle Loss: Posterior calf:  moderate--(severe) (mild wasting at clavicles and back of hands attributed to typical wasting w/ aging).  Fluid Accumulation/Edema: None noted  Malnutrition Diagnosis: Severe malnutrition in the context of acute  "illness    Previous Goals   Food as desired vs nutrition support pending further w/up.   Evaluation: No longer appropriate with likely plan to discontinue comfort cares.    Previous Nutrition Diagnosis  Inadequate oral intake related to poor appetite, reflux, nausea, abdominal pain and frequent stooling as evidenced by minimal po intake since admission and elevated urinary ketones on 2/2   Evaluation: Improving    CURRENT NUTRITION DIAGNOSIS  Inadequate oral intake related to poor appetite earlier in past week and not wishing to continue taking Ensure as evidenced by daughter's report and documentation.      INTERVENTIONS  Implementation  Medical food supplement therapy--discontinue but allow to order prn.    Goals  Patient to consume % of nutritionally adequate meal trays TID, or the equivalent with supplements/snacks.    Monitoring/Evaluation  Progress toward goals will be monitored and evaluated per protocol.    ]Janae Case) Juan AGUILA, LD   6B and 8A Med/surg (M-F)   Weekday Vocera contact: \"6 Med Surg Clinical Dietitian\" or \"8 Med Surg Clinical Dietitian\"  M-F Pager: [phone]  Weekend/holiday pager: [phone]    "

## 2024-02-20 NOTE — PROGRESS NOTES
PALLIATIVE CARE PROGRESS NOTE  [facility]     Patient Name: Idalia Bob  Date of Admission: 2/1/2024        Recommendations & Counseling       GOALS OF CARE:   Restorative with limits.  No escalation of cares.  Remains DNR/DNI  Patient was transition to comfort care on 2/16/2024 after family care conference.  Today patient is alert and oriented and feeling better and therefore plan was made with patient's daughter, Barbara, to have a care conference on 2/22 to reassess Idalia's goals and determine best discharge plan.  Social work is holding off on further discharge planning until after care conference  Hospitalist requested PT and OT to reassess which will be helpful when determining best discharge plan.  Also agree with nutrition consult    ADVANCE CARE PLANNING:  No health care directive on file. Per  informed consent policy, next of kin should be involved if patient becomes unable.  There is no POLST form on file, defer to patient and/or next of kin for decisions   Code status: No CPR- Do NOT Intubate    MEDICAL MANAGEMENT:   Continue current medications for pain dyspnea and anxiety   #Pain  - Tylenol 650 mg every 4 hours if needed  - Morphine PO/SL 5-10 mg q 3 hour as needed.   - Will discontinue Morphine IV     #Air hunger/Dyspnea -if patient suddenly declines and transitions back to comfort care, recommend medication below for dyspnea  - 1st choice:Morphine PO/SL 5-10 mg q 3 hour as needed.   - 2nd choice:Morphine IV 1-2 mg q 3 hour as needed                                    #Anxiety    - continue home regimen with BuSpar 10 mg TID and Prozac 40 mg daily   - Continue Zyprexa ODT 5 mg BID   - For anxiety not controlled with above medications, can use medications listed below:  - 1st choice: Lorazepam PO/SL q 1 mg  q 3 hour as needed.   - 2nd choice: Lorazepam IV q 1 mg  q 3 hour as needed  - Hydroxyzine 10 mg TID daily as needed for anxiety/itching       #Secretion burden   - Atropine 1% q 4 hour as needed      #Nausea   - Continue ondansetron 4mg ODT/IV q6hrs prn   - Continue prochlorperazine po/IV 5mg q6hrs prn   - Zyprexa 5 mg BID     #Agitation  -Continue Zyprexa 5 mg BID  -Continue Aricept 10 mg every night  -2nd choice: Increase dose of Zyprexa to 10 mg or consider switch to Haldol   -3rd choice: Lorazepam as above     PSYCHOSOCIAL/SPIRITUAL:  Family: Daughters; Barbara Bob and Corie Satjake  Yu community: Scientologist   Spiritual:     Palliative Care will continue to follow. Thank you for the consult and allowing us to aid in the care of Idalia Bob.    Cori Antony MD  Securely message with "[employer]more info)  Text page via [facility] Paging/Directory          Interval History:     Multidisciplinary collaboration:  Discussed case with Dr. Ramírez -agreed to care conference on Thursday and will follow closely until then and will reassess with PT and OT  No acute events overnight  Patient alert and oriented      Palliative Summary/HPI          Idalia Bob is a 79 year old female with a past medical history of COPD, pneumonia, hypertension, dementia and hypothyroidism presented on 02/01/2024 with abdominal pain and found to have consolidation on CT as well as frequent mucousy bowel movements and anorexia. Hx of pseudomonal upper lobe pneumonia in December and has ongoing consolidation since. Repeat CT concerning for upper love infiltrates and edema. She remains on 2-3NC with symptomatic dyspnea and ongoing intermittent nausea.      CC on 2/16 with decision to transition to comfort-focused cares. Palliative, SW and CC following. POLST completed. Advanced care directive provided by family and identifies daughters as decision makers. She remains DNR/DNI with no escalation of cares.     Today, Patient was seen for:   Acute on chronic respiratory failure  Dyspnea on exertion  Dementia  Goals of care  Support            Review of Systems:     Besides  above, ROS was reviewed and is unremarkable           Medications:     I have reviewed this patient's medication profile and medications during this hospitalization.    Noted meds:    BuSpar 10 mg TID  Prozac 40 mg daily  Zyprexa 5 mg BID  Aricept 10 mg daily  Tylenol as needed               Physical Exam:   Temp:  [98.1  F (36.7  C)] 98.1  F (36.7  C)  Pulse:  [89] 89  Resp:  [17] 17  BP: (142)/(88) 142/88  SpO2:  [93 %-100 %] 100 %  135 lbs 5.8 oz    Gen: Alert, sitting upright in bed, appears stated age, comfortable appearing, in no respiratory distress  Oriented x 3                Current Problem List:   Principal Problem:    Hypokalemia  Active Problems:    Community acquired pneumonia of left upper lobe of lung      Allergies   Allergen Reactions    Aspirin Nausea and Nausea and Vomiting     Stomach ache    Penicillins Itching     Has tolerated ceftriaxone, piperacillin, and amoxicillin            Data Reviewed:     Reviewed recent labs and pertinent imaging  ROUTINE ICU LABS (Last four results)  CMP  Recent Labs   Lab 02/16/24  0641 02/15/24  1340 02/15/24  0740 02/14/24  0537   NA  --   --  133* 133*   POTASSIUM 4.7 3.9 3.3* 3.9   CHLORIDE  --   --  91*  --    CO2  --   --  33*  --    ANIONGAP  --   --  9  --    GLC  --   --  88  --    BUN  --   --  13.4  --    CR 0.88  --  0.91 0.90   GFRESTIMATED 66  --  63 64   AYUSH  --   --  8.7*  --    MAG 3.0*  --  1.9 2.5*   PROTTOTAL  --   --  5.7* 5.6*   ALBUMIN  --   --  3.1* 3.0*   BILITOTAL  --   --  0.3 0.4   ALKPHOS  --   --  66 71   AST  --   --  19 27   ALT  --   --  20 28     CBC  Recent Labs   Lab 02/15/24  0740 02/14/24  0537   WBC 10.4  --    RBC 3.60*  --    HGB 8.7*  --    HCT 29.2*  --    MCV 81  --    MCH 24.2*  --    MCHC 29.8*  --    RDW 18.3*  --     341     INRNo lab results found in last 7 days.  Arterial Blood GasNo lab results found in last 7 days.        Total time spent was 55 minutes regarding plan of care, symptom management on the  date of the encounter.       Medical Decision Making

## 2024-02-20 NOTE — PROGRESS NOTES
"Care Management Follow Up    Length of Stay (days): 19    Expected Discharge Date:       Concerns to be Addressed: adjustment to diagnosis/illness     Patient plan of care discussed at interdisciplinary rounds: Yes    Anticipated Discharge Disposition: Long term Care     Anticipated Discharge Services: hospice  Anticipated Discharge DME:      Patient/family educated on Medicare website which has current facility and service quality ratings:    Education Provided on the Discharge Plan:    Patient/Family in Agreement with the Plan: yes    Referrals Placed by CM/SW:    Private pay costs discussed: Not applicable    Additional Information:    Discussion with Dr. Ramírez and family would like to reassess Idalia on Thursday.  She has been doing so much better, she's eating, her pain is managed.  Spoke with daughter Barbara and we will have a care conference at 1 pm on 2/22.        [facility]   [address] [facility] [address] [phone]  2/20 They will take MA when she runs out of money.   Very long wait for private room.       [facility]  [address] [facility] [address] [phone]  2/20 Left voice mail to follow up on referral..        [facility]   [address] [place] [address] [phone]  2/20  Call to follow up with referral.  Per response \" We only have a private room with a shared bathroom.  $10,000 down and $13-20,000 a month.      Declined:    [facility]   [address] [facility] [address] [phone]    Emani Tao RN  Inpatient Care Coordinator  6 Med Surg  [phone], pager [phone]      For weekend social work needs, contact information below and available on [facility]:     SW Weekend [place] Pager ED, 5 MS, 5 ortho : [phone]  SW Weekend 6MS, 8A, 10ICU- Pager: [phone]     For weekend RN care coordinator needs (home discharge with needs " including home care, assisted living facility returns, durable medical equip, IV antibiotics)   5 med/surg, 5 ortho, ED pager: [phone]  6 med/surg, 8 med/surg, 10 ICU pager: [phone]

## 2024-02-20 NOTE — PLAN OF CARE
Goal Outcome Evaluation:       Patient is A & O x 4; coherent of speech, and able to make her needs known to staff; received scheduled medications; complied to turn, reposition, and bed mobility; purewick is patent; calm, and pleasant; denied SOB, pain, and discomfort. Will follow POC.  BP (!) 142/81 (BP Location: Left arm)   Pulse 118   Temp 97.9  F (36.6  C) (Oral)   Resp 22   Ht 1.524 m (5')   Wt 61.4 kg (135 lb 5.8 oz)   SpO2 100%   BMI 26.44 kg/m      Frantz Lopez RN

## 2024-02-21 LAB
ANION GAP SERPL CALCULATED.3IONS-SCNC: 4 MMOL/L (ref 7–15)
BUN SERPL-MCNC: 22.9 MG/DL (ref 8–23)
CALCIUM SERPL-MCNC: 8.4 MG/DL (ref 8.8–10.2)
CHLORIDE SERPL-SCNC: 98 MMOL/L (ref 98–107)
CREAT SERPL-MCNC: 0.93 MG/DL (ref 0.51–0.95)
DEPRECATED HCO3 PLAS-SCNC: 32 MMOL/L (ref 22–29)
EGFRCR SERPLBLD CKD-EPI 2021: 62 ML/MIN/1.73M2
ERYTHROCYTE [DISTWIDTH] IN BLOOD BY AUTOMATED COUNT: 18.6 % (ref 10–15)
GLUCOSE SERPL-MCNC: 79 MG/DL (ref 70–99)
HCT VFR BLD AUTO: 34.6 % (ref 35–47)
HGB BLD-MCNC: 9.9 G/DL (ref 11.7–15.7)
MCH RBC QN AUTO: 23.8 PG (ref 26.5–33)
MCHC RBC AUTO-ENTMCNC: 28.6 G/DL (ref 31.5–36.5)
MCV RBC AUTO: 83 FL (ref 78–100)
PLATELET # BLD AUTO: 415 10E3/UL (ref 150–450)
POTASSIUM SERPL-SCNC: 4.4 MMOL/L (ref 3.4–5.3)
RBC # BLD AUTO: 4.16 10E6/UL (ref 3.8–5.2)
SODIUM SERPL-SCNC: 134 MMOL/L (ref 135–145)
WBC # BLD AUTO: 13.1 10E3/UL (ref 4–11)

## 2024-02-21 PROCEDURE — 85048 AUTOMATED LEUKOCYTE COUNT: CPT | Performed by: STUDENT IN AN ORGANIZED HEALTH CARE EDUCATION/TRAINING PROGRAM

## 2024-02-21 PROCEDURE — 120N000002 HC R&B MED SURG/OB UMMC

## 2024-02-21 PROCEDURE — 94640 AIRWAY INHALATION TREATMENT: CPT | Mod: 76

## 2024-02-21 PROCEDURE — 36415 COLL VENOUS BLD VENIPUNCTURE: CPT | Performed by: STUDENT IN AN ORGANIZED HEALTH CARE EDUCATION/TRAINING PROGRAM

## 2024-02-21 PROCEDURE — 250N000013 HC RX MED GY IP 250 OP 250 PS 637

## 2024-02-21 PROCEDURE — 82374 ASSAY BLOOD CARBON DIOXIDE: CPT | Performed by: STUDENT IN AN ORGANIZED HEALTH CARE EDUCATION/TRAINING PROGRAM

## 2024-02-21 PROCEDURE — 99233 SBSQ HOSP IP/OBS HIGH 50: CPT | Performed by: STUDENT IN AN ORGANIZED HEALTH CARE EDUCATION/TRAINING PROGRAM

## 2024-02-21 PROCEDURE — C9113 INJ PANTOPRAZOLE SODIUM, VIA: HCPCS | Performed by: STUDENT IN AN ORGANIZED HEALTH CARE EDUCATION/TRAINING PROGRAM

## 2024-02-21 PROCEDURE — 250N000009 HC RX 250: Performed by: STUDENT IN AN ORGANIZED HEALTH CARE EDUCATION/TRAINING PROGRAM

## 2024-02-21 PROCEDURE — 999N000157 HC STATISTIC RCP TIME EA 10 MIN

## 2024-02-21 PROCEDURE — 250N000011 HC RX IP 250 OP 636: Performed by: STUDENT IN AN ORGANIZED HEALTH CARE EDUCATION/TRAINING PROGRAM

## 2024-02-21 PROCEDURE — 250N000013 HC RX MED GY IP 250 OP 250 PS 637: Performed by: HOSPITALIST

## 2024-02-21 RX ADMIN — ACETAMINOPHEN 650 MG: 325 TABLET, FILM COATED ORAL at 21:07

## 2024-02-21 RX ADMIN — PANTOPRAZOLE SODIUM 40 MG: 40 INJECTION, POWDER, FOR SOLUTION INTRAVENOUS at 07:51

## 2024-02-21 RX ADMIN — BUSPIRONE HYDROCHLORIDE 10 MG: 10 TABLET ORAL at 07:50

## 2024-02-21 RX ADMIN — IPRATROPIUM BROMIDE AND ALBUTEROL SULFATE 3 ML: .5; 3 SOLUTION RESPIRATORY (INHALATION) at 16:29

## 2024-02-21 RX ADMIN — OLANZAPINE 5 MG: 5 TABLET, ORALLY DISINTEGRATING ORAL at 07:50

## 2024-02-21 RX ADMIN — IPRATROPIUM BROMIDE AND ALBUTEROL SULFATE 3 ML: .5; 3 SOLUTION RESPIRATORY (INHALATION) at 12:15

## 2024-02-21 RX ADMIN — BUSPIRONE HYDROCHLORIDE 10 MG: 10 TABLET ORAL at 21:07

## 2024-02-21 RX ADMIN — FLUOXETINE HYDROCHLORIDE 40 MG: 20 CAPSULE ORAL at 07:50

## 2024-02-21 RX ADMIN — BUDESONIDE 1 MG: 1 INHALANT ORAL at 20:01

## 2024-02-21 RX ADMIN — LATANOPROST 1 DROP: 50 SOLUTION OPHTHALMIC at 21:07

## 2024-02-21 RX ADMIN — MORPHINE SULFATE 10 MG: 10 SOLUTION ORAL at 17:17

## 2024-02-21 RX ADMIN — BUSPIRONE HYDROCHLORIDE 10 MG: 10 TABLET ORAL at 14:47

## 2024-02-21 RX ADMIN — IPRATROPIUM BROMIDE AND ALBUTEROL SULFATE 3 ML: .5; 3 SOLUTION RESPIRATORY (INHALATION) at 20:01

## 2024-02-21 RX ADMIN — LEVOTHYROXINE SODIUM 50 MCG: 50 TABLET ORAL at 07:51

## 2024-02-21 RX ADMIN — MORPHINE SULFATE 10 MG: 10 SOLUTION ORAL at 09:13

## 2024-02-21 RX ADMIN — OLANZAPINE 5 MG: 5 TABLET, ORALLY DISINTEGRATING ORAL at 21:07

## 2024-02-21 RX ADMIN — IPRATROPIUM BROMIDE AND ALBUTEROL SULFATE 3 ML: .5; 3 SOLUTION RESPIRATORY (INHALATION) at 07:57

## 2024-02-21 RX ADMIN — BUDESONIDE 1 MG: 1 INHALANT ORAL at 07:58

## 2024-02-21 RX ADMIN — DONEPEZIL HYDROCHLORIDE 10 MG: 10 TABLET, FILM COATED ORAL at 21:06

## 2024-02-21 RX ADMIN — ACETAMINOPHEN 650 MG: 325 TABLET, FILM COATED ORAL at 07:50

## 2024-02-21 ASSESSMENT — ACTIVITIES OF DAILY LIVING (ADL)
ADLS_ACUITY_SCORE: 48
ADLS_ACUITY_SCORE: 56
ADLS_ACUITY_SCORE: 48
ADLS_ACUITY_SCORE: 47
ADLS_ACUITY_SCORE: 48
ADLS_ACUITY_SCORE: 48
ADLS_ACUITY_SCORE: 43
ADLS_ACUITY_SCORE: 43
ADLS_ACUITY_SCORE: 56
ADLS_ACUITY_SCORE: 56
ADLS_ACUITY_SCORE: 47
ADLS_ACUITY_SCORE: 56
ADLS_ACUITY_SCORE: 43
ADLS_ACUITY_SCORE: 56

## 2024-02-21 NOTE — PROGRESS NOTES
Pt alert and orientated x2, disoriented to time and situation. Able to make needs known. Able to swallow pills, prefer to take with room temperature water. Purewick removed, assist of 2 to the commode. No acute events this shift. Continue with plan of care.

## 2024-02-21 NOTE — PLAN OF CARE
Goal Outcome Evaluation:      Plan of Care Reviewed With: patient, child    Overall Patient Progress: improving    Outcome Evaluation: Patient A&O x4 with intermittent confusion. Able to make needs known and use call light appropriately. Incontinent of bowel and bladder. Purewick is in place. Daughter states that she looks like she is better than the past couple of days. Family remains at bedside. No acute events this shift. Continue with plan of care.

## 2024-02-21 NOTE — PLAN OF CARE
Problem: Pain Acute  Goal: Optimal Pain Control and Function  Outcome: Progressing   Goal Outcome Evaluation:      Plan of Care Reviewed With: patient, child    Overall Patient Progress: improvingOverall Patient Progress: improving    Outcome Evaluation: Po intake improving as of Monday 2/19 with much improved reflux, abd pain and frequent passage of mucus per rectum.

## 2024-02-21 NOTE — PLAN OF CARE
Problem: Adult Inpatient Plan of Care  Goal: Absence of Hospital-Acquired Illness or Injury  Intervention: Identify and Manage Fall Risk  Recent Flowsheet Documentation  Taken 2/21/2024 0034 by Zhane Burns RN  Safety Promotion/Fall Prevention:   assistive device/personal items within reach   clutter free environment maintained   lighting adjusted   room near nurse's station  Intervention: Prevent Skin Injury  Recent Flowsheet Documentation  Taken 2/21/2024 0643 by Zhane Burns RN  Body Position:   turned   right  Taken 2/21/2024 0034 by Zhane Burns RN  Body Position: turned  Taken 2/20/2024 2020 by Zhane Burns RN  Body Position: turned  Intervention: Prevent Infection  Recent Flowsheet Documentation  Taken 2/21/2024 0034 by Zhane Burns RN  Infection Prevention:   single patient room provided   rest/sleep promoted   hand hygiene promoted     Problem: Adult Inpatient Plan of Care  Goal: Absence of Hospital-Acquired Illness or Injury  Intervention: Prevent Skin Injury  Recent Flowsheet Documentation  Taken 2/21/2024 0643 by Zhane Burns RN  Body Position:   turned   right  Taken 2/21/2024 0034 by Zhane Burns RN  Body Position: turned  Taken 2/20/2024 2020 by Zhane Burns RN  Body Position: turned

## 2024-02-21 NOTE — PROGRESS NOTES
Care Management Follow Up    Length of Stay (days): 20    Expected Discharge Date:       Concerns to be Addressed: adjustment to diagnosis/illness     Patient plan of care discussed at interdisciplinary rounds: Yes    Anticipated Discharge Disposition: Long Term Care, Hospice     Anticipated Discharge Services: None  Anticipated Discharge DME:      Patient/family educated on Medicare website which has current facility and service quality ratings:    Education Provided on the Discharge Plan:    Patient/Family in Agreement with the Plan: yes    Referrals Placed by CM/SW:    Private pay costs discussed: Not applicable    Additional Information:    Call from Bernie at [facility].  Explained that both PT and OT attempted to reevaluate today for therapy needs but Idalia was in too much pain so she was unable to cooperate.  They will try to see her tomorrow morning.  She is ok waiting to hear from me tomorrow after our 1 pm care conference.  She did warn that their LTC and TCU beds are filling up.    [address] [address] [phone]    Care Management to continue to working with patient/family toward safe discharge plan.     Emani Tao, RN  Inpatient Care Coordinator  6 Med Surg  [phone], pager [phone]      For weekend social work needs, contact information below and available on [facility]:     SW Weekend [place] Pager ED, 5 MS, 5 ortho : [phone]  SW Weekend 6MS, 8A, 10ICU- Pager: [phone]     For weekend RN care coordinator needs (home discharge with needs including home care, assisted living facility returns, durable medical equip, IV antibiotics)   5 med/surg, 5 ortho, ED pager: [phone]  6 med/surg, 8 med/surg, 10 ICU pager: [phone]

## 2024-02-21 NOTE — PROGRESS NOTES
[facility]    Medicine Progress Note - Hospitalist Service, GOLD TEAM 21    Date of Admission:  2/1/2024    Assessment & Plan   Idalia Bob is a 79 year old female admitted on 2/1/2024. Pt has history of COPD, pneumonia, hypertension, dementia and hypothyroidism among others, presented with abdominal pain and found to have consolidation on CT as well as frequent mucousy bowel movements and anorexia. Known persistent LLL infiltrate of uncertain etiology. She remains on 2-3NC with symptomatic dyspnea and ongoing intermittent nausea.     CC on 2/16 with decision to transition to comfort-focused cares. Palliative, SW and CC following. POLST completed. Advanced care directive provided by family and identifies daughters as decision makers. She remains DNR/DNI with no escalation of cares.      Changes today:  - Given improvement in clinical status and increased oral intake, may pivot back to restorative cares. I think this is appropriate should she remain improved. PT/OT consults placed for dispo and cognitive eval. Nutrition consult placed. Plan for care conference on Thursday at 1300 with Medicine, Palliative Care, and RNCC.   - Back pain today, continue current pain regimen     Left upper lobe pneumonia vs persistent consolidation vs mass  COPD  Acute on chronic hypoxemic respiratory failure  Patient with pseudomonal upper lobe pneumonia in December and has had ongoing consolidation since.  While this finding was present on imaging she has a paucity of other pathology consistent with pneumonia at the present time. She is now having increasing dyspnea x 24 hours, on 3LNC. Repeat CT with worsening upper lobe infiltrates and edema. Per pulm, felt his may represent persistent pneumonia in the setting of poor airway clearance and mobilization, less likely inflammatory but cannot be ruled out and ongoing consideration for underlying malignancy. Risk outweighs benefit of  biopsy given her underlying lung disease and further workup including imaging, BAL or biopsy are not currently within her GOC. Extensive infectious workup otherwise unrevealing. TTE without evidence of CHF.     CC on 2/17 with decision to transition to comfort focused care. Completed antibiotics on 2/16 and Prednisone course on 2/16. She remains on supplemental O2 for comfort at this time but will ask nursing to attempt to wean as able.   -s/p azithromycin and ceftriaxone, s/p cefepime-> Zosyn (switch due to concern for neurotoxicity) x 7 days for PsA course  - S/p Prednisone 40mg PO daily x 5 days (end 2/19)  - Holding diuresis given little symptomatic improvement with this; low threshold to resume if felt this would provide comfort  - Continue home COPD regimen  - Continue home oxygen for now; this may need to be readdressed pending plan for hospice    Abdominal Pain, improved   Intractable nausea   Loose stools with mucus, improved   Tenesmus  Unintentional weight loss  Anorexia - improving  The patient has had weeks of worsening anorexia along with crampy abdominal pain but no evidence of gastrointestinal bleeding.  She has frequent (many many times per day) mucousy bowel movements with very little stool production.  C. difficile and enteric panel negative. S/p flex sig and endoscopy 2/7. Noted hiatal hernia, mild inflammatory changes in distal colon/rectum s/p biopsy. No identified etiology of her symptoms.     Intermittent nausea and inability to tolerate PO, eating breakfast 2/19. Will continue aggressive symptom management in the setting of comfort-focused cares. Given her oral intake, she is not in active dying process and do feel LTC vs home to assisted living or with family likely. SW/CC and financial counselor per above.   -Appreciate GI consultation; since signed off  -Ok to continue Bentyl prn, stopped psyllium, imodium as no further diarrhea   -Zyprexa added per palliative  -Steroids per  above  -Comfort order set in place including morphine and ativan   -Palliative following, appreciate recommendations    Anxiety and depression  Takes BuSpar, Prozac  -Continue home regimen - remains within GOC   -Zyprexa 2.5mg BID per palliative on 2/13    RLE DVT  Noted on US 2/13, started on therapeutic lovenox.   - stopped AC after discussion with family upon transition to comfort      Urinary tract infection  -s/p treatment   Elevated Troponin  Likely type II MI in the setting of acute illness. EKG stable. TTE without RWMA  Hypokalemia  Hypomagnesemia  Iron deficiency anemia  Hypertension  Hypothyroidism; continue Synthroid 50 mcg, as long as this remains within GOC   Dementia; continue donepezil, as long as this remains within GO           Diet: Snacks/Supplements Adult: Gelatein Plus; Between Meals  Snacks/Supplements Adult: Ensure Enlive; With Meals  Regular Diet Adult    DVT Prophylaxis: Pneumatic Compression Devices  Ruiz Catheter: Not present  Lines: None     Cardiac Monitoring: None  Code Status: No CPR- Do NOT Intubate      Clinically Significant Risk Factors              # Hypoalbuminemia: Lowest albumin = 3 g/dL at 2/14/2024  5:37 AM, will monitor as appropriate     # Hypertension: Noted on problem list     # Dementia: noted on problem list    # Overweight: Estimated body mass index is 26.44 kg/m  as calculated from the following:    Height as of this encounter: 1.524 m (5').    Weight as of this encounter: 61.4 kg (135 lb 5.8 oz).   # Severe Malnutrition: based on nutrition assessment      # Financial/Environmental Concerns: none         Disposition Plan             Memo Ramírez MD  Hospitalist Service, GOLD TEAM 21  [facility]  Securely message with [employer] (more info)  Text page via [facility] Paging/Directory   See signed in provider for up to date coverage  information  ______________________________________________________________________    Interval History   Reporting back pain today, still oriented to person and place, but energy level seems lower.     Physical Exam   Vital Signs: Temp: 98.2  F (36.8  C) Temp src: Oral BP: 138/67 Pulse: 105   Resp: 20 SpO2: 96 % O2 Device: Nasal cannula Oxygen Delivery: 2 LPM  Weight: 135 lbs 5.8 oz    General: sitting upright in bed, no acute distress, eating breakfast.   HEENT: sclera clear, glasses in place. NC in place.   CV: RRR, no m/r/g. Extremities are warm and well perfused.   LUNGS: CTAB, no w/r/c.  ABD: Soft, NT/ND, NBS.   SKIN: Warm, well perfused.   MSK: No edema.  NEURO: Awake, alert and appropriate. Oriented to person, place      Data         Imaging results reviewed over the past 24 hrs:   No results found for this or any previous visit (from the past 24 hour(s)).

## 2024-02-22 ENCOUNTER — APPOINTMENT (OUTPATIENT)
Dept: OCCUPATIONAL THERAPY | Facility: CLINIC | Age: 79
DRG: 193 | End: 2024-02-22
Attending: STUDENT IN AN ORGANIZED HEALTH CARE EDUCATION/TRAINING PROGRAM
Payer: MEDICARE

## 2024-02-22 ENCOUNTER — APPOINTMENT (OUTPATIENT)
Dept: GENERAL RADIOLOGY | Facility: CLINIC | Age: 79
DRG: 193 | End: 2024-02-22
Attending: STUDENT IN AN ORGANIZED HEALTH CARE EDUCATION/TRAINING PROGRAM
Payer: MEDICARE

## 2024-02-22 ENCOUNTER — APPOINTMENT (OUTPATIENT)
Dept: ULTRASOUND IMAGING | Facility: CLINIC | Age: 79
DRG: 193 | End: 2024-02-22
Attending: STUDENT IN AN ORGANIZED HEALTH CARE EDUCATION/TRAINING PROGRAM
Payer: MEDICARE

## 2024-02-22 ENCOUNTER — APPOINTMENT (OUTPATIENT)
Dept: PHYSICAL THERAPY | Facility: CLINIC | Age: 79
DRG: 193 | End: 2024-02-22
Payer: MEDICARE

## 2024-02-22 LAB
ANION GAP SERPL CALCULATED.3IONS-SCNC: 9 MMOL/L (ref 7–15)
BUN SERPL-MCNC: 20.4 MG/DL (ref 8–23)
CALCIUM SERPL-MCNC: 8.7 MG/DL (ref 8.8–10.2)
CHLORIDE SERPL-SCNC: 100 MMOL/L (ref 98–107)
CREAT SERPL-MCNC: 0.99 MG/DL (ref 0.51–0.95)
CYSTATIN C (ROCHE): 1.6 MG/L (ref 0.6–1)
DEPRECATED HCO3 PLAS-SCNC: 29 MMOL/L (ref 22–29)
EGFRCR SERPLBLD CKD-EPI 2021: 57 ML/MIN/1.73M2
ERYTHROCYTE [DISTWIDTH] IN BLOOD BY AUTOMATED COUNT: 18.4 % (ref 10–15)
GFR SERPL CREATININE-BSD FRML MDRD: 36 ML/MIN/1.73M2
GLUCOSE SERPL-MCNC: 87 MG/DL (ref 70–99)
HCT VFR BLD AUTO: 33.8 % (ref 35–47)
HGB BLD-MCNC: 9.6 G/DL (ref 11.7–15.7)
MCH RBC QN AUTO: 23.6 PG (ref 26.5–33)
MCHC RBC AUTO-ENTMCNC: 28.4 G/DL (ref 31.5–36.5)
MCV RBC AUTO: 83 FL (ref 78–100)
PLATELET # BLD AUTO: 389 10E3/UL (ref 150–450)
POTASSIUM SERPL-SCNC: 4.8 MMOL/L (ref 3.4–5.3)
RBC # BLD AUTO: 4.07 10E6/UL (ref 3.8–5.2)
SODIUM SERPL-SCNC: 138 MMOL/L (ref 135–145)
WBC # BLD AUTO: 12.1 10E3/UL (ref 4–11)

## 2024-02-22 PROCEDURE — 99232 SBSQ HOSP IP/OBS MODERATE 35: CPT | Performed by: FAMILY MEDICINE

## 2024-02-22 PROCEDURE — 71045 X-RAY EXAM CHEST 1 VIEW: CPT | Mod: 26 | Performed by: RADIOLOGY

## 2024-02-22 PROCEDURE — 999N000157 HC STATISTIC RCP TIME EA 10 MIN

## 2024-02-22 PROCEDURE — 250N000013 HC RX MED GY IP 250 OP 250 PS 637: Performed by: STUDENT IN AN ORGANIZED HEALTH CARE EDUCATION/TRAINING PROGRAM

## 2024-02-22 PROCEDURE — 97535 SELF CARE MNGMENT TRAINING: CPT | Mod: GO

## 2024-02-22 PROCEDURE — 250N000009 HC RX 250: Performed by: STUDENT IN AN ORGANIZED HEALTH CARE EDUCATION/TRAINING PROGRAM

## 2024-02-22 PROCEDURE — 80048 BASIC METABOLIC PNL TOTAL CA: CPT | Performed by: STUDENT IN AN ORGANIZED HEALTH CARE EDUCATION/TRAINING PROGRAM

## 2024-02-22 PROCEDURE — 97530 THERAPEUTIC ACTIVITIES: CPT | Mod: GO

## 2024-02-22 PROCEDURE — 94640 AIRWAY INHALATION TREATMENT: CPT | Mod: 76

## 2024-02-22 PROCEDURE — 99233 SBSQ HOSP IP/OBS HIGH 50: CPT | Performed by: STUDENT IN AN ORGANIZED HEALTH CARE EDUCATION/TRAINING PROGRAM

## 2024-02-22 PROCEDURE — 93971 EXTREMITY STUDY: CPT | Mod: RT

## 2024-02-22 PROCEDURE — 82610 CYSTATIN C: CPT | Performed by: STUDENT IN AN ORGANIZED HEALTH CARE EDUCATION/TRAINING PROGRAM

## 2024-02-22 PROCEDURE — 97530 THERAPEUTIC ACTIVITIES: CPT | Mod: GP | Performed by: PHYSICAL THERAPIST

## 2024-02-22 PROCEDURE — 250N000013 HC RX MED GY IP 250 OP 250 PS 637: Performed by: PHYSICIAN ASSISTANT

## 2024-02-22 PROCEDURE — 93971 EXTREMITY STUDY: CPT | Mod: 26 | Performed by: RADIOLOGY

## 2024-02-22 PROCEDURE — 99207 PR NO BILLABLE SERVICE THIS VISIT: CPT | Performed by: STUDENT IN AN ORGANIZED HEALTH CARE EDUCATION/TRAINING PROGRAM

## 2024-02-22 PROCEDURE — 250N000011 HC RX IP 250 OP 636: Performed by: STUDENT IN AN ORGANIZED HEALTH CARE EDUCATION/TRAINING PROGRAM

## 2024-02-22 PROCEDURE — C9113 INJ PANTOPRAZOLE SODIUM, VIA: HCPCS | Performed by: STUDENT IN AN ORGANIZED HEALTH CARE EDUCATION/TRAINING PROGRAM

## 2024-02-22 PROCEDURE — 120N000002 HC R&B MED SURG/OB UMMC

## 2024-02-22 PROCEDURE — 85041 AUTOMATED RBC COUNT: CPT | Performed by: STUDENT IN AN ORGANIZED HEALTH CARE EDUCATION/TRAINING PROGRAM

## 2024-02-22 PROCEDURE — 71045 X-RAY EXAM CHEST 1 VIEW: CPT

## 2024-02-22 PROCEDURE — 250N000013 HC RX MED GY IP 250 OP 250 PS 637

## 2024-02-22 PROCEDURE — 36415 COLL VENOUS BLD VENIPUNCTURE: CPT | Performed by: STUDENT IN AN ORGANIZED HEALTH CARE EDUCATION/TRAINING PROGRAM

## 2024-02-22 PROCEDURE — 250N000013 HC RX MED GY IP 250 OP 250 PS 637: Performed by: HOSPITALIST

## 2024-02-22 PROCEDURE — 999N000150 HC STATISTIC PT MED CONFERENCE < 30 MIN: Performed by: PHYSICAL THERAPIST

## 2024-02-22 PROCEDURE — 97165 OT EVAL LOW COMPLEX 30 MIN: CPT | Mod: GO

## 2024-02-22 RX ORDER — MORPHINE SULFATE 10 MG/5ML
5-10 SOLUTION ORAL EVERY 4 HOURS PRN
Status: DISCONTINUED | OUTPATIENT
Start: 2024-02-22 | End: 2024-02-23

## 2024-02-22 RX ORDER — ACETAMINOPHEN 325 MG/1
975 TABLET ORAL EVERY 8 HOURS
Status: DISCONTINUED | OUTPATIENT
Start: 2024-02-22 | End: 2024-02-23

## 2024-02-22 RX ADMIN — ACETAMINOPHEN 975 MG: 325 TABLET, FILM COATED ORAL at 18:01

## 2024-02-22 RX ADMIN — IPRATROPIUM BROMIDE AND ALBUTEROL SULFATE 3 ML: .5; 3 SOLUTION RESPIRATORY (INHALATION) at 08:08

## 2024-02-22 RX ADMIN — IPRATROPIUM BROMIDE AND ALBUTEROL SULFATE 3 ML: .5; 3 SOLUTION RESPIRATORY (INHALATION) at 13:17

## 2024-02-22 RX ADMIN — LEVOTHYROXINE SODIUM 50 MCG: 50 TABLET ORAL at 08:14

## 2024-02-22 RX ADMIN — LATANOPROST 1 DROP: 50 SOLUTION OPHTHALMIC at 21:53

## 2024-02-22 RX ADMIN — HYDROXYZINE HYDROCHLORIDE 10 MG: 10 TABLET ORAL at 13:37

## 2024-02-22 RX ADMIN — DONEPEZIL HYDROCHLORIDE 10 MG: 10 TABLET, FILM COATED ORAL at 21:53

## 2024-02-22 RX ADMIN — PANTOPRAZOLE SODIUM 40 MG: 40 INJECTION, POWDER, FOR SOLUTION INTRAVENOUS at 08:13

## 2024-02-22 RX ADMIN — BUSPIRONE HYDROCHLORIDE 10 MG: 10 TABLET ORAL at 13:35

## 2024-02-22 RX ADMIN — BUSPIRONE HYDROCHLORIDE 10 MG: 10 TABLET ORAL at 19:25

## 2024-02-22 RX ADMIN — HYDROXYZINE HYDROCHLORIDE 10 MG: 10 TABLET ORAL at 18:01

## 2024-02-22 RX ADMIN — FLUOXETINE HYDROCHLORIDE 40 MG: 20 CAPSULE ORAL at 08:14

## 2024-02-22 RX ADMIN — Medication 1 LOZENGE: at 10:06

## 2024-02-22 RX ADMIN — BUSPIRONE HYDROCHLORIDE 10 MG: 10 TABLET ORAL at 08:14

## 2024-02-22 RX ADMIN — OLANZAPINE 5 MG: 5 TABLET, ORALLY DISINTEGRATING ORAL at 08:13

## 2024-02-22 RX ADMIN — MORPHINE SULFATE 10 MG: 10 SOLUTION ORAL at 21:53

## 2024-02-22 RX ADMIN — BUDESONIDE 1 MG: 1 INHALANT ORAL at 08:08

## 2024-02-22 RX ADMIN — OLANZAPINE 5 MG: 5 TABLET, ORALLY DISINTEGRATING ORAL at 19:25

## 2024-02-22 RX ADMIN — MORPHINE SULFATE 10 MG: 10 SOLUTION ORAL at 16:40

## 2024-02-22 RX ADMIN — IPRATROPIUM BROMIDE AND ALBUTEROL SULFATE 3 ML: .5; 3 SOLUTION RESPIRATORY (INHALATION) at 16:19

## 2024-02-22 RX ADMIN — ACETAMINOPHEN 650 MG: 325 TABLET, FILM COATED ORAL at 10:07

## 2024-02-22 ASSESSMENT — ACTIVITIES OF DAILY LIVING (ADL)
ADLS_ACUITY_SCORE: 56
ADLS_ACUITY_SCORE: 54
ADLS_ACUITY_SCORE: 49
ADLS_ACUITY_SCORE: 50
ADLS_ACUITY_SCORE: 48
ADLS_ACUITY_SCORE: 54
ADLS_ACUITY_SCORE: 54
ADLS_ACUITY_SCORE: 50
ADLS_ACUITY_SCORE: 49
ADLS_ACUITY_SCORE: 56
ADLS_ACUITY_SCORE: 54
ADLS_ACUITY_SCORE: 49
ADLS_ACUITY_SCORE: 49
ADLS_ACUITY_SCORE: 54
ADLS_ACUITY_SCORE: 56
ADLS_ACUITY_SCORE: 49
ADLS_ACUITY_SCORE: 50
ADLS_ACUITY_SCORE: 54
ADLS_ACUITY_SCORE: 50
ADLS_ACUITY_SCORE: 49

## 2024-02-22 NOTE — PLAN OF CARE
"Shift 9765-2726    Patient is alert and oriented x2, disoriented to time, situations.  Able to make needs known. On 2L NC. Denies N/V, numbness/tingling or CP. Pt A/2 when OOB and A/1 in bed. R. PIV/SL.  LBM 2/21. Incontinent of bladder - Pure wick ON during night. On comfort care. Skin CDI. Bed in low position. Call light within reach. Continue with POC. No acute events during shift.     Problem: Adult Inpatient Plan of Care  Goal: Plan of Care Review  Description: The Plan of Care Review/Shift note should be completed every shift.  The Outcome Evaluation is a brief statement about your assessment that the patient is improving, declining, or no change.  This information will be displayed automatically on your shift  note.  Outcome: Progressing     Problem: Pain Acute  Goal: Optimal Pain Control and Function  Outcome: Progressing  Intervention: Prevent or Manage Pain  Recent Flowsheet Documentation  Taken 2/22/2024 0300 by Flo Tyson RN  Medication Review/Management: medications reviewed     Problem: Adult Inpatient Plan of Care  Goal: Plan of Care Review  Description: The Plan of Care Review/Shift note should be completed every shift.  The Outcome Evaluation is a brief statement about your assessment that the patient is improving, declining, or no change.  This information will be displayed automatically on your shift  note.  Outcome: Progressing  Goal: Patient-Specific Goal (Individualized)  Description: You can add care plan individualizations to a care plan. Examples of Individualization might be:  \"Parent requests to be called daily at 9am for status\", \"I have a hard time hearing out of my right ear\", or \"Do not touch me to wake me up as it startles  me\".  Outcome: Progressing  Goal: Absence of Hospital-Acquired Illness or Injury  Outcome: Progressing  Intervention: Identify and Manage Fall Risk  Recent Flowsheet Documentation  Taken 2/22/2024 0300 by Flo Tyson RN  Safety Promotion/Fall " Prevention:   activity supervised   assistive device/personal items within reach   clutter free environment maintained  Intervention: Prevent Skin Injury  Recent Flowsheet Documentation  Taken 2/22/2024 0300 by Flo Tyson RN  Body Position: turned  Intervention: Prevent and Manage VTE (Venous Thromboembolism) Risk  Recent Flowsheet Documentation  Taken 2/22/2024 0300 by Flo Tyson RN  VTE Prevention/Management: SCDs (sequential compression devices) off  Goal: Optimal Comfort and Wellbeing  Outcome: Progressing  Goal: Readiness for Transition of Care  Outcome: Progressing     Problem: Oral Intake Inadequate  Goal: Improved Oral Intake  2/22/2024 0327 by Flo Tyson RN  Outcome: Progressing  2/22/2024 0327 by Flo Tyson RN  Outcome: Not Progressing     Problem: Breathing Pattern Ineffective  Goal: Effective Breathing Pattern  Outcome: Progressing  Intervention: Promote Improved Breathing Pattern  Recent Flowsheet Documentation  Taken 2/22/2024 0300 by Flo Tyson RN  Head of Bed (HOB) Positioning: HOB at 20 degrees     Problem: Pneumonia  Goal: Fluid Balance  Outcome: Progressing  Goal: Resolution of Infection Signs and Symptoms  Outcome: Progressing  Goal: Effective Oxygenation and Ventilation  Outcome: Progressing  Intervention: Promote Airway Secretion Clearance  Recent Flowsheet Documentation  Taken 2/22/2024 0300 by Flo Tyson RN  Cough And Deep Breathing: done independently per patient  Intervention: Optimize Oxygenation and Ventilation  Recent Flowsheet Documentation  Taken 2/22/2024 0300 by Flo Tyson RN  Head of Bed (HOB) Positioning: HOB at 20 degrees     Problem: Comorbidity Management  Goal: Maintenance of COPD Symptom Control  Outcome: Progressing  Intervention: Maintain COPD Symptom Control  Recent Flowsheet Documentation  Taken 2/22/2024 0300 by Flo Tyson RN  Medication Review/Management: medications reviewed  Goal: Blood Pressure in Desired Range  Outcome:  Progressing  Intervention: Maintain Blood Pressure Management  Recent Flowsheet Documentation  Taken 2/22/2024 0300 by Flo Tyson, RN  Medication Review/Management: medications reviewed

## 2024-02-22 NOTE — PROGRESS NOTES
Care Management Follow Up    Length of Stay (days): 21    Expected Discharge Date: 2/26/24     Concerns to be Addressed: Discharge Planning    Patient plan of care discussed at interdisciplinary rounds: Yes    Anticipated Discharge Disposition:      Anticipated Discharge Services: None    Anticipated Discharge DME: None      Patient/family educated on Medicare website which has current facility and service quality ratings: Yes     Education Provided on the Discharge Plan: Yes     Patient/Family in Agreement with the Plan: Yes    Referrals Placed by CM/SW: TCU    Private pay costs discussed: Not applicable    Additional Information:    Care Conference held today with daughters Gabby and Barbara, Palliative and primary care MD's. Plan of care was discussed. Palliative talked about her pain plan and primary care discussed consulting pulmonary, doing a chest xray and an ultra sound. Family is in agreement with rehabilitation at [facility]. Writer called [facility] and updated them on the plan and estimated discharge date of Monday, 2/26.    ANGEL Newberry, MSW  6th Floor Medical Surgical Unit  Phone [phone]

## 2024-02-22 NOTE — PROGRESS NOTES
[id number]  Patient is alert and oriented x2, disoriented to time, situations.  Able to make needs known. On 2L NC. Denies N/V, numbness/tingling or CP. Pt up A/2. R. PIV/SL.  LBM 2/21. Incontinent of bladder. On comfort care. Skin CDI. Bed in low position. Call light within reach. Continue with POC.

## 2024-02-22 NOTE — PROGRESS NOTES
[facility]    Medicine Progress Note - Hospitalist Service, GOLD TEAM 21    Date of Admission:  2/1/2024    Assessment & Plan   Idalia Bob is a 79 year old female admitted on 2/1/2024. Pt has history of COPD, pneumonia, hypertension, dementia and hypothyroidism among others, presented with abdominal pain and found to have consolidation on CT as well as frequent mucousy bowel movements and anorexia. Known persistent LLL infiltrate of uncertain etiology. She remains on 2-3NC with symptomatic dyspnea and ongoing intermittent nausea.     Care conference 2/22. Will pursue restorative cares. Patient participating with therapy, is appropriate for TCU      Changes today:  - Care conference today. Will pursue restorative cares. Patient participating with therapy, is appropriate for TCU.   - LE doppler, resume anticoagulation with enoxaparin if clot  - Chest xray, consult pulm in AM for plan.      Left upper lobe pneumonia vs persistent consolidation vs mass  COPD  Acute on chronic hypoxemic respiratory failure  Patient with pseudomonal upper lobe pneumonia in December and has had ongoing consolidation since.  While this finding was present on imaging she has a paucity of other pathology consistent with pneumonia at the present time. She is now having increasing dyspnea x 24 hours, on 3LNC. Repeat CT with worsening upper lobe infiltrates and edema. Per pulm, felt his may represent persistent pneumonia in the setting of poor airway clearance and mobilization, less likely inflammatory but cannot be ruled out and ongoing consideration for underlying malignancy. Risk outweighs benefit of biopsy given her underlying lung disease and further workup including imaging, BAL or biopsy are not currently within her GOC. Extensive infectious workup otherwise unrevealing. TTE without evidence of CHF.     Care conference 2/22. Will pursue restorative cares. Patient participating with  therapy, is appropriate for TCU   -s/p azithromycin and ceftriaxone, s/p cefepime-> Zosyn (switch due to concern for neurotoxicity) x 7 days for PsA course  - S/p Prednisone 40mg PO daily x 5 days (end 2/19)  - Holding diuresis given little symptomatic improvement with this; low threshold to resume if felt this would provide comfort  - Continue home COPD regimen  - Continue home oxygen for now; this may need to be readdressed pending plan for hospice    Abdominal Pain, improved   Intractable nausea   Loose stools with mucus, improved   Tenesmus  Unintentional weight loss  Anorexia - improving  The patient has had weeks of worsening anorexia along with crampy abdominal pain but no evidence of gastrointestinal bleeding.  She has frequent (many many times per day) mucousy bowel movements with very little stool production.  C. difficile and enteric panel negative. S/p flex sig and endoscopy 2/7. Noted hiatal hernia, mild inflammatory changes in distal colon/rectum s/p biopsy. No identified etiology of her symptoms.     Intermittent nausea and inability to tolerate PO, eating breakfast 2/19. Will continue aggressive symptom management in the setting of comfort-focused cares. Given her oral intake, she is not in active dying process and do feel LTC vs home to assisted living or with family likely. SW/CC and financial counselor per above.   -Appreciate GI consultation; since signed off  -Ok to continue Bentyl prn, stopped psyllium, imodium as no further diarrhea   -Zyprexa added per palliative  -Steroids per above  -Comfort order set in place including morphine and ativan   -Palliative following, appreciate recommendations    Anxiety and depression  Takes BuSpar, Prozac  -Continue home regimen - remains within GOC   -Zyprexa 2.5mg BID per palliative on 2/13    RLE DVT  Noted on US 2/13, started on therapeutic lovenox. Stopped AC after discussion with family upon transition to comfort, repeating US now that patient goals  restorative.      Urinary tract infection  -s/p treatment   Elevated Troponin  -Likely type II MI in the setting of acute illness. EKG stable. TTE without RWMA  Hypokalemia  Hypomagnesemia  Iron deficiency anemia  Hypertension  -Resume olmesartan when creatinine stable  Hypothyroidism;   -continue Synthroid 50 mcg  Dementia;   -continue donepezil          Diet: Regular Diet Adult  Snacks/Supplements Adult: Other; pt/family/RN may order snacks/supplements as desired per RD (prior scheduled Ensure/Gelatein Plus have been discontinued); With Meals    DVT Prophylaxis: Pneumatic Compression Devices  Ruiz Catheter: Not present  Lines: None     Cardiac Monitoring: None  Code Status: No CPR- Do NOT Intubate      Clinically Significant Risk Factors              # Hypoalbuminemia: Lowest albumin = 3 g/dL at 2/14/2024  5:37 AM, will monitor as appropriate     # Hypertension: Noted on problem list     # Dementia: noted on problem list    # Overweight: Estimated body mass index is 26.44 kg/m  as calculated from the following:    Height as of this encounter: 1.524 m (5').    Weight as of this encounter: 61.4 kg (135 lb 5.8 oz).   # Severe Malnutrition: based on nutrition assessment      # Financial/Environmental Concerns: none         Disposition Plan             Memo Ramírez MD  Hospitalist Service, GOLD TEAM 21  [facility]  Securely message with [email or web address] (more info)  Text page via [employer] Paging/Directory   See signed in provider for up to date coverage information  ______________________________________________________________________    Interval History   Back pain better today. Participated with therapies today.     Physical Exam   Vital Signs: Temp: 98.2  F (36.8  C) Temp src: Oral BP: (!) 154/88 Pulse: 96   Resp: 17 SpO2: 100 % O2 Device: None (Room air) Oxygen Delivery: 2 LPM  Weight: 135 lbs 5.8 oz    General: sitting upright in bed, no acute distress, eating breakfast.    HEENT: sclera clear, glasses in place. NC in place.   CV: RRR, no m/r/g. Extremities are warm and well perfused.   LUNGS: CTAB, no w/r/c.  ABD: Soft, NT/ND, NBS.   SKIN: Warm, well perfused.   MSK: No edema.  NEURO: Awake, alert and appropriate. Oriented to person, place    Medical Decision Making       65 MINUTES SPENT BY ME on the date of service doing chart review, history, exam, documentation & further activities per the note.         Data     I have personally reviewed the following data over the past 24 hrs:    N/A  \   N/A   / N/A     N/A N/A N/A /  N/A   N/A N/A N/A \       Imaging results reviewed over the past 24 hrs:   No results found for this or any previous visit (from the past 24 hour(s)).

## 2024-02-22 NOTE — PROGRESS NOTES
"   Occupational Therapy Evaluation: 02/22/24 105   Appointment Info   Signing Clinician's Name / Credentials (OT) Funmi Cedeno, OTD, OTR/L   Living Environment   People in Home facility resident   Current Living Arrangements assisted living   Home Accessibility wheelchair accessible   Transportation Anticipated family or friend will provide   Living Environment Comments Pt lives in [facility] with other facility residents. Pt has walk in shower with bench built in within bathroom in apartment.   Self-Care   Usual Activity Tolerance fair   Current Activity Tolerance poor   Equipment Currently Used at Home walker, rolling;other (see comments)  (built in shower bench)   Fall history within last six months no   Activity/Exercise/Self-Care Comment Pt family was assisting with showers, providing supervision for transfer and assist with washing back. Pt was dressing ind and mod I toileting with 2ww ambulating to toilet. Pt family statinh WC follow for longer distances.   Instrumental Activities of Daily Living (IADL)   IADL Comments Pt daughter manages meds in boxes and pt has pill alarm to remind when to take them. Medication services available at [facility] if needed. Pt family mamnages finances, other IADLs managed by [facility].   General Information   Onset of Illness/Injury or Date of Surgery 02/01/24   Referring Physician Memo Ramírez MD   Patient/Family Therapy Goal Statement (OT) Gain strength, return to [facility]   Additional Occupational Profile Info/Pertinent History of Current Problem \"Idalia Bob is a 79 year old female admitted on 2/1/2024. Pt has history of COPD, pneumonia, hypertension, dementia and hypothyroidism among others, presented with abdominal pain and found to have consolidation on CT as well as frequent mucousy bowel movements and anorexia. Known persistent LLL infiltrate of uncertain etiology. She remains on 2-3NC with symptomatic dyspnea and ongoing intermittent nausea.\"   Existing " Precautions/Restrictions fall   Left Upper Extremity (Weight-bearing Status) full weight-bearing (FWB)   Right Upper Extremity (Weight-bearing Status) full weight-bearing (FWB)   Left Lower Extremity (Weight-bearing Status) full weight-bearing (FWB)   Right Lower Extremity (Weight-bearing Status) full weight-bearing (FWB)   General Observations and Info Activity: up ad adam   Cognitive Status Examination   Orientation Status person;place;time   Cognitive Status Comments Pt oriented however intermittently confused.   Cognitive Screens/Assessments   Cognitive Assessments Completed [facility] Mental Status Exam (UMS):  Total Score out of /30 15/30   SLUMS Domains assessed: orientation, memory, attention, executive functions   SLUMS Interpretation Pt scoring 15/30 on SLUMS. SLUMS assessment is screening tool for functional cognition. Score indicative that pt may have cognitive deficits.   Visual Perception   Visual Impairment/Limitations corrective lenses full-time;other (see comments)  (Pt intermittently wears glasses, per pt family, vision is not too bad.)   Sensory   Sensory Quick Adds sensation intact   Pain Assessment   Patient Currently in Pain Yes, see Vital Sign flowsheet   Posture   Posture forward head position;protracted shoulders   Range of Motion Comprehensive   General Range of Motion bilateral upper extremity ROM WFL   Strength Comprehensive (MMT)   Comment, General Manual Muscle Testing (MMT) Assessment Overal generalized weakness noted this date.   Coordination   Upper Extremity Coordination No deficits were identified   Bed Mobility   Bed Mobility supine-sit   Comment (Bed Mobility) CGA   Activities of Daily Living   BADL Assessment/Intervention bathing;lower body dressing;upper body dressing;grooming;toileting   Bathing Assessment/Intervention   Winston Level (Bathing) moderate assist (50% patient effort)   Comment, (Bathing) Per clinical judgement   Upper Body Dressing  Assessment/Training   Comment, (Upper Body Dressing) Per clinical judgement   Fre[place] Level (Upper Body Dressing) minimum assist (75% patient effort)   Lower Body Dressing Assessment/Training   Comment, (Lower Body Dressing) Per clinical judgement   [place] Level (Lower Body Dressing) maximum assist (25% patient effort)   Grooming Assessment/Training   Fre[place] Level (Grooming) moderate assist (50% patient effort)   Comment, (Grooming) Per clinical judgement   Toileting   Comment, (Toileting) Per clinical judgement   Fre[place] Level (Toileting) moderate assist (50% patient effort)   Clinical Impression   Criteria for Skilled Therapeutic Interventions Met (OT) Yes, treatment indicated   OT Diagnosis Decreased ind with ADLs and functional mobility.   OT Problem List-Impairments impacting ADL problems related to;activity tolerance impaired;cognition;strength   Assessment of Occupational Performance 3-5 Performance Deficits   Identified Performance Deficits bathing, dressing, toileting, g/h, decreased activity tolerance   Planned Therapy Interventions (OT) ADL retraining;strengthening;home program guidelines;progressive activity/exercise;cognition   Clinical Decision Making Complexity (OT) problem focused assessment/low complexity   Risk & Benefits of therapy have been explained evaluation/treatment results reviewed;care plan/treatment goals reviewed;risks/benefits reviewed;current/potential barriers reviewed;participants voiced agreement with care plan;participants included;patient  (Family member present for eval.)   Clinical Impression Comments Pt would benefit from skilled IP OT services to increase ind with ADLs and functional mobility prior to discharge from IP.   OT Total Evaluation Time   OT Eval, Low Complexity Minutes (60529) 10   OT Goals   Therapy Frequency (OT) 5 times/week   OT Predicted Duration/Target Date for Goal Attainment 03/14/24   OT Goals Hygiene/Grooming;Upper Body Dressing;Lower  Body Dressing;Lower Body Bathing;Toilet Transfer/Toileting;Upper Body Bathing   OT: Hygiene/Grooming modified independent   OT: Upper Body Dressing Modified independent   OT: Lower Body Dressing Modified independent   OT: Upper Body Bathing Supervision/stand-by assist   OT: Lower Body Bathing Supervision/stand-by assist   OT: Toilet Transfer/Toileting Modified independent   OT Discharge Planning   OT Plan Toileting, FB dressing, seated EOB g/h   OT Discharge Recommendation (DC Rec) Transitional Care Facility   OT Rationale for DC Rec Pt with increased weakness and fatigue this date however engaged and participating throughout session. Pt would benefit from TCU stay to progress ind and safety with ADLs and functional mobility to return to baseline.   OT Brief overview of current status CGA bed mobility, increased encoouragement for participation   Total Session Time   Total Session Time (sum of timed and untimed services) 10

## 2024-02-22 NOTE — PROGRESS NOTES
PALLIATIVE CARE PROGRESS NOTE  [facility]     Patient Name: Idalia Bob  Date of Admission: 2/1/2024        Recommendations & Counseling       GOALS OF CARE:   We participated in the care conference today with primary team, , rehab services, patient and 2 daughters.  As per notes over the last several days, Idalia has cleared significantly cognitively over where she was several days ago.  Since she started to clear patient and family have been looking at moving back towards more restorative goals rather than CMO with hospice.    At care conference today family decided to continue restorative cares while avoiding burdensome interventions such as EGD or bronchoscopy.  They hope that she can go to TCU at the same campus where she has been living in [place] with her .  Goal would be to try to build strength and see if she is able to return to assisted living setting.    Daughters have been quite surprised by her recovery over recent days.  We discussed that there is significant inherent unpredictability in how she might do in the near future as well.  However, rehab team here in the hospital that she is a good candidate for TCU, and she wants to try it, so she could certainly do that and either continue it or discontinue it based on how she does.    Daughters have concerned about ongoing pain (chronic back pain likely exacerbated by extended time in bed during this recent hospitalization).  When her care was more focused on comfort several days ago, morphine was prescribed as needed and she has gotten several 10 mg oral doses that do not seem to have caused any confusion or significant sedation.  Although our team will not be involved in symptom management given her ongoing restorative pathway, it seems reasonable to continue scheduled Tylenol as her primary pain medication, but to have available morphine elixir 5 mg up to every 4 hours as needed, with  a goal of it helping her participate in rehab.  Reevaluate regularly.    ADVANCE CARE PLANNING:  No health care directive on file. Per  informed consent policy, next of kin should be involved if patient becomes unable.  There is no POLST form on file, defer to patient and/or next of kin for decisions   Code status: No CPR- Do NOT Intubate    Palliative Care will continue to follow. Thank you for the consult and allowing us to aid in the care of Idalia Bob.    Memo Gill MD  Palliative Medicine Consult Team  Pall Care Consult Pager [phone]    TT: 41 minutes, with > 50% spent in C/C/E patient/family/care teams re: GOC, POC, Sx management. [id number]         Interval History:     Improvement in cognitive status, now with change to plan to restorative pathway of care as outlined above.    Palliative Summary/HPI          Idalia Bob is a 79 year old female with a past medical history of COPD, pneumonia, hypertension, dementia and hypothyroidism presented on 02/01/2024 with abdominal pain and found to have consolidation on CT as well as frequent mucousy bowel movements and anorexia. Hx of pseudomonal upper lobe pneumonia in December and has ongoing consolidation since. Repeat CT concerning for upper love infiltrates and edema. She remains on 2-3NC with symptomatic dyspnea and ongoing intermittent nausea.      CC on 2/16 with decision to transition to comfort-focused cares.   A second care conference was held 2/22 with issues as outlined above.      Today, Patient was seen for:   Acute on chronic respiratory failure  Dyspnea on exertion  Dementia  Goals of care  Support            Review of Systems:     Besides above, ROS was reviewed and is unremarkable           Medications:     I have reviewed this patient's medication profile and medications during this hospitalization.    Noted meds:    BuSpar 10 mg TID  Prozac 40 mg daily  Zyprexa 5 mg BID  Aricept 10 mg daily  Tylenol as needed  Morphine 10 mg p.o.  as needed; she has used 2 doses today.             Physical Exam:   Blood pressure 111/54, pulse 94, temperature 98.2  F (36.8  C), temperature source Oral, resp. rate 17, height 1.524 m (5'), weight 61.4 kg (135 lb 5.8 oz), SpO2 98%.     Gen: Alert, sitting upright in bed, appears stated age, comfortable appearing, in no respiratory distress.  Alert and interactive.  Participates in conversation although some degree of memory impairment is noted.               Current Problem List:   Principal Problem:    Hypokalemia  Active Problems:    Community acquired pneumonia of left upper lobe of lung      Allergies   Allergen Reactions    Aspirin Nausea and Nausea and Vomiting     Stomach ache    Penicillins Itching     Has tolerated ceftriaxone, piperacillin, and amoxicillin            Data Reviewed:   ROUTINE ICU LABS (Last four results)  CMP  Recent Labs   Lab 02/22/24  0800 02/21/24  0812 02/16/24  0641    134*  --    POTASSIUM 4.8 4.4 4.7   CHLORIDE 100 98  --    CO2 29 32*  --    ANIONGAP 9 4*  --    GLC 87 79  --    BUN 20.4 22.9  --    CR 0.99* 0.93 0.88   GFRESTIMATED 57* 62 66   AYUSH 8.7* 8.4*  --    MAG  --   --  3.0*     CBC  Recent Labs   Lab 02/22/24  0800 02/21/24  0812   WBC 12.1* 13.1*   RBC 4.07 4.16   HGB 9.6* 9.9*   HCT 33.8* 34.6*   MCV 83 83   MCH 23.6* 23.8*   MCHC 28.4* 28.6*   RDW 18.4* 18.6*    415     INRNo lab results found in last 7 days.  Arterial Blood GasNo lab results found in last 7 days.

## 2024-02-23 ENCOUNTER — APPOINTMENT (OUTPATIENT)
Dept: OCCUPATIONAL THERAPY | Facility: CLINIC | Age: 79
DRG: 193 | End: 2024-02-23
Payer: MEDICARE

## 2024-02-23 ENCOUNTER — APPOINTMENT (OUTPATIENT)
Dept: PHYSICAL THERAPY | Facility: CLINIC | Age: 79
DRG: 193 | End: 2024-02-23
Payer: MEDICARE

## 2024-02-23 LAB
ANION GAP SERPL CALCULATED.3IONS-SCNC: 6 MMOL/L (ref 7–15)
BUN SERPL-MCNC: 18.2 MG/DL (ref 8–23)
CALCIUM SERPL-MCNC: 9 MG/DL (ref 8.8–10.2)
CHLORIDE SERPL-SCNC: 98 MMOL/L (ref 98–107)
CREAT SERPL-MCNC: 0.88 MG/DL (ref 0.51–0.95)
CREAT SERPL-MCNC: 0.92 MG/DL (ref 0.51–0.95)
DEPRECATED HCO3 PLAS-SCNC: 30 MMOL/L (ref 22–29)
EGFRCR SERPLBLD CKD-EPI 2021: 63 ML/MIN/1.73M2
EGFRCR SERPLBLD CKD-EPI 2021: 66 ML/MIN/1.73M2
ERYTHROCYTE [DISTWIDTH] IN BLOOD BY AUTOMATED COUNT: 18.3 % (ref 10–15)
GLUCOSE SERPL-MCNC: 96 MG/DL (ref 70–99)
HCT VFR BLD AUTO: 34.7 % (ref 35–47)
HGB BLD-MCNC: 9.9 G/DL (ref 11.7–15.7)
MCH RBC QN AUTO: 23.8 PG (ref 26.5–33)
MCHC RBC AUTO-ENTMCNC: 28.5 G/DL (ref 31.5–36.5)
MCV RBC AUTO: 83 FL (ref 78–100)
PLATELET # BLD AUTO: 388 10E3/UL (ref 150–450)
POTASSIUM SERPL-SCNC: 5 MMOL/L (ref 3.4–5.3)
PROCALCITONIN SERPL IA-MCNC: 0.08 NG/ML
RBC # BLD AUTO: 4.16 10E6/UL (ref 3.8–5.2)
SODIUM SERPL-SCNC: 134 MMOL/L (ref 135–145)
WBC # BLD AUTO: 11.2 10E3/UL (ref 4–11)

## 2024-02-23 PROCEDURE — 94640 AIRWAY INHALATION TREATMENT: CPT

## 2024-02-23 PROCEDURE — 250N000013 HC RX MED GY IP 250 OP 250 PS 637

## 2024-02-23 PROCEDURE — 250N000013 HC RX MED GY IP 250 OP 250 PS 637: Performed by: INTERNAL MEDICINE

## 2024-02-23 PROCEDURE — 97530 THERAPEUTIC ACTIVITIES: CPT | Mod: GO | Performed by: OCCUPATIONAL THERAPIST

## 2024-02-23 PROCEDURE — 99233 SBSQ HOSP IP/OBS HIGH 50: CPT | Performed by: STUDENT IN AN ORGANIZED HEALTH CARE EDUCATION/TRAINING PROGRAM

## 2024-02-23 PROCEDURE — 99233 SBSQ HOSP IP/OBS HIGH 50: CPT | Performed by: INTERNAL MEDICINE

## 2024-02-23 PROCEDURE — 85027 COMPLETE CBC AUTOMATED: CPT | Performed by: STUDENT IN AN ORGANIZED HEALTH CARE EDUCATION/TRAINING PROGRAM

## 2024-02-23 PROCEDURE — 94640 AIRWAY INHALATION TREATMENT: CPT | Mod: 76

## 2024-02-23 PROCEDURE — 999N000157 HC STATISTIC RCP TIME EA 10 MIN

## 2024-02-23 PROCEDURE — 250N000011 HC RX IP 250 OP 636: Performed by: STUDENT IN AN ORGANIZED HEALTH CARE EDUCATION/TRAINING PROGRAM

## 2024-02-23 PROCEDURE — 82565 ASSAY OF CREATININE: CPT | Performed by: STUDENT IN AN ORGANIZED HEALTH CARE EDUCATION/TRAINING PROGRAM

## 2024-02-23 PROCEDURE — 250N000009 HC RX 250: Performed by: STUDENT IN AN ORGANIZED HEALTH CARE EDUCATION/TRAINING PROGRAM

## 2024-02-23 PROCEDURE — 99233 SBSQ HOSP IP/OBS HIGH 50: CPT | Mod: GC | Performed by: INTERNAL MEDICINE

## 2024-02-23 PROCEDURE — C9113 INJ PANTOPRAZOLE SODIUM, VIA: HCPCS | Performed by: STUDENT IN AN ORGANIZED HEALTH CARE EDUCATION/TRAINING PROGRAM

## 2024-02-23 PROCEDURE — 36415 COLL VENOUS BLD VENIPUNCTURE: CPT | Performed by: STUDENT IN AN ORGANIZED HEALTH CARE EDUCATION/TRAINING PROGRAM

## 2024-02-23 PROCEDURE — 84145 PROCALCITONIN (PCT): CPT | Performed by: STUDENT IN AN ORGANIZED HEALTH CARE EDUCATION/TRAINING PROGRAM

## 2024-02-23 PROCEDURE — 80048 BASIC METABOLIC PNL TOTAL CA: CPT | Performed by: STUDENT IN AN ORGANIZED HEALTH CARE EDUCATION/TRAINING PROGRAM

## 2024-02-23 PROCEDURE — 97530 THERAPEUTIC ACTIVITIES: CPT | Mod: GP | Performed by: PHYSICAL THERAPIST

## 2024-02-23 PROCEDURE — 120N000002 HC R&B MED SURG/OB UMMC

## 2024-02-23 PROCEDURE — 250N000013 HC RX MED GY IP 250 OP 250 PS 637: Performed by: STUDENT IN AN ORGANIZED HEALTH CARE EDUCATION/TRAINING PROGRAM

## 2024-02-23 PROCEDURE — 250N000013 HC RX MED GY IP 250 OP 250 PS 637: Performed by: HOSPITALIST

## 2024-02-23 RX ORDER — PANTOPRAZOLE SODIUM 40 MG/1
40 TABLET, DELAYED RELEASE ORAL
Status: DISCONTINUED | OUTPATIENT
Start: 2024-02-24 | End: 2024-02-26 | Stop reason: HOSPADM

## 2024-02-23 RX ORDER — MORPHINE SULFATE 10 MG/5ML
5 SOLUTION ORAL EVERY 4 HOURS PRN
Status: DISCONTINUED | OUTPATIENT
Start: 2024-02-23 | End: 2024-02-25

## 2024-02-23 RX ORDER — ACETAMINOPHEN 325 MG/1
975 TABLET ORAL 3 TIMES DAILY
Status: DISCONTINUED | OUTPATIENT
Start: 2024-02-23 | End: 2024-02-26 | Stop reason: HOSPADM

## 2024-02-23 RX ORDER — ENOXAPARIN SODIUM 100 MG/ML
40 INJECTION SUBCUTANEOUS EVERY 24 HOURS
Status: DISCONTINUED | OUTPATIENT
Start: 2024-02-23 | End: 2024-02-26 | Stop reason: HOSPADM

## 2024-02-23 RX ORDER — MORPHINE SULFATE 10 MG/5ML
5 SOLUTION ORAL EVERY 4 HOURS PRN
Status: DISCONTINUED | OUTPATIENT
Start: 2024-02-23 | End: 2024-02-23

## 2024-02-23 RX ADMIN — FLUOXETINE HYDROCHLORIDE 40 MG: 20 CAPSULE ORAL at 08:45

## 2024-02-23 RX ADMIN — PANTOPRAZOLE SODIUM 40 MG: 40 INJECTION, POWDER, FOR SOLUTION INTRAVENOUS at 08:46

## 2024-02-23 RX ADMIN — ACETAMINOPHEN 975 MG: 325 TABLET, FILM COATED ORAL at 10:19

## 2024-02-23 RX ADMIN — IPRATROPIUM BROMIDE AND ALBUTEROL SULFATE 3 ML: .5; 3 SOLUTION RESPIRATORY (INHALATION) at 20:32

## 2024-02-23 RX ADMIN — ACETAMINOPHEN 975 MG: 325 TABLET, FILM COATED ORAL at 21:26

## 2024-02-23 RX ADMIN — BUDESONIDE 1 MG: 1 INHALANT ORAL at 20:32

## 2024-02-23 RX ADMIN — BUDESONIDE 1 MG: 1 INHALANT ORAL at 07:52

## 2024-02-23 RX ADMIN — DONEPEZIL HYDROCHLORIDE 10 MG: 10 TABLET, FILM COATED ORAL at 21:26

## 2024-02-23 RX ADMIN — LATANOPROST 1 DROP: 50 SOLUTION OPHTHALMIC at 21:27

## 2024-02-23 RX ADMIN — OLANZAPINE 5 MG: 5 TABLET, ORALLY DISINTEGRATING ORAL at 08:46

## 2024-02-23 RX ADMIN — ACETAMINOPHEN 975 MG: 325 TABLET, FILM COATED ORAL at 13:49

## 2024-02-23 RX ADMIN — IPRATROPIUM BROMIDE AND ALBUTEROL SULFATE 3 ML: .5; 3 SOLUTION RESPIRATORY (INHALATION) at 07:52

## 2024-02-23 RX ADMIN — OLANZAPINE 5 MG: 5 TABLET, ORALLY DISINTEGRATING ORAL at 21:26

## 2024-02-23 RX ADMIN — IPRATROPIUM BROMIDE AND ALBUTEROL SULFATE 3 ML: .5; 3 SOLUTION RESPIRATORY (INHALATION) at 15:57

## 2024-02-23 RX ADMIN — IPRATROPIUM BROMIDE AND ALBUTEROL SULFATE 3 ML: .5; 3 SOLUTION RESPIRATORY (INHALATION) at 13:24

## 2024-02-23 RX ADMIN — BUSPIRONE HYDROCHLORIDE 10 MG: 10 TABLET ORAL at 13:49

## 2024-02-23 RX ADMIN — LEVOTHYROXINE SODIUM 50 MCG: 50 TABLET ORAL at 08:46

## 2024-02-23 RX ADMIN — ENOXAPARIN SODIUM 40 MG: 40 INJECTION SUBCUTANEOUS at 11:31

## 2024-02-23 RX ADMIN — BUSPIRONE HYDROCHLORIDE 10 MG: 10 TABLET ORAL at 21:26

## 2024-02-23 RX ADMIN — BUSPIRONE HYDROCHLORIDE 10 MG: 10 TABLET ORAL at 08:45

## 2024-02-23 ASSESSMENT — ACTIVITIES OF DAILY LIVING (ADL)
ADLS_ACUITY_SCORE: 46
ADLS_ACUITY_SCORE: 46
ADLS_ACUITY_SCORE: 47
ADLS_ACUITY_SCORE: 46
ADLS_ACUITY_SCORE: 47
ADLS_ACUITY_SCORE: 46
ADLS_ACUITY_SCORE: 47
ADLS_ACUITY_SCORE: 46
ADLS_ACUITY_SCORE: 48
ADLS_ACUITY_SCORE: 46
ADLS_ACUITY_SCORE: 47
ADLS_ACUITY_SCORE: 46

## 2024-02-23 NOTE — PROGRESS NOTES
[facility]    Medicine Progress Note - Hospitalist Service, GOLD TEAM 21    Date of Admission:  2/1/2024    Assessment & Plan   Idalia Bob is a 79 year old female admitted on 2/1/2024. Pt has history of COPD, pneumonia, hypertension, dementia and hypothyroidism among others, presented with abdominal pain and found to have consolidation on CT as well as frequent mucousy bowel movements and anorexia. Known persistent LLL infiltrate of uncertain etiology. She remains on 2-3NC with symptomatic dyspnea and ongoing intermittent nausea.     Care conference 2/22. Will pursue restorative cares. Patient participating with therapy, is appropriate for TCU      Changes today:  - Discussed with pulmonology. No inpatient intervention. Plan for repeat CT scan in 4 weeks. Follow up with pulmonology if persistent consolication.  - LE doppler shows resolution of clot, will not resume anticoagulation. Initiate DVT PPx with lovenox  - Decreased morphine dose due to cystatin C GFR consistent with CKD stage 3b. Will discuss transition to oxycodone.      Left upper lobe pneumonia vs persistent consolidation vs mass  COPD  Acute on chronic hypoxemic respiratory failure  Patient with pseudomonal upper lobe pneumonia in December and has had ongoing consolidation since.  While this finding was present on imaging she has a paucity of other pathology consistent with pneumonia at the present time. She is now having increasing dyspnea x 24 hours, on 3LNC. Repeat CT with worsening upper lobe infiltrates and edema. Per pulm, felt his may represent persistent pneumonia in the setting of poor airway clearance and mobilization, less likely inflammatory but cannot be ruled out and ongoing consideration for underlying malignancy. Risk outweighs benefit of biopsy given her underlying lung disease and further workup including imaging, BAL or biopsy are not currently within her GOC. Extensive infectious  workup otherwise unrevealing. TTE without evidence of CHF.     Care conference 2/22. Will pursue restorative cares. Patient participating with therapy, is appropriate for TCU   -s/p azithromycin and ceftriaxone, s/p cefepime-> Zosyn (switch due to concern for neurotoxicity) x 7 days for PsA course  - S/p Prednisone 40mg PO daily x 5 days (end 2/19)  - Holding diuresis given little symptomatic improvement with this; low threshold to resume if felt this would provide comfort  - Continue home COPD regimen  - Continue home oxygen for now; this may need to be readdressed pending plan for hospice    CKD 3b  - use cystatin c for GFR  - Renally dose medications    Back pain  - Decreased frequency of morphine PRN, will discuss with family about transition to alternate opiate    Abdominal Pain, improved   Intractable nausea   Loose stools with mucus, improved   Tenesmus  Unintentional weight loss  Anorexia - improving  The patient has had weeks of worsening anorexia along with crampy abdominal pain but no evidence of gastrointestinal bleeding.  She has frequent (many many times per day) mucousy bowel movements with very little stool production.  C. difficile and enteric panel negative. S/p flex sig and endoscopy 2/7. Noted hiatal hernia, mild inflammatory changes in distal colon/rectum s/p biopsy. No identified etiology of her symptoms.     Intermittent nausea and inability to tolerate PO, eating breakfast 2/19. Will continue aggressive symptom management in the setting of comfort-focused cares. Given her oral intake, she is not in active dying process and do feel LTC vs home to assisted living or with family likely. SW/CC and financial counselor per above.   -Appreciate GI consultation; since signed off  -Ok to continue Bentyl prn, stopped psyllium, imodium as no further diarrhea   -Zyprexa added per palliative  -Steroids per above  -Palliative following, appreciate recommendations    Anxiety and depression  Takes BuSpar,  Prozac  -Continue home regimen - remains within GOC   -Zyprexa 2.5mg BID per palliative on 2/13    RLE DVT  Noted on US 2/13, started on therapeutic lovenox. Stopped AC after discussion with family upon transition to comfort, repeating US now that patient goals restorative.      Urinary tract infection  -s/p treatment   Elevated Troponin  -Likely type II MI in the setting of acute illness. EKG stable. TTE without RWMA  Hypokalemia  Hypomagnesemia  Iron deficiency anemia  Hypertension  -Resume olmesartan when creatinine stable  Hypothyroidism;   -continue Synthroid 50 mcg  Dementia;   -continue donepezil          Diet: Regular Diet Adult  Snacks/Supplements Adult: Other; pt/family/RN may order snacks/supplements as desired per RD (prior scheduled Ensure/Gelatein Plus have been discontinued); With Meals    DVT Prophylaxis: Pneumatic Compression Devices  Ruiz Catheter: Not present  Lines: None     Cardiac Monitoring: None  Code Status: No CPR- Do NOT Intubate      Clinically Significant Risk Factors              # Hypoalbuminemia: Lowest albumin = 3 g/dL at 2/14/2024  5:37 AM, will monitor as appropriate     # Hypertension: Noted on problem list     # Dementia: noted on problem list    # Overweight: Estimated body mass index is 26.44 kg/m  as calculated from the following:    Height as of this encounter: 1.524 m (5').    Weight as of this encounter: 61.4 kg (135 lb 5.8 oz).   # Severe Malnutrition: based on nutrition assessment      # Financial/Environmental Concerns: none         Disposition Plan             Memo Ramírez MD  Hospitalist Service, GOLD TEAM 21  [facility]  Securely message with [employer] (more info)  Text page via [employer] Paging/Directory   See signed in provider for up to date coverage information  ______________________________________________________________________    Interval History   Back pain better today. No new complaints.     Physical Exam    Vital Signs: Temp: 98.7  F (37.1  C) Temp src: Oral BP: 135/83 Pulse: 116   Resp: 17 SpO2: 96 % O2 Device: Nasal cannula Oxygen Delivery: 2 LPM  Weight: 135 lbs 5.8 oz    General: sitting upright in bed, no acute distress, eating breakfast.   HEENT: sclera clear, glasses in place. NC in place.   CV: RRR, no m/r/g. Extremities are warm and well perfused.   LUNGS: CTAB, no w/r/c.  ABD: Soft, NT/ND, NBS.   SKIN: Warm, well perfused.   MSK: No edema.  NEURO: Awake, alert and appropriate. Oriented to person, place    Medical Decision Making       65 MINUTES SPENT BY ME on the date of service doing chart review, history, exam, documentation & further activities per the note.         Data     I have personally reviewed the following data over the past 24 hrs:    11.2 (H)  \   9.9 (L)   / 388     134 (L) 98 18.2 /  96   5.0 30 (H) 0.88 \       Imaging results reviewed over the past 24 hrs:   Recent Results (from the past 24 hour(s))   US Lower Extremity Venous Duplex Right    Narrative    ULTRASOUND LOWER EXTREMITY VENOUS DUPLEX RIGHT 2/22/2024 5:24 PM    CLINICAL HISTORY: Previous DVT, stopped anticoagulation when patient  went on comfort cares. Now goals of care are restorative, repeat scan  to see necessity of continued anticoagulation.     COMPARISONS: Ultrasound lower extremity venous duplex bilateral  2/12/2024    REFERRING PROVIDER: MARIFER LOUIS    TECHNIQUE: Grayscale, color Doppler, Doppler waveform ultrasound  evaluation was performed through the right common femoral, femoral,  and popliteal veins. Right posterior tibial and peroneal veins were  evaluated with grayscale imaging and compression and color Doppler  ultrasound.    Left common femoral vein was evaluated for symmetry.    FINDINGS: Left common femoral vein is fully compressible with a phasic  waveform that augments normally.    Right common femoral, femoral, and popliteal veins are fully  compressible with phasic waveforms that augment  "normally.    Anechoic collection in the popliteal fossa measures 1.3 x 3.2 x 4.6 cm  and demonstrates no internal vascular flow by color Doppler imaging.    Right posterior tibial veins are fully in the proximal and mid calf.  No evaluated in the distal calf.    Right peroneal veins were not visualized, unchanged.      Impression    IMPRESSION: Right peroneal veins were not visualized. Previous deep  venous thrombosis in one of the posterior tibial veins has cleared.  Both are fully compressible in the proximal and mid calf, but were not  evaluated in the distal calf. Otherwise, no deep venous thrombosis  demonstrated in the RIGHT leg.    Reference: \"Duplex Ultrasound in the Diagnosis of Lower-Extremity Deep  Venous Thrombosis\"- Rosalie Saini MD, S; Cristino Odonnell MD  (Circulation. 2014;129:917-921. http://circ.ahajournals.org)    DYLAN MCKINNON MD         SYSTEM ID:  [id number]   XR Chest Port 1 View    Narrative    EXAM: XR CHEST PORT 1 VIEW  2/22/2024 8:21 PM      HISTORY: Pneumonia, patient with new leukocytosis    COMPARISON: Chest x-ray 2/11/2024    FINDINGS: Single view of the chest. Cardiomediastinal silhouette is  stable. Slightly decreased mixed opacities in the left lung. Large  hiatal hernia. Small left pleural effusion. No pneumothorax. No acute  osseous body.      Impression    IMPRESSION:   1. Slightly decreased mixed opacities in the left lung. No new focal  infiltrate.  2. Small left pleural effusion.  3. Large hiatal hernia.    I have personally reviewed the examination and initial interpretation  and I agree with the findings.    CAROLINA MATTHEWS MD         SYSTEM ID:  [id number]     "

## 2024-02-23 NOTE — PLAN OF CARE
Problem: Breathing Pattern Ineffective  Goal: Effective Breathing Pattern  Outcome: Progressing     Problem: Pneumonia  Goal: Resolution of Infection Signs and Symptoms  Outcome: Progressing  Goal: Effective Oxygenation and Ventilation  Outcome: Progressing     Problem: Comorbidity Management  Goal: Maintenance of COPD Symptom Control  Outcome: Progressing  Goal: Blood Pressure in Desired Range  Outcome: Progressing    VS:  Temp: 98.2  F (36.8  C) Temp src: Oral BP: 111/54 Pulse: 94   Resp: 17 SpO2: 98 % O2 Device: Nasal cannula Oxygen Delivery: 2 LPM        O2: SpO2 > 98 and stable on 2 LPM NC.   Infrequent cough  Diminished L/S  Denies chest pain and SOB.    Output: purewick   Activity:  On bed   Skin:  +1 edema on bilateral lower extremities   CMS:  Intermittent confusion   Diet: Regular diet.   **poor appetite** per report   LDA: R PIV SL   Equipment: IV pole, personal belongings, monitor   Plan: STANDARD precautions maintained / Continue with plan of care. Call light within reach  Plan: O2; TCU   Additional Info:  On restorative cares   BED ALARM ON      0332 changed the diaper; patient did not want purewick this time

## 2024-02-23 NOTE — PROGRESS NOTES
Care Management Discharge Note    Discharge Date: 2/26/24     Discharge Disposition: Transitional Care Unit    [facility]  [address] [address]  [phone]    Discharge Services: None    Discharge DME: Oxygen     Discharge Transportation: [facility] wheelchair transportation between 10:35 am and 11:20 am    Private pay costs discussed: transportation costs    Does the patient's insurance plan have a 3 day qualifying hospital stay waiver?  No    PAS Confirmation Code: [id number]     Patient/family educated on Medicare website which has current facility and service quality ratings: No, family declined    Education Provided on the Discharge Plan: Yes      Persons Notified of Discharge Plans: Charge Nurse, Bedside Nurse, MD, Patient, Patient's Daughter    Patient/Family in Agreement with the Plan: Yes    Handoff Referral Completed: Yes    Additional Information:    IMM to be completed over the weekend. [facility] wheelchair between 10:35 am and 11:20 am. Writer confirmed with MD and facility that this day and time will be approved and updated patient and her daughter Barbara. Writer informed Barbara of the cost associated with transportation and provided an estimated cost of around $200 which daughter is in agreement with. On day of discharge orders will need to be faxed to facility at [phone] and nurse to nurse is requested by calling [phone].    ANGEL Newberry, MSW  6th Floor Medical Surgical Unit  Phone [phone]

## 2024-02-23 NOTE — CONSULTS
"SPIRITUAL HEALTH SERVICES Consult Note  [facility] ([facility]) 6B    Visited with Pat today. She was alert and more talkative than usual. She shared she 'I miss my ' and \"I am hoping to get well enough to move back home.\" Pat stated \"I feel hungry\" so I connected her with her nurse to order lunch. [facility] remains available as needed.     naomy ochoa  Chaplain Resident  Pager [phone]    * [facility] remains available 24/7 for emergent requests/referrals, either by having the switchboard page the on-call  or by entering an ASAP/STAT consult in Epic (this will also page the on-call ). Routine Epic consults receive an initial response within 24 hours.*   "

## 2024-02-23 NOTE — PLAN OF CARE
Problem: Pain Acute  Goal: Optimal Pain Control and Function  Outcome: Progressing  Intervention: Develop Pain Management Plan  Flowsheets (Taken 2/22/2024 1906)  Pain Management Interventions:   medication (see MAR)   environmental changes   emotional support   quiet environment facilitated   rest  Intervention: Prevent or Manage Pain  Flowsheets  Taken 2/22/2024 1906  Sensory Stimulation Regulation:   television on   quiet environment promoted   care clustered  Sleep/Rest Enhancement:   awakenings minimized   family presence promoted  Bowel Elimination Promotion:   adequate fluid intake promoted   privacy promoted  Medication Review/Management: medications reviewed  Taken 2/22/2024 1803  Bowel Elimination Promotion: commode/bedpan at bedside  Medication Review/Management: medications reviewed  Intervention: Optimize Psychosocial Wellbeing  Flowsheets (Taken 2/22/2024 1906)  Supportive Measures:   active listening utilized   self-care encouraged   verbalization of feelings encouraged   positive reinforcement provided  Diversional Activities: television     Problem: Breathing Pattern Ineffective  Goal: Effective Breathing Pattern  Outcome: Progressing  Intervention: Promote Improved Breathing Pattern  Flowsheets (Taken 2/22/2024 1906)  Breathing Techniques/Airway Clearance: deep/controlled cough encouraged  Supportive Measures:   active listening utilized   self-care encouraged   verbalization of feelings encouraged   positive reinforcement provided  Airway/Ventilation Management: calming measures promoted  Head of Bed (HOB) Positioning: HOB at 30 degrees     Problem: Oral Intake Inadequate  Goal: Improved Oral Intake  Outcome: Progressing  Intervention: Promote and Optimize Oral Intake  Flowsheets (Taken 2/22/2024 1906)  Nutrition Interventions: (pt/family refused ensure drinks; provider aware)   food preferences provided   supplemental drinks provided   other (see comments)  Oral Nutrition Promotion: rest  periods promoted     Problem: Pneumonia  Goal: Resolution of Infection Signs and Symptoms  Intervention: Prevent Infection Progression  Flowsheets (Taken 2/22/2024 1906)  Fever Reduction/Comfort Measures:   lightweight bedding   lightweight clothing  Infection Management: aseptic technique maintained     Problem: Pneumonia  Goal: Effective Oxygenation and Ventilation  Intervention: Promote Airway Secretion Clearance  Flowsheets  Taken 2/22/2024 1906  Breathing Techniques/Airway Clearance: deep/controlled cough encouraged  Cough And Deep Breathing: done with encouragement  Taken 2/22/2024 1803  Cough And Deep Breathing: done with encouragement   Goal Outcome Evaluation:       Pt A&Ox2-3; disoriented to situation/time. Pt on 2 L O2 NC. L PIV SL. Assist of 1 with repositioning and dayo cares; assist of 2 with transfers and GB. Incont/cont episodes; purewick removed. Scattered scabs noted throughout body. Pain managed with PRNs. No acute events. Pt expresses no needs at this time call light in reach.

## 2024-02-23 NOTE — PROGRESS NOTES
[id number]  BP 92/54 (BP Location: Left arm)   Pulse 98   Temp 97.6  F (36.4  C) (Oral)   Resp 19   Ht 1.524 m (5')   Wt 61.4 kg (135 lb 5.8 oz)   SpO2 97%   BMI 26.44 kg/m          Pt is AO with intermittent confusion heavy assist of 2 to the commode. C/o about pain , meds given, Denies nausea, vomiting. LPIV SL and patent. Purewick inj place. LBM 2/23  Continue with POC

## 2024-02-23 NOTE — PROGRESS NOTES
PALLIATIVE CARE PROGRESS NOTE  [facility]     Patient Name: Idalia Bob  Date of Admission: 2/1/2024        Recommendations & Counseling       GOALS OF CARE:   Restorative with limits.  Avoid burdensome interventions such as  EGD or bronchoscopy.   Remains DNR/DNI  Goals is to go to a TCU until she can get strong enough to return to her assisted living facility. Pt is very much looking forward to returning there as she states she has a lot of people there that she is looking forward to seeing.    ADVANCE CARE PLANNING:  No health care directive on file. Per  informed consent policy, next of kin should be involved if patient becomes unable.  There is a POLST form on file, but will need to be updated prior to discharge.  Code status: No CPR- Do NOT Intubate    MEDICAL MANAGEMENT:      #Pain  - Changed Tylenol 975 mg TID  - Continue Morphine PO/SL 5 mg q 4 hour as needed - pt not requiring it here but will prescribe on discharge as it may allow her to be more active and participate more in rehab.                                      #Anxiety    - Continue home regimen with BuSpar 10 mg TID and Prozac 40 mg daily   - Continue Zyprexa ODT 5 mg BID - started 2/16  - For anxiety not controlled with above medications, can use medications listed below:  - Hydroxyzine 10 mg TID daily as needed for anxiety/itching      -Continue PTA Aricept 10 mg every night    #Nausea   - Continue ondansetron 4mg ODT q6hrs prn   - Continue prochlorperazine po/IV 5mg q6hrs prn - do not continue on discharge  - Zyprexa 5 mg BID      Discontinued medications below:   - atropine       PSYCHOSOCIAL/SPIRITUAL:  Family: Daughters; Barbara Bob and Corie Bob  Yu community: [employer]   Spiritual:     Palliative Care will sign off now as goals are clear, restorative and symptoms well managed. Please feel free to re-consult us for any future questions or concerns that may arise.     Thank you for  the consult and allowing us to aid in the care of Idalia Bob.    Cori Antony MD  Securely message with [employer] (more info)  Text page via [employer] Paging/Directory          Interval History:     Multidisciplinary collaboration:  Discussed case with Dr. Ramírez -agreed to care conference on Thursday and will follow closely until then and will reassess with PT and OT  No acute events overnight  Patient alert and oriented      Palliative Summary/HPI          Idalia Bob is a 79 year old female with a past medical history of COPD, pneumonia, hypertension, dementia and hypothyroidism presented on 02/01/2024 with abdominal pain and found to have consolidation on CT as well as frequent mucousy bowel movements and anorexia. Hx of pseudomonal upper lobe pneumonia in December and has ongoing consolidation since. Repeat CT concerning for upper love infiltrates and edema. She remains on 2-3NC with symptomatic dyspnea and ongoing intermittent nausea.      CC on 2/16 with decision to transition to comfort-focused cares. Palliative, SW and CC following. POLST completed. Advanced care directive provided by family and identifies daughters as decision makers. She remains DNR/DNI with no escalation of cares.     Pt improved and second care conference was held on 2/22 and goals changed back to restorative with limits.     Today, Patient was seen for:   Acute on chronic respiratory failure  Dyspnea on exertion  Dementia  Goals of care  Support            Review of Systems:     Besides above, ROS was reviewed and is unremarkable           Medications:     I have reviewed this patient's medication profile and medications during this hospitalization.    Noted meds:    BuSpar 10 mg TID  Prozac 40 mg daily  Zyprexa 5 mg BID  Aricept 10 mg daily  Tylenol 975mg q8hrs                Physical Exam:   Temp:  [98.2  F (36.8  C)-99  F (37.2  C)] 98.7  F (37.1  C)  Pulse:  [] 116  Resp:  [17] 17  BP: (111-152)/(54-83)  135/83  SpO2:  [93 %-98 %] 96 %  135 lbs 5.8 oz    Gen: Alert, sitting upright in bed, appears stated age, comfortable appearing, in no respiratory distress, engaged in conversation, pleasant, sensorium intact               Current Problem List:   Principal Problem:    Hypokalemia  Active Problems:    Community acquired pneumonia of left upper lobe of lung      Allergies   Allergen Reactions    Aspirin Nausea and Nausea and Vomiting     Stomach ache    Penicillins Itching     Has tolerated ceftriaxone, piperacillin, and amoxicillin            Data Reviewed:     Reviewed recent labs and pertinent imaging  ROUTINE ICU LABS (Last four results)  CMP  Recent Labs   Lab 02/23/24  1211 02/23/24  0657 02/22/24  0800 02/21/24  0812   NA  --  134* 138 134*   POTASSIUM  --  5.0 4.8 4.4   CHLORIDE  --  98 100 98   CO2  --  30* 29 32*   ANIONGAP  --  6* 9 4*   GLC  --  96 87 79   BUN  --  18.2 20.4 22.9   CR 0.92 0.88 0.99* 0.93   GFRESTIMATED 63 66 57* 62   AYUSH  --  9.0 8.7* 8.4*     CBC  Recent Labs   Lab 02/23/24  0657 02/22/24  0800 02/21/24  0812   WBC 11.2* 12.1* 13.1*   RBC 4.16 4.07 4.16   HGB 9.9* 9.6* 9.9*   HCT 34.7* 33.8* 34.6*   MCV 83 83 83   MCH 23.8* 23.6* 23.8*   MCHC 28.5* 28.4* 28.6*   RDW 18.3* 18.4* 18.6*    389 415     INRNo lab results found in last 7 days.  Arterial Blood GasNo lab results found in last 7 days.        Total time spent was 50 minutes regarding plan of care, symptom management on the date of the encounter.       Medical Decision Making

## 2024-02-23 NOTE — PROGRESS NOTES
[facility] Pulmonology Follow up    Idalia Bob  MRN: [id number]  : 1945    Date of Admission: 2024  Date of Service: 2024    Assessment:  Pseudomonas PNA- NIRAV consolidation, slow to resolve  BL pleural effusions, pulm edema suggestive of hypervolemia- improving  Nausea, vomiting- resolved  COPD    Ms. Bob presented with general malaise, abdominal pain, shortness of breath and and workup imaging revealed a persistent left upper lobe infiltrate.  On comparison to prior images, we suspect this is just a slow to resolve infectious process, reassuring imaging continues to improve including most recent x-ray.  Differential includes other processes such as inflammatory pneumonias, less likely given clinical improvement without treatment, or malignancy.  Ms. Bob could have a follow-up CT in several weeks for resolution of this infiltrate, and further discussions about goals of care or desire for chest intervention may be revisited if it remains.  Of note, significant emphysema and prominent pulmonary vasculature makes endobronchial biopsy unsafe.     Recommendations:  -- Good pulmonary hygiene including up to chair, out of bed is much as tolerated, use of incentive spirometer  -- Continued nebulizer therapy with Aerobika valve for airway clearance  -- Diuresis per primary team  -- As an outpatient, consider repeat CT chest in 4 to 6 weeks for resolution of infiltrate  -- on discharge, continue home trelegy inhaler, and prn albuterol nebs    Subjective: Feeling okay.  Does get intermittently short of breath.  She has been starting to move around a bit more, sitting at the edge of the bed.  She has not been coughing much.  No other new respiratory complaints.    Review of systems: focused ROS performed and noted as above.    Objective:  BP 92/54 (BP Location: Left arm)   Pulse 98   Temp 97.6  F (36.4  C) (Oral)   Resp 19   Ht 1.524 m (5')   Wt 61.4 kg (135 lb 5.8 oz)   SpO2  97%   BMI 26.44 kg/m      Gen: in no acute distress, sitting upright in bed  HEENT: MMM, NC in place  CV: RRR, no significant peripheral edema  Pulm: no increased work of breathing, intermittent SOB, CTAB  GI: soft, not distended  MSK: no gross deformities, no joint swelling  Integ: no rashes or lesions appreciated  Neuro: speech clear, alert and oriented  Psych: calm, appropriate    Data:  I have personally reviewed new CXR, labs.    Ene Morgan DO  Pulmonary fellow  Patient discussed and seen with Dr. Eaton.  Pulmonary team will sign off at this time, please contact our team with any other questions or concerns.

## 2024-02-24 ENCOUNTER — APPOINTMENT (OUTPATIENT)
Dept: PHYSICAL THERAPY | Facility: CLINIC | Age: 79
DRG: 193 | End: 2024-02-24
Payer: MEDICARE

## 2024-02-24 PROCEDURE — 250N000013 HC RX MED GY IP 250 OP 250 PS 637: Performed by: PHYSICIAN ASSISTANT

## 2024-02-24 PROCEDURE — 94640 AIRWAY INHALATION TREATMENT: CPT | Mod: 76

## 2024-02-24 PROCEDURE — 99232 SBSQ HOSP IP/OBS MODERATE 35: CPT | Performed by: STUDENT IN AN ORGANIZED HEALTH CARE EDUCATION/TRAINING PROGRAM

## 2024-02-24 PROCEDURE — 250N000013 HC RX MED GY IP 250 OP 250 PS 637: Performed by: STUDENT IN AN ORGANIZED HEALTH CARE EDUCATION/TRAINING PROGRAM

## 2024-02-24 PROCEDURE — 250N000013 HC RX MED GY IP 250 OP 250 PS 637: Performed by: INTERNAL MEDICINE

## 2024-02-24 PROCEDURE — 97530 THERAPEUTIC ACTIVITIES: CPT | Mod: GP

## 2024-02-24 PROCEDURE — 250N000013 HC RX MED GY IP 250 OP 250 PS 637: Performed by: HOSPITALIST

## 2024-02-24 PROCEDURE — 250N000013 HC RX MED GY IP 250 OP 250 PS 637

## 2024-02-24 PROCEDURE — 120N000002 HC R&B MED SURG/OB UMMC

## 2024-02-24 PROCEDURE — 94640 AIRWAY INHALATION TREATMENT: CPT

## 2024-02-24 PROCEDURE — 250N000009 HC RX 250: Performed by: STUDENT IN AN ORGANIZED HEALTH CARE EDUCATION/TRAINING PROGRAM

## 2024-02-24 PROCEDURE — 999N000157 HC STATISTIC RCP TIME EA 10 MIN

## 2024-02-24 PROCEDURE — 97110 THERAPEUTIC EXERCISES: CPT | Mod: GP

## 2024-02-24 PROCEDURE — 250N000011 HC RX IP 250 OP 636: Performed by: STUDENT IN AN ORGANIZED HEALTH CARE EDUCATION/TRAINING PROGRAM

## 2024-02-24 RX ADMIN — IPRATROPIUM BROMIDE AND ALBUTEROL SULFATE 3 ML: .5; 3 SOLUTION RESPIRATORY (INHALATION) at 16:30

## 2024-02-24 RX ADMIN — HYDROXYZINE HYDROCHLORIDE 10 MG: 10 TABLET ORAL at 17:33

## 2024-02-24 RX ADMIN — IPRATROPIUM BROMIDE AND ALBUTEROL SULFATE 3 ML: .5; 3 SOLUTION RESPIRATORY (INHALATION) at 08:01

## 2024-02-24 RX ADMIN — BUDESONIDE 1 MG: 1 INHALANT ORAL at 19:36

## 2024-02-24 RX ADMIN — DONEPEZIL HYDROCHLORIDE 10 MG: 10 TABLET, FILM COATED ORAL at 21:50

## 2024-02-24 RX ADMIN — BUSPIRONE HYDROCHLORIDE 10 MG: 10 TABLET ORAL at 20:51

## 2024-02-24 RX ADMIN — BUDESONIDE 1 MG: 1 INHALANT ORAL at 08:01

## 2024-02-24 RX ADMIN — ACETAMINOPHEN 975 MG: 325 TABLET, FILM COATED ORAL at 08:45

## 2024-02-24 RX ADMIN — IPRATROPIUM BROMIDE AND ALBUTEROL SULFATE 3 ML: .5; 3 SOLUTION RESPIRATORY (INHALATION) at 12:47

## 2024-02-24 RX ADMIN — FLUOXETINE HYDROCHLORIDE 40 MG: 20 CAPSULE ORAL at 08:46

## 2024-02-24 RX ADMIN — ENOXAPARIN SODIUM 40 MG: 40 INJECTION SUBCUTANEOUS at 11:49

## 2024-02-24 RX ADMIN — LATANOPROST 1 DROP: 50 SOLUTION OPHTHALMIC at 21:52

## 2024-02-24 RX ADMIN — OLANZAPINE 5 MG: 5 TABLET, ORALLY DISINTEGRATING ORAL at 08:46

## 2024-02-24 RX ADMIN — ACETAMINOPHEN 975 MG: 325 TABLET, FILM COATED ORAL at 14:11

## 2024-02-24 RX ADMIN — Medication 1 LOZENGE: at 17:33

## 2024-02-24 RX ADMIN — BUSPIRONE HYDROCHLORIDE 10 MG: 10 TABLET ORAL at 14:11

## 2024-02-24 RX ADMIN — BUSPIRONE HYDROCHLORIDE 10 MG: 10 TABLET ORAL at 08:45

## 2024-02-24 RX ADMIN — IPRATROPIUM BROMIDE AND ALBUTEROL SULFATE 3 ML: .5; 3 SOLUTION RESPIRATORY (INHALATION) at 19:36

## 2024-02-24 RX ADMIN — OLANZAPINE 5 MG: 5 TABLET, ORALLY DISINTEGRATING ORAL at 20:51

## 2024-02-24 RX ADMIN — LEVOTHYROXINE SODIUM 50 MCG: 50 TABLET ORAL at 08:45

## 2024-02-24 RX ADMIN — PANTOPRAZOLE SODIUM 40 MG: 40 TABLET, DELAYED RELEASE ORAL at 08:45

## 2024-02-24 RX ADMIN — Medication 1 MG: at 21:50

## 2024-02-24 RX ADMIN — ACETAMINOPHEN 975 MG: 325 TABLET, FILM COATED ORAL at 21:50

## 2024-02-24 ASSESSMENT — ACTIVITIES OF DAILY LIVING (ADL)
ADLS_ACUITY_SCORE: 46
ADLS_ACUITY_SCORE: 49
ADLS_ACUITY_SCORE: 46
ADLS_ACUITY_SCORE: 49
ADLS_ACUITY_SCORE: 46
ADLS_ACUITY_SCORE: 49
ADLS_ACUITY_SCORE: 46
ADLS_ACUITY_SCORE: 49
ADLS_ACUITY_SCORE: 46
ADLS_ACUITY_SCORE: 46
ADLS_ACUITY_SCORE: 49
ADLS_ACUITY_SCORE: 46

## 2024-02-24 NOTE — PROGRESS NOTES
[facility]    Medicine Progress Note - Hospitalist Service, GOLD TEAM 21    Date of Admission:  2/1/2024    Assessment & Plan   Idalia Bob is a 79 year old female admitted on 2/1/2024. Pt has history of COPD, pneumonia, hypertension, dementia and hypothyroidism among others, presented with abdominal pain and found to have consolidation on CT as well as frequent mucousy bowel movements and anorexia. Known persistent LLL infiltrate of uncertain etiology. She remains on 2-3NC with symptomatic dyspnea and ongoing intermittent nausea.     Care conference 2/22. Will pursue restorative cares. Patient participating with therapy, is appropriate for TCU      Changes today:  - Need to fill out POLST with family     Left upper lobe pneumonia vs persistent consolidation vs mass  COPD  Acute on chronic hypoxemic respiratory failure  Patient with pseudomonal upper lobe pneumonia in December and has had ongoing consolidation since.  While this finding was present on imaging she has a paucity of other pathology consistent with pneumonia at the present time. She is now having increasing dyspnea x 24 hours, on 3LNC. Repeat CT with worsening upper lobe infiltrates and edema. Per pulm, felt his may represent persistent pneumonia in the setting of poor airway clearance and mobilization, less likely inflammatory but cannot be ruled out and ongoing consideration for underlying malignancy. Risk outweighs benefit of biopsy given her underlying lung disease and further workup including imaging, BAL or biopsy are not currently within her GOC. Extensive infectious workup otherwise unrevealing. TTE without evidence of CHF.     Care conference 2/22. Will pursue restorative cares. Patient participating with therapy, is appropriate for TCU   -s/p azithromycin and ceftriaxone, s/p cefepime-> Zosyn (switch due to concern for neurotoxicity) x 7 days for PsA course  - S/p Prednisone 40mg PO daily x 5  days (end 2/19)  - Holding diuresis given little symptomatic improvement with this; low threshold to resume if felt this would provide comfort  - Continue home COPD regimen  - Continue home oxygen for now; this may need to be readdressed pending plan for hospice    CKD 3b  - use cystatin c for GFR  - Renally dose medications    Back pain  - Decreased frequency of morphine PRN, will discuss with family about transition to alternate opiate    Abdominal Pain, improved   Intractable nausea   Loose stools with mucus, improved   Tenesmus  Unintentional weight loss  Anorexia - improving  The patient has had weeks of worsening anorexia along with crampy abdominal pain but no evidence of gastrointestinal bleeding.  She has frequent (many many times per day) mucousy bowel movements with very little stool production.  C. difficile and enteric panel negative. S/p flex sig and endoscopy 2/7. Noted hiatal hernia, mild inflammatory changes in distal colon/rectum s/p biopsy. No identified etiology of her symptoms.     Intermittent nausea and inability to tolerate PO, eating breakfast 2/19. Will continue aggressive symptom management in the setting of comfort-focused cares. Given her oral intake, she is not in active dying process and do feel LTC vs home to assisted living or with family likely. SW/CC and financial counselor per above.   -Appreciate GI consultation; since signed off  -Ok to continue Bentyl prn, stopped psyllium, imodium as no further diarrhea   -Zyprexa added per palliative  -Steroids per above  -Palliative following, appreciate recommendations    Anxiety and depression  Takes BuSpar, Prozac  -Continue home regimen - remains within GOC   -Zyprexa 2.5mg BID per palliative on 2/13    RLE DVT  Noted on US 2/13, started on therapeutic lovenox. Stopped AC after discussion with family upon transition to comfort, repeating US now that patient goals restorative.      Urinary tract infection  -s/p treatment   Elevated  Troponin  -Likely type II MI in the setting of acute illness. EKG stable. TTE without RWMA  Hypokalemia  Hypomagnesemia  Iron deficiency anemia  Hypertension  -Resume olmesartan when creatinine stable  Hypothyroidism;   -continue Synthroid 50 mcg  Dementia;   -continue donepezil          Diet: Regular Diet Adult    DVT Prophylaxis: Pneumatic Compression Devices  Ruiz Catheter: Not present  Lines: None     Cardiac Monitoring: None  Code Status: No CPR- Do NOT Intubate      Clinically Significant Risk Factors              # Hypoalbuminemia: Lowest albumin = 3 g/dL at 2/14/2024  5:37 AM, will monitor as appropriate     # Hypertension: Noted on problem list     # Dementia: noted on problem list    # Overweight: Estimated body mass index is 26.44 kg/m  as calculated from the following:    Height as of this encounter: 1.524 m (5').    Weight as of this encounter: 61.4 kg (135 lb 5.8 oz).   # Severe Malnutrition: based on nutrition assessment      # Financial/Environmental Concerns: none         Disposition Plan             Memo Ramírez MD  Hospitalist Service, GOLD TEAM 21  [facility]  Securely message with [employer] (more info)  Text page via [facility] Paging/Directory   See signed in provider for up to date coverage information  ______________________________________________________________________    Interval History   Back pain better today. No new complaints.     Physical Exam   Vital Signs: Temp: 98  F (36.7  C) Temp src: Oral BP: 132/68 Pulse: 115   Resp: 20 SpO2: 97 % O2 Device: Nasal cannula Oxygen Delivery: 2 LPM  Weight: 135 lbs 5.8 oz    General: sitting upright in bed, no acute distress, eating breakfast.   HEENT: sclera clear, glasses in place. NC in place.   CV: RRR, no m/r/g. Extremities are warm and well perfused.   LUNGS: CTAB, no w/r/c.  ABD: Soft, NT/ND, NBS.   SKIN: Warm, well perfused.   MSK: No edema.  NEURO: Awake, alert and appropriate. Oriented to  person, place    Medical Decision Making       65 MINUTES SPENT BY ME on the date of service doing chart review, history, exam, documentation & further activities per the note.         Data         Imaging results reviewed over the past 24 hrs:   No results found for this or any previous visit (from the past 24 hour(s)).

## 2024-02-25 PROCEDURE — 250N000013 HC RX MED GY IP 250 OP 250 PS 637

## 2024-02-25 PROCEDURE — 250N000013 HC RX MED GY IP 250 OP 250 PS 637: Performed by: STUDENT IN AN ORGANIZED HEALTH CARE EDUCATION/TRAINING PROGRAM

## 2024-02-25 PROCEDURE — 250N000011 HC RX IP 250 OP 636: Performed by: STUDENT IN AN ORGANIZED HEALTH CARE EDUCATION/TRAINING PROGRAM

## 2024-02-25 PROCEDURE — 120N000002 HC R&B MED SURG/OB UMMC

## 2024-02-25 PROCEDURE — 999N000157 HC STATISTIC RCP TIME EA 10 MIN

## 2024-02-25 PROCEDURE — 250N000013 HC RX MED GY IP 250 OP 250 PS 637: Performed by: INTERNAL MEDICINE

## 2024-02-25 PROCEDURE — 250N000009 HC RX 250: Performed by: STUDENT IN AN ORGANIZED HEALTH CARE EDUCATION/TRAINING PROGRAM

## 2024-02-25 PROCEDURE — 94640 AIRWAY INHALATION TREATMENT: CPT | Mod: 76

## 2024-02-25 PROCEDURE — 99232 SBSQ HOSP IP/OBS MODERATE 35: CPT | Performed by: STUDENT IN AN ORGANIZED HEALTH CARE EDUCATION/TRAINING PROGRAM

## 2024-02-25 PROCEDURE — 94640 AIRWAY INHALATION TREATMENT: CPT

## 2024-02-25 PROCEDURE — 250N000013 HC RX MED GY IP 250 OP 250 PS 637: Performed by: HOSPITALIST

## 2024-02-25 RX ORDER — BUDESONIDE 1 MG/2ML
1 INHALANT ORAL 2 TIMES DAILY
DISCHARGE
Start: 2024-02-25 | End: 2024-05-15

## 2024-02-25 RX ORDER — BUSPIRONE HYDROCHLORIDE 10 MG/1
10 TABLET ORAL 3 TIMES DAILY
DISCHARGE
Start: 2024-02-25 | End: 2024-03-26

## 2024-02-25 RX ORDER — DONEPEZIL HYDROCHLORIDE 10 MG/1
10 TABLET, FILM COATED ORAL AT BEDTIME
DISCHARGE
Start: 2024-02-25 | End: 2024-06-06

## 2024-02-25 RX ORDER — IPRATROPIUM BROMIDE AND ALBUTEROL SULFATE 2.5; .5 MG/3ML; MG/3ML
3 SOLUTION RESPIRATORY (INHALATION) 4 TIMES DAILY
DISCHARGE
Start: 2024-02-25 | End: 2024-05-15

## 2024-02-25 RX ORDER — FUROSEMIDE 20 MG
TABLET ORAL
DISCHARGE
Start: 2024-02-25 | End: 2024-05-03

## 2024-02-25 RX ORDER — ACETAMINOPHEN 325 MG/1
975 TABLET ORAL 3 TIMES DAILY
DISCHARGE
Start: 2024-02-25 | End: 2024-05-15

## 2024-02-25 RX ORDER — CHOLECALCIFEROL (VITAMIN D3) 50 MCG
1 TABLET ORAL DAILY
DISCHARGE
Start: 2024-02-25

## 2024-02-25 RX ORDER — DICYCLOMINE HYDROCHLORIDE 10 MG/1
10 CAPSULE ORAL EVERY 6 HOURS PRN
DISCHARGE
Start: 2024-02-25 | End: 2024-03-26

## 2024-02-25 RX ORDER — HYDROXYZINE HYDROCHLORIDE 10 MG/1
10 TABLET, FILM COATED ORAL 3 TIMES DAILY PRN
DISCHARGE
Start: 2024-02-25 | End: 2024-05-14 | Stop reason: ALTCHOICE

## 2024-02-25 RX ORDER — AMOXICILLIN 250 MG
1 CAPSULE ORAL 2 TIMES DAILY PRN
DISCHARGE
Start: 2024-02-25

## 2024-02-25 RX ORDER — PANTOPRAZOLE SODIUM 40 MG/1
40 TABLET, DELAYED RELEASE ORAL DAILY
DISCHARGE
Start: 2024-02-25 | End: 2024-03-26

## 2024-02-25 RX ORDER — OLANZAPINE 5 MG/1
5 TABLET, ORALLY DISINTEGRATING ORAL 2 TIMES DAILY
DISCHARGE
Start: 2024-02-25 | End: 2024-03-26

## 2024-02-25 RX ORDER — OXYCODONE HYDROCHLORIDE 5 MG/1
2.5 TABLET ORAL EVERY 6 HOURS PRN
Qty: 6 TABLET | Refills: 0 | Status: SHIPPED | OUTPATIENT
Start: 2024-02-25 | End: 2024-02-26

## 2024-02-25 RX ORDER — LATANOPROST 50 UG/ML
1 SOLUTION/ DROPS OPHTHALMIC AT BEDTIME
DISCHARGE
Start: 2024-02-25

## 2024-02-25 RX ORDER — ALBUTEROL SULFATE 0.83 MG/ML
2.5 SOLUTION RESPIRATORY (INHALATION) 2 TIMES DAILY PRN
DISCHARGE
Start: 2024-02-25 | End: 2024-04-22

## 2024-02-25 RX ORDER — FLUOXETINE 40 MG/1
40 CAPSULE ORAL DAILY
DISCHARGE
Start: 2024-02-25

## 2024-02-25 RX ORDER — DIPHENHYDRAMINE HYDROCHLORIDE, ZINC ACETATE 2; .1 G/100G; G/100G
CREAM TOPICAL 3 TIMES DAILY PRN
DISCHARGE
Start: 2024-02-25 | End: 2024-09-11

## 2024-02-25 RX ORDER — ONDANSETRON 4 MG/1
4 TABLET, ORALLY DISINTEGRATING ORAL EVERY 6 HOURS PRN
DISCHARGE
Start: 2024-02-25 | End: 2024-03-26

## 2024-02-25 RX ORDER — LEVOTHYROXINE SODIUM 50 UG/1
50 TABLET ORAL DAILY
DISCHARGE
Start: 2024-02-25 | End: 2024-09-26

## 2024-02-25 RX ORDER — TRIAMCINOLONE ACETONIDE 1 MG/G
CREAM TOPICAL 2 TIMES DAILY PRN
DISCHARGE
Start: 2024-02-25

## 2024-02-25 RX ORDER — CALCIUM CARBONATE 500 MG/1
1 TABLET, CHEWABLE ORAL 4 TIMES DAILY PRN
DISCHARGE
Start: 2024-02-25

## 2024-02-25 RX ADMIN — BUSPIRONE HYDROCHLORIDE 10 MG: 10 TABLET ORAL at 20:17

## 2024-02-25 RX ADMIN — ENOXAPARIN SODIUM 40 MG: 40 INJECTION SUBCUTANEOUS at 11:35

## 2024-02-25 RX ADMIN — BUDESONIDE 1 MG: 1 INHALANT ORAL at 19:32

## 2024-02-25 RX ADMIN — BUSPIRONE HYDROCHLORIDE 10 MG: 10 TABLET ORAL at 13:54

## 2024-02-25 RX ADMIN — PANTOPRAZOLE SODIUM 40 MG: 40 TABLET, DELAYED RELEASE ORAL at 06:30

## 2024-02-25 RX ADMIN — BUSPIRONE HYDROCHLORIDE 10 MG: 10 TABLET ORAL at 08:53

## 2024-02-25 RX ADMIN — LEVOTHYROXINE SODIUM 50 MCG: 50 TABLET ORAL at 06:30

## 2024-02-25 RX ADMIN — ACETAMINOPHEN 975 MG: 325 TABLET, FILM COATED ORAL at 13:55

## 2024-02-25 RX ADMIN — IPRATROPIUM BROMIDE AND ALBUTEROL SULFATE 3 ML: .5; 3 SOLUTION RESPIRATORY (INHALATION) at 07:58

## 2024-02-25 RX ADMIN — IPRATROPIUM BROMIDE AND ALBUTEROL SULFATE 3 ML: .5; 3 SOLUTION RESPIRATORY (INHALATION) at 13:09

## 2024-02-25 RX ADMIN — OLANZAPINE 5 MG: 5 TABLET, ORALLY DISINTEGRATING ORAL at 20:17

## 2024-02-25 RX ADMIN — IPRATROPIUM BROMIDE AND ALBUTEROL SULFATE 3 ML: .5; 3 SOLUTION RESPIRATORY (INHALATION) at 16:41

## 2024-02-25 RX ADMIN — OLANZAPINE 5 MG: 5 TABLET, ORALLY DISINTEGRATING ORAL at 08:53

## 2024-02-25 RX ADMIN — FLUOXETINE HYDROCHLORIDE 40 MG: 20 CAPSULE ORAL at 08:53

## 2024-02-25 RX ADMIN — ACETAMINOPHEN 975 MG: 325 TABLET, FILM COATED ORAL at 21:24

## 2024-02-25 RX ADMIN — LATANOPROST 1 DROP: 50 SOLUTION OPHTHALMIC at 21:24

## 2024-02-25 RX ADMIN — ACETAMINOPHEN 975 MG: 325 TABLET, FILM COATED ORAL at 06:26

## 2024-02-25 RX ADMIN — IPRATROPIUM BROMIDE AND ALBUTEROL SULFATE 3 ML: .5; 3 SOLUTION RESPIRATORY (INHALATION) at 19:32

## 2024-02-25 RX ADMIN — DONEPEZIL HYDROCHLORIDE 10 MG: 10 TABLET, FILM COATED ORAL at 21:24

## 2024-02-25 RX ADMIN — BUDESONIDE 1 MG: 1 INHALANT ORAL at 07:58

## 2024-02-25 ASSESSMENT — ACTIVITIES OF DAILY LIVING (ADL)
ADLS_ACUITY_SCORE: 44
ADLS_ACUITY_SCORE: 44
ADLS_ACUITY_SCORE: 46
ADLS_ACUITY_SCORE: 44
ADLS_ACUITY_SCORE: 46
ADLS_ACUITY_SCORE: 44
ADLS_ACUITY_SCORE: 44
ADLS_ACUITY_SCORE: 46
ADLS_ACUITY_SCORE: 44
ADLS_ACUITY_SCORE: 44
ADLS_ACUITY_SCORE: 46
ADLS_ACUITY_SCORE: 44
ADLS_ACUITY_SCORE: 44
ADLS_ACUITY_SCORE: 46
ADLS_ACUITY_SCORE: 46
ADLS_ACUITY_SCORE: 44
ADLS_ACUITY_SCORE: 46

## 2024-02-25 NOTE — PROGRESS NOTES
BP 94/59 (BP Location: Left arm)   Pulse 103   Temp 97  F (36.1  C) (Oral)   Resp 16   Ht 1.524 m (5')   Wt 61.4 kg (135 lb 5.8 oz)   SpO2 95%   BMI 26.44 kg/m      Pt AO x3 with intermittent forgetfulness, on 2 LPM Oxygen via NC. Stable vitals. Denies pain, dyspnea with exertion. Assist x2 with gait belt and walker.   POC reviewed, questions encouraged and addressed. No new events this shift. POCC is ongoing.

## 2024-02-25 NOTE — PROGRESS NOTES
Care Management Follow Up    Length of Stay (days): 24    Expected Discharge Date: 2/26/24       Concerns to be Addressed: adjustment to diagnosis/illness     Patient plan of care discussed at interdisciplinary rounds: Yes    Anticipated Discharge Disposition: TCU     Anticipated Discharge Services: None  Anticipated Discharge DME:  Oxygen    Patient/family educated on Medicare website which has current facility and service quality ratings:  n/a  Education Provided on the Discharge Plan: yes   Patient/Family in Agreement with the Plan: yes    Referrals Placed by CM/SW:  n/a  Private pay costs discussed: Not applicable    Additional Information:      Met with patient at bedside. Reviewed IMM with patient, patient signed, copy in chart, and faxed to HIM. No further questions or concerns at this time. Plan for patient to discharge to TCU tomorrow, and [facility] wheelchair transportation set up between 10:35 am and 11:20 am.      CHONG Bautista       2/25/2024  Nurse Coordinator      Social Work and Care Management Department       SEARCHABLE in [facility] - search CARE COORDINATOR       [employer] ([phone]) Saturday & Sunday; ([phone]) [facility] Recognized Holidays     Units: 5A Onc Vocera & 5C Vocera Pager: [phone]    Units: 6B Vocera & 6C Vocera  Pager: [phone]    Units: 7A SOT RNCC Vocera, 7B Med Surg Vocera, & 7C Med Surg Vocera  Pager: [phone]    Units: 6A Vocera & 4A CVICU Vocera, 4C MICU Vocera, and 4E SICU Vocera   Pager: [phone]    Units: 5 Ortho Vocera & 5 Med Surg Vocera  Pager: [phone]    Units: 6 Med Surg Vocera & 8 Med Surg Vocera  Pager [phone]

## 2024-02-25 NOTE — PROGRESS NOTES
[facility]    Medicine Progress Note - Hospitalist Service, GOLD TEAM 21    Date of Admission:  2/1/2024    Assessment & Plan   Idalia Bob is a 79 year old female admitted on 2/1/2024. Pt has history of COPD, pneumonia, hypertension, dementia and hypothyroidism among others, presented with abdominal pain and found to have consolidation on CT as well as frequent mucousy bowel movements and anorexia. Known persistent LLL infiltrate of uncertain etiology. She remains on 2-3NC with symptomatic dyspnea and ongoing intermittent nausea.     Care conference 2/22. Will pursue restorative cares. Patient participating with therapy, is appropriate for TCU      Changes today:  - Renewed POLST with patient's daughter Barbara yesterday  - Changed morphine to oxy, plan for discharge tomorrow     Left upper lobe pneumonia vs persistent consolidation vs mass  COPD  Acute on chronic hypoxemic respiratory failure  Patient with pseudomonal upper lobe pneumonia in December and has had ongoing consolidation since.  While this finding was present on imaging she has a paucity of other pathology consistent with pneumonia at the present time. She is now having increasing dyspnea x 24 hours, on 3LNC. Repeat CT with worsening upper lobe infiltrates and edema. Per pulm, felt his may represent persistent pneumonia in the setting of poor airway clearance and mobilization, less likely inflammatory but cannot be ruled out and ongoing consideration for underlying malignancy. Risk outweighs benefit of biopsy given her underlying lung disease and further workup including imaging, BAL or biopsy are not currently within her GOC. Extensive infectious workup otherwise unrevealing. TTE without evidence of CHF.     Care conference 2/22. Will pursue restorative cares. Patient participating with therapy, is appropriate for TCU   -s/p azithromycin and ceftriaxone, s/p cefepime-> Zosyn (switch due to concern for  neurotoxicity) x 7 days for PsA course  - S/p Prednisone 40mg PO daily x 5 days (end 2/19)  - Holding diuresis given little symptomatic improvement with this; low threshold to resume if felt this would provide comfort  - Continue home COPD regimen  - Continue home oxygen for now; this may need to be readdressed pending plan for hospice    CKD 3b  - use cystatin c for GFR  - Renally dose medications    Back pain  - Decreased frequency of morphine PRN, will discuss with family about transition to alternate opiate    Abdominal Pain, improved   Intractable nausea   Loose stools with mucus, improved   Tenesmus  Unintentional weight loss  Anorexia - improving  The patient has had weeks of worsening anorexia along with crampy abdominal pain but no evidence of gastrointestinal bleeding.  She has frequent (many many times per day) mucousy bowel movements with very little stool production.  C. difficile and enteric panel negative. S/p flex sig and endoscopy 2/7. Noted hiatal hernia, mild inflammatory changes in distal colon/rectum s/p biopsy. No identified etiology of her symptoms.     Intermittent nausea and inability to tolerate PO, eating breakfast 2/19. Will continue aggressive symptom management in the setting of comfort-focused cares. Given her oral intake, she is not in active dying process and do feel LTC vs home to assisted living or with family likely. SW/CC and financial counselor per above.   -Appreciate GI consultation; since signed off  -Ok to continue Bentyl prn, stopped psyllium, imodium as no further diarrhea   -Zyprexa added per palliative  -Steroids per above  -Palliative following, appreciate recommendations    Anxiety and depression  Takes BuSpar, Prozac  -Continue home regimen - remains within GOC   -Zyprexa 2.5mg BID per palliative on 2/13    RLE DVT  Noted on US 2/13, started on therapeutic lovenox. Stopped AC after discussion with family upon transition to comfort, repeating US now that patient goals  restorative.      Urinary tract infection  -s/p treatment   Elevated Troponin  -Likely type II MI in the setting of acute illness. EKG stable. TTE without RWMA  Hypokalemia  Hypomagnesemia  Iron deficiency anemia  Hypertension  -Resume olmesartan when creatinine stable  Hypothyroidism;   -continue Synthroid 50 mcg  Dementia;   -continue donepezil          Diet: Regular Diet Adult    DVT Prophylaxis: Pneumatic Compression Devices  Ruiz Catheter: Not present  Lines: None     Cardiac Monitoring: None  Code Status: No CPR- Do NOT Intubate      Clinically Significant Risk Factors              # Hypoalbuminemia: Lowest albumin = 3 g/dL at 2/14/2024  5:37 AM, will monitor as appropriate     # Hypertension: Noted on problem list     # Dementia: noted on problem list    # Overweight: Estimated body mass index is 26.57 kg/m  as calculated from the following:    Height as of this encounter: 1.524 m (5').    Weight as of this encounter: 61.7 kg (136 lb 0.4 oz).   # Severe Malnutrition: based on nutrition assessment      # Financial/Environmental Concerns: none         Disposition Plan             Memo Ramírez MD  Hospitalist Service, GOLD TEAM 21  [facility]  Securely message with [employer] (more info)  Text page via [employer] Paging/Directory   See signed in provider for up to date coverage information  ______________________________________________________________________    Interval History   Back pain better today. No new complaints.     Physical Exam   Vital Signs: Temp: 98.1  F (36.7  C) Temp src: Oral BP: 128/83 Pulse: 94   Resp: 19 SpO2: 100 % O2 Device: Nasal cannula Oxygen Delivery: 2 LPM  Weight: 136 lbs .38 oz    General: sitting upright in bed, no acute distress, eating breakfast.   HEENT: sclera clear, glasses in place. NC in place.   CV: RRR, no m/r/g. Extremities are warm and well perfused.   LUNGS: CTAB, no w/r/c.  ABD: Soft, NT/ND, NBS.   SKIN: Warm, well  perfused.   MSK: No edema.  NEURO: Awake, alert and appropriate. Oriented to person, place    Medical Decision Making       65 MINUTES SPENT BY ME on the date of service doing chart review, history, exam, documentation & further activities per the note.         Data         Imaging results reviewed over the past 24 hrs:   No results found for this or any previous visit (from the past 24 hour(s)).

## 2024-02-25 NOTE — PLAN OF CARE
[id number]    Patient is A&O x3, intermittent confusion noted. Call light within reach. Able to make needs known. Needs assistance x2 with gait belt and walker. Pivots to commode. Purewick in place, changed during this shift. LBM: 02/24.    On continuous O2 at 2LPM via NC. Regular diet. PIV on L hand, SL. Denies pain, chest pain, n/v and n/t. Dyspnea on exertion. Noted tremors on upper extremities. Scattered scabbing on lower extremities.     Frequent checks done. Bed alarm on. Continue with POC.

## 2024-02-26 ENCOUNTER — MYC MEDICAL ADVICE (OUTPATIENT)
Dept: FAMILY MEDICINE | Facility: CLINIC | Age: 79
End: 2024-02-26
Payer: MEDICARE

## 2024-02-26 VITALS
HEIGHT: 60 IN | TEMPERATURE: 98 F | HEART RATE: 103 BPM | SYSTOLIC BLOOD PRESSURE: 131 MMHG | OXYGEN SATURATION: 98 % | WEIGHT: 136.02 LBS | DIASTOLIC BLOOD PRESSURE: 77 MMHG | RESPIRATION RATE: 18 BRPM | BODY MASS INDEX: 26.71 KG/M2

## 2024-02-26 LAB
CREAT SERPL-MCNC: 0.87 MG/DL (ref 0.51–0.95)
EGFRCR SERPLBLD CKD-EPI 2021: 67 ML/MIN/1.73M2
PLATELET # BLD AUTO: 416 10E3/UL (ref 150–450)

## 2024-02-26 PROCEDURE — 250N000009 HC RX 250: Performed by: STUDENT IN AN ORGANIZED HEALTH CARE EDUCATION/TRAINING PROGRAM

## 2024-02-26 PROCEDURE — 99239 HOSP IP/OBS DSCHRG MGMT >30: CPT | Performed by: STUDENT IN AN ORGANIZED HEALTH CARE EDUCATION/TRAINING PROGRAM

## 2024-02-26 PROCEDURE — 85049 AUTOMATED PLATELET COUNT: CPT | Performed by: STUDENT IN AN ORGANIZED HEALTH CARE EDUCATION/TRAINING PROGRAM

## 2024-02-26 PROCEDURE — 250N000013 HC RX MED GY IP 250 OP 250 PS 637: Performed by: INTERNAL MEDICINE

## 2024-02-26 PROCEDURE — 250N000013 HC RX MED GY IP 250 OP 250 PS 637: Performed by: STUDENT IN AN ORGANIZED HEALTH CARE EDUCATION/TRAINING PROGRAM

## 2024-02-26 PROCEDURE — 94640 AIRWAY INHALATION TREATMENT: CPT

## 2024-02-26 PROCEDURE — 250N000013 HC RX MED GY IP 250 OP 250 PS 637: Performed by: HOSPITALIST

## 2024-02-26 PROCEDURE — 36415 COLL VENOUS BLD VENIPUNCTURE: CPT | Performed by: STUDENT IN AN ORGANIZED HEALTH CARE EDUCATION/TRAINING PROGRAM

## 2024-02-26 PROCEDURE — 999N000157 HC STATISTIC RCP TIME EA 10 MIN

## 2024-02-26 PROCEDURE — 82565 ASSAY OF CREATININE: CPT | Performed by: STUDENT IN AN ORGANIZED HEALTH CARE EDUCATION/TRAINING PROGRAM

## 2024-02-26 PROCEDURE — 250N000013 HC RX MED GY IP 250 OP 250 PS 637

## 2024-02-26 RX ORDER — OXYCODONE HYDROCHLORIDE 5 MG/1
2.5 TABLET ORAL EVERY 6 HOURS PRN
Qty: 6 TABLET | Refills: 0 | Status: SHIPPED | OUTPATIENT
Start: 2024-02-26 | End: 2024-03-26

## 2024-02-26 RX ADMIN — BUDESONIDE 1 MG: 1 INHALANT ORAL at 08:05

## 2024-02-26 RX ADMIN — FLUOXETINE HYDROCHLORIDE 40 MG: 20 CAPSULE ORAL at 08:22

## 2024-02-26 RX ADMIN — OLANZAPINE 5 MG: 5 TABLET, ORALLY DISINTEGRATING ORAL at 08:22

## 2024-02-26 RX ADMIN — BUSPIRONE HYDROCHLORIDE 10 MG: 10 TABLET ORAL at 08:22

## 2024-02-26 RX ADMIN — PANTOPRAZOLE SODIUM 40 MG: 40 TABLET, DELAYED RELEASE ORAL at 06:34

## 2024-02-26 RX ADMIN — Medication 2.5 MG: at 03:10

## 2024-02-26 RX ADMIN — LEVOTHYROXINE SODIUM 50 MCG: 50 TABLET ORAL at 06:34

## 2024-02-26 RX ADMIN — IPRATROPIUM BROMIDE AND ALBUTEROL SULFATE 3 ML: .5; 3 SOLUTION RESPIRATORY (INHALATION) at 08:05

## 2024-02-26 RX ADMIN — ACETAMINOPHEN 975 MG: 325 TABLET, FILM COATED ORAL at 06:34

## 2024-02-26 ASSESSMENT — ACTIVITIES OF DAILY LIVING (ADL)
ADLS_ACUITY_SCORE: 50
ADLS_ACUITY_SCORE: 50
ADLS_ACUITY_SCORE: 44
ADLS_ACUITY_SCORE: 50
ADLS_ACUITY_SCORE: 44
ADLS_ACUITY_SCORE: 50
ADLS_ACUITY_SCORE: 50

## 2024-02-26 NOTE — PROGRESS NOTES
6MS DISCHARGE    D: Patient discharged to [facility] at 11:10AM. Patient accompanied by EMS wheelchair transport.    I: All discharge medications and instructions reviewed with patient. Patient instructed to seek care if experiencing worsening symptoms. Other phone numbers to call with questions or concerns after discharge reviewed. Education completed.    A: Patient verbalized understanding of discharge medications and instructions. Prescribed home medications given to patient.  Belongings returned to patient.    P: Patient to follow-up with TCU physician.

## 2024-02-26 NOTE — PLAN OF CARE
[id number]    Patient is A&O x3, intermittent confusion noted. Call light within reach. Able to make needs known. Needs assistance x2 with gait belt and walker. Pivots to bedside commode. Purewick in place. LBM: 02/24.    On continuous O2 at 2LPM via NC. No IV access. C/o lower back pain managed with PRN Oxy. Denies chest pain, n/v and n/t. Dyspnea on exertion. Scattered scabbing on lower extremities.     0245-Patient woke up and complaints of chills, noted to be sweaty. VSS. No confusion noted. Provider notified.     Possible to be discharge to TCU tomorrow. [facility] wheelchair transportation set up between [id number]. Bed alarm on for safety. Continue with POC.

## 2024-02-26 NOTE — PLAN OF CARE
Physical Therapy Discharge Summary    Reason for therapy discharge:    Discharged to transitional care facility.    Progress towards therapy goal(s). See goals on Care Plan in [facility] electronic health record for goal details.  Goals partially met.  Barriers to achieving goals:   limited tolerance for therapy.    Therapy recommendation(s):    Continued therapy is recommended.  Rationale/Recommendations:  to progress safety and independence with functional mobility prior to returning home.

## 2024-02-26 NOTE — PROGRESS NOTES
Care Management Follow Up    Length of Stay (days): 25    Expected Discharge Date: 02/26/2024 - 10:35-11:20 ride time     Concerns to be Addressed: discharge planning      Patient plan of care discussed at interdisciplinary rounds: Yes    Anticipated Discharge Disposition: Transitional Care     Anticipated Discharge Services: Therapies    Anticipated Discharge DME: Oxygen    Patient/family educated on Medicare website which has current facility and service quality ratings: n/a    Education Provided on the Discharge Plan: yes     Patient/Family in Agreement with the Plan: yes    Referrals Placed by CM/SW:      Accepting:  [address] [address]  P: [phone]  RN to RN Report: [phone]    Private pay costs discussed: transportation costs-FABIOLA Catherine discussed with pt's dtr on 2/23/24.    Additional Information:  FABIOLA sent discharge orders via Epic and faxed hard script to [phone].    FABIOLA called Admissions and spoke with Bernie who confirmed they received everything that was sent.    Radha Kumar FABIOLA  [facility]   Covering for Dorene Torres - Phone: [phone]

## 2024-02-26 NOTE — PROGRESS NOTES
Patient alert and oriented with some intermittent confusion and forgetfulness. VSS on Oxygen 2 LPM NC.  Fall precautions maintained with bed alarm on, bed locked and in lowest position. No complaint of pain. Planned discharge for TCU tomorrow at 11 AM. LBM 2/24. Voided in BR and also using purewick. Continue the plan of care.

## 2024-02-26 NOTE — PLAN OF CARE
Occupational Therapy Discharge Summary    Reason for therapy discharge:    Discharged to transitional care facility.    Progress towards therapy goal(s). See goals on Care Plan in [facility] electronic health record for goal details.  Goals partially met.  Barriers to achieving goals:   discharge from facility.    Therapy recommendation(s):    Continued therapy is recommended.  Rationale/Recommendations:  to maximize safety and I with ADL.

## 2024-02-26 NOTE — DISCHARGE SUMMARY
[facility]  Hospitalist Discharge Summary      Date of Admission:  2/1/2024  Date of Discharge:  2/26/2024 11:13 AM  Discharging Provider: Memo Ramírez MD  Discharge Service: Hospitalist Service, GOLD TEAM 21    Discharge Diagnoses   Left upper lobe pneumonia  Acute on chronic hypoxemic respiratory failure  COPD    Clinically Significant Risk Factors     # Overweight: Estimated body mass index is 26.57 kg/m  as calculated from the following:    Height as of this encounter: 1.524 m (5').    Weight as of this encounter: 61.7 kg (136 lb 0.4 oz).    # Severe Malnutrition: based on nutrition assessment      Follow-ups Needed After Discharge   Follow-up Appointments     Follow Up and recommended labs and tests      Follow up with [facility] physician.  The following labs/tests are   recommended: None.  Follow up with primary care provider upon discharge from TCU.  The   following labs/tests are recommended: CT Chest in 4 weeks.        - Repeat CT scan in 4 weeks, follow up with pulmonology if consolidation persists.     Unresulted Labs Ordered in the Past 30 Days of this Admission       No orders found from 1/2/2024 to 2/2/2024.            Discharge Disposition   Discharged to home  Condition at discharge: Stable    Hospital Course   Idalia Bob is a 79 year old female admitted on 2/1/2024. Pt has history of COPD, pneumonia, hypertension, dementia and hypothyroidism among others, presented with abdominal pain and found to have consolidation on CT as well as frequent mucousy bowel movements and anorexia. Known persistent LLL infiltrate of uncertain etiology. She remains on 2-3NC with symptomatic dyspnea and ongoing intermittent nausea.      Care conference 2/22. Will pursue restorative cares. Patient participating with therapy, is appropriate for TCU        Left upper lobe pneumonia vs persistent consolidation vs mass  COPD  Acute on chronic hypoxemic  respiratory failure  Patient with pseudomonal upper lobe pneumonia in December and has had ongoing consolidation since.  While this finding was present on imaging she has a paucity of other pathology consistent with pneumonia at the present time. She is now having increasing dyspnea x 24 hours, on 3LNC. Repeat CT with worsening upper lobe infiltrates and edema. Per pulm, felt his may represent persistent pneumonia in the setting of poor airway clearance and mobilization, less likely inflammatory but cannot be ruled out and ongoing consideration for underlying malignancy. Risk outweighs benefit of biopsy given her underlying lung disease and further workup including imaging, BAL or biopsy are not currently within her GOC. Extensive infectious workup otherwise unrevealing. TTE without evidence of CHF. Care conference 2/22. Will pursue restorative cares. Patient participating with therapy, is appropriate for TCU   - Continue home COPD regimen  - Repeat CT scan in 4 weeks, follow up with pulmonology if consolidation persists.      CKD 3b  - use cystatin c for GFR  - Renally dose medications     Back pain  - Decreased frequency of morphine PRN, will discuss with family about transition to alternate opiate     Abdominal Pain, improved   Intractable nausea   Loose stools with mucus, improved   Tenesmus  Unintentional weight loss  Anorexia - improving  The patient has had weeks of worsening anorexia along with crampy abdominal pain but no evidence of gastrointestinal bleeding.  She has frequent (many many times per day) mucousy bowel movements with very little stool production.  C. difficile and enteric panel negative. S/p flex sig and endoscopy 2/7. Noted hiatal hernia, mild inflammatory changes in distal colon/rectum s/p biopsy. No identified etiology of her symptoms.      Intermittent nausea and inability to tolerate PO, eating breakfast 2/19. Will continue aggressive symptom management in the setting of comfort-focused  cares. Given her oral intake, she is not in active dying process and do feel LTC vs home to assisted living or with family likely. SW/CC and financial counselor per above.   -Appreciate GI consultation; since signed off  -Ok to continue Bentyl prn, stopped psyllium, imodium as no further diarrhea   -Zyprexa added per palliative  -Steroids per above  -Palliative following, appreciate recommendations     Anxiety and depression  Takes BuSpar, Prozac  -Continue home regimen - remains within GOC   -Zyprexa 2.5mg BID per palliative on 2/13     RLE DVT  Noted on US 2/13, started on therapeutic lovenox. Stopped AC after discussion with family upon transition to comfort, repeating US now that patient goals restorative. DVT resolved on repeat US, no anticoagulation indicated at this time.      Urinary tract infection  -s/p treatment   Elevated Troponin  -Likely type II MI in the setting of acute illness. EKG stable. TTE without RWMA  Hypokalemia  Hypomagnesemia  Iron deficiency anemia  Hypertension  -Resume olmesartan when creatinine stable  Hypothyroidism;   -continue Synthroid 50 mcg  Dementia;   -continue donepezil    Consultations This Hospital Stay   NUTRITION SERVICES ADULT IP CONSULT  CARE MANAGEMENT / SOCIAL WORK IP CONSULT  GI LUMINAL ADULT IP CONSULT  PHYSICAL THERAPY ADULT IP CONSULT  OCCUPATIONAL THERAPY ADULT IP CONSULT  GI LUMINAL ADULT IP CONSULT  PULMONARY GENERAL ADULT IP CONSULT  PALLIATIVE CARE ADULT IP CONSULT  PHARMACY TO DOSE VANCO  SPEECH LANGUAGE PATH ADULT IP CONSULT  SPIRITUAL HEALTH SERVICES IP CONSULT  SPIRITUAL HEALTH SERVICES IP CONSULT  NUTRITION SERVICES ADULT IP CONSULT  SPIRITUAL HEALTH SERVICES IP CONSULT  PHYSICAL THERAPY ADULT IP CONSULT  OCCUPATIONAL THERAPY ADULT IP CONSULT  NUTRITION SERVICES ADULT IP CONSULT  PULMONARY GENERAL ADULT IP CONSULT  SPIRITUAL HEALTH SERVICES IP CONSULT  PHYSICAL THERAPY ADULT IP CONSULT  OCCUPATIONAL THERAPY ADULT IP CONSULT    Code Status   Prior    Time  Spent on this Encounter   I, Memo Ramírez MD, personally saw the patient today and spent greater than 30 minutes discharging this patient.       Memo Ramírez MD  [facility] MED SURG  [address] [address]  Phone: [phone]  Fax: [phone]  ______________________________________________________________________    Physical Exam   Vital Signs:                    Weight: 136 lbs .38 oz  General: sitting upright in bed, no acute distress, eating breakfast.   HEENT: sclera clear, glasses in place. NC in place.   CV: RRR, no m/r/g. Extremities are warm and well perfused.   LUNGS: CTAB, no w/r/c.  ABD: Soft, NT/ND, NBS.   SKIN: Warm, well perfused.   MSK: No edema.  NEURO: Awake, alert and appropriate. Oriented to person, place       Primary Care Physician   Gomez Louis    Discharge Orders      Primary Care - Care Coordination Referral      Resume Home Care Services    Resume [facility] care orders with [facility].     General info for SNF    Length of Stay Estimate: Short Term Care: Estimated # of Days <30  Condition at Discharge: Improving  Level of care:skilled   Rehabilitation Potential: Good  Admission H&P remains valid and up-to-date: Yes  Recent Chemotherapy: N/A  Use Nursing Home Standing Orders: Yes     Follow Up and recommended labs and tests    Follow up with assisted physician.  The following labs/tests are recommended: None.  Follow up with primary care provider upon discharge from TCU.  The following labs/tests are recommended: CT Chest in 4 weeks.     Reason for your hospital stay    Pneumonia     Activity - Up with nursing assistance     Physical Therapy Adult Consult    Evaluate and treat as clinically indicated.    Reason:  Deconditioning, pulmonary rehab     Occupational Therapy Adult Consult    Evaluate and treat as clinically indicated.    Reason:  deconditioning     Oxygen (SNF/TCU) Discharge     Fall precautions      Diet    Follow this diet upon discharge: Orders Placed This Encounter      Calorie Counts      Regular Diet Adult       Significant Results and Procedures   Most Recent 3 CBC's:  Recent Labs   Lab Test 02/26/24  0625 02/23/24  0657 02/22/24  0800 02/21/24  0812   WBC  --  11.2* 12.1* 13.1*   HGB  --  9.9* 9.6* 9.9*   MCV  --  83 83 83    388 389 415     Most Recent 3 BMP's:  Recent Labs   Lab Test 02/26/24  0625 02/23/24  1211 02/23/24  0657 02/22/24  0800 02/21/24  0812   NA  --   --  134* 138 134*   POTASSIUM  --   --  5.0 4.8 4.4   CHLORIDE  --   --  98 100 98   CO2  --   --  30* 29 32*   BUN  --   --  18.2 20.4 22.9   CR 0.87 0.92 0.88 0.99* 0.93   ANIONGAP  --   --  6* 9 4*   AYUSH  --   --  9.0 8.7* 8.4*   GLC  --   --  96 87 79   ,   Results for orders placed or performed during the hospital encounter of 02/01/24   CT Chest/Abdomen/Pelvis w Contrast    Narrative    EXAM: CT CHEST/ABDOMEN/PELVIS W CONTRAST  LOCATION: [facility]  DATE: 2/1/2024    INDICATION: chest and abd pain, 2 days duration, mostly mid upper abdomen radiating towards chest; hx of lower lobe pseudomonas in december  COMPARISON: None.  TECHNIQUE: CT scan of the chest, abdomen, and pelvis was performed following injection of IV contrast. Multiplanar reformats were obtained. Dose reduction techniques were used.   CONTRAST: IsoVue 370: 81mL    FINDINGS:   LUNGS AND PLEURA: Advanced centrilobular emphysema. Airspace consolidation within the posterior aspect of the left upper lobe along the major fissure minimal scarring left base. Fat-containing Bochdalek hernia right lung base.    MEDIASTINUM/AXILLAE: A few borderline-enlarged mediastinal nodes. Large esophageal hiatal hernia.    CORONARY ARTERY CALCIFICATION: Moderate.    HEPATOBILIARY: Normal.    PANCREAS: Normal.    SPLEEN: Normal.    ADRENAL GLANDS: Slight nodular thickening both adrenal glands. No discrete lesions.    KIDNEYS/BLADDER:  Normal. No stones or hydronephrosis.    BOWEL: Small left inguinal hernia containing a knuckle of nondilated small bowel. No evidence for bowel obstruction. No bowel wall thickening or inflammatory changes.    LYMPH NODES: Normal.    VASCULATURE: Atherosclerotic disease abdominal aorta and iliac arteries.    PELVIC ORGANS: Normal.    MUSCULOSKELETAL: Mild scoliosis. Degenerative disc disease lumbar spine.      Impression    IMPRESSION:  1.  Airspace consolidation left upper lobe concerning for pneumonia. Follow-up CT exam after therapy recommended to confirm resolution and exclude underlying mass.  2.  Advanced emphysema.  3.  Large esophageal hiatal hernia.  4.  Atherosclerotic disease.  5.  Small left inguinal hernia.   XR Chest 1 View    Narrative    Examination:  XR CHEST 1 VIEW    Date:  2/11/2024 12:30 PM     Clinical Information: increased SOB, cough     Comparison: 2/1/2024.    Findings:   AP upright chest radiograph. Stable cardiac silhouette. Interval  reduced left lung aeration with increased diffuse mixed left-sided  pulmonary opacities. Increased streaky right perihilar and basilar  opacities likely representing edema/atelectasis. New small left  pleural effusion. No right pleural effusion or pneumothorax. No acute  osseous abnormalities.      Impression    Impression:  1. Interval reduced left lung aeration with increased diffuse mixed  left-sided pulmonary opacities, possibly edema/atelectasis or  infection.  2. Small left pleural effusion.     CARLEEN DON MD         SYSTEM ID:  [id number]   CT Chest w/o Contrast    Narrative    EXAMINATION: Chest CT  2/12/2024 8:45 AM    CLINICAL HISTORY: Worsening dyspnea, reevaluate L lung opacity vs new  process    COMPARISON: CT, 2/1/2024.    TECHNIQUE: CT imaging obtained through the chest without contrast.  Axial, coronal, and sagittal reconstructions and axial MIP reformatted  images are reviewed.     CONTRAST: No contrast    FINDINGS:  Lungs: The trachea  and central airways are patent. Extensive  emphysematous changes. Bilateral small pleural effusions, new compared  to prior. Increased consolidative changes in the bilateral upper  lobes. Septal thickening also noted bilaterally suggestive of edema.    Mediastinum: The visualized thyroid gland is unremarkable. The heart  size is within normal limits. No pericardial effusion. The ascending  aorta and main pulmonary artery diameters are within normal limits.  Coronary artery calcifications. Normal appearance and configuration of  the great vessels off of the aortic arch. Enlarged pretracheal lymph  node image 22, series 2 mm up to 1.2 cm in short axis. Other prominent  aortopulmonary window lymph nodes just under a centimeter in short  axis. Moderate sliding hiatal hernia.    Bones and soft tissues: No suspicious bone findings. Chest wall  subcutaneous edema.    Upper Abdomen: Unremarkable appearance of the adrenal glands.      Impression    IMPRESSION:   1. Increased consolidative changes in bilateral upper lobes. Bilateral  new small pleural effusions. Findings are suggestive of worsening  infection with or without superimposed atelectasis. Mild associated  pulmonary edema as well.  2. Enlarged mediastinal lymph nodes, most likely reactive.    I have personally reviewed the examination and initial interpretation  and I agree with the findings.    SYLVIA GREEN MD         SYSTEM ID:  [id number]   US Lower Extremity Venous Duplex Bilateral     Value    Radiologist flags DVT (Urgent)    Narrative    EXAMINATION: DOPPLER VENOUS ULTRASOUND OF BILATERAL LOWER EXTREMITIES,  2/12/2024 8:01 PM     COMPARISON: None.    HISTORY: Evaluate for clot    TECHNIQUE:  Gray-scale evaluation with compression, spectral flow and  color Doppler assessment of the deep venous system of both legs from  groin to knee, and then at the ankles.    FINDINGS:  Right lower extremity: the common femoral, femoral, popliteal  demonstrate normal  compressibility and blood flow. The peroneal vein  is not visualized. Occlusive thrombus in the posterior tibial vein in  the proximal-mid calf.    Left lower extremity: the common femoral, femoral, popliteal and  posterior tibial veins demonstrate normal compressibility and blood  flow. The peroneal vein is not visualized.    Avascular hypoechoic collection in the left popliteal fossa measuring  5.5 x 1.9 x 2.4 cm.      Impression    IMPRESSION:  1. Occlusive DVT in the right posterior tibial vein at the  proximal-mid calf.  2.  No evidence of deep venous thrombosis in the left lower extremity.  3. Left popliteal cyst.    [Urgent Result: DVT]    Finding was identified on 2/12/2024 8:23 PM.     Dr. Torrez was contacted by Dr. Holbrook at 2/12/2024 8:31 PM and  verbalized understanding of the urgent finding.     I have personally reviewed the examination and initial interpretation  and I agree with the findings.    NIELS ROMERO MD         SYSTEM ID:  [id number]   XR Chest Port 1 View    Narrative    EXAM: XR CHEST PORT 1 VIEW  2/22/2024 8:21 PM      HISTORY: Pneumonia, patient with new leukocytosis    COMPARISON: Chest x-ray 2/11/2024    FINDINGS: Single view of the chest. Cardiomediastinal silhouette is  stable. Slightly decreased mixed opacities in the left lung. Large  hiatal hernia. Small left pleural effusion. No pneumothorax. No acute  osseous body.      Impression    IMPRESSION:   1. Slightly decreased mixed opacities in the left lung. No new focal  infiltrate.  2. Small left pleural effusion.  3. Large hiatal hernia.    I have personally reviewed the examination and initial interpretation  and I agree with the findings.    CAROLINA MATTHEWS MD         SYSTEM ID:  [id number]   US Lower Extremity Venous Duplex Right    Narrative    ULTRASOUND LOWER EXTREMITY VENOUS DUPLEX RIGHT 2/22/2024 5:24 PM    CLINICAL HISTORY: Previous DVT, stopped anticoagulation when patient  went on comfort cares. Now goals of care are  "restorative, repeat scan  to see necessity of continued anticoagulation.     COMPARISONS: Ultrasound lower extremity venous duplex bilateral  2024    REFERRING PROVIDER: MARIFER LOUIS    TECHNIQUE: Grayscale, color Doppler, Doppler waveform ultrasound  evaluation was performed through the right common femoral, femoral,  and popliteal veins. Right posterior tibial and peroneal veins were  evaluated with grayscale imaging and compression and color Doppler  ultrasound.    Left common femoral vein was evaluated for symmetry.    FINDINGS: Left common femoral vein is fully compressible with a phasic  waveform that augments normally.    Right common femoral, femoral, and popliteal veins are fully  compressible with phasic waveforms that augment normally.    Anechoic collection in the popliteal fossa measures 1.3 x 3.2 x 4.6 cm  and demonstrates no internal vascular flow by color Doppler imaging.    Right posterior tibial veins are fully in the proximal and mid calf.  No evaluated in the distal calf.    Right peroneal veins were not visualized, unchanged.      Impression    IMPRESSION: Right peroneal veins were not visualized. Previous deep  venous thrombosis in one of the posterior tibial veins has cleared.  Both are fully compressible in the proximal and mid calf, but were not  evaluated in the distal calf. Otherwise, no deep venous thrombosis  demonstrated in the RIGHT leg.    Reference: \"Duplex Ultrasound in the Diagnosis of Lower-Extremity Deep  Venous Thrombosis\"- Rosalie Saini MD, S; Cristino Odonnell MD  (Circulation. 2014;129:917-921. http://circ.ahajournals.org)    DYLAN MCKINNON MD         SYSTEM ID:  [id number]   Echo Complete     Value    LVEF  55-60%    Narrative    [id number]  [id number]  [id number]  [id number]^LINDSEY^ARMEN^AARON     [facility]  Echocardiography Laboratory  [address] [place] [id number]     Name: JULIANO HEALY  MRN: [id number]  : " 1945  Study Date: 02/12/2024 02:29 PM  Age: 79 yrs  Gender: Female  Patient Location: [facility]  Reason For Study: Dyspnea  Ordering Physician: ARMEN PORTILLO  Performed By: Carolina Child     BSA: 1.5 m2  Height: 60 in  Weight: 123 lb  HR: 114  BP: 169/100 mmHg  ______________________________________________________________________________  Procedure  Complete Portable Echo Adult.  ______________________________________________________________________________  Interpretation Summary  A large left pleural effusion is present.  Global and regional left ventricular function is normal with an EF of 55-60%.  Global right ventricular function is normal. The right ventricle is normal  size.  Moderate tricuspid insufficiency is present. The etiology is RA enlargement.  The estimated PA systolic pressure is 60 mmHg.  IVC diameter and respiratory changes fall into an intermediate range  suggesting an RA pressure of 8 mmHg.  There is no prior study for direct comparison.  ______________________________________________________________________________  Left Ventricle  Global and regional left ventricular function is normal with an EF of 55-60%.  Left ventricular size is normal. Relative wall thickness is increased  consistent with concentric remodeling. Left ventricular diastolic function is  normal.     Right Ventricle  Global right ventricular function is normal. The right ventricle is normal  size.     Atria  Mild to moderate biatrial enlargement is present.     Mitral Valve  Mild mitral annular calcification is present. Trace mitral insufficiency is  present.     Aortic Valve  Mild aortic valve calcification is present. The valve leaflets are not well  visualized. On Doppler interrogation, there is no significant stenosis or  regurgitation.     Tricuspid Valve  Moderate tricuspid insufficiency is present. The right ventricular systolic  pressure is approximated at 51.6 mmHg plus the right atrial pressure.  The  etiology is RA enlargement.     Pulmonic Valve  Mild pulmonic insufficiency is present.     Vessels  The aorta root is normal. The thoracic aorta is normal. IVC diameter and  respiratory changes fall into an intermediate range suggesting an RA pressure  of 8 mmHg.     Pericardium  Trivial pericardial effusion is present. Chamber compression is not present;  there is no evidence for tamponade.     Miscellaneous  A left pleural effusion is present.     Compared to Previous Study  There is no prior study for direct comparison.  ______________________________________________________________________________  MMode/2D Measurements & Calculations  IVSd: 0.82 cm  LVIDd: 4.2 cm  LVIDs: 3.2 cm  LVPWd: 1.1 cm  FS: 24.8 %  LV mass(C)d: 131.0 grams  LV mass(C)dI: 86.3 grams/m2  Ao root diam: 2.9 cm  asc Aorta Diam: 3.0 cm  LVOT diam: 1.9 cm  LVOT area: 2.8 cm2  Ao root diam index Ht(cm/m): 1.9  Ao root diam index BSA (cm/m2): 1.9  Asc Ao diam index BSA (cm/m2): 2.0  Asc Ao diam index Ht(cm/m): 2.0  LA Volume (BP): 57.0 ml     LA Volume Index (BP): 37.5 ml/m2  RV Base: 3.7 cm  RWT: 0.53  TAPSE: 1.3 cm     Doppler Measurements & Calculations  MV E max bryson: 92.5 cm/sec  MV A max bryson: 101.0 cm/sec  MV E/A: 0.92  MV dec slope: 825.0 cm/sec2  MV dec time: 0.11 sec  Ao V2 max: 166.0 cm/sec  Ao max P.0 mmHg  Ao V2 mean: 124.0 cm/sec  Ao mean P.0 mmHg  Ao V2 VTI: 29.3 cm  GENEVIEVE(I,D): 2.1 cm2  GENEVIEVE(V,D): 2.2 cm2  LV V1 max P.4 mmHg  LV V1 max: 126.0 cm/sec  LV V1 VTI: 21.8 cm  SV(LVOT): 61.8 ml  SI(LVOT): 40.7 ml/m2     PA acc time: 0.08 sec  PI end-d bryson: 122.0 cm/sec  TR max bryson: 359.0 cm/sec  TR max P.6 mmHg  AV Bryson Ratio (DI): 0.76  GENEVIEVE Index (cm2/m2): 1.4  E/E' av.6  Lateral E/e': 7.5  Medial E/e': 11.6  RV S Bryson: 10.1 cm/sec     ______________________________________________________________________________  Report approved by: Ely Johns 2024 07:27 PM             Discharge Medications   Discharge  Medication List as of 2/26/2024 10:58 AM        START taking these medications    Details   acetaminophen (TYLENOL) 325 MG tablet Take 3 tablets (975 mg) by mouth 3 times daily, Transitional      benzocaine-menthol (CHLORASEPTIC) 6-10 MG lozenge Place 1 lozenge inside cheek every hour as needed for sore throat, Transitional      budesonide (PULMICORT) 1 MG/2ML neb solution Take 2 mLs (1 mg) by nebulization 2 times daily, Transitional      calcium carbonate (TUMS) 500 MG chewable tablet Take 1 tablet (500 mg) by mouth 4 times daily as needed for heartburn, Transitional      dicyclomine (BENTYL) 10 MG capsule Take 1 capsule (10 mg) by mouth every 6 hours as needed (cramping), Transitional      diphenhydrAMINE-zinc acetate (BENADRYL) 2-0.1 % external cream Apply topically 3 times daily as needed for itchingTransitional      hydrOXYzine HCl (ATARAX) 10 MG tablet Take 1 tablet (10 mg) by mouth 3 times daily as needed for itching, Transitional      ipratropium - albuterol 0.5 mg/2.5 mg/3 mL (DUONEB) 0.5-2.5 (3) MG/3ML neb solution Take 1 vial (3 mLs) by nebulization 4 times daily, Transitional      melatonin 1 MG TABS tablet Take 1 tablet (1 mg) by mouth nightly as needed for sleep, Transitional      OLANZapine zydis (ZYPREXA) 5 MG ODT Take 1 tablet (5 mg) by mouth 2 times daily, Transitional      ondansetron (ZOFRAN ODT) 4 MG ODT tab Take 1 tablet (4 mg) by mouth every 6 hours as needed for nausea or vomiting, Transitional      senna-docusate (SENOKOT-S/PERICOLACE) 8.6-50 MG tablet Take 1 tablet by mouth 2 times daily as needed for constipation, Transitional           CONTINUE these medications which have CHANGED    Details   albuterol (PROVENTIL) (2.5 MG/3ML) 0.083% neb solution Take 1 vial (2.5 mg) by nebulization 2 times daily as needed (COPD), Transitional      busPIRone (BUSPAR) 10 MG tablet Take 1 tablet (10 mg) by mouth 3 times daily Either 10 mg po tid or 15 mg po bid. Pt was on 15 mg po bid at home.,  Transitional      donepezil (ARICEPT) 10 MG tablet Take 1 tablet (10 mg) by mouth at bedtime, Transitional      FLUoxetine (PROZAC) 40 MG capsule Take 1 capsule (40 mg) by mouth daily, Transitional      furosemide (LASIX) 20 MG tablet One tab daily as needed for lower ext edema, Transitional      latanoprost (XALATAN) 0.005 % ophthalmic solution Place 1 drop into both eyes at bedtime, Transitional      levothyroxine (SYNTHROID/LEVOTHROID) 50 MCG tablet Take 1 tablet (50 mcg) by mouth daily, Transitional      oxyCODONE (ROXICODONE) 5 MG tablet Take 0.5 tablets (2.5 mg) by mouth every 6 hours as needed for severe pain (pain limiting work with therapies), Disp-6 tablet, R-0, Local Print      pantoprazole (PROTONIX) 40 MG EC tablet Take 1 tablet (40 mg) by mouth daily, Transitional      triamcinolone (KENALOG) 0.1 % external cream Apply topically 2 times daily as needed for irritationTransitional      vitamin D3 (CHOLECALCIFEROL) 50 mcg (2000 units) tablet Take 1 tablet (50 mcg) by mouth daily, Transitional           CONTINUE these medications which have NOT CHANGED    Details   acetylcysteine (N-ACETYL CYSTEINE) 600 MG CAPS capsule Take 1,200 mg by mouth 2 times daily, Historical           STOP taking these medications       calcium polycarbophil (FIBERCON) 625 MG tablet Comments:   Reason for Stopping:         denosumab (PROLIA) 60 MG/ML SOSY injection Comments:   Reason for Stopping:         ferrous gluconate (FERGON) 324 (38 Fe) MG tablet Comments:   Reason for Stopping:         Fluticasone-Umeclidin-Vilanterol (TRELEGY ELLIPTA) 200-62.5-25 MCG/ACT oral inhaler Comments:   Reason for Stopping:         olmesartan (BENICAR) 20 MG tablet Comments:   Reason for Stopping:             Allergies   Allergies   Allergen Reactions    Aspirin Nausea and Nausea and Vomiting     Stomach ache    Penicillins Itching     Has tolerated ceftriaxone, piperacillin, and amoxicillin

## 2024-02-27 ENCOUNTER — PATIENT OUTREACH (OUTPATIENT)
Dept: CARE COORDINATION | Facility: CLINIC | Age: 79
End: 2024-02-27
Payer: MEDICARE

## 2024-02-27 NOTE — PROGRESS NOTES
Clinic Care Coordination Contact  Care Coordination Transition Communication    Clinical Data: Patient was hospitalized at [facility] from 2/1/24 to 2/26/24 with diagnosis of hypokalemia, community acquired pneumonia.     Assessment: Patient has transitioned to TCU/ARU for short term rehabilitation:    Facility Name: Accepting:  [address] [address]  P: [phone]  RN to RN Report: [phone]  Transition Communication:  Notified facility of Primary Care- Care Coordination support via   .    Plan: Care Coordinator will await notification from facility staff informing of patient's discharge plans/needs. Care Coordinator will review chart and outreach to facility staff every 4 weeks and as needed.     Mukul Carvalho MSN, RN, PHN, CCM   Primary Care Clinical RN Care Coordinator  [facility]  2/27/2024   7:23 AM  [email or web address]  Office: [phone]

## 2024-02-27 NOTE — TELEPHONE ENCOUNTER
RN replied to patient via [employer]. See message for details.     Miki Schmid RN, BSN, PHN  [facility]: [place]

## 2024-02-28 ENCOUNTER — DOCUMENTATION ONLY (OUTPATIENT)
Dept: OTHER | Facility: CLINIC | Age: 79
End: 2024-02-28
Payer: MEDICARE

## 2024-03-05 ENCOUNTER — DOCUMENTATION ONLY (OUTPATIENT)
Dept: OTHER | Facility: CLINIC | Age: 79
End: 2024-03-05
Payer: MEDICARE

## 2024-03-12 ENCOUNTER — MEDICAL CORRESPONDENCE (OUTPATIENT)
Dept: HEALTH INFORMATION MANAGEMENT | Facility: CLINIC | Age: 79
End: 2024-03-12
Payer: MEDICARE

## 2024-03-15 ENCOUNTER — TELEPHONE (OUTPATIENT)
Dept: FAMILY MEDICINE | Facility: CLINIC | Age: 79
End: 2024-03-15
Payer: MEDICARE

## 2024-03-15 NOTE — TELEPHONE ENCOUNTER
Patients daughter Barbara is calling, she is on the consent form to talk with her.    Appointment 3/26/24 with Dr. Louis.  She is calling to schedule an appointment for Prolia injection for same day.    Scheduled appointment with RN.    She stated understanding and agreeable with the plan of care.     vEelyn SHANNON RN  Triage Nurse  [facility]

## 2024-03-26 ENCOUNTER — TELEPHONE (OUTPATIENT)
Dept: FAMILY MEDICINE | Facility: CLINIC | Age: 79
End: 2024-03-26

## 2024-03-26 ENCOUNTER — ALLIED HEALTH/NURSE VISIT (OUTPATIENT)
Dept: FAMILY MEDICINE | Facility: CLINIC | Age: 79
End: 2024-03-26
Payer: MEDICARE

## 2024-03-26 ENCOUNTER — OFFICE VISIT (OUTPATIENT)
Dept: FAMILY MEDICINE | Facility: CLINIC | Age: 79
End: 2024-03-26
Payer: MEDICARE

## 2024-03-26 VITALS
OXYGEN SATURATION: 98 % | BODY MASS INDEX: 24.94 KG/M2 | TEMPERATURE: 97.7 F | SYSTOLIC BLOOD PRESSURE: 118 MMHG | HEART RATE: 96 BPM | HEIGHT: 60 IN | RESPIRATION RATE: 16 BRPM | WEIGHT: 127 LBS | DIASTOLIC BLOOD PRESSURE: 79 MMHG

## 2024-03-26 DIAGNOSIS — K21.9 GASTROESOPHAGEAL REFLUX DISEASE, UNSPECIFIED WHETHER ESOPHAGITIS PRESENT: ICD-10-CM

## 2024-03-26 DIAGNOSIS — M81.8 OTHER OSTEOPOROSIS, UNSPECIFIED PATHOLOGICAL FRACTURE PRESENCE: ICD-10-CM

## 2024-03-26 DIAGNOSIS — F01.50 VASCULAR DEMENTIA WITHOUT BEHAVIORAL DISTURBANCE, PSYCHOTIC DISTURBANCE, MOOD DISTURBANCE, OR ANXIETY, UNSPECIFIED DEMENTIA SEVERITY (H): ICD-10-CM

## 2024-03-26 DIAGNOSIS — M81.0 AGE-RELATED OSTEOPOROSIS WITHOUT CURRENT PATHOLOGICAL FRACTURE: Primary | ICD-10-CM

## 2024-03-26 DIAGNOSIS — Z09 HOSPITAL DISCHARGE FOLLOW-UP: Primary | ICD-10-CM

## 2024-03-26 DIAGNOSIS — J43.9 PULMONARY EMPHYSEMA, UNSPECIFIED EMPHYSEMA TYPE (H): ICD-10-CM

## 2024-03-26 DIAGNOSIS — F41.1 GAD (GENERALIZED ANXIETY DISORDER): ICD-10-CM

## 2024-03-26 DIAGNOSIS — Z99.81 OXYGEN DEPENDENT: ICD-10-CM

## 2024-03-26 DIAGNOSIS — J18.9 COMMUNITY ACQUIRED PNEUMONIA OF LEFT UPPER LOBE OF LUNG: ICD-10-CM

## 2024-03-26 DIAGNOSIS — N18.30 STAGE 3 CHRONIC KIDNEY DISEASE, UNSPECIFIED WHETHER STAGE 3A OR 3B CKD (H): ICD-10-CM

## 2024-03-26 DIAGNOSIS — M54.9 BACK PAIN, UNSPECIFIED BACK LOCATION, UNSPECIFIED BACK PAIN LATERALITY, UNSPECIFIED CHRONICITY: ICD-10-CM

## 2024-03-26 PROBLEM — N18.9 CHRONIC KIDNEY DISEASE (CKD): Status: ACTIVE | Noted: 2023-01-10

## 2024-03-26 PROCEDURE — 99207 PR NO CHARGE NURSE ONLY: CPT

## 2024-03-26 PROCEDURE — 99417 PROLNG OP E/M EACH 15 MIN: CPT | Performed by: FAMILY MEDICINE

## 2024-03-26 PROCEDURE — 96372 THER/PROPH/DIAG INJ SC/IM: CPT | Performed by: FAMILY MEDICINE

## 2024-03-26 PROCEDURE — 99215 OFFICE O/P EST HI 40 MIN: CPT | Mod: 25 | Performed by: FAMILY MEDICINE

## 2024-03-26 RX ORDER — OXYCODONE HYDROCHLORIDE 5 MG/1
TABLET ORAL
Qty: 10 TABLET | Refills: 0 | Status: SHIPPED | OUTPATIENT
Start: 2024-03-26 | End: 2024-04-11

## 2024-03-26 RX ORDER — DICYCLOMINE HYDROCHLORIDE 10 MG/1
10 CAPSULE ORAL EVERY 6 HOURS PRN
Qty: 180 CAPSULE | Refills: 3 | Status: SHIPPED | OUTPATIENT
Start: 2024-03-26 | End: 2024-04-19

## 2024-03-26 RX ORDER — OLANZAPINE 2.5 MG/1
2.5 TABLET, FILM COATED ORAL AT BEDTIME
COMMUNITY
Start: 2024-03-26 | End: 2024-04-01

## 2024-03-26 RX ORDER — PANTOPRAZOLE SODIUM 40 MG/1
40 TABLET, DELAYED RELEASE ORAL DAILY
Qty: 90 TABLET | Refills: 3 | Status: SHIPPED | OUTPATIENT
Start: 2024-03-26

## 2024-03-26 RX ORDER — ONDANSETRON 4 MG/1
4 TABLET, ORALLY DISINTEGRATING ORAL EVERY 6 HOURS PRN
Qty: 18 TABLET | Refills: 3 | Status: SHIPPED | OUTPATIENT
Start: 2024-03-26 | End: 2024-04-22

## 2024-03-26 RX ORDER — BUSPIRONE HYDROCHLORIDE 15 MG/1
15 TABLET ORAL 2 TIMES DAILY
COMMUNITY
Start: 2024-03-26

## 2024-03-26 ASSESSMENT — PAIN SCALES - GENERAL: PAINLEVEL: NO PAIN (0)

## 2024-03-26 NOTE — LETTER
March 26, 2024      Idalia Bob  [address]    [place] [address]        Dear Idalia,     Continue with oxygen at 1 L for the next a few weeks.  Continue to monitor your oxygen at home, without oxygen while walking.  If oxygen saturation continue to be above 92 %, then may stop your oxygen supplement          Sincerely,        Gomez Louis MD

## 2024-03-26 NOTE — PROGRESS NOTES
Over 1 year ago per patient  Clinic Administered Medication Documentation      Prolia Documentation    Indication: Prolia  (denosumab) is a prescription medicine used to treat osteoporosis in patients who:   Are at high risk for fracture, meaning patients who have had a fracture related to osteoporosis, or who have multiple risk factors for fracture.  Cannot use another osteoporosis medicine or other osteoporosis medicines did not work well.  The timeline for early/late injections would be 4 weeks early and any time after the 6 month lien. If a patient receives their injection late, then the subsequent injection would be 6 months from the date that they actually received the injection.    When was the last injection?  Over 1 year ago per patient . Received in [place]  Was the last injection at least 6 months ago? Yes  Has the prior authorization been completed?  Yes  Is there an active order (written within the past 365 days, with administrations remaining, not ) in the chart?  Yes  Patient denies any dental work involving the bone (e.g. tooth extraction or dental implants) in the past 4 weeks?  Yes  Patient denies plans for any dental work involving the bone (e.g. tooth extraction or dental implants) in the next 4 weeks? Yes    The following steps were completed to comply with the REMS program for Prolia:  Reviewed information in the Medication Guide and Patient Counseling Chart, including the serious risks of Prolia  and the symptoms of each risk.  Advised patient to seek prompt medical attention if they have signs or symptoms of any of the serious risks.  Provided each patient a copy of the Medication Guide and Patient Brochure.    Prior to injection, verified patient identity using patient's name and date of birth. Medication was administered. Please see MAR and medication order for additional information. Patient instructed to remain in clinic for 15 minutes, report any adverse reaction to staff  immediately, and remain in clinic for 15 minutes and report any adverse reaction to staff immediately but patient declined.    Vial/Syringe: Syringe  Was this medication supplied by the patient? No  Patient has no refills remaining.        Patient received Prolia injection in Left arm.  Reviewed side effects and when to seek medical attention.    Christine M Klisch, RN

## 2024-03-26 NOTE — TELEPHONE ENCOUNTER
Patient seen 3/26/24 and received prolia injection.    Will need next injection any time after 9/26/2024.    Christine M Klisch, RN

## 2024-03-26 NOTE — PROGRESS NOTES
Assessment & Plan     Hospital discharge follow-up  Patient was admitted February 1, discharged February 26, she was admitted to rehab for a few weeks.  She was admitted for recurrent pneumonia, abdominal pain, decreased appetite.  Reviewed her admission, discharge.  Reviewed her CT scan,   she did have an upper endoscopy.  She does need to have a repeat CT scan after she treated with pneumonia with Augmentin.  Your upper endoscopy did not indicate any acute finding she did have hiatal hernia.    Since her discharge she has been feeling better, she continues to use oxygen.    Checking her oxygen during her office visit she was  Oxygen: Walking with 1 LT: 97%; 0.5 Lt: 95%, without O2: 92%     She will continue with oxygen at 1 L for the next few weeks, in the meantime monitor oxygen at home if remains to be above 92% then she will stop her oxygen.    Community acquired pneumonia of left upper lobe of lung  Continue on Oxygen  Repeat CT lung scan  - COPD ACTION PLAN  - CT Chest w/o Contrast; Future    Pulmonary emphysema, unspecified emphysema type (H)  Continue with current inhalers.    Gastroesophageal reflux disease, unspecified whether esophagitis present  Upper endoscopy, 02/7/2024, large hiatal hernia.  - pantoprazole (PROTONIX) 40 MG EC tablet; Take 1 tablet (40 mg) by mouth daily  - ondansetron (ZOFRAN ODT) 4 MG ODT tab; Take 1 tablet (4 mg) by mouth every 6 hours as needed for nausea or vomiting  - dicyclomine (BENTYL) 10 MG capsule; Take 1 capsule (10 mg) by mouth every 6 hours as needed (cramping)    Back pain, unspecified back location, unspecified back pain laterality, unspecified chronicity  Use as needed  - oxyCODONE (ROXICODONE) 5 MG tablet; 1/2 tab at bedtime as needed for severe pain.    AMRIT (generalized anxiety disorder)    - busPIRone (BUSPAR) 15 MG tablet; Take 1 tablet (15 mg) by mouth 2 times daily Either 10 mg po tid or 15 mg po bid. Pt was on 15 mg po bid at home.    Other osteoporosis,  unspecified pathological fracture presence   Repeat Injections in 6 months  Need labs, BMP, Vitamin D, magnesium, phosphors.    - denosumab (PROLIA) injection 60 mg  - DX Bone Density; Future    Vascular dementia without behavioral disturbance, psychotic disturbance, mood disturbance, or anxiety, unspecified dementia severity (H)      Stage 3 chronic kidney disease, unspecified whether stage 3a or 3b CKD (H)      Oxygen dependent  Oxygen: Walking with 1 LT: 97%; 0.5 Lt: 95%, without O2: 92%     60 minutes spent reviewing chart, reviewing test results, talking with and examining patient, formulating plan, and documentation on the day of the encounter.      MED REC REQUIRED  Post Medication Reconciliation Status:       MEDICATIONS:  Continue current medications without change    Pato Friedman is a 79 year old, presenting for the following health issues:  Hospital F/U        3/26/2024    12:57 PM   Additional Questions   Roomed by Venkata HALE CMA   Accompanied by Daughter         3/26/2024    12:57 PM   Patient Reported Additional Medications   Patient reports taking the following new medications None     HPI       Hospital Follow-up Visit:    Hospital/Nursing Home/IP Rehab Facility: [facility]  Date of Admission: 02/01/24; Rehab: 02/26/24  Date of Discharge: 02/26/24; Rehab: 03/18/24  Reason(s) for Admission: Hypokalemia     Was your hospitalization related to COVID-19? No   Problems taking medications regularly:  None  Medication changes since discharge: None  Problems adhering to non-medication therapy:  None    Summary of hospitalization:  [facility] discharge summary reviewed  Diagnostic Tests/Treatments reviewed.  Follow up needed: none  Other Healthcare Providers Involved in Patient s Care:         Homecare and Physical Therapy  Update since discharge: improved.     Oxygen: Walking with 1 LT: 97%; 0.5 Lt: 95%, without O2: 92%      Plan of  care communicated with patient and family               Review of Systems  Constitutional, HEENT, cardiovascular, pulmonary, GI, , musculoskeletal, neuro, skin, endocrine and psych systems are negative, except as otherwise noted.      Objective    /79 (BP Location: Right arm, Patient Position: Chair, Cuff Size: Adult Regular)   Pulse 96   Temp 97.7  F (36.5  C) (Oral)   Resp 16   Ht 1.524 m (5')   Wt 57.6 kg (127 lb)   SpO2 98%   BMI 24.80 kg/m    Body mass index is 24.8 kg/m .  Physical Exam   GENERAL: alert and no distress  NECK: no adenopathy, no asymmetry, masses, or scars  RESP: lungs clear to auscultation - no rales, rhonchi or wheezes  CV: regular rate and rhythm, normal S1 S2, no S3 or S4, no murmur, click or rub, no peripheral edema  ABDOMEN: soft, nontender, no hepatosplenomegaly, no masses and bowel sounds normal  MS: no gross musculoskeletal defects noted, no edema  SKIN: no suspicious lesions or rashes  PSYCH: mentation appears normal, affect normal/bright    Orders Placed This Encounter   Procedures    COPD ACTION PLAN    CT Chest w/o Contrast    DX Bone Density            Signed Electronically by: Gomez Louis MD

## 2024-03-29 ENCOUNTER — PATIENT OUTREACH (OUTPATIENT)
Dept: CARE COORDINATION | Facility: CLINIC | Age: 79
End: 2024-03-29
Payer: MEDICARE

## 2024-03-29 ASSESSMENT — ACTIVITIES OF DAILY LIVING (ADL): DEPENDENT_IADLS:: CLEANING;COOKING;LAUNDRY;SHOPPING;MEAL PREPARATION;TRANSPORTATION

## 2024-03-29 NOTE — LETTER
[facility] CARE COORDINATION  [address] [facility] [address]    March 29, 2024    Idalia Bob  [address] [address]      Dear Idalia,    I am a clinic care coordinator who works with Gomez Louis MD with the [facility]. I wanted to introduce myself and provide you with my contact information for you to be able to call me with any questions or concerns. Below is a description of clinic care coordination and how I can further assist you.       The clinic care coordination team is made up of a registered nurse, , financial resource worker and community health worker who understand the health care system. The goal of clinic care coordination is to help you manage your health and improve access to the health care system. Our team works alongside your provider to assist you in determining your health and social needs. We can help you obtain health care and community resources, providing you with necessary information and education. We can work with you through any barriers and develop a care plan that helps coordinate and strengthen the communication between you and your care team.  Our services are voluntary and are offered without charge to you personally.    Please feel free to contact me with any questions or concerns regarding care coordination and what we can offer.      We are focused on providing you with the highest-quality healthcare experience possible.    Sincerely,     Mukul Carvalho MSN, RN, PHN, CCM   Primary Care Clinical RN Care Coordinator  [facility]  3/29/2024   9:44 AM  [email or web address]  Office: [phone]

## 2024-03-29 NOTE — PROGRESS NOTES
Clinic Care Coordination Contact  [facility]/Voicemail    Clinical Data: Care Coordinator Outreach    Outreach Documentation Number of Outreach Attempt   3/29/2024   9:43 AM 1       Left message on patient's voicemail with call back information and requested return call.    Plan: Care Coordinator will send care coordination introduction letter with care coordinator contact information and explanation of care coordination services via [employer]. Care Coordinator will try to reach patient again in 1-2 business days.    Mukul Carvalho MSN, RN, PHN, CCM   Primary Care Clinical RN Care Coordinator  [facility]  3/29/2024   9:44 AM  [email or web address]  Office: [phone]

## 2024-04-01 ENCOUNTER — OFFICE VISIT (OUTPATIENT)
Dept: FAMILY MEDICINE | Facility: CLINIC | Age: 79
End: 2024-04-01
Payer: MEDICARE

## 2024-04-01 ENCOUNTER — MYC REFILL (OUTPATIENT)
Dept: FAMILY MEDICINE | Facility: CLINIC | Age: 79
End: 2024-04-01

## 2024-04-01 DIAGNOSIS — N18.30 STAGE 3 CHRONIC KIDNEY DISEASE, UNSPECIFIED WHETHER STAGE 3A OR 3B CKD (H): ICD-10-CM

## 2024-04-01 DIAGNOSIS — J44.9 CHRONIC OBSTRUCTIVE PULMONARY DISEASE, UNSPECIFIED COPD TYPE (H): ICD-10-CM

## 2024-04-01 DIAGNOSIS — R10.9 ABDOMINAL CRAMPS: Primary | ICD-10-CM

## 2024-04-01 DIAGNOSIS — R19.5 ABNORMAL STOOL COLOR: ICD-10-CM

## 2024-04-01 DIAGNOSIS — Z23 NEED FOR TDAP VACCINATION: ICD-10-CM

## 2024-04-01 DIAGNOSIS — R63.0 LACK OF APPETITE: ICD-10-CM

## 2024-04-01 DIAGNOSIS — F41.1 GAD (GENERALIZED ANXIETY DISORDER): Primary | ICD-10-CM

## 2024-04-01 PROCEDURE — 99214 OFFICE O/P EST MOD 30 MIN: CPT | Performed by: FAMILY MEDICINE

## 2024-04-01 RX ORDER — OLANZAPINE 2.5 MG/1
2.5 TABLET, FILM COATED ORAL AT BEDTIME
Qty: 90 TABLET | Refills: 3 | Status: SHIPPED | OUTPATIENT
Start: 2024-04-01 | End: 2024-04-15

## 2024-04-01 RX ORDER — HYOSCYAMINE SULFATE 0.125 MG
0.12 TABLET ORAL EVERY 4 HOURS PRN
Qty: 30 TABLET | Refills: 1 | Status: SHIPPED | OUTPATIENT
Start: 2024-04-01 | End: 2024-04-10

## 2024-04-01 NOTE — PROGRESS NOTES
Assessment & Plan     Abdominal cramps   Symptom been persistent; reviewed prior work up including stool studies, upper endoscopy and sigmoidoscopy; pathology was negative for Celiac or duodenitis and Lt colon tissue negative for active inflammation, lymphocytic colitis or dysplasia. Not consistent with ischemic colitis, though has CT abdomen Pelvis planned. CBC showed moderate anemia and mild elevation of WBC. CMP essentially normal.  IBS cannot be ruled out.   Discussed further evaluation by GI. Dicylomine not helped for past week; will hold and try hyoscyamine for a few days and see if helps. Also follow up with planned CT scan. Up date in 5 days.  - hyoscyamine (LEVSIN) 0.125 MG tablet  Dispense: 30 tablet; Refill: 1  - Adult GI  Referral - Consult Only    Lack of appetite  - Adult GI  Referral - Consult Only    Abnormal stool color   Possible IBS or a malabsorptive disorder  - Adult GI  Referral - Consult Only    Stage 3 chronic kidney disease, unspecified whether stage 3a or 3b CKD (H)    -  possible related to inadequate fluid intake, will continue to monitor.    Chronic obstructive pulmonary disease, unspecified COPD type (H)    -  Uses albuterol nebs with symptoms    See Patient Instructions    Pato Friedman is a 79 year old, presenting for the following health issues:  No chief complaint on file.    Abdominal cramps(frequency IBS resulting in yellowish/green discharge)   -  stool shredded    - some cramping;  throughout the day    Concerns:   About Crohn's disease    Appetite minimal:      - no appetite.    Urination is normal  Stools;    - described as yellow/green discharge     Weight:  Wt Readings from Last 4 Encounters:   03/26/24 57.6 kg (127 lb)   02/25/24 61.7 kg (136 lb 0.4 oz)   01/22/24 59.9 kg (132 lb)          4/1/2024     1:19 PM   Additional Questions   Roomed by antonio   Accompanied by son in law     History of Present Illness       Reason for visit:   Stomach diacomfoet and yellow and green doschrage  Symptom onset:  3-7 days ago  Symptoms include:  Stomach discomfort and discharge  Symptom intensity:  Moderate  Symptom progression:  Worsening  Had these symptoms before:  Yes  Has tried/received treatment for these symptoms:  Yes  Previous treatment was successful:  No    She eats 0-1 servings of fruits and vegetables daily.She consumes 1 sweetened beverage(s) daily.She exercises with enough effort to increase her heart rate 9 or less minutes per day.  She exercises with enough effort to increase her heart rate 3 or less days per week.   She is taking medications regularly.         Review of Systems  Constitutional, HEENT, cardiovascular, pulmonary and gu systems are negative, except as otherwise noted.      Objective    /79   Pulse 96   Temp 97.7  F (36.5  C)   SpO2 98%   There is no height or weight on file to calculate BMI.  Physical Exam   GENERAL: sitting in wheelchair and in moderate distress about symptoms  RESP: lungs clear to auscultation - no rales, rhonchi or wheezes  CV: regular rate and rhythm, no murmur, click or rub, no peripheral edema  ABDOMEN: soft, tenderness in lower abdomen, no guarding or rebound, no masses and bowel sounds normal  MS: no gross musculoskeletal defects noted, no edema  PSYCH: Affect emotional    Signed Electronically by: Fernando Medina MD

## 2024-04-01 NOTE — PATIENT INSTRUCTIONS
Abdominal Cramping Plan:   - Will start Levsin 0.125 mg every 4 hours as needed (hold Bentyl for a few days)   - Follow up with the ordered CT Abdomen/Pelvis   - Priority referral to GI

## 2024-04-02 ENCOUNTER — PATIENT OUTREACH (OUTPATIENT)
Dept: CARE COORDINATION | Facility: CLINIC | Age: 79
End: 2024-04-02
Payer: MEDICARE

## 2024-04-02 NOTE — PROGRESS NOTES
Clinic Care Coordination Contact  [facility]/Voicemail    Clinical Data: Care Coordinator Outreach    Outreach Documentation Number of Outreach Attempt   3/29/2024   9:43 AM 1   4/2/2024   2:10 PM 2       Left message on patient's voicemail with call back information and requested return call.    Plan: Care Coordinator sent care coordination introduction letter on 2/27/24 via [employer]. Care Coordinator will do no further outreaches at this time.    Mukul Carvalho MSN, RN, PHN, [facility]   Primary Care Clinical RN Care Coordinator  [facility]  4/2/2024   2:11 PM  [email or web address]  Office: [phone]

## 2024-04-03 ENCOUNTER — TELEPHONE (OUTPATIENT)
Dept: GASTROENTEROLOGY | Facility: CLINIC | Age: 79
End: 2024-04-03
Payer: MEDICARE

## 2024-04-03 VITALS
SYSTOLIC BLOOD PRESSURE: 118 MMHG | OXYGEN SATURATION: 98 % | DIASTOLIC BLOOD PRESSURE: 79 MMHG | TEMPERATURE: 97.7 F | HEART RATE: 96 BPM

## 2024-04-03 NOTE — TELEPHONE ENCOUNTER
[facility] Call [facility]    Phone Message    May a detailed message be left on voicemail: Yes    Reason for Call: Other: Patient is currently scheduled on 5/9, as visit type New GI Urgent. This is outside the expected timeline for this referral. Patient has been added to the waitlist.      Action Taken: Message routed to:  Other: GI REFERRAL TRIAGE POOL     Travel Screening: Not Applicable

## 2024-04-09 ENCOUNTER — VIRTUAL VISIT (OUTPATIENT)
Dept: FAMILY MEDICINE | Facility: CLINIC | Age: 79
End: 2024-04-09
Payer: MEDICARE

## 2024-04-09 ENCOUNTER — MYC REFILL (OUTPATIENT)
Dept: FAMILY MEDICINE | Facility: CLINIC | Age: 79
End: 2024-04-09

## 2024-04-09 DIAGNOSIS — R10.9 ABDOMINAL CRAMPS: ICD-10-CM

## 2024-04-09 DIAGNOSIS — R10.84 INTRACTABLE GENERALIZED ABDOMINAL PAIN: Primary | ICD-10-CM

## 2024-04-09 DIAGNOSIS — M54.9 BACK PAIN, UNSPECIFIED BACK LOCATION, UNSPECIFIED BACK PAIN LATERALITY, UNSPECIFIED CHRONICITY: ICD-10-CM

## 2024-04-09 DIAGNOSIS — K21.00 GASTROESOPHAGEAL REFLUX DISEASE WITH ESOPHAGITIS WITHOUT HEMORRHAGE: ICD-10-CM

## 2024-04-09 DIAGNOSIS — K44.9 HIATAL HERNIA: ICD-10-CM

## 2024-04-09 PROCEDURE — 99442 PR PHYSICIAN TELEPHONE EVALUATION 11-20 MIN: CPT | Mod: 93 | Performed by: FAMILY MEDICINE

## 2024-04-09 RX ORDER — OXYCODONE HYDROCHLORIDE 5 MG/1
TABLET ORAL
Qty: 10 TABLET | Refills: 0 | Status: CANCELLED | OUTPATIENT
Start: 2024-04-09

## 2024-04-09 NOTE — PROGRESS NOTES
Idalia is a 79 year old who is being evaluated via a billable telephone visit.    How would you like to obtain your AVS? MyChart  If the video visit is dropped, the invitation should be resent by: Text to cell phone: [phone] or   CALL IF UNABLE TO JOIN   Will anyone else be joining your video visit? No  Originating Location (pt. Location): Home  Distant Location (provider location):  On-site    Assessment & Plan     Intractable generalized abdominal pain    Pain consistent with IBS; physical cause unlikely given that she can sleep through the night without pain. There has not been any weight change as well. Antispasmodics not helping much at this time. Will do low dose pain medication in meantime.     - Adult GI  Referral - Consult Only    Hiatal hernia    Noted on endoscopy; not likely to cause the type of pain she is experiencing, will have a Gi evaluation  - Adult GI  Referral - Consult Only    Gastroesophageal reflux disease with esophagitis without hemorrhage  - Adult GI  Referral - Consult Only    Back pain, unspecified back location, unspecified back pain laterality, unspecified chronicity    - stable      Subjective   Idalia is a 79 year old, presenting for the following health issues:  Follow Up  (Last seen 4/1/2024)    HPI       HPI:   Abdominal pain.   Patient is in a lot of pain (on going)   Appetite poor: vomited x 2 in past week (after eating a [employer])   BM: Not having, like a slimy discharge   Has a hiatal hernia;    CT scan: this Thursday.   Sleeps well at night   She seems to have lost some weight.   Sigmoidoscopy showed diverticulosis.      Objective           Vitals:  No vitals were obtained today due to virtual visit.    Physical Exam   =      Phone call duration: 22 minutes  Signed Electronically by: Fernando Medina MD

## 2024-04-10 DIAGNOSIS — R10.9 ABDOMINAL CRAMPS: ICD-10-CM

## 2024-04-10 RX ORDER — HYOSCYAMINE SULFATE 0.125 MG
0.12 TABLET ORAL EVERY 4 HOURS PRN
Qty: 30 TABLET | Refills: 1 | Status: SHIPPED | OUTPATIENT
Start: 2024-04-10 | End: 2024-07-15

## 2024-04-10 RX ORDER — HYOSCYAMINE SULFATE 0.125 MG
0.12 TABLET ORAL EVERY 4 HOURS PRN
Qty: 30 TABLET | Refills: 1 | OUTPATIENT
Start: 2024-04-10

## 2024-04-11 ENCOUNTER — ANCILLARY PROCEDURE (OUTPATIENT)
Dept: CT IMAGING | Facility: CLINIC | Age: 79
End: 2024-04-11
Attending: FAMILY MEDICINE
Payer: MEDICARE

## 2024-04-11 ENCOUNTER — MYC MEDICAL ADVICE (OUTPATIENT)
Dept: FAMILY MEDICINE | Facility: CLINIC | Age: 79
End: 2024-04-11
Payer: MEDICARE

## 2024-04-11 DIAGNOSIS — J18.9 COMMUNITY ACQUIRED PNEUMONIA OF LEFT UPPER LOBE OF LUNG: ICD-10-CM

## 2024-04-11 DIAGNOSIS — K58.9 IRRITABLE BOWEL SYNDROME, UNSPECIFIED TYPE: ICD-10-CM

## 2024-04-11 DIAGNOSIS — F41.1 GAD (GENERALIZED ANXIETY DISORDER): ICD-10-CM

## 2024-04-11 DIAGNOSIS — M54.9 BACK PAIN, UNSPECIFIED BACK LOCATION, UNSPECIFIED BACK PAIN LATERALITY, UNSPECIFIED CHRONICITY: ICD-10-CM

## 2024-04-11 DIAGNOSIS — R45.1 AGITATION: Primary | ICD-10-CM

## 2024-04-11 PROCEDURE — 71250 CT THORAX DX C-: CPT | Mod: MG

## 2024-04-11 RX ORDER — OXYCODONE HYDROCHLORIDE 5 MG/1
TABLET ORAL
Qty: 15 TABLET | Refills: 0 | Status: SHIPPED | OUTPATIENT
Start: 2024-04-11 | End: 2024-05-07

## 2024-04-11 NOTE — TELEPHONE ENCOUNTER
Routing to PCP    Patient was seen on 3/26 for hosp discharge. PCP gave oxycodone rx for back pain    Patient's daughter is wondering when she is next eligible for this med refill. Refill request was refused on 4/9    Trena Delacruz RN

## 2024-04-11 NOTE — TELEPHONE ENCOUNTER
Sent 15 tab more  Take 1/2 tab at bedtime as needed for sever pain  Need to last for 30 days.    Thanks

## 2024-04-12 ENCOUNTER — MYC REFILL (OUTPATIENT)
Dept: FAMILY MEDICINE | Facility: CLINIC | Age: 79
End: 2024-04-12
Payer: MEDICARE

## 2024-04-12 DIAGNOSIS — F41.1 GAD (GENERALIZED ANXIETY DISORDER): ICD-10-CM

## 2024-04-12 RX ORDER — OLANZAPINE 2.5 MG/1
2.5 TABLET, FILM COATED ORAL AT BEDTIME
Qty: 90 TABLET | Refills: 3 | OUTPATIENT
Start: 2024-04-12

## 2024-04-12 NOTE — TELEPHONE ENCOUNTER
Routing to provider seen 4/9/24. Please see patient mychart regarding CT result review and pain medication request.    Thanks,  MIRTHA Jackson RN  [facility]

## 2024-04-15 RX ORDER — OLANZAPINE 2.5 MG/1
2.5 TABLET, FILM COATED ORAL 2 TIMES DAILY PRN
Qty: 60 TABLET | Refills: 3 | Status: SHIPPED | OUTPATIENT
Start: 2024-04-15 | End: 2024-04-19

## 2024-04-15 NOTE — TELEPHONE ENCOUNTER
If was helping twice daily will change prescription; this makes sense because has pain only during the day; there could be a psychogenic component. It is complicated with fact that has dementia.   Caution daughter that Olanzapine can cause drowsiness, so patient will needs to be watched closely

## 2024-04-15 NOTE — TELEPHONE ENCOUNTER
Received call from patient's daughter. Consent to communicate on file. She is calling to follow-up on [employer] messages.     She explains that patient was receiving Olanzapine 2.5 mg tablets twice a day in the TCU, and she would like to request new prescription to have patient take twice a day as she was in the TCU. She explains that she has having more stomach pain ever since taking 1 tablet per day.    Please advise. Routing to provider patient last saw in virtual visit.     MIRTHA Morrison RN  [facility]

## 2024-04-16 ENCOUNTER — TELEPHONE (OUTPATIENT)
Dept: FAMILY MEDICINE | Facility: CLINIC | Age: 79
End: 2024-04-16
Payer: MEDICARE

## 2024-04-16 NOTE — TELEPHONE ENCOUNTER
Retail Pharmacy Prior Authorization Team   Phone: [phone]    PRIOR AUTHORIZATION DENIED    Medication: OLANZAPINE 2.5 MG PO TABS  Insurance Company: [employer] [place] - Phone [phone] Fax [phone]  Denial Date: 4/15/2024  Denial Reason(s): REQUIRES AN FDA APPROVED DX      Appeal Information: IF THE PROVIDER WOULD LIKE TO APPEAL THIS DECISION PLEASE PROVIDE THE PA TEAM WITH A LETTER OF MEDICAL NECESSITY      Patient Notified: NO

## 2024-04-18 ENCOUNTER — TRANSFERRED RECORDS (OUTPATIENT)
Dept: HEALTH INFORMATION MANAGEMENT | Facility: CLINIC | Age: 79
End: 2024-04-18
Payer: MEDICARE

## 2024-04-18 NOTE — TELEPHONE ENCOUNTER
PA for olanzapine was denied by insurance (see telephone encounter 4/16/24). Please advise.    Julienne Paris RN

## 2024-04-19 DIAGNOSIS — R45.1 AGITATION: ICD-10-CM

## 2024-04-19 DIAGNOSIS — F01.A18 MILD VASCULAR DEMENTIA WITH OTHER BEHAVIORAL DISTURBANCE (H): Primary | ICD-10-CM

## 2024-04-19 RX ORDER — OLANZAPINE 2.5 MG/1
2.5 TABLET, FILM COATED ORAL 2 TIMES DAILY PRN
Qty: 60 TABLET | Refills: 0 | Status: SHIPPED | OUTPATIENT
Start: 2024-04-19 | End: 2024-04-22 | Stop reason: ALTCHOICE

## 2024-04-19 NOTE — TELEPHONE ENCOUNTER
Patients pain causes agitation and I think the denial was because we did not specify that. I will resend with a new diagnosis.

## 2024-04-19 NOTE — TELEPHONE ENCOUNTER
See TE from today. PA initiated. Patient's daughter also called regarding PA denial and encounter was sent to provider in other TE.    Closing encounter.    MIRTHA Morrison RN  [facility]

## 2024-04-20 ENCOUNTER — MYC MEDICAL ADVICE (OUTPATIENT)
Dept: FAMILY MEDICINE | Facility: CLINIC | Age: 79
End: 2024-04-20
Payer: MEDICARE

## 2024-04-22 DIAGNOSIS — J43.9 PULMONARY EMPHYSEMA, UNSPECIFIED EMPHYSEMA TYPE (H): ICD-10-CM

## 2024-04-22 RX ORDER — ALBUTEROL SULFATE 0.83 MG/ML
2.5 SOLUTION RESPIRATORY (INHALATION) 2 TIMES DAILY PRN
Qty: 90 ML | Refills: 5 | Status: SHIPPED | OUTPATIENT
Start: 2024-04-22

## 2024-04-22 RX ORDER — OLANZAPINE 2.5 MG/1
2.5 TABLET, FILM COATED ORAL 2 TIMES DAILY PRN
Qty: 60 TABLET | Refills: 0 | OUTPATIENT
Start: 2024-04-22

## 2024-04-22 NOTE — TELEPHONE ENCOUNTER
Olanzapine, was not covered by her insurance.  This was discontinued.    Started an alternative, Brexpiprazole, take one tab at bedtime, started a low dose to try for one months     Thanks

## 2024-04-22 NOTE — TELEPHONE ENCOUNTER
Daughter and pt notified of Provider's message via [employer].    Ignacia Pinto RN  [facility]

## 2024-04-22 NOTE — TELEPHONE ENCOUNTER
I  based refill on records on her chart prescription orders have been for the 2.5 mg formulation and per her message (after TCU admission see below)> It will be okay to do the Olanzapine 2.5 mg twice daily and if there is need can be increased to 5 mg twice daily    Message from patient daughter 4/15/24:  Received call from patient's daughter. Consent to communicate on file. She is calling to follow-up on [employer] messages.      She explains that patient was receiving Olanzapine 2.5 mg tablets twice a day in the TCU, and she would like to request new prescription to have patient take twice a day as she was in the TCU. She explains that she has having more stomach pain ever since taking 1 tablet per day.     Please advise. Routing to provider patient last saw in virtual visit.      JAMES Morrison

## 2024-04-23 NOTE — TELEPHONE ENCOUNTER
Routing to      Daughter calling to request appeal be done for Zyprexa.    She will either fax or drop off Medicare appeal form for Dr Garcia to fill out and return.    Daughter does not want to take alternate medication.    When form arrives please have provider fill out and return to medicare.    Christine M Klisch, RN

## 2024-04-24 ENCOUNTER — MYC MEDICAL ADVICE (OUTPATIENT)
Dept: FAMILY MEDICINE | Facility: CLINIC | Age: 79
End: 2024-04-24
Payer: MEDICARE

## 2024-04-25 NOTE — TELEPHONE ENCOUNTER
Please see TE 4/19/24 with further communication.    JAMES JacksonN RN  [facility]

## 2024-04-30 NOTE — H&P
[facility]    History and Physical - Hospitalist Service, GOLD TEAM        Date of Admission:  2/1/2024    Assessment & Plan      Idalia Bob is a 79 year old female admitted on 2/1/2024. Pt has history of COPD, pneumonia, hypertension, dementia and hypothyroidism among others, presented with abdominal pain and found to have consolidation on CT.     Left upper lobe pneumonia   COPD  Elevated Troponin  History of pneumonia. Presents primarily with abdominal pain and weakness. Pain is in epigastric area radiating to back. Not particularly pleuritic. COVID and Influenza negative. Not much respiratory symptoms. However clear evidence of consolidation within the posterior aspect of the left upper lobe on CT compatible with pneumonia. EKG non-ischemic. Troponin trend flat. Suspect type II physiology. Will continue to follow.  - Resume home COPD regimen  - Continue Azithromycin and Ceftriaxone     Hypokalemia  Hypomagnesemia  Unclear etiology. Suspect GI losses along with decrease PO intake. Other possibilities are: Is on prn lasix but not taking recently, so diuretic likely not etiology. On PPI that could decrease mag absorption leading to K depletion. Her previous labs mag,K were in normal range. So likely not need to be stopped before further investigation if electrolytes remains low.  - Replace and monitor. If continue to be low then consider further work-up    Iron deficiency anemia    Not tolerating Iron replacement. Iron profile pending.   - If low Saturation consider IV iron replacement.     Abdominal Pain:  Loose stools  Intermittent. Associated with eating and nausea. Again noted esophageal hiatal hernia on CT otherwise unremarkable from abdomen stand point. Per family EGD few years back. Results not accessible. Denies abdominal pain at this time. Suspect pain related to hiatal hernia and may be gastritis. Currently patient on pantoprazole. Patient describes  frequent mucusy discharge without much stool. No associated cramping.   - Continue to follow.    Hypertension  Takes Olmesartan 20 mg daily.  - Not available, pharmacy will start alternate per [facility] formulary    Anxiety and depression  Takes BuSpar, Prozac  -Continue home regimen     Hypothyroidism     Takes Synthroid 50 mcg. TSH normal  -Continue    Dementia   Takes Aricept   -Continue        Diet: Combination Diet Regular Diet Adult    DVT Prophylaxis: Enoxaparin (Lovenox) SQ  Ruiz Catheter: Not present  Lines: None     Cardiac Monitoring: None  Code Status: Full Code      Clinically Significant Risk Factors Present on Admission              Disposition Plan      Expected Discharge Date: 02/03/2024                  Franco Meyer MD  Hospitalist Service, [facility]  Securely message with [employer] (more info)  Text page via [employer] Paging/Directory   See signed in provider for up to date coverage information    ______________________________________________________________________    Chief Complaint   Abdominal pain    History is obtained from the patient    History of Present Illness   Idalia Bob is a 79 year old female with history of COPD, pneumonia, hypertension, dementia and hypothyroidism among others, presents with abdominal pain  going on for couple of days. No chest pain, shortness of breath, or diarrhea. Abdominal pain associated with eating. Mostly in epigastric area. Does have heart burn for which she takes antiacid with some relief. Patient has been having frequent mucus rich bowel movements. Patient has history of such bowel movements but has increased.     Past Medical History    History reviewed. No pertinent past medical history.    Past Surgical History   History reviewed. No pertinent surgical history.    Prior to Admission Medications   Prior to Admission Medications   Prescriptions Last Dose Informant Patient Reported? Taking?    FLUoxetine (PROZAC) 40 MG capsule   No No   Sig: Take 1 capsule (40 mg) by mouth daily   Fluticasone-Umeclidin-Vilanterol (TRELEGY ELLIPTA) 200-62.5-25 MCG/ACT oral inhaler   Yes No   Sig: Inhale 1 puff into the lungs daily   acetylcysteine (N-ACETYL CYSTEINE) 600 MG CAPS capsule   Yes No   Sig: Take 1,200 mg by mouth 2 times daily   albuterol (PROVENTIL) (2.5 MG/3ML) 0.083% neb solution   Yes No   Sig: Take 2.5 mg by nebulization 2 times daily as needed (COPD)   busPIRone (BUSPAR) 10 MG tablet   Yes No   Sig: Take 10 mg by mouth 3 times daily   calcium polycarbophil (FIBERCON) 625 MG tablet   No No   Sig: Take 2 tablets (1,250 mg) by mouth daily   denosumab (PROLIA) 60 MG/ML SOSY injection   Yes No   Sig: Inject 60 mg Subcutaneous every 6 months   donepezil (ARICEPT) 10 MG tablet   No No   Sig: Take 1 tablet (10 mg) by mouth at bedtime   ferrous gluconate (FERGON) 324 (38 Fe) MG tablet   No No   Sig: Take 1 tablet (324 mg) by mouth daily (with breakfast)   furosemide (LASIX) 20 MG tablet   No No   Sig: One tab daily as needed for lower ext edema   latanoprost (XALATAN) 0.005 % ophthalmic solution   Yes No   Sig: Place 1 drop into both eyes at bedtime   levothyroxine (SYNTHROID/LEVOTHROID) 50 MCG tablet   Yes No   Sig: Take 50 mcg by mouth daily   olmesartan (BENICAR) 20 MG tablet   Yes No   Sig: Take 20 mg by mouth   pantoprazole (PROTONIX) 40 MG EC tablet   Yes No   Sig: Take 40 mg by mouth   triamcinolone (KENALOG) 0.1 % external cream   Yes No   Sig: Apply topically 2 times daily   vitamin D3 (CHOLECALCIFEROL) 50 mcg (2000 units) tablet   No No   Sig: Take 1 tablet (50 mcg) by mouth daily      Facility-Administered Medications Last Administration Doses Remaining   denosumab (PROLIA) injection 60 mg None recorded 1           Review of Systems    The 10 point Review of Systems is negative other than noted in the HPI.    Social History   I have reviewed this patient's social history and updated it with pertinent  information if needed.  Social History     Tobacco Use    Smoking status: Former     Packs/day: 1.00     Years: 40.00     Additional pack years: 0.00     Total pack years: 40.00     Types: Cigarettes     Passive exposure: Past    Smokeless tobacco: Never   Vaping Use    Vaping Use: Never used   Substance Use Topics    Alcohol use: Not Currently    Drug use: Never         Family History     No significant family history.    Allergies   Allergies   Allergen Reactions    Aspirin Nausea and Nausea and Vomiting     Stomach ache    Penicillins Itching        Physical Exam   Vital Signs: Temp: 97.4  F (36.3  C) Temp src: Oral BP: 133/89 Pulse: 106   Resp: 17 SpO2: 99 %      Weight: 0 lbs 0 oz  General Appearance: Pleasant, NAD  Eyes: PERRLA  HEENT: ATNC  Respiratory: Decreased A/E left upper lobe, no crackles or wheezing  Cardiovascular: S1, S2, no gallop  GI: Soft, mild generalized tenderness, ND, Bowel sounds normal in all quadrants  Lymph/Hematologic: mild b/l LE edema  Genitourinary: Normal  Skin: No rashes  Musculoskeletal: Normal  Neurologic: Gross motor normal  Psychiatric: AAA X 3, normal mood and affect      Medical Decision Making       90 MINUTES SPENT BY ME on the date of service doing chart review, history, exam, documentation & further activities per the note.          Data   Imaging results reviewed over the past 24 hrs:   Recent Results (from the past 24 hour(s))   CT Chest/Abdomen/Pelvis w Contrast    Narrative    EXAM: CT CHEST/ABDOMEN/PELVIS W CONTRAST  LOCATION: [facility]  DATE: 2/1/2024    INDICATION: chest and abd pain, 2 days duration, mostly mid upper abdomen radiating towards chest; hx of lower lobe pseudomonas in december  COMPARISON: None.  TECHNIQUE: CT scan of the chest, abdomen, and pelvis was performed following injection of IV contrast. Multiplanar reformats were obtained. Dose reduction techniques were used.   CONTRAST: IsoVue 370:  81mL    FINDINGS:   LUNGS AND PLEURA: Advanced centrilobular emphysema. Airspace consolidation within the posterior aspect of the left upper lobe along the major fissure minimal scarring left base. Fat-containing Bochdalek hernia right lung base.    MEDIASTINUM/AXILLAE: A few borderline-enlarged mediastinal nodes. Large esophageal hiatal hernia.    CORONARY ARTERY CALCIFICATION: Moderate.    HEPATOBILIARY: Normal.    PANCREAS: Normal.    SPLEEN: Normal.    ADRENAL GLANDS: Slight nodular thickening both adrenal glands. No discrete lesions.    KIDNEYS/BLADDER: Normal. No stones or hydronephrosis.    BOWEL: Small left inguinal hernia containing a knuckle of nondilated small bowel. No evidence for bowel obstruction. No bowel wall thickening or inflammatory changes.    LYMPH NODES: Normal.    VASCULATURE: Atherosclerotic disease abdominal aorta and iliac arteries.    PELVIC ORGANS: Normal.    MUSCULOSKELETAL: Mild scoliosis. Degenerative disc disease lumbar spine.      Impression    IMPRESSION:  1.  Airspace consolidation left upper lobe concerning for pneumonia. Follow-up CT exam after therapy recommended to confirm resolution and exclude underlying mass.  2.  Advanced emphysema.  3.  Large esophageal hiatal hernia.  4.  Atherosclerotic disease.  5.  Small left inguinal hernia.     Recent Labs   Lab 02/02/24  0715 02/01/24  2120   WBC 8.0 9.0   HGB 10.8* 9.7*   MCV 83 81    333    137   POTASSIUM 4.7 2.8*   CHLORIDE 101 104   CO2 23 18*   BUN 9.3 10.1   CR 0.83 0.70   ANIONGAP 13 15   AYUSH 9.3 7.7*   * 70   ALBUMIN  --  3.5   PROTTOTAL  --  5.5*   BILITOTAL  --  0.2   ALKPHOS  --  47   ALT  --  <5   AST  --  22   LIPASE  --  20       Opt out

## 2024-05-02 ENCOUNTER — MYC MEDICAL ADVICE (OUTPATIENT)
Dept: FAMILY MEDICINE | Facility: CLINIC | Age: 79
End: 2024-05-02
Payer: MEDICARE

## 2024-05-02 DIAGNOSIS — R60.0 LOWER EXTREMITY EDEMA: ICD-10-CM

## 2024-05-02 NOTE — LETTER
May 3, 2024      Idalia Bob  [address]   [place] [address]        To Whom It May Concern,     RE: APPEAL FOR REDETERMINATION OF MEDICATION DENIAL: ZYPREXA      I am writing on behalf of the above named patient about denial of Zyprexa.  The patient had been admitted to hospital and then discharged to TCU then home and had been on Zyprexa due to agitation with anxiety with depression and this is said to have helped a lot.    When discharged her symptoms continued but had not been discharged with any prescription for Zyprexa hence a new one was written. Given her current medical status and fact that the medication did help, I would prefer not to switch to a different medication due concerns about medication interactions/intolerance. As a result I am appealing that, we keep her on her current medication regimen.    Thanks for your consideration.    Sincerely,        Fernando Medina MD

## 2024-05-03 RX ORDER — FUROSEMIDE 20 MG
TABLET ORAL
Qty: 90 TABLET | Refills: 0 | Status: SHIPPED | OUTPATIENT
Start: 2024-05-03 | End: 2024-05-14

## 2024-05-03 NOTE — TELEPHONE ENCOUNTER
Medication Appeal Initiation    Medication: OLANZAPINE 2.5 MG PO TABS  Appeal Start Date:  5/3/2024  Insurance Company: [employer]  Insurance Phone: [phone]  Insurance Fax: [phone]  Comments:       FAXED LETTER OF MEDICAL NECESSITY AND CHART NOTES TO INSURANCE -        Patient arrived back to unit post temporary right intrajugular HD catheter. Upon arrival site was C/D/I. Within an hour, site was oozing blood. Dressing on site was then changed with a quick clot placed.

## 2024-05-03 NOTE — TELEPHONE ENCOUNTER
Refill for Lasix sent.  For Zyprexa; her insurance will only accept diagnoses that are FDA approved for it use and for non formulary would require that one has tried 3 other alternatives and failed.   I will do the appeal but not sure whether will go through.  I hope not taking Rexulti (an antipsychotic as well, please have daughter confirm)

## 2024-05-06 NOTE — TELEPHONE ENCOUNTER
MEDICATION APPEAL DENIED    Medication: OLANZAPINE 2.5 MG PO TABS  Insurance Company: [employer]  Denial Date: 5/6/2024  Denial Reason(s): MUST BE PRESCRIBED FOR A MEDICALLY ACCEPTED INDICATION OUTLINED IN MEDICARE APPROVED COMPENIDA      Second Level Appeal Information:        Patient Notified: NO    Central Prior Authorization Team ONLY: Second level appeals will be managed by the clinic staff and provider. Please contact the [facility] Prior Authorization Team if additional information about the denial is needed.

## 2024-05-07 ENCOUNTER — MYC REFILL (OUTPATIENT)
Dept: FAMILY MEDICINE | Facility: CLINIC | Age: 79
End: 2024-05-07
Payer: MEDICARE

## 2024-05-07 DIAGNOSIS — M54.9 BACK PAIN, UNSPECIFIED BACK LOCATION, UNSPECIFIED BACK PAIN LATERALITY, UNSPECIFIED CHRONICITY: ICD-10-CM

## 2024-05-09 RX ORDER — OXYCODONE HYDROCHLORIDE 5 MG/1
TABLET ORAL
Qty: 15 TABLET | Refills: 0 | Status: SHIPPED | OUTPATIENT
Start: 2024-05-09 | End: 2024-07-27

## 2024-05-13 ENCOUNTER — TELEPHONE (OUTPATIENT)
Dept: FAMILY MEDICINE | Facility: CLINIC | Age: 79
End: 2024-05-13
Payer: MEDICARE

## 2024-05-13 NOTE — TELEPHONE ENCOUNTER
Any changes in weight ?  Symptom of acute UTI    She should be seen to get evaluated and check for UTi, Anemia, ? A . [place].  May add her on my schedule tomorrow at 3 or 4 PM slot      Thanks

## 2024-05-13 NOTE — TELEPHONE ENCOUNTER
Rosy, Nurse at [facility] calling for update.     Last few days, patient asking more assistant for ADLs, transfers. Daughter asked RN to assess patient today. RN did assess today  She reports dizziness, lightheaded and weakness. Does not want to do anything. A&O x4. Requires assistance x1 staff with transfers (whereas normally she's independent)    Vitals   146/88 sitting   Pulse 110 -112 regular but at rest.   Denies feeling like heart racing or chest pain.   Respirations at 18 non-labored  O2 95 % 2L   96.4 axillary     No pain  Lung on left side had slight crackles on lower lobe  SOB with exertion (per usual)  Present bowel sounds. Had a small formed bowel movement     She did go down to dining room today for lunch. They did not monitor her intake.     Lois Younger RN on 5/13/2024 at 1:41 PM

## 2024-05-13 NOTE — TELEPHONE ENCOUNTER
Called and spoke with daughter Barbara,    Schedule appointment with Dr. Louis tomorrow at 4 pm.    Christine M Klisch, RN     No

## 2024-05-14 ENCOUNTER — OFFICE VISIT (OUTPATIENT)
Dept: FAMILY MEDICINE | Facility: CLINIC | Age: 79
End: 2024-05-14
Payer: MEDICARE

## 2024-05-14 VITALS
HEIGHT: 60 IN | RESPIRATION RATE: 24 BRPM | TEMPERATURE: 97.7 F | DIASTOLIC BLOOD PRESSURE: 89 MMHG | OXYGEN SATURATION: 96 % | HEART RATE: 106 BPM | SYSTOLIC BLOOD PRESSURE: 133 MMHG | BODY MASS INDEX: 24.94 KG/M2 | WEIGHT: 127 LBS

## 2024-05-14 DIAGNOSIS — R60.0 LOWER EXTREMITY EDEMA: ICD-10-CM

## 2024-05-14 DIAGNOSIS — R30.0 DYSURIA: Primary | ICD-10-CM

## 2024-05-14 DIAGNOSIS — F41.1 GAD (GENERALIZED ANXIETY DISORDER): ICD-10-CM

## 2024-05-14 DIAGNOSIS — R06.09 OTHER FORMS OF DYSPNEA: ICD-10-CM

## 2024-05-14 DIAGNOSIS — D50.8 OTHER IRON DEFICIENCY ANEMIA: ICD-10-CM

## 2024-05-14 DIAGNOSIS — N18.30 STAGE 3 CHRONIC KIDNEY DISEASE, UNSPECIFIED WHETHER STAGE 3A OR 3B CKD (H): ICD-10-CM

## 2024-05-14 DIAGNOSIS — E03.8 OTHER SPECIFIED HYPOTHYROIDISM: ICD-10-CM

## 2024-05-14 DIAGNOSIS — R42 DIZZINESS: ICD-10-CM

## 2024-05-14 LAB
ALBUMIN SERPL BCG-MCNC: 3.7 G/DL (ref 3.5–5.2)
ALBUMIN UR-MCNC: NEGATIVE MG/DL
ALP SERPL-CCNC: 91 U/L (ref 40–150)
ALT SERPL W P-5'-P-CCNC: 50 U/L (ref 0–50)
ANION GAP SERPL CALCULATED.3IONS-SCNC: 12 MMOL/L (ref 7–15)
APPEARANCE UR: CLEAR
AST SERPL W P-5'-P-CCNC: 45 U/L (ref 0–45)
BASOPHILS # BLD AUTO: 0 10E3/UL (ref 0–0.2)
BASOPHILS NFR BLD AUTO: 0 %
BILIRUB SERPL-MCNC: 0.4 MG/DL
BILIRUB UR QL STRIP: NEGATIVE
BUN SERPL-MCNC: 25.6 MG/DL (ref 8–23)
CALCIUM SERPL-MCNC: 9.2 MG/DL (ref 8.8–10.2)
CHLORIDE SERPL-SCNC: 105 MMOL/L (ref 98–107)
COLOR UR AUTO: YELLOW
CREAT SERPL-MCNC: 1.25 MG/DL (ref 0.51–0.95)
DEPRECATED HCO3 PLAS-SCNC: 24 MMOL/L (ref 22–29)
EGFRCR SERPLBLD CKD-EPI 2021: 43 ML/MIN/1.73M2
EOSINOPHIL # BLD AUTO: 0.6 10E3/UL (ref 0–0.7)
EOSINOPHIL NFR BLD AUTO: 6 %
ERYTHROCYTE [DISTWIDTH] IN BLOOD BY AUTOMATED COUNT: 19.5 % (ref 10–15)
GLUCOSE SERPL-MCNC: 94 MG/DL (ref 70–99)
GLUCOSE UR STRIP-MCNC: NEGATIVE MG/DL
HCT VFR BLD AUTO: 31 % (ref 35–47)
HGB BLD-MCNC: 8.6 G/DL (ref 11.7–15.7)
HGB UR QL STRIP: NEGATIVE
HOLD SPECIMEN: NORMAL
IMM GRANULOCYTES # BLD: 0.1 10E3/UL
IMM GRANULOCYTES NFR BLD: 1 %
KETONES UR STRIP-MCNC: NEGATIVE MG/DL
LEUKOCYTE ESTERASE UR QL STRIP: NEGATIVE
LYMPHOCYTES # BLD AUTO: 1.1 10E3/UL (ref 0.8–5.3)
LYMPHOCYTES NFR BLD AUTO: 11 %
MAGNESIUM SERPL-MCNC: 2 MG/DL (ref 1.7–2.3)
MCH RBC QN AUTO: 22.3 PG (ref 26.5–33)
MCHC RBC AUTO-ENTMCNC: 27.7 G/DL (ref 31.5–36.5)
MCV RBC AUTO: 80 FL (ref 78–100)
MONOCYTES # BLD AUTO: 1 10E3/UL (ref 0–1.3)
MONOCYTES NFR BLD AUTO: 10 %
NEUTROPHILS # BLD AUTO: 7.3 10E3/UL (ref 1.6–8.3)
NEUTROPHILS NFR BLD AUTO: 73 %
NITRATE UR QL: NEGATIVE
NT-PROBNP SERPL-MCNC: ABNORMAL PG/ML (ref 0–1800)
PH UR STRIP: 5.5 [PH] (ref 5–7)
PLATELET # BLD AUTO: 303 10E3/UL (ref 150–450)
POTASSIUM SERPL-SCNC: 4.7 MMOL/L (ref 3.4–5.3)
PROT SERPL-MCNC: 5.8 G/DL (ref 6.4–8.3)
RBC # BLD AUTO: 3.86 10E6/UL (ref 3.8–5.2)
SODIUM SERPL-SCNC: 141 MMOL/L (ref 135–145)
SP GR UR STRIP: 1.02 (ref 1–1.03)
TSH SERPL DL<=0.005 MIU/L-ACNC: 1.36 UIU/ML (ref 0.3–4.2)
UROBILINOGEN UR STRIP-ACNC: 0.2 E.U./DL
VIT B12 SERPL-MCNC: 569 PG/ML (ref 232–1245)
WBC # BLD AUTO: 10 10E3/UL (ref 4–11)

## 2024-05-14 PROCEDURE — G2211 COMPLEX E/M VISIT ADD ON: HCPCS | Performed by: FAMILY MEDICINE

## 2024-05-14 PROCEDURE — 36415 COLL VENOUS BLD VENIPUNCTURE: CPT | Performed by: FAMILY MEDICINE

## 2024-05-14 PROCEDURE — 82607 VITAMIN B-12: CPT | Performed by: FAMILY MEDICINE

## 2024-05-14 PROCEDURE — 93000 ELECTROCARDIOGRAM COMPLETE: CPT | Performed by: FAMILY MEDICINE

## 2024-05-14 PROCEDURE — 84443 ASSAY THYROID STIM HORMONE: CPT | Performed by: FAMILY MEDICINE

## 2024-05-14 PROCEDURE — 85025 COMPLETE CBC W/AUTO DIFF WBC: CPT | Performed by: FAMILY MEDICINE

## 2024-05-14 PROCEDURE — 80053 COMPREHEN METABOLIC PANEL: CPT | Performed by: FAMILY MEDICINE

## 2024-05-14 PROCEDURE — 81003 URINALYSIS AUTO W/O SCOPE: CPT | Performed by: FAMILY MEDICINE

## 2024-05-14 PROCEDURE — 83880 ASSAY OF NATRIURETIC PEPTIDE: CPT | Performed by: FAMILY MEDICINE

## 2024-05-14 PROCEDURE — 83735 ASSAY OF MAGNESIUM: CPT | Performed by: FAMILY MEDICINE

## 2024-05-14 PROCEDURE — 99214 OFFICE O/P EST MOD 30 MIN: CPT | Performed by: FAMILY MEDICINE

## 2024-05-14 RX ORDER — OLANZAPINE 2.5 MG/1
2.5 TABLET, FILM COATED ORAL 2 TIMES DAILY
Status: ON HOLD | COMMUNITY
Start: 2024-05-14 | End: 2024-05-24

## 2024-05-14 RX ORDER — FUROSEMIDE 20 MG
TABLET ORAL
Qty: 90 TABLET | Refills: 3 | Status: ON HOLD | OUTPATIENT
Start: 2024-05-14 | End: 2024-05-24

## 2024-05-14 RX ORDER — FERROUS GLUCONATE 324(38)MG
324 TABLET ORAL
Qty: 90 TABLET | Refills: 3 | Status: SHIPPED | OUTPATIENT
Start: 2024-05-14 | End: 2024-06-21

## 2024-05-14 RX ORDER — POTASSIUM CHLORIDE 1500 MG/1
TABLET, EXTENDED RELEASE ORAL
Qty: 60 TABLET | Refills: 2 | Status: ON HOLD | OUTPATIENT
Start: 2024-05-14 | End: 2024-06-14

## 2024-05-14 ASSESSMENT — ENCOUNTER SYMPTOMS: DIZZINESS: 1

## 2024-05-14 NOTE — PROGRESS NOTES
Assessment & Plan     Dysuria  Negative for UA    - UA Macroscopic with reflex to Microscopic and Culture - Lab Collect; Future  - UA Macroscopic with reflex to Microscopic and Culture - Lab Collect    Dizziness  No acute changes on EKG today.    Hemoglobin has decreased.  I added an iron supplement.  Will repeat his CBC in a week time.    - CBC with platelets and differential; Future  - Comprehensive metabolic panel (BMP + Alb, Alk Phos, ALT, AST, Total. Bili, TP); Future  - Comprehensive metabolic panel (BMP + Alb, Alk Phos, ALT, AST, Total. Bili, TP)  - CBC with platelets and differential  - EKG 12-lead complete w/read - Clinics  - BNP-N terminal pro; Future  - BNP-N terminal pro  - Magnesium; Future  - Vitamin B12; Future  - Magnesium  - Vitamin B12    Stage 3 chronic kidney disease, unspecified whether stage 3a or 3b CKD (H)    - Albumin Random Urine Quantitative with Creat Ratio; Future    Other specified hypothyroidism    - TSH with free T4 reflex; Future  - TSH with free T4 reflex    Other forms of dyspnea  Chronically on oxygen, I did lower her oxygen to 1 L.  - BNP-N terminal pro; Future  - BNP-N terminal pro    Lower extremity edema    - US Lower Extremity Venous Duplex Bilateral; Future  - furosemide (LASIX) 20 MG tablet; 2 tab daily for 5 days, then one tab daily as needed for lower ext edema  - potassium chloride ER (K-TAB) 20 MEQ CR tablet; Take one tab bid with Furosemide 40 mg.    Other iron deficiency anemia  No source of bleeding, she had an upper endoscopy from February 2024.  Continue with Pantoprazole.  - ferrous gluconate (FERGON) 324 (38 Fe) MG tablet; Take 1 tablet (324 mg) by mouth daily (with breakfast)    AMRIT (generalized anxiety disorder)  Continue with Zyprexa.    Oxygen saturation has improved, I lowered her oxygen to 1 L nasal cannula.      Discussed with patient, her daughter if symptoms worsening to go to the ER.    Pato Friedman is a 79 year old, presenting for the  following health issues:  Extremity Weakness, Dizziness, and Edema (Both lower extremities)  Patient has been more fatigued, more tired, feeling more dizzy for the past few days.  Lower extremity edema has increased.  She has no calf pain or tenderness.  She has no fever no chills.  Weight remains stable.  No change in her medication.  I did review her EKG with her and her daughter, no acute finding.    Hemoglobin has decreased.  However, she denies any blood in her urine or stool.  She did have an upper endoscopy February of this year.          5/14/2024     4:02 PM   Additional Questions   Roomed by Asher ROBLES MA   Accompanied by Daughter Barbara         5/14/2024     4:02 PM   Patient Reported Additional Medications   Patient reports taking the following new medications zyprexa 2.5 mg twice daily, probiotics, and fiber pill     Dizziness    History of Present Illness       Reason for visit:  Weak dizzy unble to stnd without help swollen legs    She eats 0-1 servings of fruits and vegetables daily.She consumes 0 sweetened beverage(s) daily.She exercises with enough effort to increase her heart rate 9 or less minutes per day.  She exercises with enough effort to increase her heart rate 3 or less days per week. She is missing 1 dose(s) of medications per week.     Concerned about weakness, getting worse. Pt cannot stand on her own.   Furosemide once daily for edema.   Lungs had some crackles according to home nurse.   Nurse says her bp was high.     Oxygen on 2 L currently and if oxygen needs to be lowered, needs new orders for that.         Review of Systems  Constitutional, HEENT, cardiovascular, pulmonary, GI, , musculoskeletal, neuro, skin, endocrine and psych systems are negative, except as otherwise noted.      Objective    /89 (BP Location: Right arm, Patient Position: Chair, Cuff Size: Adult Regular)   Pulse 106   Temp 97.7  F (36.5  C) (Oral)   Resp 24   Ht 1.524 m (5')   Wt 57.6 kg (127 lb)    LMP  (LMP Unknown)   SpO2 100%   Breastfeeding No   BMI 24.80 kg/m    Body mass index is 24.8 kg/m .  Physical Exam   GENERAL: alert and no distress  NECK: no adenopathy, no asymmetry, masses, or scars  RESP: lungs clear to auscultation - no rales, rhonchi or wheezes  CV: regular rate and rhythm, normal S1 S2, no S3 or S4, no murmur, click or rub, no peripheral edema  ABDOMEN: soft, nontender, no hepatosplenomegaly, no masses and bowel sounds normal  MS: +3 edema to bilaterally  No calf tenderness, no redness  SKIN: no suspicious lesions or rashes  PSYCH: mentation appears normal, affect normal/bright    Orders Placed This Encounter   Procedures    US Lower Extremity Venous Duplex Bilateral    UA Macroscopic with reflex to Microscopic and Culture - Lab Collect    Extra Tube    Extra Blue Top Tube    Extra Red Top Tube    Extra Green Top (Lithium Heparin) Tube    Extra Purple Top Tube    Comprehensive metabolic panel (BMP + Alb, Alk Phos, ALT, AST, Total. Bili, TP)    CBC with platelets and differential    Albumin Random Urine Quantitative with Creat Ratio    TSH with free T4 reflex    BNP-N terminal pro    Magnesium    Vitamin B12    EKG 12-lead complete w/read - Clinics    CBC with platelets and differential            Signed Electronically by: Gomez Louis MD

## 2024-05-14 NOTE — TELEPHONE ENCOUNTER
Rosy returning call; informed patient has appointment scheduled for today at 4pm    Albina Hawkins RN  [facility]

## 2024-05-15 ENCOUNTER — APPOINTMENT (OUTPATIENT)
Dept: ULTRASOUND IMAGING | Facility: CLINIC | Age: 79
DRG: 291 | End: 2024-05-15
Attending: STUDENT IN AN ORGANIZED HEALTH CARE EDUCATION/TRAINING PROGRAM
Payer: MEDICARE

## 2024-05-15 ENCOUNTER — APPOINTMENT (OUTPATIENT)
Dept: GENERAL RADIOLOGY | Facility: CLINIC | Age: 79
DRG: 291 | End: 2024-05-15
Attending: STUDENT IN AN ORGANIZED HEALTH CARE EDUCATION/TRAINING PROGRAM
Payer: MEDICARE

## 2024-05-15 ENCOUNTER — APPOINTMENT (OUTPATIENT)
Dept: CARDIOLOGY | Facility: CLINIC | Age: 79
DRG: 291 | End: 2024-05-15
Payer: MEDICARE

## 2024-05-15 ENCOUNTER — HOSPITAL ENCOUNTER (INPATIENT)
Facility: CLINIC | Age: 79
LOS: 9 days | Discharge: HOME HEALTH | DRG: 291 | End: 2024-05-24
Attending: STUDENT IN AN ORGANIZED HEALTH CARE EDUCATION/TRAINING PROGRAM | Admitting: HOSPITALIST
Payer: MEDICARE

## 2024-05-15 DIAGNOSIS — K21.9 GASTROESOPHAGEAL REFLUX DISEASE WITHOUT ESOPHAGITIS: ICD-10-CM

## 2024-05-15 DIAGNOSIS — J43.9 PULMONARY EMPHYSEMA, UNSPECIFIED EMPHYSEMA TYPE (H): ICD-10-CM

## 2024-05-15 DIAGNOSIS — I50.9 ACUTE ON CHRONIC CONGESTIVE HEART FAILURE, UNSPECIFIED HEART FAILURE TYPE (H): ICD-10-CM

## 2024-05-15 DIAGNOSIS — I49.1 ATRIAL PREMATURE CONTRACTIONS: ICD-10-CM

## 2024-05-15 DIAGNOSIS — R00.0 TACHYCARDIA, UNSPECIFIED: Primary | ICD-10-CM

## 2024-05-15 LAB
ALBUMIN SERPL BCG-MCNC: 3.5 G/DL (ref 3.5–5.2)
ALBUMIN SERPL BCG-MCNC: 3.5 G/DL (ref 3.5–5.2)
ALP SERPL-CCNC: 90 U/L (ref 40–150)
ALP SERPL-CCNC: 91 U/L (ref 40–150)
ALT SERPL W P-5'-P-CCNC: 58 U/L (ref 0–50)
ALT SERPL W P-5'-P-CCNC: 58 U/L (ref 0–50)
ANION GAP SERPL CALCULATED.3IONS-SCNC: 13 MMOL/L (ref 7–15)
ANION GAP SERPL CALCULATED.3IONS-SCNC: 15 MMOL/L (ref 7–15)
AST SERPL W P-5'-P-CCNC: 58 U/L (ref 0–45)
AST SERPL W P-5'-P-CCNC: 64 U/L (ref 0–45)
BASE EXCESS BLDV CALC-SCNC: -0.5 MMOL/L (ref -3–3)
BASOPHILS # BLD AUTO: 0.1 10E3/UL (ref 0–0.2)
BASOPHILS NFR BLD AUTO: 1 %
BILIRUB DIRECT SERPL-MCNC: 0.24 MG/DL (ref 0–0.3)
BILIRUB SERPL-MCNC: 0.5 MG/DL
BILIRUB SERPL-MCNC: 0.6 MG/DL
BUN SERPL-MCNC: 24.4 MG/DL (ref 8–23)
BUN SERPL-MCNC: 25.3 MG/DL (ref 8–23)
CALCIUM SERPL-MCNC: 8.8 MG/DL (ref 8.8–10.2)
CALCIUM SERPL-MCNC: 9 MG/DL (ref 8.8–10.2)
CHLORIDE SERPL-SCNC: 105 MMOL/L (ref 98–107)
CHLORIDE SERPL-SCNC: 105 MMOL/L (ref 98–107)
CREAT SERPL-MCNC: 1.06 MG/DL (ref 0.51–0.95)
CREAT SERPL-MCNC: 1.11 MG/DL (ref 0.51–0.95)
CREAT SERPL-MCNC: 1.16 MG/DL (ref 0.51–0.95)
DEPRECATED HCO3 PLAS-SCNC: 21 MMOL/L (ref 22–29)
DEPRECATED HCO3 PLAS-SCNC: 21 MMOL/L (ref 22–29)
EGFRCR SERPLBLD CKD-EPI 2021: 47 ML/MIN/1.73M2
EGFRCR SERPLBLD CKD-EPI 2021: 50 ML/MIN/1.73M2
EGFRCR SERPLBLD CKD-EPI 2021: 53 ML/MIN/1.73M2
EOSINOPHIL # BLD AUTO: 0.8 10E3/UL (ref 0–0.7)
EOSINOPHIL NFR BLD AUTO: 8 %
ERYTHROCYTE [DISTWIDTH] IN BLOOD BY AUTOMATED COUNT: 19.5 % (ref 10–15)
GLUCOSE SERPL-MCNC: 102 MG/DL (ref 70–99)
GLUCOSE SERPL-MCNC: 98 MG/DL (ref 70–99)
HCO3 BLDV-SCNC: 25 MMOL/L (ref 21–28)
HCT VFR BLD AUTO: 33.6 % (ref 35–47)
HGB BLD-MCNC: 9.4 G/DL (ref 11.7–15.7)
IMM GRANULOCYTES # BLD: 0.1 10E3/UL
IMM GRANULOCYTES NFR BLD: 1 %
INR PPP: 2.34 (ref 0.85–1.15)
LACTATE SERPL-SCNC: 1.3 MMOL/L (ref 0.7–2)
LVEF ECHO: NORMAL
LYMPHOCYTES # BLD AUTO: 1 10E3/UL (ref 0.8–5.3)
LYMPHOCYTES NFR BLD AUTO: 11 %
MAGNESIUM SERPL-MCNC: 1.9 MG/DL (ref 1.7–2.3)
MAGNESIUM SERPL-MCNC: 1.9 MG/DL (ref 1.7–2.3)
MCH RBC QN AUTO: 22.6 PG (ref 26.5–33)
MCHC RBC AUTO-ENTMCNC: 28 G/DL (ref 31.5–36.5)
MCV RBC AUTO: 81 FL (ref 78–100)
MONOCYTES # BLD AUTO: 0.8 10E3/UL (ref 0–1.3)
MONOCYTES NFR BLD AUTO: 9 %
NEUTROPHILS # BLD AUTO: 6.8 10E3/UL (ref 1.6–8.3)
NEUTROPHILS NFR BLD AUTO: 70 %
NRBC # BLD AUTO: 0 10E3/UL
NRBC BLD AUTO-RTO: 0 /100
NT-PROBNP SERPL-MCNC: ABNORMAL PG/ML (ref 0–1800)
O2/TOTAL GAS SETTING VFR VENT: 21 %
OXYHGB MFR BLDV: 32 % (ref 70–75)
PCO2 BLDV: 45 MM HG (ref 40–50)
PH BLDV: 7.35 [PH] (ref 7.32–7.43)
PHOSPHATE SERPL-MCNC: 4.1 MG/DL (ref 2.5–4.5)
PHOSPHATE SERPL-MCNC: 4.4 MG/DL (ref 2.5–4.5)
PLATELET # BLD AUTO: 289 10E3/UL (ref 150–450)
PO2 BLDV: 25 MM HG (ref 25–47)
POTASSIUM SERPL-SCNC: 3.9 MMOL/L (ref 3.4–5.3)
POTASSIUM SERPL-SCNC: 4.4 MMOL/L (ref 3.4–5.3)
PROT SERPL-MCNC: 5.6 G/DL (ref 6.4–8.3)
PROT SERPL-MCNC: 5.8 G/DL (ref 6.4–8.3)
RBC # BLD AUTO: 4.16 10E6/UL (ref 3.8–5.2)
SAO2 % BLDV: 33 % (ref 70–75)
SODIUM SERPL-SCNC: 139 MMOL/L (ref 135–145)
SODIUM SERPL-SCNC: 141 MMOL/L (ref 135–145)
TROPONIN T SERPL HS-MCNC: 101 NG/L
TROPONIN T SERPL HS-MCNC: 98 NG/L
WBC # BLD AUTO: 9.7 10E3/UL (ref 4–11)

## 2024-05-15 PROCEDURE — 94640 AIRWAY INHALATION TREATMENT: CPT

## 2024-05-15 PROCEDURE — 84484 ASSAY OF TROPONIN QUANT: CPT

## 2024-05-15 PROCEDURE — 93970 EXTREMITY STUDY: CPT

## 2024-05-15 PROCEDURE — 999N000157 HC STATISTIC RCP TIME EA 10 MIN

## 2024-05-15 PROCEDURE — 93308 TTE F-UP OR LMTD: CPT | Performed by: STUDENT IN AN ORGANIZED HEALTH CARE EDUCATION/TRAINING PROGRAM

## 2024-05-15 PROCEDURE — 36415 COLL VENOUS BLD VENIPUNCTURE: CPT

## 2024-05-15 PROCEDURE — 83605 ASSAY OF LACTIC ACID: CPT

## 2024-05-15 PROCEDURE — 82248 BILIRUBIN DIRECT: CPT

## 2024-05-15 PROCEDURE — 250N000009 HC RX 250

## 2024-05-15 PROCEDURE — 250N000013 HC RX MED GY IP 250 OP 250 PS 637

## 2024-05-15 PROCEDURE — 84100 ASSAY OF PHOSPHORUS: CPT

## 2024-05-15 PROCEDURE — 94640 AIRWAY INHALATION TREATMENT: CPT | Mod: 76

## 2024-05-15 PROCEDURE — 80048 BASIC METABOLIC PNL TOTAL CA: CPT

## 2024-05-15 PROCEDURE — 999N000208 ECHOCARDIOGRAM COMPLETE

## 2024-05-15 PROCEDURE — 250N000011 HC RX IP 250 OP 636

## 2024-05-15 PROCEDURE — 82805 BLOOD GASES W/O2 SATURATION: CPT | Performed by: STUDENT IN AN ORGANIZED HEALTH CARE EDUCATION/TRAINING PROGRAM

## 2024-05-15 PROCEDURE — 82565 ASSAY OF CREATININE: CPT

## 2024-05-15 PROCEDURE — 36415 COLL VENOUS BLD VENIPUNCTURE: CPT | Performed by: STUDENT IN AN ORGANIZED HEALTH CARE EDUCATION/TRAINING PROGRAM

## 2024-05-15 PROCEDURE — 99285 EMERGENCY DEPT VISIT HI MDM: CPT | Mod: 25 | Performed by: STUDENT IN AN ORGANIZED HEALTH CARE EDUCATION/TRAINING PROGRAM

## 2024-05-15 PROCEDURE — 83880 ASSAY OF NATRIURETIC PEPTIDE: CPT | Performed by: STUDENT IN AN ORGANIZED HEALTH CARE EDUCATION/TRAINING PROGRAM

## 2024-05-15 PROCEDURE — 71046 X-RAY EXAM CHEST 2 VIEWS: CPT

## 2024-05-15 PROCEDURE — 83735 ASSAY OF MAGNESIUM: CPT

## 2024-05-15 PROCEDURE — 93306 TTE W/DOPPLER COMPLETE: CPT | Mod: 26 | Performed by: INTERNAL MEDICINE

## 2024-05-15 PROCEDURE — 93010 ELECTROCARDIOGRAM REPORT: CPT | Performed by: STUDENT IN AN ORGANIZED HEALTH CARE EDUCATION/TRAINING PROGRAM

## 2024-05-15 PROCEDURE — 93970 EXTREMITY STUDY: CPT | Mod: 26 | Performed by: STUDENT IN AN ORGANIZED HEALTH CARE EDUCATION/TRAINING PROGRAM

## 2024-05-15 PROCEDURE — 250N000011 HC RX IP 250 OP 636: Performed by: STUDENT IN AN ORGANIZED HEALTH CARE EDUCATION/TRAINING PROGRAM

## 2024-05-15 PROCEDURE — 80069 RENAL FUNCTION PANEL: CPT | Performed by: STUDENT IN AN ORGANIZED HEALTH CARE EDUCATION/TRAINING PROGRAM

## 2024-05-15 PROCEDURE — 93005 ELECTROCARDIOGRAM TRACING: CPT | Performed by: STUDENT IN AN ORGANIZED HEALTH CARE EDUCATION/TRAINING PROGRAM

## 2024-05-15 PROCEDURE — 93308 TTE F-UP OR LMTD: CPT | Mod: 26 | Performed by: STUDENT IN AN ORGANIZED HEALTH CARE EDUCATION/TRAINING PROGRAM

## 2024-05-15 PROCEDURE — 99223 1ST HOSP IP/OBS HIGH 75: CPT | Mod: AI | Performed by: HOSPITALIST

## 2024-05-15 PROCEDURE — 120N000002 HC R&B MED SURG/OB UMMC

## 2024-05-15 PROCEDURE — 99223 1ST HOSP IP/OBS HIGH 75: CPT | Mod: GC | Performed by: INTERNAL MEDICINE

## 2024-05-15 PROCEDURE — 85025 COMPLETE CBC W/AUTO DIFF WBC: CPT | Performed by: STUDENT IN AN ORGANIZED HEALTH CARE EDUCATION/TRAINING PROGRAM

## 2024-05-15 PROCEDURE — C8929 TTE W OR WO FOL WCON,DOPPLER: HCPCS

## 2024-05-15 PROCEDURE — 255N000002 HC RX 255 OP 636: Performed by: INTERNAL MEDICINE

## 2024-05-15 PROCEDURE — 71046 X-RAY EXAM CHEST 2 VIEWS: CPT | Mod: 26 | Performed by: RADIOLOGY

## 2024-05-15 PROCEDURE — 85610 PROTHROMBIN TIME: CPT | Performed by: STUDENT IN AN ORGANIZED HEALTH CARE EDUCATION/TRAINING PROGRAM

## 2024-05-15 RX ORDER — PANTOPRAZOLE SODIUM 40 MG/1
40 TABLET, DELAYED RELEASE ORAL DAILY
Status: DISCONTINUED | OUTPATIENT
Start: 2024-05-15 | End: 2024-05-24 | Stop reason: HOSPADM

## 2024-05-15 RX ORDER — FUROSEMIDE 20 MG
40 TABLET ORAL DAILY
Status: DISCONTINUED | OUTPATIENT
Start: 2024-05-15 | End: 2024-05-15

## 2024-05-15 RX ORDER — OLANZAPINE 2.5 MG/1
2.5 TABLET, FILM COATED ORAL 2 TIMES DAILY
Status: DISCONTINUED | OUTPATIENT
Start: 2024-05-15 | End: 2024-05-24 | Stop reason: HOSPADM

## 2024-05-15 RX ORDER — VITAMIN B COMPLEX
50 TABLET ORAL DAILY
Status: DISCONTINUED | OUTPATIENT
Start: 2024-05-16 | End: 2024-05-24 | Stop reason: HOSPADM

## 2024-05-15 RX ORDER — FUROSEMIDE 10 MG/ML
40 INJECTION INTRAMUSCULAR; INTRAVENOUS ONCE
Status: COMPLETED | OUTPATIENT
Start: 2024-05-15 | End: 2024-05-15

## 2024-05-15 RX ORDER — ACETAMINOPHEN 325 MG/1
325-650 TABLET ORAL EVERY 6 HOURS PRN
COMMUNITY

## 2024-05-15 RX ORDER — LACTOBACILLUS RHAMNOSUS GG 10B CELL
1 CAPSULE ORAL DAILY
COMMUNITY

## 2024-05-15 RX ORDER — IPRATROPIUM BROMIDE AND ALBUTEROL SULFATE 2.5; .5 MG/3ML; MG/3ML
3 SOLUTION RESPIRATORY (INHALATION) 4 TIMES DAILY
Status: DISCONTINUED | OUTPATIENT
Start: 2024-05-15 | End: 2024-05-16

## 2024-05-15 RX ORDER — AMOXICILLIN 250 MG
2 CAPSULE ORAL 2 TIMES DAILY PRN
Status: DISCONTINUED | OUTPATIENT
Start: 2024-05-15 | End: 2024-05-24 | Stop reason: HOSPADM

## 2024-05-15 RX ORDER — CALCIUM CARBONATE 500 MG/1
1000 TABLET, CHEWABLE ORAL 4 TIMES DAILY PRN
Status: DISCONTINUED | OUTPATIENT
Start: 2024-05-15 | End: 2024-05-15

## 2024-05-15 RX ORDER — LATANOPROST 50 UG/ML
1 SOLUTION/ DROPS OPHTHALMIC AT BEDTIME
Status: DISCONTINUED | OUTPATIENT
Start: 2024-05-15 | End: 2024-05-24 | Stop reason: HOSPADM

## 2024-05-15 RX ORDER — LIDOCAINE 40 MG/G
CREAM TOPICAL
Status: DISCONTINUED | OUTPATIENT
Start: 2024-05-15 | End: 2024-05-24 | Stop reason: HOSPADM

## 2024-05-15 RX ORDER — IPRATROPIUM BROMIDE AND ALBUTEROL SULFATE 2.5; .5 MG/3ML; MG/3ML
3 SOLUTION RESPIRATORY (INHALATION) 4 TIMES DAILY
Status: DISCONTINUED | OUTPATIENT
Start: 2024-05-15 | End: 2024-05-15

## 2024-05-15 RX ORDER — CALCIUM POLYCARBOPHIL 625 MG
2 TABLET ORAL DAILY
COMMUNITY

## 2024-05-15 RX ORDER — LISINOPRIL 2.5 MG/1
2.5 TABLET ORAL DAILY
Status: DISCONTINUED | OUTPATIENT
Start: 2024-05-15 | End: 2024-05-18

## 2024-05-15 RX ORDER — BUSPIRONE HYDROCHLORIDE 5 MG/1
15 TABLET ORAL 2 TIMES DAILY
Status: DISCONTINUED | OUTPATIENT
Start: 2024-05-15 | End: 2024-05-24 | Stop reason: HOSPADM

## 2024-05-15 RX ORDER — CALCIUM CARBONATE 500 MG/1
500 TABLET, CHEWABLE ORAL 4 TIMES DAILY PRN
Status: DISCONTINUED | OUTPATIENT
Start: 2024-05-15 | End: 2024-05-24 | Stop reason: HOSPADM

## 2024-05-15 RX ORDER — BUDESONIDE 1 MG/2ML
1 INHALANT ORAL 2 TIMES DAILY
Status: DISCONTINUED | OUTPATIENT
Start: 2024-05-15 | End: 2024-05-20

## 2024-05-15 RX ORDER — ACETAMINOPHEN 325 MG/1
975 TABLET ORAL EVERY 6 HOURS PRN
Status: DISCONTINUED | OUTPATIENT
Start: 2024-05-15 | End: 2024-05-20

## 2024-05-15 RX ORDER — ENOXAPARIN SODIUM 100 MG/ML
40 INJECTION SUBCUTANEOUS EVERY 24 HOURS
Status: DISCONTINUED | OUTPATIENT
Start: 2024-05-15 | End: 2024-05-17

## 2024-05-15 RX ORDER — ONDANSETRON 2 MG/ML
INJECTION INTRAMUSCULAR; INTRAVENOUS
Status: DISCONTINUED
Start: 2024-05-15 | End: 2024-05-15 | Stop reason: HOSPADM

## 2024-05-15 RX ORDER — LEVOTHYROXINE SODIUM 25 UG/1
50 TABLET ORAL DAILY
Status: DISCONTINUED | OUTPATIENT
Start: 2024-05-15 | End: 2024-05-24 | Stop reason: HOSPADM

## 2024-05-15 RX ORDER — IPRATROPIUM BROMIDE AND ALBUTEROL SULFATE 2.5; .5 MG/3ML; MG/3ML
1 SOLUTION RESPIRATORY (INHALATION) EVERY 6 HOURS PRN
Status: ON HOLD | COMMUNITY
End: 2024-06-10

## 2024-05-15 RX ORDER — POLYETHYLENE GLYCOL 3350 17 G/17G
17 POWDER, FOR SOLUTION ORAL 2 TIMES DAILY PRN
Status: DISCONTINUED | OUTPATIENT
Start: 2024-05-15 | End: 2024-05-24 | Stop reason: HOSPADM

## 2024-05-15 RX ORDER — ACETAMINOPHEN 500 MG
1000 TABLET ORAL DAILY
Status: ON HOLD | COMMUNITY
End: 2024-05-24

## 2024-05-15 RX ORDER — AMOXICILLIN 250 MG
1 CAPSULE ORAL 2 TIMES DAILY PRN
Status: DISCONTINUED | OUTPATIENT
Start: 2024-05-15 | End: 2024-05-24 | Stop reason: HOSPADM

## 2024-05-15 RX ORDER — DONEPEZIL HYDROCHLORIDE 10 MG/1
10 TABLET, FILM COATED ORAL AT BEDTIME
Status: DISCONTINUED | OUTPATIENT
Start: 2024-05-15 | End: 2024-05-24 | Stop reason: HOSPADM

## 2024-05-15 RX ORDER — HYOSCYAMINE SULFATE 0.125 MG
125 TABLET ORAL EVERY 4 HOURS PRN
Status: DISCONTINUED | OUTPATIENT
Start: 2024-05-15 | End: 2024-05-24 | Stop reason: HOSPADM

## 2024-05-15 RX ORDER — ALBUTEROL SULFATE 0.83 MG/ML
2.5 SOLUTION RESPIRATORY (INHALATION) 2 TIMES DAILY PRN
Status: DISCONTINUED | OUTPATIENT
Start: 2024-05-15 | End: 2024-05-24 | Stop reason: HOSPADM

## 2024-05-15 RX ORDER — BUDESONIDE 1 MG/2ML
1 INHALANT ORAL 2 TIMES DAILY PRN
Status: ON HOLD | COMMUNITY
End: 2024-06-10

## 2024-05-15 RX ADMIN — DONEPEZIL HYDROCHLORIDE 10 MG: 10 TABLET, FILM COATED ORAL at 20:54

## 2024-05-15 RX ADMIN — FUROSEMIDE 10 MG/HR: 10 INJECTION, SOLUTION INTRAMUSCULAR; INTRAVENOUS at 20:46

## 2024-05-15 RX ADMIN — FUROSEMIDE 40 MG: 10 INJECTION, SOLUTION INTRAVENOUS at 14:22

## 2024-05-15 RX ADMIN — IPRATROPIUM BROMIDE AND ALBUTEROL SULFATE 3 ML: .5; 3 SOLUTION RESPIRATORY (INHALATION) at 19:50

## 2024-05-15 RX ADMIN — ENOXAPARIN SODIUM 40 MG: 40 INJECTION SUBCUTANEOUS at 14:23

## 2024-05-15 RX ADMIN — HUMAN ALBUMIN MICROSPHERES AND PERFLUTREN 6 ML: 10; .22 INJECTION, SOLUTION INTRAVENOUS at 13:59

## 2024-05-15 RX ADMIN — OLANZAPINE 2.5 MG: 2.5 TABLET, FILM COATED ORAL at 20:52

## 2024-05-15 RX ADMIN — LISINOPRIL 2.5 MG: 2.5 TABLET ORAL at 20:52

## 2024-05-15 RX ADMIN — LATANOPROST 1 DROP: 50 SOLUTION OPHTHALMIC at 21:04

## 2024-05-15 RX ADMIN — PANTOPRAZOLE SODIUM 40 MG: 40 TABLET, DELAYED RELEASE ORAL at 14:23

## 2024-05-15 RX ADMIN — BUDESONIDE 1 MG: 1 SUSPENSION RESPIRATORY (INHALATION) at 19:50

## 2024-05-15 RX ADMIN — BUSPIRONE HYDROCHLORIDE 15 MG: 5 TABLET ORAL at 20:53

## 2024-05-15 RX ADMIN — IPRATROPIUM BROMIDE AND ALBUTEROL SULFATE 3 ML: .5; 3 SOLUTION RESPIRATORY (INHALATION) at 15:00

## 2024-05-15 ASSESSMENT — ACTIVITIES OF DAILY LIVING (ADL)
ADLS_ACUITY_SCORE: 38
DEPENDENT_IADLS:: CLEANING;COOKING;LAUNDRY;SHOPPING;MEAL PREPARATION;MEDICATION MANAGEMENT;TRANSPORTATION

## 2024-05-15 ASSESSMENT — COLUMBIA-SUICIDE SEVERITY RATING SCALE - C-SSRS
1. IN THE PAST MONTH, HAVE YOU WISHED YOU WERE DEAD OR WISHED YOU COULD GO TO SLEEP AND NOT WAKE UP?: NO
6. HAVE YOU EVER DONE ANYTHING, STARTED TO DO ANYTHING, OR PREPARED TO DO ANYTHING TO END YOUR LIFE?: NO
2. HAVE YOU ACTUALLY HAD ANY THOUGHTS OF KILLING YOURSELF IN THE PAST MONTH?: NO

## 2024-05-15 NOTE — ED TRIAGE NOTES
BIBA from [facility] with abnormal labs and SOB X1 week. Denies chest pain. Pt instructed to come to ER to get cardiac workup. Hx of COPD, baseline 2L O2.

## 2024-05-15 NOTE — MEDICATION SCRIBE - ADMISSION MEDICATION HISTORY
Medication Scribe Admission Medication History    Admission medication history is complete. The information provided in this note is only as accurate as the sources available at the time of the update.    Information Source(s): Patient, Family member, and CareEverywhere/SureScripts via in-person    Pertinent Information:   -Talked mainly to Pt's daughter Barbara about medications, as she is the one who sets up the medications  -Barbara stated that Pt has not started taking her Potassium chloride 20 MEQ or Ferrous Gluconate 325 mg    Changes made to PTA medication list:  Added: Probiotic, fiber, Tylenol 500 mg  Deleted: Acetylcysteine 600 mg  Changed: Tylenol 325 mg (TID) --> Tylenol 325 mg (TID PRN), Budesonide 1 mg/2 mL (BID) --> Budesonide 1 mg/2 mL (BID PRN), Duoneb (4 times daily) --> Duoneb (4 times daily PRN),      Allergies reviewed with patient and updates made in EHR: yes    Medication History Completed By: Rupinder Zhu 5/15/2024 5:44 PM    PTA Med List   Medication Sig Last Dose    acetaminophen (TYLENOL) 325 MG tablet Take 325-650 mg by mouth every 6 hours as needed for mild pain Unknown at unknown    acetaminophen (TYLENOL) 500 MG tablet Take 1,000 mg by mouth daily 5/14/2024 at unknown    albuterol (PROVENTIL) (2.5 MG/3ML) 0.083% neb solution Take 1 vial (2.5 mg) by nebulization 2 times daily as needed (COPD) Unknown at unknown    benzocaine-menthol (CHLORASEPTIC) 6-10 MG lozenge Place 1 lozenge inside cheek every hour as needed for sore throat Unknown at unknown    budesonide (PULMICORT) 1 MG/2ML neb solution Take 1 mg by nebulization 2 times daily as needed Unknown at unknown    busPIRone (BUSPAR) 15 MG tablet Take 15 mg by mouth 2 times daily 5/14/2024 at unknown    calcium carbonate (TUMS) 500 MG chewable tablet Take 1 tablet (500 mg) by mouth 4 times daily as needed for heartburn Unknown at unknown    Calcium Polycarbophil (FIBER) 625 MG tablet Take 2 tablets by mouth daily 5/14/2024 at unknown     diphenhydrAMINE-zinc acetate (BENADRYL) 2-0.1 % external cream Apply topically 3 times daily as needed for itching Unknown at unknown    donepezil (ARICEPT) 10 MG tablet Take 1 tablet (10 mg) by mouth at bedtime 5/14/2024 at unknown    ferrous gluconate (FERGON) 324 (38 Fe) MG tablet Take 1 tablet (324 mg) by mouth daily (with breakfast)     FLUoxetine (PROZAC) 40 MG capsule Take 1 capsule (40 mg) by mouth daily 5/14/2024 at unknown    furosemide (LASIX) 20 MG tablet 2 tab daily for 5 days, then one tab daily as needed for lower ext edema 5/14/2024 at unknown    hyoscyamine (LEVSIN) 0.125 MG tablet TAKE 1 TABLET (125 MCG) BY MOUTH EVERY 4 HOURS AS NEEDED FOR CRAMPING OR DIARRHEA Unknown at unknown    ipratropium - albuterol 0.5 mg/2.5 mg/3 mL (DUONEB) 0.5-2.5 (3) MG/3ML neb solution Take 1 vial by nebulization every 6 hours as needed for shortness of breath, wheezing or cough Unknown at unknown    lactobacillus rhamnosus, GG, (CULTURELL) capsule Take 1 capsule by mouth daily 5/14/2024 at unknown    latanoprost (XALATAN) 0.005 % ophthalmic solution Place 1 drop into both eyes at bedtime 5/14/2024 at pm    levothyroxine (SYNTHROID/LEVOTHROID) 50 MCG tablet Take 1 tablet (50 mcg) by mouth daily 5/15/2024 at am    melatonin 1 MG TABS tablet Take 1 tablet (1 mg) by mouth nightly as needed for sleep Unknown at unknown    OLANZapine (ZYPREXA) 2.5 MG tablet Take 1 tablet (2.5 mg) by mouth 2 times daily 5/14/2024 at pm    oxyCODONE (ROXICODONE) 5 MG tablet 1/2 tab at bedtime as needed for severe pain. ( Need to last for one month ) Unknown at unknown    pantoprazole (PROTONIX) 40 MG EC tablet Take 1 tablet (40 mg) by mouth daily 5/14/2024 at unknown    potassium chloride ER (K-TAB) 20 MEQ CR tablet Take one tab bid with Furosemide 40 mg.     senna-docusate (SENOKOT-S/PERICOLACE) 8.6-50 MG tablet Take 1 tablet by mouth 2 times daily as needed for constipation Unknown at unknown    triamcinolone (KENALOG) 0.1 % external cream  Apply topically 2 times daily as needed for irritation Unknown at unknown    vitamin D3 (CHOLECALCIFEROL) 50 mcg (2000 units) tablet Take 1 tablet (50 mcg) by mouth daily 5/14/2024 at unknown

## 2024-05-15 NOTE — CONSULTS
Care Management Initial Consult    General Information  Assessment completed with: Patient, Children, pt, Idalia, harish Jmienez and Corie  Type of CM/SW Visit: Initial Assessment    Primary Care Provider verified and updated as needed: Yes   Readmission within the last 30 days: no previous admission in last 30 days         Advance Care Planning: Advance Care Planning Reviewed: no concerns identified (pt has Health care directive.)       Communication Assessment  Patient's communication style: spoken language (English or Bilingual)      Cognitive  Cognitive/Neuro/Behavioral: WDL                      Living Environment:   People in home: spouse, facility resident     Current living Arrangements: assisted living  [facility]   Able to return to prior arrangements: yes    Family/Social Support:  Care provided by: self, other (see comments) (assisted facility staff)  Provides care for: no one, unable/limited ability to care for self  Marital Status:   , Children          Description of Support System: Supportive, Involved       Current Resources:   Patient receiving home care services: No     Community Resources: Housekeeping/Chore Agency  Equipment currently used at home: grab bar, tub/shower, walker, standard, wheelchair, manual (uses w/c for distance)  Supplies currently used at home:      Employment/Financial:  Employment Status: retired        Financial Concerns: none   Referral to Financial Worker: No     Does the patient's insurance plan have a 3 day qualifying hospital stay waiver?  No    Lifestyle & Psychosocial Needs:  Social Determinants of Health     Food Insecurity: Low Risk  (1/22/2024)    Food Insecurity     Within the past 12 months, did you worry that your food would run out before you got money to buy more?: No     Within the past 12 months, did the food you bought just not last and you didn t have money to get more?: No   Depression: Not at risk (1/22/2024)    PHQ-2      PHQ-2 Score: 0   Housing Stability: Low Risk  (1/22/2024)    Housing Stability     Do you have housing? : Yes     Are you worried about losing your housing?: No   Tobacco Use: Medium Risk (5/14/2024)    Patient History     Smoking Tobacco Use: Former     Smokeless Tobacco Use: Never     Passive Exposure: Past   Financial Resource Strain: Low Risk  (1/22/2024)    Financial Resource Strain     Within the past 12 months, have you or your family members you live with been unable to get utilities (heat, electricity) when it was really needed?: No   Alcohol Use: Not on file   Transportation Needs: Low Risk  (1/22/2024)    Transportation Needs     Within the past 12 months, has lack of transportation kept you from medical appointments, getting your medicines, non-medical meetings or appointments, work, or from getting things that you need?: No   Physical Activity: Not on file   Interpersonal Safety: Not on file   Stress: Not on file   Social Connections: Socially Integrated (11/21/2023)    [facility] [employer] [facility] [employer] [facility] [employer]  [employer] [facility]    Social Connections     Frequency of Communication with Friends and Family: 0   Health Literacy: Not on file     Functional Status:  Prior to admission patient needed assistance:   Dependent ADLs:: Ambulation-walker, Wheelchair-independent  Dependent IADLs:: Cleaning, Cooking, Laundry, Shopping, Meal Preparation, Medication Management, Transportation (pt receives assistance from [facility])    Mental Health Status:  Mental Health Status: No Current Concerns       Chemical Dependency Status:  Chemical Dependency Status: No Current Concerns         Values/Beliefs:  Spiritual, Cultural Beliefs, Gnosticist Practices, Values that affect care:               Additional Information:  RNCC met with pt and 2 dtrsBarbara and Corie in the ED to assess discharge needs and complete elevated risk score assessment.    Pt lives at the [facility]  assisted living facility with spouse.  Pt sated she receives cleaning, meals, and laundry service.  Pt family provides transportation.  Pt is not receiving any home RN or PT service at this time.  Barbara sated the facility can provides if needed.   Pt uses walker and wheelchair for distance walking.  Pt has been at rehab facility in the past, at [facility] rehab.  Discharge needs are not known at this time.  PT/OT eval has been ordered.  RNCC discussed about TCU vs home with home care vs home with OP rehab discharge options.  RNCC informed pt and dtrs that RNCC/SW will cont. to follow the plan of care and assist with the discharge planning.  Pt and dtrs agreed.    RNCC shared info. about discount parking rates options.    Parveen Levi RN, PHN, BSN  4A and 4E/ ICU  Care Coordinator  Phone: [phone]  Pager: [phone]      SEARCHABLE in [facility] - search CARE COORDINATOR       [place] & [place] ([id number]) Saturday & Sunday; ([id number]) [facility] Recognized Holidays     Units: 5A Onc Vocera & 5C Vocera Pager: [phone]    Units: 6B Vocera & 6C Vocera  Pager: [phone]    Units: 7A SOT RNCC Vocera, 7B Med Surg Vocera, & 7C Med Surg Vocera  Pager: [phone]    Units: 6A Vocera & 4A CVICU Vocera, 4C MICU Vocera, and 4E SICU Vocera   Pager: [phone]    Units: 5 Ortho Vocera & 5 Med Surg Vocera  Pager: [phone]    Units: 6 Med Surg Vocera & 8 Med Surg Vocera  Pager [phone]

## 2024-05-15 NOTE — PROGRESS NOTES
CLINICAL NUTRITION SERVICES - BRIEF NOTE    Received consult for Congestive Heart Failure (CHF) - Dietitian to instruct patient on 2 gram sodium diet (automatic consult from order set). Education not indicated at this time due to patient seen by/follows with [facility].       RD will follow per LOS protocol or if re-consulted.     Adelia Mattson MS, RD, LD, [facility]    6C (beds 8266-1690) + 7C (beds 1765-5933) + ED   Available in Vocera by name or unit dietitian  No longer available via pager

## 2024-05-15 NOTE — H&P
I was present with the resident during the history and exam.  I discussed the case with the resident and agree with the findings as documented in the assessment and plan.    Sina Landrum MD  Hospital Medicine  Date of Service (when I saw the patient): 05/15/24        Resident/Fellow Attestation   I, LATRICE LEWIS MD, was present with the medical/SAY student who participated in the service and in the documentation of the note.  I have verified the history and personally performed the physical exam and medical decision making.  I agree with the assessment and plan of care as documented in the note.      Latrice Lewis MD  PGY1, Internal Medicine & Pediatrics Residency  [facility]  Pager: [phone]    Date of Service (when I saw the patient): 05/15/24    [facility]    History and Physical - Medicine Service, [facility] TEAM 2       Date of Admission:  5/15/2024    Assessment & Plan      Idalia Bob is a 79 year old female with a history of COPD, psuedomonal upper lobe pneumonia (12/2023), hypertension and dementia, admitted on 5/15/2024 for newly-diagnosed HFrEF exacerbation.     #Acute decompensated HFrEF (10-15%)  #Suspected cardiorenal syndrome   - 4+ bilateral pitting lower extremity edema, negative US doppler for bilateral DVT  - Elevated BNP 26,696, 31,5556 on 5/14   - ECHO 5/15 showed EF 10-15%, decreased left ventricular function. Severe diffuse hypokinesis is present   - Last echo 2/12/2024 showed normal EF 55-60%  - Cardiology Heart Failure consult   - Started on 40 mg IV Lasix in ED, transitioned to Lasix ggt @ 10 mg/hr PM of 5/15   - Decrease to 5 mg/hr if Uop over 250 ml/hr  - Lisinopril 2.5 mg every day   - monitor I&Os and weight   - recheck LFTs, creatinine in AM  - monitor electrolytes, recheck CMP, phos, mag BID and PRN  - NPO at midnight, hold Lovenox for possible RHC on 5/16    #Chronic Respiratory Failure with Hypoxia     #Pleural  effusion  - Chest XR showed continued left pleural effusion w trace increased small right pleural effusion.   - Likely secondary to acute decompensated HF     #Elevated troponin   Troponin peaked at 101 without symptoms or EKG findings of ACS, likely 2/2 demand ischemia in setting of ADHF.  - Cardiac telemetry during active diuresis    Chronic/Stable/Resolving:     #COPD   - continue budesonide 1mg neb BID  - continue duoneb 3ml QID    #Anemia   -Hb 9.7 (5/15), no signs of bleeding  - Iron and iron binding capacity study 3 month ago showed iron of 26  - continue ferrous gluconate 324 (38 Fe) MG tablet     #Hypothyrodism   - Continue levothyrozine 50mcg daily     #Dementia  - Continue donepezil 10mg daily    #CKD, stage 3  #GERD  - Continue prantoprazole 40mg daily           Diet: Combination Diet 2 gm NA Diet; No Caffeine Diet (and additional linked orders)  Fluid restriction 1800 ML FLUID (and additional linked orders)  DVT Prophylaxis: Enoxaparin (Lovenox) subcutaneous 40mg Q24   Ruiz Catheter: Not present  Fluids: None   Lines: None     Cardiac Monitoring: ACTIVE order. Indication: Acute decompensated heart failure (48 hours)  Code Status: No CPR- Do NOT IntubateDNR/DNI    Clinically Significant Risk Factors Present on Admission               # Coagulation Defect: INR = 2.34 (Ref range: 0.85 - 1.15) and/or PTT = N/A, will monitor for bleeding    # Hypertension: Noted on problem list  # Acute heart failure with preserved ejection fraction: heart failure noted on problem list, last echo with EF >50%, and receiving IV diuretics  # Dementia: noted on problem list        # Financial/Environmental Concerns:           Disposition Plan      Expected Discharge Date: 05/17/2024                The patient's care was discussed with the Attending Physician, Dr. Landrum .      Flower Newsome  Medical Student  Medicine Service, [address]  Securely message with [employer]  (more info)  Text page via [facility] Paging/Directory   See signed in provider for up to date coverage information  ______________________________________________________________________    Chief Complaint   Abnormal labs    History is obtained from the patient and family (daughters).     History of Present Illness   Idalia Bob is a 79 year old female with a past medical history of COPD, Pseudomonas pneumonia (recently hospitalized 2/1/2024), hypertension and dementia who presents with to the ED via ambulance with fatigue, increasing lower extremity edema and abnormal labs in her PCP visit on 5/14.     She reports that she has been fatigued for the past two weeks which has worsened over the past 3 days, since Monday. She was seen in her PCP clinic yesterday and received a call this morning instructing her to come to the ED for evaluation of abnormal labs. Patient states her primary care provider was concerned about heart failure. She states when she stands up she feels like she feels like she is going to faint. She reports increased leg swelling which has worsened over the past two weeks and reports pain in her left leg when standing.  She has been taking her lasix as prescribed. She endorses SOB when moving as well as sitting, which is worse than her baseline. She denies cough. She denies chest pain. She denies abdominal pain, changes in urination frequency or changes in bowel movements.     ED Course:   - received 40mg lasix IV  - received 40mg enoxaparin  - 40mg pantoprazole   - Duonebs given   - EKG showed sinus tachycardia with premature atrial complexes, no ST elevation.   - Chest xray showed continued left pleural effusion with small right pleural effusion  - Lower extremity US duplex showed no DVT in bilateral lower extremity   - o2 sat 100% on 2L      Past Medical History    Past Medical History:   Diagnosis Date    Chronic kidney disease (CKD) 01/10/2023    Chronic obstructive pulmonary disease (H)  01/10/2023    Dementia (H) 11/28/2023    GERD (gastroesophageal reflux disease) 03/15/2023    Hypertension 11/28/2023       Past Surgical History   Past Surgical History:   Procedure Laterality Date    ESOPHAGOSCOPY, GASTROSCOPY, DUODENOSCOPY (EGD), COMBINED N/A 2/7/2024    Procedure: ESOPHAGOGASTRODUODENOSCOPY, WITH BIOPSY;  Surgeon: Felton James MD;  Location:  GI       Prior to Admission Medications   Prior to Admission Medications   Prescriptions Last Dose Informant Patient Reported? Taking?   FLUoxetine (PROZAC) 40 MG capsule   No No   Sig: Take 1 capsule (40 mg) by mouth daily   OLANZapine (ZYPREXA) 2.5 MG tablet   Yes No   Sig: Take 1 tablet (2.5 mg) by mouth 2 times daily   acetaminophen (TYLENOL) 325 MG tablet   No No   Sig: Take 3 tablets (975 mg) by mouth 3 times daily   acetylcysteine (N-ACETYL CYSTEINE) 600 MG CAPS capsule   Yes No   Sig: Take 1,200 mg by mouth 2 times daily   albuterol (PROVENTIL) (2.5 MG/3ML) 0.083% neb solution   No No   Sig: Take 1 vial (2.5 mg) by nebulization 2 times daily as needed (COPD)   benzocaine-menthol (CHLORASEPTIC) 6-10 MG lozenge   No No   Sig: Place 1 lozenge inside cheek every hour as needed for sore throat   budesonide (PULMICORT) 1 MG/2ML neb solution   No No   Sig: Take 2 mLs (1 mg) by nebulization 2 times daily   busPIRone (BUSPAR) 15 MG tablet   Yes No   Sig: Take 1 tablet (15 mg) by mouth 2 times daily Either 10 mg po tid or 15 mg po bid. Pt was on 15 mg po bid at home.   calcium carbonate (TUMS) 500 MG chewable tablet   No No   Sig: Take 1 tablet (500 mg) by mouth 4 times daily as needed for heartburn   diphenhydrAMINE-zinc acetate (BENADRYL) 2-0.1 % external cream   No No   Sig: Apply topically 3 times daily as needed for itching   donepezil (ARICEPT) 10 MG tablet   No No   Sig: Take 1 tablet (10 mg) by mouth at bedtime   ferrous gluconate (FERGON) 324 (38 Fe) MG tablet   No No   Sig: Take 1 tablet (324 mg) by mouth daily (with breakfast)    furosemide (LASIX) 20 MG tablet   No No   Si tab daily for 5 days, then one tab daily as needed for lower ext edema   hyoscyamine (LEVSIN) 0.125 MG tablet   No No   Sig: TAKE 1 TABLET (125 MCG) BY MOUTH EVERY 4 HOURS AS NEEDED FOR CRAMPING OR DIARRHEA   ipratropium - albuterol 0.5 mg/2.5 mg/3 mL (DUONEB) 0.5-2.5 (3) MG/3ML neb solution   No No   Sig: Take 1 vial (3 mLs) by nebulization 4 times daily   latanoprost (XALATAN) 0.005 % ophthalmic solution   No No   Sig: Place 1 drop into both eyes at bedtime   levothyroxine (SYNTHROID/LEVOTHROID) 50 MCG tablet   No No   Sig: Take 1 tablet (50 mcg) by mouth daily   melatonin 1 MG TABS tablet   No No   Sig: Take 1 tablet (1 mg) by mouth nightly as needed for sleep   oxyCODONE (ROXICODONE) 5 MG tablet   No No   Si/2 tab at bedtime as needed for severe pain. ( Need to last for one month )   pantoprazole (PROTONIX) 40 MG EC tablet   No No   Sig: Take 1 tablet (40 mg) by mouth daily   potassium chloride ER (K-TAB) 20 MEQ CR tablet   No No   Sig: Take one tab bid with Furosemide 40 mg.   senna-docusate (SENOKOT-S/PERICOLACE) 8.6-50 MG tablet   No No   Sig: Take 1 tablet by mouth 2 times daily as needed for constipation   triamcinolone (KENALOG) 0.1 % external cream   No No   Sig: Apply topically 2 times daily as needed for irritation   vitamin D3 (CHOLECALCIFEROL) 50 mcg (2000 units) tablet   No No   Sig: Take 1 tablet (50 mcg) by mouth daily      Facility-Administered Medications: None        Review of Systems    Consitutional: No fever, chills  The 10 point Review of Systems is negative other than noted in the HPI or here.     Social History   I have reviewed this patient's social history and updated it with pertinent information if needed.  Social History     Tobacco Use    Smoking status: Former     Current packs/day: 1.00     Average packs/day: 1 pack/day for 40.0 years (40.0 ttl pk-yrs)     Types: Cigarettes     Passive exposure: Past    Smokeless tobacco: Never    Vaping Use    Vaping status: Never Used   Substance Use Topics    Alcohol use: Not Currently    Drug use: Never       Allergies   Allergies   Allergen Reactions    Aspirin Nausea and Nausea and Vomiting     Stomach ache    Penicillins Itching     Has tolerated ceftriaxone, piperacillin, and amoxicillin        Physical Exam   Vital Signs: Temp: 97  F (36.1  C)   BP: 138/82 Pulse: 118   Resp: 18 SpO2: 96 %      Weight: 0 lbs 0 oz    General: Alert, oriented X4. Patient resting comfortably in bed.   HEENT: atraumatic, oropharynx clear, PERRL, no scleral icterus  NECK: JVD 5cm from sternal angle   RESPIRATORY: Normal effort, no accessory muscle use. Faint bilateral crackles in lower lobes bilaterally.   CARDIOVASCULAR:  RRR, no murmurs appreciated. 4+ pitting edema up to knees bilaterally.   GASTROINTESTINAL: Abdomen is soft, non-tender, non-distended. +BS.   SKIN: No abnormal skin rashes or lesions. Skin is pale and cold to touch on bilateral lower extremities.   NEURO: Alert and oriented.  Grossly normal motor exam.  PSYCH: pleasant, appropriate affect, good eye contact.       Medical Decision Making       Please see A&P for additional details of medical decision making.      Data     I have personally reviewed the following data over the past 24 hrs:    9.7  \   9.4 (L)   / 289     139 105 25.3 (H) /  102 (H)   4.4 21 (L) 1.16 (H) \     ALT: 58 (H) AST: 64 (H) AP: 91 TBILI: 0.5   ALB: 3.5 TOT PROTEIN: 5.6 (L) LIPASE: N/A     Trop: 101 (HH) BNP: 31,556 (H)  26,696 (H)     TSH: 1.36 T4: N/A A1C: N/A     INR:  2.34 (H) PTT:  N/A   D-dimer:  N/A Fibrinogen:  N/A       Imaging results reviewed over the past 24 hrs:   Recent Results (from the past 24 hour(s))   POC US ECHO LIMITED    Impression    Limited Bedside Cardiac Ultrasound, performed and interpreted by me.   Indication: Shortness of Breath.  Parasternal long axis and parasternal short axis views were acquired.   Image quality was satisfactory.    Findings:     Abnormal decreased ejection fraction, B-lines bilaterally    IMPRESSION: Abnormal limited cardiac ultrasound showing decreased ejection fraction, B-lines bilaterally.  No pericardial effusion.      XR Chest 2 Views    Narrative    Chest 2 views    INDICATION: Short of breath    COMPARISON: Chest CT 4/11/2024. Most recent available plain film on  PACS 2/22/2024    FINDINGS: Heart size normal. There is continued left costophrenic  angle blunting and meniscus formation. Slightly increased right  costophrenic angle blunting on the PA view. No pneumothorax or  developing consolidation.  Correlation with previous CT again shows a prominent retrocardiac soft  tissue density with small amount of internal air which projects to the  right of the spine this is consistent with a large hiatal hernia as  seen on the CT scan frontal last month.  Aortic knob atherosclerotic curvilinear calcifications.      Impression    IMPRESSION: Continued left pleural effusion with trace increase very  small right pleural effusion. Continued presence of hiatal hernia.    SYLVIA GREEN MD         SYSTEM ID:  [id number]   US Lower Extremity Venous Duplex Bilateral    Narrative    EXAMINATION: DOPPLER VENOUS ULTRASOUND OF BILATERAL LOWER EXTREMITIES,  5/15/2024 11:04 AM     COMPARISON: 2/22/2024    HISTORY: pain and swelling     TECHNIQUE: Gray-scale evaluation with compression, spectral flow and  color Doppler assessment of the deep venous system of both legs from  groin to knee, and then at the ankles.    FINDINGS:      In both lower extremities, the common femoral, femoral, popliteal and  posterior tibial veins demonstrate normal compressibility and blood  flow. Right peroneal vein is not identified. Normal compressibility of  the left peroneal vein.  Circumscribed echogenicity, predominantly hypoechoic cyst/anechoic  with few internal echogenicities and without internal vascularity on  color Doppler in the right popliteal fossa measuring  2.3 x 1.8 x 5.7  cm (image 11)  Circumscribed echogenicity, predominantly anechoic measuring 3.1 x 1.3  x 6.1 cm without internal vascularity on color Doppler.  Pulsatile venous waveform in bilateral lower extremity especially in  the common femoral and femoral veins.      Impression    IMPRESSION:  1. Right peroneal vein is not visualized, otherwise no deep vein  thrombosis demonstrated in bilateral lower extremity.  2. Circumscribed echogenicities in the popliteal fossae, predominantly  hypo-/anechoic, may represent popliteal fossa cyst versus collection  in the appropriate clinical settings.  3. Pulsatile venous waveforms, may at times be seen with cardiac  dysfunction; consider clinical correlation and/or echocardiogram for  further evaluation as clinically desired.    I have personally reviewed the examination and initial interpretation  and I agree with the findings.    OMID GARCIA MD         SYSTEM ID:  [id number]

## 2024-05-15 NOTE — PLAN OF CARE
Goal Outcome Evaluation:      Plan of Care Reviewed With: patient, child    Overall Patient Progress: no change    4 x A/O. VSS. 2L NC. Purewick in place, draining adequate amounts. Bumex given for fluid overload. Assist of 2. JANICE HUBER.

## 2024-05-15 NOTE — CONSULTS
Cardiology Consultation Note   Attending: .   Date of Service: 5/15/2024      CC:   Fatigue    HPI:   Ms Bob is a 78 yo f with history of dementia, pulmonary emphysema AMRIT on fluoxetine, IBS with diarrhea and oxygen dependence since prior hospitalization with PNA in 12/2023 requiring TCU stay and now in [place], how is presenting with insidious onset of weakness and is now found to have new presentation of HFrEF.     She had 3 bouts of PNAs from December through February of 2024 requiring TCU stay and now jail. She had reported insidious onset of le swelling, progressively worse, along with fatigue, poor appetite and worsening orthopnea. She denies any chest pain episodes or palpitations. She visited her family doctor, leading to workup with labs revealing elevated NT-po BNP, for which she was advised to visit the ED.    Here she is afebrile, on RA, breathing comfortably sitting up. Labs notable for elveated Ntpto BNP.. Creatinine at baseline of 1.1 and Troponin 100- 91.    Past Medical History:   Past Medical History:   Diagnosis Date    Chronic kidney disease (CKD) 01/10/2023    Chronic obstructive pulmonary disease (H) 01/10/2023    Dementia (H) 11/28/2023    GERD (gastroesophageal reflux disease) 03/15/2023    Hypertension 11/28/2023     Past Surgical History:   Past Surgical History:   Procedure Laterality Date    ESOPHAGOSCOPY, GASTROSCOPY, DUODENOSCOPY (EGD), COMBINED N/A 2/7/2024    Procedure: ESOPHAGOGASTRODUODENOSCOPY, WITH BIOPSY;  Surgeon: Felton James MD;  Location:  GI     Allergies: Per MAR     Allergies   Allergen Reactions    Aspirin Nausea and Nausea and Vomiting     Stomach ache    Penicillins Itching     Has tolerated ceftriaxone, piperacillin, and amoxicillin     Medications:   Per MAR current outpatient cardiovascular medications include: (Not in a hospital admission)    Current Outpatient Medications   Medication Sig Dispense Refill    acetaminophen (TYLENOL) 325 MG  tablet Take 325-650 mg by mouth every 6 hours as needed for mild pain      acetaminophen (TYLENOL) 500 MG tablet Take 1,000 mg by mouth daily      albuterol (PROVENTIL) (2.5 MG/3ML) 0.083% neb solution Take 1 vial (2.5 mg) by nebulization 2 times daily as needed (COPD) 90 mL 5    benzocaine-menthol (CHLORASEPTIC) 6-10 MG lozenge Place 1 lozenge inside cheek every hour as needed for sore throat      busPIRone (BUSPAR) 15 MG tablet Take 1 tablet (15 mg) by mouth 2 times daily Either 10 mg po tid or 15 mg po bid. Pt was on 15 mg po bid at home.      calcium carbonate (TUMS) 500 MG chewable tablet Take 1 tablet (500 mg) by mouth 4 times daily as needed for heartburn      diphenhydrAMINE-zinc acetate (BENADRYL) 2-0.1 % external cream Apply topically 3 times daily as needed for itching      donepezil (ARICEPT) 10 MG tablet Take 1 tablet (10 mg) by mouth at bedtime      ferrous gluconate (FERGON) 324 (38 Fe) MG tablet Take 1 tablet (324 mg) by mouth daily (with breakfast) 90 tablet 3    FLUoxetine (PROZAC) 40 MG capsule Take 1 capsule (40 mg) by mouth daily      furosemide (LASIX) 20 MG tablet 2 tab daily for 5 days, then one tab daily as needed for lower ext edema 90 tablet 3    hyoscyamine (LEVSIN) 0.125 MG tablet TAKE 1 TABLET (125 MCG) BY MOUTH EVERY 4 HOURS AS NEEDED FOR CRAMPING OR DIARRHEA 30 tablet 1    ipratropium - albuterol 0.5 mg/2.5 mg/3 mL (DUONEB) 0.5-2.5 (3) MG/3ML neb solution Take 1 vial (3 mLs) by nebulization 4 times daily      latanoprost (XALATAN) 0.005 % ophthalmic solution Place 1 drop into both eyes at bedtime      levothyroxine (SYNTHROID/LEVOTHROID) 50 MCG tablet Take 1 tablet (50 mcg) by mouth daily      melatonin 1 MG TABS tablet Take 1 tablet (1 mg) by mouth nightly as needed for sleep      OLANZapine (ZYPREXA) 2.5 MG tablet Take 1 tablet (2.5 mg) by mouth 2 times daily      oxyCODONE (ROXICODONE) 5 MG tablet 1/2 tab at bedtime as needed for severe pain. ( Need to last for one month ) 15  tablet 0    pantoprazole (PROTONIX) 40 MG EC tablet Take 1 tablet (40 mg) by mouth daily 90 tablet 3    potassium chloride ER (K-TAB) 20 MEQ CR tablet Take one tab bid with Furosemide 40 mg. 60 tablet 2    senna-docusate (SENOKOT-S/PERICOLACE) 8.6-50 MG tablet Take 1 tablet by mouth 2 times daily as needed for constipation      triamcinolone (KENALOG) 0.1 % external cream Apply topically 2 times daily as needed for irritation      vitamin D3 (CHOLECALCIFEROL) 50 mcg (2000 units) tablet Take 1 tablet (50 mcg) by mouth daily       Current Facility-Administered Medications   Medication Dose Route Frequency Provider Last Rate Last Admin    albuterol (PROVENTIL) neb solution 2.5 mg  2.5 mg Nebulization BID PRN Terrence Tate MD        budesonide (PULMICORT) neb solution 1 mg  1 mg Nebulization BID Terrence Tate MD        busPIRone (BUSPAR) tablet 15 mg  15 mg Oral BID Terrence Tate MD        calcium carbonate (TUMS) chewable tablet 500 mg  500 mg Oral 4x Daily PRN Terrence Tate MD        donepezil (ARICEPT) tablet 10 mg  10 mg Oral At Bedtime Terrence Tate MD        [Held by provider] enoxaparin ANTICOAGULANT (LOVENOX) injection 40 mg  40 mg Subcutaneous Q24H Terrence Tate MD   40 mg at 05/15/24 1423    [START ON 5/16/2024] FLUoxetine (PROzac) capsule 40 mg  40 mg Oral Daily Terrence Tate MD        hyoscyamine (LEVSIN) tablet 125 mcg  125 mcg Oral Q4H PRN Terrence Tate MD        ipratropium - albuterol 0.5 mg/2.5 mg/3 mL (DUONEB) neb solution 3 mL  3 mL Nebulization 4x Daily Evangelina Johnson MD   3 mL at 05/15/24 1500    latanoprost (XALATAN) 0.005 % ophthalmic solution 1 drop  1 drop Both Eyes At Bedtime Terrence Tate MD        levothyroxine (SYNTHROID/LEVOTHROID) tablet 50 mcg  50 mcg Oral Daily Terrence Tate MD        lidocaine (LMX4) cream   Topical Q1H PRN Terrence Tate MD        lidocaine 1 % 0.1-1 mL  0.1-1 mL  Other Q1H PRN Terrence Tate MD        OLANZapine (zyPREXA) tablet 2.5 mg  2.5 mg Oral BID Terrence Tate MD        pantoprazole (PROTONIX) EC tablet 40 mg  40 mg Oral Daily Terrence Tate MD   40 mg at 05/15/24 1423    polyethylene glycol (MIRALAX) Packet 17 g  17 g Oral BID PRN Terrence Tate MD        Reason beta blocker not prescribed   Other DOES NOT GO TO MAR Terrence Tate MD        senna-docusate (SENOKOT-S/PERICOLACE) 8.6-50 MG per tablet 1 tablet  1 tablet Oral BID PRN Terrence Tate MD        Or    senna-docusate (SENOKOT-S/PERICOLACE) 8.6-50 MG per tablet 2 tablet  2 tablet Oral BID PRN Terrence Tate MD        sodium chloride (PF) 0.9% PF flush 3 mL  3 mL Intracatheter Q8H Terrence Tate MD   3 mL at 05/15/24 1423    sodium chloride (PF) 0.9% PF flush 3 mL  3 mL Intracatheter q1 min prn Terrence Tate MD        [START ON 5/16/2024] Vitamin D3 (CHOLECALCIFEROL) tablet 50 mcg  50 mcg Oral Daily Terrence Tate MD         Current Outpatient Medications   Medication Sig Dispense Refill    acetaminophen (TYLENOL) 325 MG tablet Take 325-650 mg by mouth every 6 hours as needed for mild pain      acetaminophen (TYLENOL) 500 MG tablet Take 1,000 mg by mouth daily      albuterol (PROVENTIL) (2.5 MG/3ML) 0.083% neb solution Take 1 vial (2.5 mg) by nebulization 2 times daily as needed (COPD) 90 mL 5    benzocaine-menthol (CHLORASEPTIC) 6-10 MG lozenge Place 1 lozenge inside cheek every hour as needed for sore throat      busPIRone (BUSPAR) 15 MG tablet Take 1 tablet (15 mg) by mouth 2 times daily Either 10 mg po tid or 15 mg po bid. Pt was on 15 mg po bid at home.      calcium carbonate (TUMS) 500 MG chewable tablet Take 1 tablet (500 mg) by mouth 4 times daily as needed for heartburn      diphenhydrAMINE-zinc acetate (BENADRYL) 2-0.1 % external cream Apply topically 3 times daily as needed for itching      donepezil (ARICEPT) 10 MG  tablet Take 1 tablet (10 mg) by mouth at bedtime      ferrous gluconate (FERGON) 324 (38 Fe) MG tablet Take 1 tablet (324 mg) by mouth daily (with breakfast) 90 tablet 3    FLUoxetine (PROZAC) 40 MG capsule Take 1 capsule (40 mg) by mouth daily      furosemide (LASIX) 20 MG tablet 2 tab daily for 5 days, then one tab daily as needed for lower ext edema 90 tablet 3    hyoscyamine (LEVSIN) 0.125 MG tablet TAKE 1 TABLET (125 MCG) BY MOUTH EVERY 4 HOURS AS NEEDED FOR CRAMPING OR DIARRHEA 30 tablet 1    ipratropium - albuterol 0.5 mg/2.5 mg/3 mL (DUONEB) 0.5-2.5 (3) MG/3ML neb solution Take 1 vial (3 mLs) by nebulization 4 times daily      latanoprost (XALATAN) 0.005 % ophthalmic solution Place 1 drop into both eyes at bedtime      levothyroxine (SYNTHROID/LEVOTHROID) 50 MCG tablet Take 1 tablet (50 mcg) by mouth daily      melatonin 1 MG TABS tablet Take 1 tablet (1 mg) by mouth nightly as needed for sleep      OLANZapine (ZYPREXA) 2.5 MG tablet Take 1 tablet (2.5 mg) by mouth 2 times daily      oxyCODONE (ROXICODONE) 5 MG tablet 1/2 tab at bedtime as needed for severe pain. ( Need to last for one month ) 15 tablet 0    pantoprazole (PROTONIX) 40 MG EC tablet Take 1 tablet (40 mg) by mouth daily 90 tablet 3    potassium chloride ER (K-TAB) 20 MEQ CR tablet Take one tab bid with Furosemide 40 mg. 60 tablet 2    senna-docusate (SENOKOT-S/PERICOLACE) 8.6-50 MG tablet Take 1 tablet by mouth 2 times daily as needed for constipation      triamcinolone (KENALOG) 0.1 % external cream Apply topically 2 times daily as needed for irritation      vitamin D3 (CHOLECALCIFEROL) 50 mcg (2000 units) tablet Take 1 tablet (50 mcg) by mouth daily       Family History:   No family history on file.  Social History:   Social History     Tobacco Use    Smoking status: Former     Current packs/day: 1.00     Average packs/day: 1 pack/day for 40.0 years (40.0 ttl pk-yrs)     Types: Cigarettes     Passive exposure: Past    Smokeless tobacco: Never  "  Substance Use Topics    Alcohol use: Not Currently       ROS:   A comprehensive 10 point ROS was negative other than as mentioned in HPI.    Physical Examination:     B/P: 138/86, T: 97.8, P: 90, R: 16    General: Alert and oriented; no acute distress  Neck: JVP is 20 cm  CV: s1 s2 regular; no m/r/g; +3 le pitting edema till the mid thighs  Lungs: CTABL, no rhonchi or wheezing  GI: BS+X4; non tender, non distended  Skin: warm, no rashes  Neuro: alert and oriented  Psych: congruent affect    Labs:  Resp: 16 SpO2: 100 % O2 Device: Nasal cannula Oxygen Delivery: 2 LPM    Arterial Blood Gas:   Recent Labs   Lab 05/15/24  1111   O2PER 21     There were no vitals filed for this visit.No intake/output data recorded.  Recent Labs   Lab 05/15/24  1426 05/15/24  0929 05/14/24  1549   NA  --  139 141   POTASSIUM  --  4.4 4.7   CHLORIDE  --  105 105   CO2  --  21* 24   ANIONGAP  --  13 12   GLC  --  102* 94   BUN  --  25.3* 25.6*   CR 1.11* 1.16* 1.25*   AYSUH  --  9.0 9.2     No components found for: \"URINE\"   Recent Labs   Lab 05/15/24  0929 05/14/24  1549   AST 64* 45   ALT 58* 50   BILITOTAL 0.5 0.4   ALBUMIN 3.5 3.7   PROTTOTAL 5.6* 5.8*   ALKPHOS 91 91     Temp: 97.8  F (36.6  C) Temp src: OralTemp  Min: 97  F (36.1  C)  Max: 97.8  F (36.6  C)   Recent Labs   Lab 05/15/24  1111 05/14/24  1549   WBC 9.7 10.0   HGB 9.4* 8.6*   HCT 33.6* 31.0*   MCV 81 80   RDW 19.5* 19.5*    303     Recent Labs   Lab 05/15/24  0929   INR 2.34*     Recent Labs   Lab 05/15/24  0929 05/14/24  1549   * 94         Imaging:   Recent Results (from the past 24 hour(s))   POC US ECHO LIMITED    Impression    Limited Bedside Cardiac Ultrasound, performed and interpreted by me.   Indication: Shortness of Breath.  Parasternal long axis and parasternal short axis views were acquired.   Image quality was satisfactory.    Findings:    Abnormal decreased ejection fraction, B-lines bilaterally    IMPRESSION: Abnormal limited cardiac ultrasound " showing decreased ejection fraction, B-lines bilaterally.  No pericardial effusion.      XR Chest 2 Views    Narrative    Chest 2 views    INDICATION: Short of breath    COMPARISON: Chest CT 4/11/2024. Most recent available plain film on  PACS 2/22/2024    FINDINGS: Heart size normal. There is continued left costophrenic  angle blunting and meniscus formation. Slightly increased right  costophrenic angle blunting on the PA view. No pneumothorax or  developing consolidation.  Correlation with previous CT again shows a prominent retrocardiac soft  tissue density with small amount of internal air which projects to the  right of the spine this is consistent with a large hiatal hernia as  seen on the CT scan frontal last month.  Aortic knob atherosclerotic curvilinear calcifications.      Impression    IMPRESSION: Continued left pleural effusion with trace increase very  small right pleural effusion. Continued presence of hiatal hernia.    SYLVIA GREEN MD         SYSTEM ID:  [id number]   US Lower Extremity Venous Duplex Bilateral    Narrative    EXAMINATION: DOPPLER VENOUS ULTRASOUND OF BILATERAL LOWER EXTREMITIES,  5/15/2024 11:04 AM     COMPARISON: 2/22/2024    HISTORY: pain and swelling     TECHNIQUE: Gray-scale evaluation with compression, spectral flow and  color Doppler assessment of the deep venous system of both legs from  groin to knee, and then at the ankles.    FINDINGS:      In both lower extremities, the common femoral, femoral, popliteal and  posterior tibial veins demonstrate normal compressibility and blood  flow. Right peroneal vein is not identified. Normal compressibility of  the left peroneal vein.  Circumscribed echogenicity, predominantly hypoechoic cyst/anechoic  with few internal echogenicities and without internal vascularity on  color Doppler in the right popliteal fossa measuring 2.3 x 1.8 x 5.7  cm (image 11)  Circumscribed echogenicity, predominantly anechoic measuring 3.1 x 1.3  x 6.1  cm without internal vascularity on color Doppler.  Pulsatile venous waveform in bilateral lower extremity especially in  the common femoral and femoral veins.      Impression    IMPRESSION:  1. Right peroneal vein is not visualized, otherwise no deep vein  thrombosis demonstrated in bilateral lower extremity.  2. Circumscribed echogenicities in the popliteal fossae, predominantly  hypo-/anechoic, may represent popliteal fossa cyst versus collection  in the appropriate clinical settings.  3. Pulsatile venous waveforms, may at times be seen with cardiac  dysfunction; consider clinical correlation and/or echocardiogram for  further evaluation as clinically desired.    I have personally reviewed the examination and initial interpretation  and I agree with the findings.    OMID GARCIA MD         SYSTEM ID:  [id number]   Echocardiogram Complete   Result Value    LVEF  10-15% (severely reduced)    Narrative    [id number]  [facility]  [id number]  [id number]^DEBBIE^LATRICE^[facility],[place]  Echocardiography Laboratory  [address] [place] [id number]     Name: JULIANO HEALY  MRN: [id number]  : 1945  Study Date: 05/15/2024 01:35 PM  Age: 79 yrs  Gender: Female  Patient Location: [facility]  Reason For Study: Heart Failure  Ordering Physician: LATRICE LEWIS  Performed By: Pily Stapleton RDCS     BSA: 1.5 m2  Height: 60 in  Weight: 127 lb  HR: 104  ______________________________________________________________________________  Procedure  Echocardiogram with two-dimensional, color and spectral Doppler performed.  Contrast Optison. Optison (NDC #1029-2312-25) given intravenously. Patient was  given 6 ml mixture of 3 ml Optison and 6 ml saline. 3 ml wasted.  ______________________________________________________________________________  Interpretation Summary  Left ventricular function is decreased. The ejection fraction is 10-15%  (severely reduced). Severe diffuse  hypokinesis is present.  Global right ventricular function is moderately reduced.  Moderate tricuspid insufficiency is present.  Estimated pulmonary artery systolic pressure is 54 mmHg plus right atrial  pressure.  IVC diameter >2.1 cm collapsing <50% with sniff suggests a high RA pressure  estimated at 15 mmHg or greater.  Trivial pericardial effusion is present.     This study was compared with the study from 2/12/24: LV and RV function have  decreased significantly.  ______________________________________________________________________________  Left Ventricle  Left ventricular wall thickness is normal. Left ventricular size is normal.  Left ventricular function is decreased. The ejection fraction is 10-15%  (severely reduced). Left ventricular diastolic function is abnormal. Severe  diffuse hypokinesis is present.     Right Ventricle  The right ventricle is normal size. Global right ventricular function is  moderately reduced.     Atria  The atria cannot be assessed. Lipomatous infiltration of the interatrial septm  is present.     Mitral Valve  Mild mitral annular calcification is present. Mild mitral insufficiency is  present.     Aortic Valve  Trileaflet aortic sclerosis without stenosis.     Tricuspid Valve  Moderate tricuspid insufficiency is present. The right ventricular systolic  pressure is approximated at 38.7 mmHg plus the right atrial pressure.  Estimated pulmonary artery systolic pressure is 54 mmHg plus right atrial  pressure.     Pulmonic Valve  Mild pulmonic insufficiency is present.     Vessels  Dilation of the inferior vena cava is present with abnormal respiratory  variation in diameter. IVC diameter >2.1 cm collapsing <50% with sniff  suggests a high RA pressure estimated at 15 mmHg or greater.     Pericardium  Prominent epicardial fat is noted. Trivial pericardial effusion is present.     Compared to Previous Study  This study was compared with the study from 2/12/24 . LV and RV function  have  decreased significantly.     Attestation  I have personally viewed the imaging and agree with the interpretation and  report as documented by the fellow, Kyle Snyder, and/or edited by me.  ______________________________________________________________________________  MMode/2D Measurements & Calculations  IVSd: 0.97 cm  LVIDd: 5.0 cm  LVIDs: 4.4 cm  LVPWd: 0.98 cm  FS: 12.5 %  LV mass(C)d: 176.9 grams  LV mass(C)dI: 115.0 grams/m2  LVOT diam: 2.0 cm  LVOT area: 3.2 cm2     EF(MOD-bp): 21.2 %  RWT: 0.39  TAPSE: 0.86 cm     Doppler Measurements & Calculations  Ao V2 max: 121.1 cm/sec  Ao max P.9 mmHg  Ao V2 mean: 93.7 cm/sec  Ao mean PG: 3.9 mmHg  Ao V2 VTI: 19.1 cm  GENEVIEVE(I,D): 2.9 cm2  GENEVIEVE(V,D): 2.7 cm2  LV V1 max P.3 mmHg  LV V1 max: 104.0 cm/sec  LV V1 VTI: 17.2 cm     SV(LVOT): 54.6 ml  SI(LVOT): 35.5 ml/m2  PA acc time: 0.07 sec  PI end-d bryson: 132.6 cm/sec  TR max bryson: 309.4 cm/sec  TR max P.7 mmHg  AV Bryson Ratio (DI): 0.86  GENEVIEVE Index (cm2/m2): 1.9     ______________________________________________________________________________  Report approved by: Ely Deng 05/15/2024 03:54 PM               Assessment and Plan:   Ms Bob is a 78 yo f with history of dementia, pulmonary emphysema AMRIT on fluoxetine, IBS with diarrhea and oxygen dependence since prior hospitalization with PNA in 2023 requiring TCU stay and now in group home, how is presenting with insidious onset of weakness and is now found to have new presentation of HFrEF.     # New presentation of HFrEF EF 10-15% with normal size LV  # Active smoking  # recent PNAs  She presents with new presentation of  HFrEF with normal EF. The lack of WMA and the normal size of the LV argue against CAD or long standing NICM. She most likely has either a tachy-mediated CM or a stress-induced CM from prior infections or a prior myocarditis that might have been missed in the interim.     Recommendations:  - Lasix 40 mg IV at the ED, start Lasix  gtt at 10 mg IV/hour and cut to 5 mg IV if the UOP is >250 ml per hour  - BMP, Mg BID  - Lisinopril 2.5 mg po daily start today  - Coronary angiogram after diuresis for ischemic evaluation  - GDMT escalation from tomorrow with b blocker  - Will continue to follow    Thank you for allowing us to participate in the care of this patient.   The patient was discussed w/ Dr. Hensley.     Carmella Taylor MD PhD  Cardiology fellow  PGY4

## 2024-05-15 NOTE — LETTER
Attn: [facility], nursing staff    Date: 5/24    Message: Attached are discharge orders for CATHERINE Bob.     Thanks,  Elza Rodriguez, Nurse Coordinator, BSN  Phone: [phone]  Vocera: 6B [facility] RNCC

## 2024-05-15 NOTE — ED PROVIDER NOTES
[place] EMERGENCY DEPARTMENT ([facility])    5/15/24       ED PROVIDER NOTE       History     Chief Complaint   Patient presents with    Abnormal Labs     The history is provided by the patient, a relative and medical records.     Idalia Bob is a 79 year old female with a past medical history of CKD, AMRIT, dementia, hypertension, GERD, COPD, and hypokalemia who was biba from her assisted living facility for evaluation of abnormal labs.     Patient reports she was seen in clinic yesterday and received a call this morning instructing her to come to the ED for evaluation of abnormal labs. Patient states her primary care provider was concerned about heart failure and a DVT. Patient reports she has been feeling very tired and dizzy for the past few weeks, but it has worsened over the past 5 days. She reports one episode of vomiting on Sunday. She states when she stands up she feels like she feels like she is going to faint. She reports new leg swelling and reports pain in her left leg. Patient states she is on a water pill. Denies any cardiac history. Denies change in shortness of breath. Denies cough. Denies anticoagulation. Denies any bleeding.     Past Medical History  Past Medical History:   Diagnosis Date    Chronic kidney disease (CKD) 01/10/2023    Chronic obstructive pulmonary disease (H) 01/10/2023    Dementia (H) 11/28/2023    GERD (gastroesophageal reflux disease) 03/15/2023    Hypertension 11/28/2023     Past Surgical History:   Procedure Laterality Date    ESOPHAGOSCOPY, GASTROSCOPY, DUODENOSCOPY (EGD), COMBINED N/A 2/7/2024    Procedure: ESOPHAGOGASTRODUODENOSCOPY, WITH BIOPSY;  Surgeon: Felton James MD;  Location:  GI     acetaminophen (TYLENOL) 325 MG tablet  acetylcysteine (N-ACETYL CYSTEINE) 600 MG CAPS capsule  albuterol (PROVENTIL) (2.5 MG/3ML) 0.083% neb solution  benzocaine-menthol (CHLORASEPTIC) 6-10 MG lozenge  budesonide (PULMICORT) 1 MG/2ML neb  solution  busPIRone (BUSPAR) 15 MG tablet  calcium carbonate (TUMS) 500 MG chewable tablet  diphenhydrAMINE-zinc acetate (BENADRYL) 2-0.1 % external cream  donepezil (ARICEPT) 10 MG tablet  ferrous gluconate (FERGON) 324 (38 Fe) MG tablet  FLUoxetine (PROZAC) 40 MG capsule  furosemide (LASIX) 20 MG tablet  hyoscyamine (LEVSIN) 0.125 MG tablet  ipratropium - albuterol 0.5 mg/2.5 mg/3 mL (DUONEB) 0.5-2.5 (3) MG/3ML neb solution  latanoprost (XALATAN) 0.005 % ophthalmic solution  levothyroxine (SYNTHROID/LEVOTHROID) 50 MCG tablet  melatonin 1 MG TABS tablet  OLANZapine (ZYPREXA) 2.5 MG tablet  oxyCODONE (ROXICODONE) 5 MG tablet  pantoprazole (PROTONIX) 40 MG EC tablet  potassium chloride ER (K-TAB) 20 MEQ CR tablet  senna-docusate (SENOKOT-S/PERICOLACE) 8.6-50 MG tablet  triamcinolone (KENALOG) 0.1 % external cream  vitamin D3 (CHOLECALCIFEROL) 50 mcg (2000 units) tablet      Allergies   Allergen Reactions    Aspirin Nausea and Nausea and Vomiting     Stomach ache    Penicillins Itching     Has tolerated ceftriaxone, piperacillin, and amoxicillin     Family History  No family history on file.  Social History   Social History     Tobacco Use    Smoking status: Former     Current packs/day: 1.00     Average packs/day: 1 pack/day for 40.0 years (40.0 ttl pk-yrs)     Types: Cigarettes     Passive exposure: Past    Smokeless tobacco: Never   Vaping Use    Vaping status: Never Used   Substance Use Topics    Alcohol use: Not Currently    Drug use: Never         A medically appropriate review of systems was performed with pertinent positives and negatives noted in the HPI, and all other systems negative.    Physical Exam      Physical Exam  Vitals and nursing note reviewed.   Constitutional:       General: She is not in acute distress.     Appearance: Normal appearance. She is not diaphoretic.   HENT:      Head: Atraumatic.      Mouth/Throat:      Mouth: Mucous membranes are moist.   Eyes:      General: No scleral icterus.      Conjunctiva/sclera: Conjunctivae normal.   Cardiovascular:      Rate and Rhythm: Normal rate.      Heart sounds: Normal heart sounds.   Pulmonary:      Effort: No respiratory distress.      Breath sounds: Normal breath sounds.      Comments: Crackles bilateral lung bases  Abdominal:      General: Abdomen is flat.   Musculoskeletal:      Cervical back: Neck supple.      Comments: Pitting edema to bilateral knees   Skin:     General: Skin is warm.      Coloration: Skin is pale.      Findings: No rash.   Neurological:      Mental Status: She is alert.           ED Course, Procedures, & Data     ED Course as of 05/15/24 1033   Wed May 15, 2024   1030 proBNP elevated at 26,700 from 18,000     Procedures            EKG Interpretation:      Interpreted by James Cole MD  Time reviewed: 9:44:44  Symptoms at time of EKG: Abnormal Labs   Rhythm: Sinus tachycardia with premature atrial complexes   Rate: 106 bpm    Comparison to prior: Unchanged from 2/15/24    Clinical Impression: Sinus tachycardia with premature atrial complexes, no acute ischemia             No results found for any visits on 05/15/24.  Medications - No data to display  Labs Ordered and Resulted from Time of ED Arrival to Time of ED Departure - No data to display  No orders to display          Critical care was not performed.     Medical Decision Making  The patient's presentation was of high complexity (an acute health issue posing potential threat to life or bodily function).    The patient's evaluation involved:  an assessment requiring an independent historian (see separate area of note for details)  review of external note(s) from 3+ sources (see separate area of note for details)  review of 2 test result(s) ordered prior to this encounter (see separate area of note for details)  ordering and/or review of 3+ test(s) in this encounter (see separate area of note for details)  discussion of management or test interpretation with another health  professional (hospitalist)    The patient's management necessitated high risk (a decision regarding hospitalization).    Assessment & Plan    Workup here in the emergency room significant for hemoglobin is slightly above baseline, and an elevated proBNP is markedly elevated beyond her baseline approximately 27,000, with an INR of 2.3  Chest x-ray shows bilateral pleural effusions, pulmonary edema, which is consistent with her echo which also showed decreased ejection fraction and B-lines bilaterally  At this point patient likely with CHF exacerbation so gave Lasix here in the emergency room and plan to admit to medicine  Signed out to inpatient hospitalist who accepted the patient for admission.  For CHF exacerbation on IV diuresis    I have reviewed the nursing notes. I have reviewed the findings, diagnosis, plan and need for follow up with the patient.    New Prescriptions    No medications on file       Final diagnoses:   None   IJasmin, am serving as a trained medical scribe to document services personally performed by James Cole MD, based on the provider's statements to me.     IJames MD, was physically present and have reviewed and verified the accuracy of this note documented by Jasmin Vigil.     James Cole MD  [facility] EMERGENCY DEPARTMENT  5/15/2024     James Cole MD  05/15/24 0649

## 2024-05-16 ENCOUNTER — APPOINTMENT (OUTPATIENT)
Dept: PHYSICAL THERAPY | Facility: CLINIC | Age: 79
DRG: 291 | End: 2024-05-16
Payer: MEDICARE

## 2024-05-16 LAB
ALBUMIN SERPL BCG-MCNC: 3.5 G/DL (ref 3.5–5.2)
ALP SERPL-CCNC: 87 U/L (ref 40–150)
ALT SERPL W P-5'-P-CCNC: 54 U/L (ref 0–50)
ANION GAP SERPL CALCULATED.3IONS-SCNC: 11 MMOL/L (ref 7–15)
ANION GAP SERPL CALCULATED.3IONS-SCNC: 11 MMOL/L (ref 7–15)
ANION GAP SERPL CALCULATED.3IONS-SCNC: 15 MMOL/L (ref 7–15)
ANION GAP SERPL CALCULATED.3IONS-SCNC: 17 MMOL/L (ref 7–15)
AST SERPL W P-5'-P-CCNC: 46 U/L (ref 0–45)
ATRIAL RATE - MUSE: 106 BPM
BILIRUB SERPL-MCNC: 0.6 MG/DL
BUN SERPL-MCNC: 17.7 MG/DL (ref 8–23)
BUN SERPL-MCNC: 18.4 MG/DL (ref 8–23)
BUN SERPL-MCNC: 20.8 MG/DL (ref 8–23)
BUN SERPL-MCNC: 22.6 MG/DL (ref 8–23)
CALCIUM SERPL-MCNC: 8.5 MG/DL (ref 8.8–10.2)
CALCIUM SERPL-MCNC: 8.5 MG/DL (ref 8.8–10.2)
CALCIUM SERPL-MCNC: 8.6 MG/DL (ref 8.8–10.2)
CALCIUM SERPL-MCNC: 8.7 MG/DL (ref 8.8–10.2)
CHLORIDE SERPL-SCNC: 100 MMOL/L (ref 98–107)
CHLORIDE SERPL-SCNC: 101 MMOL/L (ref 98–107)
CHLORIDE SERPL-SCNC: 102 MMOL/L (ref 98–107)
CHLORIDE SERPL-SCNC: 104 MMOL/L (ref 98–107)
CREAT SERPL-MCNC: 0.97 MG/DL (ref 0.51–0.95)
CREAT SERPL-MCNC: 1.07 MG/DL (ref 0.51–0.95)
CREAT SERPL-MCNC: 1.08 MG/DL (ref 0.51–0.95)
CREAT SERPL-MCNC: 1.12 MG/DL (ref 0.51–0.95)
DEPRECATED HCO3 PLAS-SCNC: 17 MMOL/L (ref 22–29)
DEPRECATED HCO3 PLAS-SCNC: 24 MMOL/L (ref 22–29)
DEPRECATED HCO3 PLAS-SCNC: 27 MMOL/L (ref 22–29)
DEPRECATED HCO3 PLAS-SCNC: 29 MMOL/L (ref 22–29)
DIASTOLIC BLOOD PRESSURE - MUSE: NORMAL MMHG
EGFRCR SERPLBLD CKD-EPI 2021: 49 ML/MIN/1.73M2
EGFRCR SERPLBLD CKD-EPI 2021: 52 ML/MIN/1.73M2
EGFRCR SERPLBLD CKD-EPI 2021: 52 ML/MIN/1.73M2
EGFRCR SERPLBLD CKD-EPI 2021: 59 ML/MIN/1.73M2
ERYTHROCYTE [DISTWIDTH] IN BLOOD BY AUTOMATED COUNT: 19.5 % (ref 10–15)
FERRITIN SERPL-MCNC: 38 NG/ML (ref 11–328)
GLUCOSE SERPL-MCNC: 123 MG/DL (ref 70–99)
GLUCOSE SERPL-MCNC: 76 MG/DL (ref 70–99)
GLUCOSE SERPL-MCNC: 79 MG/DL (ref 70–99)
GLUCOSE SERPL-MCNC: 84 MG/DL (ref 70–99)
HCT VFR BLD AUTO: 34.4 % (ref 35–47)
HGB BLD-MCNC: 9.6 G/DL (ref 11.7–15.7)
INTERPRETATION ECG - MUSE: NORMAL
IRON BINDING CAPACITY (ROCHE): 250 UG/DL (ref 240–430)
IRON SATN MFR SERPL: 8 % (ref 15–46)
IRON SERPL-MCNC: 19 UG/DL (ref 37–145)
LACTATE SERPL-SCNC: 1.8 MMOL/L (ref 0.7–2)
MAGNESIUM SERPL-MCNC: 1.7 MG/DL (ref 1.7–2.3)
MAGNESIUM SERPL-MCNC: 1.8 MG/DL (ref 1.7–2.3)
MAGNESIUM SERPL-MCNC: 1.8 MG/DL (ref 1.7–2.3)
MAGNESIUM SERPL-MCNC: 1.9 MG/DL (ref 1.7–2.3)
MCH RBC QN AUTO: 22.6 PG (ref 26.5–33)
MCHC RBC AUTO-ENTMCNC: 27.9 G/DL (ref 31.5–36.5)
MCV RBC AUTO: 81 FL (ref 78–100)
P AXIS - MUSE: 106 DEGREES
PHOSPHATE SERPL-MCNC: 3.7 MG/DL (ref 2.5–4.5)
PHOSPHATE SERPL-MCNC: 3.8 MG/DL (ref 2.5–4.5)
PHOSPHATE SERPL-MCNC: 4 MG/DL (ref 2.5–4.5)
PHOSPHATE SERPL-MCNC: 4.2 MG/DL (ref 2.5–4.5)
PLATELET # BLD AUTO: 311 10E3/UL (ref 150–450)
POTASSIUM SERPL-SCNC: 3.1 MMOL/L (ref 3.4–5.3)
POTASSIUM SERPL-SCNC: 3.2 MMOL/L (ref 3.4–5.3)
POTASSIUM SERPL-SCNC: 3.9 MMOL/L (ref 3.4–5.3)
POTASSIUM SERPL-SCNC: 4.2 MMOL/L (ref 3.4–5.3)
PR INTERVAL - MUSE: 142 MS
PROT SERPL-MCNC: 5.8 G/DL (ref 6.4–8.3)
QRS DURATION - MUSE: 64 MS
QT - MUSE: 298 MS
QTC - MUSE: 395 MS
R AXIS - MUSE: 36 DEGREES
RBC # BLD AUTO: 4.24 10E6/UL (ref 3.8–5.2)
SODIUM SERPL-SCNC: 138 MMOL/L (ref 135–145)
SODIUM SERPL-SCNC: 140 MMOL/L (ref 135–145)
SYSTOLIC BLOOD PRESSURE - MUSE: NORMAL MMHG
T AXIS - MUSE: -80 DEGREES
VENTRICULAR RATE- MUSE: 106 BPM
WBC # BLD AUTO: 10.2 10E3/UL (ref 4–11)

## 2024-05-16 PROCEDURE — 85027 COMPLETE CBC AUTOMATED: CPT

## 2024-05-16 PROCEDURE — 83735 ASSAY OF MAGNESIUM: CPT

## 2024-05-16 PROCEDURE — 82728 ASSAY OF FERRITIN: CPT

## 2024-05-16 PROCEDURE — 97530 THERAPEUTIC ACTIVITIES: CPT | Mod: GP

## 2024-05-16 PROCEDURE — 82947 ASSAY GLUCOSE BLOOD QUANT: CPT

## 2024-05-16 PROCEDURE — 250N000011 HC RX IP 250 OP 636

## 2024-05-16 PROCEDURE — 84100 ASSAY OF PHOSPHORUS: CPT

## 2024-05-16 PROCEDURE — 80053 COMPREHEN METABOLIC PANEL: CPT

## 2024-05-16 PROCEDURE — 80048 BASIC METABOLIC PNL TOTAL CA: CPT

## 2024-05-16 PROCEDURE — 999N000248 HC STATISTIC IV INSERT WITH US BY RN

## 2024-05-16 PROCEDURE — 36415 COLL VENOUS BLD VENIPUNCTURE: CPT

## 2024-05-16 PROCEDURE — 97161 PT EVAL LOW COMPLEX 20 MIN: CPT | Mod: GP

## 2024-05-16 PROCEDURE — 250N000009 HC RX 250

## 2024-05-16 PROCEDURE — 83605 ASSAY OF LACTIC ACID: CPT

## 2024-05-16 PROCEDURE — 99233 SBSQ HOSP IP/OBS HIGH 50: CPT | Mod: GC | Performed by: INTERNAL MEDICINE

## 2024-05-16 PROCEDURE — 258N000003 HC RX IP 258 OP 636

## 2024-05-16 PROCEDURE — 94640 AIRWAY INHALATION TREATMENT: CPT | Mod: 76

## 2024-05-16 PROCEDURE — 120N000002 HC R&B MED SURG/OB UMMC

## 2024-05-16 PROCEDURE — 999N000157 HC STATISTIC RCP TIME EA 10 MIN

## 2024-05-16 PROCEDURE — 999N000128 HC STATISTIC PERIPHERAL IV START W/O US GUIDANCE

## 2024-05-16 PROCEDURE — 83550 IRON BINDING TEST: CPT

## 2024-05-16 PROCEDURE — 99233 SBSQ HOSP IP/OBS HIGH 50: CPT | Mod: GC | Performed by: HOSPITALIST

## 2024-05-16 PROCEDURE — 999N000127 HC STATISTIC PERIPHERAL IV START W US GUIDANCE

## 2024-05-16 PROCEDURE — 250N000013 HC RX MED GY IP 250 OP 250 PS 637

## 2024-05-16 RX ORDER — POTASSIUM CHLORIDE 1.5 G/1.58G
80 POWDER, FOR SOLUTION ORAL ONCE
Status: COMPLETED | OUTPATIENT
Start: 2024-05-16 | End: 2024-05-16

## 2024-05-16 RX ORDER — MAGNESIUM SULFATE HEPTAHYDRATE 40 MG/ML
2 INJECTION, SOLUTION INTRAVENOUS ONCE
Status: COMPLETED | OUTPATIENT
Start: 2024-05-17 | End: 2024-05-17

## 2024-05-16 RX ORDER — IPRATROPIUM BROMIDE AND ALBUTEROL SULFATE 2.5; .5 MG/3ML; MG/3ML
3 SOLUTION RESPIRATORY (INHALATION)
Status: DISCONTINUED | OUTPATIENT
Start: 2024-05-16 | End: 2024-05-20 | Stop reason: DRUGHIGH

## 2024-05-16 RX ORDER — ONDANSETRON 2 MG/ML
4 INJECTION INTRAMUSCULAR; INTRAVENOUS ONCE
Status: COMPLETED | OUTPATIENT
Start: 2024-05-16 | End: 2024-05-16

## 2024-05-16 RX ORDER — METHYLPREDNISOLONE SODIUM SUCCINATE 125 MG/2ML
125 INJECTION, POWDER, LYOPHILIZED, FOR SOLUTION INTRAMUSCULAR; INTRAVENOUS
Status: ACTIVE | OUTPATIENT
Start: 2024-05-16 | End: 2024-05-16

## 2024-05-16 RX ORDER — POTASSIUM CHLORIDE 1500 MG/1
80 TABLET, EXTENDED RELEASE ORAL ONCE
Qty: 4 TABLET | Refills: 0 | Status: DISCONTINUED | OUTPATIENT
Start: 2024-05-16 | End: 2024-05-16

## 2024-05-16 RX ORDER — MAGNESIUM SULFATE HEPTAHYDRATE 40 MG/ML
2 INJECTION, SOLUTION INTRAVENOUS ONCE
Status: COMPLETED | OUTPATIENT
Start: 2024-05-16 | End: 2024-05-16

## 2024-05-16 RX ORDER — DIPHENHYDRAMINE HYDROCHLORIDE 50 MG/ML
50 INJECTION INTRAMUSCULAR; INTRAVENOUS
Status: ACTIVE | OUTPATIENT
Start: 2024-05-16 | End: 2024-05-16

## 2024-05-16 RX ADMIN — ONDANSETRON 4 MG: 2 INJECTION INTRAMUSCULAR; INTRAVENOUS at 16:32

## 2024-05-16 RX ADMIN — POTASSIUM CHLORIDE 80 MEQ: 1.5 POWDER, FOR SOLUTION ORAL at 20:34

## 2024-05-16 RX ADMIN — Medication 50 MCG: at 10:21

## 2024-05-16 RX ADMIN — OLANZAPINE 2.5 MG: 2.5 TABLET, FILM COATED ORAL at 10:21

## 2024-05-16 RX ADMIN — BUDESONIDE 1 MG: 1 SUSPENSION RESPIRATORY (INHALATION) at 21:07

## 2024-05-16 RX ADMIN — LISINOPRIL 2.5 MG: 2.5 TABLET ORAL at 10:21

## 2024-05-16 RX ADMIN — BUSPIRONE HYDROCHLORIDE 15 MG: 5 TABLET ORAL at 10:21

## 2024-05-16 RX ADMIN — DONEPEZIL HYDROCHLORIDE 10 MG: 10 TABLET, FILM COATED ORAL at 21:46

## 2024-05-16 RX ADMIN — PANTOPRAZOLE SODIUM 40 MG: 40 TABLET, DELAYED RELEASE ORAL at 10:21

## 2024-05-16 RX ADMIN — Medication 12.5 MG: at 18:05

## 2024-05-16 RX ADMIN — OLANZAPINE 2.5 MG: 2.5 TABLET, FILM COATED ORAL at 20:33

## 2024-05-16 RX ADMIN — LATANOPROST 1 DROP: 50 SOLUTION OPHTHALMIC at 21:48

## 2024-05-16 RX ADMIN — FLUOXETINE HYDROCHLORIDE 40 MG: 20 CAPSULE ORAL at 10:21

## 2024-05-16 RX ADMIN — MAGNESIUM SULFATE HEPTAHYDRATE 2 G: 2 INJECTION, SOLUTION INTRAVENOUS at 14:46

## 2024-05-16 RX ADMIN — CALCIUM CARBONATE (ANTACID) CHEW TAB 500 MG 500 MG: 500 CHEW TAB at 18:20

## 2024-05-16 RX ADMIN — BUSPIRONE HYDROCHLORIDE 15 MG: 5 TABLET ORAL at 20:33

## 2024-05-16 RX ADMIN — IPRATROPIUM BROMIDE AND ALBUTEROL SULFATE 3 ML: .5; 3 SOLUTION RESPIRATORY (INHALATION) at 21:07

## 2024-05-16 RX ADMIN — LEVOTHYROXINE SODIUM 50 MCG: 0.05 TABLET ORAL at 10:20

## 2024-05-16 ASSESSMENT — ACTIVITIES OF DAILY LIVING (ADL)
ADLS_ACUITY_SCORE: 45
ADLS_ACUITY_SCORE: 31
ADLS_ACUITY_SCORE: 44
ADLS_ACUITY_SCORE: 44
ADLS_ACUITY_SCORE: 45
ADLS_ACUITY_SCORE: 38
ADLS_ACUITY_SCORE: 45
ADLS_ACUITY_SCORE: 38
ADLS_ACUITY_SCORE: 44
ADLS_ACUITY_SCORE: 44
ADLS_ACUITY_SCORE: 45
ADLS_ACUITY_SCORE: 44
ADLS_ACUITY_SCORE: 45
ADLS_ACUITY_SCORE: 44
ADLS_ACUITY_SCORE: 44
ADLS_ACUITY_SCORE: 45

## 2024-05-16 NOTE — PROGRESS NOTES
Cardiology Consultation Note   Attending: .   Date of Service: 5/15/2024      CC:   Fatigue    HPI:   Ms Bob is a 80 yo f with history of dementia, pulmonary emphysema AMRIT on fluoxetine, IBS with diarrhea and oxygen dependence since prior hospitalization with PNA in 12/2023 requiring TCU stay and now in [facility], how is presenting with insidious onset of weakness and is now found to have new presentation of HFrEF.     She had 3 bouts of PNAs from December through February of 2024 requiring TCU stay and now correction. She had reported insidious onset of le swelling, progressively worse, along with fatigue, poor appetite and worsening orthopnea. She denies any chest pain episodes or palpitations. She visited her family doctor, leading to workup with labs revealing elevated NT-po BNP, for which she was advised to visit the ED.    Here she is afebrile, on RA, breathing comfortably sitting up. Labs notable for elveated Ntpto BNP.. Creatinine at baseline of 1.1 and Troponin 100- 91.    Past Medical History:   Past Medical History:   Diagnosis Date    Chronic kidney disease (CKD) 01/10/2023    Chronic obstructive pulmonary disease (H) 01/10/2023    Dementia (H) 11/28/2023    GERD (gastroesophageal reflux disease) 03/15/2023    Hypertension 11/28/2023     Past Surgical History:   Past Surgical History:   Procedure Laterality Date    ESOPHAGOSCOPY, GASTROSCOPY, DUODENOSCOPY (EGD), COMBINED N/A 2/7/2024    Procedure: ESOPHAGOGASTRODUODENOSCOPY, WITH BIOPSY;  Surgeon: Felton James MD;  Location:  GI     Allergies: Per MAR     Allergies   Allergen Reactions    Aspirin Nausea and Nausea and Vomiting     Stomach ache    Penicillins Itching     Has tolerated ceftriaxone, piperacillin, and amoxicillin     Medications:   Per MAR current outpatient cardiovascular medications include:   Medications Prior to Admission   Medication Sig Dispense Refill Last Dose    acetaminophen (TYLENOL) 325 MG tablet Take 325-650 mg  by mouth every 6 hours as needed for mild pain   Unknown at unknown    acetaminophen (TYLENOL) 500 MG tablet Take 1,000 mg by mouth daily   5/14/2024 at unknown    albuterol (PROVENTIL) (2.5 MG/3ML) 0.083% neb solution Take 1 vial (2.5 mg) by nebulization 2 times daily as needed (COPD) 90 mL 5 Unknown at unknown    benzocaine-menthol (CHLORASEPTIC) 6-10 MG lozenge Place 1 lozenge inside cheek every hour as needed for sore throat   Unknown at unknown    budesonide (PULMICORT) 1 MG/2ML neb solution Take 1 mg by nebulization 2 times daily as needed   Unknown at unknown    busPIRone (BUSPAR) 15 MG tablet Take 15 mg by mouth 2 times daily   5/14/2024 at unknown    calcium carbonate (TUMS) 500 MG chewable tablet Take 1 tablet (500 mg) by mouth 4 times daily as needed for heartburn   Unknown at unknown    Calcium Polycarbophil (FIBER) 625 MG tablet Take 2 tablets by mouth daily   5/14/2024 at unknown    diphenhydrAMINE-zinc acetate (BENADRYL) 2-0.1 % external cream Apply topically 3 times daily as needed for itching   Unknown at unknown    donepezil (ARICEPT) 10 MG tablet Take 1 tablet (10 mg) by mouth at bedtime   5/14/2024 at unknown    ferrous gluconate (FERGON) 324 (38 Fe) MG tablet Take 1 tablet (324 mg) by mouth daily (with breakfast) 90 tablet 3     FLUoxetine (PROZAC) 40 MG capsule Take 1 capsule (40 mg) by mouth daily   5/14/2024 at unknown    furosemide (LASIX) 20 MG tablet 2 tab daily for 5 days, then one tab daily as needed for lower ext edema 90 tablet 3 5/14/2024 at unknown    hyoscyamine (LEVSIN) 0.125 MG tablet TAKE 1 TABLET (125 MCG) BY MOUTH EVERY 4 HOURS AS NEEDED FOR CRAMPING OR DIARRHEA 30 tablet 1 Unknown at unknown    ipratropium - albuterol 0.5 mg/2.5 mg/3 mL (DUONEB) 0.5-2.5 (3) MG/3ML neb solution Take 1 vial by nebulization every 6 hours as needed for shortness of breath, wheezing or cough   Unknown at unknown    lactobacillus rhamnosus, GG, (CULTURELL) capsule Take 1 capsule by mouth daily    5/14/2024 at unknown    latanoprost (XALATAN) 0.005 % ophthalmic solution Place 1 drop into both eyes at bedtime   5/14/2024 at pm    levothyroxine (SYNTHROID/LEVOTHROID) 50 MCG tablet Take 1 tablet (50 mcg) by mouth daily   5/15/2024 at am    melatonin 1 MG TABS tablet Take 1 tablet (1 mg) by mouth nightly as needed for sleep   Unknown at unknown    OLANZapine (ZYPREXA) 2.5 MG tablet Take 1 tablet (2.5 mg) by mouth 2 times daily   5/14/2024 at pm    oxyCODONE (ROXICODONE) 5 MG tablet 1/2 tab at bedtime as needed for severe pain. ( Need to last for one month ) 15 tablet 0 Unknown at unknown    pantoprazole (PROTONIX) 40 MG EC tablet Take 1 tablet (40 mg) by mouth daily 90 tablet 3 5/14/2024 at unknown    potassium chloride ER (K-TAB) 20 MEQ CR tablet Take one tab bid with Furosemide 40 mg. 60 tablet 2     senna-docusate (SENOKOT-S/PERICOLACE) 8.6-50 MG tablet Take 1 tablet by mouth 2 times daily as needed for constipation   Unknown at unknown    triamcinolone (KENALOG) 0.1 % external cream Apply topically 2 times daily as needed for irritation   Unknown at unknown    vitamin D3 (CHOLECALCIFEROL) 50 mcg (2000 units) tablet Take 1 tablet (50 mcg) by mouth daily   5/14/2024 at unknown     No current outpatient medications on file.     Current Facility-Administered Medications   Medication Dose Route Frequency Provider Last Rate Last Admin    acetaminophen (TYLENOL) tablet 975 mg  975 mg Oral Q6H PRN Yonathan Valdez MD PhD        albuterol (PROVENTIL) neb solution 2.5 mg  2.5 mg Nebulization BID PRN Terrence Tate MD        budesonide (PULMICORT) neb solution 1 mg  1 mg Nebulization BID Terrence Tate MD   1 mg at 05/15/24 1950    busPIRone (BUSPAR) tablet 15 mg  15 mg Oral BID Terrence Tate MD   15 mg at 05/16/24 1021    calcium carbonate (TUMS) chewable tablet 500 mg  500 mg Oral 4x Daily PRN Terrence Tate MD        donepezil (ARICEPT) tablet 10 mg  10 mg Oral At Bedtime  Terernce Tate MD   10 mg at 05/15/24 2054    [Held by provider] enoxaparin ANTICOAGULANT (LOVENOX) injection 40 mg  40 mg Subcutaneous Q24H Terrence Tate MD   40 mg at 05/15/24 1423    FLUoxetine (PROzac) capsule 40 mg  40 mg Oral Daily Terrence Tate MD   40 mg at 05/16/24 1021    furosemide (LASIX) 500 mg/50mL infusion ADULT MAX CONC  5 mg/hr Intravenous Continuous Yonathan Valdez MD PhD 0.5 mL/hr at 05/16/24 0602 5 mg/hr at 05/16/24 0602    hyoscyamine (LEVSIN) tablet 125 mcg  125 mcg Oral Q4H PRN Terrence Tate MD        ipratropium - albuterol 0.5 mg/2.5 mg/3 mL (DUONEB) neb solution 3 mL  3 mL Nebulization 2 times daily Evangelina Johnson MD        latanoprost (XALATAN) 0.005 % ophthalmic solution 1 drop  1 drop Both Eyes At Bedtime Terrence Tate MD   1 drop at 05/15/24 2104    levothyroxine (SYNTHROID/LEVOTHROID) tablet 50 mcg  50 mcg Oral Daily Terrence Tate MD   50 mcg at 05/16/24 1020    lidocaine (LMX4) cream   Topical Q1H PRN Terrence Tate MD        lidocaine 1 % 0.1-1 mL  0.1-1 mL Other Q1H PRN Terrence Tate MD        lisinopril (ZESTRIL) tablet 2.5 mg  2.5 mg Oral Daily Terrence Tate MD   2.5 mg at 05/16/24 1021    magnesium sulfate 2 g in 50 mL sterile water intermittent infusion  2 g Intravenous Once Terrence Tate MD        OLANZapine (zyPREXA) tablet 2.5 mg  2.5 mg Oral BID Terrence Tate MD   2.5 mg at 05/16/24 1021    pantoprazole (PROTONIX) EC tablet 40 mg  40 mg Oral Daily Terrence Tate MD   40 mg at 05/16/24 1021    polyethylene glycol (MIRALAX) Packet 17 g  17 g Oral BID PRN Terrence Tate MD        potassium chloride caroline ER (KLOR-CON M20) CR tablet 80 mEq  80 mEq Oral Once Terrence Tate MD        Reason beta blocker not prescribed   Other DOES NOT GO TO Terrence Garcia MD        senna-docusate (SENOKOT-S/PERICOLACE) 8.6-50 MG per tablet 1 tablet  1  "tablet Oral BID PRN Terrence Tate MD        Or    senna-docusate (SENOKOT-S/PERICOLACE) 8.6-50 MG per tablet 2 tablet  2 tablet Oral BID PRN Terrence Tate MD        sodium chloride (PF) 0.9% PF flush 3 mL  3 mL Intracatheter Q8H Terrence Tate MD   3 mL at 05/15/24 1423    sodium chloride (PF) 0.9% PF flush 3 mL  3 mL Intracatheter q1 min prn Terrence Tate MD        Vitamin D3 (CHOLECALCIFEROL) tablet 50 mcg  50 mcg Oral Daily Terrence Tate MD   50 mcg at 05/16/24 1021     Family History:   No family history on file.  Social History:   Social History     Tobacco Use    Smoking status: Former     Current packs/day: 1.00     Average packs/day: 1 pack/day for 40.0 years (40.0 ttl pk-yrs)     Types: Cigarettes     Passive exposure: Past    Smokeless tobacco: Never   Substance Use Topics    Alcohol use: Not Currently       ROS:   A comprehensive 10 point ROS was negative other than as mentioned in HPI.    Physical Examination:     B/P: 138/86, T: 97.8, P: 90, R: 16    General: Alert and oriented; no acute distress  Neck: JVP is 18 cm (jawline)  CV: s1 s2 regular; no m/r/g; +3 le pitting edema till the mid thighs  Lungs: CTABL, no rhonchi or wheezing  GI: BS+X4; non tender, non distended  Skin: warm, no rashes  Neuro: alert and oriented  Psych: congruent affect    Labs:  Resp: 18 SpO2: 99 % O2 Device: Nasal cannula Oxygen Delivery: 1 LPM    Arterial Blood Gas:   Recent Labs   Lab 05/15/24  1111   O2PER 21     There were no vitals filed for this visit.I/O last 3 completed shifts:  In: -   Out: 2700 [Urine:2700]  Recent Labs   Lab 05/16/24  1218 05/16/24  0535    140   POTASSIUM 3.2* 3.1*   CHLORIDE 100 101   CO2 29 24   ANIONGAP 11 15   GLC 84 76   BUN 18.4 20.8   CR 1.08* 1.07*   AYUSH 8.5* 8.7*     No components found for: \"URINE\"   Recent Labs   Lab 05/16/24  0535 05/15/24  1426 05/15/24  0929   AST 46* 58* 64*   ALT 54* 58* 58*   BILITOTAL 0.6 0.6 0.5   ALBUMIN 3.5 3.5 " 3.5   PROTTOTAL 5.8* 5.8* 5.6*   ALKPHOS 87 90 91     Temp: 97.9  F (36.6  C) Temp src: OralTemp  Min: 97.5  F (36.4  C)  Max: 97.9  F (36.6  C)   Recent Labs   Lab 24  0535 05/15/24  1111 24  1549   WBC 10.2 9.7 10.0   HGB 9.6* 9.4* 8.6*   HCT 34.4* 33.6* 31.0*   MCV 81 81 80   RDW 19.5* 19.5* 19.5*    289 303     Recent Labs   Lab 05/15/24  0929   INR 2.34*     Recent Labs   Lab 24  1218 24  0535 24  0028 05/15/24  1426 05/15/24  0929   GLC 84 76 79 98 102*         Imaging:   Recent Results (from the past 24 hour(s))   Echocardiogram Complete   Result Value    LVEF  10-15% (severely reduced)    [facility]    [id number]  [id number]  [id number]  [id number]^DEBBIE^LATRICE^     [facility],[place]  Echocardiography Laboratory  [address] [place] [address]     Name: JULIANO HEALY  MRN: [id number]  : 1945  Study Date: 05/15/2024 01:35 PM  Age: 79 yrs  Gender: Female  Patient Location: [facility]  Reason For Study: Heart Failure  Ordering Physician: LATRICE LEWIS  Performed By: Pily Stapleton RDCS     BSA: 1.5 m2  Height: 60 in  Weight: 127 lb  HR: 104  ______________________________________________________________________________  Procedure  Echocardiogram with two-dimensional, color and spectral Doppler performed.  Contrast Optison. Optison (NDC #3404-3692-87) given intravenously. Patient was  given 6 ml mixture of 3 ml Optison and 6 ml saline. 3 ml wasted.  ______________________________________________________________________________  Interpretation Summary  Left ventricular function is decreased. The ejection fraction is 10-15%  (severely reduced). Severe diffuse hypokinesis is present.  Global right ventricular function is moderately reduced.  Moderate tricuspid insufficiency is present.  Estimated pulmonary artery systolic pressure is 54 mmHg plus right atrial  pressure.  IVC diameter >2.1 cm collapsing <50% with sniff suggests  a high RA pressure  estimated at 15 mmHg or greater.  Trivial pericardial effusion is present.     This study was compared with the study from 2/12/24: LV and RV function have  decreased significantly.  ______________________________________________________________________________  Left Ventricle  Left ventricular wall thickness is normal. Left ventricular size is normal.  Left ventricular function is decreased. The ejection fraction is 10-15%  (severely reduced). Left ventricular diastolic function is abnormal. Severe  diffuse hypokinesis is present.     Right Ventricle  The right ventricle is normal size. Global right ventricular function is  moderately reduced.     Atria  The atria cannot be assessed. Lipomatous infiltration of the interatrial septm  is present.     Mitral Valve  Mild mitral annular calcification is present. Mild mitral insufficiency is  present.     Aortic Valve  Trileaflet aortic sclerosis without stenosis.     Tricuspid Valve  Moderate tricuspid insufficiency is present. The right ventricular systolic  pressure is approximated at 38.7 mmHg plus the right atrial pressure.  Estimated pulmonary artery systolic pressure is 54 mmHg plus right atrial  pressure.     Pulmonic Valve  Mild pulmonic insufficiency is present.     Vessels  Dilation of the inferior vena cava is present with abnormal respiratory  variation in diameter. IVC diameter >2.1 cm collapsing <50% with sniff  suggests a high RA pressure estimated at 15 mmHg or greater.     Pericardium  Prominent epicardial fat is noted. Trivial pericardial effusion is present.     Compared to Previous Study  This study was compared with the study from 2/12/24 . LV and RV function have  decreased significantly.     Attestation  I have personally viewed the imaging and agree with the interpretation and  report as documented by the fellow, Kyle Snyder, and/or edited by  me.  ______________________________________________________________________________  MMode/2D Measurements & Calculations  IVSd: 0.97 cm  LVIDd: 5.0 cm  LVIDs: 4.4 cm  LVPWd: 0.98 cm  FS: 12.5 %  LV mass(C)d: 176.9 grams  LV mass(C)dI: 115.0 grams/m2  LVOT diam: 2.0 cm  LVOT area: 3.2 cm2     EF(MOD-bp): 21.2 %  RWT: 0.39  TAPSE: 0.86 cm     Doppler Measurements & Calculations  Ao V2 max: 121.1 cm/sec  Ao max P.9 mmHg  Ao V2 mean: 93.7 cm/sec  Ao mean PG: 3.9 mmHg  Ao V2 VTI: 19.1 cm  GENEVIEVE(I,D): 2.9 cm2  GENEVIEVE(V,D): 2.7 cm2  LV V1 max P.3 mmHg  LV V1 max: 104.0 cm/sec  LV V1 VTI: 17.2 cm     SV(LVOT): 54.6 ml  SI(LVOT): 35.5 ml/m2  PA acc time: 0.07 sec  PI end-d bryson: 132.6 cm/sec  TR max bryson: 309.4 cm/sec  TR max P.7 mmHg  AV Bryson Ratio (DI): 0.86  GENEVIEVE Index (cm2/m2): 1.9     ______________________________________________________________________________  Report approved by: Ely Deng 05/15/2024 03:54 PM               Assessment and Plan:   Ms Bob is a 78 yo f with history of dementia, pulmonary emphysema AMRIT on fluoxetine, IBS with diarrhea and oxygen dependence since prior hospitalization with PNA in 2023 requiring TCU stay and now in [facility], how is presenting with insidious onset of weakness and is now found to have new presentation of HFrEF.     # New presentation of HFrEF EF 10-15% with normal size LV  # Active smoking  # recent PNAs  She presents with new presentation of  HFrEF with normal EF. The lack of WMA and the normal size of the LV argue against CAD or long standing NICM. She most likely has either a tachy-mediated CM or a stress-induced CM from prior infections or a prior myocarditis that might have been missed in the interim.     Recommendations:  - Lasix 40 mg IV at the ED, start Lasix gtt at 10 mg IV/hour and cut to 5 mg IV if the UOP is >250 ml per hour; would keep at 10 for now  - Please record I+O and daily weights  - BMP, Mg BID  - Continue Lisinopril 2.5 mg po daily  start today  - Start Toprol 12.5 mg this evening if she tolerates diuresis well  - Would pursue Coronary angiogram after she is euvolemic      Thank you for allowing us to participate in the care of this patient.   The patient was discussed w/ Dr. Hensley.     Carmella Taylor MD PhD  Cardiology fellow  [id number]

## 2024-05-16 NOTE — PROGRESS NOTES
Resident/Fellow Attestation   I, LATRICE LEWIS MD, was present with the medical/SAY student who participated in the service and in the documentation of the note.  I have verified the history and personally performed the physical exam and medical decision making.  I agree with the assessment and plan of care as documented in the note.      Latrice Lewis MD  PGY1, Internal Medicine & Pediatrics Residency  [facility]  Pager: [phone]    Date of Service (when I saw the patient): 05/16/24    [facility]    Progress Note - Medicine Service, MAROON TEAM 2       Date of Admission:  5/15/2024    Assessment & Plan   Idalia Bob is a 79 year old female with a history of COPD, psuedomonal upper lobe pneumonia (12/2023), hypertension and dementia, admitted on 5/15/2024 for newly-diagnosed HFrEF exacerbation.     Progress of Care 5/16  - Good UOP with Lasix gtt  -Cardiology    - Angiogram when euvolemic    - continue uptitration with GDMT, initiating low dose metoprolol succinate 12.5 mg PM of 5/16     #Acute decompensated HFrEF (10-15%)  #Suspected cardiorenal syndrome   Lack of WMA and the normal size of the LV argue against CAD or long standing NICM. She most likely has either a tachy-mediated CM or a stress-induced CM from prior infections or a prior myocarditis that might have been missed in the interim. - 4+ bilateral pitting lower extremity edema, negative US doppler for bilateral DVT  - Elevated BNP 26,696, 31,5556 on 5/14   - ECHO 5/15 showed EF 10-15%, decreased left ventricular function. Severe diffuse hypokinesis is present              - Last echo 2/12/2024 showed normal EF 55-60%  - Cardiology Heart Failure consult   - Started on 40 mg IV Lasix in ED, transitioned to Lasix ggt @ 10 mg/hr PM of 5/15              - DECREASED to 5 mg/hr as UO overnight was 2050mL   - Lisinopril 2.5 mg every day   - monitor I&Os and weight   - Creatinine 1.07  (5/16), LFTs stable   - continue to monitor, recheck in AM   - monitor electrolytes, recheck CMP, phos, mag BID and PRN  - Coronary Angio  when euvolemic per cardiology      #Pleural effusion  - Chest XR showed continued left pleural effusion w trace increased small right pleural effusion.   - Likely secondary to acute decompensated HF      #Elevated troponin   Troponin peaked at 101 without symptoms or EKG findings of ACS, likely 2/2 demand ischemia in setting of ADHF.  - Cardiac telemetry during active diuresis     Chronic/Stable/Resolving:      #COPD   - continue budesonide 1mg neb BID  - continue duoneb 3ml QID     #Anemia   -Hb 9.7 (5/15), no signs of bleeding  - Iron and iron binding capacity study 3 month ago showed iron of 26  - continue ferrous gluconate 324 (38 Fe) MG tablet      #Hypothyrodism   - Continue levothyrozine 50mcg daily      #Dementia  - Continue donepezil 10mg daily     #CKD, stage 3  #GERD  - Continue prantoprazole 40mg daily         Diet: Fluid restriction 1800 ML FLUID (and additional linked orders)  2 Gram Sodium Diet    DVT Prophylaxis: Enoxaparin (Lovenox) subcutaneous 40mg Q24  Ruiz Catheter: Not present  Fluids: None  Lines: None     Cardiac Monitoring: ACTIVE order. Indication: Acute decompensated heart failure (48 hours)  Code Status: No CPR- Do NOT Intubate      Clinically Significant Risk Factors        # Hypokalemia: Lowest K = 3.1 mmol/L in last 2 days, will replace as needed          # Coagulation Defect: INR = 2.34 (Ref range: 0.85 - 1.15) and/or PTT = N/A, will monitor for bleeding    # Hypertension: Noted on problem list  # Acute heart failure with reduced ejection fraction: last echo with EF <40% and receiving IV diuretics    # Dementia: noted on problem list        # Financial/Environmental Concerns: none         Disposition Plan      Expected Discharge Date: 05/18/2024      Destination: assisted living  Discharge Comments: Dispo: TBD  Plan: lab work-up;  CHF exacerbation  > IV diuresis  Progress: INR: 2.3; Cards consult        The patient's care was discussed with the Attending Physician, Dr. Landrum .    Flower Newsome  Medical Student  Medicine Service, [facility] TEAM 2  [facility]  Securely message with [employer] (more info)  Text page via [employer] Paging/Directory   See signed in provider for up to date coverage information  ______________________________________________________________________    Interval History   - NAEO  - Edema better this morning, improved mobility     Physical Exam   Vital Signs: Temp: 97.9  F (36.6  C) Temp src: Oral BP: 132/86 Pulse: 90   Resp: 18 SpO2: 99 % O2 Device: Nasal cannula Oxygen Delivery: 1 LPM  Weight: 0 lbs 0 oz    General: Alert, oriented X4. Patient resting comfortably in bed.   HEENT: atraumatic, oropharynx clear, PERRL, no scleral icterus  NECK: JVD 5cm from sternal angle   RESPIRATORY: Normal effort, no accessory muscle use. Faint bilateral crackles in lower lobes bilaterally.   CARDIOVASCULAR:  RRR, no murmurs appreciated. 3+ pitting edema up to knees bilaterally.   GASTROINTESTINAL: Abdomen is soft, non-tender, non-distended. +BS.   SKIN: No abnormal skin rashes or lesions. Skin is pale and cold to touch on bilateral lower extremities.   NEURO: Alert and oriented.  Grossly normal motor exam.  PSYCH: pleasant, appropriate affect, good eye contact.     Medical Decision Making       MANAGEMENT DISCUSSED with the following over the past 24 hours: Dr Landrum       Data     I have personally reviewed the following data over the past 24 hrs:    10.2  \   9.6 (L)   / 311     140 101 20.8 /  76   3.1 (L) 24 1.07 (H) \     ALT: 54 (H) AST: 46 (H) AP: 87 TBILI: 0.6   ALB: 3.5 TOT PROTEIN: 5.8 (L) LIPASE: N/A     Trop: 98 (H) BNP: N/A     Procal: N/A CRP: N/A Lactic Acid: 1.3         Imaging results reviewed over the past 24 hrs:   Recent Results (from the past 24 hour(s))   Echocardiogram Complete   Result Value     LVEF  10-15% (severely reduced)    [facility]    [id number]  [id number]  [id number]  [id number]^DEBBIE^LATRICE^     [facility],[place]  Echocardiography Laboratory  [address] [place] [id number]     Name: JULIANO HEALY  MRN: [id number]  : 1945  Study Date: 05/15/2024 01:35 PM  Age: 79 yrs  Gender: Female  Patient Location: [facility]  Reason For Study: Heart Failure  Ordering Physician: LATRICE LEWIS  Performed By: Pily Stapleton LUIS EDUARDO     BSA: 1.5 m2  Height: 60 in  Weight: 127 lb  HR: 104  ______________________________________________________________________________  Procedure  Echocardiogram with two-dimensional, color and spectral Doppler performed.  Contrast Optison. Optison (NDC #9444-6073-75) given intravenously. Patient was  given 6 ml mixture of 3 ml Optison and 6 ml saline. 3 ml wasted.  ______________________________________________________________________________  Interpretation Summary  Left ventricular function is decreased. The ejection fraction is 10-15%  (severely reduced). Severe diffuse hypokinesis is present.  Global right ventricular function is moderately reduced.  Moderate tricuspid insufficiency is present.  Estimated pulmonary artery systolic pressure is 54 mmHg plus right atrial  pressure.  IVC diameter >2.1 cm collapsing <50% with sniff suggests a high RA pressure  estimated at 15 mmHg or greater.  Trivial pericardial effusion is present.     This study was compared with the study from 24: LV and RV function have  decreased significantly.  ______________________________________________________________________________  Left Ventricle  Left ventricular wall thickness is normal. Left ventricular size is normal.  Left ventricular function is decreased. The ejection fraction is 10-15%  (severely reduced). Left ventricular diastolic function is abnormal. Severe  diffuse hypokinesis is present.     Right Ventricle  The right ventricle is  normal size. Global right ventricular function is  moderately reduced.     Atria  The atria cannot be assessed. Lipomatous infiltration of the interatrial septm  is present.     Mitral Valve  Mild mitral annular calcification is present. Mild mitral insufficiency is  present.     Aortic Valve  Trileaflet aortic sclerosis without stenosis.     Tricuspid Valve  Moderate tricuspid insufficiency is present. The right ventricular systolic  pressure is approximated at 38.7 mmHg plus the right atrial pressure.  Estimated pulmonary artery systolic pressure is 54 mmHg plus right atrial  pressure.     Pulmonic Valve  Mild pulmonic insufficiency is present.     Vessels  Dilation of the inferior vena cava is present with abnormal respiratory  variation in diameter. IVC diameter >2.1 cm collapsing <50% with sniff  suggests a high RA pressure estimated at 15 mmHg or greater.     Pericardium  Prominent epicardial fat is noted. Trivial pericardial effusion is present.     Compared to Previous Study  This study was compared with the study from 24 . LV and RV function have  decreased significantly.     Attestation  I have personally viewed the imaging and agree with the interpretation and  report as documented by the fellow, Kyle Snyder, and/or edited by me.  ______________________________________________________________________________  MMode/2D Measurements & Calculations  IVSd: 0.97 cm  LVIDd: 5.0 cm  LVIDs: 4.4 cm  LVPWd: 0.98 cm  FS: 12.5 %  LV mass(C)d: 176.9 grams  LV mass(C)dI: 115.0 grams/m2  LVOT diam: 2.0 cm  LVOT area: 3.2 cm2     EF(MOD-bp): 21.2 %  RWT: 0.39  TAPSE: 0.86 cm     Doppler Measurements & Calculations  Ao V2 max: 121.1 cm/sec  Ao max P.9 mmHg  Ao V2 mean: 93.7 cm/sec  Ao mean PG: 3.9 mmHg  Ao V2 VTI: 19.1 cm  GENEVIEVE(I,D): 2.9 cm2  GENEVIEVE(V,D): 2.7 cm2  LV V1 max P.3 mmHg  LV V1 max: 104.0 cm/sec  LV V1 VTI: 17.2 cm     SV(LVOT): 54.6 ml  SI(LVOT): 35.5 ml/m2  PA acc time: 0.07 sec  PI end-d gregory:  132.6 cm/sec  TR max bryson: 309.4 cm/sec  TR max P.7 mmHg  AV Bryson Ratio (DI): 0.86  GENEVIEVE Index (cm2/m2): 1.9     ______________________________________________________________________________  Report approved by: Ely Deng 05/15/2024 03:54 PM

## 2024-05-16 NOTE — PROGRESS NOTES
05/16/24 1135   Appointment Info   Signing Clinician's Name / Credentials (PT) Haider Wise DPT   Rehab Comments (PT) monitor HR   Living Environment   People in Home spouse;facility resident   Current Living Arrangements assisted living   Home Accessibility wheelchair accessible   Transportation Anticipated family or friend will provide   Living Environment Comments pt lives in snf with , w/c accessible throughout with elevator access   Self-Care   Usual Activity Tolerance fair   Current Activity Tolerance poor   Equipment Currently Used at Home grab bar, tub/shower;walker, standard;wheelchair, manual;shower chair   Fall history within last six months no   Activity/Exercise/Self-Care Comment Pt was mostly IND with dressing and toileting, some difficulty bending forward for socks/shoes. Family would assist with showers, pt receives meals from snf, has w/c that pt will be pushed in to go down for meals. Deepak with 4ww around apartment. Able to increase services as needed   General Information   Onset of Illness/Injury or Date of Surgery 05/15/24   Referring Physician Chuck Hernandez MD   Patient/Family Therapy Goals Statement (PT) to return home from d/c   Pertinent History of Current Problem (include personal factors and/or comorbidities that impact the POC) Per EMR: Ms Bob is a 78 yo f with history of dementia, pulmonary emphysema AMRIT on fluoxetine, IBS with diarrhea and oxygen dependence since prior hospitalization with PNA in 12/2023 requiring TCU stay and now in snf, how is presenting with insidious onset of weakness and is now found to have new presentation of HFrEF.      She had 3 bouts of PNAs from December through February of 2024 requiring TCU stay and now snf. She had reported insidious onset of le swelling, progressively worse, along with fatigue, poor appetite and worsening orthopnea. She denies any chest pain episodes or palpitations. She visited her family doctor, leading to workup with  labs revealing elevated NT-po BNP, for which she was advised to visit the ED.   Existing Precautions/Restrictions fall   Weight-Bearing Status - LUE full weight-bearing   Weight-Bearing Status - RUE full weight-bearing   Weight-Bearing Status - LLE full weight-bearing   Weight-Bearing Status - RLE full weight-bearing   Cognition   Affect/Mental Status (Cognition) WNL   Follows Commands (Cognition) WNL   Pain Assessment   Patient Currently in Pain Yes, see Vital Sign flowsheet  (low back pain, typical)   Integumentary/Edema   Integumentary/Edema Comments beau LE pitting edema, lymphedema to initiate   Posture    Posture Forward head position;Kyphosis   Range of Motion (ROM)   Range of Motion ROM is WFL   Strength (Manual Muscle Testing)   Strength (Manual Muscle Testing) Deficits observed during functional mobility   Bed Mobility   Comment, (Bed Mobility) SBA for supine < > sit   Transfers   Comment, (Transfers) CGA for STS with FWW   Gait/Stairs (Locomotion)   Comment, (Gait/Stairs) amb short distance with FWW and CGA, forward flexed trunk posture   Balance   Balance Comments needing one UE support in standing, SBA sitting balance EOB/commode   Sensory Examination   Sensory Perception patient reports no sensory changes   Clinical Impression   Criteria for Skilled Therapeutic Intervention Yes, treatment indicated   PT Diagnosis (PT) impaired functional mobility   Influenced by the following impairments dyspnea, fatigue, weakness, edema   Functional limitations due to impairments bed mobility, gait, transfers, activity tolerance   Clinical Presentation (PT Evaluation Complexity) stable   Clinical Presentation Rationale clinical reasoning   Clinical Decision Making (Complexity) low complexity   Planned Therapy Interventions (PT) balance training;bed mobility training;gait training;home exercise program;neuromuscular re-education;patient/family education;ROM (range of motion);strengthening;transfer training;wheelchair  management/propulsion training;progressive activity/exercise;risk factor education;home program guidelines   Risk & Benefits of therapy have been explained evaluation/treatment results reviewed;care plan/treatment goals reviewed;risks/benefits reviewed;current/potential barriers reviewed;participants voiced agreement with care plan;participants included;patient   Clinical Impression Comments pt presents to PT with HF exacerbation primarily limited by generalized weakness and LE edema. Skilled PT services necessary to improve pt's level of IND and safety wtih mobility for safe d/c home.   PT Total Evaluation Time   PT Eval, Low Complexity Minutes (85316) 8   Physical Therapy Goals   PT Frequency Daily   PT Predicted Duration/Target Date for Goal Attainment 05/24/24   PT Goals Bed Mobility;Transfers;Gait;PT Goal 1   PT: Bed Mobility Independent;Supine to/from sit;Rolling;Bridging   PT: Transfers Modified independent;Sit to/from stand;Bed to/from chair;Assistive device   PT: Gait Modified independent;Rolling walker;50 feet   PT: Goal 1 pt will be IND with HEP in order to progress LE strength and overall function   Interventions   Interventions Quick Adds Therapeutic Activity   Therapeutic Activity   Therapeutic Activities: dynamic activities to improve functional performance Minutes (62144) 40   Symptoms Noted During/After Treatment Fatigue   Treatment Detail/Skilled Intervention Post eval, continued with functional mobility training to improve IND. Performing SPT bed <> commode with HHA, able to doff/don briefs and perform hygiene with set up assist and CGA in standing. Able to additionally amb 20' in room with FWW and CGA-SBA with assist for IV pole, stable on 2L O2, limited primarily by fatigue and weakness. Returning to bed and sit > supine with SBA, cues to scoot up in bed. Time spent discussing d/c recs and plan with family who were not wanting pt to d/c to TCU. Family noting ability to increase services as  necessary to accomodate this. Extra time for line management and room set up throughout. Ends supine in bed, all needs met within reach.   PT Discharge Planning   PT Plan progress gait with chair follow as needed, STS practice   PT Discharge Recommendation (DC Rec) home with assist;home with home care physical therapy   PT Rationale for DC Rec Pt living in [place] with , currently has assistance with meals, showering, and w/c transportation outside of apartment, otherwise Otilio with FWW in apartment and IND with dressing and toileting. Pt's family noting ability to increase services if necessary to facilitate d/c to [place] rather than TCU. Anticipate pt to continue to progress during IP stay to be safe to return home. Will update as appropriate   PT Brief overview of current status CGA short distance with FWW   Total Session Time   Timed Code Treatment Minutes 40   Total Session Time (sum of timed and untimed services) 48

## 2024-05-16 NOTE — PLAN OF CARE
Goal Outcome Evaluation:      Plan of Care Reviewed With: patient, child, family    Overall Patient Progress: no change    Outcome Evaluation: A&Ox4, forgetful at times. Slight HTN and tachy pulse, now started on metoprolol. Pt denies pain, but had two episodes of emesis post-meals. MD paged and IV zofran given with no relief. MD paged again for suggestions as apparently pt was experiencing similar symptoms while in ED. Lasix gtt increased to 1mL/hr, purewick in place with minimal output. Very loose BM in commode with therapy. Closely tracking I&Os, h/e pt intaking fluids minimally and loss more d/t emesis episodes. Lymphedema consult placed for BLE, pillows under legs for now. Continue to monitor with potential to discharge back to detention 5/18.    /83   Pulse 76   Temp 97.8  F (36.6  C) (Oral)   Resp 18   LMP  (LMP Unknown)   SpO2 99%      Meagan Crenshaw RN on 5/16/2024 at 6:31 PM

## 2024-05-16 NOTE — PROGRESS NOTES
Status: DNR-DNI  Vitals: /88   Pulse 103   Temp 97.5  F (36.4  C) (Oral)   Resp 16   LMP  (LMP Unknown)   SpO2 96%     Neuros: A&Ox4, edema to BLE +3, on cardiac monitoring.  IV: PIV infusing lasix  Labs/Electrolytes: reviewed  Resp/trach: RA  Diet: NPO  Bowel status: continent, LBM PTA  : pure wick in place  Skin: Open sores to Left shin, scattered bruises  Pain: denies  Activity: SBA  Plan: Continue with POC

## 2024-05-17 ENCOUNTER — APPOINTMENT (OUTPATIENT)
Dept: GENERAL RADIOLOGY | Facility: CLINIC | Age: 79
DRG: 291 | End: 2024-05-17
Payer: MEDICARE

## 2024-05-17 LAB
ALBUMIN SERPL BCG-MCNC: 3.2 G/DL (ref 3.5–5.2)
ALBUMIN SERPL BCG-MCNC: 3.4 G/DL (ref 3.5–5.2)
ALP SERPL-CCNC: 81 U/L (ref 40–150)
ALP SERPL-CCNC: 83 U/L (ref 40–150)
ALT SERPL W P-5'-P-CCNC: 37 U/L (ref 0–50)
ALT SERPL W P-5'-P-CCNC: 43 U/L (ref 0–50)
ANION GAP SERPL CALCULATED.3IONS-SCNC: 10 MMOL/L (ref 7–15)
ANION GAP SERPL CALCULATED.3IONS-SCNC: 13 MMOL/L (ref 7–15)
ANION GAP SERPL CALCULATED.3IONS-SCNC: 13 MMOL/L (ref 7–15)
AST SERPL W P-5'-P-CCNC: 30 U/L (ref 0–45)
AST SERPL W P-5'-P-CCNC: 36 U/L (ref 0–45)
ATRIAL RATE - MUSE: 111 BPM
BASE EXCESS BLDV CALC-SCNC: 5.9 MMOL/L (ref -3–3)
BASE EXCESS BLDV CALC-SCNC: 6.5 MMOL/L (ref -3–3)
BASE EXCESS BLDV CALC-SCNC: 6.9 MMOL/L (ref -3–3)
BILIRUB SERPL-MCNC: 0.6 MG/DL
BILIRUB SERPL-MCNC: 0.7 MG/DL
BUN SERPL-MCNC: 16.2 MG/DL (ref 8–23)
BUN SERPL-MCNC: 16.6 MG/DL (ref 8–23)
BUN SERPL-MCNC: 18.1 MG/DL (ref 8–23)
CALCIUM SERPL-MCNC: 8.3 MG/DL (ref 8.8–10.2)
CALCIUM SERPL-MCNC: 8.4 MG/DL (ref 8.8–10.2)
CALCIUM SERPL-MCNC: 8.8 MG/DL (ref 8.8–10.2)
CHLORIDE SERPL-SCNC: 103 MMOL/L (ref 98–107)
CHLORIDE SERPL-SCNC: 96 MMOL/L (ref 98–107)
CHLORIDE SERPL-SCNC: 97 MMOL/L (ref 98–107)
CREAT SERPL-MCNC: 1.01 MG/DL (ref 0.51–0.95)
CREAT SERPL-MCNC: 1.11 MG/DL (ref 0.51–0.95)
CREAT SERPL-MCNC: 1.13 MG/DL (ref 0.51–0.95)
DEPRECATED HCO3 PLAS-SCNC: 26 MMOL/L (ref 22–29)
DEPRECATED HCO3 PLAS-SCNC: 27 MMOL/L (ref 22–29)
DEPRECATED HCO3 PLAS-SCNC: 28 MMOL/L (ref 22–29)
DIASTOLIC BLOOD PRESSURE - MUSE: NORMAL MMHG
EGFRCR SERPLBLD CKD-EPI 2021: 49 ML/MIN/1.73M2
EGFRCR SERPLBLD CKD-EPI 2021: 50 ML/MIN/1.73M2
EGFRCR SERPLBLD CKD-EPI 2021: 56 ML/MIN/1.73M2
GLUCOSE SERPL-MCNC: 109 MG/DL (ref 70–99)
GLUCOSE SERPL-MCNC: 134 MG/DL (ref 70–99)
GLUCOSE SERPL-MCNC: 160 MG/DL (ref 70–99)
HCO3 BLDV-SCNC: 32 MMOL/L (ref 21–28)
HCO3 BLDV-SCNC: 33 MMOL/L (ref 21–28)
HCO3 BLDV-SCNC: 33 MMOL/L (ref 21–28)
HOLD SPECIMEN: NORMAL
INTERPRETATION ECG - MUSE: NORMAL
LACTATE SERPL-SCNC: 1.3 MMOL/L (ref 0.7–2)
LACTATE SERPL-SCNC: 1.4 MMOL/L (ref 0.7–2)
LACTATE SERPL-SCNC: 1.6 MMOL/L (ref 0.7–2)
LACTATE SERPL-SCNC: 2.3 MMOL/L (ref 0.7–2)
MAGNESIUM SERPL-MCNC: 2 MG/DL (ref 1.7–2.3)
MAGNESIUM SERPL-MCNC: 2.1 MG/DL (ref 1.7–2.3)
O2/TOTAL GAS SETTING VFR VENT: 20 %
O2/TOTAL GAS SETTING VFR VENT: 21 %
O2/TOTAL GAS SETTING VFR VENT: 30 %
OXYHGB MFR BLDV: 24 % (ref 70–75)
OXYHGB MFR BLDV: 44 % (ref 70–75)
OXYHGB MFR BLDV: 90 % (ref 70–75)
P AXIS - MUSE: 84 DEGREES
PCO2 BLDV: 52 MM HG (ref 40–50)
PCO2 BLDV: 53 MM HG (ref 40–50)
PCO2 BLDV: 56 MM HG (ref 40–50)
PH BLDV: 7.38 [PH] (ref 7.32–7.43)
PH BLDV: 7.39 [PH] (ref 7.32–7.43)
PH BLDV: 7.4 [PH] (ref 7.32–7.43)
PO2 BLDV: 21 MM HG (ref 25–47)
PO2 BLDV: 27 MM HG (ref 25–47)
PO2 BLDV: 75 MM HG (ref 25–47)
POTASSIUM SERPL-SCNC: 3.7 MMOL/L (ref 3.4–5.3)
POTASSIUM SERPL-SCNC: 4 MMOL/L (ref 3.4–5.3)
POTASSIUM SERPL-SCNC: 4.7 MMOL/L (ref 3.4–5.3)
PR INTERVAL - MUSE: 152 MS
PROT SERPL-MCNC: 5.6 G/DL (ref 6.4–8.3)
PROT SERPL-MCNC: 5.8 G/DL (ref 6.4–8.3)
QRS DURATION - MUSE: 66 MS
QT - MUSE: 346 MS
QTC - MUSE: 470 MS
R AXIS - MUSE: 77 DEGREES
SAO2 % BLDV: 24.3 % (ref 70–75)
SAO2 % BLDV: 45.4 % (ref 70–75)
SAO2 % BLDV: 95.6 % (ref 70–75)
SODIUM SERPL-SCNC: 135 MMOL/L (ref 135–145)
SODIUM SERPL-SCNC: 136 MMOL/L (ref 135–145)
SODIUM SERPL-SCNC: 142 MMOL/L (ref 135–145)
SYSTOLIC BLOOD PRESSURE - MUSE: NORMAL MMHG
T AXIS - MUSE: 265 DEGREES
VENTRICULAR RATE- MUSE: 111 BPM

## 2024-05-17 PROCEDURE — 250N000013 HC RX MED GY IP 250 OP 250 PS 637: Performed by: STUDENT IN AN ORGANIZED HEALTH CARE EDUCATION/TRAINING PROGRAM

## 2024-05-17 PROCEDURE — 93005 ELECTROCARDIOGRAM TRACING: CPT

## 2024-05-17 PROCEDURE — 99233 SBSQ HOSP IP/OBS HIGH 50: CPT | Mod: GC | Performed by: HOSPITALIST

## 2024-05-17 PROCEDURE — 999N000065 XR CHEST PORT 1 VIEW

## 2024-05-17 PROCEDURE — 999N000157 HC STATISTIC RCP TIME EA 10 MIN

## 2024-05-17 PROCEDURE — 71045 X-RAY EXAM CHEST 1 VIEW: CPT | Mod: 26 | Performed by: STUDENT IN AN ORGANIZED HEALTH CARE EDUCATION/TRAINING PROGRAM

## 2024-05-17 PROCEDURE — 83605 ASSAY OF LACTIC ACID: CPT | Performed by: STUDENT IN AN ORGANIZED HEALTH CARE EDUCATION/TRAINING PROGRAM

## 2024-05-17 PROCEDURE — 83605 ASSAY OF LACTIC ACID: CPT

## 2024-05-17 PROCEDURE — 36415 COLL VENOUS BLD VENIPUNCTURE: CPT | Performed by: STUDENT IN AN ORGANIZED HEALTH CARE EDUCATION/TRAINING PROGRAM

## 2024-05-17 PROCEDURE — 36592 COLLECT BLOOD FROM PICC: CPT

## 2024-05-17 PROCEDURE — 36569 INSJ PICC 5 YR+ W/O IMAGING: CPT

## 2024-05-17 PROCEDURE — 250N000011 HC RX IP 250 OP 636: Performed by: STUDENT IN AN ORGANIZED HEALTH CARE EDUCATION/TRAINING PROGRAM

## 2024-05-17 PROCEDURE — 250N000013 HC RX MED GY IP 250 OP 250 PS 637

## 2024-05-17 PROCEDURE — 999N000128 HC STATISTIC PERIPHERAL IV START W/O US GUIDANCE

## 2024-05-17 PROCEDURE — 99233 SBSQ HOSP IP/OBS HIGH 50: CPT | Mod: GC | Performed by: INTERNAL MEDICINE

## 2024-05-17 PROCEDURE — 99221 1ST HOSP IP/OBS SF/LOW 40: CPT

## 2024-05-17 PROCEDURE — 82805 BLOOD GASES W/O2 SATURATION: CPT | Performed by: STUDENT IN AN ORGANIZED HEALTH CARE EDUCATION/TRAINING PROGRAM

## 2024-05-17 PROCEDURE — 272N000451 HC KIT SHRLOCK 5FR POWER PICC DOUBLE LUMEN

## 2024-05-17 PROCEDURE — 80053 COMPREHEN METABOLIC PANEL: CPT

## 2024-05-17 PROCEDURE — 94640 AIRWAY INHALATION TREATMENT: CPT

## 2024-05-17 PROCEDURE — 250N000009 HC RX 250

## 2024-05-17 PROCEDURE — 82040 ASSAY OF SERUM ALBUMIN: CPT

## 2024-05-17 PROCEDURE — 36415 COLL VENOUS BLD VENIPUNCTURE: CPT

## 2024-05-17 PROCEDURE — 84155 ASSAY OF PROTEIN SERUM: CPT

## 2024-05-17 PROCEDURE — 258N000003 HC RX IP 258 OP 636

## 2024-05-17 PROCEDURE — 250N000011 HC RX IP 250 OP 636

## 2024-05-17 PROCEDURE — 94640 AIRWAY INHALATION TREATMENT: CPT | Mod: 76

## 2024-05-17 PROCEDURE — 36592 COLLECT BLOOD FROM PICC: CPT | Performed by: STUDENT IN AN ORGANIZED HEALTH CARE EDUCATION/TRAINING PROGRAM

## 2024-05-17 PROCEDURE — 120N000003 HC R&B IMCU UMMC

## 2024-05-17 PROCEDURE — 93010 ELECTROCARDIOGRAM REPORT: CPT | Performed by: INTERNAL MEDICINE

## 2024-05-17 PROCEDURE — 82805 BLOOD GASES W/O2 SATURATION: CPT

## 2024-05-17 PROCEDURE — 83735 ASSAY OF MAGNESIUM: CPT

## 2024-05-17 RX ORDER — FUROSEMIDE 10 MG/ML
40 INJECTION INTRAMUSCULAR; INTRAVENOUS ONCE
Qty: 4 ML | Refills: 0 | Status: COMPLETED | OUTPATIENT
Start: 2024-05-17 | End: 2024-05-17

## 2024-05-17 RX ORDER — DOBUTAMINE HYDROCHLORIDE 200 MG/100ML
2.5 INJECTION INTRAVENOUS CONTINUOUS
Status: DISCONTINUED | OUTPATIENT
Start: 2024-05-17 | End: 2024-05-18

## 2024-05-17 RX ORDER — HEPARIN SODIUM 5000 [USP'U]/.5ML
5000 INJECTION, SOLUTION INTRAVENOUS; SUBCUTANEOUS EVERY 8 HOURS
Status: DISCONTINUED | OUTPATIENT
Start: 2024-05-17 | End: 2024-05-24 | Stop reason: HOSPADM

## 2024-05-17 RX ORDER — ONDANSETRON 4 MG/1
4 TABLET, ORALLY DISINTEGRATING ORAL EVERY 6 HOURS PRN
Status: DISCONTINUED | OUTPATIENT
Start: 2024-05-17 | End: 2024-05-24 | Stop reason: HOSPADM

## 2024-05-17 RX ORDER — ONDANSETRON 2 MG/ML
4 INJECTION INTRAMUSCULAR; INTRAVENOUS EVERY 6 HOURS PRN
Status: DISCONTINUED | OUTPATIENT
Start: 2024-05-17 | End: 2024-05-24 | Stop reason: HOSPADM

## 2024-05-17 RX ORDER — HYDRALAZINE HYDROCHLORIDE 10 MG/1
10 TABLET, FILM COATED ORAL EVERY 8 HOURS
Status: DISCONTINUED | OUTPATIENT
Start: 2024-05-17 | End: 2024-05-18

## 2024-05-17 RX ORDER — ONDANSETRON 4 MG/1
4 TABLET, ORALLY DISINTEGRATING ORAL EVERY 6 HOURS PRN
Status: DISCONTINUED | OUTPATIENT
Start: 2024-05-17 | End: 2024-05-17

## 2024-05-17 RX ORDER — DIPHENHYDRAMINE HYDROCHLORIDE 50 MG/ML
50 INJECTION INTRAMUSCULAR; INTRAVENOUS
Status: DISCONTINUED | OUTPATIENT
Start: 2024-05-17 | End: 2024-05-19

## 2024-05-17 RX ORDER — CHLOROTHIAZIDE SODIUM 500 MG/1
1000 INJECTION INTRAVENOUS
Status: DISCONTINUED | OUTPATIENT
Start: 2024-05-17 | End: 2024-05-18

## 2024-05-17 RX ORDER — METHYLPREDNISOLONE SODIUM SUCCINATE 125 MG/2ML
125 INJECTION, POWDER, LYOPHILIZED, FOR SOLUTION INTRAMUSCULAR; INTRAVENOUS
Status: DISCONTINUED | OUTPATIENT
Start: 2024-05-17 | End: 2024-05-19

## 2024-05-17 RX ORDER — PROCHLORPERAZINE 25 MG
12.5 SUPPOSITORY, RECTAL RECTAL EVERY 12 HOURS PRN
Status: DISCONTINUED | OUTPATIENT
Start: 2024-05-17 | End: 2024-05-24 | Stop reason: HOSPADM

## 2024-05-17 RX ORDER — LIDOCAINE 40 MG/G
CREAM TOPICAL
Status: ACTIVE | OUTPATIENT
Start: 2024-05-17 | End: 2024-05-20

## 2024-05-17 RX ORDER — PROCHLORPERAZINE MALEATE 5 MG
5 TABLET ORAL EVERY 6 HOURS PRN
Status: DISCONTINUED | OUTPATIENT
Start: 2024-05-17 | End: 2024-05-24 | Stop reason: HOSPADM

## 2024-05-17 RX ADMIN — FUROSEMIDE 40 MG: 10 INJECTION, SOLUTION INTRAMUSCULAR; INTRAVENOUS at 11:15

## 2024-05-17 RX ADMIN — OLANZAPINE 2.5 MG: 2.5 TABLET, FILM COATED ORAL at 09:43

## 2024-05-17 RX ADMIN — BUSPIRONE HYDROCHLORIDE 15 MG: 5 TABLET ORAL at 09:42

## 2024-05-17 RX ADMIN — BUSPIRONE HYDROCHLORIDE 15 MG: 5 TABLET ORAL at 20:32

## 2024-05-17 RX ADMIN — CHLOROTHIAZIDE SODIUM 1000 MG: 500 INJECTION, POWDER, LYOPHILIZED, FOR SOLUTION INTRAVENOUS at 16:18

## 2024-05-17 RX ADMIN — LEVOTHYROXINE SODIUM 50 MCG: 0.05 TABLET ORAL at 09:43

## 2024-05-17 RX ADMIN — PANTOPRAZOLE SODIUM 40 MG: 40 TABLET, DELAYED RELEASE ORAL at 09:42

## 2024-05-17 RX ADMIN — LIDOCAINE HYDROCHLORIDE 3 ML: 10 INJECTION, SOLUTION EPIDURAL; INFILTRATION; INTRACAUDAL; PERINEURAL at 16:18

## 2024-05-17 RX ADMIN — HYDRALAZINE HYDROCHLORIDE 10 MG: 10 TABLET ORAL at 16:50

## 2024-05-17 RX ADMIN — IPRATROPIUM BROMIDE AND ALBUTEROL SULFATE 3 ML: .5; 3 SOLUTION RESPIRATORY (INHALATION) at 21:08

## 2024-05-17 RX ADMIN — OLANZAPINE 2.5 MG: 2.5 TABLET, FILM COATED ORAL at 20:32

## 2024-05-17 RX ADMIN — IRON SUCROSE 200 MG: 20 INJECTION, SOLUTION INTRAVENOUS at 14:52

## 2024-05-17 RX ADMIN — MAGNESIUM SULFATE HEPTAHYDRATE 2 G: 2 INJECTION, SOLUTION INTRAVENOUS at 00:56

## 2024-05-17 RX ADMIN — HEPARIN SODIUM 5000 UNITS: 5000 INJECTION, SOLUTION INTRAVENOUS; SUBCUTANEOUS at 16:17

## 2024-05-17 RX ADMIN — DOBUTAMINE HYDROCHLORIDE 2.5 MCG/KG/MIN: 200 INJECTION INTRAVENOUS at 22:34

## 2024-05-17 RX ADMIN — LISINOPRIL 2.5 MG: 2.5 TABLET ORAL at 09:42

## 2024-05-17 RX ADMIN — BUDESONIDE 1 MG: 1 SUSPENSION RESPIRATORY (INHALATION) at 08:56

## 2024-05-17 RX ADMIN — Medication 50 MCG: at 09:43

## 2024-05-17 RX ADMIN — LATANOPROST 1 DROP: 50 SOLUTION OPHTHALMIC at 22:38

## 2024-05-17 RX ADMIN — ACETAMINOPHEN 975 MG: 325 TABLET, FILM COATED ORAL at 15:20

## 2024-05-17 RX ADMIN — HEPARIN SODIUM 5000 UNITS: 5000 INJECTION, SOLUTION INTRAVENOUS; SUBCUTANEOUS at 09:50

## 2024-05-17 RX ADMIN — IPRATROPIUM BROMIDE AND ALBUTEROL SULFATE 3 ML: .5; 3 SOLUTION RESPIRATORY (INHALATION) at 08:56

## 2024-05-17 RX ADMIN — FLUOXETINE HYDROCHLORIDE 40 MG: 20 CAPSULE ORAL at 09:42

## 2024-05-17 RX ADMIN — DONEPEZIL HYDROCHLORIDE 10 MG: 10 TABLET, FILM COATED ORAL at 22:51

## 2024-05-17 RX ADMIN — FUROSEMIDE 20 MG/HR: 10 INJECTION, SOLUTION INTRAMUSCULAR; INTRAVENOUS at 15:18

## 2024-05-17 RX ADMIN — ONDANSETRON 4 MG: 4 TABLET, ORALLY DISINTEGRATING ORAL at 01:39

## 2024-05-17 ASSESSMENT — ACTIVITIES OF DAILY LIVING (ADL)
ADLS_ACUITY_SCORE: 45
ADLS_ACUITY_SCORE: 44
ADLS_ACUITY_SCORE: 45
ADLS_ACUITY_SCORE: 44
ADLS_ACUITY_SCORE: 44
ADLS_ACUITY_SCORE: 45
ADLS_ACUITY_SCORE: 44
ADLS_ACUITY_SCORE: 44
ADLS_ACUITY_SCORE: 45
ADLS_ACUITY_SCORE: 45
ADLS_ACUITY_SCORE: 44
ADLS_ACUITY_SCORE: 45

## 2024-05-17 NOTE — PLAN OF CARE
Assumed cares from 1900 to 0700    Pt is A/OX4, forgetful. Denies chest pain, SOB, blurred vision, headache. Complains of nausea, PRN Zofran given X1. Assistance X1 walker/GB. On 2 gr Na+, 1.8 L FR. Q4 VS, no BG checks. R upper PIV  running Lasix gtt 1mL/hr. Purewick in place. LBM on 5/16. Denies pain. On telemetry monitoring, synus tachy with episodes of afib, asymptomatic. MD Valdez notified. No acute events. Continue to monitor.    Plan of Care Reviewed With: patient    Overall Patient Progress: no change

## 2024-05-17 NOTE — CONSULTS
Palliative Care Consultation Note  [facility]      Patient: Idalia Bob  Date of Admission:  5/15/2024    Requesting Clinician / Team: Ravindra  Reason for consult: Goals of care  Patient and family support     Recommendations & Counseling     GOALS OF CARE:   Life-prolonging with limits :Daughter notes pt does not want heroic measures, values quality of life over quantity.   Medical Plans: continue to diurese patient, possible angiogram once euvolemic to attempt to identify cause of new-onset CHF. Patient is not eligible for advanced therapies.   If not responsive to therapies available, does not tolerate, or decompensates, recommend a care conference with cardiology, palliative, patient, and daughters to address GOC.  Daughters note patient has poor short-term memory, thus want to be present or updated via phone with any major medical information.     ADVANCE CARE PLANNING:  Patient has an advance directive dated 12/2023.  Primary Health Care Agent Barbara Bob, daughter.  Alternate(s) Corie Bob, daughter.   There is no POLST form on file, recommend to complete prior to DC.  Code status: No CPR- Do NOT Intubate    MEDICAL MANAGEMENT:   We are not actively managing symptoms at this time.    PSYCHOSOCIAL/SPIRITUAL SUPPORT:  Family 2 daughters,  very involved. Both daughters live in the [facility] area.   [place] community: Orthodox     Palliative Care will continue to follow. Thank you for the consult and allowing us to aid in the care of Idalia Bob.    These recommendations have been discussed with primary team via this note.    KIZZY Andrews CNP  [facility], Palliative Care  Securely message with the [employer] Web Console (learn more here) or  Text page via [facility] Paging/Directory     Assessment      Idalia Bob is an 80 yo f with history of dementia, pulmonary emphysema AMRIT on fluoxetine, IBS with diarrhea and oxygen dependence since prior  hospitalization with PNA in 12/2023 requiring TCU stay and now in jail, who presented on 5/15 with insidious onset of weakness and is now found to have new presentation of HFrEF EF 10-15% with normal size LV.     Plan per chart review is to diurese, then consider angiogram once euvolemic.     Chart review:   - 2/1 admitted to hospital for pneumonia, discharged to TCU.   - 12/6/2023 admitted to hospital for pneumonia, discharged to TCU  - 11/28/2023 admitted to hospital for pneumonia, discharged back to jail.   - 11/20/2023 admitted to hospital for pneumonia, discharged to jail    Today, the patient was seen for:  Acute on chronic CHF, unspecified type  Palliative care, initial encounter  Support    History of Present Illness   Met with patient and daughter at bedside.   I introduced our role as an extra layer of support and how we help patients and families dealing with serious, potentially life-limiting illnesses. I explained the composition of the palliative care team.  Palliative care helps patients and families navigate their care while focusing on the whole person; providing emotional, social and spiritual support  Palliative care often assists with symptom management, information sharing about what to expect from the illness, available treatment options and what effect those options may have on the disease course, and provide effective communication and caring support.    QOL had diminished somewhat over the past 6 months with recurrent hospitalizations and TCU stays, however after last hospitalization patient was able to get back to her jail.     Daughter notes she has struggled the most in the past few weeks with activity fatigue, has been very difficult for her to do basic tasks such as dressing herself.     Prognosis, Goals, & Planning:   Functional Status just prior to this current hospitalization:  Pt has been hospitalized 3 times prior to current hospitalization for pneumonia since 11/2023.   There has been  a decline in daily function including unable to dress herself without taking frequent breaks, more difficulty ambulating long distances.   American College of Cardiology/American Heart Association Heart Failure Stages:   Stage C: Symptomatic heart failure.  Patient experiences heart failure symptoms -- shortness of breath, fatigue, inability to exercise, etc.     Code Status was addressed today:   No DNR/DNI    Patient's decision making preferences: shared with support from loved ones        Patient has decision-making capacity today for complex decisions: Unreliable - daughters request that they are present or updated after any serious conversations.           Coping, Meaning, & Spirituality:   Mood, coping, and/or meaning in the context of serious illness were addressed today: Yes    Social:   Living situation:resides in assisted living with   Important relationships/caregivers:2 daughters, 1 grandchild,   Areas of fulfillment/bee: spending time with family    Medications:  Reviewed this patient's medication profile and medications from this hospitalization. Notable medications:     ROS:  Comprehensive ROS is reviewed and is negative except as here & per HPI:     Physical Exam   Vital Signs with Ranges  Temp:  [97.6  F (36.4  C)-98.8  F (37.1  C)] 98.5  F (36.9  C)  Pulse:  [] 107  Resp:  [16-18] 18  BP: (106-136)/(71-93) 111/73  SpO2:  [92 %-98 %] 94 %  Wt Readings from Last 10 Encounters:   05/17/24 59.4 kg (130 lb 15.3 oz)   05/14/24 57.6 kg (127 lb)   03/26/24 57.6 kg (127 lb)   02/25/24 61.7 kg (136 lb 0.4 oz)   01/22/24 59.9 kg (132 lb)     130 lbs 15.25 oz    Physical Exam  Cardiovascular:      Pulses: Normal pulses.   Pulmonary:      Effort: Pulmonary effort is normal.   Neurological:      Mental Status: She is alert and oriented to person, place, and time.          Data reviewed:  Results for orders placed or performed during the hospital encounter of 05/15/24 (from the past 24  hour(s))   Lactic acid whole blood   Result Value Ref Range    Lactic Acid 1.8 0.7 - 2.0 mmol/L   Basic metabolic panel   Result Value Ref Range    Sodium 140 135 - 145 mmol/L    Potassium 3.9 3.4 - 5.3 mmol/L    Chloride 102 98 - 107 mmol/L    Carbon Dioxide (CO2) 27 22 - 29 mmol/L    Anion Gap 11 7 - 15 mmol/L    Urea Nitrogen 17.7 8.0 - 23.0 mg/dL    Creatinine 1.12 (H) 0.51 - 0.95 mg/dL    GFR Estimate 49 (L) >60 mL/min/1.73m2    Calcium 8.5 (L) 8.8 - 10.2 mg/dL    Glucose 123 (H) 70 - 99 mg/dL   Magnesium   Result Value Ref Range    Magnesium 1.7 1.7 - 2.3 mg/dL   Phosphorus   Result Value Ref Range    Phosphorus 3.7 2.5 - 4.5 mg/dL   Comprehensive metabolic panel   Result Value Ref Range    Sodium 142 135 - 145 mmol/L    Potassium 4.7 3.4 - 5.3 mmol/L    Carbon Dioxide (CO2) 26 22 - 29 mmol/L    Anion Gap 13 7 - 15 mmol/L    Urea Nitrogen 16.6 8.0 - 23.0 mg/dL    Creatinine 1.11 (H) 0.51 - 0.95 mg/dL    GFR Estimate 50 (L) >60 mL/min/1.73m2    Calcium 8.8 8.8 - 10.2 mg/dL    Chloride 103 98 - 107 mmol/L    Glucose 109 (H) 70 - 99 mg/dL    Alkaline Phosphatase 81 40 - 150 U/L    AST 36 0 - 45 U/L    ALT 43 0 - 50 U/L    Protein Total 5.6 (L) 6.4 - 8.3 g/dL    Albumin 3.2 (L) 3.5 - 5.2 g/dL    Bilirubin Total 0.6 <=1.2 mg/dL   Lactic acid whole blood   Result Value Ref Range    Lactic Acid 1.3 0.7 - 2.0 mmol/L   Extra Tube    Narrative    The following orders were created for panel order Extra Tube.  Procedure                               Abnormality         Status                     ---------                               -----------         ------                     Extra Purple Top Tube[[id number]]                            Final result                 Please view results for these tests on the individual orders.   Extra Purple Top Tube   Result Value Ref Range    Hold Specimen [facility]    EKG 12-lead, complete   Result Value Ref Range    Systolic Blood Pressure  mmHg    Diastolic Blood Pressure  mmHg     Ventricular Rate 111 BPM    Atrial Rate 111 BPM    NE Interval 152 ms    QRS Duration 66 ms     ms    QTc 470 ms    P Axis 84 degrees    R AXIS 77 degrees    T Axis 265 degrees    Interpretation ECG       Sinus tachycardia with Premature atrial complexes  Nonspecific T wave abnormality  Abnormal ECG  When compared with ECG of 15-MAY-2024 09:44,  No significant change was found     Lactic acid whole blood   Result Value Ref Range    Lactic Acid 2.3 (H) 0.7 - 2.0 mmol/L   Blood gas venous   Result Value Ref Range    pH Venous 7.38 7.32 - 7.43    pCO2 Venous 56 (H) 40 - 50 mm Hg    pO2 Venous 21 (L) 25 - 47 mm Hg    Bicarbonate Venous 33 (H) 21 - 28 mmol/L    Base Excess/Deficit Venous 6.5 (H) -3.0 - 3.0 mmol/L    FIO2 30     Oxyhemoglobin Venous 24 (L) 70 - 75 %    O2 Sat, Venous 24.3 (L) 70.0 - 75.0 %    Narrative    In healthy individuals, oxyhemoglobin (O2Hb) and oxygen saturation (SO2) are approximately equal. In the presence of dyshemoglobins, oxyhemoglobin can be considerably lower than oxygen saturation.       Medical Decision Making       NOTE(S)/MEDICAL RECORDS REVIEWED over the past 24 hours: cardiology, prior hospitalizations, nursing notes   Tests REVIEWED in the past 24 hours:  - See lab/imaging results included in the data section of the note  SUPPLEMENTAL HISTORY, in addition to the patient's history, over the past 24 hours obtained from:   - daughter  45 MINUTES SPENT BY ME on the date of service doing chart review, history, exam, documentation & further activities per the note.

## 2024-05-17 NOTE — PROGRESS NOTES
Cardiology Consult Progress Note  Attending: .   Date of Service: 5/15/2024        Overnight events  Patient has not been diuresing well on the lasix gtt at 10 mg/hr. She has been more fatigued this AM and with poor appetite. I+O not accurately charted, given pure wic with some leaking.    Medications:   Per MAR current outpatient cardiovascular medications include:   Medications Prior to Admission   Medication Sig Dispense Refill Last Dose    acetaminophen (TYLENOL) 325 MG tablet Take 325-650 mg by mouth every 6 hours as needed for mild pain   Unknown at unknown    acetaminophen (TYLENOL) 500 MG tablet Take 1,000 mg by mouth daily   5/14/2024 at unknown    albuterol (PROVENTIL) (2.5 MG/3ML) 0.083% neb solution Take 1 vial (2.5 mg) by nebulization 2 times daily as needed (COPD) 90 mL 5 Unknown at unknown    benzocaine-menthol (CHLORASEPTIC) 6-10 MG lozenge Place 1 lozenge inside cheek every hour as needed for sore throat   Unknown at unknown    budesonide (PULMICORT) 1 MG/2ML neb solution Take 1 mg by nebulization 2 times daily as needed   Unknown at unknown    busPIRone (BUSPAR) 15 MG tablet Take 15 mg by mouth 2 times daily   5/14/2024 at unknown    calcium carbonate (TUMS) 500 MG chewable tablet Take 1 tablet (500 mg) by mouth 4 times daily as needed for heartburn   Unknown at unknown    Calcium Polycarbophil (FIBER) 625 MG tablet Take 2 tablets by mouth daily   5/14/2024 at unknown    diphenhydrAMINE-zinc acetate (BENADRYL) 2-0.1 % external cream Apply topically 3 times daily as needed for itching   Unknown at unknown    donepezil (ARICEPT) 10 MG tablet Take 1 tablet (10 mg) by mouth at bedtime   5/14/2024 at unknown    ferrous gluconate (FERGON) 324 (38 Fe) MG tablet Take 1 tablet (324 mg) by mouth daily (with breakfast) 90 tablet 3     FLUoxetine (PROZAC) 40 MG capsule Take 1 capsule (40 mg) by mouth daily   5/14/2024 at unknown    furosemide (LASIX) 20 MG tablet 2 tab daily for 5 days, then one tab  daily as needed for lower ext edema 90 tablet 3 5/14/2024 at unknown    hyoscyamine (LEVSIN) 0.125 MG tablet TAKE 1 TABLET (125 MCG) BY MOUTH EVERY 4 HOURS AS NEEDED FOR CRAMPING OR DIARRHEA 30 tablet 1 Unknown at unknown    ipratropium - albuterol 0.5 mg/2.5 mg/3 mL (DUONEB) 0.5-2.5 (3) MG/3ML neb solution Take 1 vial by nebulization every 6 hours as needed for shortness of breath, wheezing or cough   Unknown at unknown    lactobacillus rhamnosus, GG, (CULTURELL) capsule Take 1 capsule by mouth daily   5/14/2024 at unknown    latanoprost (XALATAN) 0.005 % ophthalmic solution Place 1 drop into both eyes at bedtime   5/14/2024 at pm    levothyroxine (SYNTHROID/LEVOTHROID) 50 MCG tablet Take 1 tablet (50 mcg) by mouth daily   5/15/2024 at am    melatonin 1 MG TABS tablet Take 1 tablet (1 mg) by mouth nightly as needed for sleep   Unknown at unknown    OLANZapine (ZYPREXA) 2.5 MG tablet Take 1 tablet (2.5 mg) by mouth 2 times daily   5/14/2024 at pm    oxyCODONE (ROXICODONE) 5 MG tablet 1/2 tab at bedtime as needed for severe pain. ( Need to last for one month ) 15 tablet 0 Unknown at unknown    pantoprazole (PROTONIX) 40 MG EC tablet Take 1 tablet (40 mg) by mouth daily 90 tablet 3 5/14/2024 at unknown    potassium chloride ER (K-TAB) 20 MEQ CR tablet Take one tab bid with Furosemide 40 mg. 60 tablet 2     senna-docusate (SENOKOT-S/PERICOLACE) 8.6-50 MG tablet Take 1 tablet by mouth 2 times daily as needed for constipation   Unknown at unknown    triamcinolone (KENALOG) 0.1 % external cream Apply topically 2 times daily as needed for irritation   Unknown at unknown    vitamin D3 (CHOLECALCIFEROL) 50 mcg (2000 units) tablet Take 1 tablet (50 mcg) by mouth daily   5/14/2024 at unknown     No current outpatient medications on file.     Current Facility-Administered Medications   Medication Dose Route Frequency Provider Last Rate Last Admin    acetaminophen (TYLENOL) tablet 975 mg  975 mg Oral Q6H PRN Yonathan Valdez  MD Bessie PhD   975 mg at 05/17/24 1520    albuterol (PROVENTIL) neb solution 2.5 mg  2.5 mg Nebulization BID PRN Terrence Tate MD        budesonide (PULMICORT) neb solution 1 mg  1 mg Nebulization BID Terrence Tate MD   1 mg at 05/17/24 0856    busPIRone (BUSPAR) tablet 15 mg  15 mg Oral BID Terrence Tate MD   15 mg at 05/17/24 0942    calcium carbonate (TUMS) chewable tablet 500 mg  500 mg Oral 4x Daily PRN Terrence Tate MD   500 mg at 05/16/24 1820    chlorothiazide (DIURIL) injection 1,000 mg  1,000 mg Intravenous BID Chuck Hernandez MD        diphenhydrAMINE (BENADRYL) injection 50 mg  50 mg Intravenous Once PRN Chuck Hernandez MD        donepezil (ARICEPT) tablet 10 mg  10 mg Oral At Bedtime Terrence Tate MD   10 mg at 05/16/24 2146    EPINEPHrine (ADRENALIN) kit 0.3 mg  0.3 mg Intramuscular Q3 Min PRN Chuck Hernandez MD        famotidine (PEPCID) injection 20 mg  20 mg Intravenous Once PRN Chuck Hernandez MD        FLUoxetine (PROzac) capsule 40 mg  40 mg Oral Daily Terrence Tate MD   40 mg at 05/17/24 0942    furosemide (LASIX) 500 mg/50mL infusion ADULT MAX CONC  20 mg/hr Intravenous Continuous Chuck Hernandez MD 2 mL/hr at 05/17/24 1518 20 mg/hr at 05/17/24 1518    heparin ANTICOAGULANT injection 5,000 Units  5,000 Units Subcutaneous Q8H Chuck Hernandez MD   5,000 Units at 05/17/24 0950    hyoscyamine (LEVSIN) tablet 125 mcg  125 mcg Oral Q4H PRN Terrence Tate MD        ipratropium - albuterol 0.5 mg/2.5 mg/3 mL (DUONEB) neb solution 3 mL  3 mL Nebulization 2 times daily Evangelina Johnson MD   3 mL at 05/17/24 0856    latanoprost (XALATAN) 0.005 % ophthalmic solution 1 drop  1 drop Both Eyes At Bedtime Terrence Tate MD   1 drop at 05/16/24 2148    levothyroxine (SYNTHROID/LEVOTHROID) tablet 50 mcg  50 mcg Oral Daily Terrence Tate MD   50 mcg at 05/17/24 0960    lidocaine (LMX4) cream   Topical Q1H PRN  Chuck Hernandez MD        lidocaine (LMX4) cream   Topical Q1H PRN Terrence Tate MD        lidocaine 1 % 0.1-1 mL  0.1-1 mL Other Q1H PRN Terrence Tate MD        lidocaine 1 % 0.1-5 mL  0.1-5 mL Other Q1H PRN Chuck Hernandez MD        lisinopril (ZESTRIL) tablet 2.5 mg  2.5 mg Oral Daily Terrence Tate MD   2.5 mg at 05/17/24 0942    methylPREDNISolone sodium succinate (solu-MEDROL) injection 125 mg  125 mg Intravenous Once PRN Chuck Hernandez MD        [Held by provider] metoprolol succinate ER (TOPROL-XL) 24 hr half-tab 12.5 mg  12.5 mg Oral Daily with supper Terrence Tate MD   12.5 mg at 05/16/24 1805    OLANZapine (zyPREXA) tablet 2.5 mg  2.5 mg Oral BID Terrence Tate MD   2.5 mg at 05/17/24 0943    ondansetron (ZOFRAN ODT) ODT tab 4 mg  4 mg Oral Q6H PRN Chuck Hernandez MD        Or    ondansetron (ZOFRAN) injection 4 mg  4 mg Intravenous Q6H PRN Chuck Hernandez MD        pantoprazole (PROTONIX) EC tablet 40 mg  40 mg Oral Daily Terrence Tate MD   40 mg at 05/17/24 0942    polyethylene glycol (MIRALAX) Packet 17 g  17 g Oral BID PRN Terrence Tate MD        prochlorperazine (COMPAZINE) injection 5 mg  5 mg Intravenous Q6H PRN Chuck Hernandez MD        Or    prochlorperazine (COMPAZINE) tablet 5 mg  5 mg Oral Q6H PRN Chuck Hernandez MD        Or    prochlorperazine (COMPAZINE) suppository 12.5 mg  12.5 mg Rectal Q12H PRN Chuck Hernandez MD        Reason beta blocker not prescribed   Other DOES NOT GO TO Terrence Garcia MD        senna-docusate (SENOKOT-S/PERICOLACE) 8.6-50 MG per tablet 1 tablet  1 tablet Oral BID PRN Terrence Tate MD        Or    senna-docusate (SENOKOT-S/PERICOLACE) 8.6-50 MG per tablet 2 tablet  2 tablet Oral BID PRN Terrence Tate MD        sodium chloride (PF) 0.9% PF flush 10-40 mL  10-40 mL Intracatheter Once PRN Chuck Hernandez MD        sodium chloride (PF) 0.9% PF flush 3 mL  3 mL  "Intracatheter Q8H Terrence Tate MD   3 mL at 05/17/24 1452    sodium chloride (PF) 0.9% PF flush 3 mL  3 mL Intracatheter q1 min prn Terrence Tate MD        Vitamin D3 (CHOLECALCIFEROL) tablet 50 mcg  50 mcg Oral Daily Terrence Tate MD   50 mcg at 05/17/24 0943     Family History:   No family history on file.  Social History:   Social History     Tobacco Use    Smoking status: Former     Current packs/day: 1.00     Average packs/day: 1 pack/day for 40.0 years (40.0 ttl pk-yrs)     Types: Cigarettes     Passive exposure: Past    Smokeless tobacco: Never   Substance Use Topics    Alcohol use: Not Currently       ROS:   A comprehensive 10 point ROS was negative other than as mentioned in HPI.    Physical Examination:     B/P: 138/86, T: 97.8, P: 90, R: 16    General: Alert and oriented; appears fatigued  Neck: JVP is 20 cm  CV: s1 s2 regular; no m/r/g; +3 le pitting edema to upper thighs  Lungs: bibasilar rales  GI: BS+X4; non tender, non distended  Skin: warm, no rashes  Neuro: alert and oriented  Psych: congruent affect      Labs:  Resp: 18 SpO2: 94 % O2 Device: Nasal cannula Oxygen Delivery: 1 LPM    Arterial Blood Gas:   Recent Labs   Lab 05/17/24  1435 05/15/24  1111   O2PER 30 21     Vitals:    05/16/24 1937 05/17/24 0924   Weight: 59.8 kg (131 lb 13.4 oz) 59.4 kg (130 lb 15.3 oz)   I/O last 3 completed shifts:  In: 600 [P.O.:600]  Out: 400 [Urine:150; Emesis/NG output:250]  Recent Labs   Lab 05/17/24  1435 05/17/24  0615    142   POTASSIUM 3.7 4.7   CHLORIDE 96* 103   CO2 27 26   ANIONGAP 13 13   * 109*   BUN 16.2 16.6   CR 1.01* 1.11*   AYUSH 8.4* 8.8     No components found for: \"URINE\"   Recent Labs   Lab 05/17/24  1435 05/17/24  0615 05/16/24  0535   AST 30 36 46*   ALT 37 43 54*   BILITOTAL 0.7 0.6 0.6   ALBUMIN 3.4* 3.2* 3.5   PROTTOTAL 5.8* 5.6* 5.8*   ALKPHOS 83 81 87     Temp: 98.5  F (36.9  C) Temp src: AxillaryTemp  Min: 97.6  F (36.4  C)  Max: 98.8  F (37.1  C) "   Recent Labs   Lab 05/16/24  0535 05/15/24  1111 05/14/24  1549   WBC 10.2 9.7 10.0   HGB 9.6* 9.4* 8.6*   HCT 34.4* 33.6* 31.0*   MCV 81 81 80   RDW 19.5* 19.5* 19.5*    289 303     Recent Labs   Lab 05/15/24  0929   INR 2.34*     Recent Labs   Lab 05/17/24  1435 05/17/24  0615 05/16/24  2100 05/16/24  1218 05/16/24  0535   * 109* 123* 84 76         Imaging:   No results found for this or any previous visit (from the past 24 hour(s)).      Assessment and Plan:   Ms Bob is a 78 yo f with history of dementia, pulmonary emphysema AMRIT on fluoxetine, IBS with diarrhea and oxygen dependence since prior hospitalization with PNA in 12/2023 requiring TCU stay and now in [place], how is presenting with insidious onset of weakness and is now found to have new presentation of HFrEF.     # New presentation of HFrEF EF 10-15% with normal size LV  # Active smoking  # recent PNAs  She presents with new presentation of  HFrEF with normal EF. The lack of WMA and the normal size of the LV argue against CAD or long standing NICM. She most likely has either a tachy-mediated CM or a stress-induced CM from prior infections or a prior myocarditis that might have been missed in the interim.     She has developed worsening anuria despite escalating diuretics, likely in the presence of initiating metoprolol succinate yesterday.    Recommendations:  - s/p Lasix 40 mg IV once  - Increase lasix gtt to 20 mg /hour  - Add Diuril 1 gm BID  - Stop Metoprolol  - Start Hydalazine 10 mg po Q8hrs to augment afterload reduction  - Continue Lisinopril 2.5 mg daily  - Please obtain and trend lactate and SvO2 from distal port of PICC line Q4 hrs for now  - Please place pacheco catheter for accurate I +O measurement  - Low threshold to start Inotrope if not responding to diuresis    Thank you for allowing us to participate in the care of this patient.   The patient was discussed w/ Dr. Baca.    Carmella Taylor MD PhD  Cardiology  fellow  [id number]

## 2024-05-17 NOTE — PROGRESS NOTES
"I was present with the resident during the history and exam.  I discussed the case with the resident and agree with the findings as documented in the assessment and plan.    Sina Landrum MD  Hospital Medicine  Date of Service (when I saw the patient): 05/17/24         Resident/Fellow Attestation   I, Chuck Hernandez MD, was present with the medical/SAY student who participated in the service and in the documentation of the note.  I have verified the history and personally performed the physical exam and medical decision making.  I agree with the assessment and plan of care as documented in the note.      Lasix gtt increased to 20mg/hr per cardiology. Low urine output in middle of day, cards recommending 1g Diuril BID, pacheco, PICC, orders in for these.     Chuck Hernandez MD  PGY3  Date of Service (when I saw the patient): 05/17/24     [facility]    Progress Note - Medicine Service, [facility] TEAM 2       Date of Admission:  5/15/2024    Assessment & Plan   Idalia Bob is a 79 year old female with a history of COPD, psuedomonal upper lobe pneumonia (12/2023), hypertension and dementia, admitted on 5/15/2024 for newly-diagnosed HFrEF exacerbation.     Progress of Care 5/17  -Continue to monitor UOP with Lasix gtt  -Cardiology    - Angiogram when euvolemic    - continue uptitration with GDMT    #Irregular rhythm reported by telemetry system, paroxysmal  \"Afib\" per telemetry on 5/16, rates 100-120 (unverifiable) ---> on our EKG we see NSR with PACs. Afib was previously quoted  on EKG on 2/01/2023 however this EKG appears to have wandering atrial pacemaker vs. Frequent PACs (never verified by cardiology).   Continue telemetry as able here and consider basil    #Acute decompensated HFrEF (10-15%)  #Suspected cardiorenal syndrome   Lack of WMA and the normal size of the LV argue against CAD or long standing NICM. She most likely has either a tachy-mediated CM or a " stress-induced CM from prior infections or a prior myocarditis that might have been missed in the interim. - 4+ bilateral pitting lower extremity edema, negative US doppler for bilateral DVT  - Elevated BNP 26,696, 31,5556 on 5/14   - ECHO 5/15 showed EF 10-15%, decreased left ventricular function. Severe diffuse hypokinesis is present              - Last echo 2/12/2024 showed normal EF 55-60%  - Cardiology Heart Failure consult   - Started on 40 mg IV Lasix in ED, transitioned to Lasix ggt @ 10 mg/hr PM of 5/15              - INCREASED to 20 mg/hr AM 5/17   - Lisinopril 2.5 mg every day   - monitor I&Os and weight   - Creatinine 1.11 (5/17), LFTs stable   - continue to monitor, recheck in AM   - monitor electrolytes, recheck CMP, phos, mag BID and PRN  - Coronary Angio when euvolemic per cardiology      #Chronic Respiratory Failure with Hypoxia due to recent episodes of pneumonia   #Pleural effusion likely due to acute decompensated heart failure  - Chest XR showed continued left pleural effusion w trace increased small right pleural effusion.   - Likely secondary to acute decompensated HF      #Elevated troponin- peaked  Troponin peaked at 101 without symptoms or EKG findings of ACS, likely 2/2 demand ischemia in setting of ADHF.  - Cardiac telemetry during active diuresis    #Anemia   -Hb 9.7 (5/15), no signs of bleeding  - Iron studies & Ferritin showed iron 19, ferritin 38  - Started on iron 200mg IV infusion x1 5/17/2024     Chronic/Stable/Resolving:      #COPD   - continue budesonide 1mg neb BID  - continue duoneb 3ml QID     #Hypothyrodism   - Continue levothyrozine 50mcg daily      #Dementia  - Continue donepezil 10mg daily     #CKD, stage 3  #GERD  - Continue prantoprazole 40mg daily         Diet: Fluid restriction 1800 ML FLUID (and additional linked orders)  2 Gram Sodium Diet    DVT Prophylaxis: subcutaneous heparin  Ruiz Catheter: Not present  Fluids: None  Lines: None     Cardiac Monitoring: ACTIVE  order. Indication: Acute decompensated heart failure (48 hours)  Code Status: No CPR- Do NOT Intubate      Clinically Significant Risk Factors        # Hypokalemia: Lowest K = 3.1 mmol/L in last 2 days, will replace as needed       # Hypoalbuminemia: Lowest albumin = 3.2 g/dL at 5/17/2024  6:15 AM, will monitor as appropriate    # Coagulation Defect: INR = 2.34 (Ref range: 0.85 - 1.15) and/or PTT = N/A, will monitor for bleeding    # Hypertension: Noted on problem list  # Acute heart failure with reduced ejection fraction: last echo with EF <40% and receiving IV diuretics    # Dementia: noted on problem list    # Overweight: Estimated body mass index is 25.58 kg/m  as calculated from the following:    Height as of 5/14/24: 1.524 m (5').    Weight as of this encounter: 59.4 kg (130 lb 15.3 oz)., PRESENT ON ADMISSION     # Financial/Environmental Concerns: none         Disposition Plan      Expected Discharge Date: 05/19/2024      Destination: assisted living  Discharge Comments: Dispo: home with assist;  Plan: lab work-up;  CHF exacerbation > IV diuresis; I&O; daily #'s  Progress: INR: 2.3; Cards consult -  angiogram when euvolemic        The patient's care was discussed with the Attending Physician, Dr. Landrum .    Flower Newsome  Medical Student  Medicine Service, [address]  Securely message with Pro Breath MD (more info)  Text page via [facility] Paging/Directory   See signed in provider for up to date coverage information  ______________________________________________________________________    Interval History   - NAEO  - Edema better this morning, improved mobility   - updated at granddaughter and daughters x2 at bedside, answered their questions, they were appreciative     Physical Exam   Vital Signs: Temp: 97.9  F (36.6  C) Temp src: Oral BP: (!) 136/93 Pulse: 112   Resp: 16 SpO2: 93 % O2 Device: Nasal cannula Oxygen Delivery: 1 LPM  Weight: 130 lbs 15.25  oz    General: Alert, oriented X4. Patient resting comfortably in bed.   HEENT: atraumatic, oropharynx clear, PERRL, no scleral icterus  NECK: JVD 5cm from sternal angle   RESPIRATORY: Normal effort, no accessory muscle use. Faint bilateral crackles in lower lobes bilaterally.   CARDIOVASCULAR:  RRR, no murmurs appreciated. 3+ pitting edema up to knees bilaterally.   GASTROINTESTINAL: Abdomen is soft, non-tender, non-distended. +BS.   SKIN: No abnormal skin rashes or lesions. Skin is pale and cold to touch on bilateral lower extremities.   NEURO: Alert and oriented.  Grossly normal motor exam.  PSYCH: pleasant, appropriate affect, good eye contact.     Medical Decision Making       MANAGEMENT DISCUSSED with the following over the past 24 hours: Dr Landrum       Data     I have personally reviewed the following data over the past 24 hrs:    N/A  \   N/A   / N/A     142 103 16.6 /  109 (H)   4.7 26 1.11 (H) \     ALT: 43 AST: 36 AP: 81 TBILI: 0.6   ALB: 3.2 (L) TOT PROTEIN: 5.6 (L) LIPASE: N/A     Procal: N/A CRP: N/A Lactic Acid: 1.3       Ferritin:  38 % Retic:  N/A LDH:  N/A       Imaging results reviewed over the past 24 hrs:   No results found for this or any previous visit (from the past 24 hour(s)).

## 2024-05-17 NOTE — PLAN OF CARE
[facility] [id number]     /73 (BP Location: Right arm)   Pulse 107   Temp 98.5  F (36.9  C) (Axillary)   Resp 18   Wt 59.4 kg (130 lb 15.3 oz)   LMP  (LMP Unknown)   SpO2 94%   BMI 25.58 kg/m       Pain: Patient had a headache, given PRN tylenol.   Neuro:  A&OX4. Forgetful at times.   Respiratory: Lungs clear and equal, diminished in lower lobes. On 1L NC.   Cardiac/Neurovascular: HR and pulse WDL. Tele: NSR with intermittent sinus tach, A-fib reported by tele room. No numbness or tingling reported. Moderate lower extremity edema.   GI/: Urinary catheter placed. No BM, incontinent at times.   Nutrition: 2g Na diet. 1.8L FR.   Activity: Up with assist of 1.  Skin: No concerns.   Lines: 2 PIVs. Lasix gtt @2ml/hr. PICC Right arm  Events this shift: Patient is on strict I/O's. Urinary catheter was placed this evening to keep track. IV Iron given today, tolerated well. Lasix drip was increased as well as an extra dose given. PICC line placed in right arm. Palliative was consulted. Transfer orders placed, spoke to MD. Ok waiting until 8pm labs are drawn. Depending on those pt may/may not need to transfer.      Plan: Continue with plan of care.     Goal Outcome Evaluation:      Plan of Care Reviewed With: patient    Overall Patient Progress: no changeOverall Patient Progress: no change    Outcome Evaluation: Continue with plan of care.

## 2024-05-17 NOTE — PROVIDER NOTIFICATION
Medicine resident paged: FYI, pt has had no urine output since 10AM, just bladder scanned for only 126ml. THanks

## 2024-05-17 NOTE — PLAN OF CARE
Goal Outcome Evaluation:      Plan of Care Reviewed With: patient    Overall Patient Progress: no change Overall Patient Progress: no change     BP (!) 136/93 (BP Location: Right arm, Patient Position: Semi-العراقي's, Cuff Size: Adult Small)   Pulse 112   Temp 97.9  F (36.6  C) (Oral)   Resp 16   Wt 59.4 kg (130 lb 15.3 oz)   LMP  (LMP Unknown)   SpO2 93%   BMI 25.58 kg/m       Pt denies of any pain and A&O x3. Easily reoriented. Output from 0600 to 1200 was 150 mL. Provider notified that urine occurrences that were immeasurable and low output.

## 2024-05-17 NOTE — PROCEDURES
[facility]    Double Lumen PICC Placement    Date/Time: 5/17/2024 4:13 PM    Performed by: Melani Petty RN  Authorized by: Chuck Hernandez MD  Indications: vascular access      UNIVERSAL PROTOCOL   Site Marked: Yes  Prior Images Obtained and Reviewed:  Yes  Required items: Required blood products, implants, devices and special equipment available    Patient identity confirmed:  Verbally with patient, arm band and hospital-assigned identification number  NA - No sedation, light sedation, or local anesthesia (lidocaine was given-local anesthesia)  Confirmation Checklist:  Patient's identity using two indicators, relevant allergies, procedure was appropriate and matched the consent or emergent situation and correct equipment/implants were available  Time out: Immediately prior to the procedure a time out was called    Universal Protocol: the Joint Commission Universal Protocol was followed    Preparation: Patient was prepped and draped in usual sterile fashion       ANESTHESIA    Anesthesia:  Local infiltration  Local Anesthetic:  Lidocaine 1% without epinephrine  Anesthetic Total (mL):  3      SEDATION    Patient Sedated: No        Preparation: skin prepped with 2% chlorhexidine and skin prepped with ChloraPrep  Skin prep agent: skin prep agent completely dried prior to procedure  Sterile barriers: maximum sterile barriers were used: cap, mask, sterile gown, sterile gloves, and large sterile sheet  Hand hygiene: hand hygiene performed prior to central venous catheter insertion  Type of line used: PICC  Catheter type: double lumen  Lumen type: non-valved and power PICC  Lumen Identification: Purple and Red  Catheter size: 5 Fr  Brand: Bard  Lot number: [id number]  Placement method: venipuncture, MST, ultrasound and tip navigation system  Number of attempts: 1  Difficulty threading catheter: no  Successful placement: yes  Orientation: right  Catheter to Vein (%):  31  Location: basilic vein  Tip Location: SVC  Arm circumference: adults 10 cm  Extremity circumference: 24.5  Visible catheter length: 0  Total catheter length: 37  Dressing and securement: adhesive securement device, alcohol impregnated caps, blood cleaned with CHG, chlorhexidine disc applied, dressing applied, glue, statlock, site cleansed, sterile dressing applied and transparent dressing  Post procedure assessment: blood return through all ports, free fluid flow and placement verified by x-ray  PROCEDURE   Patient Tolerance:  Patient tolerated the procedure well with no immediate complicationsDescribe Procedure: Pending chest xray for PICC tip confirmation, 7C RN aware.    Chest xray done. Per Carleen Conti, RN [facility], PICC tip in low SVC. PICC okay to use.  Disposal: sharps and needle count correct at the end of procedure, needles and guidewire disposed in sharps container

## 2024-05-17 NOTE — PROVIDER NOTIFICATION
Provider notified (name): CHI Valdez via [employer]  Reason for notification: on telemetry in Afib. Rates 100-120. Anything you want done at this time?  Recommendation/request given to provider:  Response from provider: Provider returned page via [employer]. Recheck in 30mins. Possible dose of metoprolol if still in afib or if pt symptomatic.

## 2024-05-18 ENCOUNTER — APPOINTMENT (OUTPATIENT)
Dept: PHYSICAL THERAPY | Facility: CLINIC | Age: 79
DRG: 291 | End: 2024-05-18
Payer: MEDICARE

## 2024-05-18 ENCOUNTER — APPOINTMENT (OUTPATIENT)
Dept: GENERAL RADIOLOGY | Facility: CLINIC | Age: 79
DRG: 291 | End: 2024-05-18
Payer: MEDICARE

## 2024-05-18 LAB
ALBUMIN SERPL BCG-MCNC: 3 G/DL (ref 3.5–5.2)
ALBUMIN SERPL BCG-MCNC: 3 G/DL (ref 3.5–5.2)
ALBUMIN SERPL BCG-MCNC: 3.2 G/DL (ref 3.5–5.2)
ALBUMIN UR-MCNC: NEGATIVE MG/DL
ALP SERPL-CCNC: 73 U/L (ref 40–150)
ALP SERPL-CCNC: 77 U/L (ref 40–150)
ALP SERPL-CCNC: 80 U/L (ref 40–150)
ALT SERPL W P-5'-P-CCNC: 24 U/L (ref 0–50)
ALT SERPL W P-5'-P-CCNC: 25 U/L (ref 0–50)
ALT SERPL W P-5'-P-CCNC: 28 U/L (ref 0–50)
ANION GAP SERPL CALCULATED.3IONS-SCNC: 10 MMOL/L (ref 7–15)
ANION GAP SERPL CALCULATED.3IONS-SCNC: 11 MMOL/L (ref 7–15)
ANION GAP SERPL CALCULATED.3IONS-SCNC: 9 MMOL/L (ref 7–15)
APPEARANCE UR: CLEAR
AST SERPL W P-5'-P-CCNC: 17 U/L (ref 0–45)
AST SERPL W P-5'-P-CCNC: 19 U/L (ref 0–45)
AST SERPL W P-5'-P-CCNC: 21 U/L (ref 0–45)
BACTERIA #/AREA URNS HPF: ABNORMAL /HPF
BASE EXCESS BLDV CALC-SCNC: 10.7 MMOL/L (ref -3–3)
BASE EXCESS BLDV CALC-SCNC: 6.8 MMOL/L (ref -3–3)
BASE EXCESS BLDV CALC-SCNC: 6.9 MMOL/L (ref -3–3)
BASE EXCESS BLDV CALC-SCNC: 8.2 MMOL/L (ref -3–3)
BASE EXCESS BLDV CALC-SCNC: 9.6 MMOL/L (ref -3–3)
BILIRUB SERPL-MCNC: 0.5 MG/DL
BILIRUB SERPL-MCNC: 0.7 MG/DL
BILIRUB SERPL-MCNC: 0.8 MG/DL
BILIRUB UR QL STRIP: NEGATIVE
BUN SERPL-MCNC: 17.1 MG/DL (ref 8–23)
BUN SERPL-MCNC: 18.2 MG/DL (ref 8–23)
BUN SERPL-MCNC: 19.7 MG/DL (ref 8–23)
CALCIUM SERPL-MCNC: 8.4 MG/DL (ref 8.8–10.2)
CALCIUM SERPL-MCNC: 8.5 MG/DL (ref 8.8–10.2)
CALCIUM SERPL-MCNC: 8.6 MG/DL (ref 8.8–10.2)
CAOX CRY #/AREA URNS HPF: ABNORMAL /HPF
CHLORIDE SERPL-SCNC: 94 MMOL/L (ref 98–107)
CHLORIDE SERPL-SCNC: 95 MMOL/L (ref 98–107)
CHLORIDE SERPL-SCNC: 96 MMOL/L (ref 98–107)
COLOR UR AUTO: ABNORMAL
CREAT SERPL-MCNC: 1.22 MG/DL (ref 0.51–0.95)
CREAT SERPL-MCNC: 1.33 MG/DL (ref 0.51–0.95)
CREAT SERPL-MCNC: 1.39 MG/DL (ref 0.51–0.95)
CRP SERPL-MCNC: 121 MG/L
DEPRECATED HCO3 PLAS-SCNC: 29 MMOL/L (ref 22–29)
DEPRECATED HCO3 PLAS-SCNC: 31 MMOL/L (ref 22–29)
DEPRECATED HCO3 PLAS-SCNC: 32 MMOL/L (ref 22–29)
EGFRCR SERPLBLD CKD-EPI 2021: 38 ML/MIN/1.73M2
EGFRCR SERPLBLD CKD-EPI 2021: 40 ML/MIN/1.73M2
EGFRCR SERPLBLD CKD-EPI 2021: 45 ML/MIN/1.73M2
ERYTHROCYTE [DISTWIDTH] IN BLOOD BY AUTOMATED COUNT: 19.4 % (ref 10–15)
GLUCOSE SERPL-MCNC: 162 MG/DL (ref 70–99)
GLUCOSE SERPL-MCNC: 206 MG/DL (ref 70–99)
GLUCOSE SERPL-MCNC: 93 MG/DL (ref 70–99)
GLUCOSE UR STRIP-MCNC: NEGATIVE MG/DL
HCO3 BLDV-SCNC: 33 MMOL/L (ref 21–28)
HCO3 BLDV-SCNC: 33 MMOL/L (ref 21–28)
HCO3 BLDV-SCNC: 35 MMOL/L (ref 21–28)
HCO3 BLDV-SCNC: 35 MMOL/L (ref 21–28)
HCO3 BLDV-SCNC: 36 MMOL/L (ref 21–28)
HCT VFR BLD AUTO: 30.6 % (ref 35–47)
HGB BLD-MCNC: 8.6 G/DL (ref 11.7–15.7)
HGB UR QL STRIP: ABNORMAL
HOLD SPECIMEN: NORMAL
KETONES UR STRIP-MCNC: NEGATIVE MG/DL
LACTATE SERPL-SCNC: 0.7 MMOL/L (ref 0.7–2)
LACTATE SERPL-SCNC: 1.1 MMOL/L (ref 0.7–2)
LACTATE SERPL-SCNC: 1.3 MMOL/L (ref 0.7–2)
LACTATE SERPL-SCNC: 2 MMOL/L (ref 0.7–2)
LACTATE SERPL-SCNC: 2.1 MMOL/L (ref 0.7–2)
LACTATE SERPL-SCNC: 2.3 MMOL/L (ref 0.7–2)
LACTATE SERPL-SCNC: 2.5 MMOL/L (ref 0.7–2)
LEUKOCYTE ESTERASE UR QL STRIP: ABNORMAL
MAGNESIUM SERPL-MCNC: 1.9 MG/DL (ref 1.7–2.3)
MAGNESIUM SERPL-MCNC: 2.1 MG/DL (ref 1.7–2.3)
MCH RBC QN AUTO: 22.5 PG (ref 26.5–33)
MCHC RBC AUTO-ENTMCNC: 28.1 G/DL (ref 31.5–36.5)
MCV RBC AUTO: 80 FL (ref 78–100)
MRSA DNA SPEC QL NAA+PROBE: NEGATIVE
MUCOUS THREADS #/AREA URNS LPF: PRESENT /LPF
NITRATE UR QL: NEGATIVE
O2/TOTAL GAS SETTING VFR VENT: 2 %
O2/TOTAL GAS SETTING VFR VENT: 2 %
O2/TOTAL GAS SETTING VFR VENT: 20 %
O2/TOTAL GAS SETTING VFR VENT: 21 %
O2/TOTAL GAS SETTING VFR VENT: 24 %
OXYHGB MFR BLDV: 21 % (ref 70–75)
OXYHGB MFR BLDV: 49 % (ref 70–75)
OXYHGB MFR BLDV: 49 % (ref 70–75)
OXYHGB MFR BLDV: 52 % (ref 70–75)
OXYHGB MFR BLDV: 55 % (ref 70–75)
PCO2 BLDV: 53 MM HG (ref 40–50)
PCO2 BLDV: 53 MM HG (ref 40–50)
PCO2 BLDV: 54 MM HG (ref 40–50)
PCO2 BLDV: 57 MM HG (ref 40–50)
PCO2 BLDV: 58 MM HG (ref 40–50)
PH BLDV: 7.37 [PH] (ref 7.32–7.43)
PH BLDV: 7.39 [PH] (ref 7.32–7.43)
PH BLDV: 7.4 [PH] (ref 7.32–7.43)
PH BLDV: 7.43 [PH] (ref 7.32–7.43)
PH BLDV: 7.44 [PH] (ref 7.32–7.43)
PH UR STRIP: 7 [PH] (ref 5–7)
PLATELET # BLD AUTO: 277 10E3/UL (ref 150–450)
PLATELET # BLD AUTO: 295 10E3/UL (ref 150–450)
PO2 BLDV: 18 MM HG (ref 25–47)
PO2 BLDV: 28 MM HG (ref 25–47)
PO2 BLDV: 31 MM HG (ref 25–47)
PO2 BLDV: 31 MM HG (ref 25–47)
PO2 BLDV: 33 MM HG (ref 25–47)
POTASSIUM SERPL-SCNC: 3.3 MMOL/L (ref 3.4–5.3)
POTASSIUM SERPL-SCNC: 4.8 MMOL/L (ref 3.4–5.3)
POTASSIUM SERPL-SCNC: 4.9 MMOL/L (ref 3.4–5.3)
POTASSIUM SERPL-SCNC: 4.9 MMOL/L (ref 3.4–5.3)
PROCALCITONIN SERPL IA-MCNC: 0.26 NG/ML
PROT SERPL-MCNC: 5.1 G/DL (ref 6.4–8.3)
PROT SERPL-MCNC: 5.2 G/DL (ref 6.4–8.3)
PROT SERPL-MCNC: 5.4 G/DL (ref 6.4–8.3)
RBC # BLD AUTO: 3.82 10E6/UL (ref 3.8–5.2)
RBC URINE: 6 /HPF
SA TARGET DNA: NEGATIVE
SAO2 % BLDV: 21.2 % (ref 70–75)
SAO2 % BLDV: 49.8 % (ref 70–75)
SAO2 % BLDV: 50.8 % (ref 70–75)
SAO2 % BLDV: 53.2 % (ref 70–75)
SAO2 % BLDV: 56.6 % (ref 70–75)
SODIUM SERPL-SCNC: 135 MMOL/L (ref 135–145)
SODIUM SERPL-SCNC: 136 MMOL/L (ref 135–145)
SODIUM SERPL-SCNC: 136 MMOL/L (ref 135–145)
SP GR UR STRIP: 1.01 (ref 1–1.03)
UROBILINOGEN UR STRIP-MCNC: NORMAL MG/DL
WBC # BLD AUTO: 15.4 10E3/UL (ref 4–11)
WBC URINE: 5 /HPF

## 2024-05-18 PROCEDURE — 94640 AIRWAY INHALATION TREATMENT: CPT | Mod: 76

## 2024-05-18 PROCEDURE — 250N000011 HC RX IP 250 OP 636

## 2024-05-18 PROCEDURE — 84145 PROCALCITONIN (PCT): CPT

## 2024-05-18 PROCEDURE — 97116 GAIT TRAINING THERAPY: CPT | Mod: GP | Performed by: REHABILITATION PRACTITIONER

## 2024-05-18 PROCEDURE — 250N000013 HC RX MED GY IP 250 OP 250 PS 637

## 2024-05-18 PROCEDURE — 97164 PT RE-EVAL EST PLAN CARE: CPT | Mod: GP | Performed by: REHABILITATION PRACTITIONER

## 2024-05-18 PROCEDURE — 36415 COLL VENOUS BLD VENIPUNCTURE: CPT | Performed by: STUDENT IN AN ORGANIZED HEALTH CARE EDUCATION/TRAINING PROGRAM

## 2024-05-18 PROCEDURE — 83735 ASSAY OF MAGNESIUM: CPT | Performed by: STUDENT IN AN ORGANIZED HEALTH CARE EDUCATION/TRAINING PROGRAM

## 2024-05-18 PROCEDURE — 82805 BLOOD GASES W/O2 SATURATION: CPT | Performed by: STUDENT IN AN ORGANIZED HEALTH CARE EDUCATION/TRAINING PROGRAM

## 2024-05-18 PROCEDURE — 250N000013 HC RX MED GY IP 250 OP 250 PS 637: Performed by: STUDENT IN AN ORGANIZED HEALTH CARE EDUCATION/TRAINING PROGRAM

## 2024-05-18 PROCEDURE — 94640 AIRWAY INHALATION TREATMENT: CPT

## 2024-05-18 PROCEDURE — 97140 MANUAL THERAPY 1/> REGIONS: CPT | Mod: GP | Performed by: REHABILITATION PRACTITIONER

## 2024-05-18 PROCEDURE — 97530 THERAPEUTIC ACTIVITIES: CPT | Mod: GP | Performed by: REHABILITATION PRACTITIONER

## 2024-05-18 PROCEDURE — 80053 COMPREHEN METABOLIC PANEL: CPT

## 2024-05-18 PROCEDURE — 36415 COLL VENOUS BLD VENIPUNCTURE: CPT

## 2024-05-18 PROCEDURE — 84460 ALANINE AMINO (ALT) (SGPT): CPT | Performed by: STUDENT IN AN ORGANIZED HEALTH CARE EDUCATION/TRAINING PROGRAM

## 2024-05-18 PROCEDURE — 36592 COLLECT BLOOD FROM PICC: CPT

## 2024-05-18 PROCEDURE — 84460 ALANINE AMINO (ALT) (SGPT): CPT

## 2024-05-18 PROCEDURE — 87641 MR-STAPH DNA AMP PROBE: CPT

## 2024-05-18 PROCEDURE — 84155 ASSAY OF PROTEIN SERUM: CPT | Performed by: STUDENT IN AN ORGANIZED HEALTH CARE EDUCATION/TRAINING PROGRAM

## 2024-05-18 PROCEDURE — 81001 URINALYSIS AUTO W/SCOPE: CPT

## 2024-05-18 PROCEDURE — 93005 ELECTROCARDIOGRAM TRACING: CPT

## 2024-05-18 PROCEDURE — 87040 BLOOD CULTURE FOR BACTERIA: CPT

## 2024-05-18 PROCEDURE — 250N000009 HC RX 250

## 2024-05-18 PROCEDURE — 999N000157 HC STATISTIC RCP TIME EA 10 MIN

## 2024-05-18 PROCEDURE — 71045 X-RAY EXAM CHEST 1 VIEW: CPT

## 2024-05-18 PROCEDURE — 250N000009 HC RX 250: Performed by: STUDENT IN AN ORGANIZED HEALTH CARE EDUCATION/TRAINING PROGRAM

## 2024-05-18 PROCEDURE — 85049 AUTOMATED PLATELET COUNT: CPT

## 2024-05-18 PROCEDURE — 83605 ASSAY OF LACTIC ACID: CPT | Performed by: STUDENT IN AN ORGANIZED HEALTH CARE EDUCATION/TRAINING PROGRAM

## 2024-05-18 PROCEDURE — 250N000013 HC RX MED GY IP 250 OP 250 PS 637: Performed by: INTERNAL MEDICINE

## 2024-05-18 PROCEDURE — 258N000003 HC RX IP 258 OP 636

## 2024-05-18 PROCEDURE — 71045 X-RAY EXAM CHEST 1 VIEW: CPT | Mod: 26 | Performed by: RADIOLOGY

## 2024-05-18 PROCEDURE — 82805 BLOOD GASES W/O2 SATURATION: CPT

## 2024-05-18 PROCEDURE — 36415 COLL VENOUS BLD VENIPUNCTURE: CPT | Performed by: INTERNAL MEDICINE

## 2024-05-18 PROCEDURE — 86140 C-REACTIVE PROTEIN: CPT

## 2024-05-18 PROCEDURE — 250N000011 HC RX IP 250 OP 636: Performed by: STUDENT IN AN ORGANIZED HEALTH CARE EDUCATION/TRAINING PROGRAM

## 2024-05-18 PROCEDURE — 36592 COLLECT BLOOD FROM PICC: CPT | Performed by: STUDENT IN AN ORGANIZED HEALTH CARE EDUCATION/TRAINING PROGRAM

## 2024-05-18 PROCEDURE — 87640 STAPH A DNA AMP PROBE: CPT

## 2024-05-18 PROCEDURE — 93010 ELECTROCARDIOGRAM REPORT: CPT | Performed by: INTERNAL MEDICINE

## 2024-05-18 PROCEDURE — 82805 BLOOD GASES W/O2 SATURATION: CPT | Performed by: INTERNAL MEDICINE

## 2024-05-18 PROCEDURE — 83605 ASSAY OF LACTIC ACID: CPT

## 2024-05-18 PROCEDURE — 99233 SBSQ HOSP IP/OBS HIGH 50: CPT | Mod: GC | Performed by: INTERNAL MEDICINE

## 2024-05-18 PROCEDURE — 84155 ASSAY OF PROTEIN SERUM: CPT

## 2024-05-18 PROCEDURE — 120N000003 HC R&B IMCU UMMC

## 2024-05-18 RX ORDER — POTASSIUM CHLORIDE 1.5 G/1.58G
40 POWDER, FOR SOLUTION ORAL ONCE
Status: COMPLETED | OUTPATIENT
Start: 2024-05-18 | End: 2024-05-18

## 2024-05-18 RX ORDER — DOPAMINE HYDROCHLORIDE 160 MG/100ML
2.5 INJECTION, SOLUTION INTRAVENOUS CONTINUOUS
Status: DISCONTINUED | OUTPATIENT
Start: 2024-05-18 | End: 2024-05-22

## 2024-05-18 RX ORDER — LIDOCAINE 4 G/G
2 PATCH TOPICAL
Status: DISCONTINUED | OUTPATIENT
Start: 2024-05-18 | End: 2024-05-24 | Stop reason: HOSPADM

## 2024-05-18 RX ORDER — POTASSIUM CHLORIDE 750 MG/1
40 TABLET, EXTENDED RELEASE ORAL ONCE
Status: COMPLETED | OUTPATIENT
Start: 2024-05-18 | End: 2024-05-18

## 2024-05-18 RX ADMIN — IPRATROPIUM BROMIDE AND ALBUTEROL SULFATE 3 ML: .5; 3 SOLUTION RESPIRATORY (INHALATION) at 09:27

## 2024-05-18 RX ADMIN — HEPARIN SODIUM 5000 UNITS: 5000 INJECTION, SOLUTION INTRAVENOUS; SUBCUTANEOUS at 00:48

## 2024-05-18 RX ADMIN — LEVOTHYROXINE SODIUM 50 MCG: 0.05 TABLET ORAL at 09:06

## 2024-05-18 RX ADMIN — OXYCODONE HYDROCHLORIDE 2.5 MG: 5 TABLET ORAL at 17:38

## 2024-05-18 RX ADMIN — BUSPIRONE HYDROCHLORIDE 15 MG: 5 TABLET ORAL at 09:06

## 2024-05-18 RX ADMIN — OLANZAPINE 2.5 MG: 2.5 TABLET, FILM COATED ORAL at 20:26

## 2024-05-18 RX ADMIN — POTASSIUM CHLORIDE 40 MEQ: 750 TABLET, EXTENDED RELEASE ORAL at 06:39

## 2024-05-18 RX ADMIN — BUDESONIDE 1 MG: 1 SUSPENSION RESPIRATORY (INHALATION) at 09:27

## 2024-05-18 RX ADMIN — PROCHLORPERAZINE EDISYLATE 5 MG: 5 INJECTION INTRAMUSCULAR; INTRAVENOUS at 13:24

## 2024-05-18 RX ADMIN — BUDESONIDE 1 MG: 1 SUSPENSION RESPIRATORY (INHALATION) at 21:11

## 2024-05-18 RX ADMIN — ONDANSETRON 4 MG: 2 INJECTION INTRAMUSCULAR; INTRAVENOUS at 10:59

## 2024-05-18 RX ADMIN — ACETAMINOPHEN 975 MG: 325 TABLET, FILM COATED ORAL at 10:45

## 2024-05-18 RX ADMIN — FLUOXETINE HYDROCHLORIDE 40 MG: 20 CAPSULE ORAL at 09:06

## 2024-05-18 RX ADMIN — SODIUM CHLORIDE, POTASSIUM CHLORIDE, SODIUM LACTATE AND CALCIUM CHLORIDE 500 ML: 600; 310; 30; 20 INJECTION, SOLUTION INTRAVENOUS at 17:44

## 2024-05-18 RX ADMIN — HEPARIN SODIUM 5000 UNITS: 5000 INJECTION, SOLUTION INTRAVENOUS; SUBCUTANEOUS at 09:13

## 2024-05-18 RX ADMIN — LIDOCAINE 4% 2 PATCH: 40 PATCH TOPICAL at 15:33

## 2024-05-18 RX ADMIN — HYDRALAZINE HYDROCHLORIDE 10 MG: 10 TABLET ORAL at 00:48

## 2024-05-18 RX ADMIN — HEPARIN SODIUM 5000 UNITS: 5000 INJECTION, SOLUTION INTRAVENOUS; SUBCUTANEOUS at 17:44

## 2024-05-18 RX ADMIN — OLANZAPINE 2.5 MG: 2.5 TABLET, FILM COATED ORAL at 09:07

## 2024-05-18 RX ADMIN — Medication 50 MCG: at 09:06

## 2024-05-18 RX ADMIN — BUSPIRONE HYDROCHLORIDE 15 MG: 5 TABLET ORAL at 20:26

## 2024-05-18 RX ADMIN — PANTOPRAZOLE SODIUM 40 MG: 40 TABLET, DELAYED RELEASE ORAL at 09:06

## 2024-05-18 RX ADMIN — DOPAMINE HYDROCHLORIDE 2.5 MCG/KG/MIN: 160 INJECTION, SOLUTION INTRAVENOUS at 12:20

## 2024-05-18 RX ADMIN — DONEPEZIL HYDROCHLORIDE 10 MG: 10 TABLET, FILM COATED ORAL at 22:25

## 2024-05-18 RX ADMIN — LIDOCAINE 4% 2 PATCH: 40 PATCH TOPICAL at 20:39

## 2024-05-18 RX ADMIN — FUROSEMIDE 10 MG/HR: 10 INJECTION, SOLUTION INTRAMUSCULAR; INTRAVENOUS at 08:50

## 2024-05-18 RX ADMIN — SODIUM CHLORIDE, POTASSIUM CHLORIDE, SODIUM LACTATE AND CALCIUM CHLORIDE 500 ML: 600; 310; 30; 20 INJECTION, SOLUTION INTRAVENOUS at 12:10

## 2024-05-18 RX ADMIN — IPRATROPIUM BROMIDE AND ALBUTEROL SULFATE 3 ML: .5; 3 SOLUTION RESPIRATORY (INHALATION) at 21:11

## 2024-05-18 RX ADMIN — LATANOPROST 1 DROP: 50 SOLUTION OPHTHALMIC at 22:25

## 2024-05-18 RX ADMIN — ACETAMINOPHEN 975 MG: 325 TABLET, FILM COATED ORAL at 04:41

## 2024-05-18 RX ADMIN — POTASSIUM CHLORIDE 40 MEQ: 1.5 POWDER, FOR SOLUTION ORAL at 09:05

## 2024-05-18 ASSESSMENT — ACTIVITIES OF DAILY LIVING (ADL)
ADLS_ACUITY_SCORE: 44
ADLS_ACUITY_SCORE: 45
ADLS_ACUITY_SCORE: 45
ADLS_ACUITY_SCORE: 42
ADLS_ACUITY_SCORE: 45
ADLS_ACUITY_SCORE: 42
ADLS_ACUITY_SCORE: 41
ADLS_ACUITY_SCORE: 45
ADLS_ACUITY_SCORE: 41
ADLS_ACUITY_SCORE: 41
ADLS_ACUITY_SCORE: 42
ADLS_ACUITY_SCORE: 41
ADLS_ACUITY_SCORE: 42
ADLS_ACUITY_SCORE: 41
ADLS_ACUITY_SCORE: 42
ADLS_ACUITY_SCORE: 41
ADLS_ACUITY_SCORE: 45
ADLS_ACUITY_SCORE: 41
ADLS_ACUITY_SCORE: 41

## 2024-05-18 NOTE — PROGRESS NOTES
Report given to 6B RN and transport ordered. Flyer RN notified, belongings packed, and medications packaged. Pt transferred to 6B with transport and flyer RN.

## 2024-05-18 NOTE — PLAN OF CARE
Hours of Care: [id number]  Neuro: A&Ox4. Intermittently confused. Answers questions approprietly.  Cardiac: Afib.HR 90's-115. Jumped to 130's x2 for couple of seconds. VSS. Denies dizziness or chest pain.    Respiratory: Sating >90% on 1-2L NC. Denies SOB  GI/: Adequate urine output via pacheco. No BM on this shift.  Diet/appetite: Tolerating 2g Na & 18L FR diet. Poor appetite.  Activity:  Assist of 1-2 w/ GB & walker. Attempted to get pt up for weight but unsteady, pt feels weak. Bed is zeroed before bed weight is completed.   Pain: At acceptable level on current regimen. Lower back pain, Tylenol given X1.  Skin: No new deficits noted. BLE edema.  LDA's: 1 R PIV -SL. R DLP -red infusing Dobutamine @ 2.5mcg/kg/hr and purple infusing Lasix @ 20mg/hr.     Plan: Continue with POC. Notify primary team with changes.

## 2024-05-18 NOTE — PROGRESS NOTES
Cardiology 2 Progress Note    05/18/2024    Idalia Bob MRN# [id number]   Age: 79 year old YOB: 1945        Brief HPI   Idalia Bob is an 80yo woman with a history of COPD, HTN, and dementia who is admitted on 5/15 for de-dameon      Subjective / Interval   Overnight with oliguria and low output state by Danae necessitating dobutamine.    Assessment and Plan:   Today's changes:  - swap dobutamine to dopamine for hypotension  - hold afterload reduction for hypotension  - resume diuresis at more gentle rate  - coronary angiogram when clinically improved    #Cardiogenic Shock  #HFrEF, acute decompensation, new diagnosis  Admitted on 5/15 for decompensated heart failure. Etiology of decompensation is currently unknown and this represents a new diagnosis. Notably, an echocardiogram in February of this year was wnl suggestive of an acute process. LV size is also wnl suggestive of some acuity. Uncertain whether this represents ischemic cardiomyopathy vs other such as stress, infectious, or arrhythmogenic. Will need full workup prior to discharge.   Date of Diagnosis: 2024  Etiology: Unknown  Baseline weight: TBD  Last TTE: 5/2024 w/ LVEF 10-15% with global hypokinesis. Mod RV dysfunction. LVIDd 5cm.   Last RHC: n/a  Last angiogram: n/a  Therapeutics   > Volume: Lasix drip   > Afterload: holding   > Inotrope: dopamine   > BB: holding   > ARNI: holding   > MRA: holding   > SGLT2i: holding   > Device: n/a    # Lactic Acidosis  # Hypotension  Multifactorial iso bp medications, rapid diuresis, and beta agonism of dobutamine. Resolved with dopamine.  - trend     #Acute Hypoxia  #Pleural Effusions   On 2L of oxygen here. Chest XR showed continued left pleural effusion w trace increased small right pleural effusion. Likely secondary to acute decompensated HF   - diuresis as above     #Non-ischemic myocardial injury  Related to decompensated heart failure as discussed above. No concern for ACS this  admission  - no further need to monitor     #Iron Deficiency Anemia  Baseline ~9 since at least early January. Labs c/w JAC.  - Started on iron 200mg IV infusion x1 5/17/2024    #JUANIS on CKD, stage III  Baseline Scr ~0.8 reflecting eGFR of 30-45. Highest Scr here of 1.2 suspect cardiorenal  - diuresis as above  - trend I/O   - renally dose meds, avoid nephrotoxic meds    #Hepatocellular Pattern of Liver Injury - Suspect due to congestive hepatopathy, diuresis as above  #COPD - No active issues, remain on PTA meds of budesonide 1mg bid and duonebs QID  #Hypothyrodism - No active issues, continue levothyrozine 50mcg daily   #Dementia - No active issues, plan for delirium precautions and continue donepezil 10mg daily  #GERD - No active issues, continue prantoprazole 40mg daily   #Electrolyte Derangements   ## Hypokalemia - replete prn   ## Hypocalcemia - replete prn    Patient seen and discussed with staff physician, Dr. Keven Silva MD  Cardiology Fellow     Physical Exam:   BP 93/55   Pulse 109   Temp 97.7  F (36.5  C) (Axillary)   Resp 16   Wt 53.8 kg (118 lb 9.7 oz)   LMP  (LMP Unknown)   SpO2 96%   BMI 23.16 kg/m    Temp:  [97.5  F (36.4  C)-98.5  F (36.9  C)] 97.7  F (36.5  C)  Pulse:  [] 109  Resp:  [14-18] 16  BP: ()/() 93/55  Cuff Mean (mmHg):  [61] 61  SpO2:  [90 %-99 %] 96 %  Wt Readings from Last 2 Encounters:   05/18/24 53.8 kg (118 lb 9.7 oz)   05/14/24 57.6 kg (127 lb)       Intake/Output Summary (Last 24 hours) at 5/18/2024 1025  Last data filed at 5/18/2024 0641  Gross per 24 hour   Intake 540 ml   Output 2850 ml   Net -2310 ml       Exam:  General: NAD   HEENT: no scleral icterus or injection  CARDIAC: irregular, no murmurs. +JVD  RESP:  breathing on 2L though speaking in full sentences  GI: nondistended  : Ruiz  EXTREMITIES: 2+ LE edema  SKIN: No acute lesions appreciated  NEURO: No focal deficits         Labs:     CMP  Recent Labs   Lab 05/18/24  6995  05/17/24  2055 05/17/24  1435 05/17/24  0615 05/16/24  2100 05/16/24  1218 05/16/24  0535 05/16/24  0028    135 136 142 140 140 140 138   POTASSIUM 3.3* 4.0 3.7 4.7 3.9 3.2* 3.1* 4.2   CHLORIDE 94* 97* 96* 103 102 100 101 104   CO2 32* 28 27 26 27 29 24 17*   ANIONGAP 10 10 13 13 11 11 15 17*   GLC 93 160* 134* 109* 123* 84 76 79   BUN 17.1 18.1 16.2 16.6 17.7 18.4 20.8 22.6   CR 1.22* 1.13* 1.01* 1.11* 1.12* 1.08* 1.07* 0.97*   GFRESTIMATED 45* 49* 56* 50* 49* 52* 52* 59*   AYUSH 8.6* 8.3* 8.4* 8.8 8.5* 8.5* 8.7* 8.6*   MAG 1.9 2.1 2.0  --  1.7 1.8 1.8 1.9   PHOS  --   --   --   --  3.7 4.0 3.8 4.2   PROTTOTAL 5.4*  --  5.8* 5.6*  --   --  5.8*  --    ALBUMIN 3.2*  --  3.4* 3.2*  --   --  3.5  --    BILITOTAL 0.8  --  0.7 0.6  --   --  0.6  --    ALKPHOS 80  --  83 81  --   --  87  --    AST 21  --  30 36  --   --  46*  --    ALT 28  --  37 43  --   --  54*  --      CBC  Recent Labs   Lab 05/18/24  0630 05/16/24  0535 05/15/24  1111 05/14/24  1549   WBC  --  10.2 9.7 10.0   HGB  --  9.6* 9.4* 8.6*   HCT  --  34.4* 33.6* 31.0*   MCV  --  81 81 80    311 289 303     INR  Recent Labs   Lab 05/15/24  0929   INR 2.34*     Arterial Blood Gas  Recent Labs   Lab 05/18/24  0630 05/18/24  0431 05/17/24  2354 05/17/24  2055   O2PER 21 24 20 20       Medications:     Current Facility-Administered Medications   Medication Dose Route Frequency Provider Last Rate Last Admin    budesonide (PULMICORT) neb solution 1 mg  1 mg Nebulization BID Terrence Tate MD   1 mg at 05/18/24 0927    busPIRone (BUSPAR) tablet 15 mg  15 mg Oral BID Terrence Tate MD   15 mg at 05/18/24 0906    donepezil (ARICEPT) tablet 10 mg  10 mg Oral At Bedtime Terrence Tate MD   10 mg at 05/17/24 2251    FLUoxetine (PROzac) capsule 40 mg  40 mg Oral Daily Terrence Tate MD   40 mg at 05/18/24 0906    heparin ANTICOAGULANT injection 5,000 Units  5,000 Units Subcutaneous Q8H Chuck Hernandez MD   5,000 Units at  05/18/24 0913    ipratropium - albuterol 0.5 mg/2.5 mg/3 mL (DUONEB) neb solution 3 mL  3 mL Nebulization 2 times daily Evangelina Johnson MD   3 mL at 05/18/24 0927    latanoprost (XALATAN) 0.005 % ophthalmic solution 1 drop  1 drop Both Eyes At Bedtime Terrence Tate MD   1 drop at 05/17/24 2238    levothyroxine (SYNTHROID/LEVOTHROID) tablet 50 mcg  50 mcg Oral Daily Terrence Tate MD   50 mcg at 05/18/24 0906    OLANZapine (zyPREXA) tablet 2.5 mg  2.5 mg Oral BID Terrence Tate MD   2.5 mg at 05/18/24 0907    pantoprazole (PROTONIX) EC tablet 40 mg  40 mg Oral Daily Terrence Tate MD   40 mg at 05/18/24 0906    sodium chloride (PF) 0.9% PF flush 10-40 mL  10-40 mL Intracatheter Q8H Chuck Hernandez MD   10 mL at 05/18/24 0913    sodium chloride (PF) 0.9% PF flush 3 mL  3 mL Intracatheter Q8H Terrence Tate MD   3 mL at 05/18/24 0448    Vitamin D3 (CHOLECALCIFEROL) tablet 50 mcg  50 mcg Oral Daily Terrence Tate MD   50 mcg at 05/18/24 0906       Current Facility-Administered Medications   Medication Dose Route Frequency Provider Last Rate Last Admin    DOBUTamine (DOBUTREX) 500 mg in D5W 250 mL infusion (adult std conc)  2.5 mcg/kg/min Intravenous Continuous Jackie Dunn MD 4.5 mL/hr at 05/18/24 0835 2.5 mcg/kg/min at 05/18/24 0835    furosemide (LASIX) 500 mg/50mL infusion ADULT MAX CONC  10 mg/hr Intravenous Continuous Derek Silva MD 1 mL/hr at 05/18/24 0850 10 mg/hr at 05/18/24 0850    Reason beta blocker not prescribed   Other DOES NOT GO TO MAR Terrence Tate MD           Recent Imaging:   Reviewed

## 2024-05-18 NOTE — PROVIDER NOTIFICATION
"Paged Cards 2, Dr. Starr \"Felicia Anthony RN 6B: P.S. 20 Wondering if you would like me to hold patients hydralazine for B/P? Initially they were low Maps 63, heart rate 102, but since have come up to 90/63 Map 73. Pulse 82.\"  "

## 2024-05-18 NOTE — PROVIDER NOTIFICATION
"Paged Dr Silva: \"Felicia Anthony RN: 6B P.S. 20 Requesting something for back pain. Tylenol not working. FYI she was nauseous and vomited again. Gave Compazine.\"  "

## 2024-05-18 NOTE — PLAN OF CARE
/53   Pulse 104   Temp 97.3  F (36.3  C) (Oral)   Resp 16   Wt 53.8 kg (118 lb 9.7 oz)   LMP  (LMP Unknown)   SpO2 94%   BMI 23.16 kg/m      Hours of Care: [id number].         Neuro: A&Ox4. Intermittently forgetful. Answers questions approprietly.  Cardiac: Afib.HR 90's-115. VSS. CVP 7. Denies dizziness or chest pain.           Respiratory: Sating >90% on 2L NC. Denies SOB  GI/: Adequate urine output via pacheco. UA sent. Large BM, BM x 2 on this shift.N & V x 2 with emesis. Gave Zofran and compazine, see MAR.  Diet/appetite: 2g Na & 18L FR diet. Poor appetite.  Activity:  Assist of 1-2 w/ GB & walker. Up to chair x 1, up to bathroom x1, up to commode x 2. Bed is zeroed before bed weight is completed.   Pain: At acceptable level on current regimen. Lower back pain, Tylenol given X1.  Skin: No new deficits noted. BLE edema.  LDA's: 1 R PIV -SL. R DLP -red infusing Dopamine @ 2.5mcg/kg/hr and purple infusing LR bolus 500ml at 250 ml/hr.      Plan: Continue with POC. Notify primary team with changes            Continue to monitor and follow POC    Felicia Anthony RN on 5/18/2024 at 7:00 PM    6B-Intermediate Care     Problem: Adult Inpatient Plan of Care  Goal: Absence of Hospital-Acquired Illness or Injury  Intervention: Prevent Skin Injury  Recent Flowsheet Documentation  Taken 5/18/2024 1700 by Felicia Anthony RN  Body Position:   heels elevated   supine, legs elevated  Taken 5/18/2024 1600 by Felicia Anthony RN  Body Position:   position changed independently   supine, legs elevated  Skin Protection: incontinence pads utilized  Device Skin Pressure Protection:   tubing/devices free from skin contact   absorbent pad utilized/changed  Taken 5/18/2024 1200 by Felicia Anthony, RN  Body Position:   position changed independently   supine, legs elevated  Skin Protection: incontinence pads utilized  Device Skin Pressure Protection:   tubing/devices free from skin contact   absorbent pad  utilized/changed  Taken 5/18/2024 1145 by Felicia Anthony RN  Body Position:   position changed independently   supine, legs elevated  Taken 5/18/2024 0800 by Felicia Anthony RN  Body Position:   position changed independently   supine, legs elevated  Skin Protection: incontinence pads utilized  Device Skin Pressure Protection:   tubing/devices free from skin contact   absorbent pad utilized/changed

## 2024-05-18 NOTE — PROGRESS NOTES
B/P MAPs fell to 55. Paged Cards 2. Started LR Bolus 250ml. Patient vomited x 2 totaling 300 of emesis. Ordered EKG. Gave another 250 ml bolus LR. Order to pause Lasix. Cards 2 at bedside discussed plans with family. Dobutamine stopped and Dopamine started with Tami 2nd RN at 1230. Will continue to monitor vitals. Patient sleeping. Family at bedside.

## 2024-05-18 NOTE — CARE PLAN
Transfer  Transferred from:   Via:bed  Reason for transfer: Pt appropriate for 6B- improved/worsened patient condition  Family: To be aware of transfer  Belongings: Received with pt  Chart: Received with pt  Medications: Meds received from old unit with pt  Code Status verified on armband: yes  2 RN Skin Assessment Completed By: PATRICIA MENDOZA  Med rec completed: yes  Suction/Ambu bag/Flowmeter at bedside: yes    Report received from: JIMI Florentino  Pt status: On 1L NC, in Afib with HR of 90's-110. Lethargic.  BP 99/55 (BP Location: Left arm, Cuff Size: Adult Small)   Pulse 101   Temp 97.9  F (36.6  C) (Oral)   Resp 14   Wt 59.4 kg (130 lb 15.3 oz)   LMP  (LMP Unknown)   SpO2 99%   BMI 25.58 kg/m

## 2024-05-19 ENCOUNTER — APPOINTMENT (OUTPATIENT)
Dept: SPEECH THERAPY | Facility: CLINIC | Age: 79
DRG: 291 | End: 2024-05-19
Payer: MEDICARE

## 2024-05-19 ENCOUNTER — APPOINTMENT (OUTPATIENT)
Dept: PHYSICAL THERAPY | Facility: CLINIC | Age: 79
DRG: 291 | End: 2024-05-19
Payer: MEDICARE

## 2024-05-19 LAB
ALBUMIN SERPL BCG-MCNC: 2.8 G/DL (ref 3.5–5.2)
ALP SERPL-CCNC: 70 U/L (ref 40–150)
ALT SERPL W P-5'-P-CCNC: 19 U/L (ref 0–50)
ANION GAP SERPL CALCULATED.3IONS-SCNC: 11 MMOL/L (ref 7–15)
ANION GAP SERPL CALCULATED.3IONS-SCNC: 8 MMOL/L (ref 7–15)
ANION GAP SERPL CALCULATED.3IONS-SCNC: 8 MMOL/L (ref 7–15)
AST SERPL W P-5'-P-CCNC: 16 U/L (ref 0–45)
BASE EXCESS BLDV CALC-SCNC: 11.7 MMOL/L (ref -3–3)
BASE EXCESS BLDV CALC-SCNC: 8.6 MMOL/L (ref -3–3)
BASE EXCESS BLDV CALC-SCNC: 9.8 MMOL/L (ref -3–3)
BILIRUB SERPL-MCNC: 0.6 MG/DL
BUN SERPL-MCNC: 18.7 MG/DL (ref 8–23)
BUN SERPL-MCNC: 18.9 MG/DL (ref 8–23)
BUN SERPL-MCNC: 20.3 MG/DL (ref 8–23)
CALCIUM SERPL-MCNC: 8.2 MG/DL (ref 8.8–10.2)
CALCIUM SERPL-MCNC: 8.5 MG/DL (ref 8.8–10.2)
CALCIUM SERPL-MCNC: 8.6 MG/DL (ref 8.8–10.2)
CHLORIDE SERPL-SCNC: 92 MMOL/L (ref 98–107)
CHLORIDE SERPL-SCNC: 94 MMOL/L (ref 98–107)
CHLORIDE SERPL-SCNC: 95 MMOL/L (ref 98–107)
CREAT SERPL-MCNC: 0.37 MG/DL (ref 0.51–0.95)
CREAT SERPL-MCNC: 1.31 MG/DL (ref 0.51–0.95)
CREAT SERPL-MCNC: 1.4 MG/DL (ref 0.51–0.95)
CRP SERPL-MCNC: 135 MG/L
DEPRECATED HCO3 PLAS-SCNC: 31 MMOL/L (ref 22–29)
DEPRECATED HCO3 PLAS-SCNC: 31 MMOL/L (ref 22–29)
DEPRECATED HCO3 PLAS-SCNC: 33 MMOL/L (ref 22–29)
EGFRCR SERPLBLD CKD-EPI 2021: 38 ML/MIN/1.73M2
EGFRCR SERPLBLD CKD-EPI 2021: 41 ML/MIN/1.73M2
EGFRCR SERPLBLD CKD-EPI 2021: >90 ML/MIN/1.73M2
ERYTHROCYTE [DISTWIDTH] IN BLOOD BY AUTOMATED COUNT: 19.3 % (ref 10–15)
ERYTHROCYTE [DISTWIDTH] IN BLOOD BY AUTOMATED COUNT: 19.7 % (ref 10–15)
ERYTHROCYTE [DISTWIDTH] IN BLOOD BY AUTOMATED COUNT: 19.8 % (ref 10–15)
GLUCOSE SERPL-MCNC: 163 MG/DL (ref 70–99)
GLUCOSE SERPL-MCNC: 185 MG/DL (ref 70–99)
GLUCOSE SERPL-MCNC: 86 MG/DL (ref 70–99)
HCO3 BLDV-SCNC: 35 MMOL/L (ref 21–28)
HCO3 BLDV-SCNC: 36 MMOL/L (ref 21–28)
HCO3 BLDV-SCNC: 37 MMOL/L (ref 21–28)
HCT VFR BLD AUTO: 26.8 % (ref 35–47)
HCT VFR BLD AUTO: 29 % (ref 35–47)
HCT VFR BLD AUTO: 30.4 % (ref 35–47)
HGB BLD-MCNC: 7.5 G/DL (ref 11.7–15.7)
HGB BLD-MCNC: 8.1 G/DL (ref 11.7–15.7)
HGB BLD-MCNC: 8.6 G/DL (ref 11.7–15.7)
HOLD SPECIMEN: NORMAL
LACTATE SERPL-SCNC: 0.5 MMOL/L (ref 0.7–2)
LACTATE SERPL-SCNC: 0.6 MMOL/L (ref 0.7–2)
LACTATE SERPL-SCNC: 1 MMOL/L (ref 0.7–2)
LACTATE SERPL-SCNC: 1.2 MMOL/L (ref 0.7–2)
LIPASE SERPL-CCNC: 11 U/L (ref 13–60)
MAGNESIUM SERPL-MCNC: 1.7 MG/DL (ref 1.7–2.3)
MAGNESIUM SERPL-MCNC: 2.4 MG/DL (ref 1.7–2.3)
MAGNESIUM SERPL-MCNC: 2.9 MG/DL (ref 1.7–2.3)
MCH RBC QN AUTO: 22.2 PG (ref 26.5–33)
MCH RBC QN AUTO: 22.3 PG (ref 26.5–33)
MCH RBC QN AUTO: 22.5 PG (ref 26.5–33)
MCHC RBC AUTO-ENTMCNC: 27.9 G/DL (ref 31.5–36.5)
MCHC RBC AUTO-ENTMCNC: 28 G/DL (ref 31.5–36.5)
MCHC RBC AUTO-ENTMCNC: 28.3 G/DL (ref 31.5–36.5)
MCV RBC AUTO: 79 FL (ref 78–100)
MCV RBC AUTO: 80 FL (ref 78–100)
MCV RBC AUTO: 80 FL (ref 78–100)
O2/TOTAL GAS SETTING VFR VENT: 28 %
O2/TOTAL GAS SETTING VFR VENT: 28 %
O2/TOTAL GAS SETTING VFR VENT: 33 %
OXYHGB MFR BLDV: 50 % (ref 70–75)
OXYHGB MFR BLDV: 52 % (ref 70–75)
OXYHGB MFR BLDV: 61 % (ref 70–75)
PCO2 BLDV: 56 MM HG (ref 40–50)
PCO2 BLDV: 56 MM HG (ref 40–50)
PCO2 BLDV: 58 MM HG (ref 40–50)
PH BLDV: 7.4 [PH] (ref 7.32–7.43)
PH BLDV: 7.41 [PH] (ref 7.32–7.43)
PH BLDV: 7.44 [PH] (ref 7.32–7.43)
PLATELET # BLD AUTO: 270 10E3/UL (ref 150–450)
PLATELET # BLD AUTO: 283 10E3/UL (ref 150–450)
PLATELET # BLD AUTO: 293 10E3/UL (ref 150–450)
PO2 BLDV: 28 MM HG (ref 25–47)
PO2 BLDV: 32 MM HG (ref 25–47)
PO2 BLDV: 36 MM HG (ref 25–47)
POTASSIUM SERPL-SCNC: 4.2 MMOL/L (ref 3.4–5.3)
POTASSIUM SERPL-SCNC: 4.3 MMOL/L (ref 3.4–5.3)
POTASSIUM SERPL-SCNC: 4.8 MMOL/L (ref 3.4–5.3)
PROT SERPL-MCNC: 5 G/DL (ref 6.4–8.3)
RBC # BLD AUTO: 3.38 10E6/UL (ref 3.8–5.2)
RBC # BLD AUTO: 3.63 10E6/UL (ref 3.8–5.2)
RBC # BLD AUTO: 3.82 10E6/UL (ref 3.8–5.2)
SAO2 % BLDV: 50.8 % (ref 70–75)
SAO2 % BLDV: 53.1 % (ref 70–75)
SAO2 % BLDV: 62 % (ref 70–75)
SODIUM SERPL-SCNC: 134 MMOL/L (ref 135–145)
SODIUM SERPL-SCNC: 134 MMOL/L (ref 135–145)
SODIUM SERPL-SCNC: 135 MMOL/L (ref 135–145)
WBC # BLD AUTO: 13.5 10E3/UL (ref 4–11)
WBC # BLD AUTO: 13.6 10E3/UL (ref 4–11)
WBC # BLD AUTO: 13.9 10E3/UL (ref 4–11)

## 2024-05-19 PROCEDURE — 999N000128 HC STATISTIC PERIPHERAL IV START W/O US GUIDANCE

## 2024-05-19 PROCEDURE — 250N000013 HC RX MED GY IP 250 OP 250 PS 637

## 2024-05-19 PROCEDURE — 250N000011 HC RX IP 250 OP 636

## 2024-05-19 PROCEDURE — 83735 ASSAY OF MAGNESIUM: CPT | Performed by: STUDENT IN AN ORGANIZED HEALTH CARE EDUCATION/TRAINING PROGRAM

## 2024-05-19 PROCEDURE — 82805 BLOOD GASES W/O2 SATURATION: CPT

## 2024-05-19 PROCEDURE — 85048 AUTOMATED LEUKOCYTE COUNT: CPT

## 2024-05-19 PROCEDURE — 97140 MANUAL THERAPY 1/> REGIONS: CPT | Mod: GP | Performed by: REHABILITATION PRACTITIONER

## 2024-05-19 PROCEDURE — 83735 ASSAY OF MAGNESIUM: CPT

## 2024-05-19 PROCEDURE — 94640 AIRWAY INHALATION TREATMENT: CPT | Mod: 76

## 2024-05-19 PROCEDURE — 3E043XZ INTRODUCTION OF VASOPRESSOR INTO CENTRAL VEIN, PERCUTANEOUS APPROACH: ICD-10-PCS | Performed by: INTERNAL MEDICINE

## 2024-05-19 PROCEDURE — 258N000003 HC RX IP 258 OP 636

## 2024-05-19 PROCEDURE — 92526 ORAL FUNCTION THERAPY: CPT | Mod: GN

## 2024-05-19 PROCEDURE — 86140 C-REACTIVE PROTEIN: CPT

## 2024-05-19 PROCEDURE — 97110 THERAPEUTIC EXERCISES: CPT | Mod: GP | Performed by: REHABILITATION PRACTITIONER

## 2024-05-19 PROCEDURE — 250N000013 HC RX MED GY IP 250 OP 250 PS 637: Performed by: STUDENT IN AN ORGANIZED HEALTH CARE EDUCATION/TRAINING PROGRAM

## 2024-05-19 PROCEDURE — 80053 COMPREHEN METABOLIC PANEL: CPT | Performed by: STUDENT IN AN ORGANIZED HEALTH CARE EDUCATION/TRAINING PROGRAM

## 2024-05-19 PROCEDURE — 250N000009 HC RX 250

## 2024-05-19 PROCEDURE — 83605 ASSAY OF LACTIC ACID: CPT

## 2024-05-19 PROCEDURE — 999N000157 HC STATISTIC RCP TIME EA 10 MIN

## 2024-05-19 PROCEDURE — 120N000003 HC R&B IMCU UMMC

## 2024-05-19 PROCEDURE — 93005 ELECTROCARDIOGRAM TRACING: CPT

## 2024-05-19 PROCEDURE — 82805 BLOOD GASES W/O2 SATURATION: CPT | Performed by: INTERNAL MEDICINE

## 2024-05-19 PROCEDURE — 92610 EVALUATE SWALLOWING FUNCTION: CPT | Mod: GN

## 2024-05-19 PROCEDURE — 99233 SBSQ HOSP IP/OBS HIGH 50: CPT | Mod: GC | Performed by: INTERNAL MEDICINE

## 2024-05-19 PROCEDURE — 93010 ELECTROCARDIOGRAM REPORT: CPT | Performed by: INTERNAL MEDICINE

## 2024-05-19 PROCEDURE — 97530 THERAPEUTIC ACTIVITIES: CPT | Mod: GP | Performed by: REHABILITATION PRACTITIONER

## 2024-05-19 PROCEDURE — 87086 URINE CULTURE/COLONY COUNT: CPT

## 2024-05-19 PROCEDURE — 82565 ASSAY OF CREATININE: CPT

## 2024-05-19 PROCEDURE — 83690 ASSAY OF LIPASE: CPT

## 2024-05-19 PROCEDURE — 250N000011 HC RX IP 250 OP 636: Performed by: INTERNAL MEDICINE

## 2024-05-19 PROCEDURE — C9113 INJ PANTOPRAZOLE SODIUM, VIA: HCPCS

## 2024-05-19 RX ORDER — MAGNESIUM SULFATE HEPTAHYDRATE 40 MG/ML
2 INJECTION, SOLUTION INTRAVENOUS ONCE
Status: COMPLETED | OUTPATIENT
Start: 2024-05-19 | End: 2024-05-19

## 2024-05-19 RX ORDER — VANCOMYCIN HYDROCHLORIDE 1 G/200ML
20 INJECTION, SOLUTION INTRAVENOUS ONCE
Qty: 250 ML | Refills: 0 | Status: COMPLETED | OUTPATIENT
Start: 2024-05-19 | End: 2024-05-19

## 2024-05-19 RX ORDER — DIPHENHYDRAMINE HYDROCHLORIDE 50 MG/ML
25 INJECTION INTRAMUSCULAR; INTRAVENOUS EVERY 6 HOURS PRN
Status: DISCONTINUED | OUTPATIENT
Start: 2024-05-19 | End: 2024-05-24 | Stop reason: HOSPADM

## 2024-05-19 RX ORDER — DIPHENHYDRAMINE HCL 25 MG
25 CAPSULE ORAL EVERY 6 HOURS PRN
Status: DISCONTINUED | OUTPATIENT
Start: 2024-05-19 | End: 2024-05-24 | Stop reason: HOSPADM

## 2024-05-19 RX ORDER — PIPERACILLIN SODIUM, TAZOBACTAM SODIUM 3; .375 G/15ML; G/15ML
3.38 INJECTION, POWDER, LYOPHILIZED, FOR SOLUTION INTRAVENOUS EVERY 6 HOURS
Status: DISCONTINUED | OUTPATIENT
Start: 2024-05-19 | End: 2024-05-23

## 2024-05-19 RX ORDER — CEFTRIAXONE 1 G/1
1 INJECTION, POWDER, FOR SOLUTION INTRAMUSCULAR; INTRAVENOUS EVERY 24 HOURS
Status: DISCONTINUED | OUTPATIENT
Start: 2024-05-19 | End: 2024-05-19

## 2024-05-19 RX ADMIN — ACETAMINOPHEN 975 MG: 325 TABLET, FILM COATED ORAL at 08:09

## 2024-05-19 RX ADMIN — HEPARIN SODIUM 5000 UNITS: 5000 INJECTION, SOLUTION INTRAVENOUS; SUBCUTANEOUS at 00:27

## 2024-05-19 RX ADMIN — FLUOXETINE HYDROCHLORIDE 40 MG: 20 CAPSULE ORAL at 08:09

## 2024-05-19 RX ADMIN — PIPERACILLIN AND TAZOBACTAM 3.38 G: 3; .375 INJECTION, POWDER, LYOPHILIZED, FOR SOLUTION INTRAVENOUS at 20:58

## 2024-05-19 RX ADMIN — LIDOCAINE 4% 2 PATCH: 40 PATCH TOPICAL at 20:59

## 2024-05-19 RX ADMIN — Medication 50 MCG: at 08:10

## 2024-05-19 RX ADMIN — OLANZAPINE 2.5 MG: 2.5 TABLET, FILM COATED ORAL at 20:59

## 2024-05-19 RX ADMIN — VANCOMYCIN HYDROCHLORIDE 1000 MG: 1 INJECTION, SOLUTION INTRAVENOUS at 02:28

## 2024-05-19 RX ADMIN — SODIUM CHLORIDE, POTASSIUM CHLORIDE, SODIUM LACTATE AND CALCIUM CHLORIDE 500 ML: 600; 310; 30; 20 INJECTION, SOLUTION INTRAVENOUS at 11:23

## 2024-05-19 RX ADMIN — PANTOPRAZOLE SODIUM 40 MG: 40 TABLET, DELAYED RELEASE ORAL at 08:10

## 2024-05-19 RX ADMIN — LATANOPROST 1 DROP: 50 SOLUTION OPHTHALMIC at 21:54

## 2024-05-19 RX ADMIN — PROCHLORPERAZINE EDISYLATE 5 MG: 5 INJECTION INTRAMUSCULAR; INTRAVENOUS at 15:58

## 2024-05-19 RX ADMIN — BUSPIRONE HYDROCHLORIDE 15 MG: 5 TABLET ORAL at 20:59

## 2024-05-19 RX ADMIN — PIPERACILLIN AND TAZOBACTAM 3.38 G: 3; .375 INJECTION, POWDER, LYOPHILIZED, FOR SOLUTION INTRAVENOUS at 13:27

## 2024-05-19 RX ADMIN — IPRATROPIUM BROMIDE AND ALBUTEROL SULFATE 3 ML: .5; 3 SOLUTION RESPIRATORY (INHALATION) at 21:08

## 2024-05-19 RX ADMIN — MAGNESIUM SULFATE HEPTAHYDRATE 2 G: 2 INJECTION, SOLUTION INTRAVENOUS at 10:22

## 2024-05-19 RX ADMIN — PIPERACILLIN AND TAZOBACTAM 3.38 G: 3; .375 INJECTION, POWDER, LYOPHILIZED, FOR SOLUTION INTRAVENOUS at 08:10

## 2024-05-19 RX ADMIN — BUSPIRONE HYDROCHLORIDE 15 MG: 5 TABLET ORAL at 08:09

## 2024-05-19 RX ADMIN — LEVOTHYROXINE SODIUM 50 MCG: 0.05 TABLET ORAL at 08:10

## 2024-05-19 RX ADMIN — DONEPEZIL HYDROCHLORIDE 10 MG: 10 TABLET, FILM COATED ORAL at 21:54

## 2024-05-19 RX ADMIN — PANTOPRAZOLE SODIUM 40 MG: 40 INJECTION, POWDER, FOR SOLUTION INTRAVENOUS at 13:27

## 2024-05-19 RX ADMIN — OLANZAPINE 2.5 MG: 2.5 TABLET, FILM COATED ORAL at 08:09

## 2024-05-19 RX ADMIN — PIPERACILLIN AND TAZOBACTAM 3.38 G: 3; .375 INJECTION, POWDER, LYOPHILIZED, FOR SOLUTION INTRAVENOUS at 01:23

## 2024-05-19 RX ADMIN — ONDANSETRON 4 MG: 2 INJECTION INTRAMUSCULAR; INTRAVENOUS at 10:22

## 2024-05-19 RX ADMIN — DIPHENHYDRAMINE HYDROCHLORIDE 25 MG: 50 INJECTION, SOLUTION INTRAMUSCULAR; INTRAVENOUS at 11:38

## 2024-05-19 RX ADMIN — HEPARIN SODIUM 5000 UNITS: 5000 INJECTION, SOLUTION INTRAVENOUS; SUBCUTANEOUS at 08:10

## 2024-05-19 RX ADMIN — BUDESONIDE 1 MG: 1 SUSPENSION RESPIRATORY (INHALATION) at 21:08

## 2024-05-19 ASSESSMENT — ACTIVITIES OF DAILY LIVING (ADL)
ADLS_ACUITY_SCORE: 41
ADLS_ACUITY_SCORE: 45
ADLS_ACUITY_SCORE: 41
ADLS_ACUITY_SCORE: 45
ADLS_ACUITY_SCORE: 41
ADLS_ACUITY_SCORE: 45
ADLS_ACUITY_SCORE: 41
ADLS_ACUITY_SCORE: 45

## 2024-05-19 NOTE — PROGRESS NOTES
Cardiology 2 Progress Note    05/19/2024    Idalia Bob MRN# [id number]   Age: 79 year old YOB: 1945        Brief HPI   Idalia Bob is an 78yo woman with a history of COPD, HTN, and dementia who is admitted on 5/15 for de-dameon      Subjective / Interval   Patient's CRP trending up and overnight team added Vancomycin and Zosyn. Patient asymptomatic at this time, denying urinary symptoms or fever. However, she states feeling a bit short of breath at rest. No lightheadedness, dizziness or chest pain. No longer nauseas and emesis. Denies abdominal pain.     Assessment and Plan:   Today's changes:  - continue with Dopamine  - hold afterload reduction and diuresis for hypotension and lactic acidosis. CVP 7 this AM.   - Switching VBG to q12h  - CRP trending up, most of infectious workup negative. Adding urine culture given pt has been on diuretics.   - Discontinue Vancomycin given negative MRSA, will continue with Zosyn.  - Adding speech pathology given multiple episodes of aspiration PNA.   - Lipase negative   - Hb trending down, now Hb 7.5. Will trend Hb q12h.  - Qtc improved from 501 to 470msec.  - coronary angiogram when clinically improved    #Cardiogenic Shock  #HFrEF, acute decompensation, new diagnosis  Admitted on 5/15 for decompensated heart failure. Etiology of decompensation is currently unknown and this represents a new diagnosis. Notably, an echocardiogram in February of this year was wnl suggestive of an acute process. LV size is also wnl suggestive of some acuity. Uncertain whether this represents ischemic cardiomyopathy vs other such as stress, infectious, or arrhythmogenic. Will need full workup prior to discharge.   Date of Diagnosis: 2024  Etiology: Unknown  Baseline weight: TBD  Last TTE: 5/2024 w/ LVEF 10-15% with global hypokinesis. Mod RV dysfunction. LVIDd 5cm.   Last RHC: n/a  Last angiogram: n/a  Therapeutics   > Volume: Holding Lasix drip   > Afterload: holding   >  Inotrope: swap dobutamine to dopamine for hypotension on 05/18.     > Continue dopamine @2.5   > BB: holding   > ARNI: holding   > MRA: holding   > SGLT2i: holding   > Device: n/a    # Lactic Acidosis  # Hypotension  Multifactorial iso bp medications, rapid diuresis, and beta agonism of dobutamine. Resolved with dopamine.  - Trend q8h      #Acute Hypoxemic respiratory failure, c/f PNA  #Hx of multiple aspiration PNAs  #Pleural Effusions   On 2L of oxygen here. Chest XR showed continued left pleural effusion w trace increased small right pleural effusion. Likely secondary to acute decompensated HF   - holding diuresis  - Daily CRP, CBC  - Abx   > Vancomycin (05/19)   > Zosyn (05/19-P)  - Infectious workup   > UA negative   > MRSA negative   > Procal negative  - Maintain O2 88-92% given hx of COPD.    #Non-ischemic myocardial injury  Related to decompensated heart failure as discussed above. No concern for ACS this admission  - no further need to monitor     #Iron Deficiency Anemia  Baseline ~9 since at least early January. Labs c/w JAC.  - Started on iron 200mg IV infusion x1 5/17/2024  - Hb trending down, now Hb 7.5. Will trend Hb q8h.  - Transfuse if Hb<7 or actively bleeding    #JUANIS on CKD, stage III  Baseline Scr ~0.8 reflecting eGFR of 30-45. Highest Scr here of 1.2 suspect cardiorenal  - no diuresis  - trend I/O   - renally dose meds, avoid nephrotoxic meds    #Hepatocellular Pattern of Liver Injury - Suspect due to congestive hepatopathy, diuresis as above  #COPD - No active issues, remain on PTA meds of budesonide 1mg bid and duonebs QID  #Hypothyrodism - No active issues, continue levothyrozine 50mcg daily   #Dementia - No active issues, plan for delirium precautions and continue donepezil 10mg daily  #GERD - No active issues, continue prantoprazole 40mg daily   #Electrolyte Derangements   ## Hypokalemia - replete prn   ## Hypocalcemia - replete prn    Patient seen and discussed with staff physician,   Keven Napier,  Internal Medicine PGY-3     Physical Exam:   /65   Pulse 116   Temp 98.5  F (36.9  C) (Oral)   Resp 14   Wt 54.1 kg (119 lb 4.3 oz)   LMP  (LMP Unknown)   SpO2 92%   BMI 23.29 kg/m    Temp:  [97.3  F (36.3  C)-98.8  F (37.1  C)] 98.5  F (36.9  C)  Pulse:  [] 116  Resp:  [14-20] 14  BP: ()/(39-75) 107/65  SpO2:  [79 %-100 %] 92 %  Wt Readings from Last 2 Encounters:   05/19/24 54.1 kg (119 lb 4.3 oz)   05/14/24 57.6 kg (127 lb)       Intake/Output Summary (Last 24 hours) at 5/18/2024 1025  Last data filed at 5/18/2024 0641  Gross per 24 hour   Intake 540 ml   Output 2850 ml   Net -2310 ml       Exam:  General: NAD   HEENT: no scleral icterus or injection  CARDIAC: irregular, no murmurs. +JVD  RESP:  breathing on 2L though speaking in full sentences  GI: nondistended  : Ruiz  EXTREMITIES: 2+ LE edema  SKIN: No acute lesions appreciated  NEURO: No focal deficits         Labs:     CMP  Recent Labs   Lab 05/19/24  0448 05/18/24  1530 05/18/24  1048 05/18/24  0431 05/17/24  2055 05/17/24  0615 05/16/24  2100 05/16/24  1218 05/16/24  0535 05/16/24  0028    135 136 136 135   < > 140 140 140 138   POTASSIUM 4.3 4.8 4.9  4.9 3.3* 4.0   < > 3.9 3.2* 3.1* 4.2   CHLORIDE 94* 95* 96* 94* 97*   < > 102 100 101 104   CO2 33* 31* 29 32* 28   < > 27 29 24 17*   ANIONGAP 8 9 11 10 10   < > 11 11 15 17*   GLC 86 206* 162* 93 160*   < > 123* 84 76 79   BUN 18.7 19.7 18.2 17.1 18.1   < > 17.7 18.4 20.8 22.6   CR 1.40* 1.39* 1.33* 1.22* 1.13*   < > 1.12* 1.08* 1.07* 0.97*   GFRESTIMATED 38* 38* 40* 45* 49*   < > 49* 52* 52* 59*   AYUSH 8.2* 8.4* 8.5* 8.6* 8.3*   < > 8.5* 8.5* 8.7* 8.6*   MAG 1.7 2.1  --  1.9 2.1   < > 1.7 1.8 1.8 1.9   PHOS  --   --   --   --   --   --  3.7 4.0 3.8 4.2   PROTTOTAL 5.0* 5.2* 5.1* 5.4*  --    < >  --   --  5.8*  --    ALBUMIN 2.8* 3.0* 3.0* 3.2*  --    < >  --   --  3.5  --    BILITOTAL 0.6 0.5 0.7 0.8  --    < >  --   --  0.6  --     ALKPHOS 70 77 73 80  --    < >  --   --  87  --    AST 16 17 19 21  --    < >  --   --  46*  --    ALT 19 24 25 28  --    < >  --   --  54*  --     < > = values in this interval not displayed.     CBC  Recent Labs   Lab 05/18/24  1530 05/18/24  0630 05/16/24  0535 05/15/24  1111 05/14/24  1549   WBC 15.4*  --  10.2 9.7 10.0   HGB 8.6*  --  9.6* 9.4* 8.6*   HCT 30.6*  --  34.4* 33.6* 31.0*   MCV 80  --  81 81 80    277 311 289 303     INR  Recent Labs   Lab 05/15/24  0929   INR 2.34*     Arterial Blood Gas  Recent Labs   Lab 05/19/24  0448 05/18/24  1530 05/18/24  1209 05/18/24  0630   O2PER 28 2 2 21       Medications:     Current Facility-Administered Medications   Medication Dose Route Frequency Provider Last Rate Last Admin    budesonide (PULMICORT) neb solution 1 mg  1 mg Nebulization BID Terrence Tate MD   1 mg at 05/18/24 2111    busPIRone (BUSPAR) tablet 15 mg  15 mg Oral BID Terrence Tate MD   15 mg at 05/18/24 2026    donepezil (ARICEPT) tablet 10 mg  10 mg Oral At Bedtime Terrence Tate MD   10 mg at 05/18/24 2225    FLUoxetine (PROzac) capsule 40 mg  40 mg Oral Daily Terrence Tate MD   40 mg at 05/18/24 0906    heparin ANTICOAGULANT injection 5,000 Units  5,000 Units Subcutaneous Q8H Chuck Hernandez MD   5,000 Units at 05/19/24 0027    ipratropium - albuterol 0.5 mg/2.5 mg/3 mL (DUONEB) neb solution 3 mL  3 mL Nebulization 2 times daily Evangelina Johnson MD   3 mL at 05/18/24 2111    latanoprost (XALATAN) 0.005 % ophthalmic solution 1 drop  1 drop Both Eyes At Bedtime Terrence Tate MD   1 drop at 05/18/24 2225    levothyroxine (SYNTHROID/LEVOTHROID) tablet 50 mcg  50 mcg Oral Daily Terrence Tate MD   50 mcg at 05/18/24 0906    Lidocaine (LIDOCARE) 4 % Patch 2 patch  2 patch Transdermal Q24h Derek Silva MD   2 patch at 05/18/24 2039    magnesium sulfate 2 g in 50 mL sterile water intermittent infusion  2 g Intravenous Once Keven,  Gualberto MODI MD        OLANZapine (zyPREXA) tablet 2.5 mg  2.5 mg Oral BID Terrence Tate MD   2.5 mg at 05/18/24 2026    pantoprazole (PROTONIX) EC tablet 40 mg  40 mg Oral Daily Terrence Tate MD   40 mg at 05/18/24 0906    piperacillin-tazobactam (ZOSYN) 3.375 g vial to attach to  mL bag  3.375 g Intravenous Q6H Evangelina Johnson  mL/hr at 05/19/24 0123 3.375 g at 05/19/24 0123    sodium chloride (PF) 0.9% PF flush 10-40 mL  10-40 mL Intracatheter Q8H Chuck Hernandez MD   10 mL at 05/19/24 0127    sodium chloride (PF) 0.9% PF flush 3 mL  3 mL Intracatheter Q8H Terrence Tate MD   3 mL at 05/19/24 0429    Vitamin D3 (CHOLECALCIFEROL) tablet 50 mcg  50 mcg Oral Daily Terrence Tate MD   50 mcg at 05/18/24 0906       Current Facility-Administered Medications   Medication Dose Route Frequency Provider Last Rate Last Admin    DOPamine 400 mg in dextrose 5% 250 mL (adult std) - premix - CENTRAL  2.5 mcg/kg/min Intravenous Continuous Debra Starr MD 5 mL/hr at 05/18/24 2028 2.5 mcg/kg/min at 05/18/24 2028    [Held by provider] furosemide (LASIX) 500 mg/50mL infusion ADULT MAX CONC  10 mg/hr Intravenous Continuous DeeetDerek MD   Paused at 05/18/24 1130    Reason beta blocker not prescribed   Other DOES NOT GO TO Terrence Garcia MD           Recent Imaging:   Reviewed

## 2024-05-19 NOTE — PROVIDER NOTIFICATION
Pt work with Bedside RN and OT, orthostatic B/P done results are on table lay, sitting, and standing,   05/19/24 1335 05/19/24 1340 05/19/24 1345   Vitals   Pulse 116 100 104   Oximeter Heart Rate 116 bpm 115 bpm 100 bpm   BP 97/68 (!) 108/95 104/84   BP - Mean 75 101 91

## 2024-05-19 NOTE — PHARMACY-VANCOMYCIN DOSING SERVICE
"Pharmacy Vancomycin Initial Note  Date of Service May 19, 2024  Patient's  1945  79 year old, female    Indication: Sepsis    Current estimated CrCl = Estimated Creatinine Clearance: 27.4 mL/min (A) (based on SCr of 1.39 mg/dL (H)).    Creatinine for last 3 days  2024:  5:35 AM Creatinine 1.07 mg/dL; 12:18 PM Creatinine 1.08 mg/dL;  9:00 PM Creatinine 1.12 mg/dL  2024:  6:15 AM Creatinine 1.11 mg/dL;  2:35 PM Creatinine 1.01 mg/dL;  8:55 PM Creatinine 1.13 mg/dL  2024:  4:31 AM Creatinine 1.22 mg/dL; 10:48 AM Creatinine 1.33 mg/dL;  3:30 PM Creatinine 1.39 mg/dL    Recent Vancomycin Level(s) for last 3 days  No results found for requested labs within last 3 days.      Vancomycin IV Administrations (past 72 hours)                     vancomycin (VANCOCIN) 1,000 mg in 200 mL dextrose intermittent infusion (mg) 1,000 mg New Bag 24 0228                    Nephrotoxins and other renal medications (From now, onward)      Start     Dose/Rate Route Frequency Ordered Stop    24 0230  vancomycin (VANCOCIN) 750 mg in sodium chloride 0.9 % 250 mL intermittent infusion         750 mg  over 90 Minutes Intravenous EVERY 24 HOURS 24 0452      24 0100  piperacillin-tazobactam (ZOSYN) 3.375 g vial to attach to  mL bag        Note to Pharmacy: For [facility], [facility] and [facility]: For Zosyn-naive patients, use the \"Zosyn initial dose + extended infusion\" order panel.    3.375 g  over 30 Minutes Intravenous EVERY 6 HOURS 24 0031      05/15/24 1900  [Held by provider]  furosemide (LASIX) 500 mg/50mL infusion ADULT MAX CONC        (On hold since yesterday at 2141 until manually unheld; held by Derek Silva MDHold Reason: Other)    10 mg/hr  1 mL/hr  Intravenous CONTINUOUS 05/15/24 1849              Contrast Orders - past 72 hours (72h ago, onward)      None            [employer] Prediction of Planned Initial Vancomycin Regimen    Loading dose: 1000 mg  Regimen: 750 mg IV every 24 hours  Exposure " target: AUC24 (range)400-600 mg/L.hr   AUC24,ss: 519 mg/L.hr  Probability of AUC24 > 400: 78 %  Ctrough,ss: 17 mg/L  Probability of Ctrough,ss > 20: 34 %  Probability of nephrotoxicity (Lodise MAY 2009): 13 %      Plan:  Start vancomycin 1000 mg IV x 1 dose then 750 mg IV q24h.   Vancomycin monitoring method: AUC  Vancomycin therapeutic monitoring goal: 400-600 mg*h/L  Pharmacy will check vancomycin levels as appropriate in 1-3 Days.    Serum creatinine levels will be ordered daily for the first week of therapy and at least twice weekly for subsequent weeks.      Elza Rocha, PharmD, BCPS

## 2024-05-19 NOTE — PLAN OF CARE
Hours of Care: 4589-3596  Neuro: A&Ox4. Forgetful. Answers questions approprietly. .   Cardiac: Afib/ST. HR 90's-110. Soft BP's but VSS. Paged provider Evangelina Johnson about 4am BP of 80/64 MAP 71 but asymptomatic, recheck BP 89/64 MAP 71 then 5am BP 90/52 MAP 65. Per MD, no orders at this time unless pressures go lower. 6am BP 99/52 MAP 66. Denies dizziness or chest pain.           Respiratory: Sating >90% on 1-2L NC. Denies SOB  GI/: Adequate urine output pacheco. No BM on this shift.  Diet/appetite: Tolerating 2g Na & 18L FR diet. Poor appetite.   Activity:  Assist of 1-2 w/ GB & walker. Pt feels weak.   Pain: At acceptable level on current regimen. No pain reported.  Skin: No new deficits noted.   LDA's: 1 R PIV -SL. R DLP -red infusing Dopamine @ 2.5mcg/kg/hr and purple SL.     X1 Vancomycin dose given, Zosyn started.    Plan: Continue with POC. Notify primary team with changes.

## 2024-05-19 NOTE — PROGRESS NOTES
"Speech-Language Pathology: Clinical Swallow Evaluation     05/19/24 1400   Appointment Info   Signing Clinician's Name / Credentials (SLP) Katie Brothers, MS, CCC-SLP   General Information   Onset of Illness/Injury or Date of Surgery 05/15/24   Referring Physician Candice Valdovinos MD   Pertinent History of Current Problem Per EMR, \"78 yo f with history of dementia, pulmonary emphysema AMRIT on fluoxetine, IBS with diarrhea and oxygen dependence since prior hospitalization with PNA in 12/2023 requiring TCU stay and now in [facility], who presented on 5/15 with insidious onset of weakness and is now found to have new presentation of HFrEF EF 10-15% with normal size LV.\" Clinical swallow evaluation completed per MD orders d/t concerns for recurrent PNA (2nd this year).   General Observations Alert & cooperative. Pt sitting on EOB with 2 daughters present. Pt just concluded PT session.   Type of Evaluation   Type of Evaluation Swallow Evaluation   Oral Motor   Oral Musculature generally intact   Structural Abnormalities none present   Mucosal Quality good   Dentition (Oral Motor)   Dentition (Oral Motor) natural dentition;adequate dentition   Facial Symmetry (Oral Motor)   Facial Symmetry (Oral Motor) WNL   Lip Function (Oral Motor)   Lip Range of Motion (Oral Motor) WNL   Tongue Function (Oral Motor)   Tongue Strength (Oral Motor) WNL   Tongue Coordination/Speed (Oral Motor) WNL   Tongue ROM (Oral Motor) WNL   Jaw Function (Oral Motor)   Jaw Function (Oral Motor) WNL   Cough/Swallow/Gag Reflex (Oral Motor)   Volitional Swallow (Oral Motor) WNL   Vocal Quality/Secretion Management (Oral Motor)   Vocal Quality (Oral Motor) WNL   Secretion Management (Oral Motor) WNL   General Swallowing Observations   Past History of Dysphagia CSE completed 12/2023 & 2/13/24 recommending reg/thin with no overt s/s of aspiration. In February 2024, recommended VFSS to evaluate for silent aspiration, but pt with vomiting and nausea and " "wouldn't tolerate trials for evaluation to be completed. Additionally, pt expressed an appetite loss just before that admission. Pt endorses reflux, heartburn, frequent burping with intake, and vomiting \"a few times per week\" per pt & pt's daughters' reports since her hospitalization in February.   Respiratory Support nasal cannula  (2L)   Current Diet/Method of Nutritional Intake (General Swallowing Observations, NIS) thin liquids (level 0);regular diet   Swallowing Evaluation Clinical swallow evaluation   Clinical Swallow Evaluation   Feeding Assistance no assistance needed   Clinical Swallow Evaluation Textures Trialed thin liquids;solid foods   Clinical Swallow Eval: Thin Liquid Texture Trial   Mode of Presentation, Thin Liquids straw;self-fed   Volume of Liquid or Food Presented 6 oz   Oral Phase of Swallow WFL   Pharyngeal Phase of Swallow intact   Diagnostic Statement No overt s/s of aspiration. Subjectively timely swallow initiation.   Clinical Swallow Evaluation: Solid Food Texture Trial   Mode of Presentation self-fed   Volume Presented 2 crackers   Oral Phase WFL   Pharyngeal Phase intact   Diagnostic Statement No overt s/s of aspiration. Timely and complete mastication.   Esophageal Phase of Swallow   Patient reports or presents with symptoms of esophageal dysphagia Yes  (burping during evaluation)   Esophageal comments hx of GERD, pt on PPI. Pt endorses reflux, heartburn, frequent burping with intake, and vomiting \"a few times per week\" per pt & pt's daughters' reports since her hospitalization in February. Recommend GI consult to further investigate for esophageal dysphagia.   Swallowing Recommendations   Diet Consistency Recommendations thin liquids (level 0);regular diet   Supervision Level for Intake patient independent   Mode of Delivery Recommendations bolus size, small;slow rate of intake   Postural Recommendations none   Swallowing Maneuver Recommendations alternate food and liquid intake " "  Monitoring/Assistance Required (Eating/Swallowing) stop eating activities when fatigue is present;monitor for cough or change in vocal quality with intake   Recommended Feeding/Eating Techniques (Swallow Eval) patient is independent, no specific recommendations;maintain upright sitting position for eating;maintain upright posture during/after eating for 30 minutes   Medication Administration Recommendations, Swallowing (SLP) as tolerated   Instrumental Assessment Recommendations VFSS (videofluoroscopic swallowing study) - to r/o silent aspiration   General Therapy Interventions   Planned Therapy Interventions Dysphagia Treatment   Dysphagia treatment Instruction of safe swallow strategies;Compensatory strategies for swallowing   Clinical Impression   Criteria for Skilled Therapeutic Interventions Met (SLP Eval) Yes, treatment indicated   Risks & Benefits of therapy have been explained evaluation/treatment results reviewed;care plan/treatment goals reviewed;risks/benefits reviewed;current/potential barriers reviewed;participants voiced agreement with care plan;participants included;patient;daughter   Clinical Impression Comments Clinical Swallow Evaluation completed per MD orders. Patient presents with functional oropharyngeal swallowing function. Oral motor function was WNL. Patient's voice was strong and clear. Patient had no s/s aspiration and no signs of dysphagia across all textures trialed - thin liquids and regular solid. Mastication was timely and complete. Pt observed with belching during evaluation after intake. Pt with hx of GERD on PPI. Pt endorses reflux, heartburn, frequent burping with intake, and vomiting \"a few times per week\" per pt & pt's daughters' reports since her hospitalization in February. Recommend GI consult to further investigate for esophageal dysphagia. Recommend pt continues regular diet and thin liquids with safe swallowing & GERD precautions. Recommend video swallow study to further " "evaluate for silent aspiration with pt with recurrent PNA this year. SLP will continue to follow.   SLP Total Evaluation Time   Eval: oral/pharyngeal swallow function, clinical swallow Minutes (94994) 15   SLP Discharge Planning   SLP Plan VFSS   SLP Discharge Recommendation home with assist   SLP Rationale for DC Rec Pt appears at baseline swallowing fx and warrants ongoing diagnostic evaluation to evaluate for silent aspiration. Suspect pt will meet swallowing goals prior to d/c.   SLP Brief overview of current status  Recommend pt continues regular diet and thin liquids with safe swallowing & GERD precautions. Recommend video swallow study to further evaluate for silent aspiration with pt with recurrent PNA this year. SLP will continue to follow. Pt with hx of GERD on PPI. Pt endorses reflux, heartburn, frequent burping with intake, and vomiting \"a few times per week\" per pt & pt's daughters' reports since her hospitalization in February. Recommend GI consult to further investigate for esophageal dysphagia.     "

## 2024-05-20 ENCOUNTER — APPOINTMENT (OUTPATIENT)
Dept: CT IMAGING | Facility: CLINIC | Age: 79
DRG: 291 | End: 2024-05-20
Payer: MEDICARE

## 2024-05-20 ENCOUNTER — APPOINTMENT (OUTPATIENT)
Dept: PHYSICAL THERAPY | Facility: CLINIC | Age: 79
DRG: 291 | End: 2024-05-20
Payer: MEDICARE

## 2024-05-20 ENCOUNTER — APPOINTMENT (OUTPATIENT)
Dept: OCCUPATIONAL THERAPY | Facility: CLINIC | Age: 79
DRG: 291 | End: 2024-05-20
Payer: MEDICARE

## 2024-05-20 ENCOUNTER — APPOINTMENT (OUTPATIENT)
Dept: SPEECH THERAPY | Facility: CLINIC | Age: 79
DRG: 291 | End: 2024-05-20
Payer: MEDICARE

## 2024-05-20 PROBLEM — N17.9 ACUTE KIDNEY FAILURE, UNSPECIFIED (H): Status: ACTIVE | Noted: 2024-05-20

## 2024-05-20 LAB
ANION GAP SERPL CALCULATED.3IONS-SCNC: 12 MMOL/L (ref 7–15)
ANION GAP SERPL CALCULATED.3IONS-SCNC: 9 MMOL/L (ref 7–15)
ATRIAL RATE - MUSE: 118 BPM
ATRIAL RATE - MUSE: 94 BPM
BACTERIA UR CULT: NO GROWTH
BASE EXCESS BLDV CALC-SCNC: 7.6 MMOL/L (ref -3–3)
BASE EXCESS BLDV CALC-SCNC: 8 MMOL/L (ref -3–3)
BUN SERPL-MCNC: 16.8 MG/DL (ref 8–23)
BUN SERPL-MCNC: 17.6 MG/DL (ref 8–23)
CALCIUM SERPL-MCNC: 8.3 MG/DL (ref 8.8–10.2)
CALCIUM SERPL-MCNC: 8.4 MG/DL (ref 8.8–10.2)
CHLORIDE SERPL-SCNC: 97 MMOL/L (ref 98–107)
CHLORIDE SERPL-SCNC: 98 MMOL/L (ref 98–107)
CREAT SERPL-MCNC: 1.27 MG/DL (ref 0.51–0.95)
CREAT SERPL-MCNC: 1.33 MG/DL (ref 0.51–0.95)
CRP SERPL-MCNC: 156 MG/L
DEPRECATED HCO3 PLAS-SCNC: 26 MMOL/L (ref 22–29)
DEPRECATED HCO3 PLAS-SCNC: 30 MMOL/L (ref 22–29)
DIASTOLIC BLOOD PRESSURE - MUSE: NORMAL MMHG
DIASTOLIC BLOOD PRESSURE - MUSE: NORMAL MMHG
EGFRCR SERPLBLD CKD-EPI 2021: 40 ML/MIN/1.73M2
EGFRCR SERPLBLD CKD-EPI 2021: 43 ML/MIN/1.73M2
ERYTHROCYTE [DISTWIDTH] IN BLOOD BY AUTOMATED COUNT: 19.9 % (ref 10–15)
ERYTHROCYTE [DISTWIDTH] IN BLOOD BY AUTOMATED COUNT: 20.1 % (ref 10–15)
GLUCOSE SERPL-MCNC: 114 MG/DL (ref 70–99)
GLUCOSE SERPL-MCNC: 96 MG/DL (ref 70–99)
HCO3 BLDV-SCNC: 34 MMOL/L (ref 21–28)
HCO3 BLDV-SCNC: 34 MMOL/L (ref 21–28)
HCT VFR BLD AUTO: 28.2 % (ref 35–47)
HCT VFR BLD AUTO: 31.2 % (ref 35–47)
HGB BLD-MCNC: 8 G/DL (ref 11.7–15.7)
HGB BLD-MCNC: 8.6 G/DL (ref 11.7–15.7)
INTERPRETATION ECG - MUSE: NORMAL
INTERPRETATION ECG - MUSE: NORMAL
L PNEUMO1 AG UR QL IA: NEGATIVE
LACTATE SERPL-SCNC: 0.6 MMOL/L (ref 0.7–2)
LACTATE SERPL-SCNC: 2 MMOL/L (ref 0.7–2)
LDH SERPL L TO P-CCNC: 133 U/L (ref 0–250)
MAGNESIUM SERPL-MCNC: 2.3 MG/DL (ref 1.7–2.3)
MAGNESIUM SERPL-MCNC: 2.3 MG/DL (ref 1.7–2.3)
MAGNESIUM SERPL-MCNC: 2.4 MG/DL (ref 1.7–2.3)
MCH RBC QN AUTO: 22.7 PG (ref 26.5–33)
MCH RBC QN AUTO: 22.9 PG (ref 26.5–33)
MCHC RBC AUTO-ENTMCNC: 27.6 G/DL (ref 31.5–36.5)
MCHC RBC AUTO-ENTMCNC: 28.4 G/DL (ref 31.5–36.5)
MCV RBC AUTO: 81 FL (ref 78–100)
MCV RBC AUTO: 82 FL (ref 78–100)
O2/TOTAL GAS SETTING VFR VENT: 21 %
O2/TOTAL GAS SETTING VFR VENT: 24 %
OXYHGB MFR BLDV: 30 % (ref 70–75)
OXYHGB MFR BLDV: 58 % (ref 70–75)
P AXIS - MUSE: 73 DEGREES
P AXIS - MUSE: 86 DEGREES
PCO2 BLDV: 54 MM HG (ref 40–50)
PCO2 BLDV: 54 MM HG (ref 40–50)
PH BLDV: 7.4 [PH] (ref 7.32–7.43)
PH BLDV: 7.41 [PH] (ref 7.32–7.43)
PLATELET # BLD AUTO: 253 10E3/UL (ref 150–450)
PLATELET # BLD AUTO: 265 10E3/UL (ref 150–450)
PO2 BLDV: 23 MM HG (ref 25–47)
PO2 BLDV: 33 MM HG (ref 25–47)
POTASSIUM SERPL-SCNC: 4.3 MMOL/L (ref 3.4–5.3)
POTASSIUM SERPL-SCNC: 4.4 MMOL/L (ref 3.4–5.3)
PR INTERVAL - MUSE: 144 MS
PR INTERVAL - MUSE: 150 MS
QRS DURATION - MUSE: 68 MS
QRS DURATION - MUSE: 68 MS
QT - MUSE: 358 MS
QT - MUSE: 376 MS
QTC - MUSE: 470 MS
QTC - MUSE: 501 MS
R AXIS - MUSE: 95 DEGREES
R AXIS - MUSE: 97 DEGREES
RBC # BLD AUTO: 3.49 10E6/UL (ref 3.8–5.2)
RBC # BLD AUTO: 3.79 10E6/UL (ref 3.8–5.2)
S PNEUM AG SPEC QL: NEGATIVE
SAO2 % BLDV: 30.8 % (ref 70–75)
SAO2 % BLDV: 60 % (ref 70–75)
SODIUM SERPL-SCNC: 136 MMOL/L (ref 135–145)
SODIUM SERPL-SCNC: 136 MMOL/L (ref 135–145)
SYSTOLIC BLOOD PRESSURE - MUSE: NORMAL MMHG
SYSTOLIC BLOOD PRESSURE - MUSE: NORMAL MMHG
T AXIS - MUSE: -71 DEGREES
T AXIS - MUSE: 255 DEGREES
VENTRICULAR RATE- MUSE: 118 BPM
VENTRICULAR RATE- MUSE: 94 BPM
WBC # BLD AUTO: 12.1 10E3/UL (ref 4–11)
WBC # BLD AUTO: 12.1 10E3/UL (ref 4–11)

## 2024-05-20 PROCEDURE — 97165 OT EVAL LOW COMPLEX 30 MIN: CPT | Mod: GO

## 2024-05-20 PROCEDURE — 97535 SELF CARE MNGMENT TRAINING: CPT | Mod: GP | Performed by: PHYSICAL THERAPIST

## 2024-05-20 PROCEDURE — 92526 ORAL FUNCTION THERAPY: CPT | Mod: GN | Performed by: SPEECH-LANGUAGE PATHOLOGIST

## 2024-05-20 PROCEDURE — 250N000009 HC RX 250

## 2024-05-20 PROCEDURE — 94640 AIRWAY INHALATION TREATMENT: CPT

## 2024-05-20 PROCEDURE — 250N000011 HC RX IP 250 OP 636

## 2024-05-20 PROCEDURE — 71250 CT THORAX DX C-: CPT | Mod: 26 | Performed by: RADIOLOGY

## 2024-05-20 PROCEDURE — C9113 INJ PANTOPRAZOLE SODIUM, VIA: HCPCS

## 2024-05-20 PROCEDURE — 86140 C-REACTIVE PROTEIN: CPT

## 2024-05-20 PROCEDURE — 83605 ASSAY OF LACTIC ACID: CPT

## 2024-05-20 PROCEDURE — 82805 BLOOD GASES W/O2 SATURATION: CPT

## 2024-05-20 PROCEDURE — 250N000013 HC RX MED GY IP 250 OP 250 PS 637: Performed by: STUDENT IN AN ORGANIZED HEALTH CARE EDUCATION/TRAINING PROGRAM

## 2024-05-20 PROCEDURE — 97110 THERAPEUTIC EXERCISES: CPT | Mod: GP | Performed by: PHYSICAL THERAPIST

## 2024-05-20 PROCEDURE — 97535 SELF CARE MNGMENT TRAINING: CPT | Mod: GO

## 2024-05-20 PROCEDURE — 87899 AGENT NOS ASSAY W/OPTIC: CPT

## 2024-05-20 PROCEDURE — 36415 COLL VENOUS BLD VENIPUNCTURE: CPT

## 2024-05-20 PROCEDURE — 85027 COMPLETE CBC AUTOMATED: CPT

## 2024-05-20 PROCEDURE — 80048 BASIC METABOLIC PNL TOTAL CA: CPT

## 2024-05-20 PROCEDURE — 97530 THERAPEUTIC ACTIVITIES: CPT | Mod: GP | Performed by: PHYSICAL THERAPIST

## 2024-05-20 PROCEDURE — 250N000013 HC RX MED GY IP 250 OP 250 PS 637

## 2024-05-20 PROCEDURE — 99233 SBSQ HOSP IP/OBS HIGH 50: CPT | Mod: GC | Performed by: INTERNAL MEDICINE

## 2024-05-20 PROCEDURE — 71250 CT THORAX DX C-: CPT | Mod: MF

## 2024-05-20 PROCEDURE — 83735 ASSAY OF MAGNESIUM: CPT

## 2024-05-20 PROCEDURE — 83615 LACTATE (LD) (LDH) ENZYME: CPT

## 2024-05-20 PROCEDURE — 999N000157 HC STATISTIC RCP TIME EA 10 MIN

## 2024-05-20 PROCEDURE — 85041 AUTOMATED RBC COUNT: CPT

## 2024-05-20 PROCEDURE — G1010 CDSM STANSON: HCPCS | Performed by: RADIOLOGY

## 2024-05-20 PROCEDURE — 250N000012 HC RX MED GY IP 250 OP 636 PS 637

## 2024-05-20 PROCEDURE — 120N000003 HC R&B IMCU UMMC

## 2024-05-20 PROCEDURE — 94640 AIRWAY INHALATION TREATMENT: CPT | Mod: 76

## 2024-05-20 RX ORDER — ENOXAPARIN SODIUM 100 MG/ML
40 INJECTION SUBCUTANEOUS EVERY 24 HOURS
Status: DISCONTINUED | OUTPATIENT
Start: 2024-05-20 | End: 2024-05-20

## 2024-05-20 RX ORDER — NALOXONE HYDROCHLORIDE 0.4 MG/ML
0.4 INJECTION, SOLUTION INTRAMUSCULAR; INTRAVENOUS; SUBCUTANEOUS
Status: DISCONTINUED | OUTPATIENT
Start: 2024-05-20 | End: 2024-05-24 | Stop reason: HOSPADM

## 2024-05-20 RX ORDER — NALOXONE HYDROCHLORIDE 0.4 MG/ML
0.2 INJECTION, SOLUTION INTRAMUSCULAR; INTRAVENOUS; SUBCUTANEOUS
Status: DISCONTINUED | OUTPATIENT
Start: 2024-05-20 | End: 2024-05-24 | Stop reason: HOSPADM

## 2024-05-20 RX ORDER — ACETAMINOPHEN 325 MG/1
650 TABLET ORAL EVERY 4 HOURS PRN
Status: DISCONTINUED | OUTPATIENT
Start: 2024-05-20 | End: 2024-05-24 | Stop reason: HOSPADM

## 2024-05-20 RX ORDER — ACETAMINOPHEN 650 MG/1
650 SUPPOSITORY RECTAL EVERY 4 HOURS PRN
Status: DISCONTINUED | OUTPATIENT
Start: 2024-05-20 | End: 2024-05-24 | Stop reason: HOSPADM

## 2024-05-20 RX ORDER — METHOCARBAMOL 500 MG/1
500 TABLET, FILM COATED ORAL 4 TIMES DAILY
Status: DISCONTINUED | OUTPATIENT
Start: 2024-05-20 | End: 2024-05-24 | Stop reason: HOSPADM

## 2024-05-20 RX ORDER — BUDESONIDE 1 MG/2ML
1 INHALANT ORAL 2 TIMES DAILY PRN
Status: DISCONTINUED | OUTPATIENT
Start: 2024-05-20 | End: 2024-05-24 | Stop reason: HOSPADM

## 2024-05-20 RX ORDER — IPRATROPIUM BROMIDE AND ALBUTEROL SULFATE 2.5; .5 MG/3ML; MG/3ML
3 SOLUTION RESPIRATORY (INHALATION)
Status: DISCONTINUED | OUTPATIENT
Start: 2024-05-20 | End: 2024-05-23

## 2024-05-20 RX ORDER — PREDNISONE 20 MG/1
40 TABLET ORAL DAILY
Status: DISCONTINUED | OUTPATIENT
Start: 2024-05-20 | End: 2024-05-24 | Stop reason: HOSPADM

## 2024-05-20 RX ADMIN — DOPAMINE HYDROCHLORIDE 5 MCG/KG/MIN: 160 INJECTION, SOLUTION INTRAVENOUS at 03:13

## 2024-05-20 RX ADMIN — PANTOPRAZOLE SODIUM 40 MG: 40 INJECTION, POWDER, FOR SOLUTION INTRAVENOUS at 00:23

## 2024-05-20 RX ADMIN — FLUOXETINE HYDROCHLORIDE 40 MG: 20 CAPSULE ORAL at 08:56

## 2024-05-20 RX ADMIN — OLANZAPINE 2.5 MG: 2.5 TABLET, FILM COATED ORAL at 20:53

## 2024-05-20 RX ADMIN — ONDANSETRON 4 MG: 4 TABLET, ORALLY DISINTEGRATING ORAL at 16:35

## 2024-05-20 RX ADMIN — PREDNISONE 40 MG: 20 TABLET ORAL at 11:59

## 2024-05-20 RX ADMIN — METHOCARBAMOL 500 MG: 500 TABLET ORAL at 20:53

## 2024-05-20 RX ADMIN — BUDESONIDE 1 MG: 1 SUSPENSION RESPIRATORY (INHALATION) at 09:22

## 2024-05-20 RX ADMIN — OLANZAPINE 2.5 MG: 2.5 TABLET, FILM COATED ORAL at 08:56

## 2024-05-20 RX ADMIN — DONEPEZIL HYDROCHLORIDE 10 MG: 10 TABLET, FILM COATED ORAL at 21:36

## 2024-05-20 RX ADMIN — PIPERACILLIN AND TAZOBACTAM 3.38 G: 3; .375 INJECTION, POWDER, LYOPHILIZED, FOR SOLUTION INTRAVENOUS at 02:35

## 2024-05-20 RX ADMIN — Medication 50 MCG: at 08:58

## 2024-05-20 RX ADMIN — BUSPIRONE HYDROCHLORIDE 15 MG: 5 TABLET ORAL at 08:57

## 2024-05-20 RX ADMIN — IPRATROPIUM BROMIDE AND ALBUTEROL SULFATE 3 ML: .5; 3 SOLUTION RESPIRATORY (INHALATION) at 09:22

## 2024-05-20 RX ADMIN — LEVOTHYROXINE SODIUM 50 MCG: 0.05 TABLET ORAL at 08:57

## 2024-05-20 RX ADMIN — PIPERACILLIN AND TAZOBACTAM 3.38 G: 3; .375 INJECTION, POWDER, LYOPHILIZED, FOR SOLUTION INTRAVENOUS at 14:19

## 2024-05-20 RX ADMIN — LATANOPROST 1 DROP: 50 SOLUTION OPHTHALMIC at 21:36

## 2024-05-20 RX ADMIN — BUSPIRONE HYDROCHLORIDE 15 MG: 5 TABLET ORAL at 20:53

## 2024-05-20 RX ADMIN — IPRATROPIUM BROMIDE AND ALBUTEROL SULFATE 3 ML: .5; 3 SOLUTION RESPIRATORY (INHALATION) at 14:21

## 2024-05-20 RX ADMIN — HEPARIN SODIUM 5000 UNITS: 5000 INJECTION, SOLUTION INTRAVENOUS; SUBCUTANEOUS at 16:03

## 2024-05-20 RX ADMIN — ACETAMINOPHEN 975 MG: 325 TABLET, FILM COATED ORAL at 14:17

## 2024-05-20 RX ADMIN — PIPERACILLIN AND TAZOBACTAM 3.38 G: 3; .375 INJECTION, POWDER, LYOPHILIZED, FOR SOLUTION INTRAVENOUS at 20:53

## 2024-05-20 RX ADMIN — PIPERACILLIN AND TAZOBACTAM 3.38 G: 3; .375 INJECTION, POWDER, LYOPHILIZED, FOR SOLUTION INTRAVENOUS at 08:58

## 2024-05-20 ASSESSMENT — ACTIVITIES OF DAILY LIVING (ADL)
ADLS_ACUITY_SCORE: 45

## 2024-05-20 NOTE — CONSULTS
Gastroenterology Consultation  Idalia Bob [id number] 79 year old 1945 5/20/2024        Date of Admission: 5/15/2024  Requesting physician: Gualberto Montana MD       Reason for Consultation:   Esophageal dysphagia.  History is obtained from the patient         Assessment and Plan:   79 year old female with a PMHX significant for dementia, COPD with recent Pseudomonas pneumonia, hypertension who presented on 5/14 for worsening fatigue, lower extremity edema found to have new decompensated heart failure and aspiration pneumonia.  GI was consulted to comment on her esophageal dysphagia as a cause of the pneumonia.    # Chronic intermittent nausea, early satiety with food.   # 7cm Hiatal hernia  # GERD on PPI  # New onset of HFrEF  # Aspiration pneumonia  # COPD    Last EGD 2/2024 for nausea, early satiety showed a normal esophagus, 8cm hiatal hernia, antral gastritis with biopsy showed non specific reactive gastropathy. Currently denying food getting stuck or odynophagia. At the moment, the patient does not have symptoms suggestive of esophageal dysphagia. It is unclear how much and what role the 8cm hiatal hernia plays in her aspiration pna. Hiatal hernia is mostly asymptomatic but can cause nausea and vomiting if payal enough. Given the patient's current symptoms and last EGD findings, she is unlikely to benefit from additional endoscopies.          Recommendations:   - Aspiration precaution: chew food well, eat slowly, small frequent meals, stay upright 30 minutes after meals.   - Continue with PPI.   - Agreed with MBBS by SLP.   - Outpatient GI clinic follow up for further evaluation for the chronic intermittent nausea.  - Plan of care discussed at bedside with patient and family. They area agreeable.   - Inpatient GI will sign off. Outpatient follow up arranged. Please contact us with additional questions/concerns.       It has been a pleasure to participate in the care of this patient.  Patient  discussed with GI staff, Dr. Singletary.  Please do not hesitate to contact the GI service with any questions or concerns.     Aashish Ramirez MD  Gastroenterology Fellow  [facility]  Text page [phone]           HPI:   Idalia Bob is a 79 year old female with a PMHX significant for dementia, COPD with recent Pseudomonas pneumonia, hypertension who presented on 5/14 for worsening fatigue, lower extremity edema found to have new decompensated heart failure and aspiration pneumonia.  GI was consulted to comment on her esophageal dysphagia as a cause of the pneumonia.    The patient presented with the above symptoms of fatigue and lower extremity edema and was diagnosed with new onset of decompensated heart failure with EF of 10 to 15%.  Now being treated with diuresis and other goal-directed medical therapy.  During the admission she was also diagnosed with another last episode of pneumonia and now being treated with Zosyn.  Speech-language pathology was consulted for evaluate for aspiration with the pending modified barium swallow study.  GI was consulted to comment on the esophageal component of it.    Regarding her swallowing, the patient confirmed that prior to this admission she has been on a regular diet at home and denied solid/liquid food getting stuck in the chest or pain with swallowing.  She does have a history of intermittent nausea and vomiting at home but symptoms seem to be about the same as an inpatient.  She did have a recent EGD in 2/24 that show a large hiatal hernia without any other findings to explain her nausea.         Past Medical History:   Reviewed and edited as appropriate  Past Medical History:   Diagnosis Date    Chronic kidney disease (CKD) 01/10/2023    Chronic obstructive pulmonary disease (H) 01/10/2023    Dementia (H) 11/28/2023    GERD (gastroesophageal reflux disease) 03/15/2023    Hypertension 11/28/2023            Past Surgical History:   Reviewed and edited as  appropriate   Past Surgical History:   Procedure Laterality Date    ESOPHAGOSCOPY, GASTROSCOPY, DUODENOSCOPY (EGD), COMBINED N/A 02/07/2024    Procedure: ESOPHAGOGASTRODUODENOSCOPY, WITH BIOPSY;  Surgeon: Felton James MD;  Location:  GI    PICC DOUBLE LUMEN PLACEMENT Right 05/17/2024    Basilic Vein 5F DL 37 cm            Medications:     Current Facility-Administered Medications   Medication Dose Route Frequency Provider Last Rate Last Admin    acetaminophen (TYLENOL) tablet 975 mg  975 mg Oral Q6H PRN Yonathan Valdez MD PhD   975 mg at 05/20/24 1417    albuterol (PROVENTIL) neb solution 2.5 mg  2.5 mg Nebulization BID PRN Terrence Tate MD        budesonide (PULMICORT) neb solution 1 mg  1 mg Nebulization BID PRN Candice Valdovinos MD        busPIRone (BUSPAR) tablet 15 mg  15 mg Oral BID Terrence Tate MD   15 mg at 05/20/24 0857    calcium carbonate (TUMS) chewable tablet 500 mg  500 mg Oral 4x Daily PRN Terrence Tate MD   500 mg at 05/16/24 1820    diphenhydrAMINE (BENADRYL) capsule 25 mg  25 mg Oral Q6H PRN Candice Valdovinos MD        Or    diphenhydrAMINE (BENADRYL) injection 25 mg  25 mg Intravenous Q6H PRN Candice Valdovinos MD   25 mg at 05/19/24 1138    donepezil (ARICEPT) tablet 10 mg  10 mg Oral At Bedtime Terrence Tate MD   10 mg at 05/19/24 2154    DOPamine 400 mg in dextrose 5% 250 mL (adult std) - premix - CENTRAL  2.5 mcg/kg/min Intravenous Continuous Candice Valdovinos MD 5 mL/hr at 05/20/24 0921 2.5 mcg/kg/min at 05/20/24 0921    FLUoxetine (PROzac) capsule 40 mg  40 mg Oral Daily Terrence Tate MD   40 mg at 05/20/24 0856    heparin ANTICOAGULANT injection 5,000 Units  5,000 Units Subcutaneous Q8H Candice Valdovinos MD   5,000 Units at 05/19/24 0810    hyoscyamine (LEVSIN) tablet 125 mcg  125 mcg Oral Q4H PRN Terrence Tate MD        ipratropium - albuterol 0.5 mg/2.5 mg/3 mL (DUONEB) neb solution 3  mL  3 mL Nebulization Q6H Candice Valdovinos MD   3 mL at 05/20/24 1421    latanoprost (XALATAN) 0.005 % ophthalmic solution 1 drop  1 drop Both Eyes At Bedtime Terrence Tate MD   1 drop at 05/19/24 2154    levothyroxine (SYNTHROID/LEVOTHROID) tablet 50 mcg  50 mcg Oral Daily Terrence Tate MD   50 mcg at 05/20/24 0857    Lidocaine (LIDOCARE) 4 % Patch 2 patch  2 patch Transdermal Q24h Derek Silva MD   2 patch at 05/19/24 2059    lidocaine (LMX4) cream   Topical Q1H PRN Terrence Tate MD        lidocaine 1 % 0.1-1 mL  0.1-1 mL Other Q1H PRN Terrence Tate MD        OLANZapine (zyPREXA) tablet 2.5 mg  2.5 mg Oral BID Terrence Tate MD   2.5 mg at 05/20/24 0856    ondansetron (ZOFRAN ODT) ODT tab 4 mg  4 mg Oral Q6H PRN Chuck Hernandez MD        Or    ondansetron (ZOFRAN) injection 4 mg  4 mg Intravenous Q6H PRN Chuck Hernandez MD   4 mg at 05/19/24 1022    pantoprazole (PROTONIX) EC tablet 40 mg  40 mg Oral Daily Candice Valdovinos MD   40 mg at 05/19/24 0810    piperacillin-tazobactam (ZOSYN) 3.375 g vial to attach to  mL bag  3.375 g Intravenous Q6H Evangelina Johnson  mL/hr at 05/20/24 0858 3.375 g at 05/20/24 1419    polyethylene glycol (MIRALAX) Packet 17 g  17 g Oral BID PRN Terrence Tate MD        predniSONE (DELTASONE) tablet 40 mg  40 mg Oral Daily Candice Valdovinos MD   40 mg at 05/20/24 1159    prochlorperazine (COMPAZINE) injection 5 mg  5 mg Intravenous Q6H PRN Chuck Hernandez MD   5 mg at 05/19/24 1558    Or    prochlorperazine (COMPAZINE) tablet 5 mg  5 mg Oral Q6H PRN Chuck Hernandez MD        Or    prochlorperazine (COMPAZINE) suppository 12.5 mg  12.5 mg Rectal Q12H PRN Chuck Hernandez MD        Reason beta blocker not prescribed   Other DOES NOT GO TO Terrence Garcia MD        senna-docusate (SENOKOT-S/PERICOLACE) 8.6-50 MG per tablet 1 tablet  1 tablet Oral BID PRN Terrence Tate MD         Or    senna-docusate (SENOKOT-S/PERICOLACE) 8.6-50 MG per tablet 2 tablet  2 tablet Oral BID PRN Terrence Tate MD        sodium chloride (PF) 0.9% PF flush 10-20 mL  10-20 mL Intracatheter q1 min prn Chuck Hernandez MD   20 mL at 05/17/24 2043    sodium chloride (PF) 0.9% PF flush 10-40 mL  10-40 mL Intracatheter Q8H Chuck Hernandez MD   20 mL at 05/19/24 1801    sodium chloride (PF) 0.9% PF flush 3 mL  3 mL Intracatheter Q8H Terrence Tate MD   3 mL at 05/20/24 0447    sodium chloride (PF) 0.9% PF flush 3 mL  3 mL Intracatheter q1 min prn Terrence Tate MD   10 mL at 05/18/24 0622    Vitamin D3 (CHOLECALCIFEROL) tablet 50 mcg  50 mcg Oral Daily Terrence Tate MD   50 mcg at 05/20/24 0858            Allergies      Allergies   Allergen Reactions    Aspirin Nausea and Nausea and Vomiting     Stomach ache    Penicillins Itching     Has tolerated ceftriaxone, piperacillin, and amoxicillin            Social History:   Reviewed and edited as appropriate  Social History     Socioeconomic History    Marital status:      Spouse name: Not on file    Number of children: Not on file    Years of education: Not on file    Highest education level: Not on file   Occupational History    Not on file   Tobacco Use    Smoking status: Former     Current packs/day: 1.00     Average packs/day: 1 pack/day for 40.0 years (40.0 ttl pk-yrs)     Types: Cigarettes     Passive exposure: Past    Smokeless tobacco: Never   Vaping Use    Vaping status: Never Used   Substance and Sexual Activity    Alcohol use: Not Currently    Drug use: Never    Sexual activity: Not Currently   Other Topics Concern    Not on file   Social History Narrative    Not on file     Social Determinants of Health     Financial Resource Strain: Low Risk  (1/22/2024)    Financial Resource Strain     Within the past 12 months, have you or your family members you live with been unable to get utilities (heat, electricity) when it was  really needed?: No   Food Insecurity: Low Risk  (1/22/2024)    Food Insecurity     Within the past 12 months, did you worry that your food would run out before you got money to buy more?: No     Within the past 12 months, did the food you bought just not last and you didn t have money to get more?: No   Transportation Needs: Low Risk  (1/22/2024)    Transportation Needs     Within the past 12 months, has lack of transportation kept you from medical appointments, getting your medicines, non-medical meetings or appointments, work, or from getting things that you need?: No   Physical Activity: Not on file   Stress: Not on file   Social Connections: Socially Integrated (11/21/2023)    Received from [employer] [employer]    Social Connections     Frequency of Communication with Friends and Family: 0   Interpersonal Safety: Not on file   Housing Stability: Low Risk  (1/22/2024)    Housing Stability     Do you have housing? : Yes     Are you worried about losing your housing?: No           Family History:   Reviewed and edited as appropriate  No family history on file.   No known history of colorectal cancer, liver disease, or inflammatory bowel disease.         Review of Systems:   A complete 10 point review of systems was obtained.  Please see the HPI for pertinent positives and negatives.  All other systems were reviewed and were found to be negative.          Physical Exam:   VS:  /70   Pulse 100   Temp 97.8  F (36.6  C) (Oral)   Resp 16   Wt 53.9 kg (118 lb 13.3 oz)   LMP  (LMP Unknown)   SpO2 99%   BMI 23.21 kg/m      Wt:   Wt Readings from Last 2 Encounters:   05/20/24 53.9 kg (118 lb 13.3 oz)   05/14/24 57.6 kg (127 lb)      Constitutional: cooperative, pleasant, no acute distress  Eyes: Conjunctiva anicteric  HEENT: Normal oropharynx without ulcers or exudate, mucus membranes moist  CV: No Yesenia edema  Respiratory: Breathing  comfortably on ambient air  Abd: Nondistended, +bs, no hepatosplenomegaly, nontender, no rebound or guarding   Skin: warm, perfused, no jaundice  Neuro: A&O x 3, No asterixis         Laboratory:   BMP  Recent Labs   Lab 05/20/24  1226 05/20/24 0445 05/19/24 2112 05/19/24  1624    136 134* 134*   POTASSIUM 4.4 4.3 4.8 4.2   CHLORIDE 98 97* 95* 92*   AYUSH 8.4* 8.3* 8.5* 8.6*   CO2 26 30* 31* 31*   BUN 16.8 17.6 20.3 18.9   CR 1.27* 1.33* 1.31* 0.37*   * 96 163* 185*     CBC  Recent Labs   Lab 05/20/24 1226 05/20/24 0445 05/19/24 2112 05/19/24  1624   WBC 12.1* 12.1* 13.6* 13.9*   RBC 3.79* 3.49* 3.63* 3.82   HGB 8.6* 8.0* 8.1* 8.6*   HCT 31.2* 28.2* 29.0* 30.4*   MCV 82 81 80 80   MCH 22.7* 22.9* 22.3* 22.5*   MCHC 27.6* 28.4* 27.9* 28.3*   RDW 20.1* 19.9* 19.8* 19.7*    265 283 293     INR  Recent Labs   Lab 05/15/24  0929   INR 2.34*     Liver Enzymes  Recent Labs   Lab 05/19/24  0448 05/18/24  1530 05/18/24  1048 05/18/24  0431   ALKPHOS 70 77 73 80   AST 16 17 19 21   ALT 19 24 25 28   BILITOTAL 0.6 0.5 0.7 0.8   PROTTOTAL 5.0* 5.2* 5.1* 5.4*   ALBUMIN 2.8* 3.0* 3.0* 3.2*      PANC  Recent Labs   Lab 05/19/24  0448   LIPASE 11*

## 2024-05-20 NOTE — PROVIDER NOTIFICATION
Time of notification: 11:10 AM  Provider notified:Douglas Napier.  Patient status:       05/19/24 1100 05/19/24 1105 05/19/24 1110   Vitals   Temp  --   --   --    Temp src  --   --   --    Pulse 110 92 103   Oximeter Heart Rate 111 bpm 92 bpm 110 bpm   BP (!) 62/45 (!) 65/39 (!) 79/46   BP - Mean 51 47 56      05/19/24 1120 05/19/24 1121 05/19/24 1122   Vitals   Temp 97.8  F (36.6  C)  --   --    Temp src Oral  --   --    Pulse 91 91 104   Oximeter Heart Rate 108 bpm 94 bpm 96 bpm   BP (!) 75/45 (!) 62/51  (pt Trendelenburg) (!) 75/42   BP - Mean 56 57 51      05/19/24 1125 05/19/24 1130 05/19/24 1135   Vitals   Temp  --   --   --    Temp src  --   --   --    Pulse 113 109 98   Oximeter Heart Rate 111 bpm 108 bpm 110 bpm   BP (!) 85/56 93/43 (!) 83/48   BP - Mean 67 56 58      05/19/24 1140 05/19/24 1145 05/19/24 1150   Vitals   Temp  --   --   --    Temp src  --   --   --    Pulse 107 108 108   Oximeter Heart Rate 88 bpm 94 bpm 109 bpm   BP (!) 82/36 97/55 96/56   BP - Mean 49 68  (increased dopmine drip) 68      05/19/24 1155 05/19/24 1200 05/19/24 1205   Vitals   Temp  --   --   --    Temp src  --   --   --    Pulse 114 115 117   Oximeter Heart Rate 115 bpm 115 bpm 96 bpm   BP (!) 82/68 111/67 (!) 79/59   BP - Mean 74 82 68      05/19/24 1207 05/19/24 1210 05/19/24 1215   Vitals   Temp  --   --   --    Temp src  --   --   --    Pulse 114 115 118   Oximeter Heart Rate 117 bpm 116 bpm 121 bpm   BP 93/73 93/81 125/67   BP - Mean 78 87 83      05/19/24 1220   Vitals   Temp  --    Temp src  --    Pulse 117   Oximeter Heart Rate 116 bpm   /64   BP - Mean 78     Pt was light head ans dizzy at 1105 AM.  Called Cards 2 HF spoke via phone, ordered 500 ml bolus LR over 2 hours.  Place B/P still low after 15 minutes of bolus running.  @ 1121 pt place Trendelenburg.  @ 1140 pt was still having low Map and symptomatic with lightlessness/dizziness.  Ok to increase drip to 5 mcg/kg/min.  By 1215 PM was able have  pt HOB back up to 45%.  Pt  was now asymptomatic and able to eat.

## 2024-05-20 NOTE — PROGRESS NOTES
05/20/24 1500   Appointment Info   Signing Clinician's Name / Credentials (OT) Jessica Campos OTR/L   Living Environment   People in Home spouse;facility resident   Current Living Arrangements assisted living   Home Accessibility wheelchair accessible   Transportation Anticipated family or friend will provide   Living Environment Comments pt lives in [facility] with , w/c accessible throughout with elevator access   Self-Care   Usual Activity Tolerance fair   Current Activity Tolerance fair   Equipment Currently Used at Home grab bar, tub/shower;walker, standard;wheelchair, manual;shower chair   Fall history within last six months no   Activity/Exercise/Self-Care Comment Pt was mostly IND with dressing and toileting, some difficulty bending forward for socks/shoes. Family would assist with showers, pt receives meals from [facility], has w/c that pt will be pushed in to go down for meals. Family provides set up ofr medication management and pt uses pill alarms for reminders. Deepak with 4ww around apartment. Able to increase services as needed   General Information   Onset of Illness/Injury or Date of Surgery 05/15/24   Referring Physician Terrence Tate MD   Additional Occupational Profile Info/Pertinent History of Current Problem Idalia Bob is an 78yo woman with a history of COPD, HTN, and dementia who is admitted on 5/15 for de-dameon   Existing Precautions/Restrictions cardiac;fall   Left Upper Extremity (Weight-bearing Status) full weight-bearing (FWB)   Right Upper Extremity (Weight-bearing Status) full weight-bearing (FWB)   Left Lower Extremity (Weight-bearing Status) full weight-bearing (FWB)   Right Lower Extremity (Weight-bearing Status) full weight-bearing (FWB)   Cognitive Status Examination   Cognitive Status Comments Pt with hx of dementia, follows all commands but forgetful t/o session   Visual Perception   Visual Impairment/Limitations corrective lenses full-time   Pain Assessment   Patient  Currently in Pain No   Range of Motion Comprehensive   Comment, General Range of Motion BUE WFL   Strength Comprehensive (MMT)   Comment, General Manual Muscle Testing (MMT) Assessment Gross 4/5 in BUE   Bed Mobility   Comment (Bed Mobility) SBA supine to sit   Transfers   Transfer Comments Min A STS with FWW   Activities of Daily Living   BADL Assessment/Intervention bathing;lower body dressing;upper body dressing;grooming;toileting   Bathing Assessment/Intervention   Comment, (Bathing) Per clinical judgement, Mod A   Upper Body Dressing Assessment/Training   Comment, (Upper Body Dressing) Per clinical judgement, Min A   Lower Body Dressing Assessment/Training   Comment, (Lower Body Dressing) Max A to thread BLE in brief   Grooming Assessment/Training   Comment, (Grooming) Per clinical judgement, Min A standing at sink with chair behind   Toileting   Comment, (Toileting) Min A FWW toilet transfer   Clinical Impression   Criteria for Skilled Therapeutic Interventions Met (OT) Yes, treatment indicated   OT Diagnosis Decline in ADL/IADL performance   Influenced by the following impairments Decreased endurance, decreased strength, impaired balance   OT Problem List-Impairments impacting ADL problems related to;activity tolerance impaired;balance;cognition;mobility;strength   Assessment of Occupational Performance 3-5 Performance Deficits   Identified Performance Deficits Dressing, grooming, toileting, showering   Planned Therapy Interventions (OT) ADL retraining;balance training;bed mobility training;ROM;strengthening;transfer training;home program guidelines;progressive activity/exercise;risk factor education   Clinical Decision Making Complexity (OT) problem focused assessment/low complexity   Risk & Benefits of therapy have been explained evaluation/treatment results reviewed;care plan/treatment goals reviewed;risks/benefits reviewed;current/potential barriers reviewed;participants voiced agreement with care  plan;participants included;patient;daughter   OT Total Evaluation Time   OT Eval, Low Complexity Minutes (58049) 6   OT Goals   Therapy Frequency (OT) 5 times/week   OT Predicted Duration/Target Date for Goal Attainment 06/10/24   OT Goals Hygiene/Grooming;Upper Body Dressing;Lower Body Dressing;Upper Body Bathing;Lower Body Bathing;Toilet Transfer/Toileting;OT Goal 1   OT: Hygiene/Grooming modified independent   OT: Upper Body Dressing Modified independent   OT: Lower Body Dressing Supervision/stand-by assist   OT: Upper Body Bathing Minimal assist   OT: Lower Body Bathing Minimal assist   OT: Toilet Transfer/Toileting Supervision/stand-by assist   OT: Goal 1 Pt will perform shower transfer with Min A and DME as needed.   Self-Care/Home Management   Self-Care/Home Mgmt/ADL, Compensatory, Meal Prep Minutes (59995) 28   Symptoms Noted During/After Treatment (Meal Preparation/Planning Training) fatigue   Treatment Detail/Skilled Intervention OT: Upon completion of eval, treatment indicated. Time included line management and environmental set up in prep for safe OOB activity. Performed LB dressing with pt requiring Max A to pull over hips in standing. Facilitated SPT EOB to recliner with Min A and FWW. Pt required seated rest break d/t fatigue. Pt reporting need to use toilet and requesting to trial ambulating to toilet vs commode. Facilitated STS from recliner with Min A and FWW. Performed functional mobility to bathroom (~10ft) with Min A, FWW, and mod cues for encouragement. Pt required increased time d/t decreased gait speed. Pt required Max A for standing clothing management and Mod A for standing dayo cares. Pt reported increased fatigue and unable to ambulate to recliner. OT moved recliner closer for energy conservation. Facilitated short distance (~4ft) functional mobility to recliner with Min A and FWW. HR increased to 120s with activity and returned to low 110s with rest breaks. SpO2 >97% on 4L NC t/o. Pt  remained seated in recliner at end of session with all needs within reach and family in room.   OT Discharge Planning   OT Plan Toilet transfers, LB dressing with AE, ADLs standing at sink with chair behind   OT Discharge Recommendation (DC Rec) Transitional Care Facility   OT Rationale for DC Rec Pt performing below functional baseline limited by decreased endurance. Recommend TCU at d/c to maximize IND and safety with ADLs and functional transfers prior to return to [facility].   OT Brief overview of current status Min A FWW   Total Session Time   Timed Code Treatment Minutes 28   Total Session Time (sum of timed and untimed services) 34

## 2024-05-20 NOTE — PROGRESS NOTES
Care Management Follow Up    Length of Stay (days): 5    Expected Discharge Date: 05/21/2024     Concerns to be Addressed:  discharge planning     Patient plan of care discussed at interdisciplinary rounds: Yes    Anticipated Discharge Disposition:  Transitional Care Unit     Anticipated Discharge Services:  (TBD)  Anticipated Discharge DME:  (TBD)    Patient/family educated on Medicare website which has current facility and service quality ratings:  TBD  Education Provided on the Discharge Plan:  TBD  Patient/Family in Agreement with the Plan:  TBD    Referrals Placed by CM/SW:  TBD  Private pay costs discussed: Not applicable    Additional Information:  FABIOLA sent a Vocera message to pt's primary [facility] 2 provider Jorge Harvey to see when pt would be medically ready for discharge. Provider reported that pt will need to be inpatient for at least two more days before being med ready. Per chart review, pt has TCU recs with PT, awaiting OT recs. Care management will follow for updates.    EDITH Fortune, ALBERT  6B   Ph: [phone]  Vocera: Gabriela Redding or OLGA [facility]

## 2024-05-20 NOTE — PLAN OF CARE
Goal Outcome Evaluation:    Neuro: A&Ox4, forgetful.  Cardiac: Afib/ST. Please see provider notes today.   Respiratory: Sating 88%> on 2 LPM via NC  GI/: Adequate urine output through pacheco. No BM today  Diet/appetite: Tolerating 2 G Na+  diet. Eating fair today.  Activity:  Assist of 2 with walker and gait belt, up to chair and in halls.  Pain: At acceptable level on current regimen.   Skin: No new deficits noted.  LDA's:PICC, pacheco    Plan: Continue with POC. Notify primary team with changes.

## 2024-05-20 NOTE — PLAN OF CARE
Hours of Care: [id number]  Neuro: A&Ox4. Forgetful  Cardiac: ST. HR 90's-110. VSS.  Respiratory: Sating >90% on 2L NC. Denies SOB   GI/: Adequate urine output via pacheco. No BM on this shift.   Diet/appetite: Tolerating 2g Na & 18L FR diet. Poor appetite.   Activity:  Assist of 1-2 w/ GB & walker. Pt is weak and unsteady when attempted to get standing weight. Bed weight done instead.   Pain: At acceptable level on current regimen. No pain reported.   Skin: No new deficits noted.  LDA's: 1 R PIV -SL. R DLP -red infusing Dopamine @ 2.5mcg/kg/hr and purple SL.     Plan: Continue with POC. Notify primary team with changes.

## 2024-05-20 NOTE — PLAN OF CARE
Goal Outcome Evaluation:      Plan of Care Reviewed With: patient, child    /70   Pulse 100   Temp 97.8  F (36.6  C) (Oral)   Resp 16   Wt 53.9 kg (118 lb 13.3 oz)   LMP  (LMP Unknown)   SpO2 99%   BMI 23.21 kg/m       Neuro: A&Ox4. Intermittent confusion and forgetful. Patient knew month but not year. Easily reoriented. Short term memory impaired  Cardiac: Currently SR, Previously AFIB/ST. Denies chest pain. CVP 5-6  Respiratory: Sating 92% on 2L NC. Wet frequent cough. Dyspnea on exertion. Chest CT done, see note. Respiratory scheduled duonebs ordered. Need sputum culture. Severe coughing episode on ambulation from chair to bed.  GI/: Ruiz in place - plan to remove and void trial. Adequate urine output - urine specimen collected, cultures negative today. Up to commode x3, no BM. Last bowel movement 5/18  Diet/appetite: Tolerating regular thin liquid diet. Patient has been having N/V episodes after meals, 2 emesis episodes today. Plan to trial PRN zofran before meals.   Activity:  Assist of 2 up to commode with walker and gait belt. Generalized weakness, refused to move to chair  Pain: PT reports pain in legs on movement and stocking adjustment. Pain in neck and back worsening which happened last hospital stay. Tylenol given as well as heat pack. Heat pack worked well.   Skin: No new deficits noted.  LDA's: R PIV infiltrated today. 2 lumen R PICC infusing. Dopamine decreased from 5mcg/kg/min -> 2.5 mcg/kg/min. L PIV SL  Pain: Provider paged about added pain meds, awaiting response  Plan: Plan for swallow study tomorrow. Continue with POC. Notify primary team with changes.

## 2024-05-20 NOTE — PROGRESS NOTES
Cardiology 2 Progress Note    05/20/2024    Idalia Bob MRN# [id number]   Age: 79 year old YOB: 1945        Brief HPI   Idalia Bob is an 80yo woman with a history of COPD, HTN, and dementia who is admitted on 5/15 for de-dameon      Subjective / Interval   Patient denies lightheadedness and dizziness, MAPs dropped to 62 with ScVO2 to 2.5. Patient still oxygen dependent, and has wheezings all over the lungs bilaterally along with sputum production. Given Speech pathology c/f aspiration.       Assessment and Plan:   Today's changes:  - continue with Dopamine, decreased to 2.5 given improved lactate, ScVO2  - hold afterload reduction and diuresis for hypotension and lactic acidosis. CVP 7 this AM.   - Switching labs from q8h to q12h   - CRP trending up, wheezings with sputum production. C/f COPD exacerbation vs CAP. Adding sputum culture, Legionella and Streptococcus in urine. CT chest ordered.   - GI consulted to r/o esophageal dysphagia  - Speech pathology c/f aspiration PNA, and need for barium swallowing test.   - Hemoglobin at baseline, DVT prophylaxis added with heparin.    #Cardiogenic Shock  #HFrEF, acute decompensation, new diagnosis  Admitted on 5/15 for decompensated heart failure. Etiology of decompensation is currently unknown and this represents a new diagnosis. Notably, an echocardiogram in February of this year was wnl suggestive of an acute process. LV size is also wnl suggestive of some acuity. Uncertain whether this represents ischemic cardiomyopathy vs other such as stress, infectious, or arrhythmogenic. Will need full workup prior to discharge.   Date of Diagnosis: 2024  Etiology: Unknown  Baseline weight: TBD  Last TTE: 5/2024 w/ LVEF 10-15% with global hypokinesis. Mod RV dysfunction. LVIDd 5cm.   Last RHC: n/a  Last angiogram: n/a  Therapeutics   > Volume: Holding Lasix drip   > Afterload: holding   > Inotrope: swap dobutamine to dopamine for hypotension on 05/18.     >  Continue dopamine @2.5 at this time. Lactate q12h   > BB: holding   > ARNI: holding   > MRA: holding   > SGLT2i: holding   > Device: n/a    # Lactic Acidosis  # Hypotension  Multifactorial iso bp medications, rapid diuresis, and beta agonism of dobutamine. Resolved with dopamine.  - Trend q12h    #Acute Hypoxemic respiratory failure, c/f PNA  #Hx of multiple aspiration PNAs  #Pleural Effusions   On 2L of oxygen here. Chest XR showed continued left pleural effusion w trace increased small right pleural effusion. Likely secondary to acute decompensated HF   - holding diuresis  - Daily CRP, CBC  - Abx   > Vancomycin (05/19)   > Zosyn (05/19-P)  - Infectious workup   > UA negative   > MRSA negative   > Procal negative   > Sputum culture, Legionella and Streptococcus in urine  - Maintain O2 88-92% given hx of COPD  - CT chest w/o contrast ordered   - Prednisone 40mg qday x 5 days   - GI consulted to r/o esophageal dysphagia  - Speech pathology c/f aspiration PNA, and need for barium swallowing test. Scheduled on 05/21    #Non-ischemic myocardial injury  Related to decompensated heart failure as discussed above. No concern for ACS this admission  - no further need to monitor     #Iron Deficiency Anemia  Baseline ~9 since at least early January. Labs c/w JAC.  - Started on iron 200mg IV infusion x1 5/17/2024  - Hb trending down, now Hb 7.5, back to baseline this AM ~8. Will trend Hb q12h  - Transfuse if Hb<7 or actively bleeding    #JUANIS on CKD, stage III  Baseline Scr ~0.8 reflecting eGFR of 30-45. Highest Scr here of 1.2 suspect cardiorenal  - no diuresis  - trend I/O   - renally dose meds, avoid nephrotoxic meds    #Hepatocellular Pattern of Liver Injury - Suspect due to congestive hepatopathy, diuresis as above  #COPD - No active issues, remain on PTA meds of budesonide 1mg bid and duonebs QID  #Hypothyrodism - No active issues, continue levothyrozine 50mcg daily   #Dementia - No active issues, plan for delirium  precautions and continue donepezil 10mg daily  #GERD - No active issues, continue prantoprazole 40mg daily   #Electrolyte Derangements   ## Hypokalemia - replete prn   ## Hypocalcemia - replete prn    Patient seen and discussed with staff physician, Dr. Keven Napier,  Internal Medicine PGY-3     Physical Exam:   /77 (BP Location: Left arm, Cuff Size: Adult Small)   Pulse 99   Temp 97.6  F (36.4  C) (Oral)   Resp 18   Wt 53.9 kg (118 lb 13.3 oz)   LMP  (LMP Unknown)   SpO2 98%   BMI 23.21 kg/m    Temp:  [97.3  F (36.3  C)-98.1  F (36.7  C)] 97.6  F (36.4  C)  Pulse:  [] 99  Resp:  [14-18] 18  BP: ()/(36-95) 115/77  SpO2:  [91 %-100 %] 98 %  Wt Readings from Last 2 Encounters:   05/20/24 53.9 kg (118 lb 13.3 oz)   05/14/24 57.6 kg (127 lb)       Intake/Output Summary (Last 24 hours) at 5/18/2024 1025  Last data filed at 5/18/2024 0641  Gross per 24 hour   Intake 540 ml   Output 2850 ml   Net -2310 ml       Exam:  General: NAD   HEENT: no scleral icterus or injection  CARDIAC: irregular, no murmurs. +JVD  RESP:  breathing on 2L though speaking in full sentences  GI: nondistended  : Ruiz  EXTREMITIES: 2+ LE edema  SKIN: No acute lesions appreciated  NEURO: No focal deficits         Labs:     CMP  Recent Labs   Lab 05/20/24  0445 05/19/24  2112 05/19/24  1624 05/19/24  0448 05/18/24  1530 05/18/24  1048 05/18/24  0431 05/17/24  0615 05/16/24  2100 05/16/24  1218 05/16/24  0535 05/16/24  0028    134* 134* 135 135 136 136   < > 140 140 140 138   POTASSIUM 4.3 4.8 4.2 4.3 4.8 4.9  4.9 3.3*   < > 3.9 3.2* 3.1* 4.2   CHLORIDE 97* 95* 92* 94* 95* 96* 94*   < > 102 100 101 104   CO2 30* 31* 31* 33* 31* 29 32*   < > 27 29 24 17*   ANIONGAP 9 8 11 8 9 11 10   < > 11 11 15 17*   GLC 96 163* 185* 86 206* 162* 93   < > 123* 84 76 79   BUN 17.6 20.3 18.9 18.7 19.7 18.2 17.1   < > 17.7 18.4 20.8 22.6   CR 1.33* 1.31* 0.37* 1.40* 1.39* 1.33* 1.22*   < > 1.12* 1.08* 1.07* 0.97*    GFRESTIMATED 40* 41* >90 38* 38* 40* 45*   < > 49* 52* 52* 59*   AYUSH 8.3* 8.5* 8.6* 8.2* 8.4* 8.5* 8.6*   < > 8.5* 8.5* 8.7* 8.6*   MAG 2.3 2.4* 2.9* 1.7 2.1  --  1.9   < > 1.7 1.8 1.8 1.9   PHOS  --   --   --   --   --   --   --   --  3.7 4.0 3.8 4.2   PROTTOTAL  --   --   --  5.0* 5.2* 5.1* 5.4*   < >  --   --  5.8*  --    ALBUMIN  --   --   --  2.8* 3.0* 3.0* 3.2*   < >  --   --  3.5  --    BILITOTAL  --   --   --  0.6 0.5 0.7 0.8   < >  --   --  0.6  --    ALKPHOS  --   --   --  70 77 73 80   < >  --   --  87  --    AST  --   --   --  16 17 19 21   < >  --   --  46*  --    ALT  --   --   --  19 24 25 28   < >  --   --  54*  --     < > = values in this interval not displayed.     CBC  Recent Labs   Lab 05/20/24 0445 05/19/24 2112 05/19/24 1624 05/19/24  0939   WBC 12.1* 13.6* 13.9* 13.5*   HGB 8.0* 8.1* 8.6* 7.5*   HCT 28.2* 29.0* 30.4* 26.8*   MCV 81 80 80 79    283 293 270     INR  Recent Labs   Lab 05/15/24  0929   INR 2.34*     Arterial Blood Gas  Recent Labs   Lab 05/20/24 0445 05/19/24 2112 05/19/24 1624 05/19/24 0448   O2PER 24 28 33 28       Medications:     Current Facility-Administered Medications   Medication Dose Route Frequency Provider Last Rate Last Admin    budesonide (PULMICORT) neb solution 1 mg  1 mg Nebulization BID Terrence Tate MD   1 mg at 05/19/24 2108    busPIRone (BUSPAR) tablet 15 mg  15 mg Oral BID Terrence Tate MD   15 mg at 05/19/24 2059    donepezil (ARICEPT) tablet 10 mg  10 mg Oral At Bedtime Terrence Tate MD   10 mg at 05/19/24 2154    FLUoxetine (PROzac) capsule 40 mg  40 mg Oral Daily Terrence Tate MD   40 mg at 05/19/24 0809    [Held by provider] heparin ANTICOAGULANT injection 5,000 Units  5,000 Units Subcutaneous Q8H Chuck Hernandez MD   5,000 Units at 05/19/24 0810    ipratropium - albuterol 0.5 mg/2.5 mg/3 mL (DUONEB) neb solution 3 mL  3 mL Nebulization Q6H Candice Valdovinos MD        latanoprost (XALATAN) 0.005 %  ophthalmic solution 1 drop  1 drop Both Eyes At Bedtime Terrence Tate MD   1 drop at 05/19/24 2154    levothyroxine (SYNTHROID/LEVOTHROID) tablet 50 mcg  50 mcg Oral Daily Terrence Tate MD   50 mcg at 05/19/24 0810    Lidocaine (LIDOCARE) 4 % Patch 2 patch  2 patch Transdermal Q24h Derek Silva MD   2 patch at 05/19/24 2059    OLANZapine (zyPREXA) tablet 2.5 mg  2.5 mg Oral BID Terrence Tate MD   2.5 mg at 05/19/24 2059    [Held by provider] pantoprazole (PROTONIX) EC tablet 40 mg  40 mg Oral Daily Terrence Tate MD   40 mg at 05/19/24 0810    pantoprazole (PROTONIX) IV push injection 40 mg  40 mg Intravenous Q12H Candice Valdovinos MD   40 mg at 05/20/24 0023    piperacillin-tazobactam (ZOSYN) 3.375 g vial to attach to  mL bag  3.375 g Intravenous Q6H Evangelina Johnson  mL/hr at 05/19/24 2058 3.375 g at 05/20/24 0235    sodium chloride (PF) 0.9% PF flush 10-40 mL  10-40 mL Intracatheter Q8H Chuck Hernandez MD   20 mL at 05/19/24 1801    sodium chloride (PF) 0.9% PF flush 3 mL  3 mL Intracatheter Q8H Terrence Tate MD   3 mL at 05/20/24 0447    Vitamin D3 (CHOLECALCIFEROL) tablet 50 mcg  50 mcg Oral Daily Terrence Tate MD   50 mcg at 05/19/24 0810       Current Facility-Administered Medications   Medication Dose Route Frequency Provider Last Rate Last Admin    DOPamine 400 mg in dextrose 5% 250 mL (adult std) - premix - CENTRAL  5 mcg/kg/min Intravenous Continuous Evangelina Johnson MD 10.1 mL/hr at 05/20/24 0313 5 mcg/kg/min at 05/20/24 0313    [Held by provider] furosemide (LASIX) 500 mg/50mL infusion ADULT MAX CONC  10 mg/hr Intravenous Continuous Derek Silva MD   Paused at 05/18/24 1130    Reason beta blocker not prescribed   Other DOES NOT GO TO Terrence Garcia MD           Recent Imaging:   Reviewed

## 2024-05-21 ENCOUNTER — APPOINTMENT (OUTPATIENT)
Dept: PHYSICAL THERAPY | Facility: CLINIC | Age: 79
DRG: 291 | End: 2024-05-21
Payer: MEDICARE

## 2024-05-21 ENCOUNTER — APPOINTMENT (OUTPATIENT)
Dept: SPEECH THERAPY | Facility: CLINIC | Age: 79
DRG: 291 | End: 2024-05-21
Payer: MEDICARE

## 2024-05-21 ENCOUNTER — APPOINTMENT (OUTPATIENT)
Dept: GENERAL RADIOLOGY | Facility: CLINIC | Age: 79
DRG: 291 | End: 2024-05-21
Payer: MEDICARE

## 2024-05-21 LAB
ANION GAP SERPL CALCULATED.3IONS-SCNC: 12 MMOL/L (ref 7–15)
ANION GAP SERPL CALCULATED.3IONS-SCNC: 9 MMOL/L (ref 7–15)
BASE EXCESS BLDV CALC-SCNC: 5.5 MMOL/L (ref -3–3)
BASE EXCESS BLDV CALC-SCNC: 5.6 MMOL/L (ref -3–3)
BASE EXCESS BLDV CALC-SCNC: 5.9 MMOL/L (ref -3–3)
BASE EXCESS BLDV CALC-SCNC: 7.4 MMOL/L (ref -3–3)
BUN SERPL-MCNC: 14.4 MG/DL (ref 8–23)
BUN SERPL-MCNC: 14.9 MG/DL (ref 8–23)
CALCIUM SERPL-MCNC: 8.1 MG/DL (ref 8.8–10.2)
CALCIUM SERPL-MCNC: 8.1 MG/DL (ref 8.8–10.2)
CHLORIDE SERPL-SCNC: 100 MMOL/L (ref 98–107)
CHLORIDE SERPL-SCNC: 101 MMOL/L (ref 98–107)
CREAT SERPL-MCNC: 0.48 MG/DL (ref 0.51–0.95)
CREAT SERPL-MCNC: 1.15 MG/DL (ref 0.51–0.95)
CRP SERPL-MCNC: 120 MG/L
DEPRECATED HCO3 PLAS-SCNC: 26 MMOL/L (ref 22–29)
DEPRECATED HCO3 PLAS-SCNC: 29 MMOL/L (ref 22–29)
EGFRCR SERPLBLD CKD-EPI 2021: 48 ML/MIN/1.73M2
EGFRCR SERPLBLD CKD-EPI 2021: >90 ML/MIN/1.73M2
ERYTHROCYTE [DISTWIDTH] IN BLOOD BY AUTOMATED COUNT: 20 % (ref 10–15)
ERYTHROCYTE [DISTWIDTH] IN BLOOD BY AUTOMATED COUNT: 20.4 % (ref 10–15)
GLUCOSE SERPL-MCNC: 166 MG/DL (ref 70–99)
GLUCOSE SERPL-MCNC: 199 MG/DL (ref 70–99)
HCO3 BLDV-SCNC: 31 MMOL/L (ref 21–28)
HCO3 BLDV-SCNC: 32 MMOL/L (ref 21–28)
HCO3 BLDV-SCNC: 32 MMOL/L (ref 21–28)
HCO3 BLDV-SCNC: 33 MMOL/L (ref 21–28)
HCT VFR BLD AUTO: 28.5 % (ref 35–47)
HCT VFR BLD AUTO: 28.6 % (ref 35–47)
HGB BLD-MCNC: 7.8 G/DL (ref 11.7–15.7)
HGB BLD-MCNC: 7.9 G/DL (ref 11.7–15.7)
HOLD SPECIMEN: NORMAL
LACTATE SERPL-SCNC: 0.6 MMOL/L (ref 0.7–2)
LACTATE SERPL-SCNC: 1.9 MMOL/L (ref 0.7–2)
LACTATE SERPL-SCNC: 2.1 MMOL/L (ref 0.7–2)
LACTATE SERPL-SCNC: 2.6 MMOL/L (ref 0.7–2)
MAGNESIUM SERPL-MCNC: 2.2 MG/DL (ref 1.7–2.3)
MAGNESIUM SERPL-MCNC: 2.3 MG/DL (ref 1.7–2.3)
MAGNESIUM SERPL-MCNC: 2.3 MG/DL (ref 1.7–2.3)
MCH RBC QN AUTO: 22.5 PG (ref 26.5–33)
MCH RBC QN AUTO: 22.8 PG (ref 26.5–33)
MCHC RBC AUTO-ENTMCNC: 27.4 G/DL (ref 31.5–36.5)
MCHC RBC AUTO-ENTMCNC: 27.6 G/DL (ref 31.5–36.5)
MCV RBC AUTO: 82 FL (ref 78–100)
MCV RBC AUTO: 83 FL (ref 78–100)
O2/TOTAL GAS SETTING VFR VENT: 100 %
O2/TOTAL GAS SETTING VFR VENT: 100 %
O2/TOTAL GAS SETTING VFR VENT: 21 %
O2/TOTAL GAS SETTING VFR VENT: 28 %
OXYHGB MFR BLDV: 31 % (ref 70–75)
OXYHGB MFR BLDV: 48 % (ref 70–75)
OXYHGB MFR BLDV: 49 % (ref 70–75)
OXYHGB MFR BLDV: 60 % (ref 70–75)
PCO2 BLDV: 53 MM HG (ref 40–50)
PCO2 BLDV: 54 MM HG (ref 40–50)
PCO2 BLDV: 54 MM HG (ref 40–50)
PCO2 BLDV: 56 MM HG (ref 40–50)
PH BLDV: 7.38 [PH] (ref 7.32–7.43)
PH BLDV: 7.39 [PH] (ref 7.32–7.43)
PLATELET # BLD AUTO: 266 10E3/UL (ref 150–450)
PLATELET # BLD AUTO: 294 10E3/UL (ref 150–450)
PO2 BLDV: 24 MM HG (ref 25–47)
PO2 BLDV: 29 MM HG (ref 25–47)
PO2 BLDV: 30 MM HG (ref 25–47)
PO2 BLDV: 35 MM HG (ref 25–47)
POTASSIUM SERPL-SCNC: 3.9 MMOL/L (ref 3.4–5.3)
POTASSIUM SERPL-SCNC: 4.3 MMOL/L (ref 3.4–5.3)
RBC # BLD AUTO: 3.46 10E6/UL (ref 3.8–5.2)
RBC # BLD AUTO: 3.46 10E6/UL (ref 3.8–5.2)
SAO2 % BLDV: 31.4 % (ref 70–75)
SAO2 % BLDV: 48.8 % (ref 70–75)
SAO2 % BLDV: 50.4 % (ref 70–75)
SAO2 % BLDV: 61.7 % (ref 70–75)
SODIUM SERPL-SCNC: 138 MMOL/L (ref 135–145)
SODIUM SERPL-SCNC: 139 MMOL/L (ref 135–145)
WBC # BLD AUTO: 10.6 10E3/UL (ref 4–11)
WBC # BLD AUTO: 9.2 10E3/UL (ref 4–11)

## 2024-05-21 PROCEDURE — 99233 SBSQ HOSP IP/OBS HIGH 50: CPT | Mod: GC | Performed by: INTERNAL MEDICINE

## 2024-05-21 PROCEDURE — 82805 BLOOD GASES W/O2 SATURATION: CPT

## 2024-05-21 PROCEDURE — 250N000011 HC RX IP 250 OP 636

## 2024-05-21 PROCEDURE — 36415 COLL VENOUS BLD VENIPUNCTURE: CPT

## 2024-05-21 PROCEDURE — 74230 X-RAY XM SWLNG FUNCJ C+: CPT | Mod: 26 | Performed by: RADIOLOGY

## 2024-05-21 PROCEDURE — 80048 BASIC METABOLIC PNL TOTAL CA: CPT

## 2024-05-21 PROCEDURE — 250N000009 HC RX 250

## 2024-05-21 PROCEDURE — 83605 ASSAY OF LACTIC ACID: CPT

## 2024-05-21 PROCEDURE — 86140 C-REACTIVE PROTEIN: CPT

## 2024-05-21 PROCEDURE — 94640 AIRWAY INHALATION TREATMENT: CPT | Mod: 76

## 2024-05-21 PROCEDURE — 999N000157 HC STATISTIC RCP TIME EA 10 MIN

## 2024-05-21 PROCEDURE — 83735 ASSAY OF MAGNESIUM: CPT

## 2024-05-21 PROCEDURE — 92611 MOTION FLUOROSCOPY/SWALLOW: CPT | Mod: GN

## 2024-05-21 PROCEDURE — 85027 COMPLETE CBC AUTOMATED: CPT

## 2024-05-21 PROCEDURE — 250N000013 HC RX MED GY IP 250 OP 250 PS 637: Performed by: STUDENT IN AN ORGANIZED HEALTH CARE EDUCATION/TRAINING PROGRAM

## 2024-05-21 PROCEDURE — 250N000013 HC RX MED GY IP 250 OP 250 PS 637

## 2024-05-21 PROCEDURE — 250N000012 HC RX MED GY IP 250 OP 636 PS 637

## 2024-05-21 PROCEDURE — 120N000003 HC R&B IMCU UMMC

## 2024-05-21 PROCEDURE — 97530 THERAPEUTIC ACTIVITIES: CPT | Mod: GP | Performed by: PHYSICAL THERAPIST

## 2024-05-21 PROCEDURE — 92526 ORAL FUNCTION THERAPY: CPT | Mod: GN

## 2024-05-21 PROCEDURE — 74230 X-RAY XM SWLNG FUNCJ C+: CPT

## 2024-05-21 RX ORDER — BARIUM SULFATE 400 MG/ML
SUSPENSION ORAL ONCE
Status: COMPLETED | OUTPATIENT
Start: 2024-05-21 | End: 2024-05-21

## 2024-05-21 RX ORDER — LIDOCAINE 40 MG/G
CREAM TOPICAL
Status: CANCELLED | OUTPATIENT
Start: 2024-05-21

## 2024-05-21 RX ORDER — POTASSIUM CHLORIDE 750 MG/1
20 TABLET, EXTENDED RELEASE ORAL
Status: CANCELLED | OUTPATIENT
Start: 2024-05-21

## 2024-05-21 RX ORDER — ASPIRIN 81 MG/1
243 TABLET, CHEWABLE ORAL ONCE
Status: CANCELLED | OUTPATIENT
Start: 2024-05-22

## 2024-05-21 RX ORDER — POTASSIUM CHLORIDE 750 MG/1
40 TABLET, EXTENDED RELEASE ORAL
Status: CANCELLED | OUTPATIENT
Start: 2024-05-21

## 2024-05-21 RX ORDER — SODIUM CHLORIDE 9 MG/ML
INJECTION, SOLUTION INTRAVENOUS CONTINUOUS
Status: CANCELLED | OUTPATIENT
Start: 2024-05-21

## 2024-05-21 RX ORDER — ASPIRIN 325 MG
325 TABLET ORAL ONCE
Status: CANCELLED | OUTPATIENT
Start: 2024-05-22

## 2024-05-21 RX ADMIN — ACETAMINOPHEN 650 MG: 325 TABLET, FILM COATED ORAL at 08:22

## 2024-05-21 RX ADMIN — METHOCARBAMOL 500 MG: 500 TABLET ORAL at 15:57

## 2024-05-21 RX ADMIN — LEVOTHYROXINE SODIUM 50 MCG: 0.05 TABLET ORAL at 08:10

## 2024-05-21 RX ADMIN — OLANZAPINE 2.5 MG: 2.5 TABLET, FILM COATED ORAL at 21:27

## 2024-05-21 RX ADMIN — PIPERACILLIN AND TAZOBACTAM 3.38 G: 3; .375 INJECTION, POWDER, LYOPHILIZED, FOR SOLUTION INTRAVENOUS at 21:28

## 2024-05-21 RX ADMIN — FLUOXETINE HYDROCHLORIDE 40 MG: 20 CAPSULE ORAL at 08:11

## 2024-05-21 RX ADMIN — BUSPIRONE HYDROCHLORIDE 15 MG: 5 TABLET ORAL at 08:11

## 2024-05-21 RX ADMIN — ACETAMINOPHEN 650 MG: 325 TABLET, FILM COATED ORAL at 23:07

## 2024-05-21 RX ADMIN — BARIUM SULFATE 5 ML: 400 SUSPENSION ORAL at 09:10

## 2024-05-21 RX ADMIN — PREDNISONE 40 MG: 20 TABLET ORAL at 08:10

## 2024-05-21 RX ADMIN — LATANOPROST 1 DROP: 50 SOLUTION OPHTHALMIC at 21:28

## 2024-05-21 RX ADMIN — IPRATROPIUM BROMIDE AND ALBUTEROL SULFATE 3 ML: .5; 3 SOLUTION RESPIRATORY (INHALATION) at 14:49

## 2024-05-21 RX ADMIN — BUSPIRONE HYDROCHLORIDE 15 MG: 5 TABLET ORAL at 21:27

## 2024-05-21 RX ADMIN — HEPARIN SODIUM 5000 UNITS: 5000 INJECTION, SOLUTION INTRAVENOUS; SUBCUTANEOUS at 15:57

## 2024-05-21 RX ADMIN — METHOCARBAMOL 500 MG: 500 TABLET ORAL at 21:27

## 2024-05-21 RX ADMIN — ONDANSETRON 4 MG: 4 TABLET, ORALLY DISINTEGRATING ORAL at 09:56

## 2024-05-21 RX ADMIN — PANTOPRAZOLE SODIUM 40 MG: 40 TABLET, DELAYED RELEASE ORAL at 08:11

## 2024-05-21 RX ADMIN — OLANZAPINE 2.5 MG: 2.5 TABLET, FILM COATED ORAL at 08:11

## 2024-05-21 RX ADMIN — HEPARIN SODIUM 5000 UNITS: 5000 INJECTION, SOLUTION INTRAVENOUS; SUBCUTANEOUS at 00:40

## 2024-05-21 RX ADMIN — METHOCARBAMOL 500 MG: 500 TABLET ORAL at 12:27

## 2024-05-21 RX ADMIN — PIPERACILLIN AND TAZOBACTAM 3.38 G: 3; .375 INJECTION, POWDER, LYOPHILIZED, FOR SOLUTION INTRAVENOUS at 13:37

## 2024-05-21 RX ADMIN — HEPARIN SODIUM 5000 UNITS: 5000 INJECTION, SOLUTION INTRAVENOUS; SUBCUTANEOUS at 23:07

## 2024-05-21 RX ADMIN — Medication 50 MCG: at 08:10

## 2024-05-21 RX ADMIN — HEPARIN SODIUM 5000 UNITS: 5000 INJECTION, SOLUTION INTRAVENOUS; SUBCUTANEOUS at 08:10

## 2024-05-21 RX ADMIN — ONDANSETRON 4 MG: 4 TABLET, ORALLY DISINTEGRATING ORAL at 16:39

## 2024-05-21 RX ADMIN — LIDOCAINE 4% 2 PATCH: 40 PATCH TOPICAL at 21:28

## 2024-05-21 RX ADMIN — METHOCARBAMOL 500 MG: 500 TABLET ORAL at 08:11

## 2024-05-21 RX ADMIN — PIPERACILLIN AND TAZOBACTAM 3.38 G: 3; .375 INJECTION, POWDER, LYOPHILIZED, FOR SOLUTION INTRAVENOUS at 08:11

## 2024-05-21 RX ADMIN — DONEPEZIL HYDROCHLORIDE 10 MG: 10 TABLET, FILM COATED ORAL at 21:27

## 2024-05-21 RX ADMIN — PIPERACILLIN AND TAZOBACTAM 3.38 G: 3; .375 INJECTION, POWDER, LYOPHILIZED, FOR SOLUTION INTRAVENOUS at 03:05

## 2024-05-21 ASSESSMENT — ACTIVITIES OF DAILY LIVING (ADL)
ADLS_ACUITY_SCORE: 40
ADLS_ACUITY_SCORE: 45
ADLS_ACUITY_SCORE: 44
ADLS_ACUITY_SCORE: 45
ADLS_ACUITY_SCORE: 45
ADLS_ACUITY_SCORE: 40
ADLS_ACUITY_SCORE: 45
ADLS_ACUITY_SCORE: 47
ADLS_ACUITY_SCORE: 47
ADLS_ACUITY_SCORE: 40
ADLS_ACUITY_SCORE: 40
ADLS_ACUITY_SCORE: 44
ADLS_ACUITY_SCORE: 45
ADLS_ACUITY_SCORE: 45
ADLS_ACUITY_SCORE: 40
ADLS_ACUITY_SCORE: 45
ADLS_ACUITY_SCORE: 45
ADLS_ACUITY_SCORE: 44
ADLS_ACUITY_SCORE: 40
ADLS_ACUITY_SCORE: 40
ADLS_ACUITY_SCORE: 45
ADLS_ACUITY_SCORE: 44
ADLS_ACUITY_SCORE: 45

## 2024-05-21 NOTE — PLAN OF CARE
Neuro: A&Ox3, disoriented to situation.   Cardiac: SR. VSS. B/P: 119/68, T: 97.3, P: 94, R: 16, CVP: 6  Respiratory: O2 sats stable on RA  GI/: Adequate urine output via pacheco. Maintain pacheco for strict I/O per primary team, reevaluate if able to remove tomorrow. BM X2 this shift  Diet/appetite: Tolerating regular diet with 1.8L FR. Eating well. NPO at midnight.  Activity:  Assist of 1 with gait belt , up to chair, up to bathroom, and in halls.  Pain: At acceptable level on current regimen.  Skin: No new deficits noted.  LDA's: PICC, PIV    Pacheco removed 1800   Dopamine held this AM  Plan for coronary angiogram    Plan: Continue with POC. Notify primary team with changes.

## 2024-05-21 NOTE — PLAN OF CARE
1902-0825    Neuro: Disoriented to situation and time on occasion. Q4 neuros.  Cardiac: HR 80-100s. -130/50-70s. Afebrile. CVP twice daily - CVP 0630 = 5  Respiratory: Sating >88% on 1-2 L NC.  GI/: Adequate urine output via pacheco for strict I&Os per MD, order needs updating - endorsing to day team. No BM this shift.   Diet/appetite: Regular diet. No oral intake this shift - plan to attempt zofran before oral intake.   Activity:  Assist of 2 w/ GB & walker, not oob this shift. Generalized weakness.   Pain: Denied pain this shift.  Skin: No new deficits noted.  LDA's: R 2L PICC - TKO for antibiotics, dopamine. L PIV - saline locked. Pacheco.     VSS today, 5/21.     Plan: Continue with POC. Notify primary team with changes.

## 2024-05-21 NOTE — PROGRESS NOTES
"   05/21/24 1100   Appointment Info   Signing Clinician's Name / Credentials (SLP) Rakel Aranda MA CCC-SLP   General Information   Onset of Illness/Injury or Date of Surgery 05/15/24   Referring Physician Candice Valdovinos MD   Pertinent History of Current Problem Per EMR, \"80 yo f with history of dementia, pulmonary emphysema AMRIT on fluoxetine, IBS with diarrhea and oxygen dependence since prior hospitalization with PNA in 12/2023 requiring TCU stay and now in [facility], who presented on 5/15 with insidious onset of weakness and is now found to have new presentation of HFrEF EF 10-15% with normal size LV.\" VFSS completed per MD orders d/t concerns for recurrent PNA (2nd this year).   General Observations Alert and cooperative.       Present no   Pain Assessment   Patient Currently in Pain No   Type of Evaluation   Type of Evaluation Swallow Evaluation   General Swallowing Observations   Past History of Dysphagia CSE completed 12/2023 & 2/13/24 recommending reg/thin with no overt s/s of aspiration. In February 2024, recommended VFSS to evaluate for silent aspiration, but pt with vomiting and nausea and wouldn't tolerate trials for evaluation to be completed. Additionally, pt expressed an appetite loss just before that admission. Pt endorses reflux, heartburn, frequent burping with intake, and vomiting \"a few times per week\" per pt & pt's daughters' reports since her hospitalization in February.  Pt followed by ST at bedside since 5/19 and on reg/thin liquid diet.   Respiratory Support nasal cannula   Current Diet/Method of Nutritional Intake (General Swallowing Observations, NIS) thin liquids (level 0);regular diet   Swallowing Evaluation Videofluoroscopic swallow study (VFSS)   Clinical Swallow Evaluation   Feeding Assistance no assistance needed   VFSS Evaluation   Radiologist Resident   Views Taken left lateral   Physical Location of Procedure [facility] Radiology Department   VFSS " Textures Trialed thin liquids;pureed;solid foods   VFSS Eval: Thin Liquid Texture Trial   Mode of Presentation, Thin Liquid self-fed;cup;straw   Order of Presentation 1-3, 6, 7   Preparatory Phase WFL   Oral Phase, Thin Liquid WFL   Bolus Location When Swallow Triggered posterior angle of ramus   Pharyngeal Phase, Thin Liquid pharyngeal wall coating   Rosenbek's Penetration Aspiration Scale: Thin Liquid Trial Results 1 - no aspiration, contrast does not enter airway   Diagnostic Statement No aspiration or penetration with single or consecutive straw and cup sips   VFSS Evaluation: Puree Solid Texture Trial   Mode of Presentation, Puree self-fed;spoon   Order of Presentation 4   Preparatory Phase WFL   Oral Phase, Puree WFL   Bolus Location When Swallow Triggered posterior angle of ramus   Pharyngeal Phase, Puree impaired tongue base retraction   Rosenbek's Penetration Aspiration Scale: Puree Food Trial Results 1 - no aspiration, contrast does not enter airway   Diagnostic Statement No aspiration or penetration.  Very mildly impaired tongue base retraction.   VFSS Evaluation: Solid Food Texture Trial   Mode of Presentation, Solid self-fed;spoon   Order of Presentation 5   Preparatory Phase WFL   Oral Phase, Solid WFL   Bolus Location When Swallow Triggered posterior angle of ramus   Pharyngeal Phase, Solid WFL   Rosenbek's Penetration Aspiration Scale: Solid Food Trial Results 1 - no aspiration, contrast does not enter airway   Diagnostic Statement No aspiration or penetration   Esophageal Phase of Swallow   Patient reports or presents with symptoms of esophageal dysphagia Yes   Esophageal comments hx of GERD, pt on PPI   Swallowing Recommendations   Diet Consistency Recommendations thin liquids (level 0);regular diet   Supervision Level for Intake patient independent   Mode of Delivery Recommendations bolus size, small;slow rate of intake   Postural Recommendations none   Swallowing Maneuver Recommendations  alternate food and liquid intake   Monitoring/Assistance Required (Eating/Swallowing) stop eating activities when fatigue is present;monitor for cough or change in vocal quality with intake   Recommended Feeding/Eating Techniques (Swallow Eval) maintain upright sitting position for eating   Medication Administration Recommendations, Swallowing (SLP) as tolerated   Instrumental Assessment Recommendations instrumental evaluation not recommended at this time   Clinical Impression   Criteria for Skilled Therapeutic Interventions Met (SLP Eval) Evaluation only;No problems identified which require skilled intervention   SLP Diagnosis WNL oropharyngeal swallowing abilities   Risks & Benefits of therapy have been explained evaluation/treatment results reviewed;care plan/treatment goals reviewed;risks/benefits reviewed;current/potential barriers reviewed;participants voiced agreement with care plan;participants included;patient   Clinical Impression Comments VFSS completed per provider orders. Patient presents with WNL oropharyngeal swallowing abilities. Pt assessed with thin liquids, pureed textures and regular solids under fluoroscopy. Oral phase was WNL.  Pt demonstrated adequate bolus acceptance, functional bolus control, adequate A-P movement, and good oral clearance. Pharyngeal phase remarkable for very mildly impaired tongue base retraction with pureed textures.  No aspiration or penetration observed across consistencies including single and consecutive cup and straw sips of thin liquid consistencies.      Recommend continue regular textures and thin liquids diet.  Patient should be fully upright and alert for all intake, alternate solids and liquids and take small, bites/sips at a slow rate.  Given WNL oropharyngeal swallowing abilities, no further speech therapy indicated at this time and will complete orders.   Swallowing Dysfunction &/or Oral Function for Feeding   Treatment of Swallowing Dysfunction &/or Oral  Function for Feeding Minutes (80934) 5   Symptoms Noted During/After Treatment None   Treatment Detail/Skilled Intervention VFSS completed.  Using pictures from VFSS, pt educated on anatomy and physiology of swallowing mechanism and s/sx of aspiration.  Pt educated on safe swallowing strategies and pt verbalized understanding.   SLP Discharge Planning   SLP Plan s-o   SLP Discharge Recommendation home with assist   SLP Rationale for DC Rec WNL oropharyngeal swallowing abilities   SLP Brief overview of current status  VFSS completed. Recommend continue regular textures and thin liquids diet. Patient should be fully upright and alert for all intake, alternate solids and liquids and take small, bites/sips at a slow rate. Pt demonstrates WNL oropharyngeal swallowing abilities and no further speech therapy indicated at this time and will complete orders.

## 2024-05-21 NOTE — PROGRESS NOTES
Cardiology 2 Progress Note    05/21/2024    Idalia Bob MRN# [id number]   Age: 79 year old YOB: 1945        Brief HPI   Idalia Bob is an 78yo woman with a history of COPD, HTN, and dementia who is admitted on 5/15 for de-dameon      Subjective / Interval   Patient's MAP and lactate stable. Asymptomatic in terms of lightheadedness and dizziness. Hemodynamically stable. Afebrile, CRP trending down and WBC normalized; however patient overall weak and tired.     Assessment and Plan:   Today's changes:  - 5/21/24 CVP 5, CI 3.8, CO 5.8, SVR 1144. Discontinue Dopamine this AM given stable MAPs and lactate wnl, continue holding diuretics. Might need IVF as bridge.  - Switching labs from q8h to q12h   - CRP trending down after steroids started on 05/20  - GI consulted to r/o esophageal dysphagia, no further workup. Will follow up outpatient  - Barium swallowing test this AM  - NPO MN for coronary angiogram    #Cardiogenic Shock  #HFrEF, acute decompensation, new diagnosis  Admitted on 5/15 for decompensated heart failure. Etiology of decompensation is currently unknown and this represents a new diagnosis. Notably, an echocardiogram in February of this year was wnl suggestive of an acute process. LV size is also wnl suggestive of some acuity. Uncertain whether this represents ischemic cardiomyopathy vs other such as stress, infectious, or arrhythmogenic. Will need full workup prior to discharge.   Date of Diagnosis: 2024  Etiology: Unknown  Baseline weight: TBD  Last TTE: 5/2024 w/ LVEF 10-15% with global hypokinesis. Mod RV dysfunction. LVIDd 5cm.   Last RHC: n/a  Last angiogram: n/a  Therapeutics   > Volume: Holding Lasix drip   > Afterload: Holding   > Inotrope: swap dobutamine to dopamine for hypotension on 05/18.     > Dopamine 05/19-05/21. Discontinue today given lactate and MAPs are stable. - 05/21/24 CVP 5, CI 3.8, CO 5.8, SVR 1144   > BB: holding   > ARNI: holding   > MRA: holding   >  SGLT2i: holding   > Device: n/a    # Lactic Acidosis  # Hypotension  Multifactorial iso bp medications, rapid diuresis, and beta agonism of dobutamine. Resolved with dopamine.  - Trend lactate    #Acute Hypoxemic respiratory failure, c/f PNA  #Hx of multiple aspiration PNAs  #Pleural Effusions   On 2L of oxygen here. Chest XR showed continued left pleural effusion w trace increased small right pleural effusion. Likely secondary to acute decompensated HF   - holding diuresis  - Daily CRP, CBC  - Abx   > Vancomycin (05/19)   > Zosyn (05/19-P) x 5-day course. Will transition to PO tomorrow.   - Infectious workup   > UA negative   > MRSA negative   > Procal negative   > Sputum culture, Legionella and Streptococcus in urine  - Maintain O2 88-92% given hx of COPD  - CT chest w/o contrast with worsening RUL and RLL consolidation and severe emphysema.    - Prednisone 40mg qday x 5 days   - GI consulted to r/o esophageal dysphagia --> will follow up outpatient for esophageal dysphagia  - Speech pathology c/f aspiration PNA, and need for barium swallowing test. Scheduled on 05/21, pending  - Legionella and Streptococcus in urine negative     #Non-ischemic myocardial injury  Related to decompensated heart failure as discussed above. No concern for ACS this admission  - no further need to monitor     #Iron Deficiency Anemia  Baseline ~9 since at least early January. Labs c/w JAC.  - Started on iron 200mg IV infusion x1 5/17/2024  - Hb trending down, now Hb 7.5, back to baseline this AM ~8. Will trend Hb q12h  - Transfuse if Hb<7 or actively bleeding  - DVT prophylaxis with Heparin resumed and Pantoprazole switched from IV to PO on 05/20    #JUANIS on CKD, stage III  Baseline Scr ~0.8 reflecting eGFR of 30-45. Highest Scr here of 1.2 suspect cardiorenal  - no diuresis  - trend I/O   - renally dose meds, avoid nephrotoxic meds    #Hepatocellular Pattern of Liver Injury - Suspect due to congestive hepatopathy, diuresis as  above  #COPD - No active issues, remain on PTA meds of budesonide 1mg bid and duonebs QID  #Hypothyrodism - No active issues, continue levothyrozine 50mcg daily   #Dementia - No active issues, plan for delirium precautions and continue donepezil 10mg daily  #GERD - No active issues, continue prantoprazole 40mg daily   #Electrolyte Derangements   ## Hypokalemia - replete prn   ## Hypocalcemia - replete prn    Patient seen and discussed with staff physician, Dr. Keven Napier,  Internal Medicine PGY-3     Physical Exam:   /68 (BP Location: Left arm, Cuff Size: Adult Small)   Pulse 87   Temp 97.3  F (36.3  C) (Oral)   Resp 14   Wt 55.3 kg (121 lb 14.6 oz)   LMP  (LMP Unknown)   SpO2 95%   BMI 23.81 kg/m    Temp:  [97.3  F (36.3  C)-98.2  F (36.8  C)] 97.3  F (36.3  C)  Pulse:  [] 87  Resp:  [14-16] 14  BP: ()/(48-78) 119/68  SpO2:  [94 %-100 %] 95 %  Wt Readings from Last 2 Encounters:   05/21/24 55.3 kg (121 lb 14.6 oz)   05/14/24 57.6 kg (127 lb)       Intake/Output Summary (Last 24 hours) at 5/18/2024 1025  Last data filed at 5/18/2024 0641  Gross per 24 hour   Intake 540 ml   Output 2850 ml   Net -2310 ml       Exam:  General: NAD   HEENT: no scleral icterus or injection  CARDIAC: irregular, no murmurs. +JVD  RESP:  breathing on 2L though speaking in full sentences  GI: nondistended  : Ruiz  EXTREMITIES: 2+ LE edema  SKIN: No acute lesions appreciated  NEURO: No focal deficits         Labs:     CMP  Recent Labs   Lab 05/21/24  0437 05/20/24  2125 05/20/24  1226 05/20/24  0445 05/19/24  2112 05/19/24  1624 05/19/24  0448 05/18/24  1530 05/18/24  1048 05/18/24  0431 05/17/24  0615 05/16/24  2100 05/16/24  1218 05/16/24  0535 05/16/24  0028     --  136 136 134*   < > 135 135 136 136   < > 140 140 140 138   POTASSIUM 4.3  --  4.4 4.3 4.8   < > 4.3 4.8 4.9  4.9 3.3*   < > 3.9 3.2* 3.1* 4.2   CHLORIDE 100  --  98 97* 95*   < > 94* 95* 96* 94*   < > 102 100 101 104    CO2 29  --  26 30* 31*   < > 33* 31* 29 32*   < > 27 29 24 17*   ANIONGAP 9  --  12 9 8   < > 8 9 11 10   < > 11 11 15 17*   *  --  114* 96 163*   < > 86 206* 162* 93   < > 123* 84 76 79   BUN 14.4  --  16.8 17.6 20.3   < > 18.7 19.7 18.2 17.1   < > 17.7 18.4 20.8 22.6   CR 0.48*  --  1.27* 1.33* 1.31*   < > 1.40* 1.39* 1.33* 1.22*   < > 1.12* 1.08* 1.07* 0.97*   GFRESTIMATED >90  --  43* 40* 41*   < > 38* 38* 40* 45*   < > 49* 52* 52* 59*   AYUSH 8.1*  --  8.4* 8.3* 8.5*   < > 8.2* 8.4* 8.5* 8.6*   < > 8.5* 8.5* 8.7* 8.6*   MAG 2.3 2.4* 2.3 2.3 2.4*   < > 1.7 2.1  --  1.9   < > 1.7 1.8 1.8 1.9   PHOS  --   --   --   --   --   --   --   --   --   --   --  3.7 4.0 3.8 4.2   PROTTOTAL  --   --   --   --   --   --  5.0* 5.2* 5.1* 5.4*   < >  --   --  5.8*  --    ALBUMIN  --   --   --   --   --   --  2.8* 3.0* 3.0* 3.2*   < >  --   --  3.5  --    BILITOTAL  --   --   --   --   --   --  0.6 0.5 0.7 0.8   < >  --   --  0.6  --    ALKPHOS  --   --   --   --   --   --  70 77 73 80   < >  --   --  87  --    AST  --   --   --   --   --   --  16 17 19 21   < >  --   --  46*  --    ALT  --   --   --   --   --   --  19 24 25 28   < >  --   --  54*  --     < > = values in this interval not displayed.     CBC  Recent Labs   Lab 05/21/24 0437 05/20/24 1226 05/20/24 0445 05/19/24 2112   WBC 9.2 12.1* 12.1* 13.6*   HGB 7.9* 8.6* 8.0* 8.1*   HCT 28.6* 31.2* 28.2* 29.0*   MCV 83 82 81 80    253 265 283     INR  Recent Labs   Lab 05/15/24  0929   INR 2.34*     Arterial Blood Gas  Recent Labs   Lab 05/21/24  0437 05/20/24  1226 05/20/24 0445 05/19/24 2112   O2PER 28 21 24 28       Medications:     Current Facility-Administered Medications   Medication Dose Route Frequency Provider Last Rate Last Admin    busPIRone (BUSPAR) tablet 15 mg  15 mg Oral BID Terrence Tate MD   15 mg at 05/21/24 0811    donepezil (ARICEPT) tablet 10 mg  10 mg Oral At Bedtime Terrence Tate MD   10 mg at 05/20/24 5075     FLUoxetine (PROzac) capsule 40 mg  40 mg Oral Daily Terrence Tate MD   40 mg at 05/21/24 0811    heparin ANTICOAGULANT injection 5,000 Units  5,000 Units Subcutaneous Q8H Candice Valdovinos MD   5,000 Units at 05/21/24 0810    ipratropium - albuterol 0.5 mg/2.5 mg/3 mL (DUONEB) neb solution 3 mL  3 mL Nebulization Q6H Candice Valdovinos MD   3 mL at 05/20/24 1421    latanoprost (XALATAN) 0.005 % ophthalmic solution 1 drop  1 drop Both Eyes At Bedtime Terrence Tate MD   1 drop at 05/20/24 2136    levothyroxine (SYNTHROID/LEVOTHROID) tablet 50 mcg  50 mcg Oral Daily Terrence Tate MD   50 mcg at 05/21/24 0810    Lidocaine (LIDOCARE) 4 % Patch 2 patch  2 patch Transdermal Q24h Derek Silva MD   2 patch at 05/19/24 2059    methocarbamol (ROBAXIN) tablet 500 mg  500 mg Oral 4x Daily Candice Valdovinos MD   500 mg at 05/21/24 0811    OLANZapine (zyPREXA) tablet 2.5 mg  2.5 mg Oral BID Terrence Tate MD   2.5 mg at 05/21/24 0811    pantoprazole (PROTONIX) EC tablet 40 mg  40 mg Oral Daily Candice Valdovinos MD   40 mg at 05/21/24 0811    piperacillin-tazobactam (ZOSYN) 3.375 g vial to attach to  mL bag  3.375 g Intravenous Q6H Evangelina Johnson  mL/hr at 05/20/24 0858 3.375 g at 05/21/24 0811    predniSONE (DELTASONE) tablet 40 mg  40 mg Oral Daily Candice Valdovinos MD   40 mg at 05/21/24 0810    sodium chloride (PF) 0.9% PF flush 10-40 mL  10-40 mL Intracatheter Q8H Chuck Hernandez MD   10 mL at 05/21/24 0957    sodium chloride (PF) 0.9% PF flush 3 mL  3 mL Intracatheter Q8H Terrence Tate MD   3 mL at 05/21/24 0443    Vitamin D3 (CHOLECALCIFEROL) tablet 50 mcg  50 mcg Oral Daily Terrence Tate MD   50 mcg at 05/21/24 0810       Current Facility-Administered Medications   Medication Dose Route Frequency Provider Last Rate Last Admin    [Held by provider] DOPamine 400 mg in dextrose 5% 250 mL (adult std) - premix - CENTRAL  2.5  mcg/kg/min Intravenous Continuous Candice Valdovinos MD   Stopped at 05/21/24 0802    Reason beta blocker not prescribed   Other DOES NOT GO TO Terrence Garcia MD           Recent Imaging:   Reviewed

## 2024-05-22 ENCOUNTER — APPOINTMENT (OUTPATIENT)
Dept: OCCUPATIONAL THERAPY | Facility: CLINIC | Age: 79
DRG: 291 | End: 2024-05-22
Payer: MEDICARE

## 2024-05-22 ENCOUNTER — APPOINTMENT (OUTPATIENT)
Dept: CT IMAGING | Facility: CLINIC | Age: 79
DRG: 291 | End: 2024-05-22
Attending: STUDENT IN AN ORGANIZED HEALTH CARE EDUCATION/TRAINING PROGRAM
Payer: MEDICARE

## 2024-05-22 LAB
ANION GAP SERPL CALCULATED.3IONS-SCNC: 10 MMOL/L (ref 7–15)
ANION GAP SERPL CALCULATED.3IONS-SCNC: 12 MMOL/L (ref 7–15)
BASE EXCESS BLDV CALC-SCNC: 1.9 MMOL/L (ref -3–3)
BASE EXCESS BLDV CALC-SCNC: 5.2 MMOL/L (ref -3–3)
BUN SERPL-MCNC: 19.6 MG/DL (ref 8–23)
BUN SERPL-MCNC: 20.1 MG/DL (ref 8–23)
CALCIUM SERPL-MCNC: 7.7 MG/DL (ref 8.8–10.2)
CALCIUM SERPL-MCNC: 8 MG/DL (ref 8.8–10.2)
CHLORIDE SERPL-SCNC: 102 MMOL/L (ref 98–107)
CHLORIDE SERPL-SCNC: 102 MMOL/L (ref 98–107)
CREAT SERPL-MCNC: 1.14 MG/DL (ref 0.51–0.95)
CREAT SERPL-MCNC: 1.18 MG/DL (ref 0.51–0.95)
CRP SERPL-MCNC: 56.4 MG/L
DEPRECATED HCO3 PLAS-SCNC: 23 MMOL/L (ref 22–29)
DEPRECATED HCO3 PLAS-SCNC: 28 MMOL/L (ref 22–29)
EGFRCR SERPLBLD CKD-EPI 2021: 46 ML/MIN/1.73M2
EGFRCR SERPLBLD CKD-EPI 2021: 48 ML/MIN/1.73M2
ERYTHROCYTE [DISTWIDTH] IN BLOOD BY AUTOMATED COUNT: 20.3 % (ref 10–15)
ERYTHROCYTE [DISTWIDTH] IN BLOOD BY AUTOMATED COUNT: 20.5 % (ref 10–15)
GLUCOSE SERPL-MCNC: 103 MG/DL (ref 70–99)
GLUCOSE SERPL-MCNC: 164 MG/DL (ref 70–99)
HCO3 BLDV-SCNC: 27 MMOL/L (ref 21–28)
HCO3 BLDV-SCNC: 31 MMOL/L (ref 21–28)
HCT VFR BLD AUTO: 29.8 % (ref 35–47)
HCT VFR BLD AUTO: 31.1 % (ref 35–47)
HGB BLD-MCNC: 8.1 G/DL (ref 11.7–15.7)
HGB BLD-MCNC: 8.6 G/DL (ref 11.7–15.7)
LACTATE SERPL-SCNC: 0.9 MMOL/L (ref 0.7–2)
LACTATE SERPL-SCNC: 2.2 MMOL/L (ref 0.7–2)
MAGNESIUM SERPL-MCNC: 2.2 MG/DL (ref 1.7–2.3)
MAGNESIUM SERPL-MCNC: 2.3 MG/DL (ref 1.7–2.3)
MAGNESIUM SERPL-MCNC: 2.3 MG/DL (ref 1.7–2.3)
MCH RBC QN AUTO: 22.6 PG (ref 26.5–33)
MCH RBC QN AUTO: 23.1 PG (ref 26.5–33)
MCHC RBC AUTO-ENTMCNC: 27.2 G/DL (ref 31.5–36.5)
MCHC RBC AUTO-ENTMCNC: 27.7 G/DL (ref 31.5–36.5)
MCV RBC AUTO: 83 FL (ref 78–100)
MCV RBC AUTO: 84 FL (ref 78–100)
O2/TOTAL GAS SETTING VFR VENT: 21 %
O2/TOTAL GAS SETTING VFR VENT: 24 %
OXYHGB MFR BLDV: 66 % (ref 70–75)
OXYHGB MFR BLDV: 69 % (ref 70–75)
PCO2 BLDV: 44 MM HG (ref 40–50)
PCO2 BLDV: 52 MM HG (ref 40–50)
PH BLDV: 7.38 [PH] (ref 7.32–7.43)
PH BLDV: 7.4 [PH] (ref 7.32–7.43)
PLATELET # BLD AUTO: 341 10E3/UL (ref 150–450)
PLATELET # BLD AUTO: 345 10E3/UL (ref 150–450)
PO2 BLDV: 38 MM HG (ref 25–47)
PO2 BLDV: 38 MM HG (ref 25–47)
POTASSIUM SERPL-SCNC: 4.3 MMOL/L (ref 3.4–5.3)
POTASSIUM SERPL-SCNC: 4.6 MMOL/L (ref 3.4–5.3)
RBC # BLD AUTO: 3.58 10E6/UL (ref 3.8–5.2)
RBC # BLD AUTO: 3.72 10E6/UL (ref 3.8–5.2)
SAO2 % BLDV: 67.4 % (ref 70–75)
SAO2 % BLDV: 69.9 % (ref 70–75)
SODIUM SERPL-SCNC: 137 MMOL/L (ref 135–145)
SODIUM SERPL-SCNC: 140 MMOL/L (ref 135–145)
WBC # BLD AUTO: 12.4 10E3/UL (ref 4–11)
WBC # BLD AUTO: 12.4 10E3/UL (ref 4–11)

## 2024-05-22 PROCEDURE — 86140 C-REACTIVE PROTEIN: CPT

## 2024-05-22 PROCEDURE — 250N000013 HC RX MED GY IP 250 OP 250 PS 637

## 2024-05-22 PROCEDURE — 97530 THERAPEUTIC ACTIVITIES: CPT | Mod: GO

## 2024-05-22 PROCEDURE — 250N000011 HC RX IP 250 OP 636: Performed by: INTERNAL MEDICINE

## 2024-05-22 PROCEDURE — 75574 CT ANGIO HRT W/3D IMAGE: CPT | Mod: 26 | Performed by: INTERNAL MEDICINE

## 2024-05-22 PROCEDURE — 82565 ASSAY OF CREATININE: CPT

## 2024-05-22 PROCEDURE — 83735 ASSAY OF MAGNESIUM: CPT

## 2024-05-22 PROCEDURE — 36415 COLL VENOUS BLD VENIPUNCTURE: CPT

## 2024-05-22 PROCEDURE — 999N000157 HC STATISTIC RCP TIME EA 10 MIN

## 2024-05-22 PROCEDURE — 250N000012 HC RX MED GY IP 250 OP 636 PS 637

## 2024-05-22 PROCEDURE — 83605 ASSAY OF LACTIC ACID: CPT

## 2024-05-22 PROCEDURE — 250N000009 HC RX 250: Performed by: INTERNAL MEDICINE

## 2024-05-22 PROCEDURE — 250N000011 HC RX IP 250 OP 636

## 2024-05-22 PROCEDURE — 85027 COMPLETE CBC AUTOMATED: CPT

## 2024-05-22 PROCEDURE — 120N000003 HC R&B IMCU UMMC

## 2024-05-22 PROCEDURE — 250N000013 HC RX MED GY IP 250 OP 250 PS 637: Performed by: STUDENT IN AN ORGANIZED HEALTH CARE EDUCATION/TRAINING PROGRAM

## 2024-05-22 PROCEDURE — 75574 CT ANGIO HRT W/3D IMAGE: CPT | Mod: MG

## 2024-05-22 PROCEDURE — 82805 BLOOD GASES W/O2 SATURATION: CPT

## 2024-05-22 PROCEDURE — 999N000285 HC STATISTIC VASC ACCESS LAB DRAW WITH PIV START

## 2024-05-22 PROCEDURE — 250N000013 HC RX MED GY IP 250 OP 250 PS 637: Performed by: INTERNAL MEDICINE

## 2024-05-22 PROCEDURE — 99233 SBSQ HOSP IP/OBS HIGH 50: CPT | Mod: GC | Performed by: INTERNAL MEDICINE

## 2024-05-22 PROCEDURE — 87040 BLOOD CULTURE FOR BACTERIA: CPT

## 2024-05-22 PROCEDURE — 82374 ASSAY BLOOD CARBON DIOXIDE: CPT

## 2024-05-22 PROCEDURE — G1010 CDSM STANSON: HCPCS | Mod: GC | Performed by: INTERNAL MEDICINE

## 2024-05-22 PROCEDURE — 80048 BASIC METABOLIC PNL TOTAL CA: CPT

## 2024-05-22 PROCEDURE — 99232 SBSQ HOSP IP/OBS MODERATE 35: CPT

## 2024-05-22 RX ORDER — METOPROLOL TARTRATE 1 MG/ML
5-15 INJECTION, SOLUTION INTRAVENOUS
Status: DISCONTINUED | OUTPATIENT
Start: 2024-05-22 | End: 2024-05-22

## 2024-05-22 RX ORDER — NITROGLYCERIN 0.4 MG/1
.4-.8 TABLET SUBLINGUAL
Status: DISCONTINUED | OUTPATIENT
Start: 2024-05-22 | End: 2024-05-22

## 2024-05-22 RX ORDER — IOPAMIDOL 755 MG/ML
110 INJECTION, SOLUTION INTRAVASCULAR ONCE
Status: COMPLETED | OUTPATIENT
Start: 2024-05-22 | End: 2024-05-22

## 2024-05-22 RX ORDER — HYDROXYZINE HYDROCHLORIDE 25 MG/1
25 TABLET, FILM COATED ORAL
Status: COMPLETED | OUTPATIENT
Start: 2024-05-22 | End: 2024-05-23

## 2024-05-22 RX ORDER — IVABRADINE 5 MG/1
15 TABLET, FILM COATED ORAL 2 TIMES DAILY WITH MEALS
Status: CANCELLED | OUTPATIENT
Start: 2024-05-22

## 2024-05-22 RX ORDER — IVABRADINE 5 MG/1
15 TABLET, FILM COATED ORAL ONCE
Qty: 3 TABLET | Refills: 0 | Status: COMPLETED | OUTPATIENT
Start: 2024-05-22 | End: 2024-05-22

## 2024-05-22 RX ORDER — ASPIRIN 81 MG/1
324 TABLET, CHEWABLE ORAL DAILY
Status: DISCONTINUED | OUTPATIENT
Start: 2024-05-22 | End: 2024-05-23

## 2024-05-22 RX ADMIN — LEVOTHYROXINE SODIUM 50 MCG: 0.05 TABLET ORAL at 09:14

## 2024-05-22 RX ADMIN — PREDNISONE 40 MG: 20 TABLET ORAL at 09:15

## 2024-05-22 RX ADMIN — PANTOPRAZOLE SODIUM 40 MG: 40 TABLET, DELAYED RELEASE ORAL at 09:14

## 2024-05-22 RX ADMIN — HEPARIN SODIUM 5000 UNITS: 5000 INJECTION, SOLUTION INTRAVENOUS; SUBCUTANEOUS at 09:15

## 2024-05-22 RX ADMIN — METHOCARBAMOL 500 MG: 500 TABLET ORAL at 20:07

## 2024-05-22 RX ADMIN — PIPERACILLIN AND TAZOBACTAM 3.38 G: 3; .375 INJECTION, POWDER, LYOPHILIZED, FOR SOLUTION INTRAVENOUS at 20:12

## 2024-05-22 RX ADMIN — BUSPIRONE HYDROCHLORIDE 15 MG: 5 TABLET ORAL at 20:07

## 2024-05-22 RX ADMIN — LATANOPROST 1 DROP: 50 SOLUTION OPHTHALMIC at 23:03

## 2024-05-22 RX ADMIN — FLUOXETINE HYDROCHLORIDE 40 MG: 20 CAPSULE ORAL at 09:14

## 2024-05-22 RX ADMIN — Medication 50 MCG: at 09:14

## 2024-05-22 RX ADMIN — IOPAMIDOL 110 ML: 755 INJECTION, SOLUTION INTRAVENOUS at 12:24

## 2024-05-22 RX ADMIN — HEPARIN SODIUM 5000 UNITS: 5000 INJECTION, SOLUTION INTRAVENOUS; SUBCUTANEOUS at 17:28

## 2024-05-22 RX ADMIN — DONEPEZIL HYDROCHLORIDE 10 MG: 10 TABLET, FILM COATED ORAL at 23:03

## 2024-05-22 RX ADMIN — IVABRADINE 15 MG: 5 TABLET, FILM COATED ORAL at 10:04

## 2024-05-22 RX ADMIN — PIPERACILLIN AND TAZOBACTAM 3.38 G: 3; .375 INJECTION, POWDER, LYOPHILIZED, FOR SOLUTION INTRAVENOUS at 09:20

## 2024-05-22 RX ADMIN — METHOCARBAMOL 500 MG: 500 TABLET ORAL at 14:12

## 2024-05-22 RX ADMIN — ASPIRIN 81 MG CHEWABLE TABLET 324 MG: 81 TABLET CHEWABLE at 09:13

## 2024-05-22 RX ADMIN — METHOCARBAMOL 500 MG: 500 TABLET ORAL at 17:28

## 2024-05-22 RX ADMIN — LIDOCAINE 4% 2 PATCH: 40 PATCH TOPICAL at 20:08

## 2024-05-22 RX ADMIN — BUSPIRONE HYDROCHLORIDE 15 MG: 5 TABLET ORAL at 09:14

## 2024-05-22 RX ADMIN — OLANZAPINE 2.5 MG: 2.5 TABLET, FILM COATED ORAL at 09:14

## 2024-05-22 RX ADMIN — PIPERACILLIN AND TAZOBACTAM 3.38 G: 3; .375 INJECTION, POWDER, LYOPHILIZED, FOR SOLUTION INTRAVENOUS at 01:47

## 2024-05-22 RX ADMIN — OLANZAPINE 2.5 MG: 2.5 TABLET, FILM COATED ORAL at 20:07

## 2024-05-22 RX ADMIN — HEPARIN SODIUM 5000 UNITS: 5000 INJECTION, SOLUTION INTRAVENOUS; SUBCUTANEOUS at 23:03

## 2024-05-22 RX ADMIN — METHOCARBAMOL 500 MG: 500 TABLET ORAL at 09:14

## 2024-05-22 RX ADMIN — NITROGLYCERIN 0.8 MG: 0.4 TABLET SUBLINGUAL at 12:39

## 2024-05-22 RX ADMIN — PIPERACILLIN AND TAZOBACTAM 3.38 G: 3; .375 INJECTION, POWDER, LYOPHILIZED, FOR SOLUTION INTRAVENOUS at 14:58

## 2024-05-22 RX ADMIN — METOPROLOL TARTRATE 10 MG: 5 INJECTION INTRAVENOUS at 12:36

## 2024-05-22 ASSESSMENT — ACTIVITIES OF DAILY LIVING (ADL)
ADLS_ACUITY_SCORE: 40
ADLS_ACUITY_SCORE: 42
ADLS_ACUITY_SCORE: 40
ADLS_ACUITY_SCORE: 45
ADLS_ACUITY_SCORE: 40
ADLS_ACUITY_SCORE: 42
ADLS_ACUITY_SCORE: 40

## 2024-05-22 NOTE — PLAN OF CARE
B/P: 102/73, T: 97, P: 84, R: 24    Neuro: Alert and occasionally forgetful.   Cardiac: Sinus arrhythmia. VSS. Dopamine gtt stopped this morning.    Respiratory: Sating 91% on RA. Ambulated out across hallway and back on RA, sats fell to 86%. Pt c/o dizziness. BP after ambulating 115/75.   GI/: Adequate urine output. Ambulating to bathroom. Loose BM X2 in bathroom.   Diet/appetite: Tolerating reg diet. Eating well.  Activity:  Assist of 1, up to chair and in halls with gait belt and walker.   Pain: At acceptable level on current regimen.   Skin: No new deficits noted.   LDA's: R 2L PICC L PIV.     Plan: CTA completed today. Continue with POC. Notify primary team with changes.

## 2024-05-22 NOTE — PROGRESS NOTES
CLINICAL NUTRITION SERVICES - ASSESSMENT NOTE     Nutrition Prescription    RECOMMENDATIONS FOR MDs/PROVIDERS TO ORDER:  Continue to minimize diet restrictions as able    Malnutrition Status:    Severe malnutrition in the context of acute illness on chronic condition     Recommendations already ordered by Registered Dietitian (RD):  Room service with assistance to continue, encourage at least 1 high protein food per meal tray  Ensure Plus to begin at 2 PM daily (chocolate), allow additional if requested PRN    Future/Additional Recommendations:  Monitor nutrition-related findings and follow pt per protocol     REASON FOR ASSESSMENT  Idalia Bob is a/an 79 year old female assessed by the dietitian for LOS    CLINICAL HISTORY  Hx of COPD, PNA, HTN,  GERD, CKD III, and dementia, presenting to [facility] with fatigue, LE edema, and abnormal labs at PCP recent PCP visit; newly dx HFrEF exacerbation per H&P.     NUTRITION HISTORY  Patient was vague with nutrition hx but reports having a good appetite and food intake of about 2 meals per day PTA. Does not follow any diet restrictions at baseline but is not a robust eater. Patient is unsure of her usual body weight. She has been receiving room service assistance for most of admission, as she is forgetful with her hx of dementia. Although good appetite reported, her intake trends have been highly variable, so writer suggested adding a protein-containing snack between meals. Pt opted for an Ensure drink, chocolate flavor in the afternoon. Placed order. Encourage intake as needed.    CURRENT NUTRITION ORDERS  Diet: Regular + Room service assistance (forgets to order meals)  Supplements: None    Intake/Tolerance: Intake trends  highly variable; range 0-25-50-75% of meals based on I/O trends. Is receiving 3 meals per day based on review of room service orders. Room service assistance service initiated 5/20.      GI  0-4 unmeasured BMs per day (occasionally 1-2 days between BMs  "based on I/O review)  GI note 5/20, assessment for esophageal dysphagia. Per GI note \"This patient was recently found to have a large hiatal hernia and open GE junction on EGD, which would predispose to reflux and/or aspiration. She is found to have significant heart failure on this admission. On my interview, she reports no dysphagia or difficulty swallowing.\" And \"Aspiration precaution: chew food well, eat slowly, small frequent meals, stay upright 30 minutes after meals.\"     LABS:  Reviewed      MEDICATIONS  Prednisone  Zosyn  Vit D3 50 mcg daily     SKIN  No pressure injuries noted; WOCN not following pt      ANTHROPOMETRICS  Height: 5'  Admit wt 59.8 kg (131 lbs) 5/16  Most Recent Weight: 57.7 kg (127 lb 3.3 oz)  IBW: 45.5 kg  BMI: Normal BMI    Weight History:   - Lowest/driest weight this admission of 53.8 kg (118 lbs) on 5/20/24.   - Weight loss of ~9.9% since admission when considering low weight noted above, but this weight loss is likely at least partially attributed to fluid given diuresis initiated 5/15.     Hx for current hospital stay:   Date/Time Weight Weight Method   05/21/24 0622 55.3 kg (121 lb 14.6 oz) Bed scale   05/20/24 0430 53.9 kg (118 lb 13.3 oz) Bed scale   05/19/24 0535 54.1 kg (119 lb 4.3 oz) Bed scale   05/18/24 0400 53.8 kg (118 lb 9.7 oz) Bed scale   05/17/24 0924 59.4 kg (130 lb 15.3 oz) Bed scale   05/16/24 1937 59.8 kg (131 lb 13.4 oz) Bed scale     Hx PTA:  Wt Readings from Last 15 Encounters:   05/21/24 55.3 kg (121 lb 14.6 oz)   05/14/24 57.6 kg (127 lb)   03/26/24 57.6 kg (127 lb)   02/25/24 61.7 kg (136 lb 0.4 oz)   01/22/24 59.9 kg (132 lb)     Dosing Weight: 53.8 kg (Lowest/driest wt 5/18, IBW 45.5 kg)    ASSESSED NUTRITION NEEDS  Estimated Energy Needs: 4858-8837 kcals/day (25 - 30 kcals/kg)  Justification: Maintenance  Estimated Protein Needs: ~55-65 grams protein/day (1 - 1.2 grams of pro/kg)  Justification: Lean body mass preservation/advanced age but modest protein in " S/O CKD III   Estimated Fluid Needs: 1345 mL/day (25 mL/kg)   Justification: Maintenance, age >65 years    PHYSICAL FINDINGS  See malnutrition section below.    MALNUTRITION  % Intake: < 75% for > 7 days (moderate)  % Weight Loss: > 2% in 1 week (severe) - Likely partially confounded by fluid status/diuresis   Subcutaneous Fat Loss: Facial region:  mild, Upper arm: severe and Thoracic/intercostal:  severe  Muscle Loss: Facial & jaw region:  mild, Thoracic region (clavicle, acromium bone, deltoid, trapezius, pectoral):  severe, and Upper arm (bicep, tricep):  severe -suspect at least partially attributed to advanced age  Fluid Accumulation/Edema: Does not meet criteria (trace)  Malnutrition Diagnosis: Severe malnutrition in the context of acute illness on chronic condition (meets criteria for both acute and chronic)    NUTRITION DIAGNOSIS  Inadequate oral intake related to cognition/forgetfulness, small eater at baseline as evidenced by variable intake trends since admission, weight decline, severe muscle/fat depletion, oral nutrition supplement to begin once daily.      INTERVENTIONS  Implementation  Nutrition Education: Provided education on RD role, reason for assessment, ONS  Note: Low Na edu not appro for pt given cognition. Agree with regular diet as ordered by MD.   Medical food supplement therapy - Begin Ensure once daily  Meals/snacks: Room service assistance    Goals  Patient to consume % of nutritionally adequate meal trays TID, or the equivalent with supplements/snacks.     Monitoring/Evaluation  Progress toward goals will be monitored and evaluated per protocol.  Cayden Baxter, MS, RDN, LD, CNSC  Available by [employer] or phone   Vocera: M-F (7:00-3:30)  6B Clinical Dietitian  or 1A Obs Clinical Dietitian  Weekend/Holiday (7:00-3:30) - Weekend Clinical Dietitian   6B RD desk: [phone]       **Clinical Dietitians are no longer available by pager.

## 2024-05-22 NOTE — PLAN OF CARE
/89   Pulse 110   Temp 97.5  F (36.4  C)   Resp 14   Wt 55.3 kg (121 lb 14.6 oz)   LMP  (LMP Unknown)   SpO2 94%   BMI 23.81 kg/m      Neuro: A&Ox2-4. Intermittently disoriented to time and/or situation.   Cardiac: Sinus arrhythmia/sinus tach. HR 80-110s. CVPs q12, refer to order. VSS.   Respiratory: Sating 92-94% on 1L NC. Titrated down from 4L. Goal SpO2 88-92%. Congested cough.   GI/: Moderate urine output via purewick. No BM this shift.   Diet/appetite: Regular diet. 1.8L FR. Currently NPO.   Activity:  Assist of 1 w/ GB+walker, up to chair and in halls.  Pain: Neck pain managed with PRN tylenol & hot pack. Lidocaine patches applied to ankles.    Skin: No new deficits noted.  LDA's: R DL PICC, dopamine gtt @ 2.5mcg/kg/min. L PIV, saline locked.    Received orders from Evangelina Johnson @0130 to restart dopamine gtt.   Sputum sample needed but pt unable to cough up phlegm.     Plan: Continue with POC. Notify primary team with changes. Coronary angiogram today.

## 2024-05-22 NOTE — PROGRESS NOTES
Cardiology 2 Progress Note    05/22/2024    Idalia Bob MRN# [id number]   Age: 79 year old YOB: 1945        Brief HPI   Idalia Bob is an 78yo woman with a history of COPD, HTN, and dementia who is admitted on 5/15 for de-dameon      Subjective / Interval   NAEO. Patient's dopamine was resumed overnight after ScVO2 was noted to be in 30 in the afternoon (unknown if it was taken from PICC line). Patient's MAP stable and asymptomatic, lactate slightly elevated to 2.1, normalized this AM. Dopamine discontinued, she remains in NPO for coronary angiogram. Aspirin allergy addressed this AM.   Assessment and Plan:   Today's changes:  - Discontinued Dopamine this AM given stable MAPs, lactate and asymptomatic  - NPO today for coronary angiogram. It seems it will be held and move forward with CTA  - PT/OT evaluated this AM.    #Cardiogenic Shock  #HFrEF, acute decompensation, new diagnosis  Admitted on 5/15 for decompensated heart failure. Etiology of decompensation is currently unknown and this represents a new diagnosis. Notably, an echocardiogram in February of this year was wnl suggestive of an acute process. LV size is also wnl suggestive of some acuity. Uncertain whether this represents ischemic cardiomyopathy vs other such as stress, infectious, or arrhythmogenic. Will need full workup prior to discharge.   Date of Diagnosis: 2024  Etiology: Unknown  Baseline weight: TBD  Last TTE: 5/2024 w/ LVEF 10-15% with global hypokinesis. Mod RV dysfunction. LVIDd 5cm.   Last RHC: n/a  Last angiogram: n/a  Therapeutics   > Volume: Holding Lasix drip   > Afterload: Holding   > Inotrope: swap dobutamine to dopamine for hypotension on 05/18.     > Dopamine 05/19-05/21. Discontinue today given lactate and MAPs are stable. - 05/21/24 CVP 5, CI 3.8, CO 5.8, SVR 1144   > BB: holding   > ARNI: holding   > MRA: holding   > SGLT2i: holding   > Device: n/a    # Lactic Acidosis  # Hypotension  Multifactorial iso bp  medications, rapid diuresis, and beta agonism of dobutamine. Resolved with dopamine.  - Trend lactate    #Acute Hypoxemic respiratory failure, c/f PNA  #Hx of multiple aspiration PNAs  #Pleural Effusions   On 2L of oxygen here. Chest XR showed continued left pleural effusion w trace increased small right pleural effusion. Likely secondary to acute decompensated HF   - holding diuresis  - Daily CRP, CBC  - Abx   > Vancomycin (05/19)   > Zosyn (05/19-P) x 5-day course. Will transition to PO tomorrow.   - Infectious workup   > UA negative   > MRSA negative   > Procal negative   > Sputum culture, Legionella and Streptococcus in urine  - Maintain O2 88-92% given hx of COPD  - CT chest w/o contrast with worsening RUL and RLL consolidation and severe emphysema.    - Prednisone 40mg qday x 5 days   - GI consulted to r/o esophageal dysphagia --> will follow up outpatient for esophageal dysphagia  - Speech pathology c/f aspiration PNA, and need for barium swallowing test. Scheduled on 05/21, pending  - Legionella and Streptococcus in urine negative     #Non-ischemic myocardial injury  Related to decompensated heart failure as discussed above. No concern for ACS this admission  - no further need to monitor     #Iron Deficiency Anemia  Baseline ~9 since at least early January. Labs c/w JAC.  - Started on iron 200mg IV infusion x1 5/17/2024  - Hb trending down, now Hb 7.5, back to baseline this AM ~8. Will trend Hb q12h  - Transfuse if Hb<7 or actively bleeding  - DVT prophylaxis with Heparin resumed and Pantoprazole switched from IV to PO on 05/20    #JUANIS on CKD, stage III  Baseline Scr ~0.8 reflecting eGFR of 30-45. Highest Scr here of 1.2 suspect cardiorenal  - no diuresis  - trend I/O   - renally dose meds, avoid nephrotoxic meds    #Hepatocellular Pattern of Liver Injury - Suspect due to congestive hepatopathy, diuresis as above  #COPD - No active issues, remain on PTA meds of budesonide 1mg bid and duonebs  QID  #Hypothyrodism - No active issues, continue levothyrozine 50mcg daily   #Dementia - No active issues, plan for delirium precautions and continue donepezil 10mg daily  #GERD - No active issues, continue prantoprazole 40mg daily   #Electrolyte Derangements   ## Hypokalemia - replete prn   ## Hypocalcemia - replete prn    Patient seen and discussed with staff physician, Dr. Keven Napier,  Internal Medicine PGY-3     Physical Exam:   /78 (BP Location: Left arm, Cuff Size: Adult Small)   Pulse 107   Temp 97.2  F (36.2  C) (Oral)   Resp 16   Wt 55.3 kg (121 lb 14.6 oz)   LMP  (LMP Unknown)   SpO2 100%   BMI 23.81 kg/m    Temp:  [97.1  F (36.2  C)-97.6  F (36.4  C)] 97.2  F (36.2  C)  Pulse:  [] 107  Resp:  [14-18] 16  BP: ()/(57-95) 123/78  SpO2:  [93 %-100 %] 100 %  Wt Readings from Last 2 Encounters:   05/21/24 55.3 kg (121 lb 14.6 oz)   05/14/24 57.6 kg (127 lb)       Intake/Output Summary (Last 24 hours) at 5/18/2024 1025  Last data filed at 5/18/2024 0641  Gross per 24 hour   Intake 540 ml   Output 2850 ml   Net -2310 ml       Exam:  General: NAD   HEENT: no scleral icterus or injection  CARDIAC: irregular, no murmurs. +JVD  RESP:  breathing on 2L though speaking in full sentences  GI: nondistended  : Ruiz  EXTREMITIES: 2+ LE edema  SKIN: No acute lesions appreciated  NEURO: No focal deficits         Labs:     CMP  Recent Labs   Lab 05/22/24  0931 05/22/24  0410 05/21/24 2011 05/21/24  1550 05/21/24  1152 05/21/24  0437 05/20/24  2125 05/20/24  1226 05/19/24  1624 05/19/24  0448 05/18/24  1530 05/18/24  1048 05/18/24  0431 05/17/24  0615 05/16/24  2100 05/16/24  1218 05/16/24  0535 05/16/24  0028   NA  --  140  --  139  --  138  --  136   < > 135 135 136 136   < > 140 140 140 138   POTASSIUM  --  4.3  --  3.9  --  4.3  --  4.4   < > 4.3 4.8 4.9  4.9 3.3*   < > 3.9 3.2* 3.1* 4.2   CHLORIDE  --  102  --  101  --  100  --  98   < > 94* 95* 96* 94*   < > 102 100  101 104   CO2  --  28  --  26  --  29  --  26   < > 33* 31* 29 32*   < > 27 29 24 17*   ANIONGAP  --  10  --  12  --  9  --  12   < > 8 9 11 10   < > 11 11 15 17*   GLC  --  103*  --  166*  --  199*  --  114*   < > 86 206* 162* 93   < > 123* 84 76 79   BUN  --  19.6  --  14.9  --  14.4  --  16.8   < > 18.7 19.7 18.2 17.1   < > 17.7 18.4 20.8 22.6   CR  --  1.14*  --  1.15*  --  0.48*  --  1.27*   < > 1.40* 1.39* 1.33* 1.22*   < > 1.12* 1.08* 1.07* 0.97*   GFRESTIMATED  --  48*  --  48*  --  >90  --  43*   < > 38* 38* 40* 45*   < > 49* 52* 52* 59*   AYUSH  --  8.0*  --  8.1*  --  8.1*  --  8.4*   < > 8.2* 8.4* 8.5* 8.6*   < > 8.5* 8.5* 8.7* 8.6*   MAG 2.2 2.3 2.2  --  2.3 2.3   < > 2.3   < > 1.7 2.1  --  1.9   < > 1.7 1.8 1.8 1.9   PHOS  --   --   --   --   --   --   --   --   --   --   --   --   --   --  3.7 4.0 3.8 4.2   PROTTOTAL  --   --   --   --   --   --   --   --   --  5.0* 5.2* 5.1* 5.4*   < >  --   --  5.8*  --    ALBUMIN  --   --   --   --   --   --   --   --   --  2.8* 3.0* 3.0* 3.2*   < >  --   --  3.5  --    BILITOTAL  --   --   --   --   --   --   --   --   --  0.6 0.5 0.7 0.8   < >  --   --  0.6  --    ALKPHOS  --   --   --   --   --   --   --   --   --  70 77 73 80   < >  --   --  87  --    AST  --   --   --   --   --   --   --   --   --  16 17 19 21   < >  --   --  46*  --    ALT  --   --   --   --   --   --   --   --   --  19 24 25 28   < >  --   --  54*  --     < > = values in this interval not displayed.     CBC  Recent Labs   Lab 05/22/24 0410 05/21/24  1550 05/21/24  0437 05/20/24  1226   WBC 12.4* 10.6 9.2 12.1*   HGB 8.1* 7.8* 7.9* 8.6*   HCT 29.8* 28.5* 28.6* 31.2*   MCV 83 82 83 82    266 294 253     INR  No lab results found in last 7 days.    Arterial Blood Gas  Recent Labs   Lab 05/22/24 0410 05/21/24 2011 05/21/24  1550 05/21/24  1152   O2PER 24 100 21 100       Medications:     Current Facility-Administered Medications   Medication Dose Route Frequency Provider Last Rate Last  Admin    aspirin (ASA) chewable tablet 324 mg  324 mg Oral Daily Candice Valdovinos MD   324 mg at 05/22/24 0913    busPIRone (BUSPAR) tablet 15 mg  15 mg Oral BID Terrence Tate MD   15 mg at 05/22/24 0914    donepezil (ARICEPT) tablet 10 mg  10 mg Oral At Bedtime Terrence Tate MD   10 mg at 05/21/24 2127    FLUoxetine (PROzac) capsule 40 mg  40 mg Oral Daily Terrence Tate MD   40 mg at 05/22/24 0914    heparin ANTICOAGULANT injection 5,000 Units  5,000 Units Subcutaneous Q8H Candice Valdovinos MD   5,000 Units at 05/22/24 0915    ipratropium - albuterol 0.5 mg/2.5 mg/3 mL (DUONEB) neb solution 3 mL  3 mL Nebulization Q6H Candice Valdovinos MD   3 mL at 05/21/24 1449    latanoprost (XALATAN) 0.005 % ophthalmic solution 1 drop  1 drop Both Eyes At Bedtime Terrence Tate MD   1 drop at 05/21/24 2128    levothyroxine (SYNTHROID/LEVOTHROID) tablet 50 mcg  50 mcg Oral Daily Terrence Tate MD   50 mcg at 05/22/24 0914    Lidocaine (LIDOCARE) 4 % Patch 2 patch  2 patch Transdermal Q24h Derek Silva MD   2 patch at 05/21/24 2128    methocarbamol (ROBAXIN) tablet 500 mg  500 mg Oral 4x Daily Candice Valdovinos MD   500 mg at 05/22/24 0914    OLANZapine (zyPREXA) tablet 2.5 mg  2.5 mg Oral BID Terrence Tate MD   2.5 mg at 05/22/24 0914    pantoprazole (PROTONIX) EC tablet 40 mg  40 mg Oral Daily Candice Valdovinos MD   40 mg at 05/22/24 0914    piperacillin-tazobactam (ZOSYN) 3.375 g vial to attach to  mL bag  3.375 g Intravenous Q6H Evangelina Johnson  mL/hr at 05/20/24 0858 3.375 g at 05/22/24 0920    predniSONE (DELTASONE) tablet 40 mg  40 mg Oral Daily Candice Valdovinos MD   40 mg at 05/22/24 0915    sodium chloride (PF) 0.9% PF flush 10-40 mL  10-40 mL Intracatheter Q8H Chuck Hernandez MD   10 mL at 05/22/24 1001    sodium chloride (PF) 0.9% PF flush 3 mL  3 mL Intracatheter Q8H Terrence Tate MD   3 mL at 05/21/24  2129    Vitamin D3 (CHOLECALCIFEROL) tablet 50 mcg  50 mcg Oral Daily Terrence Tate MD   50 mcg at 05/22/24 0914       Current Facility-Administered Medications   Medication Dose Route Frequency Provider Last Rate Last Admin    Reason beta blocker not prescribed   Other DOES NOT GO TO Terrence Garcia MD           Recent Imaging:   Reviewed

## 2024-05-22 NOTE — PROGRESS NOTES
Regarding aspirin allergy    Spoke with daughter Barbara Bob, Idalia's POA regarding aspirin allergy. Per daughter, Idalia has self-reported aspirin allergy causing her some nauseas 20 years ago but no emesis. No rash, anaphylactic reaction including shortness of breath or hospitalizations due to aspirin allergy was reported. Barbara understands that if Idalia needs aspirin at some point during this hospitalization and benefits outweighs risks, she would prefer her mother to receive the aspirin. She is agreeable with cares and understands the procedure for today.    - Will remove aspirin from her medication allergies.     Millicent Napier  Internal Medicine PGY3

## 2024-05-22 NOTE — PROGRESS NOTES
PALLIATIVE CARE PROGRESS NOTE  [facility]     Patient Name: Idalia Bob  Date of Admission: 5/15/2024   Today the patient was seen for: GOC, support      Recommendations & Counseling       GOALS OF CARE:   Life-prolonging with limits. Daughter notes pt does not want heroic measures, values quality of life over quantity. Hopeful to get back to [facility] where she lives with her  who has health issues of his own.   Medical Plans: Admitted with new-onset CHF of unknown etiology with EF 10-15% (Echo 2/2024 normal). Was on Dopamine which was stopped this AM. Planning for CTA today to assist with identifying etiology. Currently not on any other GDMT. Patient is not eligible for advanced therapies. To note, patient also has COPD with 4 hospital admissions since 11/2023 for pneumonia.   If not responsive to therapies available, does not tolerate, or decompensates, recommend considering a care conference.   Daughters note patient has poor short-term memory, thus want to be present or updated via phone with any major medical information.     ADVANCE CARE PLANNING:  Patient has an advance directive dated 12/2023.  Primary Health Care Agent Barbara Bob, daughter.  Alternate(s) Corie Bob, daughter.   There is no POLST form on file, recommend to complete prior to DC.  Left blank copy to daughters - they report filling one out recently, but I am not seeing this in the chart. May need to fill out a new one.   Code status: No CPR- Do NOT Intubate    MEDICAL MANAGEMENT:   We are not actively managing symptoms at this time.    PSYCHOSOCIAL/SPIRITUAL:  Family 2 daughters,  very involved. Both daughters live in the [facility] area.   [place] community: [place]     Palliative Care will continue to follow. Thank you for the consult and allowing us to aid in the care of Idalia Bob.    These recommendations have been discussed with primary team via this note.    Sadaf FRAGA  KIZZY Smith CNP  [facility], Palliative Care  Securely message with the [employer] Web Console (learn more here) or  Text page via [facility] Paging/Directory       Interval History:     Multidisciplinary collaboration:  Dopamine off today. Planning for angiogram today vs CTA?     Patient/family narrative  Daughters note they are still trying to figure out cause of HF. Patient voices she is hopeful to get back to her nursing home. Slowly feeling better. Worked with PT this AM and was able to ambulate without significant symptoms, seemed improved from just PTA.     Assessment          Idalia Bob is an 80 yo f with history of dementia, pulmonary emphysema AMRIT on fluoxetine, IBS with diarrhea and oxygen dependence since prior hospitalization with PNA in 12/2023 requiring TCU stay and now in nursing home, who presented on 5/15 with insidious onset of weakness and is now found to have new presentation of HFrEF EF 10-15% with normal size LV.      Plan per chart review is to diurese, then consider angiogram once euvolemic (angio today 5/22).      Chart review:   - 2/1 admitted to hospital for pneumonia, discharged to TCU.   - 12/6/2023 admitted to hospital for pneumonia, discharged to TCU  - 11/28/2023 admitted to hospital for pneumonia, discharged back to [facility].   - 11/20/2023 admitted to hospital for pneumonia, discharged to [facility]     Today, the patient was seen for:  Acute on chronic CHF, unspecified type  Palliative care, initial encounter  Support         Medications:      I have reviewed this patient's medication profile and medications during this hospitalization. Pertinent medications today include:      Dopamine - stopped at 10AM today     Physical Exam:   Temp:  [97.1  F (36.2  C)-97.6  F (36.4  C)] 97.2  F (36.2  C)  Pulse:  [] 107  Resp:  [14-18] 16  BP: ()/(57-95) 137/76  SpO2:  [93 %-100 %] 100 %  121 lbs 14.63 oz    Physical Exam  Cardiovascular:      Pulses: Normal pulses.   Pulmonary:      Effort: Pulmonary effort  is normal.   Skin:     General: Skin is warm.   Neurological:      Mental Status: She is alert. Mental status is at baseline.   Psychiatric:         Mood and Affect: Mood normal.           Data Reviewed:     Recent Labs   Lab 05/22/24  0410 05/21/24  1550 05/21/24  0437 05/19/24  0939 05/19/24  0448 05/18/24  1530   WBC 12.4* 10.6 9.2   < >  --  15.4*   HGB 8.1* 7.8* 7.9*   < >  --  8.6*   MCV 83 82 83   < >  --  80    266 294   < >  --  295    139 138   < > 135 135   POTASSIUM 4.3 3.9 4.3   < > 4.3 4.8   CHLORIDE 102 101 100   < > 94* 95*   CO2 28 26 29   < > 33* 31*   BUN 19.6 14.9 14.4   < > 18.7 19.7   CR 1.14* 1.15* 0.48*   < > 1.40* 1.39*   ANIONGAP 10 12 9   < > 8 9   AYUSH 8.0* 8.1* 8.1*   < > 8.2* 8.4*   * 166* 199*   < > 86 206*   ALBUMIN  --   --   --   --  2.8* 3.0*   PROTTOTAL  --   --   --   --  5.0* 5.2*   BILITOTAL  --   --   --   --  0.6 0.5   ALKPHOS  --   --   --   --  70 77   ALT  --   --   --   --  19 24   AST  --   --   --   --  16 17   LIPASE  --   --   --   --  11*  --     < > = values in this interval not displayed.     Chest CT 5/20/2024:   IMPRESSION:   1. New consolidative opacities in the right upper lobe and left lower lobe, suspicious for infection. Recommend follow-up to clearing.   2. Increased bilateral interlobular septal thickening, likely representing pulmonary edema superimposed on chronic fibrotic change.   3. Advanced centrilobular emphysema with increased fibrotic changes most pronounced in the lung bases.   4. Small bilateral pleural effusions, left greater than right.     XR Video Swallow Study 5/21/2024:   1. No evidence of laryngeal penetration or tracheal aspiration with any barium consistency.   2. Please see the speech pathologist report for further details.     Medical Decision Making       NOTE(S)/MEDICAL RECORDS REVIEWED over the past 24 hours: cardiology, nursing notes, SLP  SUPPLEMENTAL HISTORY, in addition to the patient's history, over the past  24 hours obtained from:   - 2 daughters  45 MINUTES SPENT BY ME on the date of service doing chart review, history, exam, documentation & further activities per the note.

## 2024-05-23 ENCOUNTER — APPOINTMENT (OUTPATIENT)
Dept: OCCUPATIONAL THERAPY | Facility: CLINIC | Age: 79
DRG: 291 | End: 2024-05-23
Payer: MEDICARE

## 2024-05-23 ENCOUNTER — APPOINTMENT (OUTPATIENT)
Dept: PHYSICAL THERAPY | Facility: CLINIC | Age: 79
DRG: 291 | End: 2024-05-23
Payer: MEDICARE

## 2024-05-23 LAB
ANION GAP SERPL CALCULATED.3IONS-SCNC: 10 MMOL/L (ref 7–15)
ANION GAP SERPL CALCULATED.3IONS-SCNC: 13 MMOL/L (ref 7–15)
BACTERIA BLD CULT: NO GROWTH
BACTERIA BLD CULT: NO GROWTH
BASE EXCESS BLDV CALC-SCNC: 0.4 MMOL/L (ref -3–3)
BASE EXCESS BLDV CALC-SCNC: 4.1 MMOL/L (ref -3–3)
BUN SERPL-MCNC: 21.1 MG/DL (ref 8–23)
BUN SERPL-MCNC: 22.1 MG/DL (ref 8–23)
CALCIUM SERPL-MCNC: 7.7 MG/DL (ref 8.8–10.2)
CALCIUM SERPL-MCNC: 8 MG/DL (ref 8.8–10.2)
CHLORIDE SERPL-SCNC: 103 MMOL/L (ref 98–107)
CHLORIDE SERPL-SCNC: 103 MMOL/L (ref 98–107)
CREAT SERPL-MCNC: 1.14 MG/DL (ref 0.51–0.95)
CREAT SERPL-MCNC: 1.19 MG/DL (ref 0.51–0.95)
CRP SERPL-MCNC: 29.1 MG/L
DEPRECATED HCO3 PLAS-SCNC: 22 MMOL/L (ref 22–29)
DEPRECATED HCO3 PLAS-SCNC: 25 MMOL/L (ref 22–29)
EGFRCR SERPLBLD CKD-EPI 2021: 46 ML/MIN/1.73M2
EGFRCR SERPLBLD CKD-EPI 2021: 48 ML/MIN/1.73M2
ERYTHROCYTE [DISTWIDTH] IN BLOOD BY AUTOMATED COUNT: 20.6 % (ref 10–15)
ERYTHROCYTE [DISTWIDTH] IN BLOOD BY AUTOMATED COUNT: 20.8 % (ref 10–15)
GLUCOSE SERPL-MCNC: 190 MG/DL (ref 70–99)
GLUCOSE SERPL-MCNC: 97 MG/DL (ref 70–99)
HCO3 BLDV-SCNC: 26 MMOL/L (ref 21–28)
HCO3 BLDV-SCNC: 30 MMOL/L (ref 21–28)
HCT VFR BLD AUTO: 28.6 % (ref 35–47)
HCT VFR BLD AUTO: 31.4 % (ref 35–47)
HGB BLD-MCNC: 7.9 G/DL (ref 11.7–15.7)
HGB BLD-MCNC: 8.5 G/DL (ref 11.7–15.7)
LACTATE SERPL-SCNC: 1.1 MMOL/L (ref 0.7–2)
LACTATE SERPL-SCNC: 2.8 MMOL/L (ref 0.7–2)
MAGNESIUM SERPL-MCNC: 1.9 MG/DL (ref 1.7–2.3)
MAGNESIUM SERPL-MCNC: 2.1 MG/DL (ref 1.7–2.3)
MAGNESIUM SERPL-MCNC: 2.1 MG/DL (ref 1.7–2.3)
MCH RBC QN AUTO: 22.7 PG (ref 26.5–33)
MCH RBC QN AUTO: 23 PG (ref 26.5–33)
MCHC RBC AUTO-ENTMCNC: 27.1 G/DL (ref 31.5–36.5)
MCHC RBC AUTO-ENTMCNC: 27.6 G/DL (ref 31.5–36.5)
MCV RBC AUTO: 83 FL (ref 78–100)
MCV RBC AUTO: 84 FL (ref 78–100)
O2/TOTAL GAS SETTING VFR VENT: 21 %
O2/TOTAL GAS SETTING VFR VENT: 24 %
OXYHGB MFR BLDV: 32 % (ref 70–75)
OXYHGB MFR BLDV: 42 % (ref 70–75)
PCO2 BLDV: 48 MM HG (ref 40–50)
PCO2 BLDV: 48 MM HG (ref 40–50)
PH BLDV: 7.35 [PH] (ref 7.32–7.43)
PH BLDV: 7.4 [PH] (ref 7.32–7.43)
PLATELET # BLD AUTO: 334 10E3/UL (ref 150–450)
PLATELET # BLD AUTO: 337 10E3/UL (ref 150–450)
PO2 BLDV: 24 MM HG (ref 25–47)
PO2 BLDV: 26 MM HG (ref 25–47)
POTASSIUM SERPL-SCNC: 4.2 MMOL/L (ref 3.4–5.3)
POTASSIUM SERPL-SCNC: 4.5 MMOL/L (ref 3.4–5.3)
RBC # BLD AUTO: 3.44 10E6/UL (ref 3.8–5.2)
RBC # BLD AUTO: 3.74 10E6/UL (ref 3.8–5.2)
SAO2 % BLDV: 32.8 % (ref 70–75)
SAO2 % BLDV: 43.3 % (ref 70–75)
SODIUM SERPL-SCNC: 138 MMOL/L (ref 135–145)
SODIUM SERPL-SCNC: 138 MMOL/L (ref 135–145)
WBC # BLD AUTO: 11.3 10E3/UL (ref 4–11)
WBC # BLD AUTO: 11.6 10E3/UL (ref 4–11)

## 2024-05-23 PROCEDURE — 83605 ASSAY OF LACTIC ACID: CPT

## 2024-05-23 PROCEDURE — 250N000013 HC RX MED GY IP 250 OP 250 PS 637

## 2024-05-23 PROCEDURE — 250N000011 HC RX IP 250 OP 636

## 2024-05-23 PROCEDURE — 94640 AIRWAY INHALATION TREATMENT: CPT

## 2024-05-23 PROCEDURE — 999N000157 HC STATISTIC RCP TIME EA 10 MIN

## 2024-05-23 PROCEDURE — 85027 COMPLETE CBC AUTOMATED: CPT

## 2024-05-23 PROCEDURE — 97535 SELF CARE MNGMENT TRAINING: CPT | Mod: GP | Performed by: PHYSICAL THERAPIST

## 2024-05-23 PROCEDURE — 97535 SELF CARE MNGMENT TRAINING: CPT | Mod: GO

## 2024-05-23 PROCEDURE — 120N000003 HC R&B IMCU UMMC

## 2024-05-23 PROCEDURE — 250N000013 HC RX MED GY IP 250 OP 250 PS 637: Performed by: STUDENT IN AN ORGANIZED HEALTH CARE EDUCATION/TRAINING PROGRAM

## 2024-05-23 PROCEDURE — 97116 GAIT TRAINING THERAPY: CPT | Mod: GP | Performed by: PHYSICAL THERAPIST

## 2024-05-23 PROCEDURE — 82805 BLOOD GASES W/O2 SATURATION: CPT

## 2024-05-23 PROCEDURE — 97530 THERAPEUTIC ACTIVITIES: CPT | Mod: GP | Performed by: PHYSICAL THERAPIST

## 2024-05-23 PROCEDURE — 94640 AIRWAY INHALATION TREATMENT: CPT | Mod: 76

## 2024-05-23 PROCEDURE — 83735 ASSAY OF MAGNESIUM: CPT

## 2024-05-23 PROCEDURE — 80048 BASIC METABOLIC PNL TOTAL CA: CPT

## 2024-05-23 PROCEDURE — 250N000009 HC RX 250

## 2024-05-23 PROCEDURE — 250N000011 HC RX IP 250 OP 636: Performed by: INTERNAL MEDICINE

## 2024-05-23 PROCEDURE — 36415 COLL VENOUS BLD VENIPUNCTURE: CPT

## 2024-05-23 PROCEDURE — 86140 C-REACTIVE PROTEIN: CPT

## 2024-05-23 PROCEDURE — 99233 SBSQ HOSP IP/OBS HIGH 50: CPT | Mod: GC | Performed by: INTERNAL MEDICINE

## 2024-05-23 PROCEDURE — 250N000012 HC RX MED GY IP 250 OP 636 PS 637

## 2024-05-23 RX ORDER — PIPERACILLIN SODIUM, TAZOBACTAM SODIUM 3; .375 G/15ML; G/15ML
3.38 INJECTION, POWDER, LYOPHILIZED, FOR SOLUTION INTRAVENOUS EVERY 6 HOURS
Status: COMPLETED | OUTPATIENT
Start: 2024-05-23 | End: 2024-05-23

## 2024-05-23 RX ORDER — ASPIRIN 81 MG/1
81 TABLET ORAL DAILY
Status: DISCONTINUED | OUTPATIENT
Start: 2024-05-24 | End: 2024-05-24 | Stop reason: HOSPADM

## 2024-05-23 RX ORDER — IPRATROPIUM BROMIDE AND ALBUTEROL SULFATE 2.5; .5 MG/3ML; MG/3ML
3 SOLUTION RESPIRATORY (INHALATION) 2 TIMES DAILY
Status: DISCONTINUED | OUTPATIENT
Start: 2024-05-23 | End: 2024-05-24 | Stop reason: HOSPADM

## 2024-05-23 RX ORDER — MAGNESIUM SULFATE HEPTAHYDRATE 40 MG/ML
2 INJECTION, SOLUTION INTRAVENOUS ONCE
Status: COMPLETED | OUTPATIENT
Start: 2024-05-23 | End: 2024-05-23

## 2024-05-23 RX ADMIN — PIPERACILLIN SODIUM AND TAZOBACTAM SODIUM 3.38 G: 3; .375 INJECTION, POWDER, LYOPHILIZED, FOR SOLUTION INTRAVENOUS at 16:53

## 2024-05-23 RX ADMIN — METHOCARBAMOL 500 MG: 500 TABLET ORAL at 20:14

## 2024-05-23 RX ADMIN — IPRATROPIUM BROMIDE AND ALBUTEROL SULFATE 3 ML: .5; 3 SOLUTION RESPIRATORY (INHALATION) at 14:03

## 2024-05-23 RX ADMIN — PIPERACILLIN AND TAZOBACTAM 3.38 G: 3; .375 INJECTION, POWDER, LYOPHILIZED, FOR SOLUTION INTRAVENOUS at 08:59

## 2024-05-23 RX ADMIN — DONEPEZIL HYDROCHLORIDE 10 MG: 10 TABLET, FILM COATED ORAL at 22:39

## 2024-05-23 RX ADMIN — PANTOPRAZOLE SODIUM 40 MG: 40 TABLET, DELAYED RELEASE ORAL at 08:59

## 2024-05-23 RX ADMIN — Medication 50 MCG: at 08:59

## 2024-05-23 RX ADMIN — HEPARIN SODIUM 5000 UNITS: 5000 INJECTION, SOLUTION INTRAVENOUS; SUBCUTANEOUS at 16:53

## 2024-05-23 RX ADMIN — IPRATROPIUM BROMIDE AND ALBUTEROL SULFATE 3 ML: .5; 3 SOLUTION RESPIRATORY (INHALATION) at 01:40

## 2024-05-23 RX ADMIN — BUSPIRONE HYDROCHLORIDE 15 MG: 5 TABLET ORAL at 08:58

## 2024-05-23 RX ADMIN — OLANZAPINE 2.5 MG: 2.5 TABLET, FILM COATED ORAL at 08:59

## 2024-05-23 RX ADMIN — Medication 12.5 MG: at 12:06

## 2024-05-23 RX ADMIN — FLUOXETINE HYDROCHLORIDE 40 MG: 20 CAPSULE ORAL at 08:59

## 2024-05-23 RX ADMIN — HEPARIN SODIUM 5000 UNITS: 5000 INJECTION, SOLUTION INTRAVENOUS; SUBCUTANEOUS at 23:33

## 2024-05-23 RX ADMIN — METHOCARBAMOL 500 MG: 500 TABLET ORAL at 12:08

## 2024-05-23 RX ADMIN — LATANOPROST 1 DROP: 50 SOLUTION OPHTHALMIC at 22:39

## 2024-05-23 RX ADMIN — PIPERACILLIN AND TAZOBACTAM 3.38 G: 3; .375 INJECTION, POWDER, LYOPHILIZED, FOR SOLUTION INTRAVENOUS at 01:23

## 2024-05-23 RX ADMIN — HEPARIN SODIUM 5000 UNITS: 5000 INJECTION, SOLUTION INTRAVENOUS; SUBCUTANEOUS at 09:00

## 2024-05-23 RX ADMIN — LIDOCAINE 4% 1 PATCH: 40 PATCH TOPICAL at 18:02

## 2024-05-23 RX ADMIN — HYDROXYZINE HYDROCHLORIDE 25 MG: 25 TABLET, FILM COATED ORAL at 01:13

## 2024-05-23 RX ADMIN — OLANZAPINE 2.5 MG: 2.5 TABLET, FILM COATED ORAL at 20:15

## 2024-05-23 RX ADMIN — MAGNESIUM SULFATE HEPTAHYDRATE 2 G: 2 INJECTION, SOLUTION INTRAVENOUS at 22:40

## 2024-05-23 RX ADMIN — LEVOTHYROXINE SODIUM 50 MCG: 0.05 TABLET ORAL at 08:58

## 2024-05-23 RX ADMIN — ASPIRIN 81 MG CHEWABLE TABLET 324 MG: 81 TABLET CHEWABLE at 08:58

## 2024-05-23 RX ADMIN — IPRATROPIUM BROMIDE AND ALBUTEROL SULFATE 3 ML: .5; 3 SOLUTION RESPIRATORY (INHALATION) at 08:09

## 2024-05-23 RX ADMIN — METHOCARBAMOL 500 MG: 500 TABLET ORAL at 16:54

## 2024-05-23 RX ADMIN — BUSPIRONE HYDROCHLORIDE 15 MG: 5 TABLET ORAL at 20:14

## 2024-05-23 RX ADMIN — PIPERACILLIN SODIUM AND TAZOBACTAM SODIUM 3.38 G: 3; .375 INJECTION, POWDER, LYOPHILIZED, FOR SOLUTION INTRAVENOUS at 22:41

## 2024-05-23 RX ADMIN — METHOCARBAMOL 500 MG: 500 TABLET ORAL at 08:59

## 2024-05-23 RX ADMIN — IPRATROPIUM BROMIDE AND ALBUTEROL SULFATE 3 ML: .5; 3 SOLUTION RESPIRATORY (INHALATION) at 19:38

## 2024-05-23 RX ADMIN — PREDNISONE 40 MG: 20 TABLET ORAL at 08:59

## 2024-05-23 ASSESSMENT — ACTIVITIES OF DAILY LIVING (ADL)
ADLS_ACUITY_SCORE: 45

## 2024-05-23 NOTE — PROGRESS NOTES
Cardiology 2 Progress Note    05/23/2024    Idalia Bob MRN# [id number]   Age: 79 year old YOB: 1945        Brief HPI   Idalia Bob is an 78yo woman with a history of COPD,oxygen dependence since prior hospitalization with PNA in 12/2023 , HTN, and dementia who is admitted on 5/15 with new diagnosis of decompensated heart failure.      Subjective / Interval   NAEO. Patient's blood pressures have been stable off on dopamine. Patient's MAP stable and asymptomatic. Sating well on RA. Reports feeling lightheaded with walking.     Assessment and Plan:   Today's changes:  - Complete IV Zosyn ,last dose PM 5/23  - Start Losartan 12.5mg   - Will start Empagliflozin 10mg on 5/24  - Follow up at [facility] scheduled for 6/4 at 11am  - Home oxygen assessment  - PT/OT recs TCU vs [facility] with increased assist and Home PT/OT  - Plan to discharge on 5/24    #Cardiogenic Shock  #HFrEF(EF 10-15%) acute decompensation, new diagnosis  Admitted on 5/15 for decompensated heart failure. Etiology of decompensation is currently unknown and this represents a new diagnosis. Notably, an echocardiogram in February of this year was wnl suggestive of an acute process. LV size is also wnl suggestive of some acuity. Uncertain whether this represents ischemic cardiomyopathy vs other such as stress, infectious, or arrhythmogenic. Will need full workup prior to discharge.   Date of Diagnosis: 2024  Etiology: Unknown  Baseline weight: TBD  Last TTE: 5/2024 w/ LVEF 10-15% with global hypokinesis. Mod RV dysfunction. LVIDd 5cm.   Last RHC: n/a  Last angiogram: n/a  Therapeutics   > Volume: Holding Lasix drip   > Hold PTA lasix 20mg dly, will discharge Lasix 40 mg daily PRN for weight gain of >3 lbs in a day or >5 lbs in a week   > Afterload: Holding   > Inotrope: swap dobutamine to dopamine for hypotension on 05/18.     > Dopamine 05/19-05/21.    > BB: holding   > ARNI/ACEI/ARBS: Starting Losartan 12.5mg    > MRA: holding   >  SGLT2i: Start Empagliflozin 10mg on 5/24   > Device: n/a  -[facility] clinic f/unit(s) scheduled with kate Aguilar on 6/4 at 11am    # Lactic Acidosis-Resolved  # Hypotension- Resolved  Multifactorial iso bp medications, rapid diuresis, and beta agonism of dobutamine. Resolved with dopamine.  - Trend lactate    #Acute Hypoxemic respiratory failure, c/f PNA  #Hx of multiple aspiration PNAs  #Pleural Effusions   On 2L of oxygen here, uses Oxygen prior to admission. Chest XR showed continued left pleural effusion w trace increased small right pleural effusion. Likely secondary to acute decompensated HF   - holding diuresis  - Daily CRP, CBC  - Abx   > Vancomycin (05/19)   > Zosyn (05/19-5/23) x 5-day course.   - Infectious workup   > UA negative   > MRSA negative   > Procal negative   > Sputum culture, Legionella and Streptococcus in urine  - Maintain O2 88-92% given hx of COPD  - CT chest w/o contrast with worsening RUL and RLL consolidation and severe emphysema.    - Prednisone 40mg qday x 5 days   - GI consulted to r/o esophageal dysphagia --> will follow up outpatient for esophageal dysphagia  - Speech pathology c/f aspiration PNA, and need for barium swallowing test.    Recommend continue regular textures and thin liquids diet   - Legionella and Streptococcus in urine negative     #Non-ischemic myocardial injury  Related to decompensated heart failure as discussed above. No concern for ACS this admission  - no further need to monitor     #Iron Deficiency Anemia  Baseline ~9 since at least early January. Labs c/w JAC.  - Received Iron 200mg IV infusion x 5/17-5/21  - Hb trending down ~8. Will trend Hb q12h  - Transfuse if Hb<7 or actively bleeding  - DVT prophylaxis with Heparin resumed and Pantoprazole switched from IV to PO on 05/20    #JUANIS on CKD, stage III  Baseline Scr ~0.8 reflecting eGFR of 30-45. Highest Scr here of 1.2 suspect cardiorenal  - no diuresis  - trend I/O   - renally dose meds, avoid nephrotoxic  meds    #Hepatocellular Pattern of Liver Injury - Suspect due to congestive hepatopathy, diuresis as above  #COPD - No active issues, remain on PTA meds of budesonide 1mg bid and duonebs QID  #Hypothyrodism - No active issues, continue levothyrozine 50mcg daily   #Dementia - No active issues, plan for delirium precautions and continue donepezil 10mg daily  #GERD - No active issues, continue prantoprazole 40mg daily   #Electrolyte Derangements   ## Hypokalemia - replete prn   ## Hypocalcemia - replete prn    Patient seen and discussed with staff physician, Dr. Keven [facility]  Internal Medicine PGY-1      Physical Exam:   /87 (BP Location: Left arm, Cuff Size: Adult Small)   Pulse 79   Temp 97.6  F (36.4  C) (Oral)   Resp 18   Wt 57.7 kg (127 lb 3.3 oz)   LMP  (LMP Unknown)   SpO2 97%   BMI 24.84 kg/m    Temp:  [97  F (36.1  C)-97.6  F (36.4  C)] 97.6  F (36.4  C)  Pulse:  [] 79  Resp:  [16-24] 18  BP: ()/() 130/87  SpO2:  [86 %-100 %] 97 %  Wt Readings from Last 2 Encounters:   05/23/24 57.7 kg (127 lb 3.3 oz)   05/14/24 57.6 kg (127 lb)     In and out not recorded , only 50 mls reported     Exam:  General: NAD   HEENT: no scleral icterus or injection  CARDIAC: irregular, no murmurs. +JVD  RESP:  breathing on 2L though speaking in full sentences  GI: nondistended  : Ruiz  EXTREMITIES: 2+ LE edema  SKIN: No acute lesions appreciated  NEURO: No focal deficits       Labs:     CMP  Recent Labs   Lab 05/23/24  0450 05/22/24 2012 05/22/24  1643 05/22/24  0931 05/22/24  0410 05/21/24 2011 05/21/24  1550 05/19/24  1624 05/19/24  0448 05/18/24  1530 05/18/24  1048 05/18/24  0431 05/17/24  0615 05/16/24  2100 05/16/24  1218     --  137  --  140  --  139   < > 135 135 136 136   < > 140 140   POTASSIUM 4.5  --  4.6  --  4.3  --  3.9   < > 4.3 4.8 4.9  4.9 3.3*   < > 3.9 3.2*   CHLORIDE 103  --  102  --  102  --  101   < > 94* 95* 96* 94*   < > 102 100   CO2 25  --  23  --   28  --  26   < > 33* 31* 29 32*   < > 27 29   ANIONGAP 10  --  12  --  10  --  12   < > 8 9 11 10   < > 11 11   GLC 97  --  164*  --  103*  --  166*   < > 86 206* 162* 93   < > 123* 84   BUN 22.1  --  20.1  --  19.6  --  14.9   < > 18.7 19.7 18.2 17.1   < > 17.7 18.4   CR 1.14*  --  1.18*  --  1.14*  --  1.15*   < > 1.40* 1.39* 1.33* 1.22*   < > 1.12* 1.08*   GFRESTIMATED 48*  --  46*  --  48*  --  48*   < > 38* 38* 40* 45*   < > 49* 52*   AYUSH 7.7*  --  7.7*  --  8.0*  --  8.1*   < > 8.2* 8.4* 8.5* 8.6*   < > 8.5* 8.5*   MAG 2.1 2.3  --  2.2 2.3   < >  --    < > 1.7 2.1  --  1.9   < > 1.7 1.8   PHOS  --   --   --   --   --   --   --   --   --   --   --   --   --  3.7 4.0   PROTTOTAL  --   --   --   --   --   --   --   --  5.0* 5.2* 5.1* 5.4*   < >  --   --    ALBUMIN  --   --   --   --   --   --   --   --  2.8* 3.0* 3.0* 3.2*   < >  --   --    BILITOTAL  --   --   --   --   --   --   --   --  0.6 0.5 0.7 0.8   < >  --   --    ALKPHOS  --   --   --   --   --   --   --   --  70 77 73 80   < >  --   --    AST  --   --   --   --   --   --   --   --  16 17 19 21   < >  --   --    ALT  --   --   --   --   --   --   --   --  19 24 25 28   < >  --   --     < > = values in this interval not displayed.     CBC  Recent Labs   Lab 05/23/24  0450 05/22/24  1643 05/22/24  0410 05/21/24  1550   WBC 11.3* 12.4* 12.4* 10.6   HGB 7.9* 8.6* 8.1* 7.8*   HCT 28.6* 31.1* 29.8* 28.5*   MCV 83 84 83 82    341 345 266       Arterial Blood Gas  Recent Labs   Lab 05/23/24  0450 05/22/24  1643 05/22/24  0410 05/21/24 2011   O2PER 24 21 24 100       Medications:     Current Facility-Administered Medications   Medication Dose Route Frequency Provider Last Rate Last Admin    aspirin (ASA) chewable tablet 324 mg  324 mg Oral Daily Candice Valdovinos MD   324 mg at 05/22/24 0913    busPIRone (BUSPAR) tablet 15 mg  15 mg Oral BID Terrence Tate MD   15 mg at 05/22/24 2007    donepezil (ARICEPT) tablet 10 mg  10 mg Oral At Bedtime  Terrence Tate MD   10 mg at 05/22/24 2303    FLUoxetine (PROzac) capsule 40 mg  40 mg Oral Daily Terrence Tate MD   40 mg at 05/22/24 0914    heparin ANTICOAGULANT injection 5,000 Units  5,000 Units Subcutaneous Q8H Candice Valdovinos MD   5,000 Units at 05/22/24 2303    ipratropium - albuterol 0.5 mg/2.5 mg/3 mL (DUONEB) neb solution 3 mL  3 mL Nebulization Q6H Candice Valdovinos MD   3 mL at 05/23/24 0809    latanoprost (XALATAN) 0.005 % ophthalmic solution 1 drop  1 drop Both Eyes At Bedtime Terrence Tate MD   1 drop at 05/22/24 2303    levothyroxine (SYNTHROID/LEVOTHROID) tablet 50 mcg  50 mcg Oral Daily Terrence Tate MD   50 mcg at 05/22/24 0914    Lidocaine (LIDOCARE) 4 % Patch 2 patch  2 patch Transdermal Q24h Derek Silva MD   2 patch at 05/22/24 2008    methocarbamol (ROBAXIN) tablet 500 mg  500 mg Oral 4x Daily Candice Valdovinos MD   500 mg at 05/22/24 2007    OLANZapine (zyPREXA) tablet 2.5 mg  2.5 mg Oral BID Terrence Tate MD   2.5 mg at 05/22/24 2007    pantoprazole (PROTONIX) EC tablet 40 mg  40 mg Oral Daily Candice Valdovinos MD   40 mg at 05/22/24 0914    piperacillin-tazobactam (ZOSYN) 3.375 g vial to attach to  mL bag  3.375 g Intravenous Q6H Evangelina Johnson  mL/hr at 05/20/24 0858 3.375 g at 05/23/24 0123    predniSONE (DELTASONE) tablet 40 mg  40 mg Oral Daily Candice Valdovinos MD   40 mg at 05/22/24 0915    sodium chloride (PF) 0.9% PF flush 10-40 mL  10-40 mL Intracatheter Q8H Chuck Hernandez MD   10 mL at 05/23/24 0113    sodium chloride (PF) 0.9% PF flush 3 mL  3 mL Intracatheter Q8H Terrence Tate MD   3 mL at 05/23/24 0512    Vitamin D3 (CHOLECALCIFEROL) tablet 50 mcg  50 mcg Oral Daily Terrence Tate MD   50 mcg at 05/22/24 0914       Current Facility-Administered Medications   Medication Dose Route Frequency Provider Last Rate Last Admin    Reason beta blocker not prescribed   Other  DOES NOT GO TO Terrence Garcia MD           Recent Imaging:   Reviewed    CTA 5/15  FINDINGS:     Partially visualized dual-lumen right upper extremity PICC line in the  superior vena cava. Cardiomegaly with biventricular enlargement.  Coronary artery atherosclerosis. No pericardial effusion. Normal  caliber of the visualized thoracic aorta and borderline enlarged  pulmonary artery. The visualized mediastinal and hilar lymph nodes are  not enlarged. Large internal hernia.     Diffuse centrilobular and paraseptal emphysematous changes. Small,  left greater than right, pleural effusions. No pneumothorax.      Contrast opacifying left upper quadrant loop of bowel. Other partially  visualized upper abdominal organs are within normal limits. No acute  osseous lesions of the visualized thorax.      IMPRESSION:  1. No acute abnormality on the CT images of the lower chest and upper  abdomen.  2. Borderline enlarged pulmonary artery, exact characterization  communicating made in the separate cardiologist report given their  utilization of postprocessed images.   3. Please refer to separate cardiology report for detailed information  of the heart.

## 2024-05-23 NOTE — CONSULTS
Idalia is an 79 year old female presenting with heart failure. [facility] consult requested. She is currently admitted with newly diagnosed HFrEF.    She was provided with a CHF book.  I reviewed the importance of daily weights, 2 gm sodium diet, 2L fluid restriction and compliance with medications upon hospital discharge. She lives at an [facility] and has food prepared for her. She does have a scale and will use this daily. Her family helps set up medications for her.  [facility] contact phone numbers provided and patient is encouraged to call with any questions or concerns, including any weight gain or loss of 2 or more pounds in 24 hours or 5 or more pounds in 1 week.      Appointment made in [facility] clinic on  at 11am.  I will follow-up with the patient at that time, sooner if needed.  Thank you for the consult.    Bhumika Viera RN BSN  Cardiology Care Coordinator - [facility]  Questions and [phone], option 1

## 2024-05-23 NOTE — CONSULTS
Discharge Pharmacy Test Claim    Jardiance is covered by patient's [employer] Medicare Part D plan with an initial copay of $578.28. Patient has an unmet drug deductible with $528.28 remaining. Once the drug deductible is met with the first fill, refill copays reduce to $47 per month.    Test Claim Initial Fill After Deductible is met   jardiance [phone]     Cardiomyopathy Vitaliy  If patient is unable to afford copays listed above, patient may be eligible for a $10,000 vitaliy through the [employer] cardiomyopathy fund. Visit the link below for more information. Contact pharmacy liaison to initiate an application.    [email or web address]     Addendum 5/24: Patient was approved for the cardiomyopathy vitaliy above. See documentation encounter for approval details.     Demetria Grey  [facility] Pharmacy Liaison  Ph: [phone]  Fax [phone]  Available on [employer] (learn more here)

## 2024-05-23 NOTE — PLAN OF CARE
Goal Outcome Evaluation:      Plan of Care Reviewed With: patient    Overall Patient Progress: no changeOverall Patient Progress: no change    Outcome Evaluation: See RD note 5/23 for assessment/recs

## 2024-05-23 NOTE — PROGRESS NOTES
Care Management Follow Up    Length of Stay (days): 8    Expected Discharge Date: TBD     Concerns to be Addressed: Medical readiness, discharge planning.   Patient plan of care discussed at interdisciplinary rounds: Yes    Anticipated Discharge Disposition: TBD, likely to need TCU placement prior to returning to [facility].  Anticipated Discharge Services: TBD  Anticipated Discharge DME: TBD    Patient/family educated on Medicare website which has current facility and service quality ratings:  NA  Education Provided on the Discharge Plan: Not discussed at this time.  Patient/Family in Agreement with the Plan: Not assessed at this time.    Referrals Placed by CM/SW:  No new referrals at this time.   Private pay costs discussed: Not applicable    Additional Information:  Call received from nurse w/pt's nursing home, confirmed that the patient was mostly independent inside her apartment, received some assistance w/toileting and prior to admission required assist x1-2 w/ambulation and transfers. [facility] nurse noted that they are unable to provide assist x2 and noted that for the patient to return to [facility] she would need to be a safe/steady assist x1. nursing home nurse notes that the patient managed her own medications, and they would not be able to accommodate any IV medications. Per chart review, patient is on IV zosyn, will transition to PO abx today. nursing home nurse noted that if therapy is recommending TCU, they would support that recommendation. Per nursing home nurse, pt has not had great TCU experiences and may be hesitant towards placement, SW updated.     1200 Addendum:  Per update from the primary team, patient is medically ready for discharge and they were hoping she could return to her [facility] today. Writer sent communication to OT, need to confirm pt's discharge recommendations.    1510 Addendum:  Updated therapy recs noted, patient could be safe to return to her [facility] w/assist x1 for dressing, bathing, and toileting. Patient's family plans to increase  services through the [facility] as well as provide assistance as needed by themselves. Writer contacted [facility] nurse, spoke w/Asha who confirmed the patient can return if only an assist x1 with some ADLs. Writer sent referral to [facility] for PT/OT. Anticipated discharge tomorrow if patient is medically stable. Will need orders early in the morning. Per bedside nurse, plan for walk test to assess oxygen needs (has O2 at home through [facility], occasionally uses w/ambulation and sleep). Primary team updated on discharge planning.     Discharge resources:    [facility]  Nursing line: [phone]    Fax: [phone]    -If patient were to be cleared for return to [facility], she would need to return M-F before 2pm and orders would be needed on day of discharge as soon as possible in the morning, they do not do weekend returns.   ________________________________    Care coordination will continue to follow.     Elza Rodriguez, Nurse Coordinator, BSN  Phone: [phone]  Vocera: 6B [facility] RNCC

## 2024-05-23 NOTE — DISCHARGE SUMMARY
[facility]    Cardiology Discharge Summary- Cardiology         Date of Admission:  5/15/2024  Date of Discharge:  5/24/2024  Discharging Provider: Dr. Dillon      Discharge Diagnoses   #Cardiogenic Shock  #HFrEF(EF 10-15%) acute decompensation, new diagnosis  #Lactic Acidosis-Resolved  #Hypotension- Resolved  #Non-ischemic myocardial injury   #Iron Deficiency Anemia   #JUANIS on CKD, stage III   #Hepatocellular Pattern of Liver Injury   #COPD   #Hypothyrodism       Follow-ups Needed After Discharge     Unresulted Labs Ordered in the Past 30 Days of this Admission       Date and Time Order Name Status Description    5/23/2024 10:01 AM Basic metabolic panel In process     5/22/2024  7:42 AM Blood Culture Arm, Left Preliminary     5/22/2024  7:42 AM Blood Culture Arm, Left Preliminary     5/18/2024  4:27 PM Blood Culture Hand, Right Preliminary     5/18/2024  4:27 PM Blood Culture Hand, Right Preliminary         These results will be followed up by PCP and Cardiology,Marisa Aguilar    Discharge Disposition   Discharged to assisted living  Condition at discharge: Stable    Hospital Course   #Cardiogenic Shock  #HFrEF(EF 10-15%) acute decompensation, new diagnosis  Admitted on 5/15 for decompensated heart failure. Etiology of decompensation is currently unknown and this represents a new diagnosis. Notably, an echocardiogram in February of this year was wnl suggestive of an acute process. LV size is also wnl suggestive of some acuity. Uncertain whether this represents ischemic cardiomyopathy vs other such as stress, infectious, or arrhythmogenic. Hospital course complicated by hypotension requires ionotropic support with initially dobutamine ,then dopamine which was discontinued 5/21 and she maintained SBPs in 120-130s off dopamine.  Date of Diagnosis: 2024  Etiology: Unknown  Baseline weight: TBD  Last TTE: 5/2024 w/ LVEF 10-15% with global hypokinesis. Mod RV  dysfunction. LVIDd 5cm.   Therapeutics                > Volume: Euvolemic, EDW ~130lbs                > Discharge with Lasix 40 mg daily PRN for weight gain of >3 lbs in a day or >5 lbs in a week                > Afterload: None                > Inotrope:  Received Dopamine 05/19-05/21 for hypotension .                          > BB: None                > ARNI/ACEI/ARBS: Started Losartan 12.5mg on 5/22.                > MRA: None                > SGLT2i: Start Empagliflozin 10mg on 5/24  -[facility] clinic F/U scheduled with kate Aguilar on 6/4 at 11 am with labs-CMP,CBC,Mg      #Acute Hypoxemic respiratory failure, c/f PNA  #Hx of multiple aspiration PNAs  #Pleural Effusions   #Leukocytosis  Chest XR showed continued left pleural effusion w trace increased small right pleural effusion. Likely secondary to acute decompensated HF . CT chest w/o contrast with worsening RUL and RLL consolidation and severe emphysema. Infectious workup- UA negative, MRSA negative, Procal negative, Sputum culture, UCX-Legionella and Streptococcus in urine. Received Prednisone 40mg qday x 5 days for possible COPD exacerbation, Vancomycin  on 5/19 and Zosyn 5/19-5/23 to complete 5 day antibiotic course. During hospitalization,had intermittent post prandial nausea and vomitting, GI consulted and Speech language pathology consulted. Did not show signs of aspiration on Barium swallow. On 2L of oxygen intermittently this admission here, which improved and only requiring oxygen overnight when sleeping. Had oxygen assessment test this admission, did not need oxygen with activity or at rest. Home orders for oxygen modified.  -Vancomycin  on 5/19 and Zosyn 5/19-5/23  - GI consulted to r/o esophageal dysphagia   >Follow up outpatient for esophageal dysphagia, clinic will call to schedule appointment  - Speech pathology no signs of aspiration on barium swallowing test.               >Recommendation to continue regular textures and thin liquids diet    -Home oxygen orders modified   -Home care PT/OT referral placed     # Lactic Acidosis-Resolved  # Hypotension- Resolved  Multifactorial iso bp medications, rapid diuresis, and beta agonism of dobutamine. Resolved with dopamine.    #Non-ischemic myocardial injury  Related to decompensated heart failure as discussed above. No concern for ACS this admission  - no further need to monitor     #Iron Deficiency Anemia  Baseline ~9 since at least early January. Labs c/w JAC.  - Received Iron 200mg IV infusion x 5/17-5/21  - Transfuse if Hb<7 or actively bleeding  - Continue PO Pantoprazole      #JUANIS on CKD, stage III  Baseline Scr ~0.8 reflecting eGFR of 30-45. Highest Scr here of 1.2 suspect cardiorenal  - no diuresis  - trend I/O   - renally dose meds, avoid nephrotoxic meds     #Hepatocellular Pattern of Liver Injury - Suspect due to congestive hepatopathy, diuresis as above  #COPD - No active issues, remain on PTA meds of budesonide 1mg bid and duonebs QID  #Hypothyrodism - No active issues, continue levothyrozine 50mcg daily   #Dementia - No active issues, plan for delirium precautions and continue donepezil 10mg daily  #GERD - No active issues, continue prantoprazole 40mg daily   # Hypokalemia - replete prn  # Hypocalcemia - replete prn      Consultations This Hospital Stay   CORE CLINIC EVALUATION IP CONSULT  OCCUPATIONAL THERAPY ADULT IP CONSULT  NUTRITION SERVICES ADULT IP CONSULT  CARE MANAGEMENT / SOCIAL WORK IP CONSULT  PHYSICAL THERAPY ADULT IP CONSULT  CARDIOLOGY HEART FAILURE (HF) IP CONSULT  NURSING TO CONSULT FOR VASCULAR ACCESS CARE IP CONSULT  LYMPHEDEMA THERAPY IP CONSULT  NURSING TO CONSULT FOR VASCULAR ACCESS CARE IP CONSULT  NURSING TO CONSULT FOR VASCULAR ACCESS CARE IP CONSULT  NURSING TO CONSULT FOR VASCULAR ACCESS CARE IP CONSULT  PALLIATIVE CARE ADULT IP CONSULT  VASCULAR ACCESS FOR PICC PLACEMENT ADULT IP CONSULT  PHARMACY TO DOSE VANCO  SPEECH LANGUAGE PATH ADULT IP CONSULT  NURSING TO  CONSULT FOR VASCULAR ACCESS CARE IP CONSULT  GI LUMINAL ADULT IP CONSULT  NURSING TO CONSULT FOR VASCULAR ACCESS CARE IP CONSULT  PHARMACY LIAISON FOR MEDICATION COVERAGE CONSULT    Code Status   No CPR- Do NOT Intubate      Patient staffed with attending physician Dr. Santana Starr MD  Internal Medicine PGY-1  [facility]  ______________________________________________________________________    Physical Exam   Vital Signs: Temp: 97.2  F (36.2  C) Temp src: Oral BP: 126/80 Pulse: 99   Resp: 18 SpO2: 92 % O2 Device: None (Room air) Oxygen Delivery: 1 LPM  Weight: 127 lbs 3.29 oz    Exam:  General: NAD   HEENT: no scleral icterus or injection  CARDIAC: irregular, no murmurs. JVD not visualized  RESP:  Chest is clear to auscultation bilaterally  GI: nondistended  : Ruiz  EXTREMITIES: No LE edema  SKIN: No acute lesions appreciated  NEURO: No focal deficits          Primary Care Physician   Gomez Louis    Discharge Orders       Significant Results and Procedures   Most Recent 3 CBC's:  Recent Labs   Lab Test 05/24/24  0515 05/23/24  1601 05/23/24  0450   WBC 13.2* 11.6* 11.3*   HGB 8.0* 8.5* 7.9*   MCV 83 84 83    334 337     Most Recent 3 BMP's:  Recent Labs   Lab Test 05/24/24  0515 05/23/24  1601 05/23/24  0450    138 138   POTASSIUM 4.2 4.2 4.5   CHLORIDE 105 103 103   CO2 23 22 25   BUN 23.5* 21.1 22.1   CR 1.15* 1.19* 1.14*   ANIONGAP 11 13 10   AYUSH 8.0* 8.0* 7.7*   GLC 97 190* 97     Most Recent 3 INR's:  Recent Labs   Lab Test 05/15/24  0929   INR 2.34*       Discharge Medications   Current Discharge Medication List        CONTINUE these medications which have NOT CHANGED    Details   !! acetaminophen (TYLENOL) 325 MG tablet Take 325-650 mg by mouth every 6 hours as needed for mild pain      !! acetaminophen (TYLENOL) 500 MG tablet Take 1,000 mg by mouth daily      albuterol (PROVENTIL) (2.5 MG/3ML) 0.083% neb solution Take 1 vial  (2.5 mg) by nebulization 2 times daily as needed (COPD)  Qty: 90 mL, Refills: 5    Associated Diagnoses: Pulmonary emphysema, unspecified emphysema type (H)      benzocaine-menthol (CHLORASEPTIC) 6-10 MG lozenge Place 1 lozenge inside cheek every hour as needed for sore throat    Associated Diagnoses: Sore throat      budesonide (PULMICORT) 1 MG/2ML neb solution Take 1 mg by nebulization 2 times daily as needed      busPIRone (BUSPAR) 15 MG tablet Take 15 mg by mouth 2 times daily    Associated Diagnoses: AMRIT (generalized anxiety disorder)      calcium carbonate (TUMS) 500 MG chewable tablet Take 1 tablet (500 mg) by mouth 4 times daily as needed for heartburn    Associated Diagnoses: Gastroesophageal reflux disease, unspecified whether esophagitis present      Calcium Polycarbophil (FIBER) 625 MG tablet Take 2 tablets by mouth daily      diphenhydrAMINE-zinc acetate (BENADRYL) 2-0.1 % external cream Apply topically 3 times daily as needed for itching    Associated Diagnoses: Itching      donepezil (ARICEPT) 10 MG tablet Take 1 tablet (10 mg) by mouth at bedtime    Associated Diagnoses: Mild vascular dementia, unspecified whether behavioral, psychotic, or mood disturbance or anxiety (H)      ferrous gluconate (FERGON) 324 (38 Fe) MG tablet Take 1 tablet (324 mg) by mouth daily (with breakfast)  Qty: 90 tablet, Refills: 3    Associated Diagnoses: Other iron deficiency anemia      FLUoxetine (PROZAC) 40 MG capsule Take 1 capsule (40 mg) by mouth daily    Associated Diagnoses: Pulmonary emphysema, unspecified emphysema type (H)      furosemide (LASIX) 20 MG tablet 2 tab daily for 5 days, then one tab daily as needed for lower ext edema  Qty: 90 tablet, Refills: 3    Associated Diagnoses: Lower extremity edema      hyoscyamine (LEVSIN) 0.125 MG tablet TAKE 1 TABLET (125 MCG) BY MOUTH EVERY 4 HOURS AS NEEDED FOR CRAMPING OR DIARRHEA  Qty: 30 tablet, Refills: 1    Associated Diagnoses: Abdominal cramps      ipratropium -  albuterol 0.5 mg/2.5 mg/3 mL (DUONEB) 0.5-2.5 (3) MG/3ML neb solution Take 1 vial by nebulization every 6 hours as needed for shortness of breath, wheezing or cough      lactobacillus rhamnosus, GG, (CULTURELL) capsule Take 1 capsule by mouth daily      latanoprost (XALATAN) 0.005 % ophthalmic solution Place 1 drop into both eyes at bedtime    Associated Diagnoses: Dry eyes      levothyroxine (SYNTHROID/LEVOTHROID) 50 MCG tablet Take 1 tablet (50 mcg) by mouth daily    Associated Diagnoses: Hypothyroidism, unspecified type      melatonin 1 MG TABS tablet Take 1 tablet (1 mg) by mouth nightly as needed for sleep    Associated Diagnoses: Insomnia, unspecified type      OLANZapine (ZYPREXA) 2.5 MG tablet Take 1 tablet (2.5 mg) by mouth 2 times daily      oxyCODONE (ROXICODONE) 5 MG tablet 1/2 tab at bedtime as needed for severe pain. ( Need to last for one month )  Qty: 15 tablet, Refills: 0    Associated Diagnoses: Back pain, unspecified back location, unspecified back pain laterality, unspecified chronicity      pantoprazole (PROTONIX) 40 MG EC tablet Take 1 tablet (40 mg) by mouth daily  Qty: 90 tablet, Refills: 3    Associated Diagnoses: Gastroesophageal reflux disease, unspecified whether esophagitis present      potassium chloride ER (K-TAB) 20 MEQ CR tablet Take one tab bid with Furosemide 40 mg.  Qty: 60 tablet, Refills: 2    Associated Diagnoses: Lower extremity edema      senna-docusate (SENOKOT-S/PERICOLACE) 8.6-50 MG tablet Take 1 tablet by mouth 2 times daily as needed for constipation    Associated Diagnoses: Constipation, unspecified constipation type      triamcinolone (KENALOG) 0.1 % external cream Apply topically 2 times daily as needed for irritation    Associated Diagnoses: Itching      vitamin D3 (CHOLECALCIFEROL) 50 mcg (2000 units) tablet Take 1 tablet (50 mcg) by mouth daily    Associated Diagnoses: Age-related osteoporosis without current pathological fracture       !! - Potential duplicate  medications found. Please discuss with provider.        Allergies   Allergies   Allergen Reactions    Penicillins Itching     Has tolerated ceftriaxone, piperacillin, and amoxicillin

## 2024-05-23 NOTE — PROGRESS NOTES
[facility], [place]   Palliative Care Daily Progress Note        Palliative Care was consulted for decisional support and goals of care. At this time goals are clear and there is minimal symptom burden, so we will sign off. Please feel free to reconsult us if we can be helpful in the future. Thank you for the opportunity to be involved in the care of this patient.    KIZZY Preciado, CNS  Palliative Care Consult Team

## 2024-05-23 NOTE — PLAN OF CARE
Neuro: A&Ox4. Occasionally forgetful.   Cardiac: SR.HR 70s. VSS  Respiratory: Sating >95% on RA- 1LNC. Denies SOB  GI/: Adequate urine output via external catheter. No BM this shift.  Diet/appetite: Tolerating regular diet with 1800 ml FR. Denies nausea.  Activity:  Assist of 1 with gait belt/walker. Stood at bedside  Pain: Denies  Skin: No new deficits noted.   LDA's: L PIV x2. R PICC SL    Plan: Continue with POC. Notify primary team with changes.

## 2024-05-24 ENCOUNTER — TELEPHONE (OUTPATIENT)
Dept: FAMILY MEDICINE | Facility: CLINIC | Age: 79
End: 2024-05-24
Payer: MEDICARE

## 2024-05-24 ENCOUNTER — DOCUMENTATION ONLY (OUTPATIENT)
Dept: CARDIOLOGY | Facility: CLINIC | Age: 79
End: 2024-05-24
Payer: MEDICARE

## 2024-05-24 VITALS
BODY MASS INDEX: 25.62 KG/M2 | OXYGEN SATURATION: 92 % | SYSTOLIC BLOOD PRESSURE: 141 MMHG | TEMPERATURE: 97.5 F | DIASTOLIC BLOOD PRESSURE: 90 MMHG | WEIGHT: 131.17 LBS | HEART RATE: 104 BPM | RESPIRATION RATE: 16 BRPM

## 2024-05-24 LAB
ANION GAP SERPL CALCULATED.3IONS-SCNC: 11 MMOL/L (ref 7–15)
BASE EXCESS BLDV CALC-SCNC: 2.1 MMOL/L (ref -3–3)
BUN SERPL-MCNC: 23.5 MG/DL (ref 8–23)
CALCIUM SERPL-MCNC: 8 MG/DL (ref 8.8–10.2)
CHLORIDE SERPL-SCNC: 105 MMOL/L (ref 98–107)
CREAT SERPL-MCNC: 1.15 MG/DL (ref 0.51–0.95)
CRP SERPL-MCNC: 18.2 MG/L
DEPRECATED HCO3 PLAS-SCNC: 23 MMOL/L (ref 22–29)
EGFRCR SERPLBLD CKD-EPI 2021: 48 ML/MIN/1.73M2
ERYTHROCYTE [DISTWIDTH] IN BLOOD BY AUTOMATED COUNT: 20.8 % (ref 10–15)
GLUCOSE SERPL-MCNC: 97 MG/DL (ref 70–99)
HCO3 BLDV-SCNC: 27 MMOL/L (ref 21–28)
HCT VFR BLD AUTO: 28.9 % (ref 35–47)
HGB BLD-MCNC: 8 G/DL (ref 11.7–15.7)
LACTATE SERPL-SCNC: 1.9 MMOL/L (ref 0.7–2)
MAGNESIUM SERPL-MCNC: 2.6 MG/DL (ref 1.7–2.3)
MCH RBC QN AUTO: 22.9 PG (ref 26.5–33)
MCHC RBC AUTO-ENTMCNC: 27.7 G/DL (ref 31.5–36.5)
MCV RBC AUTO: 83 FL (ref 78–100)
O2/TOTAL GAS SETTING VFR VENT: 21 %
OXYHGB MFR BLDV: 34 % (ref 70–75)
PCO2 BLDV: 43 MM HG (ref 40–50)
PH BLDV: 7.41 [PH] (ref 7.32–7.43)
PLATELET # BLD AUTO: 375 10E3/UL (ref 150–450)
PO2 BLDV: 24 MM HG (ref 25–47)
POTASSIUM SERPL-SCNC: 4.2 MMOL/L (ref 3.4–5.3)
RBC # BLD AUTO: 3.5 10E6/UL (ref 3.8–5.2)
SAO2 % BLDV: 34.4 % (ref 70–75)
SODIUM SERPL-SCNC: 139 MMOL/L (ref 135–145)
WBC # BLD AUTO: 13.2 10E3/UL (ref 4–11)

## 2024-05-24 PROCEDURE — 94640 AIRWAY INHALATION TREATMENT: CPT

## 2024-05-24 PROCEDURE — 999N000157 HC STATISTIC RCP TIME EA 10 MIN

## 2024-05-24 PROCEDURE — 85027 COMPLETE CBC AUTOMATED: CPT

## 2024-05-24 PROCEDURE — 250N000011 HC RX IP 250 OP 636

## 2024-05-24 PROCEDURE — 82805 BLOOD GASES W/O2 SATURATION: CPT

## 2024-05-24 PROCEDURE — 250N000012 HC RX MED GY IP 250 OP 636 PS 637

## 2024-05-24 PROCEDURE — 83735 ASSAY OF MAGNESIUM: CPT

## 2024-05-24 PROCEDURE — 86140 C-REACTIVE PROTEIN: CPT

## 2024-05-24 PROCEDURE — 250N000013 HC RX MED GY IP 250 OP 250 PS 637

## 2024-05-24 PROCEDURE — 83605 ASSAY OF LACTIC ACID: CPT

## 2024-05-24 PROCEDURE — 80048 BASIC METABOLIC PNL TOTAL CA: CPT

## 2024-05-24 PROCEDURE — 250N000009 HC RX 250

## 2024-05-24 RX ORDER — LOSARTAN POTASSIUM 25 MG/1
12.5 TABLET ORAL DAILY
Qty: 30 TABLET | Refills: 0 | Status: SHIPPED | OUTPATIENT
Start: 2024-05-24 | End: 2024-06-28

## 2024-05-24 RX ORDER — OLANZAPINE 2.5 MG/1
TABLET, FILM COATED ORAL
Qty: 60 TABLET | OUTPATIENT
Start: 2024-05-24

## 2024-05-24 RX ORDER — BUDESONIDE 1 MG/2ML
1 INHALANT ORAL 2 TIMES DAILY PRN
Qty: 30 ML | Refills: 0 | Status: SHIPPED | OUTPATIENT
Start: 2024-05-24 | End: 2024-06-04

## 2024-05-24 RX ORDER — FUROSEMIDE 40 MG
40 TABLET ORAL DAILY PRN
Qty: 30 TABLET | Refills: 0 | Status: SHIPPED | OUTPATIENT
Start: 2024-05-24 | End: 2024-06-04

## 2024-05-24 RX ORDER — ASPIRIN 81 MG/1
81 TABLET ORAL DAILY
Qty: 30 TABLET | Refills: 0 | Status: SHIPPED | OUTPATIENT
Start: 2024-05-25

## 2024-05-24 RX ORDER — FUROSEMIDE 40 MG
40 TABLET ORAL DAILY
Status: DISCONTINUED | OUTPATIENT
Start: 2024-05-24 | End: 2024-05-24 | Stop reason: HOSPADM

## 2024-05-24 RX ADMIN — Medication 50 MCG: at 09:29

## 2024-05-24 RX ADMIN — EMPAGLIFLOZIN 10 MG: 10 TABLET, FILM COATED ORAL at 09:28

## 2024-05-24 RX ADMIN — FLUOXETINE HYDROCHLORIDE 40 MG: 20 CAPSULE ORAL at 09:28

## 2024-05-24 RX ADMIN — ASPIRIN 81 MG: 81 TABLET ORAL at 09:28

## 2024-05-24 RX ADMIN — BUSPIRONE HYDROCHLORIDE 15 MG: 5 TABLET ORAL at 09:28

## 2024-05-24 RX ADMIN — OLANZAPINE 2.5 MG: 2.5 TABLET, FILM COATED ORAL at 09:28

## 2024-05-24 RX ADMIN — FUROSEMIDE 40 MG: 40 TABLET ORAL at 09:29

## 2024-05-24 RX ADMIN — METHOCARBAMOL 500 MG: 500 TABLET ORAL at 09:29

## 2024-05-24 RX ADMIN — HEPARIN SODIUM 5000 UNITS: 5000 INJECTION, SOLUTION INTRAVENOUS; SUBCUTANEOUS at 09:28

## 2024-05-24 RX ADMIN — PREDNISONE 40 MG: 20 TABLET ORAL at 09:28

## 2024-05-24 RX ADMIN — LEVOTHYROXINE SODIUM 50 MCG: 0.05 TABLET ORAL at 09:29

## 2024-05-24 RX ADMIN — IPRATROPIUM BROMIDE AND ALBUTEROL SULFATE 3 ML: .5; 3 SOLUTION RESPIRATORY (INHALATION) at 08:06

## 2024-05-24 RX ADMIN — PANTOPRAZOLE SODIUM 40 MG: 40 TABLET, DELAYED RELEASE ORAL at 09:29

## 2024-05-24 ASSESSMENT — ACTIVITIES OF DAILY LIVING (ADL)
ADLS_ACUITY_SCORE: 45

## 2024-05-24 NOTE — TELEPHONE ENCOUNTER
Home Care is calling regarding an established patient with [facility].    Requesting orders from: Gomez Louis  Provider is following patient: No       Orders Requested  Delay start of care for PT, and OT until 5/27/24    Confirmation that the PCP will continue to follow.    Information was gathered and will be sent to provider for review.  RN will contact Home Care with information after provider review.  Information was gathered and will be sent to provider to confirm provider will be following patient.  RN will contact Home Care with information after provider review.  Confirmed ok to leave a detailed message with call back.  Contact information confirmed and updated as needed.    Evelyn South, RN

## 2024-05-24 NOTE — PLAN OF CARE
Goal Outcome Evaluation:         Neuro: A&Ox4. Occasionally forgetful.   Cardiac: SR with PAC .HR 80s-110s VSS  Respiratory: Sating >95% on RA. Occasionally SOB at rest and with activity  GI/: Adequate urine output multiple soft Bms  Diet/appetite: Tolerating regular diet with 1800 ml FR. Denies nausea.  Activity:  Assist of 1 with gait belt/walker.   Pain: Denies  Skin: No new deficits noted.   LDA's: L PIV x2. R PICC SL     Plan: became dizzy and SOB at rest and occasionally with activity. Vitals stable but heart rate jumping from [id number]. Team notified. Orthos completed and were negative. Home oxygen eval done, does not need any oxygen while up and awake. Continue with POC. Notify primary team with changes.

## 2024-05-24 NOTE — PROGRESS NOTES
Care Management Discharge Note    Discharge Date: 5/24     Discharge Disposition: Return to [facility]    Discharge Services:  Home PT/OT    Discharge DME: No new DME noted.    Discharge Transportation: family or friend will provide    Private pay costs discussed: Not applicable    Does the patient's insurance plan have a 3 day qualifying hospital stay waiver?  No    PAS Confirmation Code:  NA  Patient/family educated on Medicare website which has current facility and service quality ratings:  NA    Education Provided on the Discharge Plan:  Yes  Persons Notified of Discharge Plans: [facility], home care  Patient/Family in Agreement with the Plan: Yes    Handoff Referral Completed: Yes    Additional Information:  Home health PT/OT confirmed through [facility]. Message sent to primary team to confirm pt's readiness for discharge. Per previous discussion w/[facility], patient must return before 2pm. Will follow up w/patient, pt's family and [facility] once discharge has been confirmed.    1015 Addendum:  Discharge orders faxed to [facility]. Patient/family updated on discharge plan and confirmed home care. [facility] updated, they will pull orders from epic. No additional discharge needs noted.     1142 Addendum:  Patient's O2 order updated by primary team to 2-3L nocturnal use. Per discussion w/nurse at [facility], order cannot be a range, writer requested primary team update order to 2L or 3L continuously rather than a range.     1205 Addendum:  Updated O2 order faxed to pt's [facility].    1345 Addendum:   Voicemail received from pt's [facility] noting that the oxygen order noted that [facility] would supply the oxygen. Writer clarified that the oxygen order auto populates to [facility] and that patient's home O2 would remain set up through [facility]. Writer faxed updated O2 order to  Respiratory at this time.     Discharge resources:     [facility]  Nursing line: [phone]    Fax: [phone]    [facility]  (PT/OT)  Phone: [phone]  Fax: [phone]    [facility]  Phone  [phone]  Fax  [phone]  ________________________________    Care coordination will continue to follow.     Elza Rodriguez, Nurse Coordinator, BSN  Phone: [phone]  Vocera: 6B SKYLAR RNCC

## 2024-05-24 NOTE — PROGRESS NOTES
Patient has been assessed for Home Oxygen needs. Oxygen readings:    *Pulse oximetry (SpO2) = 95% on room air at rest while awake.        *SpO2 = 92% on room air during activity/with exercise.

## 2024-05-24 NOTE — PLAN OF CARE
Physical Therapy Discharge Summary    Reason for therapy discharge:    Discharged to long term care facility.    Progress towards therapy goal(s). See goals on Care Plan in [facility] electronic health record for goal details.  Goals partially met.  Barriers to achieving goals:   discharge from facility.    Therapy recommendation(s):    Continued therapy is recommended.  Rationale/Recommendations:  Recommend continued PT to address activity limitations and improve functional independence. .

## 2024-05-24 NOTE — TELEPHONE ENCOUNTER
Calling to see if med was sent to the pharmacy.    Advised that it has been sent.    Evelyn BSN RN  Triage Nurse  [facility]

## 2024-05-24 NOTE — PLAN OF CARE
Goal Outcome Evaluation:    Problem: Adult Inpatient Plan of Care  Goal: Plan of Care Review  Description: The Plan of Care Review/Shift note should be completed every shift.  The Outcome Evaluation is a brief statement about your assessment that the patient is improving, declining, or no change.  This information will be displayed automatically on your shift  note.  Outcome: Adequate for Care Transition

## 2024-05-24 NOTE — PLAN OF CARE
Hours of Care: [id number]  Neuro: A&Ox4. Occasionally forgetful.  Cardiac: SR. HR 90's-110 when lying. HR increases during ambulation up to 120. VSS. Compliments of dizziness when ambulating.   Respiratory: Sating >88% on RA. On 2L beginning of shift, weaned off to RA and sating ok. Occasionally desats to 86% but back up to 90's within seconds. Denies SOB.  GI/: Adequate urine output. [place] put on at 11pm-5am. BM X1.  Diet/appetite: Tolerating regular diet w/ 1.8L FR. Eating well. Had toast w/ peanut butter & apple juice twice overnight.  Activity:  Assist of 1 w/ GB and walker, up to bathroom.  Pain: At acceptable level on current regimen. Denies pain this shift.  Skin: No new deficits noted.  LDA's: R DLP -SL. 2 L PIV -SL.    Plan: Continue with POC. Notify primary team with changes.     Plan to discharge to [facility] 5/24

## 2024-05-24 NOTE — PLAN OF CARE
DISCHARGE                         5/24/2024 11:43 AM  ----------------------------------------------------------------------------  Discharged to: Home/Assisted Living  Via: private transportation  Accompanied by: Family  Discharge Instructions: regular diet with 1.8L FR, Ax1 with 4WW activity, medications, follow up appointments, when to call the MD, aftercare instructions.  Prescriptions: To be filled by [facility] pharmacy; medication list reviewed & sent with pt  Follow Up Appointments: arranged; information given  Belongings: All sent with pt  IV: d/c'd  Telemetry: d/c'd  Pt exhibits understanding of above discharge instructions; all questions answered.      Goal Outcome Evaluation:  Problem: Adult Inpatient Plan of Care  Goal: Plan of Care Review  Description: The Plan of Care Review/Shift note should be completed every shift.  The Outcome Evaluation is a brief statement about your assessment that the patient is improving, declining, or no change.  This information will be displayed automatically on your shift  note.  Outcome: Adequate for Care Transition

## 2024-05-24 NOTE — PROGRESS NOTES
VITALIY APPROVED  Medication: JARDIANCE 10 MG PO TABS  Date submitted: 5/24/2024  9:58 AM   Amount: $ 10,000  [facility]: [facility] Phone: [phone]  Foundation Effective Dates: 4/24/2024 - 5/23/2025  Patient Notified: Yes    Vitaliy Billing Info  Member ID: [id number]  BIN: [id number]  PCN: [id number]  Group: [id number]  Expected Copay: $ 0.00    Comments:  Added vitaliy info to patient's [facility] pharmacy profile. Gave patient copy of approval letter.    Demetria Grey  [facility] Liaison  Ph: [phone]  Fax: [phone]  Available on Vocera (learn more here)

## 2024-05-27 ENCOUNTER — TELEPHONE (OUTPATIENT)
Dept: FAMILY MEDICINE | Facility: CLINIC | Age: 79
End: 2024-05-27
Payer: MEDICARE

## 2024-05-27 LAB
BACTERIA BLD CULT: NO GROWTH
BACTERIA BLD CULT: NO GROWTH

## 2024-05-27 NOTE — TELEPHONE ENCOUNTER
Order/Referral Request    Who is requesting: Andie puente [facility]    Orders being requested: Delay start of PT and OT until 06/01/24    Reason service is needed/diagnosis: CHS    When are orders needed by: As soon as possible    Has this been discussed with Provider: Yes    Does patient have a preference on a Group/Provider/Facility? [facility]    Does patient have an appointment scheduled?: No    Where to send orders:  Verbal Order    Could we send this information to you in [place] or would you prefer to receive a phone call?:   Patient would prefer a phone call   Okay to leave a detailed message?: Yes at Other phone number:  Andie at [facility] [phone]

## 2024-05-28 ENCOUNTER — PATIENT OUTREACH (OUTPATIENT)
Dept: CARE COORDINATION | Facility: CLINIC | Age: 79
End: 2024-05-28
Payer: MEDICARE

## 2024-05-28 ENCOUNTER — TELEPHONE (OUTPATIENT)
Dept: FAMILY MEDICINE | Facility: CLINIC | Age: 79
End: 2024-05-28
Payer: MEDICARE

## 2024-05-28 DIAGNOSIS — Z09 HOSPITAL DISCHARGE FOLLOW-UP: ICD-10-CM

## 2024-05-28 NOTE — PROGRESS NOTES
Clinic Care Coordination Contact  Transitions of Care Outreach  Chief Complaint   Patient presents with    Clinic Care Coordination - Post [facility]     TCM discharge date 5/24/24       Most Recent Admission Date: 5/15/2024   Most Recent Admission Diagnosis: Acute on chronic congestive heart failure, unspecified heart failure type (H) - I50.9     Most Recent Discharge Date: 5/24/2024   Most Recent Discharge Diagnosis: Acute on chronic congestive heart failure, unspecified heart failure type (H) - I50.9  Tachycardia, unspecified - R00.0  Atrial premature contractions - I49.1  Gastroesophageal reflux disease without esophagitis - K21.9  Pulmonary emphysema, unspecified emphysema type (H) - J43.9     Transitions of Care Assessment    Discharge Assessment  How are you doing now that you are home?: Doing pretty good per daughter Barbara.  They have a question reqarding a bill from the clinic, the number to the billing department was given to the daughter.  How are your symptoms? (Red Flag symptoms escalate to triage hotline per guidelines): Improved  Do you know how to contact your clinic care team if you have future questions or changes to your health status? : Yes  Does the patient have their discharge instructions? : Yes  Does the patient have questions regarding their discharge instructions? : No  Were you started on any new medications or were there changes to any of your previous medications? : Yes  Does the patient have all of their medications?: Yes  Do you have questions regarding any of your medications? : No  Do you have all of your needed medical supplies or equipment (DME)?  (i.e. oxygen tank, CPAP, cane, etc.): Yes         Post-op (Clinicians Only)  Did the patient have surgery or a procedure: No  Eating & Drinking: eating and drinking without complaints/concerns  PO Intake: other  Bowel Function: normal  Urinary Status: voiding without complaint/concerns        Follow up Plan     Discharge  Follow-Up  Discharge follow up appointment scheduled in alignment with recommended follow up timeframe or Transitions of Risk Category? (Low = within 30 days; Moderate= within 14 days; High= within 7 days): Yes  Discharge Follow Up Appointment Date: 06/04/24  Discharge Follow Up Appointment Scheduled with?: Specialty Care Provider    Future Appointments   Date Time Provider Department Center   6/4/2024 10:30 AM [facility]   6/4/2024 11:00 AM Jojo Aguilar NP [facility]       Outpatient Plan as outlined on AVS reviewed with patient.    The daughter declined care coordination at this time.  The RN CC encouraged the daughter to call with any questions.    For any urgent concerns, please contact our 24 hour nurse triage line: [phone] ([phone])       Mukul Carvalho MSN, RN, PHN, [facility]   Primary Care Clinical RN Care Coordinator  [facility]  5/28/2024   12:28 PM  [email or web address]  Office: [phone]

## 2024-05-28 NOTE — LETTER
[facility] CARE COORDINATION  [address] [facility] [address]    May 28, 2024    Idalia Bob  [address] [address]      Dear Idalia,    I am a clinic care coordinator who works with Gomez Louis MD with the [facility]. I wanted to introduce myself and provide you with my contact information for you to be able to call me with any questions or concerns. I wanted to thank you for spending the time to talk with me.  Below is a description of clinic care coordination and how I can further assist you.       The clinic care coordination team is made up of a registered nurse, , financial resource worker and community health worker who understand the health care system. The goal of clinic care coordination is to help you manage your health and improve access to the health care system. Our team works alongside your provider to assist you in determining your health and social needs. We can help you obtain health care and community resources, providing you with necessary information and education. We can work with you through any barriers and develop a care plan that helps coordinate and strengthen the communication between you and your care team.  Our services are voluntary and are offered without charge to you personally.    Please feel free to contact me with any questions or concerns regarding care coordination and what we can offer.      We are focused on providing you with the highest-quality healthcare experience possible.    Sincerely,     Mukul Carvalho MSN, RN, PHN, [facility]   Primary Care Clinical RN Care Coordinator  [facility]  5/28/2024   12:21 PM  [email or web address]  Office: [phone]

## 2024-05-28 NOTE — TELEPHONE ENCOUNTER
Andie called, stated they can see her on the 30th instead, verbal ok given.     Thanks,  JIMI Del Rio  [facility]

## 2024-05-28 NOTE — TELEPHONE ENCOUNTER
Routing Message to provider.    Home care requesting another delay in start of care.    Christine M Klisch, RN

## 2024-05-29 ENCOUNTER — PRE VISIT (OUTPATIENT)
Dept: CARDIOLOGY | Facility: CLINIC | Age: 79
End: 2024-05-29
Payer: MEDICARE

## 2024-05-29 ENCOUNTER — TELEPHONE (OUTPATIENT)
Dept: FAMILY MEDICINE | Facility: CLINIC | Age: 79
End: 2024-05-29
Payer: MEDICARE

## 2024-05-29 DIAGNOSIS — I50.9 ACUTE ON CHRONIC CONGESTIVE HEART FAILURE, UNSPECIFIED HEART FAILURE TYPE (H): Primary | ICD-10-CM

## 2024-05-29 NOTE — TELEPHONE ENCOUNTER
Forms received from [facility]/order-[id number] delayed PT orders for .  Forms placed in provider 'sign me' folder.  Please fax forms to [phone] after completion.

## 2024-05-30 ENCOUNTER — TELEPHONE (OUTPATIENT)
Dept: FAMILY MEDICINE | Facility: CLINIC | Age: 79
End: 2024-05-30
Payer: MEDICARE

## 2024-05-30 ENCOUNTER — MEDICAL CORRESPONDENCE (OUTPATIENT)
Dept: HEALTH INFORMATION MANAGEMENT | Facility: CLINIC | Age: 79
End: 2024-05-30
Payer: MEDICARE

## 2024-05-30 NOTE — TELEPHONE ENCOUNTER
Home Care is calling regarding an established patient with [facility].       Requesting orders from: Gomez Louis  Provider is following patient: Yes  Is this a 60-day recertification request?  No    Orders Requested    Physical Therapy  Request for initial certification (first set of orders)   Frequency:  2x/week for 2 weeks, 1x/week for 1 week for energy conservation, gait training and strengthening     Occupational Therapy  Request for initial evaluation and treatment (one time)       Potential level 2 medication interaction noted between Potassium Chloride, and Hyoscyamine      Verbal orders given for OT.  Information was gathered for PT.  Provider review needed.  RN will contact Home Care with information after provider review.  Confirmed ok to leave a detailed message with call back.  Contact information confirmed and updated as needed.    Brielle Hendrickson RN

## 2024-05-31 ENCOUNTER — TELEPHONE (OUTPATIENT)
Dept: FAMILY MEDICINE | Facility: CLINIC | Age: 79
End: 2024-05-31
Payer: MEDICARE

## 2024-05-31 ENCOUNTER — TELEPHONE (OUTPATIENT)
Dept: CARDIOLOGY | Facility: CLINIC | Age: 79
End: 2024-05-31
Payer: MEDICARE

## 2024-05-31 NOTE — TELEPHONE ENCOUNTER
Health Call Center    Phone Message    May a detailed message be left on voicemail: yes     Reason for Call: Symptoms or Concerns     If patient has red-flag symptoms, warm transfer to triage line    Current symptom or concern: Swelling Legs     Symptoms have been present for:  2 day(s)    Has patient previously been seen for this? Yes    Are there any new or worsening symptoms? Yes: Daughter says the swelling is start again in both legs. Patient does get her legs wrapped with JEROMY stockings. Daughter would like to know if start sherwin again. Please reach out. Thank you     Daughter did not want triage     Action Taken: Other: cardiology     Travel Screening: Not Applicable    Thank you!  [facility]       Date of Service:

## 2024-05-31 NOTE — TELEPHONE ENCOUNTER
Christiana from [facility] is calling regarding an established patient with [facility].       Requesting orders from: Gomez Louis  Provider is following patient: Yes  Is this a 60-day recertification request?  No    Orders Requested    Occupational Therapy  Request for continuation of care with no increase or decrease in frequency  Frequency:  2x/wk for 4 wks        Verbal orders given.  Home Care will send orders for provider to sign.  Confirmed ok to leave a detailed message with call back.  Contact information confirmed and updated as needed.    Chel Meléndez RN

## 2024-06-03 NOTE — TELEPHONE ENCOUNTER
I reached Barbara who told me that she called on Friday because Idalia's legs were pretty swollen for a few days. Idalia wears compression stockings daily, but likely does not elevate her legs throughout the day. Idalia's weight has not gone up at all.    Barbara told me that she restarted Idalia's lasix 40 mg yesterday - she was sent home with lasix 40 mg daily PRN for weight gain. She is also taking the potassium 20 mEq BID as instructed when taking lasix.    Barbara is wondering about what stage of heart failure her mother is in. EF is 10-15%. She will be at the CORE appointment with Jojo tomorrow where this will be discussed further.    Caroline Rubin RN

## 2024-06-03 NOTE — TELEPHONE ENCOUNTER
I returned this call to Idalia Jimenez's daughter. Reached her voicemail and left a message for a call back.    Idalia is scheduled to establish care in [facility] with Jojo Aguilar, tomorrow, June 4.    Caroline Rubin RN

## 2024-06-04 ENCOUNTER — OFFICE VISIT (OUTPATIENT)
Dept: CARDIOLOGY | Facility: CLINIC | Age: 79
End: 2024-06-04
Attending: NURSE PRACTITIONER
Payer: MEDICARE

## 2024-06-04 ENCOUNTER — LAB (OUTPATIENT)
Dept: LAB | Facility: CLINIC | Age: 79
End: 2024-06-04
Payer: MEDICARE

## 2024-06-04 VITALS — DIASTOLIC BLOOD PRESSURE: 85 MMHG | BODY MASS INDEX: 24.04 KG/M2 | WEIGHT: 123.1 LBS | SYSTOLIC BLOOD PRESSURE: 123 MMHG

## 2024-06-04 DIAGNOSIS — I50.9 ACUTE ON CHRONIC CONGESTIVE HEART FAILURE, UNSPECIFIED HEART FAILURE TYPE (H): ICD-10-CM

## 2024-06-04 DIAGNOSIS — M81.0 AGE-RELATED OSTEOPOROSIS WITHOUT CURRENT PATHOLOGICAL FRACTURE: ICD-10-CM

## 2024-06-04 DIAGNOSIS — I89.0 LYMPHEDEMA: ICD-10-CM

## 2024-06-04 DIAGNOSIS — I50.9 ACUTE ON CHRONIC CONGESTIVE HEART FAILURE, UNSPECIFIED HEART FAILURE TYPE (H): Primary | ICD-10-CM

## 2024-06-04 DIAGNOSIS — N18.30 STAGE 3 CHRONIC KIDNEY DISEASE, UNSPECIFIED WHETHER STAGE 3A OR 3B CKD (H): ICD-10-CM

## 2024-06-04 LAB
ANION GAP SERPL CALCULATED.3IONS-SCNC: 14 MMOL/L (ref 7–15)
BUN SERPL-MCNC: 14.8 MG/DL (ref 8–23)
CALCIUM SERPL-MCNC: 8.8 MG/DL (ref 8.8–10.2)
CHLORIDE SERPL-SCNC: 107 MMOL/L (ref 98–107)
CREAT SERPL-MCNC: 1.19 MG/DL (ref 0.51–0.95)
DEPRECATED HCO3 PLAS-SCNC: 20 MMOL/L (ref 22–29)
EGFRCR SERPLBLD CKD-EPI 2021: 46 ML/MIN/1.73M2
GLUCOSE SERPL-MCNC: 81 MG/DL (ref 70–99)
MAGNESIUM SERPL-MCNC: 1.8 MG/DL (ref 1.7–2.3)
NT-PROBNP SERPL-MCNC: ABNORMAL PG/ML (ref 0–1800)
PHOSPHATE SERPL-MCNC: 3 MG/DL (ref 2.5–4.5)
POTASSIUM SERPL-SCNC: 4.5 MMOL/L (ref 3.4–5.3)
SODIUM SERPL-SCNC: 141 MMOL/L (ref 135–145)

## 2024-06-04 PROCEDURE — 83880 ASSAY OF NATRIURETIC PEPTIDE: CPT | Performed by: PATHOLOGY

## 2024-06-04 PROCEDURE — 36415 COLL VENOUS BLD VENIPUNCTURE: CPT | Performed by: PATHOLOGY

## 2024-06-04 PROCEDURE — 83735 ASSAY OF MAGNESIUM: CPT | Performed by: PATHOLOGY

## 2024-06-04 PROCEDURE — G0463 HOSPITAL OUTPT CLINIC VISIT: HCPCS | Performed by: NURSE PRACTITIONER

## 2024-06-04 PROCEDURE — 99205 OFFICE O/P NEW HI 60 MIN: CPT | Performed by: NURSE PRACTITIONER

## 2024-06-04 PROCEDURE — 84100 ASSAY OF PHOSPHORUS: CPT | Performed by: PATHOLOGY

## 2024-06-04 PROCEDURE — 80048 BASIC METABOLIC PNL TOTAL CA: CPT | Performed by: PATHOLOGY

## 2024-06-04 PROCEDURE — 99417 PROLNG OP E/M EACH 15 MIN: CPT | Performed by: NURSE PRACTITIONER

## 2024-06-04 RX ORDER — FUROSEMIDE 40 MG
TABLET ORAL
Qty: 135 TABLET | Refills: 3 | Status: ON HOLD | OUTPATIENT
Start: 2024-06-04 | End: 2024-06-14

## 2024-06-04 ASSESSMENT — PAIN SCALES - GENERAL: PAINLEVEL: NO PAIN (0)

## 2024-06-04 NOTE — LETTER
6/4/2024      RE: Idalia Bob  [address]  [place] [address]       Dear Colleague,    Thank you for the opportunity to participate in the care of your patient, Idalia Bob, at the [facility] [place] [facility]. Please see a copy of my visit note below.    Advanced Heart Failure Clinic -- CORE Evaluation  June 4, 2024    HPI:   Ms. Idalia Bob is an 79yr old female with a history of CKD, HTN, GERD, COPD, CAD, and newly diagnosed NICM (LVEF 10-15% per TTE 5/2024) who presents to [facility] for evaluation and for follow-up of recent hospitalization.    Her PCP advised that she present to the ED 5/15/24 due to JUANIS and elevated NTproBNP.  While in the ED, CXR showed bilateral pleural effusions and pulmonary edema, TTE showed LVEF 10-15% and moderately reduced RV function.  She started on IV lasix, and admitted to medicine.  She became hypotensive, required dobutamine --> dopamine, which was then stopped 5/21.  She ultimately diuresed to a weight of ~130#, and discharged 5/24 on losartan 12.5mg daily and jardiance 10mg daily.    Since discharge, she states that she feels ok.  She recently started home PT/OT.  She has been walking small amounts.  Her LE edema had improved initially after discharge, but started to worsen over the weekend.  She was started on lasix 40mg daily on Sunday, but has not noticed any improvement in her LE edema.  She has been wearing LE compression stockings all day/night.  She gets winded pretty quickly, noting that she needs to stop and take breaks.  She does believe that her breathing has improved when compared to where she was at on admission.  She is no longer needing oxygen during the day, and is just using it at night.  She is sleeping well, and is able to lie flat without PND and orthopnea.  Her appetite has been well, and she is eating regularly without nausea, vomiting, diarrhea, and  constipation.  Her weight has remained < 130# since discharge, now 125-126#.  Her facility does not offer salt-restricted foods.  She has not been checking her BPs regularly.  She otherwise denies chest pain, palpitations, dizziness, falls, headaches, acute vision changes, fevers, chills, cough, sore throat, and signs of bleeding.    PAST MEDICAL HISTORY:  Past Medical History:   Diagnosis Date     Chronic kidney disease (CKD) 01/10/2023     Chronic obstructive pulmonary disease (H) 01/10/2023     Dementia (H) 11/28/2023     GERD (gastroesophageal reflux disease) 03/15/2023     Hypertension 11/28/2023       FAMILY HISTORY:  No family history on file.    SOCIAL HISTORY:  Social History     Socioeconomic History     Marital status:    Tobacco Use     Smoking status: Former     Current packs/day: 1.00     Average packs/day: 1 pack/day for 40.0 years (40.0 ttl pk-yrs)     Types: Cigarettes     Passive exposure: Past     Smokeless tobacco: Never   Vaping Use     Vaping status: Never Used   Substance and Sexual Activity     Alcohol use: Not Currently     Drug use: Never     Sexual activity: Not Currently     Social Determinants of Health     Financial Resource Strain: Low Risk  (1/22/2024)    Financial Resource Strain      Within the past 12 months, have you or your family members you live with been unable to get utilities (heat, electricity) when it was really needed?: No   Food Insecurity: Low Risk  (1/22/2024)    Food Insecurity      Within the past 12 months, did you worry that your food would run out before you got money to buy more?: No      Within the past 12 months, did the food you bought just not last and you didn t have money to get more?: No   Transportation Needs: Low Risk  (1/22/2024)    Transportation Needs      Within the past 12 months, has lack of transportation kept you from medical appointments, getting your medicines, non-medical meetings or appointments, work, or from getting things that you  need?: No   Social Connections: Socially Integrated (11/21/2023)    [facility]    Social Connections      Frequency of Communication with Friends and Family: 0   Housing Stability: Low Risk  (1/22/2024)    Housing Stability      Do you have housing? : Yes      Are you worried about losing your housing?: No       CURRENT MEDICATIONS:  Current Outpatient Medications   Medication Sig Dispense Refill     acetaminophen (TYLENOL) 325 MG tablet Take 325-650 mg by mouth every 6 hours as needed for mild pain       albuterol (PROVENTIL) (2.5 MG/3ML) 0.083% neb solution Take 1 vial (2.5 mg) by nebulization 2 times daily as needed (COPD) 90 mL 5     aspirin 81 MG EC tablet Take 1 tablet (81 mg) by mouth daily 30 tablet 0     benzocaine-menthol (CHLORASEPTIC) 6-10 MG lozenge Place 1 lozenge inside cheek every hour as needed for sore throat       budesonide (PULMICORT) 1 MG/2ML neb solution Take 2 mLs (1 mg) by nebulization 2 times daily as needed (wheezing) 30 mL 0     budesonide (PULMICORT) 1 MG/2ML neb solution Take 1 mg by nebulization 2 times daily as needed       busPIRone (BUSPAR) 15 MG tablet Take 15 mg by mouth 2 times daily       calcium carbonate (TUMS) 500 MG chewable tablet Take 1 tablet (500 mg) by mouth 4 times daily as needed for heartburn       Calcium Polycarbophil (FIBER) 625 MG tablet Take 2 tablets by mouth daily       diphenhydrAMINE-zinc acetate (BENADRYL) 2-0.1 % external cream Apply topically 3 times daily as needed for itching       donepezil (ARICEPT) 10 MG tablet Take 1 tablet (10 mg) by mouth at bedtime       empagliflozin (JARDIANCE) 10 MG TABS tablet Take 1 tablet (10 mg) by mouth daily 90 tablet 1     ferrous gluconate (FERGON) 324 (38 Fe) MG tablet Take 1 tablet (324 mg) by mouth daily (with breakfast) 90 tablet 3     FLUoxetine (PROZAC) 40 MG capsule Take 1 capsule (40 mg) by mouth daily       furosemide  (LASIX) 40 MG tablet Take 1 tablet (40 mg) by mouth daily as needed 30 tablet 0     hyoscyamine (LEVSIN) 0.125 MG tablet TAKE 1 TABLET (125 MCG) BY MOUTH EVERY 4 HOURS AS NEEDED FOR CRAMPING OR DIARRHEA 30 tablet 1     ipratropium - albuterol 0.5 mg/2.5 mg/3 mL (DUONEB) 0.5-2.5 (3) MG/3ML neb solution Take 1 vial by nebulization every 6 hours as needed for shortness of breath, wheezing or cough       lactobacillus rhamnosus, GG, (CULTURELL) capsule Take 1 capsule by mouth daily       latanoprost (XALATAN) 0.005 % ophthalmic solution Place 1 drop into both eyes at bedtime       levothyroxine (SYNTHROID/LEVOTHROID) 50 MCG tablet Take 1 tablet (50 mcg) by mouth daily       losartan (COZAAR) 25 MG tablet Take 0.5 tablets (12.5 mg) by mouth daily 30 tablet 0     melatonin 1 MG TABS tablet Take 1 tablet (1 mg) by mouth nightly as needed for sleep       OLANZapine (ZYPREXA) 2.5 MG tablet Take 1 tablet (2.5 mg) by mouth 2 times daily 60 tablet 0     oxyCODONE (ROXICODONE) 5 MG tablet 1/2 tab at bedtime as needed for severe pain. ( Need to last for one month ) 15 tablet 0     pantoprazole (PROTONIX) 40 MG EC tablet Take 1 tablet (40 mg) by mouth daily 90 tablet 3     potassium chloride ER (K-TAB) 20 MEQ CR tablet Take one tab bid with Furosemide 40 mg. 60 tablet 2     senna-docusate (SENOKOT-S/PERICOLACE) 8.6-50 MG tablet Take 1 tablet by mouth 2 times daily as needed for constipation       triamcinolone (KENALOG) 0.1 % external cream Apply topically 2 times daily as needed for irritation       vitamin D3 (CHOLECALCIFEROL) 50 mcg (2000 units) tablet Take 1 tablet (50 mcg) by mouth daily         ROS:   Constitutional: No fever, chills, or sweats. No weight gain/loss.   ENT: No visual disturbance, ear ache, epistaxis, sore throat.   Allergies/Immunologic: Negative.   Respiratory: No cough, hemoptysis.   Cardiovascular: As per HPI.   GI: No nausea, vomiting, hematemesis, melena, or hematochezia.   : No urinary frequency,  dysuria, or hematuria.   Integument: Negative.   Psychiatric: Negative.   Neuro: Negative.   Endocrinology: Negative.   Musculoskeletal: Negative.    EXAM:  /85 (BP Location: Right arm, Patient Position: Chair, Cuff Size: Adult Regular)   Wt 55.8 kg (123 lb 1.6 oz)   LMP  (LMP Unknown)   BMI 24.04 kg/m    General: appears comfortable, alert and articulate  Head: normocephalic, atraumatic  Eyes: anicteric sclera, EOMI  Neck: no adenopathy  Orophyarynx: moist mucosa, no lesions, dentition intact  Heart: regular, S1/S2, no murmur, gallop, rub, estimated JVP at clavicle  Lungs: clear, no rales or wheezing  Abdomen: soft, non-tender, bowel sounds present, no hepatosplenomegaly  Extremities: no clubbing or cyanosis, moderate bilateral LE edema above knees  Neurological: normal speech and affect, no gross motor deficits  Integument: no open lesions, rashes, or jaundice    Labs:  CBC RESULTS:  Lab Results   Component Value Date    WBC 13.2 (H) 05/24/2024    RBC 3.50 (L) 05/24/2024    HGB 8.0 (L) 05/24/2024    HCT 28.9 (L) 05/24/2024    MCV 83 05/24/2024    MCH 22.9 (L) 05/24/2024    MCHC 27.7 (L) 05/24/2024    RDW 20.8 (H) 05/24/2024     05/24/2024       CMP RESULTS:  Lab Results   Component Value Date     05/24/2024    POTASSIUM 4.2 05/24/2024    CHLORIDE 105 05/24/2024    CO2 23 05/24/2024    ANIONGAP 11 05/24/2024    GLC 97 05/24/2024     (H) 02/11/2024    BUN 23.5 (H) 05/24/2024    CR 1.15 (H) 05/24/2024    GFRESTIMATED 48 (L) 05/24/2024    AYUSH 8.0 (L) 05/24/2024    BILITOTAL 0.6 05/19/2024    ALBUMIN 2.8 (L) 05/19/2024    ALKPHOS 70 05/19/2024    ALT 19 05/19/2024    AST 16 05/19/2024        INR RESULTS:  Lab Results   Component Value Date    INR 2.34 (H) 05/15/2024       Lab Results   Component Value Date    MAG 2.6 (H) 05/24/2024     Lab Results   Component Value Date    NTBNPI 26,696 (H) 05/15/2024     Lab Results   Component Value Date    NTBNP 31,556 (H) 05/14/2024       Assessment and  Plan:   Ms. Idalia Bob is an 79yr old female with a history of CKD, HTN, GERD, COPD, CAD, and newly diagnosed NICM (LVEF 10-15% per TTE 5/2024) who presents to [facility] for evaluation and for follow-up of recent hospitalization.    Labs today are in process.    Ms. Bob appears well today.  Her BPs are controlled.  Her weight trend remains stable, but she has LE edema that has not improved since starting lasix 40mg daily, 3 days ago.    Discussed that we can refer her to lymphedema for further care.  Also discussed that I will review her labs, and make a decision re: increasing losartan to 25mg daily and adjusting lasix as indicated.    She is working with home PT/OT, so discussed that we can then refer her to outpatient cardiac rehab once she has completed home therapies.    We will plan for her to follow-up in [facility] in 2-4 weeks, and asked that she call with any issues in the interim.      Newly diagnosed systolic heart failure secondary to NICM (LVEF 10-15% per TTE 5/2024)  HTN  Stage C, NYHA Class III  - ACEi/ARB/ARNi:  will increase losartan to 25mg daily pending labs today -- note that she was recently hypotensive  - Afterload reduction:  n/a  - BB:  could consider initiation pending BP trends  - Aldosterone antagonist:  deferred while other medications are proritized  - SGLT2i:  jardiance 10mg daily  - SCD ppx:  n/a  - Fluid status:  appears euvolemic, on lasix 40mg daily, will re-eval pending labs today      Nonobstructive CAD  Coronary CTA showed mild non-obstructive coronary artery disease.  Total Agatston score 484 placing the patient in the 79th percentile when compared to age and gender matched control group.    - continue aspirin 81mg daily  - BB as above  - not on statin, defer to primary cardiologist      COPD  No longer on day-time oxygen, just at night.    CKD  BMP from today in process.      The longitudinal plan of care for the diagnosis(es)/condition(s) as documented were addressed during this  visit. Due to the added complexity in care, I will continue to support Idalia in the subsequent management and with ongoing continuity of care.    The above was reviewed with Ms. Bob, who verbalized understanding and will call with further questions/concerns.    60+ minutes spent with patient, along with preparing for visit, reviewing follow up, and completing documentation.      Jojo Aguilar DNP, FNP-BC  Advanced Heart Failure Nurse Practitioner  [facility]  Patient Care Team:  Gomez Louis MD as PCP - General (Family Medicine)  Gomez Louis MD as Assigned PCP  Subha Jansen PA-C as Physician Assistant (Gastroenterology)

## 2024-06-04 NOTE — PROGRESS NOTES
Advanced Heart Failure Clinic -- CORE Evaluation  June 4, 2024    HPI:   Ms. Idalia Bob is an 79yr old female with a history of CKD, HTN, GERD, COPD, CAD, and newly diagnosed NICM (LVEF 10-15% per TTE 5/2024) who [place] [facility] [place] and for follow-up of recent hospitalization.    Her PCP advised that she present to the ED 5/15/24 due to JUANIS and elevated NTproBNP.  While in the ED, CXR showed bilateral pleural effusions and pulmonary edema, TTE showed LVEF 10-15% and moderately reduced RV function.  She started on IV lasix, and admitted to medicine.  She became hypotensive, required dobutamine --> dopamine, which was then stopped 5/21.  She ultimately diuresed to a weight of ~130#, and discharged 5/24 on losartan 12.5mg daily and jardiance 10mg daily.    Since discharge, she states that she feels ok.  She recently started home PT/OT.  She has been walking small amounts.  Her LE edema had improved initially after discharge, but started to worsen over the weekend.  She was started on lasix 40mg daily on Sunday, but has not noticed any improvement in her LE edema.  She has been wearing LE compression stockings all day/night.  She gets winded pretty quickly, noting that she needs to stop and take breaks.  She does believe that her breathing has improved when compared to where she was at on admission.  She is no longer needing oxygen during the day, and is just using it at night.  She is sleeping well, and is able to lie flat without PND and orthopnea.  Her appetite has been well, and she is eating regularly without nausea, vomiting, diarrhea, and constipation.  Her weight has remained < 130# since discharge, now 125-126#.  Her facility does not offer salt-restricted foods.  She has not been checking her BPs regularly.  She otherwise denies chest pain, palpitations, dizziness, falls, headaches, acute vision changes, fevers, chills, cough, sore throat, and signs of bleeding.    PAST MEDICAL HISTORY:  Past  Medical History:   Diagnosis Date    Chronic kidney disease (CKD) 01/10/2023    Chronic obstructive pulmonary disease (H) 01/10/2023    Dementia (H) 11/28/2023    GERD (gastroesophageal reflux disease) 03/15/2023    Hypertension 11/28/2023       FAMILY HISTORY:  No family history on file.    SOCIAL HISTORY:  Social History     Socioeconomic History    Marital status:    Tobacco Use    Smoking status: Former     Current packs/day: 1.00     Average packs/day: 1 pack/day for 40.0 years (40.0 ttl pk-yrs)     Types: Cigarettes     Passive exposure: Past    Smokeless tobacco: Never   Vaping Use    Vaping status: Never Used   Substance and Sexual Activity    Alcohol use: Not Currently    Drug use: Never    Sexual activity: Not Currently     Social Determinants of Health     Financial Resource Strain: Low Risk  (1/22/2024)    Financial Resource Strain     Within the past 12 months, have you or your family members you live with been unable to get utilities (heat, electricity) when it was really needed?: No   Food Insecurity: Low Risk  (1/22/2024)    Food Insecurity     Within the past 12 months, did you worry that your food would run out before you got money to buy more?: No     Within the past 12 months, did the food you bought just not last and you didn t have money to get more?: No   Transportation Needs: Low Risk  (1/22/2024)    Transportation Needs     Within the past 12 months, has lack of transportation kept you from medical appointments, getting your medicines, non-medical meetings or appointments, work, or from getting things that you need?: No   Social Connections: Socially Integrated (11/21/2023)    Received [facility]    Social Connections     Frequency of Communication with Friends and Family: 0   Housing Stability: Low Risk  (1/22/2024)    Housing Stability     Do you have housing? : Yes     Are you worried about losing  your housing?: No       CURRENT MEDICATIONS:  Current Outpatient Medications   Medication Sig Dispense Refill    acetaminophen (TYLENOL) 325 MG tablet Take 325-650 mg by mouth every 6 hours as needed for mild pain      albuterol (PROVENTIL) (2.5 MG/3ML) 0.083% neb solution Take 1 vial (2.5 mg) by nebulization 2 times daily as needed (COPD) 90 mL 5    aspirin 81 MG EC tablet Take 1 tablet (81 mg) by mouth daily 30 tablet 0    benzocaine-menthol (CHLORASEPTIC) 6-10 MG lozenge Place 1 lozenge inside cheek every hour as needed for sore throat      budesonide (PULMICORT) 1 MG/2ML neb solution Take 2 mLs (1 mg) by nebulization 2 times daily as needed (wheezing) 30 mL 0    budesonide (PULMICORT) 1 MG/2ML neb solution Take 1 mg by nebulization 2 times daily as needed      busPIRone (BUSPAR) 15 MG tablet Take 15 mg by mouth 2 times daily      calcium carbonate (TUMS) 500 MG chewable tablet Take 1 tablet (500 mg) by mouth 4 times daily as needed for heartburn      Calcium Polycarbophil (FIBER) 625 MG tablet Take 2 tablets by mouth daily      diphenhydrAMINE-zinc acetate (BENADRYL) 2-0.1 % external cream Apply topically 3 times daily as needed for itching      donepezil (ARICEPT) 10 MG tablet Take 1 tablet (10 mg) by mouth at bedtime      empagliflozin (JARDIANCE) 10 MG TABS tablet Take 1 tablet (10 mg) by mouth daily 90 tablet 1    ferrous gluconate (FERGON) 324 (38 Fe) MG tablet Take 1 tablet (324 mg) by mouth daily (with breakfast) 90 tablet 3    FLUoxetine (PROZAC) 40 MG capsule Take 1 capsule (40 mg) by mouth daily      furosemide (LASIX) 40 MG tablet Take 1 tablet (40 mg) by mouth daily as needed 30 tablet 0    hyoscyamine (LEVSIN) 0.125 MG tablet TAKE 1 TABLET (125 MCG) BY MOUTH EVERY 4 HOURS AS NEEDED FOR CRAMPING OR DIARRHEA 30 tablet 1    ipratropium - albuterol 0.5 mg/2.5 mg/3 mL (DUONEB) 0.5-2.5 (3) MG/3ML neb solution Take 1 vial by nebulization every 6 hours as needed for shortness of breath, wheezing or cough       lactobacillus rhamnosus, GG, (CULTURELL) capsule Take 1 capsule by mouth daily      latanoprost (XALATAN) 0.005 % ophthalmic solution Place 1 drop into both eyes at bedtime      levothyroxine (SYNTHROID/LEVOTHROID) 50 MCG tablet Take 1 tablet (50 mcg) by mouth daily      losartan (COZAAR) 25 MG tablet Take 0.5 tablets (12.5 mg) by mouth daily 30 tablet 0    melatonin 1 MG TABS tablet Take 1 tablet (1 mg) by mouth nightly as needed for sleep      OLANZapine (ZYPREXA) 2.5 MG tablet Take 1 tablet (2.5 mg) by mouth 2 times daily 60 tablet 0    oxyCODONE (ROXICODONE) 5 MG tablet 1/2 tab at bedtime as needed for severe pain. ( Need to last for one month ) 15 tablet 0    pantoprazole (PROTONIX) 40 MG EC tablet Take 1 tablet (40 mg) by mouth daily 90 tablet 3    potassium chloride ER (K-TAB) 20 MEQ CR tablet Take one tab bid with Furosemide 40 mg. 60 tablet 2    senna-docusate (SENOKOT-S/PERICOLACE) 8.6-50 MG tablet Take 1 tablet by mouth 2 times daily as needed for constipation      triamcinolone (KENALOG) 0.1 % external cream Apply topically 2 times daily as needed for irritation      vitamin D3 (CHOLECALCIFEROL) 50 mcg (2000 units) tablet Take 1 tablet (50 mcg) by mouth daily         ROS:   Constitutional: No fever, chills, or sweats. No weight gain/loss.   ENT: No visual disturbance, ear ache, epistaxis, sore throat.   Allergies/Immunologic: Negative.   Respiratory: No cough, hemoptysis.   Cardiovascular: As per HPI.   GI: No nausea, vomiting, hematemesis, melena, or hematochezia.   : No urinary frequency, dysuria, or hematuria.   Integument: Negative.   Psychiatric: Negative.   Neuro: Negative.   Endocrinology: Negative.   Musculoskeletal: Negative.    EXAM:  /85 (BP Location: Right arm, Patient Position: Chair, Cuff Size: Adult Regular)   Wt 55.8 kg (123 lb 1.6 oz)   LMP  (LMP Unknown)   BMI 24.04 kg/m    General: appears comfortable, alert and articulate  Head: normocephalic, atraumatic  Eyes: anicteric  sclera, EOMI  Neck: no adenopathy  Orophyarynx: moist mucosa, no lesions, dentition intact  Heart: regular, S1/S2, no murmur, gallop, rub, estimated JVP at clavicle  Lungs: clear, no rales or wheezing  Abdomen: soft, non-tender, bowel sounds present, no hepatosplenomegaly  Extremities: no clubbing or cyanosis, moderate bilateral LE edema above knees  Neurological: normal speech and affect, no gross motor deficits  Integument: no open lesions, rashes, or jaundice    Labs:  CBC RESULTS:  Lab Results   Component Value Date    WBC 13.2 (H) 05/24/2024    RBC 3.50 (L) 05/24/2024    HGB 8.0 (L) 05/24/2024    HCT 28.9 (L) 05/24/2024    MCV 83 05/24/2024    MCH 22.9 (L) 05/24/2024    MCHC 27.7 (L) 05/24/2024    RDW 20.8 (H) 05/24/2024     05/24/2024       CMP RESULTS:  Lab Results   Component Value Date     05/24/2024    POTASSIUM 4.2 05/24/2024    CHLORIDE 105 05/24/2024    CO2 23 05/24/2024    ANIONGAP 11 05/24/2024    GLC 97 05/24/2024     (H) 02/11/2024    BUN 23.5 (H) 05/24/2024    CR 1.15 (H) 05/24/2024    GFRESTIMATED 48 (L) 05/24/2024    AYUSH 8.0 (L) 05/24/2024    BILITOTAL 0.6 05/19/2024    ALBUMIN 2.8 (L) 05/19/2024    ALKPHOS 70 05/19/2024    ALT 19 05/19/2024    AST 16 05/19/2024        INR RESULTS:  Lab Results   Component Value Date    INR 2.34 (H) 05/15/2024       Lab Results   Component Value Date    MAG 2.6 (H) 05/24/2024     Lab Results   Component Value Date    NTBNPI 26,696 (H) 05/15/2024     Lab Results   Component Value Date    NTBNP 31,556 (H) 05/14/2024       Assessment and Plan:   Ms. Idalia Bob is an 79yr old female with a history of CKD, HTN, GERD, COPD, CAD, and newly diagnosed NICM (LVEF 10-15% per TTE 5/2024) who [place] [facility] [place] and for follow-up of recent hospitalization.    Labs today are in process.    Ms. Bob appears well today.  Her BPs are controlled.  Her weight trend remains stable, but she has LE edema that has not improved since starting lasix  40mg daily, 3 days ago.    Discussed that we can refer her to lymphedema for further care.  Also discussed that I will review her labs, and make a decision re: increasing losartan to 25mg daily and adjusting lasix as indicated.    She is working with home PT/OT, so discussed that we can then refer her to outpatient cardiac rehab once she has completed home therapies.    We will plan for her to follow-up in CORE in 2-4 weeks, and asked that she call with any issues in the interim.      Newly diagnosed systolic heart failure secondary to NICM (LVEF 10-15% per TTE 5/2024)  HTN  Stage C, NYHA Class III  - ACEi/ARB/ARNi:  will increase losartan to 25mg daily pending labs today -- note that she was recently hypotensive  - Afterload reduction:  n/a  - BB:  could consider initiation pending BP trends  - Aldosterone antagonist:  deferred while other medications are proritized  - SGLT2i:  jardiance 10mg daily  - SCD ppx:  n/a  - Fluid status:  appears euvolemic, on lasix 40mg daily, will re-eval pending labs today      Nonobstructive CAD  Coronary CTA showed mild non-obstructive coronary artery disease.  Total Agatston score 484 placing the patient in the 79th percentile when compared to age and gender matched control group.    - continue aspirin 81mg daily  - BB as above  - not on statin, defer to primary cardiologist      COPD  No longer on day-time oxygen, just at night.    CKD  BMP from today in process.      The longitudinal plan of care for the diagnosis(es)/condition(s) as documented were addressed during this visit. Due to the added complexity in care, I will continue to support Idalia in the subsequent management and with ongoing continuity of care.    The above was reviewed with Ms. Bob, who verbalized understanding and will call with further questions/concerns.    60+ minutes spent with patient, along with preparing for visit, reviewing follow up, and completing documentation.      Jojo Aguilar DNP,  FNP-BC  Advanced Heart Failure Nurse Practitioner  [facility]  Patient Care Team:  Gomez Louis MD as PCP - General (Family Medicine)  Gomez Louis MD as Assigned PCP  Subha Jansen PA-C as Physician Assistant (Gastroenterology)

## 2024-06-04 NOTE — PATIENT INSTRUCTIONS
Take your medicines every day, as directed    Changes made today:  I will refer you to the lymphedema clinic  I will finalize the rest of your medication changes after labs result from today  Anticipate starting outpatient cardiac rehab once you've completed home therapies  Also anticipate increasing losartan to 25mg once daily   Monitor Your Weight and Symptoms    Contact us if you:    Gain 2 pounds in one day or 5 pounds in one week  Feel more short of breath  Notice more leg swelling  Feel lightheadeded   Change your lifestyle    Limit Salt or Sodium:  2000 mg  Limit Fluids:  2000 mL or approximately 64 ounces  Eat a Heart Healthy Diet  Low in saturated fats  Stay Active:  Aim to move at least 150 minutes every  week         To Contact us    During Business Hours:  [phone], option # 1      After hours, weekends or holidays:   [phone], Option #4  Ask to speak to the On-Call Cardiologist. Inform them you are a CORE/heart failure patient at the [facility].     Use [employer] allows you to communicate directly with your heart team through secure messaging.  [employer] can be accessed any time on your phone, computer, or tablet.  If you need assistance, we'd be happy to help!         Keep your Heart Appointments:    CORE in 2-4 weeks     Please consider attending our virtual support group which is held monthly. Please reach out to Memo at [phone] for more information if you are interested in attending.     2024 dates:  Monday, June 3rd, 1-2pm     Monday, July 1st, 1-2pm     Monday, August 5th, 1-2pm     Monday, September 9th, 1-2pm     Monday, October 7th, 1-2pm     Monday, November 4th, 1-2pm     Monday, December 2nd, 1-2pm

## 2024-06-04 NOTE — NURSING NOTE
Chief Complaint   Patient presents with    New Patient     New CORE- 79 year old female with New HFrEF 10-15% presents for hospital Follow-up with labs prior.     Vitals were taken and medications reconciled.    Felton Lu, EMT  11:08 AM

## 2024-06-04 NOTE — NURSING NOTE
Labs: Patient was given results of the laboratory testing obtained today. Patient was instructed to return for the next laboratory testing in 1 week. Patient demonstrated understanding of this information and agreed to call with further questions or concerns.     Med Reconcile: Reviewed and verified all current medications with the patient. The updated medication list was printed and given to the patient.    Return Appointment: Patient given instructions regarding scheduling next clinic visit. Patient demonstrated understanding of this information and agreed to call with further questions or concerns. CORE in 2-4 weeks.    Medication Change: Patient was educated regarding prescribed medication change, including discussion of the indication, administration, side effects, and when to report to MD or RN. Patient demonstrated understanding of this information and agreed to call with further questions or concerns. Increase lasix to 40 mg in AM, 20 mg in PM.    Patient stated she understood all health information given and agreed to call with further questions or concerns.    Lymphedema therapy consult order placed.    Caroline Rubin RN

## 2024-06-05 ENCOUNTER — MYC MEDICAL ADVICE (OUTPATIENT)
Dept: CARDIOLOGY | Facility: CLINIC | Age: 79
End: 2024-06-05
Payer: MEDICARE

## 2024-06-05 ENCOUNTER — MYC MEDICAL ADVICE (OUTPATIENT)
Dept: FAMILY MEDICINE | Facility: CLINIC | Age: 79
End: 2024-06-05
Payer: MEDICARE

## 2024-06-05 ENCOUNTER — TELEPHONE (OUTPATIENT)
Dept: FAMILY MEDICINE | Facility: CLINIC | Age: 79
End: 2024-06-05

## 2024-06-05 DIAGNOSIS — R60.0 LOWER EXTREMITY EDEMA: ICD-10-CM

## 2024-06-05 NOTE — TELEPHONE ENCOUNTER
[id number] is requesting a 90-day supply.  potassium chloride ER (K-TAB) 20 MEQ CR tablet

## 2024-06-05 NOTE — TELEPHONE ENCOUNTER
Corazon with [facility] is calling to let Dr Louis know about patients oxygen and heart rate.  Patients current order is for oxygen at night but she currently today with activity her oxygen dropped to 88% on room air and heart rate was up to 125.    GUDELIA Lopez  [facility]

## 2024-06-05 NOTE — TELEPHONE ENCOUNTER
Called and spoke with patients daughter Barbara and relayed provider message.    Also left VM for PT Corazon with message as well.    Christine M Klisch, RN

## 2024-06-05 NOTE — TELEPHONE ENCOUNTER
Number dialed was for Ayse, unknown if short for Corazon, did not leave a message as  did not state for [facility].    Chante, RN    Triage Nurse  [facility]

## 2024-06-05 NOTE — TELEPHONE ENCOUNTER
Called [facility] and spoke to staff you confirmed patient was seen by PT today.    The number we had was correct and they transferred me to that number and said we could leave a message,    Name on machine was Ayse .     Left message to return call to clinic so we could triage.    Also spoke with patients daughter who said these VS are not uncommon for her mother.    Christine M Klisch, RN

## 2024-06-05 NOTE — TELEPHONE ENCOUNTER
Routing message to PCP.    Update form home care.    Please also review MyChart message sent by daughter.    Christine M Klisch, RN

## 2024-06-05 NOTE — TELEPHONE ENCOUNTER
Called and talked to pt's daughter Barbara.     Barbara states that pt weight today is down to 121#; weight yesterday in clinic was 123#.     Swelling in BLE has not improved; pt continues to be SOB with activity, this has not changed from her baseline.     Pt wears JEROMY hose 12-15 hours per day; Lymphedema Referral placed yesterday in clinic.     Pt has not yet increased Lasix to 40/20 mg; currently taking Laix 40 mg daily.     Discussed with Jojo.       Date: 6/5/2024    Time of Call: 10:24 AM     Diagnosis:  Hf      [ VORB ] Ordering provider: Jojo Aguilar CNP   Order:   -Increase Lasix to 40 mg in the AM and 20 mg in the afternoon x 3 days. Then go back to Lasix 40 mg daily.      Order received by: Agueda Dodd RN       Follow-up/additional notes: Called back Barbara; she will increase pt's Lasix to 40/20 starting today. Discussed with her that lasix increase is mainly to try and help inprove SOB with exertion; continues swelling in BLE should be addressed by Lymphedema Referral.     Will plan to reach out to Barbara on Friday to see how pt is feeling/weight.

## 2024-06-05 NOTE — TELEPHONE ENCOUNTER
Routing [employer] message to PCP.    Patients daughter called and spoke with cardiology team regarding low 02 sats and increased HR with PT.    Do you want her to use oxygen during therapy?    Please also see TE from today.    Christine M Klisch, RN

## 2024-06-05 NOTE — TELEPHONE ENCOUNTER
Corazon is calling back from [facility].  Her phone number is [phone].    She called to let PCP know if the VS today at her home visit.    Patients oxygen and heart rate.  Patients current order is for oxygen at night but she currently today with activity her oxygen dropped to 88% on room air and heart rate was up to 125.    She is stated that when patients put her 02 on, her stats, came right up into the med 90s.  Her HR when her sats dropped was 125, as she was anxious.  When she left her pulse was 90, and regular.    She denies any chest pain, heart palpitations, skipped heart beats.    She does note some shortness of breath.    Routed to please make sure PCP see's message.    Evelyn SHANNON RN  Triage Nurse  [facility]

## 2024-06-05 NOTE — TELEPHONE ENCOUNTER
She should use oxygen, to maintain her oxygen saturation above 90%  with activities.  She may use 1 to 2 L, to maintain her oxygen saturation above 90%.    Thanks

## 2024-06-06 DIAGNOSIS — F01.A0 MILD VASCULAR DEMENTIA, UNSPECIFIED WHETHER BEHAVIORAL, PSYCHOTIC, OR MOOD DISTURBANCE OR ANXIETY (H): ICD-10-CM

## 2024-06-06 RX ORDER — POTASSIUM CHLORIDE 1500 MG/1
TABLET, EXTENDED RELEASE ORAL
Qty: 180 TABLET | Refills: 0 | OUTPATIENT
Start: 2024-06-06

## 2024-06-07 RX ORDER — DONEPEZIL HYDROCHLORIDE 10 MG/1
10 TABLET, FILM COATED ORAL AT BEDTIME
Qty: 90 TABLET | Refills: 3 | Status: ON HOLD | OUTPATIENT
Start: 2024-06-07 | End: 2024-06-17

## 2024-06-07 NOTE — TELEPHONE ENCOUNTER
"Called pt daughter Barbara to get updates on increased Lasix 40/20 x 2 days.     Barbara states that pt is feeling well, her BLE swelling is \"much better\" with the Lasix increase. Pt weight yesterday wa 119#, pt has not been weighted yet today.     Pt was continuing to have SOB and lower sats with PT; per PCP they ordred her to wear O2 when she is doing PT in addition to at NOC.     Pt returning to Lasix 40 mg daily today; writer will Follow-up with Gus on Tuesday to see how pt continues to feel.     Agueda Dodd RN    "

## 2024-06-09 ENCOUNTER — HOSPITAL ENCOUNTER (INPATIENT)
Facility: CLINIC | Age: 79
LOS: 5 days | Discharge: NURSING, REHAB OR LONG-TERM CARE | DRG: 871 | End: 2024-06-14
Attending: EMERGENCY MEDICINE | Admitting: STUDENT IN AN ORGANIZED HEALTH CARE EDUCATION/TRAINING PROGRAM
Payer: MEDICARE

## 2024-06-09 ENCOUNTER — APPOINTMENT (OUTPATIENT)
Dept: GENERAL RADIOLOGY | Facility: CLINIC | Age: 79
DRG: 871 | End: 2024-06-09
Attending: EMERGENCY MEDICINE
Payer: MEDICARE

## 2024-06-09 DIAGNOSIS — Z87.891 HISTORY OF TOBACCO USE: ICD-10-CM

## 2024-06-09 DIAGNOSIS — J18.9 PNEUMONIA OF RIGHT LOWER LOBE DUE TO INFECTIOUS ORGANISM: Primary | ICD-10-CM

## 2024-06-09 DIAGNOSIS — R50.9 FEVER, UNSPECIFIED FEVER CAUSE: ICD-10-CM

## 2024-06-09 LAB
ABO/RH(D): NORMAL
ALBUMIN SERPL BCG-MCNC: 3.7 G/DL (ref 3.5–5.2)
ALBUMIN UR-MCNC: NEGATIVE MG/DL
ALP SERPL-CCNC: 71 U/L (ref 40–150)
ALT SERPL W P-5'-P-CCNC: 9 U/L (ref 0–50)
ANION GAP SERPL CALCULATED.3IONS-SCNC: 13 MMOL/L (ref 7–15)
ANTIBODY SCREEN: NEGATIVE
APPEARANCE UR: CLEAR
AST SERPL W P-5'-P-CCNC: 17 U/L (ref 0–45)
ATRIAL RATE - MUSE: 104 BPM
BASE EXCESS BLDV CALC-SCNC: 0 MMOL/L (ref -3–3)
BASE EXCESS BLDV CALC-SCNC: 0 MMOL/L (ref -3–3)
BASOPHILS # BLD AUTO: 0.1 10E3/UL (ref 0–0.2)
BASOPHILS NFR BLD AUTO: 0 %
BILIRUB SERPL-MCNC: 0.6 MG/DL
BILIRUB UR QL STRIP: NEGATIVE
BUN SERPL-MCNC: 12.5 MG/DL (ref 8–23)
CA-I BLD-MCNC: 4.7 MG/DL (ref 4.4–5.2)
CALCIUM SERPL-MCNC: 9 MG/DL (ref 8.8–10.2)
CHLORIDE SERPL-SCNC: 98 MMOL/L (ref 98–107)
COLOR UR AUTO: ABNORMAL
CPB POCT: NO
CREAT SERPL-MCNC: 1.23 MG/DL (ref 0.51–0.95)
DEPRECATED HCO3 PLAS-SCNC: 22 MMOL/L (ref 22–29)
DIASTOLIC BLOOD PRESSURE - MUSE: NORMAL MMHG
EGFRCR SERPLBLD CKD-EPI 2021: 44 ML/MIN/1.73M2
EOSINOPHIL # BLD AUTO: 0.2 10E3/UL (ref 0–0.7)
EOSINOPHIL NFR BLD AUTO: 2 %
ERYTHROCYTE [DISTWIDTH] IN BLOOD BY AUTOMATED COUNT: 24.1 % (ref 10–15)
FLUAV RNA SPEC QL NAA+PROBE: NEGATIVE
FLUBV RNA RESP QL NAA+PROBE: NEGATIVE
GLUCOSE BLD-MCNC: 97 MG/DL (ref 70–99)
GLUCOSE SERPL-MCNC: 95 MG/DL (ref 70–99)
GLUCOSE UR STRIP-MCNC: 500 MG/DL
HCO3 BLDV-SCNC: 24 MMOL/L (ref 21–28)
HCO3 BLDV-SCNC: 25 MMOL/L (ref 21–28)
HCT VFR BLD AUTO: 34.6 % (ref 35–47)
HCT VFR BLD CALC: 35 % (ref 35–47)
HGB BLD-MCNC: 10 G/DL (ref 11.7–15.7)
HGB BLD-MCNC: 11.9 G/DL (ref 11.7–15.7)
HGB UR QL STRIP: NEGATIVE
IMM GRANULOCYTES # BLD: 0.1 10E3/UL
IMM GRANULOCYTES NFR BLD: 1 %
INR PPP: 1.06 (ref 0.85–1.15)
INTERPRETATION ECG - MUSE: NORMAL
KETONES UR STRIP-MCNC: NEGATIVE MG/DL
LACTATE BLD-SCNC: 1.4 MMOL/L
LACTATE SERPL-SCNC: 1.6 MMOL/L (ref 0.7–2)
LEUKOCYTE ESTERASE UR QL STRIP: NEGATIVE
LIPASE SERPL-CCNC: 13 U/L (ref 13–60)
LYMPHOCYTES # BLD AUTO: 0.5 10E3/UL (ref 0.8–5.3)
LYMPHOCYTES NFR BLD AUTO: 4 %
MCH RBC QN AUTO: 24.4 PG (ref 26.5–33)
MCHC RBC AUTO-ENTMCNC: 28.9 G/DL (ref 31.5–36.5)
MCV RBC AUTO: 84 FL (ref 78–100)
MONOCYTES # BLD AUTO: 1.1 10E3/UL (ref 0–1.3)
MONOCYTES NFR BLD AUTO: 7 %
MUCOUS THREADS #/AREA URNS LPF: PRESENT /LPF
NEUTROPHILS # BLD AUTO: 13.1 10E3/UL (ref 1.6–8.3)
NEUTROPHILS NFR BLD AUTO: 86 %
NITRATE UR QL: NEGATIVE
NRBC # BLD AUTO: 0 10E3/UL
NRBC BLD AUTO-RTO: 0 /100
NT-PROBNP SERPL-MCNC: ABNORMAL PG/ML (ref 0–1800)
P AXIS - MUSE: 100 DEGREES
PCO2 BLDV: 38 MM HG (ref 40–50)
PCO2 BLDV: 39 MM HG (ref 40–50)
PH BLDV: 7.41 [PH] (ref 7.32–7.43)
PH BLDV: 7.41 [PH] (ref 7.32–7.43)
PH UR STRIP: 5.5 [PH] (ref 5–7)
PLATELET # BLD AUTO: 239 10E3/UL (ref 150–450)
PO2 BLDV: 19 MM HG (ref 25–47)
PO2 BLDV: 20 MM HG (ref 25–47)
POTASSIUM BLD-SCNC: 4.4 MMOL/L (ref 3.4–5.3)
POTASSIUM SERPL-SCNC: 4.3 MMOL/L (ref 3.4–5.3)
PR INTERVAL - MUSE: 154 MS
PROCALCITONIN SERPL IA-MCNC: 0.15 NG/ML
PROT SERPL-MCNC: 6.1 G/DL (ref 6.4–8.3)
QRS DURATION - MUSE: 70 MS
QT - MUSE: 362 MS
QTC - MUSE: 476 MS
R AXIS - MUSE: 90 DEGREES
RBC # BLD AUTO: 4.1 10E6/UL (ref 3.8–5.2)
RBC URINE: <1 /HPF
RSV RNA SPEC NAA+PROBE: NEGATIVE
SAO2 % BLDV: 31 % (ref 70–75)
SAO2 % BLDV: 32 % (ref 70–75)
SARS-COV-2 RNA RESP QL NAA+PROBE: NEGATIVE
SODIUM BLD-SCNC: 132 MMOL/L (ref 135–145)
SODIUM SERPL-SCNC: 133 MMOL/L (ref 135–145)
SP GR UR STRIP: 1.01 (ref 1–1.03)
SPECIMEN EXPIRATION DATE: NORMAL
SYSTOLIC BLOOD PRESSURE - MUSE: NORMAL MMHG
T AXIS - MUSE: 251 DEGREES
TROPONIN T SERPL HS-MCNC: 47 NG/L
TROPONIN T SERPL HS-MCNC: 51 NG/L
UROBILINOGEN UR STRIP-MCNC: NORMAL MG/DL
VENTRICULAR RATE- MUSE: 104 BPM
WBC # BLD AUTO: 15.1 10E3/UL (ref 4–11)
WBC URINE: 1 /HPF

## 2024-06-09 PROCEDURE — 83690 ASSAY OF LIPASE: CPT | Performed by: EMERGENCY MEDICINE

## 2024-06-09 PROCEDURE — 83880 ASSAY OF NATRIURETIC PEPTIDE: CPT

## 2024-06-09 PROCEDURE — 71045 X-RAY EXAM CHEST 1 VIEW: CPT

## 2024-06-09 PROCEDURE — 87040 BLOOD CULTURE FOR BACTERIA: CPT | Performed by: EMERGENCY MEDICINE

## 2024-06-09 PROCEDURE — 99207 PR APP CREDIT; MD BILLING SHARED VISIT: CPT | Mod: FS

## 2024-06-09 PROCEDURE — 96365 THER/PROPH/DIAG IV INF INIT: CPT | Performed by: EMERGENCY MEDICINE

## 2024-06-09 PROCEDURE — 85610 PROTHROMBIN TIME: CPT | Performed by: EMERGENCY MEDICINE

## 2024-06-09 PROCEDURE — 86900 BLOOD TYPING SEROLOGIC ABO: CPT | Performed by: EMERGENCY MEDICINE

## 2024-06-09 PROCEDURE — 96366 THER/PROPH/DIAG IV INF ADDON: CPT

## 2024-06-09 PROCEDURE — 93010 ELECTROCARDIOGRAM REPORT: CPT | Performed by: EMERGENCY MEDICINE

## 2024-06-09 PROCEDURE — 83605 ASSAY OF LACTIC ACID: CPT | Performed by: EMERGENCY MEDICINE

## 2024-06-09 PROCEDURE — 87637 SARSCOV2&INF A&B&RSV AMP PRB: CPT | Performed by: EMERGENCY MEDICINE

## 2024-06-09 PROCEDURE — 82803 BLOOD GASES ANY COMBINATION: CPT

## 2024-06-09 PROCEDURE — 93005 ELECTROCARDIOGRAM TRACING: CPT | Performed by: EMERGENCY MEDICINE

## 2024-06-09 PROCEDURE — 81001 URINALYSIS AUTO W/SCOPE: CPT | Performed by: EMERGENCY MEDICINE

## 2024-06-09 PROCEDURE — 84145 PROCALCITONIN (PCT): CPT | Performed by: EMERGENCY MEDICINE

## 2024-06-09 PROCEDURE — 71045 X-RAY EXAM CHEST 1 VIEW: CPT | Mod: 26 | Performed by: STUDENT IN AN ORGANIZED HEALTH CARE EDUCATION/TRAINING PROGRAM

## 2024-06-09 PROCEDURE — G0378 HOSPITAL OBSERVATION PER HR: HCPCS

## 2024-06-09 PROCEDURE — 250N000013 HC RX MED GY IP 250 OP 250 PS 637: Performed by: EMERGENCY MEDICINE

## 2024-06-09 PROCEDURE — 85025 COMPLETE CBC W/AUTO DIFF WBC: CPT | Performed by: EMERGENCY MEDICINE

## 2024-06-09 PROCEDURE — 258N000003 HC RX IP 258 OP 636: Mod: JZ | Performed by: EMERGENCY MEDICINE

## 2024-06-09 PROCEDURE — 87086 URINE CULTURE/COLONY COUNT: CPT | Performed by: EMERGENCY MEDICINE

## 2024-06-09 PROCEDURE — 82947 ASSAY GLUCOSE BLOOD QUANT: CPT | Performed by: EMERGENCY MEDICINE

## 2024-06-09 PROCEDURE — 258N000003 HC RX IP 258 OP 636

## 2024-06-09 PROCEDURE — 250N000011 HC RX IP 250 OP 636: Mod: JZ

## 2024-06-09 PROCEDURE — 36415 COLL VENOUS BLD VENIPUNCTURE: CPT

## 2024-06-09 PROCEDURE — 120N000002 HC R&B MED SURG/OB UMMC

## 2024-06-09 PROCEDURE — 250N000011 HC RX IP 250 OP 636: Mod: JZ | Performed by: EMERGENCY MEDICINE

## 2024-06-09 PROCEDURE — 99291 CRITICAL CARE FIRST HOUR: CPT | Mod: 25 | Performed by: EMERGENCY MEDICINE

## 2024-06-09 PROCEDURE — 99285 EMERGENCY DEPT VISIT HI MDM: CPT | Mod: 25 | Performed by: EMERGENCY MEDICINE

## 2024-06-09 PROCEDURE — 36415 COLL VENOUS BLD VENIPUNCTURE: CPT | Performed by: EMERGENCY MEDICINE

## 2024-06-09 PROCEDURE — 99223 1ST HOSP IP/OBS HIGH 75: CPT | Mod: AI | Performed by: STUDENT IN AN ORGANIZED HEALTH CARE EDUCATION/TRAINING PROGRAM

## 2024-06-09 PROCEDURE — 84484 ASSAY OF TROPONIN QUANT: CPT | Performed by: EMERGENCY MEDICINE

## 2024-06-09 PROCEDURE — 250N000013 HC RX MED GY IP 250 OP 250 PS 637

## 2024-06-09 PROCEDURE — 84484 ASSAY OF TROPONIN QUANT: CPT

## 2024-06-09 RX ORDER — CEFTRIAXONE 1 G/1
1 INJECTION, POWDER, FOR SOLUTION INTRAMUSCULAR; INTRAVENOUS EVERY 24 HOURS
Status: DISCONTINUED | OUTPATIENT
Start: 2024-06-10 | End: 2024-06-09

## 2024-06-09 RX ORDER — ASPIRIN 81 MG/1
81 TABLET ORAL DAILY
Status: DISCONTINUED | OUTPATIENT
Start: 2024-06-10 | End: 2024-06-14 | Stop reason: HOSPADM

## 2024-06-09 RX ORDER — LIDOCAINE 40 MG/G
CREAM TOPICAL
Status: DISCONTINUED | OUTPATIENT
Start: 2024-06-09 | End: 2024-06-14 | Stop reason: HOSPADM

## 2024-06-09 RX ORDER — IPRATROPIUM BROMIDE AND ALBUTEROL SULFATE 2.5; .5 MG/3ML; MG/3ML
1 SOLUTION RESPIRATORY (INHALATION) EVERY 6 HOURS PRN
Status: DISCONTINUED | OUTPATIENT
Start: 2024-06-09 | End: 2024-06-14 | Stop reason: HOSPADM

## 2024-06-09 RX ORDER — PANTOPRAZOLE SODIUM 40 MG/1
40 TABLET, DELAYED RELEASE ORAL DAILY
Status: DISCONTINUED | OUTPATIENT
Start: 2024-06-10 | End: 2024-06-14 | Stop reason: HOSPADM

## 2024-06-09 RX ORDER — DONEPEZIL HYDROCHLORIDE 10 MG/1
10 TABLET, FILM COATED ORAL AT BEDTIME
Status: DISCONTINUED | OUTPATIENT
Start: 2024-06-09 | End: 2024-06-14 | Stop reason: HOSPADM

## 2024-06-09 RX ORDER — ASPIRIN 81 MG/1
81 TABLET ORAL DAILY
Status: CANCELLED | OUTPATIENT
Start: 2024-06-10

## 2024-06-09 RX ORDER — SODIUM CHLORIDE 9 MG/ML
INJECTION, SOLUTION INTRAVENOUS CONTINUOUS
Status: DISCONTINUED | OUTPATIENT
Start: 2024-06-09 | End: 2024-06-09

## 2024-06-09 RX ORDER — ACETAMINOPHEN 500 MG
1000 TABLET ORAL ONCE
Status: COMPLETED | OUTPATIENT
Start: 2024-06-09 | End: 2024-06-09

## 2024-06-09 RX ORDER — FUROSEMIDE 40 MG
40 TABLET ORAL EVERY MORNING
Status: DISCONTINUED | OUTPATIENT
Start: 2024-06-10 | End: 2024-06-14 | Stop reason: HOSPADM

## 2024-06-09 RX ORDER — CALCIUM CARBONATE 500 MG/1
1000 TABLET, CHEWABLE ORAL 4 TIMES DAILY PRN
Status: DISCONTINUED | OUTPATIENT
Start: 2024-06-09 | End: 2024-06-14 | Stop reason: HOSPADM

## 2024-06-09 RX ORDER — BUDESONIDE 1 MG/2ML
1 INHALANT ORAL DAILY
Status: DISCONTINUED | OUTPATIENT
Start: 2024-06-10 | End: 2024-06-10

## 2024-06-09 RX ORDER — FLUOXETINE 40 MG/1
40 CAPSULE ORAL DAILY
Status: DISCONTINUED | OUTPATIENT
Start: 2024-06-10 | End: 2024-06-14 | Stop reason: HOSPADM

## 2024-06-09 RX ORDER — PIPERACILLIN SODIUM, TAZOBACTAM SODIUM 3; .375 G/15ML; G/15ML
3.38 INJECTION, POWDER, LYOPHILIZED, FOR SOLUTION INTRAVENOUS EVERY 6 HOURS
Status: DISCONTINUED | OUTPATIENT
Start: 2024-06-09 | End: 2024-06-11

## 2024-06-09 RX ORDER — ACETAMINOPHEN 325 MG/1
975 TABLET ORAL EVERY 8 HOURS PRN
Status: DISCONTINUED | OUTPATIENT
Start: 2024-06-09 | End: 2024-06-14 | Stop reason: HOSPADM

## 2024-06-09 RX ORDER — SODIUM CHLORIDE 9 MG/ML
INJECTION, SOLUTION INTRAVENOUS CONTINUOUS
Status: DISCONTINUED | OUTPATIENT
Start: 2024-06-09 | End: 2024-06-10

## 2024-06-09 RX ORDER — LEVOFLOXACIN 5 MG/ML
750 INJECTION, SOLUTION INTRAVENOUS ONCE
Status: COMPLETED | OUTPATIENT
Start: 2024-06-09 | End: 2024-06-09

## 2024-06-09 RX ORDER — AZITHROMYCIN 250 MG/1
500 TABLET, FILM COATED ORAL ONCE
Qty: 2 TABLET | Refills: 0 | Status: DISCONTINUED | OUTPATIENT
Start: 2024-06-10 | End: 2024-06-09

## 2024-06-09 RX ORDER — PIPERACILLIN SODIUM, TAZOBACTAM SODIUM 4; .5 G/20ML; G/20ML
4.5 INJECTION, POWDER, LYOPHILIZED, FOR SOLUTION INTRAVENOUS EVERY 6 HOURS
Status: DISCONTINUED | OUTPATIENT
Start: 2024-06-09 | End: 2024-06-09

## 2024-06-09 RX ORDER — LEVOTHYROXINE SODIUM 50 UG/1
50 TABLET ORAL DAILY
Status: DISCONTINUED | OUTPATIENT
Start: 2024-06-10 | End: 2024-06-14 | Stop reason: HOSPADM

## 2024-06-09 RX ORDER — AMOXICILLIN 250 MG
2 CAPSULE ORAL 2 TIMES DAILY PRN
Status: DISCONTINUED | OUTPATIENT
Start: 2024-06-09 | End: 2024-06-14 | Stop reason: HOSPADM

## 2024-06-09 RX ORDER — AMOXICILLIN 250 MG
1 CAPSULE ORAL 2 TIMES DAILY PRN
Status: DISCONTINUED | OUTPATIENT
Start: 2024-06-09 | End: 2024-06-14 | Stop reason: HOSPADM

## 2024-06-09 RX ORDER — BUSPIRONE HYDROCHLORIDE 15 MG/1
15 TABLET ORAL 2 TIMES DAILY
Status: DISCONTINUED | OUTPATIENT
Start: 2024-06-09 | End: 2024-06-14 | Stop reason: HOSPADM

## 2024-06-09 RX ORDER — OLANZAPINE 2.5 MG/1
2.5 TABLET, FILM COATED ORAL 2 TIMES DAILY
Status: DISCONTINUED | OUTPATIENT
Start: 2024-06-09 | End: 2024-06-14 | Stop reason: HOSPADM

## 2024-06-09 RX ORDER — AZITHROMYCIN 250 MG/1
250 TABLET, FILM COATED ORAL DAILY
Qty: 4 TABLET | Refills: 0 | Status: DISCONTINUED | OUTPATIENT
Start: 2024-06-11 | End: 2024-06-09

## 2024-06-09 RX ADMIN — BUSPIRONE HYDROCHLORIDE 15 MG: 15 TABLET ORAL at 23:39

## 2024-06-09 RX ADMIN — ACETAMINOPHEN 1000 MG: 500 TABLET ORAL at 18:47

## 2024-06-09 RX ADMIN — LEVOFLOXACIN 750 MG: 5 INJECTION, SOLUTION INTRAVENOUS at 20:17

## 2024-06-09 RX ADMIN — SODIUM CHLORIDE: 9 INJECTION, SOLUTION INTRAVENOUS at 21:24

## 2024-06-09 RX ADMIN — SODIUM CHLORIDE, PRESERVATIVE FREE: 5 INJECTION INTRAVENOUS at 23:43

## 2024-06-09 RX ADMIN — PIPERACILLIN SODIUM AND TAZOBACTAM SODIUM 3.38 G: 3; .375 INJECTION, POWDER, LYOPHILIZED, FOR SOLUTION INTRAVENOUS at 23:44

## 2024-06-09 RX ADMIN — OLANZAPINE 2.5 MG: 2.5 TABLET, FILM COATED ORAL at 23:39

## 2024-06-09 RX ADMIN — DONEPEZIL HYDROCHLORIDE 10 MG: 10 TABLET, FILM COATED ORAL at 23:39

## 2024-06-09 ASSESSMENT — ACTIVITIES OF DAILY LIVING (ADL)
ADLS_ACUITY_SCORE: 40

## 2024-06-09 NOTE — ED TRIAGE NOTES
BIBA for AMS, weakness, dizziness. Was on INÉS from her [facility] w/o her baseline 2 liters of O2. Daughter thought these changes were related to prolonged time w/ Ow, but pt did not improve so EMS was called. Per EMS VS as follows: T 103.8, RR 25 w/ spo2 at 88 on RA, WDL on baseline O2. 12 lead unremarkable,  on site. Pt has dementia at baseline.

## 2024-06-09 NOTE — ED PROVIDER NOTES
[facility] ([facility])    6/09/24       History     Chief Complaint   Patient presents with    Altered Mental Status    Generalized Weakness     HPI  Idalia Bob is a 79 year old female who with Pmh of COPD, PNA, HTN, GERD, CKD III, newly dx of HFrEF and dementia, presents to the ED [facility] due to altered mental status and generalized weakness.      Patient was bought to the ED from [facility] after EMS was called. Patient is normally on O2 all the time, but she was off of her O2 and she was lethargic due to that. She was stating at 88% and so, she was placed back on 2L of O2. Her initial vitals showed tachycardia in 120-130's and tachypnia of 25-30's. Her 12 lead ECG was unremarkable. Patient's temperature was 103.8F  and at baseline she was confused, so EMS decided that she needs to be seen at the ED. Also, the patient was feeling dizzy according to her daughter.     Denies any pain any where else in her body except in her legs. Her left leg is wrapped. Her abdomen does not hurt except when it is palpated. Patient is a non smoker. She has shortness of breath sometimes. Patients current temperature in the ED is 101F. She thinks it is the month April currently and knows that Frank is the current president. Patient lives with her .     Denies chest pain. Denies taking any medications before getting here. Denies cough or sneeze.       Prior history from daughter patient was doing well earlier today.  She did go up and down several times with her walker.  Patient lives in assisted living facility with her .  Says that she was not using her oxygen during the day today.  She has been told in the past that she needs to use oxygen only at night but physical therapy recommended during the day as well.  Assisted living facility called the daughter because the patient was ill-appearing and was feeling more weak.  They found her to be febrile at the home.   Daughter does note that the mother keeps the house very warm.  She does have a history of recurrent pneumonias.      As per Epic review:   Echocardiogram on 5/15/24:   Left ventricular function is decreased. The ejection fraction is 10-15%  (severely reduced). Severe diffuse hypokinesis is present.  Global right ventricular function is moderately reduced.  Moderate tricuspid insufficiency is present.  Estimated pulmonary artery systolic pressure is 54 mmHg plus right atrial  pressure.  IVC diameter >2.1 cm collapsing <50% with sniff suggests a high RA pressure  estimated at 15 mmHg or greater.  Trivial pericardial effusion is present.     This study was compared with the study from 2/12/24: LV and RV function have  decreased significantly.    Past Medical History  Past Medical History:   Diagnosis Date    Chronic kidney disease (CKD) 01/10/2023    Chronic obstructive pulmonary disease (H) 01/10/2023    Dementia (H) 11/28/2023    GERD (gastroesophageal reflux disease) 03/15/2023    Hypertension 11/28/2023     Past Surgical History:   Procedure Laterality Date    ESOPHAGOSCOPY, GASTROSCOPY, DUODENOSCOPY (EGD), COMBINED N/A 02/07/2024    Procedure: ESOPHAGOGASTRODUODENOSCOPY, WITH BIOPSY;  Surgeon: Felton James MD;  Location: [facility]    PICC DOUBLE LUMEN PLACEMENT Right 05/17/2024    Basilic Vein 5F DL 37 cm     acetaminophen (TYLENOL) 325 MG tablet  albuterol (PROVENTIL) (2.5 MG/3ML) 0.083% neb solution  aspirin 81 MG EC tablet  benzocaine-menthol (CHLORASEPTIC) 6-10 MG lozenge  budesonide (PULMICORT) 1 MG/2ML neb solution  busPIRone (BUSPAR) 15 MG tablet  calcium carbonate (TUMS) 500 MG chewable tablet  Calcium Polycarbophil (FIBER) 625 MG tablet  diphenhydrAMINE-zinc acetate (BENADRYL) 2-0.1 % external cream  donepezil (ARICEPT) 10 MG tablet  empagliflozin (JARDIANCE) 10 MG TABS tablet  ferrous gluconate (FERGON) 324 (38 Fe) MG tablet  FLUoxetine (PROZAC) 40 MG capsule  furosemide (LASIX) 40 MG  tablet  hyoscyamine (LEVSIN) 0.125 MG tablet  ipratropium - albuterol 0.5 mg/2.5 mg/3 mL (DUONEB) 0.5-2.5 (3) MG/3ML neb solution  lactobacillus rhamnosus, GG, (CULTURELL) capsule  latanoprost (XALATAN) 0.005 % ophthalmic solution  levothyroxine (SYNTHROID/LEVOTHROID) 50 MCG tablet  losartan (COZAAR) 25 MG tablet  melatonin 1 MG TABS tablet  OLANZapine (ZYPREXA) 2.5 MG tablet  oxyCODONE (ROXICODONE) 5 MG tablet  pantoprazole (PROTONIX) 40 MG EC tablet  potassium chloride ER (K-TAB) 20 MEQ CR tablet  senna-docusate (SENOKOT-S/PERICOLACE) 8.6-50 MG tablet  triamcinolone (KENALOG) 0.1 % external cream  vitamin D3 (CHOLECALCIFEROL) 50 mcg (2000 units) tablet      Allergies   Allergen Reactions    Penicillins Itching     Has tolerated ceftriaxone, piperacillin, and amoxicillin     Family History  No family history on file.  Social History   Social History     Tobacco Use    Smoking status: Former     Current packs/day: 1.00     Average packs/day: 1 pack/day for 40.0 years (40.0 ttl pk-yrs)     Types: Cigarettes     Passive exposure: Past    Smokeless tobacco: Never   Vaping Use    Vaping status: Never Used   Substance Use Topics    Alcohol use: Not Currently    Drug use: Never      Past medical history, past surgical history, medications, allergies, family history, and social history were reviewed with the patient. No additional pertinent items.   A complete review of systems was performed with pertinent positives and negatives noted in the HPI, and all other systems negative.    Physical Exam      Physical Exam  Constitutional:       Appearance: She is well-developed. She is not ill-appearing or toxic-appearing.      Comments: Mildly tachycardic to low 100s.  Blood pressure normal at 120.  Satting well at 2 L of oxygen at 96 to 98%.  No conversational dyspnea.   HENT:      Head: Normocephalic and atraumatic.   Cardiovascular:      Rate and Rhythm: Normal rate and regular rhythm.      Heart sounds: Normal heart sounds.    Pulmonary:      Effort: Pulmonary effort is normal. No respiratory distress.      Breath sounds: Normal breath sounds.   Abdominal:      General: There is no distension.      Palpations: Abdomen is soft.      Tenderness: There is no abdominal tenderness. There is no rebound.   Musculoskeletal:         General: No tenderness.      Cervical back: Normal range of motion.      Right lower leg: Edema (minimal bilateral; both legs wrapped) present.      Left lower leg: Edema present.   Skin:     General: Skin is warm and dry.   Neurological:      Mental Status: She is alert. Mental status is at baseline.      Cranial Nerves: No cranial nerve deficit or dysarthria.      Comments: Patient aware that the president is Frank.  Get confused to the month thought it was April.  Was able to answer some questions of history of present illness.   Psychiatric:         Mood and Affect: Mood normal.         Behavior: Behavior normal.         Thought Content: Thought content normal.           ED Course, Procedures, & Data      Procedures            EKG Interpretation:      Interpreted by Amber Segal MD  Time reviewed: 724 pm  Symptoms at time of EKG: NONE   Rhythm: normal sinus   Rate: normal  Axis: normal  Ectopy: none  Conduction: normal  ST Segments/ T Waves: No ST-T wave changes  Q Waves: none  Comparison to prior: Unchanged    Clinical Impression: normal EKG            No results found for any visits on 06/09/24.  Medications - No data to display  Labs Ordered and Resulted from Time of ED Arrival to Time of ED Departure - No data to display  No orders to display          Critical care was not performed.     Critical Care Addendum  My initial assessment, based on my review of prehospital provider report, established a high suspicion that Idalia Bob has sepsis with indication for early goal-directed therapy and altered mental status, which requires immediate intervention, and therefore she is critically ill.  She  was brought to the stable room directly as a red patient by EMS recommendation due to concern for severe sepsis or septic shock.    After the initial assessment, the care team initiated multiple lab tests and initiated IV fluid administration to provide stabilization care. Due to the critical nature of this patient, I reassessed nursing observations, vital signs, and physical exam multiple times prior to her disposition.     Time also spent performing documentation and reviewing test results.     Critical care time (excluding teaching time and procedures): 30 minutes.         Assessment & Plan    Patient is an 79-year-old female who was came in from EMS due to concern for severe fever.  Patient was found to be mildly altered at home with a fever of 103.  Patient's blood pressures were normal during transport.  Patient was transported here and she was febrile to 101.  She says that she was not having any specific pain.  She did note that she is on oxygen at home.  Patient here was satting in the high 90s.  Did an evaluation to look for possible cause of fever.  Patient lactic acid was found to be normal.  Patient initial gas was normal.  She does have a leukocytosis of 15,000.  Patient's procalcitonin was normal.  Patient was found to have a right lower lobe pneumonia.  This is likely was caused her fever.  Per review of patient's medical chart she was admitted in beginning of May for fever and cardiogenic shock.  Patient follows closely with core clinic and was last seen in core clinic earlier this month.  Patient has a known EF of 10%.  Patient received Tylenol and her fever has improved.  I did speak to the daughter regarding her care.  Daughter says that patient normally ambulates with a walker.  She does not normally use her oxygen when she is moving around during the day even though physical therapy has told her she should in the past.  Due to history of severe sepsis, leukocytosis and fever pneumonia;  recommend admission to the hospital.  Report was given to the hospitalist for further care.  This part of the medical record was transcribed by MERLIN PICKARD, Medical Scribe, from a dictation done by Amber Segal MD.    I have reviewed the nursing notes. I have reviewed the findings, diagnosis, plan and need for follow up with the patient.    New Prescriptions    No medications on file       Final diagnoses:   Pneumonia of right lower lobe due to infectious organism   Fever, unspecified fever cause     I, MERLIN PICKARD, am serving as a trained medical scribe to document services personally performed by Rm Alexander MD, based on the provider's statements to me.     I, Amber Segal MD was physically present and have reviewed and verified the accuracy of this note documented by MERLIN PICKARD.     Amber Segal MD.  [facility] EMERGENCY DEPARTMENT  6/9/2024     Amber Segal MD  06/09/24 1324

## 2024-06-10 ENCOUNTER — APPOINTMENT (OUTPATIENT)
Dept: PHYSICAL THERAPY | Facility: CLINIC | Age: 79
DRG: 871 | End: 2024-06-10
Payer: MEDICARE

## 2024-06-10 ENCOUNTER — APPOINTMENT (OUTPATIENT)
Dept: GENERAL RADIOLOGY | Facility: CLINIC | Age: 79
DRG: 871 | End: 2024-06-10
Attending: STUDENT IN AN ORGANIZED HEALTH CARE EDUCATION/TRAINING PROGRAM
Payer: MEDICARE

## 2024-06-10 ENCOUNTER — TELEPHONE (OUTPATIENT)
Dept: FAMILY MEDICINE | Facility: CLINIC | Age: 79
End: 2024-06-10
Payer: MEDICARE

## 2024-06-10 DIAGNOSIS — R60.0 LOWER EXTREMITY EDEMA: ICD-10-CM

## 2024-06-10 LAB
ANION GAP SERPL CALCULATED.3IONS-SCNC: 12 MMOL/L (ref 7–15)
ANION GAP SERPL CALCULATED.3IONS-SCNC: 12 MMOL/L (ref 7–15)
ANION GAP SERPL CALCULATED.3IONS-SCNC: 13 MMOL/L (ref 7–15)
BUN SERPL-MCNC: 12.7 MG/DL (ref 8–23)
BUN SERPL-MCNC: 14.3 MG/DL (ref 8–23)
BUN SERPL-MCNC: 14.6 MG/DL (ref 8–23)
CALCIUM SERPL-MCNC: 8.5 MG/DL (ref 8.8–10.2)
CALCIUM SERPL-MCNC: 8.8 MG/DL (ref 8.8–10.2)
CALCIUM SERPL-MCNC: 9.6 MG/DL (ref 8.8–10.2)
CHLORIDE SERPL-SCNC: 100 MMOL/L (ref 98–107)
CHLORIDE SERPL-SCNC: 98 MMOL/L (ref 98–107)
CHLORIDE SERPL-SCNC: 98 MMOL/L (ref 98–107)
CREAT SERPL-MCNC: 1.19 MG/DL (ref 0.51–0.95)
CREAT SERPL-MCNC: 1.36 MG/DL (ref 0.51–0.95)
CREAT SERPL-MCNC: 1.37 MG/DL (ref 0.51–0.95)
DEPRECATED HCO3 PLAS-SCNC: 19 MMOL/L (ref 22–29)
DEPRECATED HCO3 PLAS-SCNC: 20 MMOL/L (ref 22–29)
DEPRECATED HCO3 PLAS-SCNC: 20 MMOL/L (ref 22–29)
EGFRCR SERPLBLD CKD-EPI 2021: 39 ML/MIN/1.73M2
EGFRCR SERPLBLD CKD-EPI 2021: 39 ML/MIN/1.73M2
EGFRCR SERPLBLD CKD-EPI 2021: 46 ML/MIN/1.73M2
ERYTHROCYTE [DISTWIDTH] IN BLOOD BY AUTOMATED COUNT: 23.7 % (ref 10–15)
GLUCOSE SERPL-MCNC: 114 MG/DL (ref 70–99)
GLUCOSE SERPL-MCNC: 78 MG/DL (ref 70–99)
GLUCOSE SERPL-MCNC: 88 MG/DL (ref 70–99)
HCT VFR BLD AUTO: 34.4 % (ref 35–47)
HGB BLD-MCNC: 9.6 G/DL (ref 11.7–15.7)
LACTATE SERPL-SCNC: 1.4 MMOL/L (ref 0.7–2)
MCH RBC QN AUTO: 24.2 PG (ref 26.5–33)
MCHC RBC AUTO-ENTMCNC: 27.9 G/DL (ref 31.5–36.5)
MCV RBC AUTO: 87 FL (ref 78–100)
MRSA DNA SPEC QL NAA+PROBE: NEGATIVE
PLATELET # BLD AUTO: 250 10E3/UL (ref 150–450)
POTASSIUM SERPL-SCNC: 3.6 MMOL/L (ref 3.4–5.3)
POTASSIUM SERPL-SCNC: 3.9 MMOL/L (ref 3.4–5.3)
POTASSIUM SERPL-SCNC: 4 MMOL/L (ref 3.4–5.3)
RBC # BLD AUTO: 3.96 10E6/UL (ref 3.8–5.2)
SA TARGET DNA: NEGATIVE
SODIUM SERPL-SCNC: 129 MMOL/L (ref 135–145)
SODIUM SERPL-SCNC: 131 MMOL/L (ref 135–145)
SODIUM SERPL-SCNC: 132 MMOL/L (ref 135–145)
WBC # BLD AUTO: 19.8 10E3/UL (ref 4–11)

## 2024-06-10 PROCEDURE — 99233 SBSQ HOSP IP/OBS HIGH 50: CPT | Performed by: STUDENT IN AN ORGANIZED HEALTH CARE EDUCATION/TRAINING PROGRAM

## 2024-06-10 PROCEDURE — 36415 COLL VENOUS BLD VENIPUNCTURE: CPT | Performed by: STUDENT IN AN ORGANIZED HEALTH CARE EDUCATION/TRAINING PROGRAM

## 2024-06-10 PROCEDURE — 80048 BASIC METABOLIC PNL TOTAL CA: CPT

## 2024-06-10 PROCEDURE — 87633 RESP VIRUS 12-25 TARGETS: CPT | Performed by: STUDENT IN AN ORGANIZED HEALTH CARE EDUCATION/TRAINING PROGRAM

## 2024-06-10 PROCEDURE — 999N000248 HC STATISTIC IV INSERT WITH US BY RN

## 2024-06-10 PROCEDURE — 250N000011 HC RX IP 250 OP 636: Mod: JZ

## 2024-06-10 PROCEDURE — 80048 BASIC METABOLIC PNL TOTAL CA: CPT | Performed by: STUDENT IN AN ORGANIZED HEALTH CARE EDUCATION/TRAINING PROGRAM

## 2024-06-10 PROCEDURE — 97530 THERAPEUTIC ACTIVITIES: CPT | Mod: GP

## 2024-06-10 PROCEDURE — 71046 X-RAY EXAM CHEST 2 VIEWS: CPT | Mod: 26 | Performed by: RADIOLOGY

## 2024-06-10 PROCEDURE — 97161 PT EVAL LOW COMPLEX 20 MIN: CPT | Mod: GP

## 2024-06-10 PROCEDURE — 36415 COLL VENOUS BLD VENIPUNCTURE: CPT

## 2024-06-10 PROCEDURE — 87899 AGENT NOS ASSAY W/OPTIC: CPT

## 2024-06-10 PROCEDURE — 999N000157 HC STATISTIC RCP TIME EA 10 MIN

## 2024-06-10 PROCEDURE — 999N000226 HC STATISTIC SLP IP EVAL DEFER: Performed by: SPEECH-LANGUAGE PATHOLOGIST

## 2024-06-10 PROCEDURE — 83605 ASSAY OF LACTIC ACID: CPT | Performed by: STUDENT IN AN ORGANIZED HEALTH CARE EDUCATION/TRAINING PROGRAM

## 2024-06-10 PROCEDURE — 250N000013 HC RX MED GY IP 250 OP 250 PS 637: Performed by: STUDENT IN AN ORGANIZED HEALTH CARE EDUCATION/TRAINING PROGRAM

## 2024-06-10 PROCEDURE — 120N000002 HC R&B MED SURG/OB UMMC

## 2024-06-10 PROCEDURE — 85027 COMPLETE CBC AUTOMATED: CPT

## 2024-06-10 PROCEDURE — 71046 X-RAY EXAM CHEST 2 VIEWS: CPT

## 2024-06-10 PROCEDURE — 99418 PROLNG IP/OBS E/M EA 15 MIN: CPT | Performed by: STUDENT IN AN ORGANIZED HEALTH CARE EDUCATION/TRAINING PROGRAM

## 2024-06-10 PROCEDURE — 87641 MR-STAPH DNA AMP PROBE: CPT

## 2024-06-10 PROCEDURE — 250N000013 HC RX MED GY IP 250 OP 250 PS 637

## 2024-06-10 RX ORDER — NALOXONE HYDROCHLORIDE 0.4 MG/ML
0.4 INJECTION, SOLUTION INTRAMUSCULAR; INTRAVENOUS; SUBCUTANEOUS
Status: DISCONTINUED | OUTPATIENT
Start: 2024-06-10 | End: 2024-06-14 | Stop reason: HOSPADM

## 2024-06-10 RX ORDER — NALOXONE HYDROCHLORIDE 0.4 MG/ML
0.2 INJECTION, SOLUTION INTRAMUSCULAR; INTRAVENOUS; SUBCUTANEOUS
Status: DISCONTINUED | OUTPATIENT
Start: 2024-06-10 | End: 2024-06-14 | Stop reason: HOSPADM

## 2024-06-10 RX ORDER — LIDOCAINE 4 G/G
1 PATCH TOPICAL
Status: DISCONTINUED | OUTPATIENT
Start: 2024-06-10 | End: 2024-06-14 | Stop reason: HOSPADM

## 2024-06-10 RX ADMIN — FLUOXETINE HYDROCHLORIDE 40 MG: 40 CAPSULE ORAL at 08:21

## 2024-06-10 RX ADMIN — LEVOTHYROXINE SODIUM 50 MCG: 0.05 TABLET ORAL at 08:20

## 2024-06-10 RX ADMIN — OLANZAPINE 2.5 MG: 2.5 TABLET, FILM COATED ORAL at 20:11

## 2024-06-10 RX ADMIN — BUSPIRONE HYDROCHLORIDE 15 MG: 15 TABLET ORAL at 20:11

## 2024-06-10 RX ADMIN — LIDOCAINE 1 PATCH: 4 PATCH TOPICAL at 20:11

## 2024-06-10 RX ADMIN — PIPERACILLIN SODIUM AND TAZOBACTAM SODIUM 3.38 G: 3; .375 INJECTION, POWDER, LYOPHILIZED, FOR SOLUTION INTRAVENOUS at 04:33

## 2024-06-10 RX ADMIN — ACETAMINOPHEN 975 MG: 325 TABLET, FILM COATED ORAL at 21:44

## 2024-06-10 RX ADMIN — PIPERACILLIN SODIUM AND TAZOBACTAM SODIUM 3.38 G: 3; .375 INJECTION, POWDER, LYOPHILIZED, FOR SOLUTION INTRAVENOUS at 12:20

## 2024-06-10 RX ADMIN — ACETAMINOPHEN 975 MG: 325 TABLET, FILM COATED ORAL at 10:28

## 2024-06-10 RX ADMIN — BUSPIRONE HYDROCHLORIDE 15 MG: 15 TABLET ORAL at 08:21

## 2024-06-10 RX ADMIN — OLANZAPINE 2.5 MG: 2.5 TABLET, FILM COATED ORAL at 08:20

## 2024-06-10 RX ADMIN — ASPIRIN 81 MG: 81 TABLET ORAL at 08:20

## 2024-06-10 RX ADMIN — PANTOPRAZOLE SODIUM 40 MG: 40 TABLET, DELAYED RELEASE ORAL at 08:20

## 2024-06-10 RX ADMIN — PIPERACILLIN SODIUM AND TAZOBACTAM SODIUM 3.38 G: 3; .375 INJECTION, POWDER, LYOPHILIZED, FOR SOLUTION INTRAVENOUS at 22:46

## 2024-06-10 RX ADMIN — DONEPEZIL HYDROCHLORIDE 10 MG: 10 TABLET, FILM COATED ORAL at 21:44

## 2024-06-10 RX ADMIN — PIPERACILLIN SODIUM AND TAZOBACTAM SODIUM 3.38 G: 3; .375 INJECTION, POWDER, LYOPHILIZED, FOR SOLUTION INTRAVENOUS at 16:44

## 2024-06-10 ASSESSMENT — ACTIVITIES OF DAILY LIVING (ADL)
ADLS_ACUITY_SCORE: 36
ADLS_ACUITY_SCORE: 40
TOILETING_ISSUES: YES
ADLS_ACUITY_SCORE: 40
ADLS_ACUITY_SCORE: 36
ADLS_ACUITY_SCORE: 40
DEPENDENT_IADLS:: CLEANING;COOKING;LAUNDRY;SHOPPING;MEAL PREPARATION;MEDICATION MANAGEMENT;TRANSPORTATION
ADLS_ACUITY_SCORE: 40
TOILETING_ASSISTANCE: TOILETING DIFFICULTY, ASSISTANCE 1 PERSON
ADLS_ACUITY_SCORE: 40

## 2024-06-10 NOTE — TELEPHONE ENCOUNTER
[id number] is requesting a 90-day supply.    potassium chloride ER (K-TAB) 20 MEQ CR tablet     quantity requested: 180.0

## 2024-06-10 NOTE — PHARMACY-ADMISSION MEDICATION HISTORY
Pharmacist Admission Medication History    Admission medication history is complete. The information provided in this note is only as accurate as the sources available at the time of the update.    Information Source(s): Patient, Family member, Caregiver, Clinic records, Hospital records, and CareEverywhere/SureScripts via phone    Pertinent Information: Patient was residing at an assisted living facility prior to admission and her daughter Barbara, was in charge of her medications. Barbara was a reliable historian of Jaydas medications and was also able to verify her allergies. Barbara reports that the patient has been in and out of the hospital due to complications which explains the lack of recent fill history on some of her medications (buspirone, Trelegy, fluoxetine).    Changes made to PTA medication list:  Added:   Trelegy Ellipta  Deleted:   Budesinode nebulizer solution: reports no longer taking; verified patient only on albuterol nebs  Duoneb solution: reports no longer taking; verified patient only on albuterol nebs  Changed:   Ferrous gluconate: changed from daily with breakfast to daily with lunch    Allergies reviewed with patient and updates made in EHR: yes    Medication History Completed By: Michel Hassan RPH 6/10/2024 9:56 AM    PTA Med List   Medication Sig Last Dose    acetaminophen (TYLENOL) 325 MG tablet Take 325-650 mg by mouth every 6 hours as needed for mild pain Past Month    albuterol (PROVENTIL) (2.5 MG/3ML) 0.083% neb solution Take 1 vial (2.5 mg) by nebulization 2 times daily as needed (COPD) More than a month    aspirin 81 MG EC tablet Take 1 tablet (81 mg) by mouth daily 6/9/2024    benzocaine-menthol (CHLORASEPTIC) 6-10 MG lozenge Place 1 lozenge inside cheek every hour as needed for sore throat More than a month    busPIRone (BUSPAR) 15 MG tablet Take 15 mg by mouth 2 times daily 6/9/2024 at AM    calcium carbonate (TUMS) 500 MG chewable tablet Take 1 tablet (500 mg) by mouth 4 times  daily as needed for heartburn Past Month    Calcium Polycarbophil (FIBER) 625 MG tablet Take 2 tablets by mouth daily 6/9/2024    diphenhydrAMINE-zinc acetate (BENADRYL) 2-0.1 % external cream Apply topically 3 times daily as needed for itching Past Month    donepezil (ARICEPT) 10 MG tablet Take 1 tablet (10 mg) by mouth at bedtime 6/8/2024 at PM    empagliflozin (JARDIANCE) 10 MG TABS tablet Take 1 tablet (10 mg) by mouth daily 6/9/2024    ferrous gluconate (FERGON) 324 (38 Fe) MG tablet Take 1 tablet (324 mg) by mouth daily (with breakfast) (Patient taking differently: Take 324 mg by mouth daily (with lunch)) 6/9/2024    FLUoxetine (PROZAC) 40 MG capsule Take 1 capsule (40 mg) by mouth daily 6/8/2024    Fluticasone-Umeclidin-Vilanterol (TRELEGY ELLIPTA) 200-62.5-25 MCG/ACT oral inhaler Inhale 1 puff into the lungs daily 6/9/2024    furosemide (LASIX) 40 MG tablet Take 1 tablet (40 mg) by mouth every morning AND 0.5 tablets (20 mg) daily at 2 pm. (Patient taking differently: Take 1 tablet by mouth every morning) 6/9/2024    hyoscyamine (LEVSIN) 0.125 MG tablet TAKE 1 TABLET (125 MCG) BY MOUTH EVERY 4 HOURS AS NEEDED FOR CRAMPING OR DIARRHEA     lactobacillus rhamnosus, GG, (CULTURELL) capsule Take 1 capsule by mouth daily 6/9/2024    latanoprost (XALATAN) 0.005 % ophthalmic solution Place 1 drop into both eyes at bedtime 6/8/2024 at HS    levothyroxine (SYNTHROID/LEVOTHROID) 50 MCG tablet Take 1 tablet (50 mcg) by mouth daily 6/9/2024    losartan (COZAAR) 25 MG tablet Take 0.5 tablets (12.5 mg) by mouth daily 6/9/2024    melatonin 1 MG TABS tablet Take 1 tablet (1 mg) by mouth nightly as needed for sleep Past Week    OLANZapine (ZYPREXA) 2.5 MG tablet Take 1 tablet (2.5 mg) by mouth 2 times daily 6/9/2024 at AM    oxyCODONE (ROXICODONE) 5 MG tablet 1/2 tab at bedtime as needed for severe pain. ( Need to last for one month ) Past Week    pantoprazole (PROTONIX) 40 MG EC tablet Take 1 tablet (40 mg) by mouth daily  6/9/2024    potassium chloride ER (K-TAB) 20 MEQ CR tablet Take one tab bid with Furosemide 40 mg. 6/9/2024    senna-docusate (SENOKOT-S/PERICOLACE) 8.6-50 MG tablet Take 1 tablet by mouth 2 times daily as needed for constipation     triamcinolone (KENALOG) 0.1 % external cream Apply topically 2 times daily as needed for irritation More than a month    vitamin D3 (CHOLECALCIFEROL) 50 mcg (2000 units) tablet Take 1 tablet (50 mcg) by mouth daily 6/9/2024

## 2024-06-10 NOTE — ED NOTES
Pt current BP-95/54, Map- 67, Pulse-85.Fluids running. Gold cross cover-Simón Jefferson was notified.

## 2024-06-10 NOTE — PROGRESS NOTES
/80 (BP Location: Right arm)   Pulse 90   Temp 97.6  F (36.4  C) (Oral)   Resp 18   Wt 49.9 kg (110 lb)   LMP  (LMP Unknown)   SpO2 97%   BMI 21.48 kg/m      Activity: Intermittent disorientation to time/day, pt initiated clarifying question and verified with white board. Ambulated in room with some dyspnea. Pt's dtr, Barbara, at bedside for dinner.   Neuros:  A&Ox4. Hx of dementia.   Cardiac: BP at 2100 was 81/67, recheck at 2115: 108/62, rechecked at 2130: 116/66, 2200: 101/79.  Respiratory: 97% on 0.5-1.5 L NC. Baseline for pt between 89-94%. Dx of COPD with baseline O2 level between 89-94%. O2 needed with activity and at night.   GI/: BM this morning. 2 small smears with urge to have BM this evening. Voids spontaneously. Some urinary incontinence, frequency, and urgency. Set up Purewick overnight.  Diet: Regular diet. Tolerated dinner.   Skin/Incisions/Drains: L PIV for abx.   Lines: L PIV for abx.   Pain: Complaints of back/neck pain. Tylenol given.     Pt was transported down for Chest Xray at 1600.

## 2024-06-10 NOTE — CARE PLAN
Speech Language Pathology: Orders received. Chart reviewed and discussed with care team and patient.? Speech Language Pathology not indicated due to patient at baseline swallow function, no changes in medical status to suggest a new dysphagia, VFSS completed 5/21/2024 during most recent hospitalization revealing no aspiration, silent or otherwise, during study and overall [facility] swallowing ability. Message sent to primary team on Vocera.? Defer discharge recommendations to provider team.? Will complete orders.

## 2024-06-10 NOTE — PLAN OF CARE
Goal Outcome Evaluation:      Plan of Care Reviewed With: patient    Overall Patient Progress: no change    Admitted/transferred from:   2 RN full   skin assessment completed by Jannie Quigley, RN and Paras RN.  Skin assessment finding: skin intact, no problems   Interventions/actions: other none      Will continue to monitor.     Neuro:A/Ox4, forgetful, impulsive at times, bed alarm on  Respiratory: on 2L NC sats in the high 90s   Cardiac:WDL. Denies chest pain  Diet:reg  GI/:frequently got up to use the bathroom 6+ times to urinate and have a bowel movement overnight   Incision/Drains:none   IV Access:L PIV Sl  Pain:denies   Labs:reviewed   VS:Temp: 98  F (36.7  C) Temp src: Oral BP: 112/75 Pulse: 87   Resp: 16 SpO2: 99 % O2 Device: Nasal cannula Oxygen Delivery: 2 LPM   Activity:ax1      New Changes this shift: none   Plan: continue POC

## 2024-06-10 NOTE — PROGRESS NOTES
[facility]    Medicine Progress Note - Hospitalist Service, GOLD TEAM 10    Date of Admission:  6/9/2024    Assessment & Plan     Idalia Bob is a 78 yo F with pmhx of  COPD, HFrEF (EF 10-15%), PsA upper lobe pneumonia (12/2023), HTN, cardiogenic shock during 5/2024 admission and dementia, CKD, HTN, depression, anxiety who was admitted on 6/9/2024 for presumed PNA after being found to be febrile at nursing facility. On admission temperature of 101, tachycardic in 110s that has since resolved, SBPs 80s - 120s -- has stabilized in the 90s and currently getting 500 ml of NS, on 2 L of O2 (which she wears at night time and with activity at baseline). WBC 15.1, procal 0.15, lactate 1.4. Fluvid negative, UA without LE or nitrites. CXR on admission with patchy opacities in the right lung base concerning for infection, trace left pleural effusion. No localizing infectious symptoms. Patient was admitted to hospital medicine for escalataion of care.    RLL consolidation  Sepsis  Leukocytosis  Trace L Pleural Effusion   P/w fever and generalized weakness from facility. On admission temperature of 101, tachycardic in 110s that has since resolved, SBPs 80s - 120s -- has stabilized in the 90s and currently getting 500 ml of NS, on 2 L of O2 (which she wears at night time and with activity at baseline). WBC 15.1, procal 0.15, lactate 1.4. Fluvid negative, UA without LE or nitrites. CXR on admission with patchy opacities in the right lung base concerning for infection, trace left pleural effusion. No localizing infectious symptoms. Patient has had several recent admissions for pneumonia so will have speech eval, could consider ID involvement pending cultures given the recurrent nature of pneumonia. Will treat with zosyn given history of PsA PNA, could narrow as able based on cultures.  - Influenza, RSV, covid negative. MRSA negative.  - procal negative  - strep pneumo, legionella  pending  - respiratorey culture pending. Blood culture pending  - RVP pending  - Continuous pulse ox, wean as able  - SLP consult given recurrent PNA  - Started on Zosyn at admission. Will continue this for epirical treatment. Narrow per cultures results  - Consider CT of chest if lack of improvement    HFrEF (EF 10-15%)  Last Echo 5/2024 with EF 10-15%, [facility], last seen 6/4/14. HF 2/2 NICM. Currently taking lasix 40 mg daily and jardiance.  - Hold home lasix and jardiance iso acute infection and reassess in AM  - home ASA  - proBNP-- elevated but seems to be lower than during previous episode of HF  - Strict Is/Os  - Daily weight (standing weights)  - Given 500ml of NSB in the ER at admission. Will need to be cautious with fluids.     COPD  Chronic Hypoxic Respiratory Failure   No e/o acute exacerbation. Baseline 2 L of O2 at night and with activity.  - cont Pulmicort and q6h prn albuterol nebs  - monitor on cpox     Mild Hyponatremia  Na 133 on admission, suspect hypovolemic iso poor PO intake but hesitate to give additional fluids iso HF, is receiving 500 ml bolus of NS currently.   - Will give another 500ml  NSB this evening since poor por intake  - recheck BMP at 2000 on 6/10     Elevated Troponin  Troponin 51 on admission, EKG with sinus tachycardia, no CP. Likely demand iso above, will trend.  - Repeat ordered      Chronic Back Pain and Neck Pain  - Acetaminophen for pain control  - oxycodone 2.5mg at bedtime (home dose)  - lidocaine patch    Normocytic Anemia: Baseline hgb appears to be ~8s, 10 on arrival. No e/o bleeding on exam. Continue to trend.  CKD Stage 3: Scr 1.23 on admission, baseline appears to be ~1.1s so not meeting current criteria for JUANIS, will cont to trend.   Dementia: cont pta donepezil and zyprexa, mentation at baseline as above. PT/OT consulted.   HTN: pta on losartan hold iso acute infection, cont to trend Bps.   Hypothyroidism: cont pta levothyroxine 50 mcg daily    Anxiety, depression: cont buspar and prozac   GERD: Cont PPI        Diet: Combination Diet Regular Diet Adult    DVT Prophylaxis: Low Risk/Ambulatory with no VTE prophylaxis indicated  Ruiz Catheter: Not present  Lines: None     Cardiac Monitoring: None  Code Status: No CPR- Do NOT Intubate      Clinically Significant Risk Factors Present on Admission                # Drug Induced Platelet Defect: home medication list includes an antiplatelet medication   # Hypertension: Noted on problem list  # Chronic heart failure with reduced ejection fraction: last echo with EF <40%  # Dementia: noted on problem list  # Anemia: based on hgb <11               # Financial/Environmental Concerns:           Disposition Plan     Medically Ready for Discharge: Anticipated in 2-4 Days             MINGO TRUJILLO MD  Hospitalist Service, GOLD TEAM 10  [facility]  Securely message with [employer] (more info)  Text page via [employer] Paging/Directory   See signed in provider for up to date coverage information  ______________________________________________________________________    Interval History   NAION. Pain in the back poorly controlled    Physical Exam   Vital Signs: Temp: 98.9  F (37.2  C) Temp src: Oral BP: 115/68 Pulse: 91   Resp: 13 SpO2: 100 % O2 Device: Nasal cannula Oxygen Delivery: 2 LPM  Weight: 110 lbs 0 oz    General Appearance: Alert, awake, interactive. AxO x3  Respiratory: RLL inspiratory rales. No wheezing. No rhonchi. No other rales   Cardiovascular: Normal S1, S2. No murmurs  GI: Abd soft, non tender. NBS.  Skin: No skin lesions. No rashes on the neck or back     Medical Decision Making       65 MINUTES SPENT BY ME on the date of service doing chart review, history, exam, documentation & further activities per the note.      Data     I have personally reviewed the following data over the past 24 hrs:    19.8 (H)  \   9.6 (L)   / 250     129 (L) 98 14.3 /  88   3.9 19  (L) 1.37 (H) \     ALT: 9 AST: 17 AP: 71 TBILI: 0.6   ALB: 3.7 TOT PROTEIN: 6.1 (L) LIPASE: 13     Trop: 47 (H) BNP: 12,428 (H)     Procal: 0.15 CRP: N/A Lactic Acid: 1.6       INR:  1.06 PTT:  N/A   D-dimer:  N/A Fibrinogen:  N/A       Imaging results reviewed over the past 24 hrs:   Recent Results (from the past 24 hour(s))   XR Chest Port 1 View    Narrative    EXAM: XR CHEST PORT 1 VIEW  6/9/2024 7:10 PM     HISTORY:  Fever       COMPARISON:  CT chest 5/20/2024.    FINDINGS:     Portable upright view of the chest. Trachea is midline. Stable cardial  mediastinal silhouette. Indistinct pulmonary vasculature.. Opacity in  the right lower paramediastinal location. Small left pleural effusion.  Streaky midzone pulmonary opacities. Aortic knob calcifications.    No acute osseous abnormality. Visualized upper abdomen is  unremarkable.        Impression    IMPRESSION:     1. Streaky mid zone and bibasilar pulmonary opacities which may  represent edema, atelectasis or infection in the appropriate clinical  settings.  2. Increased right paramediastinal opacity possibly secondary to the  hiatal hernia better seen on recent CT from 5/20/2024.  3. Small left effusion      I have personally reviewed the examination and initial interpretation  and I agree with the findings.    OMID GARCIA MD         SYSTEM ID:  [id number]   XR Chest 2 Views    Narrative    Chest 2 views    INDICATION: Acute hypoxic respiratory distress. Chest pain.    COMPARISON: Portable length from yesterday    Findings: Heart size normal. Patchy density in the right lower lung  zone unchanged may indicate infection or other atelectasis. Heart size  upper normal. Dextrocurvature of the lower thoracic spine. Osteopenic  bones. Atherosclerotic calcification at the aortic knob. Mildly  flattening the hemidiaphragms on the lateral view. Hiatal hernia.      Impression    IMPRESSION: Right lower lung zone possible infection or atelectasis  unchanged from  yesterday. Hiatal hernia. Hyperinflated lungs.    SYLVIA GREEN MD         SYSTEM ID:  [id number]

## 2024-06-10 NOTE — CONSULTS
Care Management Initial Consult    General Information  Assessment completed with: Children, Corie-Daughter  Type of CM/SW Visit: Initial Assessment    Primary Care Provider verified and updated as needed: Yes   Readmission within the last 30 days: current reason for admission unrelated to previous admission   Return Category: New Diagnosis  Reason for Consult: utilization management concerns  Advance Care Planning: Advance Care Planning Reviewed: present on chart        Communication Assessment  Patient's communication style: spoken language (English or Bilingual)    Cognitive  Cognitive/Neuro/Behavioral: WDL  Level of Consciousness: intermittent confusion  Arousal Level: opens eyes spontaneously  Orientation: oriented x 4  Mood/Behavior: calm, cooperative     Speech: clear, spontaneous, logical    Living Environment:   People in home: spouse, facility resident     Current living Arrangements: assisted living  Name of Facility: [facility]   Able to return to prior arrangements: other (see comments) (PT/OT eval pending)       Family/Social Support:  Care provided by: self, other (see comments) ([facility] staff)  Provides care for: no one, unable/limited ability to care for self  Marital Status:   , Children, Facility resident(s)/Staff          Description of Support System: Supportive, Involved    Support Assessment: Adequate family and caregiver support, Adequate social supports (Dtr reports needs are well met)    Current Resources:   Patient receiving home care services: Yes  Skilled Home Care Services: Physical Therapy, Occupational Therapy  Community Resources: Housekeeping/Chore Agency  Equipment currently used at home: grab bar, tub/shower, walker, standard, wheelchair, manual, shower chair  Supplies currently used at home: Oxygen Tubing/Supplies    Employment/Financial:  Employment Status: retired        Financial Concerns: none   Referral to Financial Worker: No        Does the patient's insurance plan have a 3 day qualifying hospital stay waiver?  No    Lifestyle & Psychosocial Needs:  Social Determinants of Health     Food Insecurity: Low Risk  (1/22/2024)    Food Insecurity     Within the past 12 months, did you worry that your food would run out before you got money to buy more?: No     Within the past 12 months, did the food you bought just not last and you didn t have money to get more?: No   Depression: Not at risk (1/22/2024)    PHQ-2     PHQ-2 Score: 0   Housing Stability: Low Risk  (1/22/2024)    Housing Stability     Do you have housing? : Yes     Are you worried about losing your housing?: No   Tobacco Use: Medium Risk (6/4/2024)    Patient History     Smoking Tobacco Use: Former     Smokeless Tobacco Use: Never     Passive Exposure: Past   Financial Resource Strain: Low Risk  (1/22/2024)    Financial Resource Strain     Within the past 12 months, have you or your family members you live with been unable to get utilities (heat, electricity) when it was really needed?: No   Alcohol Use: Not on file   Transportation Needs: Low Risk  (1/22/2024)    Transportation Needs     Within the past 12 months, has lack of transportation kept you from medical appointments, getting your medicines, non-medical meetings or appointments, work, or from getting things that you need?: No   Physical Activity: Not on file   Interpersonal Safety: Not on file   Stress: Not on file   Social Connections: Socially Integrated (11/21/2023)    Received from [facility], [facility]    Social Connections     Frequency of Communication with Friends and Family: 0   Health Literacy: Not on file       Functional Status:  Prior to admission patient needed assistance:   Dependent ADLs:: Ambulation-walker, Wheelchair-independent  Dependent IADLs:: Cleaning, Cooking, Laundry, Shopping, Meal Preparation, Medication Management,  Transportation  Assesssment of Functional Status:  (PT eval pending)    Mental Health Status:  Mental Health Status: No Current Concerns       Chemical Dependency Status:  Chemical Dependency Status: No Current Concerns             Values/Beliefs:  Spiritual, Cultural Beliefs, Congregational Practices, Values that affect care: no               Additional Information:  Idalia Bob is a 78 yo F with pmhx of  COPD, HFrEF (EF 10-15%), PsA upper lobe pneumonia (12/2023), HTN, cardiogenic shock during 5/2024 admission and dementia, CKD, HTN, depression, anxiety who was admitted on 6/9/2024 for presumed PNA after being found to be febrile    Chart reviewed.    Writer met with pt and pt's daughter, Corie, and Corie's , in pt's room. Writer introduced self, discussed role and went over writer's availability. Pt rested comfortably, while allowing daughter to speak on her behalf.    Pt lives in an [place] with her . Pt receives assistance with many of her IADLs and ADLs; cleaning, meals, laundry, transportation, and med management. Corie reports that things are going well overall with pt's return to her [facility]. [facility] was assisting with pt's edema wrapping daily. Corie reports being satisfied with the HHC services pt has been receiving, but notes that their PT are recommending that pt utilize her O2 whenever she needs to move around the [place], not just at night, which was the instructions when she was discharged. SW/RNCC to follow up with [place] [facility] to update them on pt's O2 needs prior to discharge.     Corie expresses hope that pt will be able to discharge home with resumed HHC services at discharge. Corie notes that if pt requires TCU, she does not want pt to go to [facility], citing two bad previous experiences there.     Writer spoke with [facility] intake and confirmed services are in place. If pt returns home with resumed HHC services, orders will be faxed.    Social work/Care Coordination will continue to  follow and provide assistance to ensure a safe and timely discharge.     [facility]  Nursing line: [phone]    Fax: [phone]     [facility] (PT/OT)  Phone: [phone]  Fax: [phone]     [facility]  Phone  [phone]  Fax  [phone]    ________________    EDITH Luciano, [facility]  ED/Observation   [facility]  Phone: [phone]  Fax: [phone]    After hours Vocera [place] and After Hours Vocera [place]  Available from 4:00pm - Midnight

## 2024-06-10 NOTE — PLAN OF CARE
Goal Outcome Evaluation:      Plan of Care Reviewed With: patient    Overall Patient Progress: no changeOverall Patient Progress: no change     Temp: 98.1  F (36.7  C) Temp src: Oral BP: 103/59 Pulse: 94   Resp: 18 SpO2: 99 % O2 Device: Nasal cannula Oxygen Delivery: 1 LPM     Pt is A&O x4, on 1-2 L of NC, sating adequately. BP is soft, reported feeling dizziness and MD is aware.. Calls frequently for bathroom. Placed a pure wick in. Forgetful and impulsive at times, bed alarm on for safety. Pain is managed with Tylenol. Regular diet, tolerating. Chest x-ray completed.PIV SL.. Continue with POC.

## 2024-06-10 NOTE — H&P
[facility]    History and Physical - Hospitalist Service, GOLD TEAM        Date of Admission:  6/9/2024    Assessment & Plan    Idalia Bob is a 78 yo F with pmhx of  COPD, HFrEF (EF 10-15%), PsA upper lobe pneumonia (12/2023), HTN, cardiogenic shock during 5/2024 admission and dementia, CKD, HTN, depression, anxiety who was admitted on 6/9/2024 for presumed PNA after being found to be febrile at nursing facility.       Presumed PNA    Sepsis  Leukocytosis  Trace L Pleural Effusion   P/w fever and generalized weakness from facility. On admission temperature of 101, tachycardic in 110s that has since resolved, SBPs 80s - 120s -- has stabilized in the 90s and currently getting 500 ml of NS, on 2 L of O2 (which she wears at night time and with activity at baseline). WBC 15.1, procal 0.15, lactate 1.4. Fluvid negative, UA without LE or nitrites. CXR on admission with patchy opacities in the right lung base concerning for infection, trace left pleural effusion. No localizing infectious symptoms. Patient has had several recent admissions for pneumonia so will have speech eval, could consider ID involvement pending cultures given the recurrent nature of pneumonia. Will treat with zosyn given history of PsA PNA, could narrow as able based on cultures.  - S/p dose of  levaquin in ED --> zosyn Q6h   - Continuous pulse ox  - Strep pneumo and legionella urine Ag   - Resp culture ordered    - Blood cultures in process   - MRSA swab   - SLP consult given recurrent PNA  - Consider CT of chest if lack of improvement -  - 500 ml of NS started in ED  -- caution to give more fluids given EF so very low threshold to discuss pressors should patient become hypotensive     HFrEF (EF 10-15%)  Last Echo 5/2024 with EF 10-15%, [facility], last seen 6/4/14. HF 2/2 NICM. Currently taking lasix 40 mg daily and jardiance.  - Hold home lasix and jardiance iso acute infection and  reassess in AM  - Cont home ASA  - Added BNP  - Strict Is/Os  - Daily weight     COPD  Chronic Hypoxic Respiratory Failure   No e/o acute exacerbation. Baseline 2 L of O2 at night and with activity.  - cont Pulmicort and q6h prn albuterol nebs  - monitor on cpox    Mild Hyponatremia  Na 133 on admission, suspect hypovolemic iso poor PO intake but hesitate to give additional fluids iso HF, is receiving 500 ml bolus of NS currently.   - Trend in AM     Elevated Troponin  Troponin 51 on admission, EKG with sinus tachycardia, no CP. Likely demand iso above, will trend.  - Repeat ordered     Normocytic Anemia: Baseline hgb appears to be ~8s, 10 on arrival. No e/o bleeding on exam. Continue to trend.  CKD Stage 3: Scr 1.23 on admission, baseline appears to be ~1.1s so not meeting current criteria for JUANIS, will cont to trend.   Dementia: cont pta donepezil and zyprexa, mentation at baseline as above. PT/OT consulted.   HTN: pta on losartan hold iso acute infection, cont to trend Bps.   Hypothyroidism: cont pta levothyroxine 50 mcg daily   Anxiety, depression: cont buspar and prozac   GERD: Cont PPI           Diet: Combination Diet Regular Diet Adult  DVT Prophylaxis: Pneumatic Compression Devices  Ruiz Catheter: Not present  Lines: None     Cardiac Monitoring: None  Code Status: No CPR- Do NOT IntubateDNR/ DNI- confirmed with patient and daughter     Clinically Significant Risk Factors Present on Admission                # Drug Induced Platelet Defect: home medication list includes an antiplatelet medication   # Hypertension: Noted on problem list  # Chronic heart failure with reduced ejection fraction: last echo with EF <40%  # Dementia: noted on problem list  # Anemia: based on hgb <11               # Financial/Environmental Concerns:           Disposition Plan     Medically Ready for Discharge: Anticipated in 2-4 Days       The patient's care was discussed with the Attending Physician, Dr. Alexander, Bedside Nurse,  Patient, and Patient's Family.    Li Gr PA-C  Hospitalist Service, [facility]  Securely message with [employer] (more info)  Text page via [employer] Paging/Directory   See signed in provider for up to date coverage information    ______________________________________________________________________    Chief Complaint   Fever    History is obtained from the patient    History of Present Illness   Idalia Bob is a 80 yo F with pmhx of  COPD, HFrEF (EF 10-15%), PsA upper lobe pneumonia (12/2023), HTN, cardiogenic shock during 5/2024 admission and dementia, CKD, HTN, depression, anxiety who was admitted on 6/9/2024 for presumed PNA after being found to be febrile at nursing facility.       Pt's daughter reports this evening she had a temperature so they called the ambulance, started feeling weak this evening. She was over at her daughters house this morning and doing well. This is very similar to previous episodes of pneumonia per daughter. Wears oxygen only at night but then PT saw here and said to wear oxygen with actitivites - daugther unsure how much she wears it during the day. No cough, no CP, no increased SOB. Denies coughing with eating or choking on food. Daughter reports she is at mental status baseline with her dementia. Her LE edema has much improved. The patient reports otherwise feeling well, has no new symptoms or concerns.     No fevers, chills, rhionrhea, sore throat, CP, SOB, addominal pain, dysuria, diarrhea.     Past Medical History    Past Medical History:   Diagnosis Date    Chronic kidney disease (CKD) 01/10/2023    Chronic obstructive pulmonary disease (H) 01/10/2023    Dementia (H) 11/28/2023    GERD (gastroesophageal reflux disease) 03/15/2023    Hypertension 11/28/2023     Past Surgical History   Past Surgical History:   Procedure Laterality Date    ESOPHAGOSCOPY, GASTROSCOPY, DUODENOSCOPY (EGD), COMBINED N/A 02/07/2024     Procedure: ESOPHAGOGASTRODUODENOSCOPY, WITH BIOPSY;  Surgeon: Felton James MD;  Location: [facility]    PICC DOUBLE LUMEN PLACEMENT Right 05/17/2024    Basilic Vein 5F DL 37 cm     Prior to Admission Medications   Prior to Admission Medications   Prescriptions Last Dose Informant Patient Reported? Taking?   Calcium Polycarbophil (FIBER) 625 MG tablet   Yes No   Sig: Take 2 tablets by mouth daily   FLUoxetine (PROZAC) 40 MG capsule   No No   Sig: Take 1 capsule (40 mg) by mouth daily   OLANZapine (ZYPREXA) 2.5 MG tablet   No No   Sig: Take 1 tablet (2.5 mg) by mouth 2 times daily   acetaminophen (TYLENOL) 325 MG tablet   Yes No   Sig: Take 325-650 mg by mouth every 6 hours as needed for mild pain   albuterol (PROVENTIL) (2.5 MG/3ML) 0.083% neb solution   No No   Sig: Take 1 vial (2.5 mg) by nebulization 2 times daily as needed (COPD)   aspirin 81 MG EC tablet   No No   Sig: Take 1 tablet (81 mg) by mouth daily   benzocaine-menthol (CHLORASEPTIC) 6-10 MG lozenge   No No   Sig: Place 1 lozenge inside cheek every hour as needed for sore throat   budesonide (PULMICORT) 1 MG/2ML neb solution   Yes No   Sig: Take 1 mg by nebulization 2 times daily as needed   Patient not taking: Reported on 6/4/2024   busPIRone (BUSPAR) 15 MG tablet   Yes No   Sig: Take 15 mg by mouth 2 times daily   calcium carbonate (TUMS) 500 MG chewable tablet   No No   Sig: Take 1 tablet (500 mg) by mouth 4 times daily as needed for heartburn   diphenhydrAMINE-zinc acetate (BENADRYL) 2-0.1 % external cream   No No   Sig: Apply topically 3 times daily as needed for itching   donepezil (ARICEPT) 10 MG tablet   No No   Sig: Take 1 tablet (10 mg) by mouth at bedtime   empagliflozin (JARDIANCE) 10 MG TABS tablet   No No   Sig: Take 1 tablet (10 mg) by mouth daily   ferrous gluconate (FERGON) 324 (38 Fe) MG tablet   No No   Sig: Take 1 tablet (324 mg) by mouth daily (with breakfast)   furosemide (LASIX) 40 MG tablet   No No   Sig: Take 1 tablet  (40 mg) by mouth every morning AND 0.5 tablets (20 mg) daily at 2 pm.   hyoscyamine (LEVSIN) 0.125 MG tablet   No No   Sig: TAKE 1 TABLET (125 MCG) BY MOUTH EVERY 4 HOURS AS NEEDED FOR CRAMPING OR DIARRHEA   ipratropium - albuterol 0.5 mg/2.5 mg/3 mL (DUONEB) 0.5-2.5 (3) MG/3ML neb solution   Yes No   Sig: Take 1 vial by nebulization every 6 hours as needed for shortness of breath, wheezing or cough   lactobacillus rhamnosus, GG, (CULTURELL) capsule   Yes No   Sig: Take 1 capsule by mouth daily   latanoprost (XALATAN) 0.005 % ophthalmic solution   No No   Sig: Place 1 drop into both eyes at bedtime   levothyroxine (SYNTHROID/LEVOTHROID) 50 MCG tablet   No No   Sig: Take 1 tablet (50 mcg) by mouth daily   losartan (COZAAR) 25 MG tablet   No No   Sig: Take 0.5 tablets (12.5 mg) by mouth daily   melatonin 1 MG TABS tablet   No No   Sig: Take 1 tablet (1 mg) by mouth nightly as needed for sleep   oxyCODONE (ROXICODONE) 5 MG tablet   No No   Si/2 tab at bedtime as needed for severe pain. ( Need to last for one month )   pantoprazole (PROTONIX) 40 MG EC tablet   No No   Sig: Take 1 tablet (40 mg) by mouth daily   potassium chloride ER (K-TAB) 20 MEQ CR tablet   No No   Sig: Take one tab bid with Furosemide 40 mg.   senna-docusate (SENOKOT-S/PERICOLACE) 8.6-50 MG tablet   No No   Sig: Take 1 tablet by mouth 2 times daily as needed for constipation   triamcinolone (KENALOG) 0.1 % external cream   No No   Sig: Apply topically 2 times daily as needed for irritation   vitamin D3 (CHOLECALCIFEROL) 50 mcg (2000 units) tablet   No No   Sig: Take 1 tablet (50 mcg) by mouth daily      Facility-Administered Medications: None           Physical Exam   Vital Signs: Temp: 99.5  F (37.5  C) Temp src: Oral BP: 95/54 Pulse: 85   Resp: 14 SpO2: 97 % O2 Device: Nasal cannula Oxygen Delivery: 2 LPM  Weight: 0 lbs 0 oz  Constitutional: sitting up in bed, appears comfortable, no apparent distress. Daughter at bedside   HEENT: Mucous  membranes moist, no scleral icterus   Respiratory: No increased work of breathing, clear to auscultation bilaterally, decreased breath sounds in bilateral lower lobes   Cardiovascular: RRR, extremities well perfused   GI: abdomen soft, non tender, non-distended.  Skin: normal skin color, texture, and no jaundice  Musculoskeletal: moving all extremities voluntarily during exam   Neuro: awake, alert, answering all questions appropriately during exam, oriented to self and to place, knows its 2024 but unsure on month     Medical Decision Making       75 MINUTES SPENT BY ME on the date of service doing chart review, history, exam, documentation & further activities per the note.      Data     I have personally reviewed the following data over the past 24 hrs:    15.1 (H)  \   10.0 (L)   / 239     133 (L) 98 12.5 /  95   4.3 22 1.23 (H) \     ALT: 9 AST: 17 AP: 71 TBILI: 0.6   ALB: 3.7 TOT PROTEIN: 6.1 (L) LIPASE: 13     Trop: 51 (H) BNP: N/A     Procal: 0.15 CRP: N/A Lactic Acid: 1.6       INR:  1.06 PTT:  N/A   D-dimer:  N/A Fibrinogen:  N/A       Imaging results reviewed over the past 24 hrs:   Recent Results (from the past 24 hour(s))   XR Chest Port 1 View    Narrative    EXAM: XR CHEST PORT 1 VIEW  6/9/2024 7:10 PM     HISTORY:  Fever       COMPARISON:  CT chest 5/20/2024.    FINDINGS:     Portable upright view of the chest. Trachea is midline. Stable cardial  mediastinal silhouette. Indistinct pulmonary vasculature.. Opacity in  the right lower paramediastinal location. Small left pleural effusion.  Streaky midzone pulmonary opacities. Aortic knob calcifications.    No acute osseous abnormality. Visualized upper abdomen is  unremarkable.        Impression    IMPRESSION:     1. Streaky mid zone and bibasilar pulmonary opacities which may  represent edema, atelectasis or infection in the appropriate clinical  settings.  2. Increased right paramediastinal opacity possibly secondary to the  hiatal hernia better seen on  recent CT from 5/20/2024.  3. Small left effusion      I have personally reviewed the examination and initial interpretation  and I agree with the findings.    OMID GARCIA MD         SYSTEM ID:  [id number]

## 2024-06-10 NOTE — PROGRESS NOTES
"   06/10/24 1304   Appointment Info   Signing Clinician's Name / Credentials (PT) GUS Shah   Student Supervision Direct supervision provided;Therapy services provided with the co-signing licensed therapist guiding and directing the services, and providing the skilled judgement and assessment throughout the session   Living Environment   People in Home spouse;facility resident   Current Living Arrangements assisted living   Home Accessibility no concerns   Living Environment Comments Per daughter, pt lives in an assisted living facility with . Elevators present.   Self-Care   Usual Activity Tolerance fair   Current Activity Tolerance poor   Regular Exercise No   Equipment Currently Used at Home grab bar, tub/shower;walker, rolling;wheelchair, manual;shower chair   Activity/Exercise/Self-Care Comment Per daughter, pt was modified IND with 4WW for mobility around apartment and toileting. Family would assist with bathing.  unable to provide assistance. W/c for ambulating longer distances and has assist from [facility] staff. Prior to most recent admission, was not utilizing additional services outside of meals at [facility], per family, there is very responsive staff that are available 24/7 with  added services available.   General Information   Onset of Illness/Injury or Date of Surgery 06/09/24   Referring Physician Rm Alexander MD   Patient/Family Therapy Goals Statement (PT) To get stronger and return home   Pertinent History of Current Problem (include personal factors and/or comorbidities that impact the POC) Per EMR: \"Idalia Bob is a 80 yo F with pmhx of  COPD, HFrEF (EF 10-15%), PsA upper lobe pneumonia (12/2023), HTN, cardiogenic shock during 5/2024 admission and dementia, CKD, HTN, depression, anxiety who was admitted on 6/9/2024 for presumed PNA after being found to be febrile at nursing facility.\"   Existing Precautions/Restrictions fall;oxygen therapy device and L/min  (On 1/2 L) "   General Observations RN oks mobility   Cognition   Orientation Status (Cognition) oriented x 4   Follows Commands (Cognition) WFL   Cognitive Status Comments baseline dementia per chart, was alert and oriented, answered questions appropriately and consistently with daughter/chart   Integumentary/Edema   Integumentary/Edema other (describe)   Integumentary/Edema Comments Pt with BLE edema, worse in LLE. (per daughter, this is baseline).   Affected Body Part(s) Left LE;Right LE   Posture    Posture Forward head position;Protracted shoulders   Range of Motion (ROM)   Range of Motion ROM is WFL   Strength (Manual Muscle Testing)   Strength Comments Pt at least 3/5 bilateral shoulder flexion and bilateral hip and knee flexion   Bed Mobility   Comment, (Bed Mobility) SBA for supine <> sit   Transfers   Comment, (Transfers) Min ax1 for sit <> stand and pivot transfer   Gait/Stairs (Locomotion)   Comment, (Gait/Stairs) Ambulated a few steps with CGA and use of FWW   Balance   Balance Comments SBA for EOB balance. Used one UE to grab PT arm for standing balance   Sensory Examination   Sensory Perception patient reports no sensory changes   Clinical Impression   Criteria for Skilled Therapeutic Intervention Yes, treatment indicated   PT Diagnosis (PT) Impaired functional mobility   Influenced by the following impairments decreased strength, activity tolerance, balance deficits, O2 demand   Functional limitations due to impairments bed mobility, transfers, gait   Clinical Presentation (PT Evaluation Complexity) stable   Clinical Presentation Rationale clincial reasoning and pt presentation   Clinical Decision Making (Complexity) low complexity   Planned Therapy Interventions (PT) balance training;bed mobility training;gait training;strengthening;transfer training   Risk & Benefits of therapy have been explained evaluation/treatment results reviewed;patient;daughter   Clinical Impression Comments PT evaluation completed. pt  presents below functional baseline. will benefit from skilled PT to improve activity tolerance and strength.   PT Total Evaluation Time   PT Eval, Low Complexity Minutes (14938) 10   Physical Therapy Goals   PT Frequency 5x/week   PT Predicted Duration/Target Date for Goal Attainment 06/21/24   PT: Bed Mobility Independent;Supine to/from sit   PT: Transfers Modified independent;Sit to/from stand;Assistive device   PT: Gait Modified independent;Assistive device;Rolling walker;25 feet   PT Discharge Planning   PT Plan Progress transfers and gait.   PT Discharge Recommendation (DC Rec) home with assist;home with home care physical therapy   PT Rationale for DC Rec Pt is below baseline and has limited mobility. Family has desire for pt to return home to [facility] due to previous negative experiences with TCU. Has assistance from family and staff at [facility] and has the ability to increase the amount of services done at [facility]. Pt would benefit from skill HH PT services.   PT Brief overview of current status Min A for sit<>stand. CGA for gait with use of FWW.   Total Session Time   Total Session Time (sum of timed and untimed services) 10

## 2024-06-10 NOTE — TELEPHONE ENCOUNTER
Arnoldo Harrell Technician Ultdpo02:47 AM     Addend     Delete     Copy     [id number] is requesting a 90-day supply.     potassium chloride ER (K-TAB) 20 MEQ CR tablet      quantity requested: 180.0

## 2024-06-11 ENCOUNTER — APPOINTMENT (OUTPATIENT)
Dept: PHYSICAL THERAPY | Facility: CLINIC | Age: 79
DRG: 871 | End: 2024-06-11
Payer: MEDICARE

## 2024-06-11 ENCOUNTER — MYC MEDICAL ADVICE (OUTPATIENT)
Dept: CARDIOLOGY | Facility: CLINIC | Age: 79
End: 2024-06-11
Payer: MEDICARE

## 2024-06-11 ENCOUNTER — APPOINTMENT (OUTPATIENT)
Dept: OCCUPATIONAL THERAPY | Facility: CLINIC | Age: 79
DRG: 871 | End: 2024-06-11
Payer: MEDICARE

## 2024-06-11 DIAGNOSIS — I50.9 ACUTE ON CHRONIC CONGESTIVE HEART FAILURE, UNSPECIFIED HEART FAILURE TYPE (H): ICD-10-CM

## 2024-06-11 LAB
ANION GAP SERPL CALCULATED.3IONS-SCNC: 11 MMOL/L (ref 7–15)
BACTERIA UR CULT: NO GROWTH
BASOPHILS # BLD AUTO: 0 10E3/UL (ref 0–0.2)
BASOPHILS NFR BLD AUTO: 0 %
BUN SERPL-MCNC: 12.8 MG/DL (ref 8–23)
C PNEUM DNA SPEC QL NAA+PROBE: NOT DETECTED
CALCIUM SERPL-MCNC: 8.5 MG/DL (ref 8.8–10.2)
CHLORIDE SERPL-SCNC: 103 MMOL/L (ref 98–107)
CREAT SERPL-MCNC: 1.21 MG/DL (ref 0.51–0.95)
DEPRECATED HCO3 PLAS-SCNC: 21 MMOL/L (ref 22–29)
EGFRCR SERPLBLD CKD-EPI 2021: 45 ML/MIN/1.73M2
EOSINOPHIL # BLD AUTO: 1.1 10E3/UL (ref 0–0.7)
EOSINOPHIL NFR BLD AUTO: 7 %
ERYTHROCYTE [DISTWIDTH] IN BLOOD BY AUTOMATED COUNT: 23.9 % (ref 10–15)
FLUAV H1 2009 PAND RNA SPEC QL NAA+PROBE: NOT DETECTED
FLUAV H1 RNA SPEC QL NAA+PROBE: NOT DETECTED
FLUAV H3 RNA SPEC QL NAA+PROBE: NOT DETECTED
FLUAV RNA SPEC QL NAA+PROBE: NOT DETECTED
FLUBV RNA SPEC QL NAA+PROBE: NOT DETECTED
GLUCOSE SERPL-MCNC: 78 MG/DL (ref 70–99)
HADV DNA SPEC QL NAA+PROBE: NOT DETECTED
HCOV PNL SPEC NAA+PROBE: NOT DETECTED
HCT VFR BLD AUTO: 35.9 % (ref 35–47)
HGB BLD-MCNC: 10.2 G/DL (ref 11.7–15.7)
HMPV RNA SPEC QL NAA+PROBE: NOT DETECTED
HPIV1 RNA SPEC QL NAA+PROBE: NOT DETECTED
HPIV2 RNA SPEC QL NAA+PROBE: NOT DETECTED
HPIV3 RNA SPEC QL NAA+PROBE: NOT DETECTED
HPIV4 RNA SPEC QL NAA+PROBE: NOT DETECTED
IMM GRANULOCYTES # BLD: 0.1 10E3/UL
IMM GRANULOCYTES NFR BLD: 1 %
L PNEUMO1 AG UR QL IA: NEGATIVE
LACTATE SERPL-SCNC: 1.3 MMOL/L (ref 0.7–2)
LYMPHOCYTES # BLD AUTO: 0.7 10E3/UL (ref 0.8–5.3)
LYMPHOCYTES NFR BLD AUTO: 4 %
M PNEUMO DNA SPEC QL NAA+PROBE: NOT DETECTED
MCH RBC QN AUTO: 24.6 PG (ref 26.5–33)
MCHC RBC AUTO-ENTMCNC: 28.4 G/DL (ref 31.5–36.5)
MCV RBC AUTO: 87 FL (ref 78–100)
MONOCYTES # BLD AUTO: 0.9 10E3/UL (ref 0–1.3)
MONOCYTES NFR BLD AUTO: 6 %
NEUTROPHILS # BLD AUTO: 13 10E3/UL (ref 1.6–8.3)
NEUTROPHILS NFR BLD AUTO: 82 %
NRBC # BLD AUTO: 0 10E3/UL
NRBC BLD AUTO-RTO: 0 /100
PLATELET # BLD AUTO: 267 10E3/UL (ref 150–450)
POTASSIUM SERPL-SCNC: 3.8 MMOL/L (ref 3.4–5.3)
RBC # BLD AUTO: 4.14 10E6/UL (ref 3.8–5.2)
RSV RNA SPEC QL NAA+PROBE: NOT DETECTED
RSV RNA SPEC QL NAA+PROBE: NOT DETECTED
RV+EV RNA SPEC QL NAA+PROBE: NOT DETECTED
SODIUM SERPL-SCNC: 135 MMOL/L (ref 135–145)
WBC # BLD AUTO: 15.9 10E3/UL (ref 4–11)

## 2024-06-11 PROCEDURE — 120N000002 HC R&B MED SURG/OB UMMC

## 2024-06-11 PROCEDURE — 250N000011 HC RX IP 250 OP 636: Mod: JZ | Performed by: HOSPITALIST

## 2024-06-11 PROCEDURE — 83605 ASSAY OF LACTIC ACID: CPT | Performed by: HOSPITALIST

## 2024-06-11 PROCEDURE — 97530 THERAPEUTIC ACTIVITIES: CPT | Mod: GP

## 2024-06-11 PROCEDURE — 97535 SELF CARE MNGMENT TRAINING: CPT | Mod: GO

## 2024-06-11 PROCEDURE — 97110 THERAPEUTIC EXERCISES: CPT | Mod: GP

## 2024-06-11 PROCEDURE — 97116 GAIT TRAINING THERAPY: CPT | Mod: GP

## 2024-06-11 PROCEDURE — 85025 COMPLETE CBC W/AUTO DIFF WBC: CPT | Performed by: STUDENT IN AN ORGANIZED HEALTH CARE EDUCATION/TRAINING PROGRAM

## 2024-06-11 PROCEDURE — 36415 COLL VENOUS BLD VENIPUNCTURE: CPT | Performed by: HOSPITALIST

## 2024-06-11 PROCEDURE — 99233 SBSQ HOSP IP/OBS HIGH 50: CPT | Performed by: HOSPITALIST

## 2024-06-11 PROCEDURE — 82565 ASSAY OF CREATININE: CPT | Performed by: STUDENT IN AN ORGANIZED HEALTH CARE EDUCATION/TRAINING PROGRAM

## 2024-06-11 PROCEDURE — 250N000011 HC RX IP 250 OP 636: Mod: JZ

## 2024-06-11 PROCEDURE — 36415 COLL VENOUS BLD VENIPUNCTURE: CPT | Performed by: STUDENT IN AN ORGANIZED HEALTH CARE EDUCATION/TRAINING PROGRAM

## 2024-06-11 PROCEDURE — 250N000013 HC RX MED GY IP 250 OP 250 PS 637

## 2024-06-11 PROCEDURE — 82374 ASSAY BLOOD CARBON DIOXIDE: CPT | Performed by: STUDENT IN AN ORGANIZED HEALTH CARE EDUCATION/TRAINING PROGRAM

## 2024-06-11 PROCEDURE — 97165 OT EVAL LOW COMPLEX 30 MIN: CPT | Mod: GO

## 2024-06-11 PROCEDURE — 250N000013 HC RX MED GY IP 250 OP 250 PS 637: Performed by: STUDENT IN AN ORGANIZED HEALTH CARE EDUCATION/TRAINING PROGRAM

## 2024-06-11 RX ORDER — CEFTRIAXONE 1 G/1
1 INJECTION, POWDER, FOR SOLUTION INTRAMUSCULAR; INTRAVENOUS EVERY 24 HOURS
Status: DISCONTINUED | OUTPATIENT
Start: 2024-06-11 | End: 2024-06-11

## 2024-06-11 RX ORDER — PIPERACILLIN SODIUM, TAZOBACTAM SODIUM 2; .25 G/10ML; G/10ML
2.25 INJECTION, POWDER, LYOPHILIZED, FOR SOLUTION INTRAVENOUS EVERY 6 HOURS
Status: DISCONTINUED | OUTPATIENT
Start: 2024-06-11 | End: 2024-06-13

## 2024-06-11 RX ORDER — POTASSIUM CHLORIDE 1500 MG/1
TABLET, EXTENDED RELEASE ORAL
Qty: 60 TABLET | Refills: 2 | OUTPATIENT
Start: 2024-06-11

## 2024-06-11 RX ADMIN — LEVOTHYROXINE SODIUM 50 MCG: 0.05 TABLET ORAL at 08:25

## 2024-06-11 RX ADMIN — ACETAMINOPHEN 975 MG: 325 TABLET, FILM COATED ORAL at 20:09

## 2024-06-11 RX ADMIN — BUSPIRONE HYDROCHLORIDE 15 MG: 15 TABLET ORAL at 19:40

## 2024-06-11 RX ADMIN — PIPERACILLIN AND TAZOBACTAM 2.25 G: 2; .25 INJECTION, POWDER, FOR SOLUTION INTRAVENOUS at 17:09

## 2024-06-11 RX ADMIN — LIDOCAINE 1 PATCH: 4 PATCH TOPICAL at 19:39

## 2024-06-11 RX ADMIN — BUSPIRONE HYDROCHLORIDE 15 MG: 15 TABLET ORAL at 08:25

## 2024-06-11 RX ADMIN — OLANZAPINE 2.5 MG: 2.5 TABLET, FILM COATED ORAL at 08:25

## 2024-06-11 RX ADMIN — ASPIRIN 81 MG: 81 TABLET ORAL at 08:25

## 2024-06-11 RX ADMIN — OLANZAPINE 2.5 MG: 2.5 TABLET, FILM COATED ORAL at 19:40

## 2024-06-11 RX ADMIN — FLUOXETINE HYDROCHLORIDE 40 MG: 40 CAPSULE ORAL at 08:25

## 2024-06-11 RX ADMIN — PIPERACILLIN SODIUM AND TAZOBACTAM SODIUM 3.38 G: 3; .375 INJECTION, POWDER, LYOPHILIZED, FOR SOLUTION INTRAVENOUS at 11:29

## 2024-06-11 RX ADMIN — DONEPEZIL HYDROCHLORIDE 10 MG: 10 TABLET, FILM COATED ORAL at 22:33

## 2024-06-11 RX ADMIN — PIPERACILLIN SODIUM AND TAZOBACTAM SODIUM 3.38 G: 3; .375 INJECTION, POWDER, LYOPHILIZED, FOR SOLUTION INTRAVENOUS at 05:07

## 2024-06-11 RX ADMIN — PANTOPRAZOLE SODIUM 40 MG: 40 TABLET, DELAYED RELEASE ORAL at 08:25

## 2024-06-11 RX ADMIN — PIPERACILLIN AND TAZOBACTAM 2.25 G: 2; .25 INJECTION, POWDER, FOR SOLUTION INTRAVENOUS at 22:33

## 2024-06-11 ASSESSMENT — ACTIVITIES OF DAILY LIVING (ADL)
ADLS_ACUITY_SCORE: 39
ADLS_ACUITY_SCORE: 39
ADLS_ACUITY_SCORE: 40
ADLS_ACUITY_SCORE: 44
ADLS_ACUITY_SCORE: 39
ADLS_ACUITY_SCORE: 39
ADLS_ACUITY_SCORE: 46
ADLS_ACUITY_SCORE: 39
ADLS_ACUITY_SCORE: 46
ADLS_ACUITY_SCORE: 40
ADLS_ACUITY_SCORE: 46
ADLS_ACUITY_SCORE: 39
ADLS_ACUITY_SCORE: 46
ADLS_ACUITY_SCORE: 40
ADLS_ACUITY_SCORE: 39
ADLS_ACUITY_SCORE: 43
ADLS_ACUITY_SCORE: 39
ADLS_ACUITY_SCORE: 44
ADLS_ACUITY_SCORE: 46
ADLS_ACUITY_SCORE: 39
ADLS_ACUITY_SCORE: 43

## 2024-06-11 NOTE — PROGRESS NOTES
[facility]    Medicine Progress Note - Hospitalist Service, GOLD TEAM 10    Date of Admission:  6/9/2024    Assessment & Plan     Idalia Bob is a 78 yo F with pmhx of  COPD, HFrEF (EF 10-15%), PsA upper lobe pneumonia (12/2023), HTN, cardiogenic shock during 5/2024 admission and dementia, CKD, HTN, depression, anxiety who was admitted on 6/9/2024 for presumed PNA after being found to be febrile at nursing facility. On admission temperature of 101, tachycardic in 110s that has since resolved, SBPs 80s - 120s -- has stabilized in the 90s and currently getting 500 ml of NS, on 2 L of O2 (which she wears at night time and with activity at baseline). WBC 15.1, procal 0.15, lactate 1.4. Fluvid negative, UA without LE or nitrites. CXR on admission with patchy opacities in the right lung base concerning for infection, trace left pleural effusion. No localizing infectious symptoms. Patient was admitted to hospital medicine for escalataion of care.    Today's updates:  - Based on [facility] documenation, and h/o PSA pneumonia, continue IV Zosn  - Trend labs, vitals, and clinical progress  - If not improving, likely will consider ID consult and or CT chest    RLL consolidation, recurrent Pneumonia  Sepsis, present on admission  Leukocytosis  Trace L Pleural Effusion   P/w fever and generalized weakness from facility. On admission temperature of 101, tachycardic in 110s that has since resolved, SBPs 80s - 120s -- has stabilized in the 90s and currently getting 500 ml of NS, on 2 L of O2 (which she wears at night time and with activity at baseline). WBC 15.1, procal 0.15, lactate 1.4. Fluvid negative, UA without LE or nitrites. CXR on admission with patchy opacities in the right lung base concerning for infection, trace left pleural effusion. No localizing infectious symptoms. Patient has had several recent admissions for pneumonia so will have speech eval, could consider ID  involvement pending cultures given the recurrent nature of pneumonia. Will treat with zosyn given history of PsA PNA, could narrow as able based on cultures.  - Influenza, RSV, covid negative. MRSA negative.  - procal negative  - strep pneumo- pending, legionella - negative  - RVP: negative  - Continuous pulse ox, wean as able  - SLP consult given recurrent PNA-- not indicated, as at baseline without aspiration signs per patient and family  - Started on Zosyn at admission. Will continue this for epirical treatment. Narrow per cultures results  - Consider CT of chest if lack of improvement    HFrEF (EF 10-15%)  Last Echo 5/2024 with EF 10-15%, [facility], last seen 6/4/14. HF 2/2 NICM. Currently taking lasix 40 mg daily and jardiance.  - Hold home lasix and jardiance iso acute infection and reassess in AM  - home ASA  - proBNP-- elevated but seems to be lower than during previous episode of HF  - Strict Is/Os  - Daily weight (standing weights)  - Given 500ml of NSB in the ER at admission. Will need to be cautious with fluids.     COPD  Chronic Hypoxic Respiratory Failure   No e/o acute exacerbation. Baseline 2 L of O2 at night and with activity.  - cont Pulmicort and q6h prn albuterol nebs  - monitor on cpox     Mild Hyponatremia  Na 133 on admission, suspect hypovolemic iso poor PO intake but hesitate to give additional fluids iso HF, is receiving 500 ml bolus of NS currently.   - Will give another 500ml  NSB this evening since poor por intake  - recheck BMP at 2000 on 6/10     Elevated Troponin  Troponin 51 on admission, EKG with sinus tachycardia, no CP. Likely demand iso above, will trend.  - Repeat ordered      Chronic Back Pain and Neck Pain  - Acetaminophen for pain control  - oxycodone 2.5mg at bedtime (home dose)  - lidocaine patch    Normocytic Anemia: Baseline hgb appears to be ~8s, 10 on arrival. No e/o bleeding on exam. Continue to trend.  CKD Stage 3: Scr 1.23 on admission, baseline  appears to be ~1.1s so not meeting current criteria for JUANIS, will cont to trend.   Dementia: cont pta donepezil and zyprexa, mentation at baseline as above. PT/OT consulted.   HTN: pta on losartan hold iso acute infection, cont to trend Bps.   Hypothyroidism: cont pta levothyroxine 50 mcg daily   Anxiety, depression: cont buspar and prozac   GERD: Cont PPI        Diet: Combination Diet Regular Diet Adult    DVT Prophylaxis: Low Risk/Ambulatory with no VTE prophylaxis indicated  Ruiz Catheter: Not present  Lines: None     Cardiac Monitoring: None  Code Status: No CPR- Do NOT Intubate      Clinically Significant Risk Factors         # Hyponatremia: Lowest Na = 129 mmol/L in last 2 days, will monitor as appropriate          # Hypertension: Noted on problem list  # Chronic heart failure with reduced ejection fraction: last echo with EF <40%    # Dementia: noted on problem list                # Financial/Environmental Concerns: none         Disposition Plan     Medically Ready for Discharge: Anticipated in 2-4 Days             Zach Whitten MD  Hospitalist Service, GOLD TEAM [address] [place] [address]  Securely message with Vocera (more info)  Text page via [facility] Paging/Directory   See signed in provider for up to date coverage information  ______________________________________________________________________    Interval History     Pt states no clinical breathing or energy improvement this AM from day prior.  Denies clinical worsening symptoms either.    No other new complaints.    Daughter at bedside during MD visit.    Physical Exam   Vital Signs: Temp: 97.2  F (36.2  C) Temp src: Oral BP: 112/65 Pulse: 109   Resp: 20 SpO2: 99 % O2 Device: Nasal cannula Oxygen Delivery: 1 LPM  Weight: 109 lbs 14.4 oz    General Appearance: Alert, awake, interactive. AxO x3  Respiratory: RLL inspiratory rales. No wheezing. No rhonchi. No other rales   Cardiovascular: Normal S1, S2. No murmurs  GI:  Abd soft, non tender. NBS.  Skin: No skin lesions. No rashes on the neck or back     Medical Decision Making       65 MINUTES SPENT BY ME on the date of service doing chart review, history, exam, documentation & further activities per the note.      Data     I have personally reviewed the following data over the past 24 hrs:    15.9 (H)  \   10.2 (L)   / 267     135 103 12.8 /  78   3.8 21 (L) 1.21 (H) \     Procal: N/A CRP: N/A Lactic Acid: 1.3         Imaging results reviewed over the past 24 hrs:   Recent Results (from the past 24 hour(s))   XR Chest 2 Views    Narrative    Chest 2 views    INDICATION: Acute hypoxic respiratory distress. Chest pain.    COMPARISON: Portable length from yesterday    Findings: Heart size normal. Patchy density in the right lower lung  zone unchanged may indicate infection or other atelectasis. Heart size  upper normal. Dextrocurvature of the lower thoracic spine. Osteopenic  bones. Atherosclerotic calcification at the aortic knob. Mildly  flattening the hemidiaphragms on the lateral view. Hiatal hernia.      Impression    IMPRESSION: Right lower lung zone possible infection or atelectasis  unchanged from yesterday. Hiatal hernia. Hyperinflated lungs.    SYLVIA GREEN MD         SYSTEM ID:  [id number]

## 2024-06-11 NOTE — PLAN OF CARE
Goal Outcome Evaluation:       Temp: 98.6  F (37  C) Temp src: Axillary BP: 108/57 Pulse: 87   Resp: 18 SpO2: 98 % O2 Device: Nasal cannula Oxygen Delivery: 1 LPM    Neuro: Alert and Oriented  x4; sometimes able to make needs known; history of dementia  Cardiac: heart history at baseline; int tachycardia   Respiratory: adventitious sounds to R lung; on 1 L NC  GI/: INC of urine; AUOP; last BM 5-11  Diet/Appetite: regular, denies nausea  Skin: Skin intact, no new deficient  LDA: L PIV infusing TKO between abx  Activity: Up 1A w/ GB and walker; bed alarm on for compulsivity  Pain: lido patch applied for lower back and neck pain  Plan: continue plan of care

## 2024-06-11 NOTE — PROGRESS NOTES
/64 (BP Location: Right arm)   Pulse 110   Temp 97.3  F (36.3  C) (Oral)   Resp 20   Wt 49.9 kg (109 lb 14.4 oz)   LMP  (LMP Unknown)   SpO2 99%   BMI 21.46 kg/m      Activity: SBA with gait belt and walker. Generalized weakness. Up to bathroom and commode.   Neuros:  A&Ox4 with intermittent confusion  Cardiac: Tachycardic. On tele monitoring, pads changed today. Tachy arrhythmia recorded.   Respiratory: 99% of 0.5-1 LPM NC. Dx of COPD.    GI/: Passing gas. BS+.   Diet: Regular diet, tolerated dinner ate 50%. Denies nausea.   Skin/Incisions/Drains: L PIV.   Lines: L PIV for abx.  Pain: Reports back/neck pain managed with tylenol and lido-patch.

## 2024-06-11 NOTE — PLAN OF CARE
Goal Outcome Evaluation:      Plan of Care Reviewed With: patient    Overall Patient Progress: no changeOverall Patient Progress: no change    A&Ox4, intermittent confusion. VSS on 1L NC. On telemetry- sinus tach with -110. Denies pain and nausea. Voiding spontaneously, calls frequently for bathroom. BM x2, passing flatus. Tolerating regular diet. Ambulated in hallway with assist x1, GB and walker. L PIV TKO btwn ABX. Bed alarm on for impulsivity and forgetfulness. Triggered sepsis protocol, lactic 1.3. Continue with POC.     Vitals: /65   Pulse 109   Temp 97.2  F (36.2  C) (Oral)   Resp 20   Wt 49.9 kg (109 lb 14.4 oz)   LMP  (LMP Unknown)   SpO2 99%   BMI 21.46 kg/m

## 2024-06-11 NOTE — TELEPHONE ENCOUNTER
Noted that pt is admitted from ER to inpt for fever and possible PNA.     [employer] message sent to pt daughter Barbara as we were suppose to check in today to Follow-up on 2 day Lasix increase.     Pt has Follow-up [facility] appt on 7/10.     Agueda Dodd RN

## 2024-06-12 ENCOUNTER — APPOINTMENT (OUTPATIENT)
Dept: PHYSICAL THERAPY | Facility: CLINIC | Age: 79
DRG: 871 | End: 2024-06-12
Payer: MEDICARE

## 2024-06-12 ENCOUNTER — TELEPHONE (OUTPATIENT)
Dept: FAMILY MEDICINE | Facility: CLINIC | Age: 79
End: 2024-06-12
Payer: MEDICARE

## 2024-06-12 LAB
ANION GAP SERPL CALCULATED.3IONS-SCNC: 12 MMOL/L (ref 7–15)
BUN SERPL-MCNC: 9.6 MG/DL (ref 8–23)
CALCIUM SERPL-MCNC: 8.2 MG/DL (ref 8.8–10.2)
CHLORIDE SERPL-SCNC: 106 MMOL/L (ref 98–107)
CREAT SERPL-MCNC: 0.95 MG/DL (ref 0.51–0.95)
DEPRECATED HCO3 PLAS-SCNC: 19 MMOL/L (ref 22–29)
EGFRCR SERPLBLD CKD-EPI 2021: 60 ML/MIN/1.73M2
ERYTHROCYTE [DISTWIDTH] IN BLOOD BY AUTOMATED COUNT: 23.6 % (ref 10–15)
GLUCOSE SERPL-MCNC: 119 MG/DL (ref 70–99)
HCT VFR BLD AUTO: 36.1 % (ref 35–47)
HGB BLD-MCNC: 10.2 G/DL (ref 11.7–15.7)
MAGNESIUM SERPL-MCNC: 1.7 MG/DL (ref 1.7–2.3)
MCH RBC QN AUTO: 24.8 PG (ref 26.5–33)
MCHC RBC AUTO-ENTMCNC: 28.3 G/DL (ref 31.5–36.5)
MCV RBC AUTO: 88 FL (ref 78–100)
NT-PROBNP SERPL-MCNC: ABNORMAL PG/ML (ref 0–1800)
PHOSPHATE SERPL-MCNC: 2 MG/DL (ref 2.5–4.5)
PLATELET # BLD AUTO: 281 10E3/UL (ref 150–450)
POTASSIUM SERPL-SCNC: 3.3 MMOL/L (ref 3.4–5.3)
RBC # BLD AUTO: 4.11 10E6/UL (ref 3.8–5.2)
SODIUM SERPL-SCNC: 137 MMOL/L (ref 135–145)
WBC # BLD AUTO: 10.7 10E3/UL (ref 4–11)

## 2024-06-12 PROCEDURE — 250N000013 HC RX MED GY IP 250 OP 250 PS 637: Performed by: STUDENT IN AN ORGANIZED HEALTH CARE EDUCATION/TRAINING PROGRAM

## 2024-06-12 PROCEDURE — 83735 ASSAY OF MAGNESIUM: CPT | Performed by: HOSPITALIST

## 2024-06-12 PROCEDURE — 250N000011 HC RX IP 250 OP 636: Mod: JZ | Performed by: HOSPITALIST

## 2024-06-12 PROCEDURE — 80048 BASIC METABOLIC PNL TOTAL CA: CPT | Performed by: HOSPITALIST

## 2024-06-12 PROCEDURE — 120N000002 HC R&B MED SURG/OB UMMC

## 2024-06-12 PROCEDURE — 85041 AUTOMATED RBC COUNT: CPT | Performed by: HOSPITALIST

## 2024-06-12 PROCEDURE — 250N000013 HC RX MED GY IP 250 OP 250 PS 637

## 2024-06-12 PROCEDURE — 99233 SBSQ HOSP IP/OBS HIGH 50: CPT | Performed by: HOSPITALIST

## 2024-06-12 PROCEDURE — 83880 ASSAY OF NATRIURETIC PEPTIDE: CPT | Performed by: HOSPITALIST

## 2024-06-12 PROCEDURE — 36415 COLL VENOUS BLD VENIPUNCTURE: CPT | Performed by: HOSPITALIST

## 2024-06-12 PROCEDURE — 97110 THERAPEUTIC EXERCISES: CPT | Mod: GP

## 2024-06-12 PROCEDURE — 84100 ASSAY OF PHOSPHORUS: CPT | Performed by: HOSPITALIST

## 2024-06-12 PROCEDURE — 97530 THERAPEUTIC ACTIVITIES: CPT | Mod: GP

## 2024-06-12 RX ORDER — ONDANSETRON 2 MG/ML
4 INJECTION INTRAMUSCULAR; INTRAVENOUS EVERY 6 HOURS PRN
Status: DISCONTINUED | OUTPATIENT
Start: 2024-06-12 | End: 2024-06-14 | Stop reason: HOSPADM

## 2024-06-12 RX ADMIN — OLANZAPINE 2.5 MG: 2.5 TABLET, FILM COATED ORAL at 08:50

## 2024-06-12 RX ADMIN — LIDOCAINE 1 PATCH: 4 PATCH TOPICAL at 20:47

## 2024-06-12 RX ADMIN — BUSPIRONE HYDROCHLORIDE 15 MG: 15 TABLET ORAL at 08:50

## 2024-06-12 RX ADMIN — FLUOXETINE HYDROCHLORIDE 40 MG: 40 CAPSULE ORAL at 08:50

## 2024-06-12 RX ADMIN — PIPERACILLIN AND TAZOBACTAM 2.25 G: 2; .25 INJECTION, POWDER, FOR SOLUTION INTRAVENOUS at 18:22

## 2024-06-12 RX ADMIN — PANTOPRAZOLE SODIUM 40 MG: 40 TABLET, DELAYED RELEASE ORAL at 08:50

## 2024-06-12 RX ADMIN — BUSPIRONE HYDROCHLORIDE 15 MG: 15 TABLET ORAL at 20:47

## 2024-06-12 RX ADMIN — ASPIRIN 81 MG: 81 TABLET ORAL at 08:50

## 2024-06-12 RX ADMIN — OLANZAPINE 2.5 MG: 2.5 TABLET, FILM COATED ORAL at 20:46

## 2024-06-12 RX ADMIN — PIPERACILLIN AND TAZOBACTAM 2.25 G: 2; .25 INJECTION, POWDER, FOR SOLUTION INTRAVENOUS at 13:12

## 2024-06-12 RX ADMIN — PIPERACILLIN AND TAZOBACTAM 2.25 G: 2; .25 INJECTION, POWDER, FOR SOLUTION INTRAVENOUS at 04:48

## 2024-06-12 RX ADMIN — LEVOTHYROXINE SODIUM 50 MCG: 0.05 TABLET ORAL at 08:50

## 2024-06-12 RX ADMIN — ACETAMINOPHEN 975 MG: 325 TABLET, FILM COATED ORAL at 17:09

## 2024-06-12 RX ADMIN — DONEPEZIL HYDROCHLORIDE 10 MG: 10 TABLET, FILM COATED ORAL at 22:45

## 2024-06-12 ASSESSMENT — ACTIVITIES OF DAILY LIVING (ADL)
ADLS_ACUITY_SCORE: 44
ADLS_ACUITY_SCORE: 43
ADLS_ACUITY_SCORE: 44
ADLS_ACUITY_SCORE: 43
ADLS_ACUITY_SCORE: 44
ADLS_ACUITY_SCORE: 43
ADLS_ACUITY_SCORE: 44
ADLS_ACUITY_SCORE: 43
ADLS_ACUITY_SCORE: 43
ADLS_ACUITY_SCORE: 44

## 2024-06-12 NOTE — TELEPHONE ENCOUNTER
Forms received from [facility]/order-[id number]  services held  for .  Forms placed in provider 'sign me' folder.  Please fax forms to [phone] after completion.

## 2024-06-12 NOTE — PLAN OF CARE
Goal Outcome Evaluation:      Plan of Care Reviewed With: patient, child    Overall Patient Progress: improving    BP (!) 124/96 (BP Location: Left arm)   Pulse 92   Temp 97.2  F (36.2  C)   Resp 18   Wt 51.2 kg (112 lb 12.8 oz)   LMP  (LMP Unknown)   SpO2 99%   BMI 22.03 kg/m      A&Ox4. C/o pain in head and neck- gave PRN tylenol. Ambulated to bathroom- assist x1 with walker. C/o SOB and nausea when walking- improved with rest. IV Zosyn administered. LS clear. Bed alarm on for safety. Takes pills whole with water.

## 2024-06-12 NOTE — PROGRESS NOTES
[facility]    Medicine Progress Note - Hospitalist Service, GOLD TEAM 10    Date of Admission:  6/9/2024    Assessment & Plan     Idalia Bob is a 78 yo F with pmhx of  COPD, HFrEF (EF 10-15%), PsA upper lobe pneumonia (12/2023), HTN, cardiogenic shock during 5/2024 admission and dementia, CKD, HTN, depression, anxiety who was admitted on 6/9/2024 for presumed PNA after being found to be febrile at nursing facility. On admission temperature of 101, tachycardic in 110s that has since resolved, SBPs 80s - 120s -- has stabilized in the 90s and currently getting 500 ml of NS, on 2 L of O2 (which she wears at night time and with activity at baseline). WBC 15.1, procal 0.15, lactate 1.4. Fluvid negative, UA without LE or nitrites. CXR on admission with patchy opacities in the right lung base concerning for infection, trace left pleural effusion. No localizing infectious symptoms. Patient was admitted to hospital medicine for escalataion of care.    Today's updates:  - Based on [facility] documenation, and h/o PSA pneumonia, continue IV Zosn   >> WBC improving, and will consult ID regarding PO options for discharge as has had many recurrent PNA episodes   - Trend labs, vitals, and clinical progress  - Check BNP and AM CXR to assess fluid status    RLL consolidation, recurrent Pneumonia  Sepsis, present on admission  Leukocytosis  Trace L Pleural Effusion   P/w fever and generalized weakness from facility. On admission temperature of 101, tachycardic in 110s that has since resolved, SBPs 80s - 120s -- has stabilized in the 90s and currently getting 500 ml of NS, on 2 L of O2 (which she wears at night time and with activity at baseline). WBC 15.1, procal 0.15, lactate 1.4. Fluvid negative, UA without LE or nitrites. CXR on admission with patchy opacities in the right lung base concerning for infection, trace left pleural effusion. No localizing infectious symptoms. Patient has  had several recent admissions for pneumonia so will have speech eval, could consider ID involvement pending cultures given the recurrent nature of pneumonia. Will treat with zosyn given history of PsA PNA, could narrow as able based on cultures.  - Influenza, RSV, covid negative. MRSA negative.  - procal negative  - strep pneumo- pending, legionella - negative  - RVP: negative  - Continuous pulse ox, wean as able  - SLP consult given recurrent PNA-- not indicated, as at baseline without aspiration signs per patient and family  - Started on Zosyn at admission. Will continue this for epirical treatment. Narrow per cultures results  - Consider CT of chest if lack of improvement    HFrEF (EF 10-15%)  Last Echo 5/2024 with EF 10-15%, [facility], last seen 6/4/14. HF 2/2 NICM. Currently taking lasix 40 mg daily and jardiance.  - Hold home lasix and jardiance iso acute infection and reassess in AM  - home ASA  - proBNP-- elevated but seems to be lower than during previous episode of HF  - Strict Is/Os  - Daily weight (standing weights)  - Given 500ml of NSB in the ER at admission. Will need to be cautious with fluids.     COPD  Chronic Hypoxic Respiratory Failure   No e/o acute exacerbation. Baseline 2 L of O2 at night and with activity.  - cont Pulmicort and q6h prn albuterol nebs  - monitor on cpox     Mild Hyponatremia  Na 133 on admission, suspect hypovolemic iso poor PO intake but hesitate to give additional fluids iso HF  - PO fluids at this time  - Lasix is still being held     Elevated Troponin  Troponin 51 on admission, EKG with sinus tachycardia, no CP. Likely demand ischemia      Chronic Back Pain and Neck Pain  - Acetaminophen for pain control  - oxycodone 2.5mg at bedtime (home dose)  - lidocaine patch    Normocytic Anemia: Baseline hgb appears to be ~8s, 10 on arrival. No e/o bleeding on exam. Continue to trend.  CKD Stage 3: Scr 1.23 on admission, baseline appears to be ~1.1s so not meeting  current criteria for JUANIS, will cont to trend.   Dementia: cont pta donepezil and zyprexa, mentation at baseline as above. PT/OT consulted.   HTN: pta on losartan hold iso acute infection, cont to trend Bps.   Hypothyroidism: cont pta levothyroxine 50 mcg daily   Anxiety, depression: cont buspar and prozac   GERD: Cont PPI        Diet: Combination Diet Regular Diet Adult    DVT Prophylaxis: Low Risk/Ambulatory with no VTE prophylaxis indicated  Ruiz Catheter: Not present  Lines: None     Cardiac Monitoring: None  Code Status: No CPR- Do NOT Intubate      Clinically Significant Risk Factors        # Hypokalemia: Lowest K = 3.3 mmol/L in last 2 days, will replace as needed           # Hypertension: Noted on problem list  # Chronic heart failure with reduced ejection fraction: last echo with EF <40%    # Dementia: noted on problem list                # Financial/Environmental Concerns: none         Disposition Plan     Medically Ready for Discharge: Anticipated in 2-4 Days             Zach Whitten MD  Hospitalist Service, GOLD TEAM 10  [facility]  Securely message with lmbang (more info)  Text page via [facility] Paging/Directory   See signed in provider for up to date coverage information  ______________________________________________________________________    Interval History     Pt states breathing easier, more energy and feels better today than day prior.    No other new complaints.    Daughter at bedside during MD visit.    Physical Exam   Vital Signs: Temp: 97.2  F (36.2  C) Temp src: Oral BP: (!) 124/96 Pulse: 92   Resp: 18 SpO2: 99 % O2 Device: None (Room air) Oxygen Delivery: 1 LPM  Weight: 112 lbs 12.8 oz    General Appearance: Alert, awake, interactive. AxO x3  Respiratory: RLL inspiratory rales. No wheezing. No rhonchi. No other rales   Cardiovascular: Normal S1, S2. No murmurs  GI: Abd soft, non tender. NBS.  Skin: No skin lesions. No rashes on the neck or back      Medical Decision Making       52 MINUTES SPENT BY ME on the date of service doing chart review, history, exam, documentation & further activities per the note.      Data     I have personally reviewed the following data over the past 24 hrs:    10.7  \   10.2 (L)   / 281     137 106 9.6 /  119 (H)   3.3 (L) 19 (L) 0.95 \       Imaging results reviewed over the past 24 hrs:   No results found for this or any previous visit (from the past 24 hour(s)).

## 2024-06-12 NOTE — PLAN OF CARE
Goal Outcome Evaluation:       Temp: 97.6  F (36.4  C) Temp src: Oral BP: 116/75 Pulse: 81   Resp: 19 SpO2: 99 % O2 Device: Nasal cannula Oxygen Delivery: 1 LPM    Neuro: Alert and Oriented  x4; intermittent confusion; hx of dementia  Cardiac: On telemetry; denies cardiac chest pain  Respiratory: some coarse crackles to R; otherwise clear and dim on 1 L NC  GI/:WNL, Voiding, last BM 6-12  Diet/Appetite: regular, denies nausea  Skin:Skin intact, no new deficient  LDA: L PIV infusing TKO  Activity: Up 1A GB and walker  Pain: denies  Plan: continue plan of care

## 2024-06-12 NOTE — PLAN OF CARE
Plan of Care Reviewed With: patient    Overall Patient Progress: improving    Outcome Evaluation: Patient up to chair this shift. Up to chair for breakfast. Encourage patient to be up for all meals. Incentive Spirometer encourage this shift, taken off oxyen. Cardiac monitoring. Continue to monitor.

## 2024-06-13 ENCOUNTER — APPOINTMENT (OUTPATIENT)
Dept: PHYSICAL THERAPY | Facility: CLINIC | Age: 79
DRG: 871 | End: 2024-06-13
Payer: MEDICARE

## 2024-06-13 ENCOUNTER — APPOINTMENT (OUTPATIENT)
Dept: OCCUPATIONAL THERAPY | Facility: CLINIC | Age: 79
DRG: 871 | End: 2024-06-13
Payer: MEDICARE

## 2024-06-13 ENCOUNTER — TELEPHONE (OUTPATIENT)
Dept: FAMILY MEDICINE | Facility: CLINIC | Age: 79
End: 2024-06-13

## 2024-06-13 ENCOUNTER — APPOINTMENT (OUTPATIENT)
Dept: GENERAL RADIOLOGY | Facility: CLINIC | Age: 79
DRG: 871 | End: 2024-06-13
Attending: HOSPITALIST
Payer: MEDICARE

## 2024-06-13 ENCOUNTER — MYC MEDICAL ADVICE (OUTPATIENT)
Dept: FAMILY MEDICINE | Facility: CLINIC | Age: 79
End: 2024-06-13

## 2024-06-13 DIAGNOSIS — R45.1 AGITATION: ICD-10-CM

## 2024-06-13 LAB
ANION GAP SERPL CALCULATED.3IONS-SCNC: 11 MMOL/L (ref 7–15)
BUN SERPL-MCNC: 8.2 MG/DL (ref 8–23)
CALCIUM SERPL-MCNC: 8.5 MG/DL (ref 8.8–10.2)
CHLORIDE SERPL-SCNC: 107 MMOL/L (ref 98–107)
CREAT SERPL-MCNC: 0.84 MG/DL (ref 0.51–0.95)
DEPRECATED HCO3 PLAS-SCNC: 21 MMOL/L (ref 22–29)
EGFRCR SERPLBLD CKD-EPI 2021: 70 ML/MIN/1.73M2
ERYTHROCYTE [DISTWIDTH] IN BLOOD BY AUTOMATED COUNT: 23.5 % (ref 10–15)
GLUCOSE SERPL-MCNC: 91 MG/DL (ref 70–99)
HCT VFR BLD AUTO: 37.8 % (ref 35–47)
HGB BLD-MCNC: 10.6 G/DL (ref 11.7–15.7)
MAGNESIUM SERPL-MCNC: 2.1 MG/DL (ref 1.7–2.3)
MCH RBC QN AUTO: 24.3 PG (ref 26.5–33)
MCHC RBC AUTO-ENTMCNC: 28 G/DL (ref 31.5–36.5)
MCV RBC AUTO: 87 FL (ref 78–100)
PHOSPHATE SERPL-MCNC: 2 MG/DL (ref 2.5–4.5)
PLATELET # BLD AUTO: 364 10E3/UL (ref 150–450)
POTASSIUM SERPL-SCNC: 3.4 MMOL/L (ref 3.4–5.3)
POTASSIUM SERPL-SCNC: 4.6 MMOL/L (ref 3.4–5.3)
RBC # BLD AUTO: 4.36 10E6/UL (ref 3.8–5.2)
SODIUM SERPL-SCNC: 139 MMOL/L (ref 135–145)
WBC # BLD AUTO: 9.6 10E3/UL (ref 4–11)

## 2024-06-13 PROCEDURE — 99233 SBSQ HOSP IP/OBS HIGH 50: CPT | Performed by: HOSPITALIST

## 2024-06-13 PROCEDURE — 97535 SELF CARE MNGMENT TRAINING: CPT | Mod: GO | Performed by: OCCUPATIONAL THERAPIST

## 2024-06-13 PROCEDURE — 97110 THERAPEUTIC EXERCISES: CPT | Mod: GP

## 2024-06-13 PROCEDURE — 99222 1ST HOSP IP/OBS MODERATE 55: CPT | Performed by: INTERNAL MEDICINE

## 2024-06-13 PROCEDURE — 250N000013 HC RX MED GY IP 250 OP 250 PS 637: Performed by: STUDENT IN AN ORGANIZED HEALTH CARE EDUCATION/TRAINING PROGRAM

## 2024-06-13 PROCEDURE — 80048 BASIC METABOLIC PNL TOTAL CA: CPT | Performed by: HOSPITALIST

## 2024-06-13 PROCEDURE — 250N000013 HC RX MED GY IP 250 OP 250 PS 637

## 2024-06-13 PROCEDURE — 250N000013 HC RX MED GY IP 250 OP 250 PS 637: Performed by: HOSPITALIST

## 2024-06-13 PROCEDURE — 99223 1ST HOSP IP/OBS HIGH 75: CPT | Performed by: INTERNAL MEDICINE

## 2024-06-13 PROCEDURE — 120N000002 HC R&B MED SURG/OB UMMC

## 2024-06-13 PROCEDURE — 84132 ASSAY OF SERUM POTASSIUM: CPT | Performed by: HOSPITALIST

## 2024-06-13 PROCEDURE — 71045 X-RAY EXAM CHEST 1 VIEW: CPT | Mod: 26 | Performed by: RADIOLOGY

## 2024-06-13 PROCEDURE — 71045 X-RAY EXAM CHEST 1 VIEW: CPT

## 2024-06-13 PROCEDURE — 97110 THERAPEUTIC EXERCISES: CPT | Mod: GO | Performed by: OCCUPATIONAL THERAPIST

## 2024-06-13 PROCEDURE — 85027 COMPLETE CBC AUTOMATED: CPT | Performed by: HOSPITALIST

## 2024-06-13 PROCEDURE — 84100 ASSAY OF PHOSPHORUS: CPT | Performed by: HOSPITALIST

## 2024-06-13 PROCEDURE — 97530 THERAPEUTIC ACTIVITIES: CPT | Mod: GP

## 2024-06-13 PROCEDURE — 36415 COLL VENOUS BLD VENIPUNCTURE: CPT | Performed by: HOSPITALIST

## 2024-06-13 PROCEDURE — 250N000011 HC RX IP 250 OP 636: Mod: JZ | Performed by: HOSPITALIST

## 2024-06-13 PROCEDURE — 83735 ASSAY OF MAGNESIUM: CPT | Performed by: HOSPITALIST

## 2024-06-13 RX ORDER — PIPERACILLIN SODIUM, TAZOBACTAM SODIUM 3; .375 G/15ML; G/15ML
3.38 INJECTION, POWDER, LYOPHILIZED, FOR SOLUTION INTRAVENOUS EVERY 6 HOURS
Status: DISCONTINUED | OUTPATIENT
Start: 2024-06-13 | End: 2024-06-14 | Stop reason: HOSPADM

## 2024-06-13 RX ORDER — POTASSIUM CHLORIDE 750 MG/1
40 TABLET, EXTENDED RELEASE ORAL ONCE
Status: COMPLETED | OUTPATIENT
Start: 2024-06-13 | End: 2024-06-13

## 2024-06-13 RX ORDER — OLANZAPINE 2.5 MG/1
2.5 TABLET, FILM COATED ORAL 2 TIMES DAILY
Qty: 60 TABLET | Refills: 5 | Status: ON HOLD | OUTPATIENT
Start: 2024-06-13 | End: 2024-06-17

## 2024-06-13 RX ADMIN — PIPERACILLIN SODIUM AND TAZOBACTAM SODIUM 3.38 G: 3; .375 INJECTION, POWDER, LYOPHILIZED, FOR SOLUTION INTRAVENOUS at 14:34

## 2024-06-13 RX ADMIN — PANTOPRAZOLE SODIUM 40 MG: 40 TABLET, DELAYED RELEASE ORAL at 08:36

## 2024-06-13 RX ADMIN — OLANZAPINE 2.5 MG: 2.5 TABLET, FILM COATED ORAL at 08:36

## 2024-06-13 RX ADMIN — LIDOCAINE 1 PATCH: 4 PATCH TOPICAL at 21:12

## 2024-06-13 RX ADMIN — FLUOXETINE HYDROCHLORIDE 40 MG: 40 CAPSULE ORAL at 08:36

## 2024-06-13 RX ADMIN — ONDANSETRON 4 MG: 2 INJECTION INTRAMUSCULAR; INTRAVENOUS at 17:34

## 2024-06-13 RX ADMIN — ASPIRIN 81 MG: 81 TABLET ORAL at 08:35

## 2024-06-13 RX ADMIN — POTASSIUM & SODIUM PHOSPHATES POWDER PACK 280-160-250 MG 1 PACKET: 280-160-250 PACK at 16:51

## 2024-06-13 RX ADMIN — DONEPEZIL HYDROCHLORIDE 10 MG: 10 TABLET, FILM COATED ORAL at 21:12

## 2024-06-13 RX ADMIN — LEVOTHYROXINE SODIUM 50 MCG: 0.05 TABLET ORAL at 08:36

## 2024-06-13 RX ADMIN — PIPERACILLIN AND TAZOBACTAM 2.25 G: 2; .25 INJECTION, POWDER, FOR SOLUTION INTRAVENOUS at 00:52

## 2024-06-13 RX ADMIN — PIPERACILLIN AND TAZOBACTAM 2.25 G: 2; .25 INJECTION, POWDER, FOR SOLUTION INTRAVENOUS at 07:06

## 2024-06-13 RX ADMIN — BUSPIRONE HYDROCHLORIDE 15 MG: 15 TABLET ORAL at 08:36

## 2024-06-13 RX ADMIN — POTASSIUM & SODIUM PHOSPHATES POWDER PACK 280-160-250 MG 1 PACKET: 280-160-250 PACK at 21:12

## 2024-06-13 RX ADMIN — POTASSIUM CHLORIDE 40 MEQ: 750 TABLET, EXTENDED RELEASE ORAL at 16:51

## 2024-06-13 RX ADMIN — BUSPIRONE HYDROCHLORIDE 15 MG: 15 TABLET ORAL at 21:12

## 2024-06-13 RX ADMIN — PIPERACILLIN SODIUM AND TAZOBACTAM SODIUM 3.38 G: 3; .375 INJECTION, POWDER, LYOPHILIZED, FOR SOLUTION INTRAVENOUS at 19:22

## 2024-06-13 RX ADMIN — OLANZAPINE 2.5 MG: 2.5 TABLET, FILM COATED ORAL at 21:12

## 2024-06-13 ASSESSMENT — ACTIVITIES OF DAILY LIVING (ADL)
ADLS_ACUITY_SCORE: 44

## 2024-06-13 NOTE — TELEPHONE ENCOUNTER
[facility] care(cert:05/30/2024-07/28/2024) [id number] for .  Forms placed in provider 'sign me' folder.  Please fax forms to [phone] after completion.

## 2024-06-13 NOTE — PLAN OF CARE
/79 (BP Location: Left arm)   Pulse 111   Temp 98.4  F (36.9  C) (Oral)   Resp 18   Wt 51.2 kg (112 lb 12.8 oz)   LMP  (LMP Unknown)   SpO2 93%   BMI 22.03 kg/m      Neuro: Alert and oriented x 4, lets needs known  Cardiac:Wnl, denies chest pain  Respiratory:WNL, Denies SOB  GI/: voiding AUOP, No BM  Diet/Appetite:Tolerating diet, denies nausea,   Skin:no new skin deficient this shift  LDA: L PIV TKO between abx  Labs: Reviewed.   Activity: SBA  Pain:Denies pain  Plan:Cont with POC          Goal Outcome Evaluation:Ongoing

## 2024-06-13 NOTE — CONSULTS
[facility] - GENERAL INFECTIOUS DISEASES CONSULTATION     Patient:  Idalia Bob   Date of birth 1945, Medical record number [id number]  Date of Visit:  06/13/2024  Date of Admission: 6/9/2024  Consult Requested by:Zach Whitten MD  Reason for Consult:  recurrent PNA, improving on Zosyn h/o PSA pneumonia. PO option/ duration for discharge          Assessment and Recommendations:   ASSESSMENT:    Fever - resolved with antibiotic/s  no sputum culture.   blood cultures on 6/9/24 - remain negative,   negative UA and UC,   Legionella pneumophila urine ag neg   MRSA and MSSA nasal screen PCR - neg     Respi panel , SARsCoV2 , Influenza - neg     Leukocytosis - resolved   COPD with severe ephysematous and fibrotic changes   Persistent or recurrent consolidative opacities in Bilateral lungs    - possible aspiration from GERD/ large esophageal Hiatal hernia   - Pseudomonas aeruginosa isolated in sputum in Nov 2023   Large esophageal hiatal hernia   GERD  No aspiration based on swallow evaluation  Dementia   CAD with HFrEF (EF 10-15%) 5/15/24  History of prolonged QTc    Discussion   Idalia Bob is a 80 yo F with PMHx of COPD, emphysema with fibrotic changes, HFrEF (EF 10-15%) 5/15/24, CAD, GERD, hiatal hernia Pseudomonas aeruginosa  left lung pneumonia (11/22/2023), recurrent pneumonia HTN, cardiogenic shock during 5/2024 admission and dementia, CKD, HTN, depression, anxiety who was admitted on 6/9/2024 for presumed PNA after being found to be febrile at nursing facility. On admission temperature of 101F, tachycardic in 110s that has since resolved, SBPs 80s - 120s -- has stabilized in the 90s and currently getting 500 ml of NS, on 2 L of O2 (which she wears at night time and with activity at baseline). WBC 15.1, procal 0.15, lactate 1.4.  UA without LE or nitrites. CXR on admission with patchy opacities in the right lung base concerning for infection, trace left pleural effusion.     Patient denies sick  contacts, denies cough, shortness of breath, sinus pain/ dental pain/ dental work recently, headaches, urinary symptoms , diarrhea, rashes  and denies any pain     She had P.aeruginosa pneumonia in Nov 2023 but repeat  chest CTs with persistent/ recurrent consolidation in  right and left upper lobes. Last CT chest was done on 5/20/24.       per Speech SLP, patient 's swallow function is at baseline. VFSS completed on 5/21/24 reveled no aspiration, silent or otherwise during study  but patient can still aspirate from GERD/ large esophageal Hiatal hernia      RECOMMENDATION:  - sputum culture if possible - aerobic and anaerobic cultures  - Strep pneumoniae ur ag   - persistent/ recurrent consolidative changes in upper and lower lobes are concerning, at least since 11/2023 , follow-up  CT chest wo contrast ,   - consider Pulmonary consult inpt/outpt   - continue Pip/tazo for 7 days . avoid quinolones due to prolonged QTc and elderly patient with cardiac issues     - close follow-up with PCP     Plan discussed with primary team   Thank you for allowing us to participate in the care of this patient    Yasmani Goldberg MD, M.Med.Sc  Staff, Infectious Diseases       80 Minutes spent by me on the date of service doing chart review, history, exam, documentation, coordination of care and further activities per the note           History of Present Illness:     Idalia Bob is a 80 yo F with PMHx of COPD, emphysema with fibrotic changes, HFrEF (EF 10-15%) 5/15/24, CAD, HERD, hiatal hernia Pseudomonas aeruginosa  left lung pneumonia (11/22/2023), recurrent pneumonia HTN, cardiogenic shock during 5/2024 admission and dementia, CKD, HTN, depression, anxiety who was admitted on 6/9/2024 for presumed PNA after being found to be febrile at nursing facility. On admission temperature of 101F, tachycardic in 110s that has since resolved, SBPs 80s - 120s -- has stabilized in the 90s and currently getting 500 ml of NS, on  2 L of O2 (which she wears at night time and with activity at baseline). WBC 15.1, procal 0.15, lactate 1.4.  UA without LE or nitrites. CXR on admission with patchy opacities in the right lung base concerning for infection, trace left pleural effusion.     Patient denies sick contacts, denies cough, shortness of breath, sinus pain/ dental pain/ dental work recently, headaches, urinary symptoms , diarrhea, rashes  and denies any pain     She had P.aeruginosa pneumonia in Nov 2023 but repeat  chest CTs with persistent/ recurrent consolidation in  right and left upper lobes. Last CT chest was done on 5/20/24.       per Speech SLP, patient 's swallow function is at baseline. VFSS completed on 5/21/24 reveled no aspiration, silent or otherwise during study.      She is on Pip/Tazo and is afebrile with normal WBC since 6/12/24 . Patient feels better, eating well and wants to go home.     Microbiology   no sputum culture.   blood cultures on 6/9/24 - remain negative,   negative UA and UC,   Legionella pneumophila urine ag neg   MRSA and MSSA nasal screen PCR - neg     Respi panel , SARsCoV2 , Influenza - neg     antimicrobials  Pip/tazobactam 6/9-present     Levofloxacin 750 mg 6/9 1x     Imagings:  CXR 6/10/24 2 V   IMPRESSION: Right lower lung zone possible infection or atelectasis unchanged from yesterday. Hiatal hernia. Hyperinflated lungs.    CXR 6/9/24 1 V  IMPRESSION:   1. Streaky mid zone and bibasilar pulmonary opacities which may represent edema, atelectasis or infection in the appropriate clinical settings.  2. Increased right paramediastinal opacity possibly secondary to the hiatal hernia better seen on recent CT from 5/20/2024.  3. Small left effusion    CT chest wo contrast 5/20/24   IMPRESSION:   1. New consolidative opacities in the right upper lobe and left lower lobe, suspicious for infection. Recommend follow-up to clearing.   2. Increased bilateral interlobular septal thickening, likely representing  pulmonary edema superimposed on chronic fibrotic change.  3. Advanced centrilobular emphysema with increased fibrotic changes most pronounced in the lung bases.   4. Small bilateral pleural effusions, left greater than right.     CT chest wo contrast 12/6/23  1.  Severe emphysematous changes of the lungs.   2.  Large area of consolidation in the left lung compatible with pneumonia. There is a small amount of infiltrate in the medial aspect of the right upper lobe likely reflecting the same process.   3.  Scattered irregular interstitial nodularity within both lungs greatest in the right upper lobe and left lower lobe posteriorly. These may reflect post inflammatory changes; however, since we have no prior imaging, I would recommend a follow-up CT after the patient's current pneumonia has been treated in approximately 3 months to reassess these areas for stability or clearing.   4.  Moderate to large esophageal hiatal hernia.          Review of Systems:   CONSTITUTIONAL:  see HPI  EYES: negative for icterus  ENT:  negative for hearing loss, tinnitus and sore throat  RESPIRATORY:  negative for cough with sputum and dyspnea  CARDIOVASCULAR:  negative for chest pain, dyspnea  GASTROINTESTINAL:  negative for nausea, vomiting, diarrhea and constipation  GENITOURINARY:  negative for dysuria  HEME:  No easy bruising  INTEGUMENT:  negative for rash and pruritus  NEURO:  Negative for headache         Past Medical History:     Past Medical History:   Diagnosis Date    Chronic kidney disease (CKD) 01/10/2023    Chronic obstructive pulmonary disease (H) 01/10/2023    Dementia (H) 11/28/2023    GERD (gastroesophageal reflux disease) 03/15/2023    Hypertension 11/28/2023          Past Surgical History:     Past Surgical History:   Procedure Laterality Date    ESOPHAGOSCOPY, GASTROSCOPY, DUODENOSCOPY (EGD), COMBINED N/A 02/07/2024    Procedure: ESOPHAGOGASTRODUODENOSCOPY, WITH BIOPSY;  Surgeon: Felton James MD;   Location: [facility]    PICC DOUBLE LUMEN PLACEMENT Right 05/17/2024    Basilic Vein 5F DL 37 cm            Family History:   No family history on file.         Social History:     Social History     Tobacco Use    Smoking status: Former     Current packs/day: 1.00     Average packs/day: 1 pack/day for 40.0 years (40.0 ttl pk-yrs)     Types: Cigarettes     Passive exposure: Past    Smokeless tobacco: Never   Substance Use Topics    Alcohol use: Not Currently     History   Sexual Activity    Sexual activity: Not Currently          Current Medications (antimicrobials listed in bold):     Current Facility-Administered Medications   Medication Dose Route Frequency Provider Last Rate Last Admin    aspirin EC tablet 81 mg  81 mg Oral Daily Li Gr PA-C   81 mg at 06/13/24 0835    busPIRone (BUSPAR) tablet 15 mg  15 mg Oral BID Li Gr PA-C   15 mg at 06/13/24 0836    donepezil (ARICEPT) tablet 10 mg  10 mg Oral At Bedtime Li Gr PA-C   10 mg at 06/12/24 2245    [Held by provider] empagliflozin (JARDIANCE) tablet 10 mg  10 mg Oral Daily Li Gr PA-C        FLUoxetine (PROzac) capsule 40 mg  40 mg Oral Daily Li Gr PA-C   40 mg at 06/13/24 0836    [Held by provider] furosemide (LASIX) tablet 40 mg  40 mg Oral QAM Li Gr PA-C        levothyroxine (SYNTHROID/LEVOTHROID) tablet 50 mcg  50 mcg Oral Daily Li Gr PA-C   50 mcg at 06/13/24 0836    Lidocaine (LIDOCARE) 4 % Patch 1 patch  1 patch Transdermal Q24h Fletcher Eaton MD   1 patch at 06/12/24 2047    [Held by provider] losartan (COZAAR) half-tab 12.5 mg  12.5 mg Oral Daily Li Gr PA-C        OLANZapine (zyPREXA) tablet 2.5 mg  2.5 mg Oral BID Li Gr PA-C   2.5 mg at 06/13/24 0836    pantoprazole (PROTONIX) EC tablet 40 mg  40 mg Oral Daily Li Gr PA-C   40 mg at 06/13/24 0836    piperacillin-tazobactam (ZOSYN) 2.25 g vial to attach to  ml bag  2.25 g Intravenous Q6H Maria Dolores,  MD Zach   2.25 g at 06/13/24 0706    sodium chloride (PF) 0.9% PF flush 3 mL  3 mL Intracatheter Q8H Amber Segal MD   3 mL at 06/12/24 1030    sodium chloride (PF) 0.9% PF flush 3 mL  3 mL Intracatheter Q8H Li Gr PA-C   3 mL at 06/09/24 2344          Allergies:     Allergies   Allergen Reactions    Penicillins Itching     Has tolerated ceftriaxone, piperacillin, and amoxicillin          Physical Exam:   Vitals were reviewed  Patient Vitals for the past 24 hrs:   BP Temp Temp src Pulse Resp SpO2 Weight   06/13/24 0839 -- -- -- 114 -- 94 % --   06/13/24 0838 -- -- -- 98 -- 93 % --   06/13/24 0837 -- -- -- 93 -- 96 % --   06/13/24 0836 -- -- -- 116 -- 92 % --   06/13/24 0835 -- -- -- 103 -- 96 % --   06/13/24 0834 -- -- -- 114 -- 97 % --   06/13/24 0833 -- -- -- 111 -- 95 % --   06/13/24 0832 -- -- -- 113 -- 95 % --   06/13/24 0831 -- -- -- 112 -- 94 % --   06/13/24 0830 -- -- -- 113 -- 94 % --   06/13/24 0829 -- -- -- 110 -- 94 % --   06/13/24 0828 -- -- -- 95 -- 94 % --   06/13/24 0827 -- -- -- 115 -- 94 % --   06/13/24 0826 -- -- -- 104 -- 94 % --   06/13/24 0825 -- -- -- 98 -- 95 % --   06/13/24 0824 -- -- -- 111 -- 95 % --   06/13/24 0823 -- -- -- 103 -- 95 % --   06/13/24 0822 -- -- -- 120 -- 94 % --   06/13/24 0821 -- -- -- 107 -- 91 % --   06/13/24 0820 (!) 150/82 97.7  F (36.5  C) Oral 120 18 93 % 52.7 kg (116 lb 2.9 oz)   06/13/24 0819 -- -- -- 105 -- 91 % --   06/13/24 0818 -- -- -- (!) 124 -- -- --   06/13/24 0817 -- -- -- 114 -- -- --   06/13/24 0816 -- -- -- (!) 131 -- -- --   06/13/24 0815 -- -- -- 114 -- -- --   06/13/24 0814 -- -- -- 119 -- -- --   06/13/24 0813 -- -- -- (!) 122 -- -- --   06/13/24 0812 -- -- -- (!) 124 -- -- --   06/13/24 0811 -- -- -- 119 -- -- --   06/13/24 0810 -- -- -- 118 -- 96 % --   06/13/24 0809 -- -- -- 120 -- 95 % --   06/13/24 0808 -- -- -- (!) 121 -- 96 % --   06/13/24 0807 -- -- -- (!) 124 -- 95 % --   06/13/24 0806 -- -- -- 110 -- 92 % --    06/13/24 0804 -- -- -- (!) 121 -- 95 % --   06/13/24 0803 -- -- -- (!) 123 -- 96 % --   06/13/24 0802 -- -- -- (!) 121 -- 94 % --   06/13/24 0801 -- -- -- (!) 123 -- 91 % --   06/13/24 0800 -- -- -- (!) 124 -- 93 % --   06/13/24 0759 -- -- -- 108 -- 90 % --   06/13/24 0758 -- -- -- (!) 124 -- 96 % --   06/13/24 0757 -- -- -- 102 -- 95 % --   06/13/24 0756 -- -- -- 109 -- 95 % --   06/13/24 0755 -- -- -- 118 -- 93 % --   06/13/24 0754 -- -- -- 114 -- 94 % --   06/13/24 0753 -- -- -- 110 -- 95 % --   06/13/24 0752 -- -- -- 106 -- 94 % --   06/13/24 0751 -- -- -- 94 -- 91 % --   06/13/24 0750 -- -- -- 100 -- 92 % --   06/13/24 0749 -- -- -- 94 -- 94 % --   06/13/24 0748 -- -- -- 95 -- 93 % --   06/13/24 0747 -- -- -- 89 -- 93 % --   06/13/24 0746 -- -- -- 93 -- (!) 89 % --   06/13/24 0745 -- -- -- 103 -- 93 % --   06/13/24 0744 -- -- -- 104 -- 92 % --   06/13/24 0743 -- -- -- 100 -- 92 % --   06/13/24 0742 -- -- -- 90 -- 91 % --   06/13/24 0741 -- -- -- 96 -- 92 % --   06/13/24 0740 -- -- -- 89 -- 94 % --   06/13/24 0739 -- -- -- 112 -- 92 % --   06/13/24 0738 -- -- -- 91 -- 96 % --   06/13/24 0737 -- -- -- 95 -- 94 % --   06/13/24 0736 -- -- -- 109 -- 93 % --   06/13/24 0735 -- -- -- 89 -- 93 % --   06/13/24 0734 -- -- -- 111 -- 95 % --   06/13/24 0733 -- -- -- 103 -- 93 % --   06/13/24 0732 -- -- -- 98 -- 93 % --   06/13/24 0731 -- -- -- 97 -- 92 % --   06/13/24 0730 -- -- -- 92 -- 92 % --   06/13/24 0729 -- -- -- 89 -- 94 % --   06/13/24 0728 -- -- -- 106 -- 93 % --   06/13/24 0727 -- -- -- 94 -- 93 % --   06/13/24 0726 -- -- -- 117 -- 93 % --   06/13/24 0725 -- -- -- 98 -- 93 % --   06/13/24 0724 -- -- -- 97 -- 94 % --   06/13/24 0723 -- -- -- 92 -- 94 % --   06/13/24 0722 -- -- -- 97 -- 93 % --   06/13/24 0721 -- -- -- 113 -- 93 % --   06/13/24 0720 -- -- -- 115 -- 94 % --   06/13/24 0719 -- -- -- 113 -- 93 % --   06/13/24 0718 -- -- -- 112 -- 94 % --   06/13/24 0717 -- -- -- 118 -- 94 % --   06/13/24 0716 -- --  -- 100 -- 93 % --   06/13/24 0715 -- -- -- 99 -- 94 % --   06/13/24 0714 -- -- -- 106 -- (!) 89 % --   06/13/24 0713 -- -- -- 113 -- 96 % --   06/13/24 0712 -- -- -- (!) 125 -- -- --   06/13/24 0711 -- -- -- (!) 123 -- -- --   06/13/24 0710 -- -- -- (!) 130 -- -- --   06/13/24 0709 -- -- -- (!) 127 -- -- --   06/13/24 0708 -- -- -- 112 -- 95 % --   06/13/24 0707 -- -- -- 118 -- 95 % --   06/13/24 0706 -- -- -- 89 -- (!) 89 % --   06/13/24 0705 -- -- -- 106 -- 95 % --   06/13/24 0704 -- -- -- 87 -- 93 % --   06/13/24 0703 -- -- -- 109 -- 92 % --   06/13/24 0702 -- -- -- 105 -- 90 % --   06/13/24 0701 -- -- -- 87 -- 92 % --   06/13/24 0700 -- -- -- 104 -- 93 % --   06/13/24 0659 -- -- -- 96 -- 91 % --   06/13/24 0658 -- -- -- 96 -- 92 % --   06/13/24 0657 -- -- -- 93 -- 94 % --   06/13/24 0656 -- -- -- 107 -- 92 % --   06/13/24 0655 -- -- -- 84 -- 91 % --   06/13/24 0654 -- -- -- 108 -- 91 % --   06/13/24 0653 -- -- -- 107 -- 91 % --   06/13/24 0652 -- -- -- 109 -- 91 % --   06/13/24 0651 -- -- -- 106 -- 91 % --   06/13/24 0650 -- -- -- 92 -- 91 % --   06/13/24 0649 -- -- -- 87 -- 92 % --   06/13/24 0648 -- -- -- 98 -- 93 % --   06/13/24 0647 -- -- -- 92 -- 92 % --   06/13/24 0646 -- -- -- 105 -- 92 % --   06/13/24 0645 -- -- -- 110 -- 92 % --   06/13/24 0644 -- -- -- 97 -- (!) 88 % --   06/13/24 0643 -- -- -- 99 -- 95 % --   06/13/24 0642 -- -- -- 87 -- 91 % --   06/13/24 0641 -- -- -- 113 -- 92 % --   06/13/24 0640 -- -- -- 105 -- 93 % --   06/13/24 0639 -- -- -- 107 -- 91 % --   06/13/24 0638 -- -- -- 100 -- 93 % --   06/13/24 0637 -- -- -- 86 -- 94 % --   06/13/24 0636 -- -- -- 86 -- 93 % --   06/13/24 0635 -- -- -- 105 -- 92 % --   06/13/24 0634 -- -- -- 106 -- 95 % --   06/13/24 0633 -- -- -- 91 -- (!) 88 % --   06/13/24 0632 -- -- -- 106 -- 92 % --   06/13/24 0631 -- -- -- 91 -- 93 % --   06/13/24 0630 -- -- -- 107 -- 90 % --   06/13/24 0629 -- -- -- 92 -- 93 % --   06/13/24 0628 -- -- -- 111 -- (!) 86 % --    06/13/24 0627 -- -- -- 101 -- 93 % --   06/13/24 0626 -- -- -- 88 -- 93 % --   06/13/24 0625 -- -- -- 85 -- 93 % --   06/13/24 0624 -- -- -- 105 -- 93 % --   06/13/24 0623 -- -- -- 107 -- 90 % --   06/13/24 0622 -- -- -- 100 -- 91 % --   06/13/24 0621 -- -- -- 107 -- 91 % --   06/13/24 0619 -- -- -- 109 -- 91 % --   06/13/24 0618 -- -- -- 83 -- 93 % --   06/13/24 0617 -- -- -- 106 -- 91 % --   06/13/24 0616 -- -- -- 90 -- 92 % --   06/13/24 0615 -- -- -- 107 -- 90 % --   06/13/24 0614 -- -- -- 96 -- 92 % --   06/13/24 0613 -- -- -- 104 -- 94 % --   06/13/24 0612 -- -- -- 106 -- 90 % --   06/13/24 0611 -- -- -- 103 -- 96 % --   06/13/24 0610 -- -- -- 103 -- 91 % --   06/13/24 0609 -- -- -- 99 -- 96 % --   06/13/24 0608 -- -- -- 101 -- 90 % --   06/13/24 0607 -- -- -- 95 -- 94 % --   06/13/24 0606 -- -- -- 95 -- 91 % --   06/13/24 0605 -- -- -- 98 -- 95 % --   06/13/24 0604 -- -- -- 87 -- 92 % --   06/13/24 0603 -- -- -- 96 -- (!) 87 % --   06/13/24 0602 -- -- -- 114 -- 91 % --   06/13/24 0601 -- -- -- 87 -- 93 % --   06/13/24 0600 -- -- -- 107 -- 91 % --   06/12/24 2332 113/79 98.4  F (36.9  C) Oral -- 18 -- --   06/12/24 2042 103/63 98.4  F (36.9  C) Oral 111 18 93 % --   06/12/24 1709 -- 97.2  F (36.2  C) -- -- -- -- --   06/12/24 1535 (!) 124/96 97.2  F (36.2  C) Oral 92 18 99 % --   06/12/24 1440 -- -- -- 93 -- 95 % --   06/12/24 1435 -- -- -- 95 -- 95 % --   06/12/24 1430 -- -- -- 107 -- -- --   06/12/24 1425 -- -- -- 95 -- (!) 89 % --   06/12/24 1420 -- -- -- 108 -- 95 % --   06/12/24 1415 -- -- -- 108 -- 97 % --   06/12/24 1410 -- -- -- 94 -- 95 % --   06/12/24 1405 -- -- -- 97 -- 91 % --   06/12/24 1400 -- -- -- 102 -- -- --   06/12/24 1355 -- -- -- 96 -- -- --   06/12/24 1350 -- -- -- 106 -- 95 % --   06/12/24 1345 -- -- -- 98 -- 90 % --   06/12/24 1340 -- -- -- 97 -- 92 % --   06/12/24 1335 -- -- -- 93 -- 91 % --   06/12/24 1330 -- -- -- 108 -- 94 % --   06/12/24 1325 -- -- -- 113 -- 97 % --   06/12/24  1320 -- -- -- 101 -- 100 % --   06/12/24 1315 -- -- -- 92 -- 100 % --   06/12/24 1310 -- -- -- 98 -- 100 % --   06/12/24 1305 -- -- -- 102 -- 100 % --   06/12/24 1300 -- -- -- 113 -- 100 % --   06/12/24 1255 -- -- -- 114 -- 100 % --   06/12/24 1250 -- -- -- 91 -- 100 % --   06/12/24 1245 -- -- -- 99 -- 100 % 51.2 kg (112 lb 12.8 oz)   06/12/24 1240 -- -- -- 102 -- -- --   06/12/24 1235 -- -- -- 96 -- -- --   06/12/24 1230 -- -- -- 112 -- 100 % --   06/12/24 1225 -- -- -- 111 -- 100 % --   06/12/24 1220 -- -- -- 108 -- 100 % --   06/12/24 1215 -- -- -- 101 -- 100 % --   06/12/24 1210 -- -- -- 94 -- 100 % --   06/12/24 1205 -- -- -- 93 -- 100 % --   06/12/24 1200 -- -- -- 119 -- -- --   06/12/24 1155 -- -- -- (!) 122 -- -- --   06/12/24 1150 -- -- -- 106 -- 97 % --   06/12/24 1145 -- -- -- 92 -- 97 % --   06/12/24 1140 -- -- -- 109 -- 98 % --   06/12/24 1135 -- -- -- 91 -- 96 % --   06/12/24 1130 -- -- -- 106 -- 98 % --   06/12/24 1125 -- -- -- 98 -- 98 % --   06/12/24 1120 -- -- -- 107 -- 98 % --   06/12/24 1115 -- -- -- 84 -- 97 % --   06/12/24 1110 -- -- -- 79 -- 98 % --   06/12/24 1105 -- -- -- 91 -- 98 % --   06/12/24 1100 -- -- -- 90 -- 100 % --   06/12/24 1055 -- -- -- 106 -- 96 % --   06/12/24 1050 -- -- -- 105 -- 97 % --   06/12/24 1045 -- -- -- 104 -- 97 % --   06/12/24 1040 -- -- -- 81 -- 98 % --   06/12/24 1035 -- -- -- 108 -- 100 % --   06/12/24 1030 -- -- -- 96 -- 99 % --   06/12/24 1025 -- -- -- 102 -- 99 % --   06/12/24 1020 -- -- -- 81 -- 99 % --   06/12/24 1015 -- -- -- 82 -- 99 % --   06/12/24 1010 -- -- -- 95 -- 100 % --   06/12/24 1005 -- -- -- 103 -- 98 % --   06/12/24 1000 -- -- -- 88 -- 99 % --   06/12/24 0955 -- -- -- 89 -- 98 % --   06/12/24 0950 -- -- -- 94 -- 96 % --   06/12/24 0945 -- -- -- 96 -- (!) 89 % --   06/12/24 0940 -- -- -- 98 -- 98 % --   06/12/24 0935 -- -- -- 105 -- -- --   06/12/24 0930 -- -- -- (!) 121 -- -- --   06/12/24 0925 -- -- -- 105 -- -- --   06/12/24 0920 -- -- --  100 -- -- --   06/12/24 0915 -- -- -- 98 -- -- --   06/12/24 0910 -- -- -- 101 -- -- --     Ranges for his vital signs:  Temp:  [97.2  F (36.2  C)-98.4  F (36.9  C)] 97.7  F (36.5  C)  Pulse:  [] 114  Resp:  [18] 18  BP: (103-150)/(63-96) 150/82  SpO2:  [86 %-100 %] 94 %    Physical Examination:  GENERAL:  awake, alert  in bed in no acute distress.   HEENT:  Head is normocephalic, atraumatic   EYES:  Eyes have anicteric sclerae without conjunctival injection    ENT:  Oropharynx is moist without exudates or ulcers. Tongue is midline sounds a bit congested but denies sinus pain / rhinorrhea   NECK:  Supple. No  Cervical lymphadenopathy  LUNGS:  crackles in lower lobes R>L , fine crackles in upper lobes    CARDIOVASCULAR:  Regular rate and rhythm with no murmurs, tachycardia   ABDOMEN:  active bowel sounds, soft, nontender.   SKIN:  No acute rashes.    Line(s) are in place without any surrounding erythema or exudate. No stigmata of endocarditis.  NEUROLOGIC:  Grossly nonfocal. Active x4 extremities  Extremities : no edema          Laboratory Data:     Hematology Studies    Recent Labs   Lab Test 06/13/24  0722 06/12/24  1315 06/11/24  0711 06/10/24  0627 06/09/24  1846 06/09/24  1841 05/24/24  0515   WBC 9.6 10.7 15.9* 19.8* 15.1*  --  13.2*   HGB 10.6* 10.2* 10.2* 9.6* 10.0* 11.9 8.0*   MCV 87 88 87 87 84  --  83    281 267 250 239  --  375     Immune Globulin Studies    Recent Labs   Lab Test 02/06/24  0653   *   IGE 20     Metabolic Studies     Recent Labs   Lab Test 06/13/24  0722 06/12/24  1315 06/11/24  0711 06/10/24  2028 06/10/24  1344    137 135 132* 129*   POTASSIUM 3.4 3.3* 3.8 3.6 3.9   CHLORIDE 107 106 103 100 98   CO2 21* 19* 21* 20* 19*   BUN 8.2 9.6 12.8 14.6 14.3   CR 0.84 0.95 1.21* 1.36* 1.37*   GFRESTIMATED 70 60* 45* 39* 39*     Hepatic Studies    Recent Labs   Lab Test 06/09/24  1846 05/19/24  0448 05/18/24  1530 05/18/24  1048 05/18/24  0431 05/17/24  1435   BILITOTAL  0.6 0.6 0.5 0.7 0.8 0.7   ALKPHOS 71 70 77 73 80 83   ALBUMIN 3.7 2.8* 3.0* 3.0* 3.2* 3.4*   AST 17 16 17 19 21 30   ALT 9 19 24 25 28 37     Thyroid Studies    Recent Labs   Lab Test 05/14/24  1549 02/01/24  2120   TSH 1.36 2.04     Urine Studies    Recent Labs   Lab Test 06/09/24  2047 05/18/24  1809 05/14/24  1645 02/02/24  1406   LEUKEST Negative Small* Negative Large*   WBCU 1 5  --  17*

## 2024-06-13 NOTE — PROGRESS NOTES
Care Management Follow Up    Length of Stay (days): 4    Expected Discharge Date: 06/14/2024     Concerns to be Addressed: discharge planning     Patient plan of care discussed at interdisciplinary rounds: Yes    Anticipated Discharge Disposition: Assisted Living, Home Care     Anticipated Discharge Services: None  Anticipated Discharge DME: Oxygen    Patient/family educated on Medicare website which has current facility and service quality ratings: Yes  Education Provided on the Discharge Plan: No  Patient/Family in Agreement with the Plan: yes    Referrals Placed by CM/SW: TCU  Private pay costs discussed: not applicable     Additional Information:  Infectious disease recommends that pt finish course of IV antibiotics which would delay discharge thru Tuesday (6/18). Per Dr. Whitten, pt anticipated to be medically ready for discharge 6/14/2024.    RNCC spoke with staff at [facility], and they are unable to assist with the completion of IV antibiotics.    RNCC spoke with patient at bedside regarding plan of care and TCU facility list given to patient.   TCU willing to take patient tomorrow 6/14 to finish antibiotic course and clarified that PICC is not needed. Patient agreeable with plan of care and had RNCC follow-up with daughter, Barbara. Daughter also agreeable to plan and seeks information regarding parking/location/etc. of  TCU. Pt and daughter prefer wheelchair ride set up for transport tomorrow.        TCU  Ph: [phone]  [facility] [place] [address]      [facility]  Nursing line: [phone]    Fax: [phone]        Evelyn Mccauley RN

## 2024-06-13 NOTE — CONSULTS
[facility] Pulmonology Consultation    Idalia Bob MRN# [id number]   Age: 79 year old YOB: 1945     Date of Admission:  6/9/2024    Reason for consult: Recurrent PNA       Requesting physician: Zach Whitten MD       Level of consult: Consult, follow and place orders           Assessment and Recommendation:   Assessment:   Fever   Leukocytosis   COPD   Persistent or recurrent consolidative opacities in Bilateral lungs    esophageal hiatal hernia     Idalia is an 79 year old female with a history of COPD, heart failure, CKD, dementia, depression and anxiety who was admitted due to a fever while in a nursing care facility. It was suspected to be a pneumonia and so a chest x ray was done on admission which showed opacities in the right lower lobe along with a mild left pleural effusion She was placed on empiric antibiotics (zosyn) since then.     Bacterial cultures done so far have been negative with strep pneumo pending. Respiratory panel is negative. A chest CT back in may showed similar findings and she has had three recurrent hospitalizations for pneumonia. She had p.aeruginosa pneumonia back in nov 2023 that was treated successfully.    We think that this patient might be silently aspirating due to the hiatal hernia that she has and this could explain her recurrent pneumonias. This is still very likely as she might be immunosuppressed since the last immunoglobulin levels done in February were low.        Recommendations:   Do sleep oximetry  Do exercise oximetry  Check IgA,IgG, IgM  Recommend aspiration precautions  Agree with antimicrobials per ID  Continue current chronic COPD medications at discharge including trelegy, would benefit from levalbuterol inhaler prn   Follow-up in the pulmonary clinic       Patent seen and discussed with Dr. De Jesus. Pulmonology will continue to follow, please reach out with any questions.    Brenda Chavarria, Medical student.    I was present  with the medical student who participated in the service and in the documentation of the note. I have verified the history and personally performed the physical exam and medical decision making. I agree with the assessment and plan of care as documented in the note.    Idalia Bob is an 79 year old F with severe COPD,HFrEF (EF 10-15%), HTN, CKD, recurrent pneumonias who was admitted on 6/9/24 from nursing home with fever and found to have RLL infiltrate concerning for pneumonia. She has had multiple pneumonias concerning for recurrent aspiration in the setting of known large hiatal hernia. Speech has evaluated her without swallow abnormality noted. The location of infiltrates varies (RUL, RLL, NIRAV) making a local intrabronchial abnormality less likely. Would not perform a bronchoscopy at this time. She was noted to have borderline low IgG in February and would recommend checking immunoglobulin panel (IgG, M, A) though this can be difficult to interpret in setting of active infection. She has been on ICS therapy chronically for COPD but given eosinophilia she likely needs this therapy for lung maintenance. Would have her establish care with OP pulmonologist which she is agreeable to to consider alternative therapies as this likely increases her risk of recurrent pneumonia as well. She does use O2 at nighttime at home but there has been some question of whether she needs this by family. Would benefit from exercise and overnight oximetry prior to discharge.       Mirela De Jesus MD  Pulmonary, Allergy, Critical Care, and Sleep Medicine   [facility]   Pager: [phone]    This note was created using dictation software and may contain errors.  Please contact the creator for any clarifications that are needed.              Chief Complaint:      RLL consolidation  Sepsis  Leukocytosis  Trace L Pleural Effusion           History of Present Illness (Student):   This patient is a 79 year old  female with a  significant past medical history  of COPD, DM, CKD, HFReF, dementia, depression and anxiety P/w fever and generalized weakness from facility. On admission temperature of 101, tachycardic in 110s that has since resolved, SBPs 80s - 120s -- has stabilized in the 90s and currently getting 500 ml of NS, on 2 L of O2 (which she wears at night time and with activity at baseline). WBC 15.1, procal 0.15, lactate 1.4. Fluvid negative, UA without LE or nitrites. CXR on admission with patchy opacities in the right lung base concerning for infection, trace left pleural effusion. No localizing infectious symptoms. Patient has had several recent admissions for pneumonia so will have speech eval, could consider ID involvement pending cultures given the recurrent nature of pneumonia. Will treat with zosyn given history of PsA PNA, could narrow as able based on cultures.  - Influenza, RSV, covid negative. MRSA negative.  - procal negative  - strep pneumo, legionella pending  - respiratorey culture pending. Blood culture pending  - RVP pending  - Continuous pulse ox, wean as able  - SLP consult given recurrent PNA  - Started on Zosyn at admission. Will continue this for epirical treatment. Narrow per cultures results  - Consider CT of chest if lack of improvement     Brenda Chavarria 6/13/2024 3:20 PM          Past Medical History:     Past Medical History:   Diagnosis Date    Chronic kidney disease (CKD) 01/10/2023    Chronic obstructive pulmonary disease (H) 01/10/2023    Dementia (H) 11/28/2023    GERD (gastroesophageal reflux disease) 03/15/2023    Hypertension 11/28/2023             Past Surgical History:     Past Surgical History:   Procedure Laterality Date    ESOPHAGOSCOPY, GASTROSCOPY, DUODENOSCOPY (EGD), COMBINED N/A 02/07/2024    Procedure: ESOPHAGOGASTRODUODENOSCOPY, WITH BIOPSY;  Surgeon: Felton James MD;  Location: [facility]    PICC DOUBLE LUMEN PLACEMENT Right 05/17/2024    Basilic Vein 5F DL 37 cm              Social History:     Social History     Tobacco Use    Smoking status: Former     Current packs/day: 1.00     Average packs/day: 1 pack/day for 40.0 years (40.0 ttl pk-yrs)     Types: Cigarettes     Passive exposure: Past    Smokeless tobacco: Never   Substance Use Topics    Alcohol use: Not Currently             Family History:   No family history on file.  Family history reviewed and updated in [facility]          Immunizations:     Immunization History   Administered Date(s) Administered    COVID-19 12+ (2023-24) (MODERNA) 10/20/2023    COVID-19 Bivalent 12+ (Pfizer) 09/24/2022    COVID-19 Monovalent 18+ (Moderna) 01/20/2021, 02/17/2021, 11/01/2021, 04/29/2022    Flu, Unspecified 01/02/2010    Influenza Vaccine 65+ (FLUAD) 10/25/2014, 10/17/2015, 10/27/2016, 11/07/2017, 11/26/2018, 10/28/2019, 10/12/2020, 10/09/2021, 10/17/2022, 09/13/2023    Influenza Vaccine 65+ (Fluzone HD) 10/16/2013    Pneumo Conj 13-V (2010&after) 02/03/2015    Pneumococcal 20 valent Conjugate (Prevnar 20) 01/22/2024    Pneumococcal 23 valent 10/21/2002, 12/01/2008    RSV Vaccine (Abrysvo) 01/22/2024    TDAP (Adacel,Boostrix) 02/03/2014    Td (Adult), Adsorbed 01/22/2010    Zoster recombinant adjuvanted (SHINGRIX) 10/29/2019, 01/26/2020    Zoster vaccine, live 03/10/2010             Allergies:     Allergies   Allergen Reactions    Penicillins Itching     Has tolerated ceftriaxone, piperacillin, and amoxicillin             Medications:     Current Facility-Administered Medications   Medication Dose Route Frequency Provider Last Rate Last Admin    acetaminophen (TYLENOL) tablet 975 mg  975 mg Oral Q8H PRN Li Gr PA-C   975 mg at 06/12/24 1709    aspirin EC tablet 81 mg  81 mg Oral Daily Li Gr PA-C   81 mg at 06/13/24 0835    busPIRone (BUSPAR) tablet 15 mg  15 mg Oral BID Li Gr PA-C   15 mg at 06/13/24 0836    calcium carbonate (TUMS) chewable tablet 1,000 mg  1,000 mg Oral 4x Daily PRN Li Gr PA-C         donepezil (ARICEPT) tablet 10 mg  10 mg Oral At Bedtime Li Gr PA-C   10 mg at 06/12/24 2245    [Held by provider] empagliflozin (JARDIANCE) tablet 10 mg  10 mg Oral Daily Li Gr PA-C        FLUoxetine (PROzac) capsule 40 mg  40 mg Oral Daily Li Gr PA-C   40 mg at 06/13/24 0836    [Held by provider] furosemide (LASIX) tablet 40 mg  40 mg Oral QAM Li Gr PA-C        ipratropium - albuterol 0.5 mg/2.5 mg/3 mL (DUONEB) neb solution 3 mL  1 vial Nebulization Q6H PRN Li Gr PA-C        levothyroxine (SYNTHROID/LEVOTHROID) tablet 50 mcg  50 mcg Oral Daily Li Gr PA-C   50 mcg at 06/13/24 0836    Lidocaine (LIDOCARE) 4 % Patch 1 patch  1 patch Transdermal Q24h Fletcher Eaton MD   1 patch at 06/12/24 2047    lidocaine (LMX4) cream   Topical Q1H PRN Li Gr PA-C        lidocaine 1 % 0.1-1 mL  0.1-1 mL Other Q1H PRN Li Gr PA-C        [Held by provider] losartan (COZAAR) half-tab 12.5 mg  12.5 mg Oral Daily Li Gr PA-C        naloxone (NARCAN) injection 0.2 mg  0.2 mg Intravenous Q2 Min PRN Rm Alexander MD        Or    naloxone (NARCAN) injection 0.4 mg  0.4 mg Intravenous Q2 Min PRN Rm Alexander MD        Or    naloxone (NARCAN) injection 0.2 mg  0.2 mg Intramuscular Q2 Min PRN Rm Alexander MD        Or    naloxone (NARCAN) injection 0.4 mg  0.4 mg Intramuscular Q2 Min PRN Rm Alexander MD        OLANZapine (zyPREXA) tablet 2.5 mg  2.5 mg Oral BID Li Gr PA-C   2.5 mg at 06/13/24 0836    ondansetron (ZOFRAN) injection 4 mg  4 mg Intravenous Q6H PRN Zach Whitten MD        oxyCODONE IR (ROXICODONE) half-tab 2.5 mg  2.5 mg Oral At Bedtime PRN Fletcher Eaton MD        pantoprazole (PROTONIX) EC tablet 40 mg  40 mg Oral Daily Li Gr PA-C   40 mg at 06/13/24 0836    piperacillin-tazobactam (ZOSYN) 3.375 g vial to attach to  mL bag  3.375 g Intravenous Q6H Zach Whitten MD   3.375 g at  24 1434    senna-docusate (SENOKOT-S/PERICOLACE) 8.6-50 MG per tablet 1 tablet  1 tablet Oral BID PRN Li Gr PA-C        Or    senna-docusate (SENOKOT-S/PERICOLACE) 8.6-50 MG per tablet 2 tablet  2 tablet Oral BID PRN Li Gr PA-C        sodium chloride (PF) 0.9% PF flush 3 mL  3 mL Intracatheter q1 min prn Amber Segal MD        sodium chloride (PF) 0.9% PF flush 3 mL  3 mL Intracatheter Q8H Amber Segal MD   3 mL at 24 1030    sodium chloride (PF) 0.9% PF flush 3 mL  3 mL Intracatheter Q8H Li Gr PA-C   3 mL at 24 1437    sodium chloride (PF) 0.9% PF flush 3 mL  3 mL Intracatheter q1 min prn Li Gr PA-C                 Review of Systems:   CONSTITUTIONAL: NEGATIVE for fever, chills, change in weight  CONSTITUTIONAL:NEGATIVE for fever, chills, change in weight  INTEGUMENTARY/SKIN: NEGATIVE for worrisome rashes, moles or lesions  EYES: NEGATIVE for vision changes or irritation  ENT/MOUTH: NEGATIVE for ear, mouth and throat problems  ENT/MOUTH: NEGATIVE for ear, mouth and throat problems  RESP: NEGATIVE for significant cough or SOB  BREAST: NEGATIVE for masses, tenderness or discharge   CV: NEGATIVE for chest pain, palpitations or peripheral edema  CV: NEGATIVE for chest pain, palpitations or peripheral edema  GI:  : normal menstrual cycles   : negative for dysuria, hematuria, decreased urinary stream, erectile dysfunction  NEURO: NEGATIVE for weakness, dizziness or paresthesias.  ENDOCRINE: NEGATIVE for temperature intolerance, skin/hair changes.  HEME/ALLERGY/IMMUNE: NEGATIVE for bleeding problems.  ROS otherwise negative          Physical Exam (Student):   Vitals were reviewed  Temp: 97.7  F (36.5  C) Temp src: Oral BP: (!) 150/82 Pulse: 99   Resp: 18 SpO2: 93 % O2 Device: None (Room air)    Blood pressure range: Systolic (24hrs), Av , Min:103 , Max:150     Temp: 97.7  F (36.5  C) Temp  Min: 97.2  F (36.2  C)  Max: 98.4  F (36.9   C)  Resp: 18 Resp  Min: 18  Max: 18  SpO2: 93 % SpO2  Min: 86 %  Max: 99 %  Pulse: 99 Pulse  Min: 83  Max: 131    No data recorded  BP: (!) 150/82 Systolic (24hrs), Av , Min:103 , Max:150  Diastolic (24hrs), Av, Min:63, Max:96   I/O last 3 completed shifts:  In: 320 [P.O.:320]  Out: -   Constitutional:   awake, alert, cooperative, no apparent distress, and appears stated age   Eyes:   Lids and lashes normal, pupils equal, round and reactive to light, extra ocular muscles intact, sclera clear, conjunctiva normal   ENT:   Normocephalic, without obvious abnormality, atraumatic, sinuses nontender on palpation, external ears without lesions, oral pharynx with moist mucous membranes, tonsils without erythema or exudates, gums normal and good dentition.   Neck:   Supple, symmetrical, trachea midline, no adenopathy, thyroid symmetric, not enlarged and no tenderness, skin normal   Hematologic / Lymphatic:   no cervical lymphadenopathy and no supraclavicular lymphadenopathy   Back:   Symmetric, no curvature, spinous processes are non-tender on palpation, paraspinous muscles are non-tender on palpation, no costal vertebral tenderness   Lungs:   No increased work of breathing, good air exchange, clear to auscultation bilaterally, no crackles or wheezing   Cardiovascular:   Normal apical impulse, regular rate and rhythm, normal S1 and S2, no S3 or S4, and no murmur noted   Abdomen:   No scars, normal bowel sounds, soft, non-distended, non-tender, no masses palpated, no hepatosplenomegally   Chest / Breast:   Breasts symmetrical, skin without lesion(s), no nipple retraction or dimpling, no nipple discharge, no masses palpated, no axillary or supraclavicular adenopathy   Genitounirinary:      Musculoskeletal:   There is no redness, warmth, or swelling of the joints.  Full range of motion noted.  Motor strength is 5 out of 5 all extremities bilaterally.  Tone is normal.   Neurologic:   Awake, alert, oriented to name,  place and time.  Cranial nerves II-XII are grossly intact.  Motor is 5 out of 5 bilaterally.  Cerebellar finger to nose, heel to shin intact.  Sensory is intact.  Babinski down going, Romberg negative, and gait is normal.   Neuropsychiatric:   General: normal, calm, and normal eye contact  Memory and insight: impaired:   Skin:   normal skin color, texture, turgor        Brenda Chavarria 6/13/2024 3:31 PM          Data:   All laboratory data reviewed  Under the Chart Review section  All imaging studies reviewed by me.    Attestation:  I have reviewed today's vital signs, notes, medications, labs and imaging.    Brenda Chavarria

## 2024-06-13 NOTE — TELEPHONE ENCOUNTER
Routing My Chart message to PCP    Patient daughter wanting Ambien sent to mail order pharmacy.    Pended.    Miki Schmid, RN, BSN, PHN  [facility]

## 2024-06-14 ENCOUNTER — HOSPITAL ENCOUNTER (INPATIENT)
Facility: SKILLED NURSING FACILITY | Age: 79
LOS: 4 days | Discharge: NURSING, REHAB OR LONG-TERM CARE | DRG: 194 | End: 2024-06-18
Attending: INTERNAL MEDICINE | Admitting: INTERNAL MEDICINE
Payer: MEDICARE

## 2024-06-14 VITALS
RESPIRATION RATE: 18 BRPM | WEIGHT: 116.18 LBS | BODY MASS INDEX: 22.69 KG/M2 | TEMPERATURE: 97.5 F | DIASTOLIC BLOOD PRESSURE: 87 MMHG | HEART RATE: 105 BPM | OXYGEN SATURATION: 90 % | SYSTOLIC BLOOD PRESSURE: 142 MMHG

## 2024-06-14 DIAGNOSIS — K44.9 HIATAL HERNIA: Primary | ICD-10-CM

## 2024-06-14 DIAGNOSIS — R45.1 AGITATION: ICD-10-CM

## 2024-06-14 DIAGNOSIS — J18.9 PNEUMONIA OF RIGHT LOWER LOBE DUE TO INFECTIOUS ORGANISM: ICD-10-CM

## 2024-06-14 DIAGNOSIS — F01.A0 MILD VASCULAR DEMENTIA, UNSPECIFIED WHETHER BEHAVIORAL, PSYCHOTIC, OR MOOD DISTURBANCE OR ANXIETY (H): ICD-10-CM

## 2024-06-14 PROBLEM — R53.81 PHYSICAL DECONDITIONING: Status: ACTIVE | Noted: 2024-06-14

## 2024-06-14 LAB
BACTERIA BLD CULT: NO GROWTH
BACTERIA BLD CULT: NO GROWTH
CREAT SERPL-MCNC: 1.08 MG/DL (ref 0.51–0.95)
EGFRCR SERPLBLD CKD-EPI 2021: 52 ML/MIN/1.73M2
ERYTHROCYTE [DISTWIDTH] IN BLOOD BY AUTOMATED COUNT: 23.8 % (ref 10–15)
HCT VFR BLD AUTO: 34.1 % (ref 35–47)
HGB BLD-MCNC: 9.3 G/DL (ref 11.7–15.7)
IGA SERPL-MCNC: 153 MG/DL (ref 84–499)
IGG SERPL-MCNC: 526 MG/DL (ref 610–1616)
IGM SERPL-MCNC: 76 MG/DL (ref 35–242)
MCH RBC QN AUTO: 24 PG (ref 26.5–33)
MCHC RBC AUTO-ENTMCNC: 27.3 G/DL (ref 31.5–36.5)
MCV RBC AUTO: 88 FL (ref 78–100)
PHOSPHATE SERPL-MCNC: 2.8 MG/DL (ref 2.5–4.5)
PLATELET # BLD AUTO: 323 10E3/UL (ref 150–450)
RBC # BLD AUTO: 3.88 10E6/UL (ref 3.8–5.2)
S PNEUM AG SPEC QL: NEGATIVE
WBC # BLD AUTO: 7.6 10E3/UL (ref 4–11)

## 2024-06-14 PROCEDURE — 258N000003 HC RX IP 258 OP 636: Mod: JZ | Performed by: INTERNAL MEDICINE

## 2024-06-14 PROCEDURE — 36415 COLL VENOUS BLD VENIPUNCTURE: CPT | Performed by: HOSPITALIST

## 2024-06-14 PROCEDURE — 87899 AGENT NOS ASSAY W/OPTIC: CPT | Performed by: HOSPITALIST

## 2024-06-14 PROCEDURE — 250N000011 HC RX IP 250 OP 636: Mod: JZ | Performed by: HOSPITALIST

## 2024-06-14 PROCEDURE — 36415 COLL VENOUS BLD VENIPUNCTURE: CPT | Performed by: PHYSICIAN ASSISTANT

## 2024-06-14 PROCEDURE — 99233 SBSQ HOSP IP/OBS HIGH 50: CPT | Performed by: INTERNAL MEDICINE

## 2024-06-14 PROCEDURE — 82784 ASSAY IGA/IGD/IGG/IGM EACH: CPT | Performed by: STUDENT IN AN ORGANIZED HEALTH CARE EDUCATION/TRAINING PROGRAM

## 2024-06-14 PROCEDURE — 999N000157 HC STATISTIC RCP TIME EA 10 MIN

## 2024-06-14 PROCEDURE — 250N000013 HC RX MED GY IP 250 OP 250 PS 637

## 2024-06-14 PROCEDURE — 120N000009 HC R&B SNF

## 2024-06-14 PROCEDURE — 250N000013 HC RX MED GY IP 250 OP 250 PS 637: Performed by: INTERNAL MEDICINE

## 2024-06-14 PROCEDURE — 99239 HOSP IP/OBS DSCHRG MGMT >30: CPT | Performed by: HOSPITALIST

## 2024-06-14 PROCEDURE — 85027 COMPLETE CBC AUTOMATED: CPT | Performed by: HOSPITALIST

## 2024-06-14 PROCEDURE — 250N000011 HC RX IP 250 OP 636: Mod: JZ | Performed by: PHYSICIAN ASSISTANT

## 2024-06-14 PROCEDURE — 250N000013 HC RX MED GY IP 250 OP 250 PS 637: Performed by: PHYSICIAN ASSISTANT

## 2024-06-14 PROCEDURE — 250N000013 HC RX MED GY IP 250 OP 250 PS 637: Performed by: HOSPITALIST

## 2024-06-14 PROCEDURE — 84100 ASSAY OF PHOSPHORUS: CPT | Performed by: HOSPITALIST

## 2024-06-14 PROCEDURE — 82565 ASSAY OF CREATININE: CPT | Performed by: PHYSICIAN ASSISTANT

## 2024-06-14 RX ORDER — PIPERACILLIN SODIUM, TAZOBACTAM SODIUM 3; .375 G/15ML; G/15ML
3.38 INJECTION, POWDER, LYOPHILIZED, FOR SOLUTION INTRAVENOUS EVERY 6 HOURS
Status: ON HOLD | DISCHARGE
Start: 2024-06-10 | End: 2024-06-17

## 2024-06-14 RX ORDER — LIDOCAINE 4 G/G
2 PATCH TOPICAL
Status: DISCONTINUED | OUTPATIENT
Start: 2024-06-14 | End: 2024-06-18 | Stop reason: HOSPADM

## 2024-06-14 RX ORDER — TRIAMCINOLONE ACETONIDE 1 MG/G
CREAM TOPICAL 2 TIMES DAILY PRN
Status: DISCONTINUED | OUTPATIENT
Start: 2024-06-14 | End: 2024-06-18 | Stop reason: HOSPADM

## 2024-06-14 RX ORDER — HYOSCYAMINE SULFATE 0.125 MG
125 TABLET ORAL EVERY 4 HOURS PRN
Status: DISCONTINUED | OUTPATIENT
Start: 2024-06-14 | End: 2024-06-18 | Stop reason: HOSPADM

## 2024-06-14 RX ORDER — ALBUTEROL SULFATE 0.83 MG/ML
2.5 SOLUTION RESPIRATORY (INHALATION) 2 TIMES DAILY PRN
Status: DISCONTINUED | OUTPATIENT
Start: 2024-06-14 | End: 2024-06-18 | Stop reason: HOSPADM

## 2024-06-14 RX ORDER — BUSPIRONE HYDROCHLORIDE 15 MG/1
15 TABLET ORAL 2 TIMES DAILY
Status: DISCONTINUED | OUTPATIENT
Start: 2024-06-14 | End: 2024-06-18 | Stop reason: HOSPADM

## 2024-06-14 RX ORDER — LATANOPROST 50 UG/ML
1 SOLUTION/ DROPS OPHTHALMIC AT BEDTIME
Status: DISCONTINUED | OUTPATIENT
Start: 2024-06-14 | End: 2024-06-18 | Stop reason: HOSPADM

## 2024-06-14 RX ORDER — PANTOPRAZOLE SODIUM 40 MG/1
40 TABLET, DELAYED RELEASE ORAL DAILY
Status: DISCONTINUED | OUTPATIENT
Start: 2024-06-15 | End: 2024-06-18 | Stop reason: HOSPADM

## 2024-06-14 RX ORDER — IPRATROPIUM BROMIDE AND ALBUTEROL SULFATE 2.5; .5 MG/3ML; MG/3ML
1 SOLUTION RESPIRATORY (INHALATION) EVERY 6 HOURS PRN
Status: DISCONTINUED | OUTPATIENT
Start: 2024-06-14 | End: 2024-06-18 | Stop reason: HOSPADM

## 2024-06-14 RX ORDER — OLANZAPINE 2.5 MG/1
2.5 TABLET, FILM COATED ORAL 2 TIMES DAILY
Status: DISCONTINUED | OUTPATIENT
Start: 2024-06-14 | End: 2024-06-18 | Stop reason: HOSPADM

## 2024-06-14 RX ORDER — ONDANSETRON 4 MG/1
4 TABLET, ORALLY DISINTEGRATING ORAL EVERY 6 HOURS PRN
Status: DISCONTINUED | OUTPATIENT
Start: 2024-06-14 | End: 2024-06-18 | Stop reason: HOSPADM

## 2024-06-14 RX ORDER — PIPERACILLIN SODIUM, TAZOBACTAM SODIUM 3; .375 G/15ML; G/15ML
3.38 INJECTION, POWDER, LYOPHILIZED, FOR SOLUTION INTRAVENOUS EVERY 6 HOURS
Status: DISPENSED | OUTPATIENT
Start: 2024-06-14 | End: 2024-06-16

## 2024-06-14 RX ORDER — ASPIRIN 81 MG/1
81 TABLET ORAL DAILY
Status: DISCONTINUED | OUTPATIENT
Start: 2024-06-15 | End: 2024-06-14

## 2024-06-14 RX ORDER — DONEPEZIL HYDROCHLORIDE 10 MG/1
10 TABLET, FILM COATED ORAL AT BEDTIME
Status: DISCONTINUED | OUTPATIENT
Start: 2024-06-14 | End: 2024-06-14

## 2024-06-14 RX ORDER — ENOXAPARIN SODIUM 100 MG/ML
40 INJECTION SUBCUTANEOUS EVERY 24 HOURS
Status: DISCONTINUED | OUTPATIENT
Start: 2024-06-14 | End: 2024-06-18 | Stop reason: HOSPADM

## 2024-06-14 RX ORDER — VITAMIN B COMPLEX
50 TABLET ORAL DAILY
Status: DISCONTINUED | OUTPATIENT
Start: 2024-06-14 | End: 2024-06-14

## 2024-06-14 RX ORDER — SODIUM CHLORIDE 9 MG/ML
INJECTION, SOLUTION INTRAVENOUS
Status: COMPLETED
Start: 2024-06-14 | End: 2024-06-14

## 2024-06-14 RX ORDER — VITAMIN B COMPLEX
50 TABLET ORAL DAILY
Status: DISCONTINUED | OUTPATIENT
Start: 2024-06-14 | End: 2024-06-18 | Stop reason: HOSPADM

## 2024-06-14 RX ORDER — CALCIUM POLYCARBOPHIL 625 MG 625 MG/1
1250 TABLET ORAL DAILY
Status: DISCONTINUED | OUTPATIENT
Start: 2024-06-14 | End: 2024-06-18 | Stop reason: HOSPADM

## 2024-06-14 RX ORDER — LEVOTHYROXINE SODIUM 25 UG/1
50 TABLET ORAL DAILY
Status: DISCONTINUED | OUTPATIENT
Start: 2024-06-15 | End: 2024-06-18 | Stop reason: HOSPADM

## 2024-06-14 RX ORDER — FERROUS GLUCONATE 324(38)MG
324 TABLET ORAL
Status: DISCONTINUED | OUTPATIENT
Start: 2024-06-15 | End: 2024-06-14

## 2024-06-14 RX ORDER — FERROUS GLUCONATE 324(38)MG
324 TABLET ORAL
Status: DISCONTINUED | OUTPATIENT
Start: 2024-06-15 | End: 2024-06-18 | Stop reason: HOSPADM

## 2024-06-14 RX ORDER — CALCIUM POLYCARBOPHIL 625 MG 625 MG/1
1250 TABLET ORAL DAILY
Status: DISCONTINUED | OUTPATIENT
Start: 2024-06-14 | End: 2024-06-14

## 2024-06-14 RX ORDER — FLUTICASONE FUROATE AND VILANTEROL 200; 25 UG/1; UG/1
1 POWDER RESPIRATORY (INHALATION) DAILY
Status: DISCONTINUED | OUTPATIENT
Start: 2024-06-14 | End: 2024-06-18 | Stop reason: HOSPADM

## 2024-06-14 RX ORDER — BUSPIRONE HYDROCHLORIDE 15 MG/1
15 TABLET ORAL 2 TIMES DAILY
Status: DISCONTINUED | OUTPATIENT
Start: 2024-06-14 | End: 2024-06-14

## 2024-06-14 RX ORDER — DIPHENHYDRAMINE HYDROCHLORIDE, ZINC ACETATE 2; .1 G/100G; G/100G
CREAM TOPICAL 3 TIMES DAILY PRN
Status: DISCONTINUED | OUTPATIENT
Start: 2024-06-14 | End: 2024-06-18 | Stop reason: HOSPADM

## 2024-06-14 RX ORDER — ACETAMINOPHEN 650 MG/1
650 SUPPOSITORY RECTAL EVERY 4 HOURS PRN
Status: DISCONTINUED | OUTPATIENT
Start: 2024-06-14 | End: 2024-06-18 | Stop reason: HOSPADM

## 2024-06-14 RX ORDER — ASPIRIN 81 MG/1
81 TABLET ORAL DAILY
Status: DISCONTINUED | OUTPATIENT
Start: 2024-06-15 | End: 2024-06-18 | Stop reason: HOSPADM

## 2024-06-14 RX ORDER — CALCIUM CARBONATE 500 MG/1
500 TABLET, CHEWABLE ORAL 4 TIMES DAILY PRN
Status: DISCONTINUED | OUTPATIENT
Start: 2024-06-14 | End: 2024-06-18 | Stop reason: HOSPADM

## 2024-06-14 RX ORDER — DONEPEZIL HYDROCHLORIDE 10 MG/1
10 TABLET, FILM COATED ORAL AT BEDTIME
Status: DISCONTINUED | OUTPATIENT
Start: 2024-06-14 | End: 2024-06-18 | Stop reason: HOSPADM

## 2024-06-14 RX ORDER — IPRATROPIUM BROMIDE AND ALBUTEROL SULFATE 2.5; .5 MG/3ML; MG/3ML
1 SOLUTION RESPIRATORY (INHALATION) EVERY 6 HOURS PRN
DISCHARGE
Start: 2024-06-14 | End: 2024-09-11

## 2024-06-14 RX ORDER — ACETAMINOPHEN 325 MG/1
325-650 TABLET ORAL EVERY 6 HOURS PRN
Status: DISCONTINUED | OUTPATIENT
Start: 2024-06-14 | End: 2024-06-18 | Stop reason: HOSPADM

## 2024-06-14 RX ORDER — AMOXICILLIN 250 MG
1 CAPSULE ORAL 2 TIMES DAILY PRN
Status: DISCONTINUED | OUTPATIENT
Start: 2024-06-14 | End: 2024-06-18 | Stop reason: HOSPADM

## 2024-06-14 RX ORDER — ONDANSETRON 2 MG/ML
4 INJECTION INTRAMUSCULAR; INTRAVENOUS EVERY 6 HOURS PRN
Status: DISCONTINUED | OUTPATIENT
Start: 2024-06-14 | End: 2024-06-18 | Stop reason: HOSPADM

## 2024-06-14 RX ORDER — ACETAMINOPHEN 325 MG/1
650 TABLET ORAL EVERY 4 HOURS PRN
Status: DISCONTINUED | OUTPATIENT
Start: 2024-06-14 | End: 2024-06-14

## 2024-06-14 RX ADMIN — LATANOPROST 1 DROP: 50 SOLUTION OPHTHALMIC at 21:00

## 2024-06-14 RX ADMIN — BUSPIRONE HYDROCHLORIDE 15 MG: 15 TABLET ORAL at 09:22

## 2024-06-14 RX ADMIN — DONEPEZIL HYDROCHLORIDE 10 MG: 10 TABLET ORAL at 21:00

## 2024-06-14 RX ADMIN — LEVOTHYROXINE SODIUM 50 MCG: 0.05 TABLET ORAL at 09:22

## 2024-06-14 RX ADMIN — PIPERACILLIN SODIUM AND TAZOBACTAM SODIUM 3.38 G: 3; .375 INJECTION, POWDER, LYOPHILIZED, FOR SOLUTION INTRAVENOUS at 06:18

## 2024-06-14 RX ADMIN — PIPERACILLIN AND TAZOBACTAM 3.38 G: 3; .375 INJECTION, POWDER, LYOPHILIZED, FOR SOLUTION INTRAVENOUS at 20:53

## 2024-06-14 RX ADMIN — OLANZAPINE 2.5 MG: 2.5 TABLET, FILM COATED ORAL at 20:56

## 2024-06-14 RX ADMIN — SODIUM CHLORIDE 500 ML: 9 INJECTION, SOLUTION INTRAVENOUS at 16:09

## 2024-06-14 RX ADMIN — PIPERACILLIN AND TAZOBACTAM 3.38 G: 3; .375 INJECTION, POWDER, LYOPHILIZED, FOR SOLUTION INTRAVENOUS at 16:04

## 2024-06-14 RX ADMIN — FLUTICASONE FUROATE AND VILANTEROL TRIFENATATE 1 PUFF: 200; 25 POWDER RESPIRATORY (INHALATION) at 14:53

## 2024-06-14 RX ADMIN — Medication 50 MCG: at 14:51

## 2024-06-14 RX ADMIN — OLANZAPINE 2.5 MG: 2.5 TABLET, FILM COATED ORAL at 09:22

## 2024-06-14 RX ADMIN — CALCIUM POLYCARBOPHIL 1250 MG: 625 TABLET, FILM COATED ORAL at 14:51

## 2024-06-14 RX ADMIN — LIDOCAINE 2 PATCH: 4 PATCH TOPICAL at 20:57

## 2024-06-14 RX ADMIN — BUSPIRONE HYDROCHLORIDE 15 MG: 15 TABLET ORAL at 20:56

## 2024-06-14 RX ADMIN — UMECLIDINIUM 1 PUFF: 62.5 AEROSOL, POWDER ORAL at 14:52

## 2024-06-14 RX ADMIN — ASPIRIN 81 MG: 81 TABLET ORAL at 09:22

## 2024-06-14 RX ADMIN — POTASSIUM & SODIUM PHOSPHATES POWDER PACK 280-160-250 MG 1 PACKET: 280-160-250 PACK at 00:13

## 2024-06-14 RX ADMIN — PANTOPRAZOLE SODIUM 40 MG: 40 TABLET, DELAYED RELEASE ORAL at 09:22

## 2024-06-14 RX ADMIN — ENOXAPARIN SODIUM 40 MG: 40 INJECTION SUBCUTANEOUS at 14:51

## 2024-06-14 RX ADMIN — PIPERACILLIN SODIUM AND TAZOBACTAM SODIUM 3.38 G: 3; .375 INJECTION, POWDER, LYOPHILIZED, FOR SOLUTION INTRAVENOUS at 00:19

## 2024-06-14 RX ADMIN — FLUOXETINE HYDROCHLORIDE 40 MG: 40 CAPSULE ORAL at 09:22

## 2024-06-14 ASSESSMENT — ACTIVITIES OF DAILY LIVING (ADL)
ADLS_ACUITY_SCORE: 33
ADLS_ACUITY_SCORE: 44
ADLS_ACUITY_SCORE: 42
ADLS_ACUITY_SCORE: 44
ADLS_ACUITY_SCORE: 43
ADLS_ACUITY_SCORE: 44
ADLS_ACUITY_SCORE: 33
ADLS_ACUITY_SCORE: 42
ADLS_ACUITY_SCORE: 33
ADLS_ACUITY_SCORE: 38
ADLS_ACUITY_SCORE: 44
ADLS_ACUITY_SCORE: 33
ADLS_ACUITY_SCORE: 44
ADLS_ACUITY_SCORE: 43
ADLS_ACUITY_SCORE: 43
ADLS_ACUITY_SCORE: 33
ADLS_ACUITY_SCORE: 44
ADLS_ACUITY_SCORE: 44
ADLS_ACUITY_SCORE: 33
ADLS_ACUITY_SCORE: 43
ADLS_ACUITY_SCORE: 33
ADLS_ACUITY_SCORE: 33
ADLS_ACUITY_SCORE: 44

## 2024-06-14 NOTE — LETTER
Recipient:  [facility]        Sender:  Cherry VAUGHNFABIOLA  Ph: [phone]        Date: June 18, 2024  Patient Name:  Idalia Bob  Patient YOB: 1945  Routing Message:  Please see attached discharge orders for the above patient. Please call with questions/concerns. Thank you!         The documents accompanying this notice contain confidential information belonging to the sender.  This information is intended only for the use of the individual or entity named above.  The authorized recipient of this information is prohibited from disclosing this information to any other party and is required to destroy the information after its stated need has been fulfilled, unless otherwise required by state law.    If you are not the intended recipient, you are hereby notified that any disclosure, copy, distribution or action taken in reliance on the contents of these documents is strictly prohibited.  If you have received this document in error, please return it by fax to [phone] with a note on the cover sheet explaining why you are returning it (e.g. not your patient, not your provider, etc.).  If you need further assistance, please call .  Documents may also be returned by mail to [employer], , [address], [place], [place] [address].

## 2024-06-14 NOTE — PLAN OF CARE
/73 (BP Location: Right arm)   Pulse 113   Temp 97.6  F (36.4  C) (Oral)   Resp 18   Wt 52.7 kg (116 lb 2.9 oz)   LMP  (LMP Unknown)   SpO2 97%   BMI 22.69 kg/m       Problem: Adult Inpatient Plan of Care  Goal: Plan of Care Review  Description: The Plan of Care Review/Shift note should be completed every shift.  The Outcome Evaluation is a brief statement about your assessment that the patient is improving, declining, or no change.  This information will be displayed automatically on your shift  note.  Outcome: Progressing     Goal Outcome Evaluation: Pt. A&Ox4. VSS on RA- tachy at times, cardiac monitoring in place. SBA. Up in chair for meals. Reg diet- tolerating. C/o nausea this shift- Zofran given. L PIV SL. Cont pulse ox on for trial. Labs reviewed- pot & phos replaced. Pain controlled.

## 2024-06-14 NOTE — PLAN OF CARE
"Goal Outcome Evaluation:      Patient is a 79 year old female  admitted to room 422 via walker.  Patient is alert and oriented X 4. See Epic for VS and assessment.  Patient is able to transfer A1 using walker. Patient was settled into their room, shown call light, tv, mealtimes etc. Oriented to unit. Will continue monitoring pain level and VS. Notifying MD with any concerns.  Follow MD orders for cares and medications.    Flu Vaccine:   - Yes, up to date (patient had vaccine this flu season)      COVID Vaccine:   - Yes, up to date (had vaccine this season)       Pneumococcal Vaccine:   - Yes, up to date       Ethnicity:   Race: White  Dentures: No  Hearing Aid: No  Smoker:   smoker in the past  Glasses: Yes  Falls 0-1 mo: 0 2-6 mo: 0  Prior device use: Walker   Advanced Care Directive Referral to Social Work?No           6/17/2024 Addendum by Birgit Araya:   Flu: current, 9/13/2023  COVID: 6, 10/20/2023 - nursing to offer   Pneumococcal: 20 1/22/24. Per CDC, \"Their pneumococcal vaccinations are complete.\"                 "

## 2024-06-14 NOTE — PLAN OF CARE
Goal Outcome Evaluation:      Plan of Care Reviewed With: patient    Overall Patient Progress: no changeOverall Patient Progress: no change      Patient is alert and oriented x4 with intermittent confusion and forgetfulness. Able to make needs known. Pt denied SOB, N/V and chest pain. Patient/daughter verbalized the need for oxygen use and saturation monitoring at night because patient uses oxygen at home. Daughter also verbalized the need for oxygen during therapy. Pt is currently stand-by assist with walker. Continent of bowel and bladder. Pt is on regular diet, and takes med whole with thin liquid. Pt has PIV on left arm with intact dressing. Antibiotic given as ordered with no notable infusion reaction. All admission questions and task completed. Pt refused skin check on buttocks/dayo areas. Call light within reach.      Patient's most recent vital signs are:     Vital signs:  BP: 125/73  Temp: Data Unavailable  HR: 77  RR: 18  SpO2: 92 %     Patient does not have new respiratory symptoms.  Patient does not have new sore throat.  Patient does not have a fever greater than 99.5.        Problem: Gas Exchange Impaired  Goal: Optimal Gas Exchange  Outcome: Progressing     Problem: Comorbidity Management  Goal: Maintenance of COPD Symptom Control  Outcome: Progressing     Problem: Fall Injury Risk  Goal: Absence of Fall and Fall-Related Injury  Outcome: Progressing     Problem: Comorbidity Management  Goal: Maintenance of Heart Failure Symptom Control  Outcome: Progressing     Problem: Fall Injury Risk  Goal: Absence of Fall and Fall-Related Injury  Outcome: Progressing

## 2024-06-14 NOTE — PROGRESS NOTES
[facility] TEAM - GENERAL INFECTIOUS DISEASES PROGRESS NOTE     Patient:  Idalia Bob   Date of birth 1945, Medical record number [id number]  Date of Visit:  06/14/2024  Date of Admission: 6/9/2024  Consult Requested by:Zcah Whitten MD  Reason for Consult:  recurrent PNA, improving on Zosyn h/o PSA pneumonia. PO option/ duration for discharge          Assessment and Recommendations:   ASSESSMENT:    Fever - resolved with antibiotic/s  no sputum culture.   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   Persistent or recurrent consolidative opacities in Bilateral lungs    - possible aspiration from GERD/ large esophageal Hiatal hernia and periodic nasea nad regurgitaion after food intake (per daughter)  - Pseudomonas aeruginosa isolated in sputum in Nov 2023   Large esophageal hiatal hernia   History of GERD  No aspiration based on swallow evaluation per SLP   Dementia - moderate ( per daughter)   CAD with HFrEF (EF 10-15%) 5/15/24  History of prolonged QTc  Chronic anemia   Diarhea     Discussion   Idalia Bob is a 78 yo F with PMHx of COPD, emphysema with fibrotic changes, HFrEF (EF 10-15%) 5/15/24, CAD, GERD, hiatal hernia Pseudomonas aeruginosa  left lung pneumonia (11/22/2023), recurrent pneumonia HTN, cardiogenic shock during 5/2024 admission and dementia, CKD, HTN, depression, anxiety who was admitted on 6/9/2024 for presumed PNA after being found to be febrile at nursing facility. On admission temperature of 101F, tachycardic in 110s that has since resolved, SBPs 80s - 120s -- has stabilized in the 90s and currently getting 500 ml of NS, on 2 L of O2 (which she wears at night time and with activity at baseline). WBC 15.1, procal 0.15, lactate 1.4.  UA  without LE or nitrites. CXR on admission with patchy opacities in the right lung base concerning for infection, trace left pleural effusion.     Patient denies sick contacts, denies cough, shortness of breath, sinus pain/ dental pain/ dental work recently, headaches, urinary symptoms , diarrhea, rashes  and denies any pain     She had P.aeruginosa pneumonia in Nov 2023 but repeat  chest CTs with persistent/ recurrent consolidation in  right and left upper lobes. Last CT chest was done on 5/20/24.       per Speech SLP, patient 's swallow function is at baseline. VFSS completed on 5/21/24 reveled no aspiration, silent or otherwise during study  but patient can still aspirate from GERD/ large esophageal Hiatal hernia      RECOMMENDATION:  - sputum culture if possible - aerobic and anaerobic cultures  - persistent/ recurrent consolidative changes in upper and lower lobes are concerning, at least since 11/2023 , follow-up  CT chest wo contrast ,   - consider Pulmonary consult inpt/outpt   - continue Pip/tazo for 7 days ( total) Day 5 today. to cover aspiration and P.aeruginosa . avoid quinolones due to prolonged QTc and elderly patient with cardiac issues     - close follow-up with PCP   - aspiration precaution , keep head of bed elevated and sit upright with oral intake   - resume PTA  probiotic daily   - monitor diarrhea. if worsens, check stools for C.diff.   - possible DC to TCU today     Plan discussed with primary team and patient's daughter    ID will sig beth now     Yasmani Kay Goldberg MD, M.Med.Sc  Staff, Infectious Diseases       45 Minutes spent by me on the date of service doing chart review, history, exam, documentation, coordination of care and further activities per the note      Interval history   Feeling better but had diarrhea this morning. denies abdominal pain. has good appetite. breathing comfortably on room air   per daughter, pt has moderate dementia, and does not follow instructions well.  has periodic nausea with food regurgitation at home          History of Present Illness:     Idalia Bob is a 80 yo F with PMHx of COPD, emphysema with fibrotic changes, HFrEF (EF 10-15%) 5/15/24, CAD, HERD, hiatal hernia Pseudomonas aeruginosa  left lung pneumonia (11/22/2023), recurrent pneumonia HTN, cardiogenic shock during 5/2024 admission and dementia, CKD, HTN, depression, anxiety who was admitted on 6/9/2024 for presumed PNA after being found to be febrile at nursing facility. On admission temperature of 101F, tachycardic in 110s that has since resolved, SBPs 80s - 120s -- has stabilized in the 90s and currently getting 500 ml of NS, on 2 L of O2 (which she wears at night time and with activity at baseline). WBC 15.1, procal 0.15, lactate 1.4.  UA without LE or nitrites. CXR on admission with patchy opacities in the right lung base concerning for infection, trace left pleural effusion.     Patient denies sick contacts, denies cough, shortness of breath, sinus pain/ dental pain/ dental work recently, headaches, urinary symptoms , diarrhea, rashes  and denies any pain     She had P.aeruginosa pneumonia in Nov 2023 but repeat  chest CTs with persistent/ recurrent consolidation in  right and left upper lobes. Last CT chest was done on 5/20/24.       per Speech SLP, patient 's swallow function is at baseline. VFSS completed on 5/21/24 reveled no aspiration, silent or otherwise during study.      She is on Pip/Tazo and is afebrile with normal WBC since 6/12/24 . Patient feels better, eating well and wants to go home.     Microbiology   no sputum culture.   blood cultures on 6/9/24 - remain negative,   negative UA and UC,   Legionella pneumophila urine ag neg   MRSA and MSSA nasal screen PCR - neg     Respi panel , SARsCoV2 , Influenza - neg     antimicrobials  Pip/tazobactam 6/9-present     Levofloxacin 750 mg 6/9 1x     Imagings:  CXR 6/10/24 2 V   IMPRESSION: Right lower lung zone possible infection or  atelectasis unchanged from yesterday. Hiatal hernia. Hyperinflated lungs.    CXR 6/9/24 1 V  IMPRESSION:   1. Streaky mid zone and bibasilar pulmonary opacities which may represent edema, atelectasis or infection in the appropriate clinical settings.  2. Increased right paramediastinal opacity possibly secondary to the hiatal hernia better seen on recent CT from 5/20/2024.  3. Small left effusion    CT chest wo contrast 5/20/24   IMPRESSION:   1. New consolidative opacities in the right upper lobe and left lower lobe, suspicious for infection. Recommend follow-up to clearing.   2. Increased bilateral interlobular septal thickening, likely representing pulmonary edema superimposed on chronic fibrotic change.  3. Advanced centrilobular emphysema with increased fibrotic changes most pronounced in the lung bases.   4. Small bilateral pleural effusions, left greater than right.     CT chest wo contrast 12/6/23  1.  Severe emphysematous changes of the lungs.   2.  Large area of consolidation in the left lung compatible with pneumonia. There is a small amount of infiltrate in the medial aspect of the right upper lobe likely reflecting the same process.   3.  Scattered irregular interstitial nodularity within both lungs greatest in the right upper lobe and left lower lobe posteriorly. These may reflect post inflammatory changes; however, since we have no prior imaging, I would recommend a follow-up CT after the patient's current pneumonia has been treated in approximately 3 months to reassess these areas for stability or clearing.   4.  Moderate to large esophageal hiatal hernia.          Review of Systems:   CONSTITUTIONAL:  see HPI  EYES: negative for icterus  ENT:  negative for hearing loss, tinnitus and sore throat  RESPIRATORY:  negative for cough with sputum and dyspnea  CARDIOVASCULAR:  negative for chest pain, dyspnea  GASTROINTESTINAL:  negative for nausea, vomiting, diarrhea and constipation  GENITOURINARY:   negative for dysuria  HEME:  No easy bruising  INTEGUMENT:  negative for rash and pruritus  NEURO:  Negative for headache         Past Medical History:     Past Medical History:   Diagnosis Date    Chronic kidney disease (CKD) 01/10/2023    Chronic obstructive pulmonary disease (H) 01/10/2023    Dementia (H) 11/28/2023    GERD (gastroesophageal reflux disease) 03/15/2023    Hypertension 11/28/2023          Past Surgical History:     Past Surgical History:   Procedure Laterality Date    ESOPHAGOSCOPY, GASTROSCOPY, DUODENOSCOPY (EGD), COMBINED N/A 02/07/2024    Procedure: ESOPHAGOGASTRODUODENOSCOPY, WITH BIOPSY;  Surgeon: Felton James MD;  Location: [facility]    PICC DOUBLE LUMEN PLACEMENT Right 05/17/2024    Basilic Vein 5F DL 37 cm            Family History:   No family history on file.         Social History:     Social History     Tobacco Use    Smoking status: Former     Current packs/day: 1.00     Average packs/day: 1 pack/day for 40.0 years (40.0 ttl pk-yrs)     Types: Cigarettes     Passive exposure: Past    Smokeless tobacco: Never   Substance Use Topics    Alcohol use: Not Currently     History   Sexual Activity    Sexual activity: Not Currently          Current Medications (antimicrobials listed in bold):     Current Facility-Administered Medications   Medication Dose Route Frequency Provider Last Rate Last Admin    aspirin EC tablet 81 mg  81 mg Oral Daily Li Gr PA-C   81 mg at 06/14/24 0922    busPIRone (BUSPAR) tablet 15 mg  15 mg Oral BID Li Gr PA-C   15 mg at 06/14/24 0922    donepezil (ARICEPT) tablet 10 mg  10 mg Oral At Bedtime Li Gr PA-C   10 mg at 06/13/24 2112    [Held by provider] empagliflozin (JARDIANCE) tablet 10 mg  10 mg Oral Daily Li Gr PA-C        FLUoxetine (PROzac) capsule 40 mg  40 mg Oral Daily Li Gr PA-C   40 mg at 06/14/24 0922    [Held by provider] furosemide (LASIX) tablet 40 mg  40 mg Oral QAM Li Gr PA-C         levothyroxine (SYNTHROID/LEVOTHROID) tablet 50 mcg  50 mcg Oral Daily Li Gr PA-C   50 mcg at 06/14/24 0922    Lidocaine (LIDOCARE) 4 % Patch 1 patch  1 patch Transdermal Q24h Fletcher Eaton MD   1 patch at 06/13/24 2112    [Held by provider] losartan (COZAAR) half-tab 12.5 mg  12.5 mg Oral Daily Li Gr PA-C        OLANZapine (zyPREXA) tablet 2.5 mg  2.5 mg Oral BID Li Gr PA-C   2.5 mg at 06/14/24 0922    pantoprazole (PROTONIX) EC tablet 40 mg  40 mg Oral Daily Li Gr PA-C   40 mg at 06/14/24 0922    piperacillin-tazobactam (ZOSYN) 3.375 g vial to attach to  mL bag  3.375 g Intravenous Q6H Zach Whitten MD   3.375 g at 06/14/24 0618    sodium chloride (PF) 0.9% PF flush 3 mL  3 mL Intracatheter Q8H Amber Segal MD   3 mL at 06/13/24 2113    sodium chloride (PF) 0.9% PF flush 3 mL  3 mL Intracatheter Q8H Li Gr PA-C   3 mL at 06/13/24 2113          Allergies:     Allergies   Allergen Reactions    Penicillins Itching     Has tolerated ceftriaxone, piperacillin, and amoxicillin          Physical Exam:   Vitals were reviewed  Patient Vitals for the past 24 hrs:   BP Temp Temp src Pulse Resp SpO2   06/14/24 0814 (!) 142/87 -- -- 109 -- 93 %   06/14/24 0810 (!) 128/90 97.5  F (36.4  C) Oral 109 18 92 %   06/14/24 0421 -- -- -- -- -- (!) 89 %   06/14/24 0420 -- -- -- -- -- 92 %   06/14/24 0345 -- -- -- -- -- (!) 89 %   06/14/24 0343 -- -- -- -- -- 96 %   06/14/24 0250 -- -- -- -- -- 96 %   06/14/24 0246 -- -- -- -- -- (!) 88 %   06/14/24 0221 -- -- -- -- -- 96 %   06/14/24 0206 -- -- -- -- -- 91 %   06/14/24 0201 -- -- -- -- -- (!) 89 %   06/14/24 0142 -- -- -- -- -- (!) 89 %   06/14/24 0140 -- -- -- -- -- 92 %   06/14/24 0002 (!) 140/89 97.8  F (36.6  C) Oral -- 16 90 %   06/13/24 2230 -- -- -- 105 -- 92 %   06/13/24 2200 -- -- -- 104 -- 93 %   06/13/24 2130 -- -- -- 107 -- 92 %   06/13/24 2100 -- -- -- 104 -- (!) 88 %   06/13/24 2030 -- -- --  107 -- 90 %   06/13/24 2000 -- -- -- 115 -- (!) 87 %   06/13/24 1932 134/84 97.9  F (36.6  C) Oral 115 16 93 %   06/13/24 1930 -- -- -- -- -- (!) 89 %   06/13/24 1900 -- -- -- -- -- (!) 89 %   06/13/24 1830 -- -- -- -- -- 90 %   06/13/24 1800 -- -- -- -- -- 90 %   06/13/24 1551 123/73 97.6  F (36.4  C) Oral 113 18 97 %   06/13/24 1100 -- -- -- 99 -- 93 %   06/13/24 1055 -- -- -- 104 -- 93 %   06/13/24 1050 -- -- -- 112 -- 93 %   06/13/24 1045 -- -- -- 113 -- 93 %   06/13/24 1040 -- -- -- 117 -- 92 %   06/13/24 1035 -- -- -- 106 -- 93 %   06/13/24 1030 -- -- -- 95 -- 94 %   06/13/24 1025 -- -- -- 86 -- 93 %   06/13/24 1020 -- -- -- 107 -- --   06/13/24 1015 -- -- -- 119 -- --   06/13/24 1010 -- -- -- (!) 121 -- --     Ranges for his vital signs:  Temp:  [97.5  F (36.4  C)-97.9  F (36.6  C)] 97.5  F (36.4  C)  Pulse:  [] 109  Resp:  [16-18] 18  BP: (123-142)/(73-90) 142/87  SpO2:  [87 %-97 %] 93 %    Physical Examination:  GENERAL:  awake, alert  in bed in no acute distress. pale  HEENT:  Head is normocephalic, atraumatic   EYES:  Eyes have anicteric sclerae without conjunctival injection    ENT:  Oropharynx is moist without exudates or ulcers. Tongue is midline sounds a bit congested but denies sinus pain / rhinorrhea   NECK:  Supple. No  Cervical lymphadenopathy  LUNGS:  crackles in lower lobes R>L , fine crackles in upper lobes    CARDIOVASCULAR:  Regular rate and rhythm with no murmurs, tachycardia   ABDOMEN:  normal  bowel sounds, soft, nontender.   SKIN:  No acute rashes.    Line(s) are in place without any surrounding erythema or exudate. No stigmata of endocarditis.  NEUROLOGIC:  Grossly nonfocal. Active x4 extremities  Extremities : no edema          Laboratory Data:     Hematology Studies    Recent Labs   Lab Test 06/14/24  0700 06/13/24  0722 06/12/24  1315 06/11/24  0711 06/10/24  0627 06/09/24  1846   WBC 7.6 9.6 10.7 15.9* 19.8* 15.1*   HGB 9.3* 10.6* 10.2* 10.2* 9.6* 10.0*   MCV 88 87 88 87 87 84     364 281 267 250 239     Immune Globulin Studies    Recent Labs   Lab Test 02/06/24  0653   *   IGE 20     Metabolic Studies     Recent Labs   Lab Test 06/13/24  2203 06/13/24  0722 06/12/24  1315 06/11/24  0711 06/10/24  2028 06/10/24  1344   NA  --  139 137 135 132* 129*   POTASSIUM 4.6 3.4 3.3* 3.8 3.6 3.9   CHLORIDE  --  107 106 103 100 98   CO2  --  21* 19* 21* 20* 19*   BUN  --  8.2 9.6 12.8 14.6 14.3   CR  --  0.84 0.95 1.21* 1.36* 1.37*   GFRESTIMATED  --  70 60* 45* 39* 39*     Hepatic Studies    Recent Labs   Lab Test 06/09/24  1846 05/19/24  0448 05/18/24  1530 05/18/24  1048 05/18/24  0431 05/17/24  1435   BILITOTAL 0.6 0.6 0.5 0.7 0.8 0.7   ALKPHOS 71 70 77 73 80 83   ALBUMIN 3.7 2.8* 3.0* 3.0* 3.2* 3.4*   AST 17 16 17 19 21 30   ALT 9 19 24 25 28 37     Thyroid Studies    Recent Labs   Lab Test 05/14/24  1549 02/01/24  2120   TSH 1.36 2.04     Urine Studies    Recent Labs   Lab Test 06/09/24  2047 05/18/24  1809 05/14/24  1645 02/02/24  1406   LEUKEST Negative Small* Negative Large*   WBCU 1 5  --  17*

## 2024-06-14 NOTE — PROGRESS NOTES
Care Management Follow Up    Additional Information:  06/14: CHW completed Pre-Admission Screening through [facility] which is needed to be admitted to a TCU and the confirmation number is the following: [id number].     Sandeep Hall   [facility] [address] [facility]  P [phone]   Fax: [phone]  Email: [email or web address]

## 2024-06-14 NOTE — PROGRESS NOTES
Care Management Discharge Note    Discharge Date: 06/14/2024       Discharge Disposition:  TCU    Discharge Services: Defer to  TCU.    Discharge DME: None    Discharge Transportation: Wheelchair ride set up with [employer] transportation services.    Private pay costs discussed: transportation costs and Not applicable -family notified that there is not a cost for the wheelchair ride    Does the patient's insurance plan have a 3 day qualifying hospital stay waiver?  No    PAS Confirmation Code: [id number]  Patient/family educated on Medicare website which has current facility and service quality ratings: Yes    Education Provided on the Discharge Plan: Yes  Persons Notified of Discharge Plans: Family and patient  Patient/Family in Agreement with the Plan: yes    Handoff Referral Completed: Yes    Additional Information:  RN conversed with Chuck Sarmiento (admissions at [facility]) to verify bed availability.    Wheelchair ride set up from [address] room 7232 to [facility] 4th floor between [id number] with [employer] transportation services. Patient and daughter, Barbara, made aware at bedside and continue to be agreeable to plan.    [facility]  Ph: [phone]  Nurse to nurse handoff: [phone]  [address] [place] [address]        Evelyn Mccauley RN

## 2024-06-14 NOTE — PROGRESS NOTES
[facility] Services  Internal Medicine Extended Progress Note    Date of Admission: [facility]  Hospital Discharge Team: 6/14/2024         Assessment and Plan:   Idalia Bob is an 79 year old female with history of  COPD, HFrEF (EF 10-15%), PsA PNA (12/2023), HTN, cardiogenic shock during 5/2024 admission, dementia, CKD, HTN, depression, and anxiety who was admitted to [facility] 6/9/2024 with PNA. Stabilized then transferred to  TCU 6/14/2024 for ongoing rehabilitation     Recurrent PNA, RLL: Etiology likely silent aspiration with large hiatal hernia. Admitted with sepsis, started on Zosyn, see hospital notes for details. Last CXR 6/13 with stable chronic-appearing pulmonary opacities, small L pleural effusion, hiatal hernia. VSS on transfer to TCU 6/14  - ID followed at [facility]: continue Zosyn through 6/17. Aspiration precautions. Probiotic   - SLP consulted   - Regular diet for now per hospital discharge diet  - EKG 6/17 per pharmacy recs to eval QTc  - Decrease Culturelle to daily starting 6/18     HFrEF 2/2 NICM, HTN: Echo 5/2024 EF 10-15%. BNP 10,435 6/12 12,428 (6/9). [facility]s [facility], last seen 6/4/14. PTA on lasix, losartan, and Jardiance both of which were held during admission due to infection   - Continue to hold Lasix, Losartan, and Jardiance for now  - Continue ASA  - Follow up with cards 7/10 as planned  - Daily weights, strict I&Os  - Caution with any IVF  ** Addendum: Restart losartan 12.5 mg daily with hold parameters as d/w Dr. Woods. Will resume lasix and possibly Jardiance if indicated this TCU stay     COPD, chronic hypoxic respiratory failure: BL 2L O2 at night and with activity. PTA on Pulmicort and albuterol. No e/p acute exacerbation during recent admission. VSS on BL O2 needs  - Continue PTA meds  - Needs OP pulmonology follow up, this has not been set up yet     Other Medical Issues:  Physical deconditioning: In the setting of the above. PT, OT  "consulted  Large Hiatal Hernia: Per discharge summary, need OP follow up with general surgery or thoracic surgery. This has not been set up at time of discharge and suggestion was to follow up with PCP to further discuss   Chronic pain: Back and neck. Continue Tylenol, Lidoderm patch, prn oxycodone   Chronic normocytic anemia: BL hgb 8.5-10 and stable. Monitor   CKD III: BL Cr 0.9-1.2 and sable. Trend   Dementia: Continue PTA Donepezil and Zyprexa. PT, OT consulted as above  Hypothyroidism: TSH 1.36. Continue PTA synthroid    Anxiety, depression: Continue PTA buspar and prozac   GERD: Cont ine PPI     The above was discuss with patient and attending physician, Dr. Woods who agrees with the above    CODE: DNR/DNI  Diet: Regular diet  DVT: Lovenox  Indication for Psychotropic Medications: Buspar and Prozac at the lowest effective dose for management of depression and anxiety. Zyprexa and Donepezil at the lowest effective dose for management of dementia. Oxycodone at the lowest effective dose for management of chronic pain  Pneumococcal Vaccination Status: Completed  Disposition: Pending PT, OT kvng Sr PA-C   Hospitalist Service  Pager: [phone]           Chief Complaint:   Physical deconditioning          HPI:   Idalia Bob is an 79 year old female with history of  COPD, HFrEF (EF 10-15%), PsA PNA (12/2023), HTN, cardiogenic shock during 5/2024 admission, dementia, CKD, HTN, depression, and anxiety who was admitted to [facility] 6/9/2024 with PNA. Stabilized then transferred to  TCU 6/14/2024 for ongoing rehabilitation    Patient reports doing well other than feeling quite tired this afternoon. Denies chest pain, dyspnea, cough, difficulty swallowing. Appetite is stable and she is quite hungry. Swelling in the legs is currently \"good.\" Denies GUTIERREZ or issues laying flat. No fever, chills, or AMS. Denies urinary symptoms or new rash. Does not want to do PT this afternoon due to feeling sleepy        "    Review of Systems:   A 10 point ROS was performed and negative unless otherwise noted in HPI.          Past Medical History:   I have reviewed this patient's medical history and updated it with pertinent information if needed.   Past Medical History:   Diagnosis Date    Chronic kidney disease (CKD) 01/10/2023    Chronic obstructive pulmonary disease (H) 01/10/2023    Dementia (H) 11/28/2023    GERD (gastroesophageal reflux disease) 03/15/2023    Hypertension 11/28/2023             Past Surgical History:   I have reviewed this patient's surgical history and updated it with pertinent information if needed.  Past Surgical History:   Procedure Laterality Date    ESOPHAGOSCOPY, GASTROSCOPY, DUODENOSCOPY (EGD), COMBINED N/A 02/07/2024    Procedure: ESOPHAGOGASTRODUODENOSCOPY, WITH BIOPSY;  Surgeon: Felton James MD;  Location:  GI    PICC DOUBLE LUMEN PLACEMENT Right 05/17/2024    Basilic Vein 5F DL 37 cm             Social History:     Social History     Tobacco Use    Smoking status: Former     Current packs/day: 1.00     Average packs/day: 1 pack/day for 40.0 years (40.0 ttl pk-yrs)     Types: Cigarettes     Passive exposure: Past    Smokeless tobacco: Never   Vaping Use    Vaping status: Never Used   Substance Use Topics    Alcohol use: Not Currently    Drug use: Never            Family History:   I have reviewed this patient's family history and updated it with pertinent information if needed.   No family history on file.         Allergies:       Allergies   Allergen Reactions    Penicillins Itching     Has tolerated ceftriaxone, piperacillin, and amoxicillin            Medications:     Cannot display prior to admission medications because the patient has not been admitted in this contact.             Physical Exam:   General Appearance: Alert and oriented x3, sitting up in bed. Appears comfortable. Street clothing is on  HEENT: Anicteric sclera, MMM   Respiratory: Breathing comfortably on room  air, lungs CTAB without wheezing or crackles   Cardiovascular: RRR, S1, S2. No murmur noted  GI: Abdomen soft, non-tender with active bowel sounds. No guarding or rebound  Lymph/Hematologic: 1+ BLE peripheral edema, distal pulses palpable   Skin: No rash or jaundice   Musculoskeletal: Moves all extremities   Neurologic: No focal deficits, CN II-XII grossly intact

## 2024-06-14 NOTE — LETTER
Recipient:  [facility]        Sender:  Cherry GIRISHTraci VAUGHN FABIOLA   Ph: [phone]        Date: June 18, 2024  Patient Name:  Idalia Bob  Patient YOB: 1945  Routing Message:  Please see attached discharge orders for the above named patient. Please call with any questions/concerns. Thanks!        The documents accompanying this notice contain confidential information belonging to the sender.  This information is intended only for the use of the individual or entity named above.  The authorized recipient of this information is prohibited from disclosing this information to any other party and is required to destroy the information after its stated need has been fulfilled, unless otherwise required by state law.    If you are not the intended recipient, you are hereby notified that any disclosure, copy, distribution or action taken in reliance on the contents of these documents is strictly prohibited.  If you have received this document in error, please return it by fax to [phone] with a note on the cover sheet explaining why you are returning it (e.g. not your patient, not your provider, etc.).  If you need further assistance, please call .  Documents may also be returned by mail to Health Information Management, , [facility] [address], [place], [place] [address].

## 2024-06-14 NOTE — PHARMACY
[facility]  Parenteral ANtibiotic Review at Departure from Acute Care Collaborative Note     Patient: Idalia Bob  MRN: [id number]  Allergies: Penicillins    Current Location: [facility]  OPAT to be provided by: [facility]       Line Type: Peripheral    Diagnosis/Indications: Fever, leukocytosis with persistent or recurrent consolidative opacities in b/l lungs in the setting of COPD  Organism(s): Empiric coverage  MRDO? No  Pending Cultures/Microbiological Tests: no      Inpatient ID involved in developing OPAT plan: Yes - discharge OPAT plan has no changes from ID provider, Dr. Yasmani Goldberg, OPAT plan charted on 6/14/2024    Outpatient ID Follow-up: Referred to another provider for follow-up  Designated Provider: [facility] provider    Antimicrobial Regimen / Route Anticipated  Duration Start Date Stop /  Reassess Date   Piperacillin + Tazobactam 3.375 g every 6 hours/IV 7 days 6/10/2024 6/17/2024     Laboratory Tests and Monitoring Frequency: Other (No laboratory monitoring requiring for outpatient antibiotic therapy given intended outpatient duration of < 5 days)      Imaging/Miscellaneous Monitoring: None    ID Pharmacist Interventions: None                          Ignacia Guadarrama, PharmD, BCIDP  Pager: [phone]

## 2024-06-14 NOTE — LETTER
Recipient:  [facility]        Sender:  Cherry VAUGHN FABIOLA  Ph: [phone]        Date: June 17, 2024  Patient Name:  Idalia Bob  Patient YOB: 1945  Routing Message:  Please see Home Care PT/OT referral for the above patient. Anticipating that patient will discharge back to her [facility] tomorrow (6/18). Please call with questions or concerns. Thanks!        The documents accompanying this notice contain confidential information belonging to the sender.  This information is intended only for the use of the individual or entity named above.  The authorized recipient of this information is prohibited from disclosing this information to any other party and is required to destroy the information after its stated need has been fulfilled, unless otherwise required by state law.    If you are not the intended recipient, you are hereby notified that any disclosure, copy, distribution or action taken in reliance on the contents of these documents is strictly prohibited.  If you have received this document in error, please return it by fax to [phone] with a note on the cover sheet explaining why you are returning it (e.g. not your patient, not your provider, etc.).  If you need further assistance, please call .  Documents may also be returned by mail to [email or web address], , [address], [place], [place] [address].

## 2024-06-14 NOTE — LETTER
Recipient:  [facility]        Sender:  Cherry VAUGHN FABIOLA  Ph: [phone]        Date: June 18, 2024  Patient Name:  Idalia Bob  Patient YOB: 1945  Routing Message:  Please see discharge orders for the above named patient. Please call with questions/concerns. Thanks!         The documents accompanying this notice contain confidential information belonging to the sender.  This information is intended only for the use of the individual or entity named above.  The authorized recipient of this information is prohibited from disclosing this information to any other party and is required to destroy the information after its stated need has been fulfilled, unless otherwise required by state law.    If you are not the intended recipient, you are hereby notified that any disclosure, copy, distribution or action taken in reliance on the contents of these documents is strictly prohibited.  If you have received this document in error, please return it by fax to [phone] with a note on the cover sheet explaining why you are returning it (e.g. not your patient, not your provider, etc.).  If you need further assistance, please call .  Documents may also be returned by mail to Health Information Management, , [facility] [address], [place], [place] [address].

## 2024-06-14 NOTE — PLAN OF CARE
Ip r  Occupational Therapy Discharge Summary    Reason for therapy discharge:    Discharged to transitional care facility.    Progress towards therapy goal(s). See goals on Care Plan in [facility] electronic health record for goal details.  Goals partially met.  Barriers to achieving goals:   discharge from facility.    Therapy recommendation(s):    Continued therapy is recommended.  Rationale/Recommendations:  decreased ADL I.    Goal Outcome Evaluation:

## 2024-06-14 NOTE — PROGRESS NOTES
"Social Work: Initial Assessment with Discharge Plan    Patient Name: Idalia Bob  : 1945  Age: 79 year old  MRN: [id number]  Completed assessment with: Patient and chart review  Admitted to U: 24    Presenting Information   Date of SW assessment: 2024  Health Care Directive: Copy in Chart  Primary Health Care Agent: Self  Secondary Health Care Agent: Daughter Barbara  Living Situation: Lives in [facility] with spouse  Previous Functional Status: Receives assistance with cleaning, meals, laundry, transportation, and med management   DME available: Walker, wheelchair, shower chair, oxygen  Patient and family understanding of hospitalization: Pleasant and appropriate  Cultural/Language/Spiritual Considerations: 79 year old female, White, , English speaking, Faith  Abuse concerns: None  -------------------------------------------------------------------------------------------------------------  TRANSPORTATION:    Has lack of transportation kept you from medical appointments, meetings, work, or from getting things needed for daily living?  A. Yes, it has kept me from medical appointments or from getting my medications  B. Yes, it has kept me from non-medical meetings, appointments, work or from getting things that I need  C. No  X. Patient Unable to respond  Y. Patient declines to respond  -------------------------------------------------------------------------------------------------------------  Health Literacy:   How Often do you need to have someone help you when you read instructions, pamphlets, or other written material from your doctor or pharmacy?  0.       Never  1.       Rarely  2.       Sometimes  3.       Often  4.       Always  5.       Patient declines to respond  6.       Patient unable to respond  ------------------------------------------------------------------------------------------------------------   BIMS:  See flow sheet   Tell the pt : \"I am going to say three " "words for you to remember.  Please repeat the words after I have said all three.  The words are:Sock, Blue and Bed. Now tell me the three words.\"     Number of words repeated after the first attempt.  0: None   1: One  2: Two  3: Three  Ask the pt: \"Please tell me what year it is right now?\"  0: Missed by 5 >5 years or no answer   1: Missed by 2-5 years  2: Missed by a 1 year  3: Correct      Ask the pt: \"What month are we in right now?\"  0: Missed by > 1 month or no answer.  1: Missed by 6 days to 1 month  2: Accurate within 5 days.     Ask pt: \"what day of the week is today?\"  0: Incorrect day of the week.  1: Correct.     Ask pt:\" Let's go back to an earlier question.  What were those three words that I asked you to repeat?\" If unable to remember a word, give a cue (something to wear, a color, a piece of furniture) for that word.   Able to recall Sock.  0: No, could not recall.  1: Yes, after cueing (something to wear)  2: Yes, no cueing required.   Able to recall Blue.  0: No, could not recall.  1:Yes, after cueing (a color)  2:Yes, no cueing required.   Able to recall Bed.  0: No,  1: Yes, after cueing (a piece of furniture)  2: Yes, no cueing required.   -----------------------------------------------------------------------------------------------------------  CAM:  1.) Acute Onset/Fluctuating Course:  Is there evidence of an acute change in mental status from the patient's baseline?  0. No  Yes  2.) Inattention:  Does the patient have difficulty focusing attention, for example, being easily distractable, or having difficulty keeping track of what was being said?  0. No - Behavior not present  1. Yes - Behavior present  3.) Disorganized Thinking:  Is the patient's speech disorganized or incoherent, such as rambling or irrelevant conversation, unclear or illogical flow of ideas, or unpredictable switching from subject to subject?  0. No - Behavior not present  1. Yes - Behavior present  4.) Altered level of " consciousness:  Did the patient have altered level of consciousness as indicated by any of the following criteria?  0. No - Alert  1. Yes - Vigilant (hyperalert), lethargic (drowsy) , stupor (difficult to arouse) or comatose (unable to be aroused)  -------------------------------------------------------------------------------------------------------------  PHQ-9:   Over the last 2 weeks, have you been bothered by any of the following problems?     If symptom is present, then ask the patient:  About how often have you been bothered by this?   Symptom Presence                                              Symptom Frequency  0. No                                                                     0. Never or 1 day  1. Yes                                                                   1. 2-6 days (several days)  9. No Response                                                    2. 7-11 days (half or more of the days)                                                                                3. 12-14 days (nearly every day)       Present Frequency   A.       Little interest of pleasure doing things       B.       Feeling down, depressed, or hopeless       C.       Trouble falling or staying asleep, or sleeping too much       D.       Feeling tired or having little energy       E.       Poor appetite or overeating       F.        Feeling bad about yourself - or that you are a failure, or have let yourself or your family down       G.      Trouble concentrating on things, such as reading the newspaper or watching television       H.      Moving or speaking so slowly that other people could have noticed. Or the opposite - being so fidgety or restless that you have been moving around a lot more than usual       I.         Thoughts that you would be better off dead, or of hurting yourself in some way          -------------------------------------------------------------------------------------------------------------  SOCIAL ISOLATION  How often do you feel lonely or isolated from those around you?  0.       Never  1.       Rarely  2.       Sometimes  3.       Often  4.       Always  5.       Patient declines to respond  6.       Patient unable to respond  -------------------------------------------------------------------------------------------------------------    BIMS: Pt scored 15 on BIMS indicating cognitively intact  PHQ-9: Pt scored 0 on PHQ-9 indicating no depression  PAS: confirmation number- [id number]   Has there been a level II screen?  No  Were there any recommendations in the screen? No  If yes, will the recommendations we incorporated into the Plan of Care?  N/A  Physical Health  Reason for admission: Idalia Bob is an 79 year old female with history of  COPD, HFrEF (EF 10-15%), PsA PNA (12/2023), HTN, cardiogenic shock during 5/2024 admission, dementia, CKD, HTN, depression, and anxiety who was admitted to [facility] 6/9/2024 with PNA. [facility]     Provider Information   Primary Care Physician:Gomez Louis   : Ronny     Mental Health:   Diagnosis: Chart indicates anxiety, depression  Current Support/Services: Medication  Previous Services: N\A  Services Needed/Recommended: [place] and health psychology available while on TCU    Substance Use:  Diagnosis: None  Current Support/Services: N/A  Previous Services: N/A  Services Needed/Recommended: [place] and health psychology available while on TCU    Support System  Marital Status:   Family support: 2 daughters, spouse   Other support available: No  Gaps in support system: No    Personalized Care and Trauma  What other information would help us give you more personalized care or how can we help you with any past trauma?No    MyChart:  Do you have access to [facility] to  view my Baseline Care Plan? Yes  If not, then SW will print out and provide Baseline Care Plan.     Community Resources  Current in home services: [facility] (PT/OT), [facility] assisting with daily edema wraps, [facility] for O2  Previous services: N/A    Financial/Employment/Education  Employment Status: Retired  Income Source: shelter  Education: high school  Financial Concerns:  None  Insurance: Medicare, BCBS    Discharge Plan   Patient and family discharge goal: Return home  Provided Education on discharge plan: YES  Patient agreeable to discharge plan:  YES  A list of Medicare Certified Facilities was provided to the patient and/or family to encourage patient choice. Based on location and rating, patient would like referrals made to: N/A  General information regarding anticipated insurance coverage and possible out of pocket cost was discussed. Patient and patient's family are aware patient may incur the cost of transportation to the facility, pending insurance payment: YES  Barriers to discharge: None identified    Discharge Recommendations   Disposition: Home to [facility]  Transportation Needs: Family will provide  Name of Transportation Company and Phone: n/a    Additional comments       JESSA Westfall   [facility], Transitional Care Unit   Social Work   [facility] [place] [address]  () [phone]

## 2024-06-14 NOTE — DISCHARGE SUMMARY
[facility]  Hospitalist Discharge Summary      Date of Admission:  6/9/2024  Date of Discharge:  6/14/2024  Discharging Provider: Zach Whitten MD  Discharge Service: Hospitalist Service, GOLD TEAM 10    Discharge Diagnoses     RLL consolidation, recurrent Pneumonia  Sepsis, present on admission  Leukocytosis  Trace L Pleural Effusion   HFrEF (EF 10-15%)  COPD  Chronic Hypoxic Respiratory Failure   Mild Hyponatremia  Elevated Troponin  Chronic Back Pain and Neck Pain  Normocytic Anemia: B Continue to trend.  CKD Stage 3  Dementia  HTN  Hypothyroidism  Anxiety, depression  GERD    Clinically Significant Risk Factors          Follow-ups Needed After Discharge   Follow-up Appointments     Follow Up and recommended labs and tests      Follow up with primary care provider in 7 days.    Pulmonary Team follow up : 2 weeks  Cardiology follow up, as previously arranged or in < 2 weeks        {Additional follow-up instructions/to-do's for PCP    :medications, lasix (resume) if applicable    Unresulted Labs Ordered in the Past 30 Days of this Admission       Date and Time Order Name Status Description    6/14/2024  1:01 AM IgM In process     6/14/2024  1:01 AM IgG In process     6/14/2024  1:01 AM IgA In process     6/13/2024 11:55 AM Streptococcus pneumoniae antigen In process     6/9/2024  6:48 PM Blood Culture Peripheral Blood Preliminary     6/9/2024  6:46 PM Blood Culture Peripheral Blood Preliminary         These results will be followed up by Pulm or PCP    Discharge Disposition   Discharged to rehabilitation facility  Condition at discharge: Stable    Hospital Course   Idalia Bob is a 78 yo F with pmhx of  COPD, HFrEF (EF 10-15%), PsA upper lobe pneumonia (12/2023), HTN, cardiogenic shock during 5/2024 admission and dementia, CKD, HTN, depression, anxiety who was admitted on 6/9/2024 for presumed PNA after being found to be febrile at nursing facility. On admission  temperature of 101, tachycardic in 110s that has since resolved, SBPs 80s - 120s -- has stabilized in the 90s and currently getting 500 ml of NS, on 2 L of O2 (which she wears at night time and with activity at baseline). WBC 15.1, procal 0.15, lactate 1.4. Fluvid negative, UA without LE or nitrites. CXR on admission with patchy opacities in the right lung base concerning for infection, trace left pleural effusion. No localizing infectious symptoms. Patient was admitted to hospital medicine for escalataion of care.    Today's updates:  - Discussed with ID: 7 days IV Zosyn, END through 6/17  - Lasix current held.  - Outpatient follow up for Hiatal Hernia  - Outpatient follow up with PCP, and Cardiology  - Outpatient Pulm follow up    RLL consolidation, recurrent Pneumonia-- possible etiology is silent aspiration from large hiatal hernia (that is seen on CT imaging)  Sepsis, present on admission  Leukocytosis  Trace L Pleural Effusion   P/w fever and generalized weakness from facility. On admission temperature of 101, tachycardic in 110s that has since resolved, SBPs 80s - 120s -- has stabilized in the 90s and currently getting 500 ml of NS, on 2 L of O2 (which she wears at night time and with activity at baseline). WBC 15.1, procal 0.15, lactate 1.4. Fluvid negative, UA without LE or nitrites. CXR on admission with patchy opacities in the right lung base concerning for infection, trace left pleural effusion. No localizing infectious symptoms. Patient has had several recent admissions for pneumonia so will have speech eval, could consider ID involvement pending cultures given the recurrent nature of pneumonia. Will treat with zosyn given history of PsA PNA, could narrow as able based on cultures.  - Influenza, RSV, covid negative. MRSA negative.  - procal negative  - strep pneumo- pending, legionella - negative  - RVP: negative  - Continuous pulse ox, wean as able  - SLP consult given recurrent PNA-- not indicated, as  at baseline without aspiration signs per patient and family  - Started on Zosyn at admission: Treat for 7 days from 6/10 through 6/17.  - As discussed with RNCC, will go to [facility] without a PICC line, just PIV, that can be placed again if lost access to complete the IV Abx.    HFrEF (EF 10-15%)  Last Echo 5/2024 with EF 10-15%, [facility], last seen 6/4/14. HF 2/2 NICM. Currently taking lasix 40 mg daily and jardiance.  - Hold home lasix and jardiance iso acute infection and reassess in AM  - home ASA  - proBNP-- elevated but seems to be lower than during previous episode of HF  - Strict Is/Os  - Daily weight (standing weights)  - Given 500ml of NSB in the ER at admission. Will need to be cautious with fluids.     COPD  Chronic Hypoxic Respiratory Failure   No e/o acute exacerbation. Baseline 2 L of O2 at night and with activity.  - cont Pulmicort and q6h prn albuterol nebs  - monitor on cpox  - Pulm follow up upon discharge    Large Hiatal Hernia  - Possible etiology of recurrent pneumonia  - Pt can obtain referral from PCP or Pulmonary outpatient for GI or Thoracic Surgery assessment.     Mild Hyponatremia  Na 133 on admission, suspect hypovolemic iso poor PO intake but hesitate to give additional fluids iso HF  - PO fluids at this time  - Lasix is still being held-- upon discharge and resume per TCU  or PCP or Cardiology at follow up     Elevated Troponin  Troponin 51 on admission, EKG with sinus tachycardia, no CP. Likely demand ischemia      Chronic Back Pain and Neck Pain  - Acetaminophen for pain control  - oxycodone 2.5mg at bedtime (home dose)  - lidocaine patch    Normocytic Anemia: Baseline hgb appears to be ~8s, 10 on arrival. No e/o bleeding on exam. Continue to trend.  CKD Stage 3: Scr 1.23 on admission, baseline appears to be ~1.1s so not meeting current criteria for JUANIS, will cont to trend.   Dementia: cont pta donepezil and zyprexa, mentation at baseline as above. PT/OT consulted.    HTN: pta on losartan hold iso acute infection, cont to trend Bps.   Hypothyroidism: cont pta levothyroxine 50 mcg daily   Anxiety, depression: cont buspar and prozac   GERD: Cont PPI        Diet: Combination Diet Regular Diet Adult    DVT Prophylaxis: Low Risk/Ambulatory with no VTE prophylaxis indicated  Ruiz Catheter: Not present  Lines: None     Cardiac Monitoring: None  Code Status: No CPR- Do NOT Intubate      Consultations This Hospital Stay   SPEECH LANGUAGE PATH ADULT IP CONSULT  PHYSICAL THERAPY ADULT IP CONSULT  OCCUPATIONAL THERAPY ADULT IP CONSULT  NURSING TO CONSULT FOR VASCULAR ACCESS CARE IP CONSULT  CARE MANAGEMENT / SOCIAL WORK IP CONSULT  INFECTIOUS DISEASE GENERAL ADULT IP CONSULT  PULMONARY GENERAL ADULT IP CONSULT    Code Status   No CPR- Do NOT Intubate    Time Spent on this Encounter   I, Zach Whitten MD, personally saw the patient today and spent greater than 30 minutes discharging this patient.       Zach Whitten MD  [facility] [address] [address]  Phone: [phone]  ______________________________________________________________________    Physical Exam   Vital Signs: Temp: 97.5  F (36.4  C) Temp src: Oral BP: (!) 142/87 Pulse: 109   Resp: 18 SpO2: 92 % O2 Device: None (Room air)    Weight: 116 lbs 2.92 oz      Alert, awake, no distress  Well spoken, no confusion  On room air       Primary Care Physician   Gomez Louis    Discharge Orders      General info for SNF    Length of Stay Estimate: Short Term Care: Estimated # of Days <30  Condition at Discharge: Improving  Level of care:skilled   Rehabilitation Potential: Good  Admission H&P remains valid and up-to-date: YES  Recent Chemotherapy: N/A  Use Nursing Home Standing Orders: Yes     Mantoux instructions    Give two-step Mantoux (PPD) Per Facility Policy Yes     Follow Up and recommended labs and tests    Follow up with primary care provider in 7 days.    Pulmonary Team follow up  : 2 weeks  Cardiology follow up, as previously arranged or in < 2 weeks     Reason for your hospital stay    Recurrent Pneumonia, leukocytosis     Activity - Up ad adam     No CPR- Do NOT Intubate     Diet    Follow this diet upon discharge: Orders Placed This Encounter      Combination Diet Regular Diet Adult       Significant Results and Procedures   Results for orders placed or performed during the hospital encounter of 06/09/24   XR Chest Port 1 View    Narrative    EXAM: XR CHEST PORT 1 VIEW  6/9/2024 7:10 PM     HISTORY:  Fever       COMPARISON:  CT chest 5/20/2024.    FINDINGS:     Portable upright view of the chest. Trachea is midline. Stable cardial  mediastinal silhouette. Indistinct pulmonary vasculature.. Opacity in  the right lower paramediastinal location. Small left pleural effusion.  Streaky midzone pulmonary opacities. Aortic knob calcifications.    No acute osseous abnormality. Visualized upper abdomen is  unremarkable.        Impression    IMPRESSION:     1. Streaky mid zone and bibasilar pulmonary opacities which may  represent edema, atelectasis or infection in the appropriate clinical  settings.  2. Increased right paramediastinal opacity possibly secondary to the  hiatal hernia better seen on recent CT from 5/20/2024.  3. Small left effusion      I have personally reviewed the examination and initial interpretation  and I agree with the findings.    OMID GARCIA MD         SYSTEM ID:  [id number]   XR Chest 2 Views    Narrative    Chest 2 views    INDICATION: Acute hypoxic respiratory distress. Chest pain.    COMPARISON: Portable length from yesterday    Findings: Heart size normal. Patchy density in the right lower lung  zone unchanged may indicate infection or other atelectasis. Heart size  upper normal. Dextrocurvature of the lower thoracic spine. Osteopenic  bones. Atherosclerotic calcification at the aortic knob. Mildly  flattening the hemidiaphragms on the lateral view. Hiatal hernia.       Impression    IMPRESSION: Right lower lung zone possible infection or atelectasis  unchanged from yesterday. Hiatal hernia. Hyperinflated lungs.    SYLVIA GREEN MD         SYSTEM ID:  [id number]   XR Chest Port 1 View    Narrative    Exam: XR CHEST PORT 1 VIEW, 6/13/2024 11:09 AM    Indication: assess for any effusion or pulm congestion, and infectious  process    Comparison: 6/10/2024    Findings:   Portable semiupright frontal view of the chest. Trachea is midline.  Heart size is normal. Moderate hiatal hernia. Bilateral emphysematous  change. Stable chronic appearing fibrotic opacities. No focal  consolidation. Small left pleural effusion. No pneumothorax.      Impression    Impression:   1. Stable chronic-appearing pulmonary opacities.  2. Stable small left pleural effusion.  3. Hiatal hernia.    I have personally reviewed the examination and initial interpretation  and I agree with the findings.    SYLVIA GREEN MD         SYSTEM ID:  [id number]       Discharge Medications   Current Discharge Medication List        START taking these medications    Details   piperacillin-tazobactam (ZOSYN) 3-0.375 GM vial Inject 3.375 g into the vein every 6 hours for 7 days    Associated Diagnoses: Pneumonia of right lower lobe due to infectious organism           CONTINUE these medications which have CHANGED    Details   ipratropium - albuterol 0.5 mg/2.5 mg/3 mL (DUONEB) 0.5-2.5 (3) MG/3ML neb solution Take 1 vial (3 mLs) by nebulization every 6 hours as needed for shortness of breath, wheezing or cough    Associated Diagnoses: Pneumonia of right lower lobe due to infectious organism           CONTINUE these medications which have NOT CHANGED    Details   acetaminophen (TYLENOL) 325 MG tablet Take 325-650 mg by mouth every 6 hours as needed for mild pain      albuterol (PROVENTIL) (2.5 MG/3ML) 0.083% neb solution Take 1 vial (2.5 mg) by nebulization 2 times daily as needed (COPD)  Qty: 90 mL, Refills: 5     Associated Diagnoses: Pulmonary emphysema, unspecified emphysema type (H)      aspirin 81 MG EC tablet Take 1 tablet (81 mg) by mouth daily  Qty: 30 tablet, Refills: 0    Associated Diagnoses: Acute on chronic congestive heart failure, unspecified heart failure type (H)      busPIRone (BUSPAR) 15 MG tablet Take 15 mg by mouth 2 times daily    Associated Diagnoses: AMRIT (generalized anxiety disorder)      calcium carbonate (TUMS) 500 MG chewable tablet Take 1 tablet (500 mg) by mouth 4 times daily as needed for heartburn    Associated Diagnoses: Gastroesophageal reflux disease, unspecified whether esophagitis present      Calcium Polycarbophil (FIBER) 625 MG tablet Take 2 tablets by mouth daily      diphenhydrAMINE-zinc acetate (BENADRYL) 2-0.1 % external cream Apply topically 3 times daily as needed for itching    Associated Diagnoses: Itching      donepezil (ARICEPT) 10 MG tablet Take 1 tablet (10 mg) by mouth at bedtime  Qty: 90 tablet, Refills: 3    Associated Diagnoses: Mild vascular dementia, unspecified whether behavioral, psychotic, or mood disturbance or anxiety (H)      empagliflozin (JARDIANCE) 10 MG TABS tablet Take 1 tablet (10 mg) by mouth daily  Qty: 90 tablet, Refills: 1    Associated Diagnoses: Acute on chronic congestive heart failure, unspecified heart failure type (H)      ferrous gluconate (FERGON) 324 (38 Fe) MG tablet Take 1 tablet (324 mg) by mouth daily (with breakfast)  Qty: 90 tablet, Refills: 3    Associated Diagnoses: Other iron deficiency anemia      FLUoxetine (PROZAC) 40 MG capsule Take 1 capsule (40 mg) by mouth daily    Associated Diagnoses: Pulmonary emphysema, unspecified emphysema type (H)      Fluticasone-Umeclidin-Vilanterol (TRELEGY ELLIPTA) 200-62.5-25 MCG/ACT oral inhaler Inhale 1 puff into the lungs daily      hyoscyamine (LEVSIN) 0.125 MG tablet TAKE 1 TABLET (125 MCG) BY MOUTH EVERY 4 HOURS AS NEEDED FOR CRAMPING OR DIARRHEA  Qty: 30 tablet, Refills: 1    Associated  Diagnoses: Abdominal cramps      lactobacillus rhamnosus, GG, (CULTURELL) capsule Take 1 capsule by mouth daily      latanoprost (XALATAN) 0.005 % ophthalmic solution Place 1 drop into both eyes at bedtime    Associated Diagnoses: Dry eyes      levothyroxine (SYNTHROID/LEVOTHROID) 50 MCG tablet Take 1 tablet (50 mcg) by mouth daily    Associated Diagnoses: Hypothyroidism, unspecified type      losartan (COZAAR) 25 MG tablet Take 0.5 tablets (12.5 mg) by mouth daily  Qty: 30 tablet, Refills: 0    Associated Diagnoses: Acute on chronic congestive heart failure, unspecified heart failure type (H)      melatonin 1 MG TABS tablet Take 1 tablet (1 mg) by mouth nightly as needed for sleep    Associated Diagnoses: Insomnia, unspecified type      oxyCODONE (ROXICODONE) 5 MG tablet 1/2 tab at bedtime as needed for severe pain. ( Need to last for one month )  Qty: 15 tablet, Refills: 0    Associated Diagnoses: Back pain, unspecified back location, unspecified back pain laterality, unspecified chronicity      pantoprazole (PROTONIX) 40 MG EC tablet Take 1 tablet (40 mg) by mouth daily  Qty: 90 tablet, Refills: 3    Associated Diagnoses: Gastroesophageal reflux disease, unspecified whether esophagitis present      senna-docusate (SENOKOT-S/PERICOLACE) 8.6-50 MG tablet Take 1 tablet by mouth 2 times daily as needed for constipation    Associated Diagnoses: Constipation, unspecified constipation type      triamcinolone (KENALOG) 0.1 % external cream Apply topically 2 times daily as needed for irritation    Associated Diagnoses: Itching      vitamin D3 (CHOLECALCIFEROL) 50 mcg (2000 units) tablet Take 1 tablet (50 mcg) by mouth daily    Associated Diagnoses: Age-related osteoporosis without current pathological fracture      OLANZapine (ZYPREXA) 2.5 MG tablet Take 1 tablet (2.5 mg) by mouth 2 times daily  Qty: 60 tablet, Refills: 5    Associated Diagnoses: Agitation           STOP taking these medications       benzocaine-menthol  (CHLORASEPTIC) 6-10 MG lozenge Comments:   Reason for Stopping:         furosemide (LASIX) 40 MG tablet Comments:   Reason for Stopping:         potassium chloride ER (K-TAB) 20 MEQ CR tablet Comments:   Reason for Stopping:             Allergies   Allergies   Allergen Reactions    Penicillins Itching     Has tolerated ceftriaxone, piperacillin, and amoxicillin

## 2024-06-14 NOTE — PLAN OF CARE
Physical Therapy Discharge Summary    Reason for therapy discharge:    Discharged to transitional care facility.    Progress towards therapy goal(s). See goals on Care Plan in [facility] electronic health record for goal details.  Goals partially met.  Barriers to achieving goals:   discharge from facility.    Therapy recommendation(s):    Continued therapy is recommended.  Rationale/Recommendations:  recommend continued PT services to progress pt's IND with functional mobility. Recommend continued bed mobility and transfer training as pt limited overall by weakness. Recommend continued gait training as pt limited to ~60' with FWW due to fatigue and dizziness.

## 2024-06-14 NOTE — PLAN OF CARE
Pt. discharged at 1205 to  TCU via EMS and was accompanied by daughter. Pt left with personal belongings. Report given to Casey FRAGA PIV SL in place per TCU request. Pt. had no further questions at the time of discharge and no unmet needs were identified.

## 2024-06-15 ENCOUNTER — APPOINTMENT (OUTPATIENT)
Dept: PHYSICAL THERAPY | Facility: SKILLED NURSING FACILITY | Age: 79
DRG: 194 | End: 2024-06-15
Attending: INTERNAL MEDICINE
Payer: MEDICARE

## 2024-06-15 PROCEDURE — 97161 PT EVAL LOW COMPLEX 20 MIN: CPT | Mod: GP

## 2024-06-15 PROCEDURE — 250N000011 HC RX IP 250 OP 636: Mod: JZ | Performed by: PHYSICIAN ASSISTANT

## 2024-06-15 PROCEDURE — 97110 THERAPEUTIC EXERCISES: CPT | Mod: GP

## 2024-06-15 PROCEDURE — 36415 COLL VENOUS BLD VENIPUNCTURE: CPT | Performed by: PHYSICIAN ASSISTANT

## 2024-06-15 PROCEDURE — 97530 THERAPEUTIC ACTIVITIES: CPT | Mod: GP

## 2024-06-15 PROCEDURE — 86481 TB AG RESPONSE T-CELL SUSP: CPT | Performed by: PHYSICIAN ASSISTANT

## 2024-06-15 PROCEDURE — 99305 1ST NF CARE MODERATE MDM 35: CPT | Performed by: INTERNAL MEDICINE

## 2024-06-15 PROCEDURE — 250N000013 HC RX MED GY IP 250 OP 250 PS 637: Performed by: INTERNAL MEDICINE

## 2024-06-15 PROCEDURE — 120N000009 HC R&B SNF

## 2024-06-15 PROCEDURE — 250N000013 HC RX MED GY IP 250 OP 250 PS 637: Performed by: PHYSICIAN ASSISTANT

## 2024-06-15 RX ORDER — LACTOBACILLUS RHAMNOSUS GG 10B CELL
1 CAPSULE ORAL 2 TIMES DAILY
Status: DISCONTINUED | OUTPATIENT
Start: 2024-06-15 | End: 2024-06-16

## 2024-06-15 RX ADMIN — PIPERACILLIN AND TAZOBACTAM 3.38 G: 3; .375 INJECTION, POWDER, LYOPHILIZED, FOR SOLUTION INTRAVENOUS at 21:49

## 2024-06-15 RX ADMIN — LEVOTHYROXINE SODIUM 50 MCG: 25 TABLET ORAL at 08:03

## 2024-06-15 RX ADMIN — BUSPIRONE HYDROCHLORIDE 15 MG: 15 TABLET ORAL at 20:31

## 2024-06-15 RX ADMIN — CALCIUM POLYCARBOPHIL 1250 MG: 625 TABLET, FILM COATED ORAL at 08:03

## 2024-06-15 RX ADMIN — UMECLIDINIUM 1 PUFF: 62.5 AEROSOL, POWDER ORAL at 08:02

## 2024-06-15 RX ADMIN — Medication 1 CAPSULE: at 20:31

## 2024-06-15 RX ADMIN — Medication 12.5 MG: at 09:59

## 2024-06-15 RX ADMIN — PIPERACILLIN AND TAZOBACTAM 3.38 G: 3; .375 INJECTION, POWDER, LYOPHILIZED, FOR SOLUTION INTRAVENOUS at 16:08

## 2024-06-15 RX ADMIN — ENOXAPARIN SODIUM 40 MG: 40 INJECTION SUBCUTANEOUS at 12:37

## 2024-06-15 RX ADMIN — LIDOCAINE 2 PATCH: 4 PATCH TOPICAL at 20:33

## 2024-06-15 RX ADMIN — ACETAMINOPHEN 650 MG: 325 TABLET, FILM COATED ORAL at 12:02

## 2024-06-15 RX ADMIN — OLANZAPINE 2.5 MG: 2.5 TABLET, FILM COATED ORAL at 20:31

## 2024-06-15 RX ADMIN — PANTOPRAZOLE SODIUM 40 MG: 40 TABLET, DELAYED RELEASE ORAL at 08:03

## 2024-06-15 RX ADMIN — LATANOPROST 1 DROP: 50 SOLUTION OPHTHALMIC at 21:49

## 2024-06-15 RX ADMIN — Medication 50 MCG: at 08:03

## 2024-06-15 RX ADMIN — BUSPIRONE HYDROCHLORIDE 15 MG: 15 TABLET ORAL at 08:03

## 2024-06-15 RX ADMIN — DONEPEZIL HYDROCHLORIDE 10 MG: 10 TABLET ORAL at 21:49

## 2024-06-15 RX ADMIN — OLANZAPINE 2.5 MG: 2.5 TABLET, FILM COATED ORAL at 08:03

## 2024-06-15 RX ADMIN — FLUTICASONE FUROATE AND VILANTEROL TRIFENATATE 1 PUFF: 200; 25 POWDER RESPIRATORY (INHALATION) at 08:02

## 2024-06-15 RX ADMIN — PIPERACILLIN AND TAZOBACTAM 3.38 G: 3; .375 INJECTION, POWDER, LYOPHILIZED, FOR SOLUTION INTRAVENOUS at 09:51

## 2024-06-15 RX ADMIN — FERROUS GLUCONATE 324 MG: 324 TABLET ORAL at 08:03

## 2024-06-15 RX ADMIN — Medication 1 CAPSULE: at 09:59

## 2024-06-15 RX ADMIN — PIPERACILLIN AND TAZOBACTAM 3.38 G: 3; .375 INJECTION, POWDER, LYOPHILIZED, FOR SOLUTION INTRAVENOUS at 04:08

## 2024-06-15 RX ADMIN — FLUOXETINE HYDROCHLORIDE 40 MG: 20 CAPSULE ORAL at 08:03

## 2024-06-15 RX ADMIN — ASPIRIN 81 MG: 81 TABLET, COATED ORAL at 08:03

## 2024-06-15 ASSESSMENT — ACTIVITIES OF DAILY LIVING (ADL)
ADLS_ACUITY_SCORE: 42
BADLS,_PREVIOUS_FUNCTIONAL_LEVEL: USES DEVICE OR EQUIPMENT
ADLS_ACUITY_SCORE: 42
IADLS,_PREVIOUS_FUNCTIONAL_LEVEL: DEPENDENT
ADLS_ACUITY_SCORE: 42

## 2024-06-15 NOTE — PHARMACY-TCU REVIEW OF H&P
I have reviewed this patient's TCU admission History & Physical for medication related changes/recommendations identified by the admitting provider.  I am confirming that there are no recommendations requiring changes to medication orders are indicated at this time based on the provider recommendations in the H&P.    Michel Hassan, PharmD  PGY-1 Pharmacy Resident

## 2024-06-15 NOTE — PLAN OF CARE
Discharge Planner Post-Acute Rehab PT:     Discharge Plan: Return to [facility] with continued HCPT services, IV abx complete 6/17    Precautions: 2LPM O2 at night and with activity    Current Status: MOD I with 4WW in room  Bed Mobility: IND  Transfer: MOD I with 4WW  Gait: MOD I with 4WW in room, supervision in halls d/t O2 tank and fatigue  Stairs: pt refused  Balance: able to free stand, balance reactions intact, had not been falling at home    Assessment:  Pt is below baseline for functional mobility s/p hospitalization related to pneumonia. She remains on TCU for IV antibiotics. PT to see her 2x/day until discharge to increase endurance to allow her to access all areas of her [facility] and increase durability. HCPT to resume after discharge.     Other Barriers to Discharge (DME, Family Training, etc):   None

## 2024-06-15 NOTE — PLAN OF CARE
Goal Outcome Evaluation:      Plan of Care Reviewed With: patient    Overall Patient Progress: no change    Pt alert and oriented x 4, can be forgetful at times. Call light appropriate, Bed alarm on. As per pt she uses 2 L of oxygen at night for comfort. Pt is continent of both bowel and bladder , assist of 1 with walker and gait belt to the bathroom.  Left upper arm PIV, patent, infused with IV antibiotic, dressing CDI. Pt denies SOB, CP and no n/v. Call light within reach. Continue with plan of care.         Patient's most recent vital signs are:     Vital signs:  BP: 123/65  Temp: 97.9  HR: 103  RR: 18  SpO2: 92 %     Patient does not have new respiratory symptoms.  Patient does not have new sore throat.  Patient does not have a fever greater than 99.5.

## 2024-06-15 NOTE — PLAN OF CARE
Goal Outcome Evaluation:  VS: /74 (BP Location: Right arm)   Pulse 89   Temp 97.5  F (36.4  C) (Oral)   Resp 18   Ht 1.524 m (5')   Wt 53.7 kg (118 lb 6.4 oz)   LMP  (LMP Unknown)   SpO2 98%   BMI 23.12 kg/m     O2: Stable on RA, h/o COPD, 2L overnight for comfort   Output:  Last BM: Continent B/B  6/15/2024   Activity: A1 with walker and gait belt   CMS: AOx4   Pain: Headache--PRN Tylenol x1 and effective   Skin:  Dressing:  LDA: Intact, scattered bruising    L PIV   Equipment: 2L oxygen overnight, IV antibiotics   Diet: Regular; Meds whole with thin liquids   Plan: Will continue to follow POC.   Additional Info: Pt able to make needs known. Denies CP, SOB, and N/V.    IV antibiotic x1.  Lidocaine patches removed.  Daughter was at bedside.  Weight was attempted to be taken twice but pt was napping both times and requested nurse to come back later both times. Oncoming nurse aware.    No acute issues this shift. Call light within reach.

## 2024-06-15 NOTE — H&P
[facility]  Admission History and Physical      Date of Admission: [facility] Discharge Team: 6/14/2024         Assessment and Plan:   Idalia Bob is an 79 year old female with history of  COPD, HFrEF (EF 10-15%), PsA PNA (12/2023), HTN, cardiogenic shock during 5/2024 admission, dementia, CKD, HTN, depression, and anxiety who was admitted to [facility] 6/9/2024 with PNA. Stabilized then transferred to  TCU 6/14/2024 for ongoing rehabilitation     Recurrent PNA, RLL: Etiology likely silent aspiration with large hiatal hernia. Admitted with sepsis, started on Zosyn, see hospital notes for details. Last CXR 6/13 with stable chronic-appearing pulmonary opacities, small L pleural effusion, hiatal hernia. VSS on transfer to TCU 6/14  - ID followed at [facility]: continue Zosyn through 6/17. Aspiration precautions. Probiotic   - SLP consulted   - Regular diet for      HFrEF 2/2 NICM, HTN: Echo 5/2024 EF 10-15%. BNP 10,435 6/12 12,428 (6/9). Follows OP with [facility], last seen 6/4/14. PTA on lasix, losartan, and Jardiance both of which were held during admission due to infection   - Consider resumption of low dose losartan, as BP allows  - Continue ASA  - Follow up with cards 7/10 as planned  - Daily weights, strict I&Os  - Caution with any IVF       COPD, chronic hypoxic respiratory failure: BL 2L O2 at night and with activity. PTA on Pulmicort and albuterol. No e/p acute exacerbation during recent admission. VSS on BL O2 needs  - Continue PTA meds  - Needs OP pulmonology follow up, this has not been set up yet     Other Medical Issues:  Physical deconditioning: In the setting of the above. PT, OT consulted  Large Hiatal Hernia: Per discharge summary, need OP follow up with general surgery or thoracic surgery. This has not been set up at time of discharge and suggestion was to follow up with PCP to further discuss   Chronic pain: Back and neck. Continue Tylenol, Lidoderm patch, prn  "oxycodone   Chronic normocytic anemia: BL hgb 8.5-10 and stable. Monitor   CKD III: BL Cr 0.9-1.2 and sable. Trend   Dementia: Continue PTA Donepezil and Zyprexa. PT, OT consulted as above  Hypothyroidism: TSH 1.36. Continue PTA synthroid    Anxiety, depression: Continue PTA buspar and prozac   GERD: Cont ine PPI     Discussed with Lynn Sr PA-C    CODE: DNR/DNI  Diet: Regular diet  DVT: Lovenox  Indication for Psychotropic Medications: Buspar and Prozac at the lowest effective dose for management of depression and anxiety. Zyprexa and Donepezil at the lowest effective dose for management of dementia. Oxycodone at the lowest effective dose for management of chronic pain  Pneumococcal Vaccination Status: Completed  Disposition: Pending PT, OT vkng Woods MD         Chief Complaint:   Physical deconditioning          HPI:   Idalia Bob is an 79 year old female with history of  COPD, HFrEF (EF 10-15%), PsA PNA (12/2023), HTN, cardiogenic shock during 5/2024 admission, dementia, CKD, HTN, depression, and anxiety who was admitted to [facility] 6/9/2024 with PNA. Stabilized then transferred to  TCU 6/14/2024 for ongoing rehabilitation    Patient reports doing well other than feeling quite tired this afternoon. Denies chest pain, dyspnea, cough, difficulty swallowing. Appetite is stable   Swelling in the legs is currently \"good.\" Denies GUTIERREZ or issues laying flat. No fever, chills, or AMS. Denies urinary symptoms or new rash. Denies sensation of heartburn.         Review of Systems:   A 10 point ROS was performed and negative unless otherwise noted in HPI.          Past Medical History:   I have reviewed this patient's medical history and updated it with pertinent information if needed.   Past Medical History:   Diagnosis Date    Chronic kidney disease (CKD) 01/10/2023    Chronic obstructive pulmonary disease (H) 01/10/2023    Dementia (H) 11/28/2023    GERD (gastroesophageal reflux disease) 03/15/2023 "    Hypertension 11/28/2023             Past Surgical History:   I have reviewed this patient's surgical history and updated it with pertinent information if needed.  Past Surgical History:   Procedure Laterality Date    ESOPHAGOSCOPY, GASTROSCOPY, DUODENOSCOPY (EGD), COMBINED N/A 02/07/2024    Procedure: ESOPHAGOGASTRODUODENOSCOPY, WITH BIOPSY;  Surgeon: Felton James MD;  Location:  GI    PICC DOUBLE LUMEN PLACEMENT Right 05/17/2024    Basilic Vein 5F DL 37 cm             Social History:     Social History     Tobacco Use    Smoking status: Former     Current packs/day: 1.00     Average packs/day: 1 pack/day for 40.0 years (40.0 ttl pk-yrs)     Types: Cigarettes     Passive exposure: Past    Smokeless tobacco: Never   Vaping Use    Vaping status: Never Used   Substance Use Topics    Alcohol use: Not Currently    Drug use: Never            Family History:   I have reviewed this patient's family history and updated it with pertinent information if needed.   No family history on file.         Allergies:       Allergies   Allergen Reactions    Penicillins Itching     Has tolerated ceftriaxone, piperacillin, and amoxicillin            Medications:     Reviewed in Epic             Physical Exam:   General Appearance: Alert and oriented x3, lying in bed. Appears comfortable and well nourished  HEENT: Anicteric conjunctiva  Respiratory: Breathing comfortably on room air, lungs CTAB without wheezing or crackles   Cardiovascular: RRR, S1, S2. No murmur noted  GI: Abdomen soft, non-tender with active bowel sounds. No guarding or rebound  Lymph/Hematologic: trace BLE peripheral edema, distal pulses palpable   Skin: No rash   Musculoskeletal: Moves all extremities   Neurologic: No focal deficits, CN II-XII grossly intact  Very pleasant

## 2024-06-15 NOTE — PROGRESS NOTES
06/15/24 1056   Appointment Info   Signing Clinician's Name / Credentials (PT) Telma Dye DPT   Quick Adds   Quick Adds Certification      Language English   Living Environment   People in Home spouse   Living Environment Comments Lives in [place] with spouse. Has IADL assist from facility and family. Sleeps in recliner.   Self-Care   Number of times patient has fallen within last six months 0   Activity/Exercise/Self-Care Comment Fairly sedenetary at baseline, does not exercise. Does not express interests or hobbies.   Post-Acute Assessment Only   Post-Acute Functional Assessment See below   Previous Level of Function/Home Environm   Bathing, Previous Functional Level partial assistance   Grooming, Previous Functional Level partial assistance   Dressing, Previous Functional Level partial assistance   Eating/Feeding, Previous Functional Level independent   Toileting, Previous Functional Level uses device or equipment   BADLs, Previous Functional Level uses device or equipment   IADLs, Previous Functional Level dependent   Bed Mobility, Previous Functional Level independent   Transfers, Previous Functional Level uses device or equipment   Household Ambulation, Previous Functional Level uses device or equipment   Stairs, Previous Functional Level uses device or equipment   Community Ambulation, Previous Functional Level uses device or equipment   General Information   Referring Physician Angelita Anna MD   Patient/Family Therapy Goals Statement (PT) I want to go home   Pertinent History of Current Problem (include personal factors and/or comorbidities that impact the POC) Per chart: is an 79 year old female with history of  COPD, HFrEF (EF 10-15%), PsA PNA (12/2023), HTN, cardiogenic shock during 5/2024 admission, dementia, CKD, HTN, depression, and anxiety who was admitted to [facility] 6/9/2024 with PNA. Stabilized then transferred to  TCU 6/14/2024 for ongoing rehabilitation   Existing  Precautions/Restrictions   (2LPM with activity and at night)   Heart Disease Risk Factors Medical history   Cognition   Cognitive Status Comments Command following intact. Fairly down and withdrawn   Pain Assessment   Patient Currently in Pain No   Integumentary/Edema   Integumentary/Edema no deficits were identifed   Posture    Posture Forward head position;Kyphosis   Range of Motion (ROM)   Range of Motion ROM is WFL   Strength (Manual Muscle Testing)   Strength Comments General deconditioning 4/5 in all major BLE muscle groups. Functional capcity very low, fatigues after 50 ft of walking.   Balance   Balance Comments Safe and intact with 4WW.   Sensory Examination   Sensory Perception Comments Declined sensory test   Coordination   Coordination no deficits were identified   Muscle Tone   Muscle Tone no deficits were identified   Clinical Impression   Criteria for Skilled Therapeutic Intervention Yes, treatment indicated   PT Diagnosis (PT) deconditioning   Influenced by the following impairments see clinical impression comments   Functional limitations due to impairments see clinical impression comments   Clinical Presentation (PT Evaluation Complexity) stable   Clinical Presentation Rationale Low complexity care plan despite   Clinical Decision Making (Complexity) low complexity   Planned Therapy Interventions (PT) gait training;home exercise program;patient/family education;stair training;strengthening;transfer training;progressive activity/exercise;risk factor education;home program guidelines   Risk & Benefits of therapy have been explained evaluation/treatment results reviewed;care plan/treatment goals reviewed;risks/benefits reviewed;current/potential barriers reviewed;participants included;patient;daughter   Clinical Impression Comments Pt is below baseline for functional mobility s/p hospitalization related to pneumonia. She remains on TCU for IV antibiotics. PT to see her 2x/day until discharge to  increase endurance to allow her to access all areas of her [place] and increase durability. HCPT to resume after discharge.   PT Total Evaluation Time   PT Eval, Low Complexity Minutes (07443) 15   Therapy Certification   Start of care date 06/15/24   Certification date from 06/15/24   Certification date to 07/14/24   Medical Diagnosis physical deconditioning   Physical Therapy Goals   PT Frequency Daily  (2x daily until d/c on 6/18)   PT Predicted Duration/Target Date for Goal Attainment 06/17/24   PT Goals Gait;Stairs;PT Goal 1   PT: Gait Modified independent;Greater than 200 feet  (4WW, 2LPM O2)   PT: Stairs 3 stairs;Modified independent  (to access community)   PT: Goal 1 Car transfer into family vehicle, SBA   Interventions   Interventions Quick Adds Therapeutic Activity;Therapeutic Procedure   Therapeutic Procedure/Exercise   Ther. Procedure: strength, endurance, ROM, flexibillity Minutes (77498) 20   Treatment Detail/Skilled Intervention Functional endurance wiht 2LPM O2. Pt unable to manage hospital O2 tanks without assist. Amb 55 ft max before needing to stop and rest. Floor bike 30 seconds x 5. Needs max encouragement to continue. SpO2 >88% on 2LPM with activity. Without this, max dessat to 86% on RA.   Therapeutic Activity   Therapeutic Activities: dynamic activities to improve functional performance Minutes (49624) 10   Treatment Detail/Skilled Intervention Assessment for MOD I in room - safe with 4WW. Reviewed call light use prn, 4WW safety, and O2 use.   Total Session Time   Timed Code Treatment Minutes 30   Total Session Time (sum of timed and untimed services) 45   Post Acute Settings Only   What unit is patient on? TCU   PT - Transitional Care Unit Time   Individual Time (minutes) - PT 45   Group Time (minutes) - PT 0   Concurrent Time (minutes) - PT 0   Co-Treatment Time (minutes) - PT 0   TCU Total Session Time (minutes) - PT 45   TCU Daily Total Session Time   PT TCU Daily Total Session Time 45    Rehab TCU Daily Total Session Time 45   Bed Mobility: Turning side to side/Roll Left and Right   Patient Performance Independent   Staff Performance No setup or physical help (No setup or physical help from staff)   Bed Mobility: Sit to lying   Patient Performance Independent   Staff Performance No setup or physical help (No setup or physical help from staff)   Bed Mobility: Lying to sitting on the side of bed   Patient Performance Independent   Staff Performance No set up or physical help (No set up or physical help from staff)   Transfers: Sit to Stand   Patient Performance Independent   Staff Performance No set up or physical help (No set up or physical help from staff)   Transfers: Chair/Bed transfers   Patient Performance Independent   Staff Performance No set up or physical help (No set up or physical help from staff)   Equipment Used Rolling walker   Walk 10 Feet - Ability to walk once standing   Patient Performance Independent   Reason Not Done Not attempted due to environmental limitations (e.g., lack of equipment,weather constraints)   Walk 10 Feet on uneven surfaces - Ability to walk on various surfaces.   Patient Performance Steadying Assist   Walk 50 Feet with Two Turns - Ability to walk at least 50 feet.   Patient Performance Supervision or verbal cues   Describe Performance SBA with 4WW, assist with O2 tank management.   Walk 150 Feet - Ability to walk 150 feet   Reason Not Done Safety concerns   Wheel 50 Feet - Ability to move wheelchair/scooter   Reason Not Done Activity not applicable   Wheel 150 Feet - Ability to move wheelchair/scooter   Reason Not Done Activity not applicable   1 Step (curb) - Ability to go up/down 1 step/curb.   Reason Not Done Resident refused to perform   4 Steps - Ability to go up/down steps.   Reason Not Done Resident refused to perform   12 Steps - Ability to go up/down steps.   Reason Not Done Resident refused to perform   Car Transfer - Ability to transfer in/out of  car or van.   Reason Not Done Resident refused to perform   Picking up Object - Ability to bend/stoop while standing.   Reason Not Done Resident refused to perform

## 2024-06-15 NOTE — PHARMACY-ADMISSION MEDICATION HISTORY
Please see Admission Medication History completed on 6/10/24 under previous encounter at [facility] for information regarding prior to admission medications.    Michel Hassan  PGY1 Pharmacy Resident         done

## 2024-06-15 NOTE — PLAN OF CARE
Goal Outcome Evaluation:  [id number]    Patient is alert and oriented x4. Able to make needs known. Denied CP, N/V, and SOB. On regular diet and takes medication whole with thin liquid. Continent of bowel and bladder. A1 walker and GB. L PIV and dressing CDI. Call light within reach. No acute issue this time. Will continue to follow POC.     /60 (BP Location: Right arm)   Pulse 87   Temp 98  F (36.7  C) (Oral)   Resp 18   Ht 1.524 m (5')   Wt 53.7 kg (118 lb 6.4 oz)   LMP  (LMP Unknown)   SpO2 95%   BMI 23.12 kg/m

## 2024-06-16 ENCOUNTER — APPOINTMENT (OUTPATIENT)
Dept: SPEECH THERAPY | Facility: SKILLED NURSING FACILITY | Age: 79
DRG: 194 | End: 2024-06-16
Attending: PHYSICIAN ASSISTANT
Payer: MEDICARE

## 2024-06-16 PROCEDURE — 250N000011 HC RX IP 250 OP 636: Mod: JZ | Performed by: PHYSICIAN ASSISTANT

## 2024-06-16 PROCEDURE — 120N000009 HC R&B SNF

## 2024-06-16 PROCEDURE — 258N000003 HC RX IP 258 OP 636: Mod: JZ | Performed by: INTERNAL MEDICINE

## 2024-06-16 PROCEDURE — 92610 EVALUATE SWALLOWING FUNCTION: CPT | Mod: GN | Performed by: SPEECH-LANGUAGE PATHOLOGIST

## 2024-06-16 PROCEDURE — 250N000013 HC RX MED GY IP 250 OP 250 PS 637: Performed by: PHYSICIAN ASSISTANT

## 2024-06-16 PROCEDURE — 250N000013 HC RX MED GY IP 250 OP 250 PS 637: Performed by: INTERNAL MEDICINE

## 2024-06-16 RX ORDER — LACTOBACILLUS RHAMNOSUS GG 10B CELL
1 CAPSULE ORAL 2 TIMES DAILY
Status: COMPLETED | OUTPATIENT
Start: 2024-06-16 | End: 2024-06-17

## 2024-06-16 RX ORDER — SODIUM CHLORIDE 9 MG/ML
INJECTION, SOLUTION INTRAVENOUS
Status: COMPLETED
Start: 2024-06-16 | End: 2024-06-16

## 2024-06-16 RX ORDER — LACTOBACILLUS RHAMNOSUS GG 10B CELL
1 CAPSULE ORAL DAILY
Status: DISCONTINUED | OUTPATIENT
Start: 2024-06-18 | End: 2024-06-18 | Stop reason: HOSPADM

## 2024-06-16 RX ADMIN — PANTOPRAZOLE SODIUM 40 MG: 40 TABLET, DELAYED RELEASE ORAL at 08:07

## 2024-06-16 RX ADMIN — OLANZAPINE 2.5 MG: 2.5 TABLET, FILM COATED ORAL at 08:07

## 2024-06-16 RX ADMIN — PIPERACILLIN AND TAZOBACTAM 3.38 G: 3; .375 INJECTION, POWDER, LYOPHILIZED, FOR SOLUTION INTRAVENOUS at 03:45

## 2024-06-16 RX ADMIN — DONEPEZIL HYDROCHLORIDE 10 MG: 10 TABLET ORAL at 22:18

## 2024-06-16 RX ADMIN — ASPIRIN 81 MG: 81 TABLET, COATED ORAL at 08:07

## 2024-06-16 RX ADMIN — BUSPIRONE HYDROCHLORIDE 15 MG: 15 TABLET ORAL at 22:12

## 2024-06-16 RX ADMIN — Medication 12.5 MG: at 08:07

## 2024-06-16 RX ADMIN — LIDOCAINE 2 PATCH: 4 PATCH TOPICAL at 22:12

## 2024-06-16 RX ADMIN — Medication 1 CAPSULE: at 08:07

## 2024-06-16 RX ADMIN — PIPERACILLIN AND TAZOBACTAM 3.38 G: 3; .375 INJECTION, POWDER, LYOPHILIZED, FOR SOLUTION INTRAVENOUS at 15:26

## 2024-06-16 RX ADMIN — Medication 50 MCG: at 08:07

## 2024-06-16 RX ADMIN — OLANZAPINE 2.5 MG: 2.5 TABLET, FILM COATED ORAL at 22:12

## 2024-06-16 RX ADMIN — UMECLIDINIUM 1 PUFF: 62.5 AEROSOL, POWDER ORAL at 08:07

## 2024-06-16 RX ADMIN — SODIUM CHLORIDE 500 ML: 9 INJECTION, SOLUTION INTRAVENOUS at 03:45

## 2024-06-16 RX ADMIN — ENOXAPARIN SODIUM 40 MG: 40 INJECTION SUBCUTANEOUS at 13:24

## 2024-06-16 RX ADMIN — BUSPIRONE HYDROCHLORIDE 15 MG: 15 TABLET ORAL at 08:07

## 2024-06-16 RX ADMIN — Medication 2.5 MG: at 22:11

## 2024-06-16 RX ADMIN — Medication 1 CAPSULE: at 22:11

## 2024-06-16 RX ADMIN — FLUOXETINE HYDROCHLORIDE 40 MG: 20 CAPSULE ORAL at 08:07

## 2024-06-16 RX ADMIN — FLUTICASONE FUROATE AND VILANTEROL TRIFENATATE 1 PUFF: 200; 25 POWDER RESPIRATORY (INHALATION) at 08:07

## 2024-06-16 RX ADMIN — FERROUS GLUCONATE 324 MG: 324 TABLET ORAL at 08:07

## 2024-06-16 RX ADMIN — CALCIUM POLYCARBOPHIL 1250 MG: 625 TABLET, FILM COATED ORAL at 08:06

## 2024-06-16 RX ADMIN — LEVOTHYROXINE SODIUM 50 MCG: 25 TABLET ORAL at 08:06

## 2024-06-16 RX ADMIN — PIPERACILLIN AND TAZOBACTAM 3.38 G: 3; .375 INJECTION, POWDER, LYOPHILIZED, FOR SOLUTION INTRAVENOUS at 09:18

## 2024-06-16 RX ADMIN — LATANOPROST 1 DROP: 50 SOLUTION OPHTHALMIC at 22:18

## 2024-06-16 ASSESSMENT — ACTIVITIES OF DAILY LIVING (ADL)
ADLS_ACUITY_SCORE: 42

## 2024-06-16 NOTE — PHARMACY-MEDICATION REGIMEN REVIEW
Pharmacy Medication Regimen Review  Idalia Bob is a 79 year old female who is currently in the Transitional Care Unit.    Assessment: All medications have an appropriate indications, durations and no unnecessary use was found    Plan:   Consider repeat EKG this week since patient is on several medications that may prolong QT; last EKG on 6/9/24  Consider decreasing Culturelle from twice daily to once daily PTA dosing after finishing antibiotic course on 6/17/24.  Agree with plan to hold empagliflozin, losartan, and furosemide in setting of infection. Reassess patient on 6/17/24 for restarting medications above after finishing antibiotic therapy.    Attending provider will be sent this note for review.  If there are any emergent issues noted above, pharmacist will contact provider directly by phone.      Pharmacy will periodically review the resident's medication regimen for any PRN medications not administered in > 72 hours and discontinue them. The pharmacist will discuss gradual dose reductions of psychopharmacologic medications with interdisciplinary team on a regular basis.    Please contact pharmacy if the above does not answer specific medication questions/concerns.    Background:  A pharmacist has reviewed all medications and pertinent medical history today.  Medications were reviewed for appropriate use and any irregularities found are listed with recommendations.      Current Facility-Administered Medications:     [START ON 6/17/2024] - Skin Test Reading -, , Does not apply, Q21 Days, Lynn Sr PA-C    acetaminophen (TYLENOL) Suppository 650 mg, 650 mg, Rectal, Q4H PRN, Lynn Sr PA-C    acetaminophen (TYLENOL) tablet 325-650 mg, 325-650 mg, Oral, Q6H PRN, Lynn Sr PA-C, 650 mg at 06/15/24 1202    albuterol (PROVENTIL) neb solution 2.5 mg, 2.5 mg, Nebulization, BID PRN, Lynn Sr PA-C    aspirin EC tablet 81 mg, 81 mg, Oral, Daily,  Angelita Anna MD, 81 mg at 06/16/24 0807    busPIRone (BUSPAR) tablet 15 mg, 15 mg, Oral, BID, Angelita Anna MD, 15 mg at 06/16/24 0807    calcium carbonate (TUMS) chewable tablet 500 mg, 500 mg, Oral, 4x Daily PRN, Lynn Sr PA-C    calcium polycarbophil (FIBERCON) tablet 1,250 mg, 1,250 mg, Oral, Daily, Angelita Anna MD, 1,250 mg at 06/16/24 0806    diphenhydrAMINE-zinc acetate (BENADRYL) 2-0.1 % cream, , Topical, TID PRN, Lynn Sr PA-C    donepezil (ARICEPT) tablet 10 mg, 10 mg, Oral, At Bedtime, Angelita Anna MD, 10 mg at 06/15/24 2149    [Held by provider] empagliflozin (JARDIANCE) tablet 10 mg, 10 mg, Oral, Daily, Lynn Sr PA-C    enoxaparin ANTICOAGULANT (LOVENOX) injection 40 mg, 40 mg, Subcutaneous, Q24H, Lynn Sr PA-C, 40 mg at 06/15/24 1237    ferrous gluconate (FERGON) tablet 324 mg, 324 mg, Oral, Daily with breakfast, Angelita Anna MD, 324 mg at 06/16/24 0807    FLUoxetine (PROzac) capsule 40 mg, 40 mg, Oral, Daily, Lynn Sr PA-C, 40 mg at 06/16/24 0807    fluticasone-vilanterol (BREO ELLIPTA) 200-25 MCG/ACT inhaler 1 puff, 1 puff, Inhalation, Daily, 1 puff at 06/16/24 0807 **AND** umeclidinium (INCRUSE ELLIPTA) 62.5 MCG/ACT inhaler 1 puff, 1 puff, Inhalation, Daily, Lynn Sr PA-C, 1 puff at 06/16/24 0807    hyoscyamine (LEVSIN) tablet 125 mcg, 125 mcg, Oral, Q4H PRN, Lynn Sr PA-C    ipratropium - albuterol 0.5 mg/2.5 mg/3 mL (DUONEB) neb solution 3 mL, 1 vial, Nebulization, Q6H PRN, Lynn Sr PA-C    lactobacillus rhamnosus (GG) (CULTURELL) capsule 1 capsule, 1 capsule, Oral, BID, Lynn Sr PA-C, 1 capsule at 06/16/24 0807    latanoprost (XALATAN) 0.005 % ophthalmic solution 1 drop, 1 drop, Both Eyes, At Bedtime, Lynn Sr PA-C, 1 drop at 06/15/24 2142    levothyroxine (SYNTHROID/LEVOTHROID) tablet 50 mcg, 50 mcg, Oral, Daily, Orin,  Lynn Sandoval PA-C, 50 mcg at 06/16/24 0806    Lidocaine (LIDOCARE) 4 % Patch 2 patch, 2 patch, Transdermal, Q24h, Lynn Sr PA-C, 2 patch at 06/15/24 2033    losartan (COZAAR) half-tab 12.5 mg, 12.5 mg, Oral, Daily, Lynn Sr PA-C, 12.5 mg at 06/16/24 0807    Nurse may request from Pharmacy a change of form of medication (e.g. Liquid to tablet)., , Does not apply, Continuous PRN, Lynn Sr PA-C    OLANZapine (zyPREXA) tablet 2.5 mg, 2.5 mg, Oral, BID, Lynn Sr PA-C, 2.5 mg at 06/16/24 0807    ondansetron (ZOFRAN ODT) ODT tab 4 mg, 4 mg, Oral, Q6H PRN **OR** ondansetron (ZOFRAN) injection 4 mg, 4 mg, Intravenous, Q6H PRN, Lynn Sr PA-C    oxyCODONE IR (ROXICODONE) half-tab 2.5 mg, 2.5 mg, Oral, At Bedtime PRN, Lynn Sr PA-C    pantoprazole (PROTONIX) EC tablet 40 mg, 40 mg, Oral, Daily, Lynn Sr PA-C, 40 mg at 06/16/24 0807    piperacillin-tazobactam (ZOSYN) 3.375 g vial to attach to  mL bag, 3.375 g, Intravenous, Q6H, Lynn Sr PA-C, 3.375 g at 06/16/24 0918    senna-docusate (SENOKOT-S/PERICOLACE) 8.6-50 MG per tablet 1 tablet, 1 tablet, Oral, BID PRN, Lynn Sr PA-C    triamcinolone (KENALOG) 0.1 % cream, , Topical, BID PRN, Lynn Sr PA-C    tuberculin injection 5 Units, 5 Units, Intradermal, Q21 Days, Lynn Sr PA-C    Vitamin D3 (CHOLECALCIFEROL) tablet 50 mcg, 50 mcg, Oral, Daily, Angelita Anna MD, 50 mcg at 06/16/24 0807  No current outpatient prescriptions on file.   Michel Hassan, MonicaD  PGY-1 Pharmacy Resident

## 2024-06-16 NOTE — PROGRESS NOTES
"   06/16/24 0915   Appointment Info   Signing Clinician's Name / Credentials (SLP) Jeanette Ramos MS, CCC-SLP   General Information   Onset of Illness/Injury or Date of Surgery 06/09/24  (most recent hospital admission; admit to TCU 06/14)   Referring Physician Lynn Sr PA-C   Pertinent History of Current Problem Per H&P: Patient is \"79 year old female with history of  COPD, HFrEF (EF 10-15%), PsA PNA (12/2023), HTN, cardiogenic shock during 5/2024 admission, dementia, CKD, HTN, depression, and anxiety who was admitted to [facility] 6/9/2024 with PNA. Stabilized then transferred to  TCU 6/14/2024 for ongoing rehabilitation.\" SLP Consult received with indication of \"hiatal hernia, c/f recurrent aspiration.\"   General Observations Pt arrived to TCU with orders for regular solids and thin liquids. Pt not evaluated by SLP during 06/09/2024 hospitalization for pneumonia, per chart note from acute SLP evaluatoin not indicated as VFSS complted during 05/2024 hospitalization admission did not reveal aspiration. Please see dysphagia history below for more details.   Type of Evaluation   Type of Evaluation Swallow Evaluation   Oral Motor   Oral Musculature generally intact   Dentition (Oral Motor)   Dentition (Oral Motor) natural dentition;adequate dentition   Facial Symmetry (Oral Motor)   Facial Symmetry (Oral Motor) WNL   Tongue Function (Oral Motor)   Tongue Coordination/Speed (Oral Motor) WNL   Vocal Quality/Secretion Management (Oral Motor)   Vocal Quality (Oral Motor) WFL   General Swallowing Observations   Past History of Dysphagia Per chart review, pt with s/sx of esophageal prior to May 2024 hospitalization- reflux, heartburn, and daughter reported pt vomitting a few times a week since 02/2024. Pt followed by acute SLP during May 2024 hospitalization, VFSS completed 05/21: \"VFSS completed per provider orders. Patient presents with WNL oropharyngeal swallowing abilities. Pt assessed with thin liquids, " "pureed textures and regular solids under fluoroscopy. Oral phase was WNL. Pt demonstrated adequate bolus acceptance, functional bolus control, adequate A-P movement, and good oral clearance. Pharyngeal phase remarkable for very mildly impaired tongue base retraction with pureed textures. No aspiration or penetration observed across consistencies including single and consecutive cup and straw sips of thin liquid consistencies. Recommend continue regular textures and thin liquids diet. Patient should be fully upright and alert for all intake, alternate solids and liquids and take small, bites/sips at a slow rate. Given WNL oropharyngeal swallowing abilities, no further speech therapy indicated at this time and will complete orders.\"   Current Diet/Method of Nutritional Intake (General Swallowing Observations, NIS) thin liquids (level 0);regular diet   Swallowing Evaluation Clinical swallow evaluation   Clinical Swallow Evaluation   Clinical Swallow Evaluation Textures Trialed thin liquids;solid foods   Clinical Swallow Eval: Thin Liquid Texture Trial   Mode of Presentation, Thin Liquids straw;self-fed   Volume of Liquid or Food Presented sequential sips   Oral Phase of Swallow WFL   Pharyngeal Phase of Swallow intact   Diagnostic Statement No overt s/sx of possible airway penetration/aspiration with thin intake at bedside.   Clinical Swallow Evaluation: Solid Food Texture Trial   Mode of Presentation self-fed   Volume Presented small single bolus   Oral Phase WFL   Pharyngeal Phase intact   Diagnostic Statement No s/sx of dysphagia, possible airway penetration/aspiration with solid.   Esophageal Phase of Swallow   Patient reports or presents with symptoms of esophageal dysphagia No   Esophageal comments Pt denied sticking/globus sensation post-swallow cracker bolus   Swallowing Recommendations   Diet Consistency Recommendations thin liquids (level 0);regular diet   Mode of Delivery Recommendations bolus size, " "small;slow rate of intake   Swallowing Maneuver Recommendations alternate food and liquid intake   Recommended Feeding/Eating Techniques (Swallow Eval) maintain upright sitting position for eating;maintain upright posture during/after eating for 30 minutes   Medication Administration Recommendations, Swallowing (SLP) per pt preference   Instrumental Assessment Recommendations instrumental evaluation not recommended at this time   Clinical Impression   Criteria for Skilled Therapeutic Interventions Met (SLP Eval) Evaluation only   SLP Diagnosis WFL oral and pharyngeal swallow function   Activity Limitations Related to Problem List (SLP) Risk of reflux, esophageal dysphagia r/t \"large hiatal hernia\"   Risks & Benefits of therapy have been explained evaluation/treatment results reviewed;care plan/treatment goals reviewed;participants voiced agreement with care plan;participants included;patient   Clinical Impression Comments SLP: Clinical swallow evaluation completed. Per chart review, VFSS completed 05/21 and no aspiration of any textures including challenge sips of thin liquids.  Oral mechanism exam unremarkable. Evaluated pt with sequential starw sips of thin water (0) and regular solid (cracker). No overt s/sx of possible airway penetration/aspiration with thin intake at bedside. No s/sx of dysphagia, possible airway penetration/aspiration with solid. Recommend continuing regular solids and thin liquids (0), pt should continue utilizing general safe swallow strategies. SLP evaluation only, recommend OP GI for evaluation and management of hiatal hernia, risk of aspiration from reflux, regurgitation rather than swallow.   SLP Total Evaluation Time   Eval: oral/pharyngeal swallow function, clinical swallow Minutes (68767) 30   Therapy Certification   Start of Care Date 06/16/24   Certification date from 06/16/24   Certification date to 06/16/24   Medical Diagnosis Pneumonia, hiatal hernia   SLP Goals   Therapy " Frequency (SLP Eval) one time eval and treatment only   SLP Discharge Planning   SLP Plan SLP: TCU SLP clinical swallow eval only, no aspiration on VFSS 05/21. Recommend OP GI for evaluation and management of hiatal hernia; recurrent pneumonias may be related.   SLP - Transitional Care Unit Time   Individual Time (minutes) - SLP 30   TCU Total Session Time (minutes) - SLP 30   TCU Daily Total Session Time   SLP TCU Daily Total Session Time 30   Rehab TCU Daily Total Session Time 30

## 2024-06-16 NOTE — PLAN OF CARE
Goal Outcome Evaluation:      Plan of Care Reviewed With: patient    Overall Patient Progress: no change    Pt. AOx4. Able to make needs known. Ax1 with walker and GB. Attached to o2 at 2LPM during the night for comfort. [id number] checked, 95%. With Left PIV, antibiotics given at due time without notable reaction. Continent with bowel and bladder. Last BM: 6/16. Safety checks done. Call light within reach. Continue POC.              Patient's most recent vital signs are:     Vital signs:  BP: 108/60  Temp: 98  HR: 87  RR: 18  SpO2: 95 %     Patient does not have new respiratory symptoms.  Patient does not have new sore throat.  Patient does not have a fever greater than 99.5.

## 2024-06-16 NOTE — PLAN OF CARE
Goal Outcome Evaluation:    VS: BP (!) 140/94 (BP Location: Right arm)   Pulse 106   Temp 97.7  F (36.5  C) (Oral)   Resp 18   Ht 1.524 m (5')   Wt 53.7 kg (118 lb 6.4 oz)   LMP  (LMP Unknown)   SpO2 95%   BMI 23.12 kg/m       O2: 95% on RA   Output: Continent of bowel and bladder   Last BM: 6/16/24   Activity: A1 with walker and GB   Skin: Scattered bruises on BUE.    Pain: Denied    CMS: Alert and oriented with forgetfulness   Dressing: PIV L arm dressing CDI   Diet: Regular diet   LDA: PIV on L arm   Equipment: IV pole, walker, and GB   Plan: Will continue to follow POC   Additional Info: Refused morning care from SHIRA this shift and stated that her daughter will assist her with cares when she comes over for visit today. Pt daughter at bedside. Tolerated IV antibiotic. Denied CP, SOB, and N/V. Call light within reach. No acute issue this shift.

## 2024-06-17 ENCOUNTER — APPOINTMENT (OUTPATIENT)
Dept: OCCUPATIONAL THERAPY | Facility: SKILLED NURSING FACILITY | Age: 79
DRG: 194 | End: 2024-06-17
Attending: INTERNAL MEDICINE
Payer: MEDICARE

## 2024-06-17 ENCOUNTER — PATIENT OUTREACH (OUTPATIENT)
Dept: CARE COORDINATION | Facility: CLINIC | Age: 79
End: 2024-06-17
Payer: MEDICARE

## 2024-06-17 ENCOUNTER — APPOINTMENT (OUTPATIENT)
Dept: PHYSICAL THERAPY | Facility: SKILLED NURSING FACILITY | Age: 79
DRG: 194 | End: 2024-06-17
Attending: INTERNAL MEDICINE
Payer: MEDICARE

## 2024-06-17 DIAGNOSIS — Z53.9 DIAGNOSIS NOT YET DEFINED: Primary | ICD-10-CM

## 2024-06-17 LAB
ANION GAP SERPL CALCULATED.3IONS-SCNC: 9 MMOL/L (ref 7–15)
BUN SERPL-MCNC: 7.4 MG/DL (ref 8–23)
CALCIUM SERPL-MCNC: 8.2 MG/DL (ref 8.8–10.2)
CHLORIDE SERPL-SCNC: 107 MMOL/L (ref 98–107)
CREAT SERPL-MCNC: 0.98 MG/DL (ref 0.51–0.95)
DEPRECATED HCO3 PLAS-SCNC: 24 MMOL/L (ref 22–29)
EGFRCR SERPLBLD CKD-EPI 2021: 58 ML/MIN/1.73M2
ERYTHROCYTE [DISTWIDTH] IN BLOOD BY AUTOMATED COUNT: 22.9 % (ref 10–15)
GAMMA INTERFERON BACKGROUND BLD IA-ACNC: 0.03 IU/ML
GLUCOSE SERPL-MCNC: 88 MG/DL (ref 70–99)
HCT VFR BLD AUTO: 33 % (ref 35–47)
HGB BLD-MCNC: 9.1 G/DL (ref 11.7–15.7)
M TB IFN-G BLD-IMP: NEGATIVE
M TB IFN-G CD4+ BCKGRND COR BLD-ACNC: 9.97 IU/ML
MAGNESIUM SERPL-MCNC: 1.8 MG/DL (ref 1.7–2.3)
MCH RBC QN AUTO: 24 PG (ref 26.5–33)
MCHC RBC AUTO-ENTMCNC: 27.6 G/DL (ref 31.5–36.5)
MCV RBC AUTO: 87 FL (ref 78–100)
MITOGEN IGNF BCKGRD COR BLD-ACNC: 0 IU/ML
MITOGEN IGNF BCKGRD COR BLD-ACNC: 0.01 IU/ML
PHOSPHATE SERPL-MCNC: 2 MG/DL (ref 2.5–4.5)
PLATELET # BLD AUTO: 416 10E3/UL (ref 150–450)
POTASSIUM SERPL-SCNC: 4.1 MMOL/L (ref 3.4–5.3)
QUANTIFERON MITOGEN: 10 IU/ML
QUANTIFERON NIL TUBE: 0.03 IU/ML
QUANTIFERON TB1 TUBE: 0.04 IU/ML
QUANTIFERON TB2 TUBE: 0.03
RBC # BLD AUTO: 3.79 10E6/UL (ref 3.8–5.2)
SODIUM SERPL-SCNC: 140 MMOL/L (ref 135–145)
WBC # BLD AUTO: 7.9 10E3/UL (ref 4–11)

## 2024-06-17 PROCEDURE — G0180 MD CERTIFICATION HHA PATIENT: HCPCS | Performed by: FAMILY MEDICINE

## 2024-06-17 PROCEDURE — 82374 ASSAY BLOOD CARBON DIOXIDE: CPT | Performed by: PHYSICIAN ASSISTANT

## 2024-06-17 PROCEDURE — 97165 OT EVAL LOW COMPLEX 30 MIN: CPT | Mod: GO

## 2024-06-17 PROCEDURE — 36415 COLL VENOUS BLD VENIPUNCTURE: CPT | Performed by: PHYSICIAN ASSISTANT

## 2024-06-17 PROCEDURE — 93005 ELECTROCARDIOGRAM TRACING: CPT

## 2024-06-17 PROCEDURE — 250N000011 HC RX IP 250 OP 636: Mod: JZ | Performed by: PHYSICIAN ASSISTANT

## 2024-06-17 PROCEDURE — 250N000013 HC RX MED GY IP 250 OP 250 PS 637: Performed by: PHYSICIAN ASSISTANT

## 2024-06-17 PROCEDURE — 83735 ASSAY OF MAGNESIUM: CPT | Performed by: PHYSICIAN ASSISTANT

## 2024-06-17 PROCEDURE — 250N000013 HC RX MED GY IP 250 OP 250 PS 637: Performed by: INTERNAL MEDICINE

## 2024-06-17 PROCEDURE — 999N000157 HC STATISTIC RCP TIME EA 10 MIN

## 2024-06-17 PROCEDURE — 93010 ELECTROCARDIOGRAM REPORT: CPT | Performed by: INTERNAL MEDICINE

## 2024-06-17 PROCEDURE — 85027 COMPLETE CBC AUTOMATED: CPT | Performed by: PHYSICIAN ASSISTANT

## 2024-06-17 PROCEDURE — 120N000009 HC R&B SNF

## 2024-06-17 PROCEDURE — 84100 ASSAY OF PHOSPHORUS: CPT | Performed by: PHYSICIAN ASSISTANT

## 2024-06-17 PROCEDURE — 97530 THERAPEUTIC ACTIVITIES: CPT | Mod: GP

## 2024-06-17 PROCEDURE — 97535 SELF CARE MNGMENT TRAINING: CPT | Mod: GO

## 2024-06-17 PROCEDURE — 82565 ASSAY OF CREATININE: CPT | Performed by: PHYSICIAN ASSISTANT

## 2024-06-17 PROCEDURE — 97110 THERAPEUTIC EXERCISES: CPT | Mod: GP

## 2024-06-17 PROCEDURE — 99207 PR NO BILLABLE SERVICE THIS VISIT: CPT | Performed by: PHYSICIAN ASSISTANT

## 2024-06-17 RX ORDER — OLANZAPINE 2.5 MG/1
TABLET, FILM COATED ORAL
DISCHARGE
Start: 2024-06-17 | End: 2024-06-21

## 2024-06-17 RX ORDER — DONEPEZIL HYDROCHLORIDE 10 MG/1
TABLET, FILM COATED ORAL
Qty: 90 TABLET | Refills: 3 | Status: SHIPPED | OUTPATIENT
Start: 2024-06-17 | End: 2024-06-21

## 2024-06-17 RX ADMIN — POTASSIUM & SODIUM PHOSPHATES POWDER PACK 280-160-250 MG 1 PACKET: 280-160-250 PACK at 14:04

## 2024-06-17 RX ADMIN — OLANZAPINE 2.5 MG: 2.5 TABLET, FILM COATED ORAL at 08:31

## 2024-06-17 RX ADMIN — BUSPIRONE HYDROCHLORIDE 15 MG: 15 TABLET ORAL at 08:31

## 2024-06-17 RX ADMIN — CALCIUM POLYCARBOPHIL 1250 MG: 625 TABLET, FILM COATED ORAL at 08:31

## 2024-06-17 RX ADMIN — LIDOCAINE 2 PATCH: 4 PATCH TOPICAL at 20:31

## 2024-06-17 RX ADMIN — Medication 50 MCG: at 08:32

## 2024-06-17 RX ADMIN — Medication 1 CAPSULE: at 20:34

## 2024-06-17 RX ADMIN — FLUTICASONE FUROATE AND VILANTEROL TRIFENATATE 1 PUFF: 200; 25 POWDER RESPIRATORY (INHALATION) at 08:32

## 2024-06-17 RX ADMIN — FLUOXETINE HYDROCHLORIDE 40 MG: 20 CAPSULE ORAL at 08:31

## 2024-06-17 RX ADMIN — UMECLIDINIUM 1 PUFF: 62.5 AEROSOL, POWDER ORAL at 08:32

## 2024-06-17 RX ADMIN — PANTOPRAZOLE SODIUM 40 MG: 40 TABLET, DELAYED RELEASE ORAL at 08:31

## 2024-06-17 RX ADMIN — LEVOTHYROXINE SODIUM 50 MCG: 25 TABLET ORAL at 08:31

## 2024-06-17 RX ADMIN — ENOXAPARIN SODIUM 40 MG: 40 INJECTION SUBCUTANEOUS at 12:48

## 2024-06-17 RX ADMIN — ASPIRIN 81 MG: 81 TABLET, COATED ORAL at 08:31

## 2024-06-17 RX ADMIN — POTASSIUM & SODIUM PHOSPHATES POWDER PACK 280-160-250 MG 1 PACKET: 280-160-250 PACK at 16:53

## 2024-06-17 RX ADMIN — BUSPIRONE HYDROCHLORIDE 15 MG: 15 TABLET ORAL at 20:31

## 2024-06-17 RX ADMIN — POTASSIUM & SODIUM PHOSPHATES POWDER PACK 280-160-250 MG 1 PACKET: 280-160-250 PACK at 20:30

## 2024-06-17 RX ADMIN — LATANOPROST 1 DROP: 50 SOLUTION OPHTHALMIC at 20:30

## 2024-06-17 RX ADMIN — FERROUS GLUCONATE 324 MG: 324 TABLET ORAL at 08:31

## 2024-06-17 RX ADMIN — Medication 12.5 MG: at 08:32

## 2024-06-17 RX ADMIN — Medication 1 CAPSULE: at 08:31

## 2024-06-17 ASSESSMENT — ACTIVITIES OF DAILY LIVING (ADL)
ADLS_ACUITY_SCORE: 42
BADLS,_PREVIOUS_FUNCTIONAL_LEVEL: USES DEVICE OR EQUIPMENT
ADLS_ACUITY_SCORE: 42

## 2024-06-17 NOTE — PROGRESS NOTES
06/17/24 1100   Appointment Info   Signing Clinician's Name / Credentials (OT) Gloria Belle, OTR/L   Living Environment   People in Home spouse   Current Living Arrangements assisted living   Home Accessibility no concerns   Transportation Anticipated family or friend will provide   Living Environment Comments Lives in [place] with , IADLS assist   Self-Care   Usual Activity Tolerance fair   Current Activity Tolerance poor   Regular Exercise No   Equipment Currently Used at Home grab bar, toilet;grab bar, tub/shower;shower chair   Fall history within last six months no   Number of times patient has fallen within last six months 0   Previous Level of Function/Home Environm   Bathing, Previous Functional Level partial assistance  (reports daughter assists however couldn't recall)   Grooming, Previous Functional Level independent   Dressing, Previous Functional Level independent   Eating/Feeding, Previous Functional Level independent   Toileting, Previous Functional Level uses device or equipment   BADLs, Previous Functional Level uses device or equipment   Bed Mobility, Previous Functional Level independent   Transfers, Previous Functional Level independent;uses device or equipment   Household Ambulation, Previous Functional Level uses device or equipment   Stairs, Previous Functional Level uses device or equipment   Community Ambulation, Previous Functional Level uses device or equipment   General Information   Onset of Illness/Injury or Date of Surgery 06/09/24   Referring Physician Dr. Anna   Patient/Family Therapy Goal Statement (OT) To go home   Additional Occupational Profile Info/Pertinent History of Current Problem Per chart: is an 79 year old female with history of  COPD, HFrEF (EF 10-15%), PsA PNA (12/2023), HTN, cardiogenic shock during 5/2024 admission, dementia, CKD, HTN, depression, and anxiety who was admitted to [facility] 6/9/2024 with PNA. Stabilized then [facility] for  ongoing rehabilitation   Existing Precautions/Restrictions cardiac;fall   Left Upper Extremity (Weight-bearing Status) full weight-bearing (FWB)   Right Upper Extremity (Weight-bearing Status) full weight-bearing (FWB)   Left Lower Extremity (Weight-bearing Status) full weight-bearing (FWB)   Right Lower Extremity (Weight-bearing Status) full weight-bearing (FWB)   Cognitive Status Examination   Orientation Status orientation to person, place and time   Cognitive Status Comments Forgetfullness, occasional confusion   Visual Perception   Visual Impairment/Limitations corrective lenses full-time   Sensory   Sensory Quick Adds sensation intact   Pain Assessment   Patient Currently in Pain No   Posture   Posture forward head position   Range of Motion Comprehensive   General Range of Motion no range of motion deficits identified   Strength Comprehensive (MMT)   General Manual Muscle Testing (MMT) Assessment no strength deficits identified   Muscle Tone Assessment   Muscle Tone Quick Adds No deficits were identified   Coordination   Upper Extremity Coordination No deficits were identified   Clinical Impression   Criteria for Skilled Therapeutic Interventions Met (OT) Evaluation only   OT Diagnosis Decreased independence with ADLS   ADL comments/analysis Patient appears to be close to baseline   Clinical Decision Making Complexity (OT) problem focused assessment/low complexity   Risk & Benefits of therapy have been explained evaluation/treatment results reviewed   Clinical Impression Comments Patient appears to be close to baseline. Independent with ADLs in room. No skilled OT intervention recommended at this time.   OT Total Evaluation Time   OT Eval, Low Complexity Minutes (02348) 30   Therapy Certification   Start of Care Date 06/17/24   Certification date from 06/17/24   Certification date to 07/16/24   Medical Diagnosis PNA   OT Goals   Therapy Frequency (OT) One time eval and treatment   Self-Care/Home Management    Self-Care/Home Mgmt/ADL, Compensatory, Meal Prep Minutes (80360) 08   Treatment Detail/Skilled Intervention OT: Reviewed plan and OT eval only. See gg's below for current levels.   OT Discharge Planning   OT Plan Eval only   Post Acute Settings Only   What unit is patient on? TCU   OT- Transitional Care Unit Time   Individual Time (minutes) - OT 60   Group Time (minutes) - OT 0   Concurrent Time (minutes) - OT 0   Co-Treatment Time (minutes) - OT 0   TCU Total Session Time (minutes) - OT 60   TCU Daily Total Session Time   OT TCU Daily Total Session Time 60   Rehab TCU Daily Total Session Time 70   Upper Body Dressing - Ability to dress/undress above the waist, including fasteners   Patient Performance Independent   Lower Body Dressing - Ability to dress/undress below the waist, includes fasteners   Patient Performance Independent   Lower Body Dressing (Putting On/Taking-Off Footwear)   Patient Performance Independent   Eating - Ability to use utensils to bring food/liquid to mouth.   Patient Performance Independent   Toileting Hygiene - Ability to maintain perineal hygiene and adjust clothes   Patient Performance Independent   Toilet transfer - Ability to get on and off a toilet or commode   Patient Performance Independent   Equipment Used Rolling walker   Personal Hygiene - Ability to maintain personal hygiene (combing hair, shaving, apply makeup wash/dry face/hands.   Patient Performance Independent   Oral Hygiene - Ability to use suitable items to clean teeth.   Patient Performance Independent   Shower/bathe self - Ability to bathe self, includes washing and drying self   Patient Performance Supervision or verbal cues   Tub/Shower Transfer - Ability to get in and out of the tub/shower   Patient Performance Supervision or verbal cues

## 2024-06-17 NOTE — PLAN OF CARE
Goal Outcome Evaluation:      Plan of Care Reviewed With: patient    Overall Patient Progress: improving  Orientation: Alert and oriented x3 baseline forgetfulness. Able to make needs known to staff.   Bowel & Bladder: Continent of both bowel and bladder. Voids spontaneously without difficulty.  Medications: Takes medication whole with thin liquids.  Pain: None  Ambulation/Transfers: independently w/ four wheel walker.  Diet: Regular diet with good  appetite.  Tubes/Lines/Drains: IV to LUE is intact, dressing dry, and saline locked.  Oxygen: SOB with activity. Room air during the day and supplemental oxygen at 2LPM via nasal cannula  Infection/Isolation: No active isolations.  Safety: No concerns noted this shift. Door opened per patient's request.  Other: Walk test completed- results in flowsheets under respiration tab. Patient reported having oxygen at home but cannot remember the name of the supplier. Daughter at bedside during early hours of the shift. Started on overnight oxymetry on RA then supplemental oxygen 2LPM as machine consistently beeped. Patient receives oxygen via nasal cannula every night at bedtime. Call-light within reach. Utilizes call-light appropriately. Continue with POC.

## 2024-06-17 NOTE — PROGRESS NOTES
06/15/24 1056   Appointment Info   Signing Clinician's Name / Credentials (PT) Telma Dye DPT   Quick Adds   Quick Adds Certification      Language English   Living Environment   People in Home spouse   Living Environment Comments Lives in [place] with spouse. Has IADL assist from facility and family. Sleeps in recliner.   Self-Care   Number of times patient has fallen within last six months 0   Activity/Exercise/Self-Care Comment Fairly sedenetary at baseline, does not exercise. Does not express interests or hobbies.   Post-Acute Assessment Only   Post-Acute Functional Assessment See below   Previous Level of Function/Home Environm   Bathing, Previous Functional Level partial assistance   Grooming, Previous Functional Level partial assistance   Dressing, Previous Functional Level partial assistance   Eating/Feeding, Previous Functional Level independent   Toileting, Previous Functional Level uses device or equipment   BADLs, Previous Functional Level uses device or equipment   IADLs, Previous Functional Level dependent   Bed Mobility, Previous Functional Level independent   Transfers, Previous Functional Level uses device or equipment   Household Ambulation, Previous Functional Level uses device or equipment   Stairs, Previous Functional Level uses device or equipment   Community Ambulation, Previous Functional Level uses device or equipment   General Information   Referring Physician Angelita Anna MD   Patient/Family Therapy Goals Statement (PT) I want to go home   Pertinent History of Current Problem (include personal factors and/or comorbidities that impact the POC) Per chart: is an 79 year old female with history of  COPD, HFrEF (EF 10-15%), PsA PNA (12/2023), HTN, cardiogenic shock during 5/2024 admission, dementia, CKD, HTN, depression, and anxiety who was admitted to [facility] 6/9/2024 with PNA. Stabilized then transferred to  TCU 6/14/2024 for ongoing rehabilitation   Existing  Precautions/Restrictions   (2LPM with activity and at night)   Heart Disease Risk Factors Medical history   Cognition   Cognitive Status Comments Command following intact. Fairly down and withdrawn   Pain Assessment   Patient Currently in Pain No   Integumentary/Edema   Integumentary/Edema no deficits were identifed   Posture    Posture Forward head position;Kyphosis   Range of Motion (ROM)   Range of Motion ROM is WFL   Strength (Manual Muscle Testing)   Strength Comments General deconditioning 4/5 in all major BLE muscle groups. Functional capcity very low, fatigues after 50 ft of walking.   Balance   Balance Comments Safe and intact with 4WW.   Sensory Examination   Sensory Perception Comments Declined sensory test   Coordination   Coordination no deficits were identified   Muscle Tone   Muscle Tone no deficits were identified   Clinical Impression   Criteria for Skilled Therapeutic Intervention Yes, treatment indicated   PT Diagnosis (PT) deconditioning   Influenced by the following impairments see clinical impression comments   Functional limitations due to impairments see clinical impression comments   Clinical Presentation (PT Evaluation Complexity) stable   Clinical Presentation Rationale Low complexity care plan despite   Clinical Decision Making (Complexity) low complexity   Planned Therapy Interventions (PT) gait training;home exercise program;patient/family education;stair training;strengthening;transfer training;progressive activity/exercise;risk factor education;home program guidelines   Risk & Benefits of therapy have been explained evaluation/treatment results reviewed;care plan/treatment goals reviewed;risks/benefits reviewed;current/potential barriers reviewed;participants included;patient;daughter   Clinical Impression Comments Pt is below baseline for functional mobility s/p hospitalization related to pneumonia. She remains on TCU for IV antibiotics. PT to see her 2x/day until discharge to  increase endurance to allow her to access all areas of her [place] and increase durability. HCPT to resume after discharge.   PT Total Evaluation Time   PT Eval, Low Complexity Minutes (98214) 15   Therapy Certification   Start of care date 06/15/24   Certification date from 06/15/24   Certification date to 07/14/24   Medical Diagnosis physical deconditioning   Physical Therapy Goals   PT Frequency Daily  (2x daily until d/c on 6/18)   PT Predicted Duration/Target Date for Goal Attainment 06/17/24   PT Goals Gait;Stairs;PT Goal 1   PT: Gait Modified independent;Greater than 200 feet  (4WW, 2LPM O2)   PT: Stairs 3 stairs;Modified independent  (to access community)   PT: Goal 1 Car transfer into family vehicle, SBA   Interventions   Interventions Quick Adds Therapeutic Activity;Therapeutic Procedure   Therapeutic Procedure/Exercise   Ther. Procedure: strength, endurance, ROM, flexibillity Minutes (37781) 20   Treatment Detail/Skilled Intervention Functional endurance wiht 2LPM O2. Pt unable to manage hospital O2 tanks without assist. Amb 55 ft max before needing to stop and rest. Floor bike 30 seconds x 5. Needs max encouragement to continue. SpO2 >88% on 2LPM with activity. Without this, max dessat to 86% on RA.   Therapeutic Activity   Therapeutic Activities: dynamic activities to improve functional performance Minutes (25976) 10   Treatment Detail/Skilled Intervention Assessment for MOD I in room - safe with 4WW. Reviewed call light use prn, 4WW safety, and O2 use.   Total Session Time   Timed Code Treatment Minutes 30   Total Session Time (sum of timed and untimed services) 45   Post Acute Settings Only   What unit is patient on? TCU   PT - Transitional Care Unit Time   Individual Time (minutes) - PT 45   Group Time (minutes) - PT 0   Concurrent Time (minutes) - PT 0   Co-Treatment Time (minutes) - PT 0   TCU Total Session Time (minutes) - PT 45   TCU Daily Total Session Time   PT TCU Daily Total Session Time 45    Rehab TCU Daily Total Session Time 45   Bed Mobility: Turning side to side/Roll Left and Right   Patient Performance Independent   Staff Performance No setup or physical help (No setup or physical help from staff)   Bed Mobility: Sit to lying   Patient Performance Independent   Staff Performance No setup or physical help (No setup or physical help from staff)   Bed Mobility: Lying to sitting on the side of bed   Patient Performance Independent   Staff Performance No set up or physical help (No set up or physical help from staff)   Transfers: Sit to Stand   Patient Performance Independent   Staff Performance No set up or physical help (No set up or physical help from staff)   Transfers: Chair/Bed transfers   Patient Performance Independent   Staff Performance No set up or physical help (No set up or physical help from staff)   Equipment Used Rolling walker   Walk 10 Feet - Ability to walk once standing   Patient Performance Independent   Reason Not Done Not attempted due to environmental limitations (e.g., lack of equipment,weather constraints)   Walk 10 Feet on uneven surfaces - Ability to walk on various surfaces.   Patient Performance Steadying Assist   Walk 50 Feet with Two Turns - Ability to walk at least 50 feet.   Patient Performance Supervision or verbal cues   Describe Performance SBA with 4WW, assist with O2 tank management.   Walk 150 Feet - Ability to walk 150 feet   Reason Not Done Safety concerns   Wheel 50 Feet - Ability to move wheelchair/scooter   Reason Not Done Activity not applicable   Wheel 150 Feet - Ability to move wheelchair/scooter   Reason Not Done Activity not applicable   1 Step (curb) - Ability to go up/down 1 step/curb.   Reason Not Done Resident refused to perform   4 Steps - Ability to go up/down steps.   Reason Not Done Resident refused to perform   12 Steps - Ability to go up/down steps.   Reason Not Done Resident refused to perform   Car Transfer - Ability to transfer in/out of  car or van.   Reason Not Done Resident refused to perform   Picking up Object - Ability to bend/stoop while standing.   Reason Not Done Resident refused to perform

## 2024-06-17 NOTE — PLAN OF CARE
Physical Therapy Discharge Summary    Reason for therapy discharge:    Anticipate discharge to home [place] with home therapy referral in place, not yet set up.     Progress towards therapy goal(s). [facility] for goal details.  Goals partially met.  Barriers to achieving goals:   discharge from facility.    Therapy recommendation(s):    Continued therapy is recommended.  Rationale/Recommendations:  Home PT recommended to progress functional endurance & strength.  Continue home exercise program.  Ambulation 3-4x/day with 4WW, standing exercises 2x/day

## 2024-06-17 NOTE — PROGRESS NOTES
Clinic Care Coordination Contact  Care Coordination Transition Communication    Clinical Data: Patient was hospitalized at [facility] from 6/9/24 to 6/14/24 with diagnosis of Discharge Diagnoses  RLL consolidation, recurrent Pneumonia  Sepsis, present on admission  Leukocytosis  Trace L Pleural Effusion   HFrEF (EF 10-15%)  COPD  Chronic Hypoxic Respiratory Failure   Mild Hyponatremia  Elevated Troponin  Chronic Back Pain and Neck Pain  Normocytic Anemia: B Continue to trend.  CKD Stage 3  Dementia  HTN  Hypothyroidism  Anxiety, depression  GERD.     Assessment: Patient has transitioned to TCU/ARU for short term rehabilitation:    Facility Name: [facility]  Transition Communication:  Notified facility of Primary Care- Care Coordination support.    Plan: Care Coordinator will await notification from facility staff informing of patient's discharge plans/needs. Care Coordinator will review chart and outreach to facility staff every 4 weeks and as needed.     Mukul Carvalho MSN, RN, PHN, [facility]   Primary Care Clinical RN Care Coordinator  [facility]  6/17/2024   7:38 AM  [email or web address]  Office: [phone]

## 2024-06-17 NOTE — CARE PLAN
MDS Pain Assessment    The following is the pain interview as conducted by the TCU RN caring for the patient on June 17, 2024. This assessment is required by the [facility] for all patients in [place] SNF (Skilled Nursing Facilities).     . Pain Presence  Have you had pain or hurting at any time in the last 5 days?   1. Yes    . Pain Frequency  How much of the time have you experienced pain or hurting over the last 5 days?   2. Occasionally    . Pain Effect on Sleep  Over the past 5 days, how much of the time has pain made it hard for you to sleep at night?   1. Rarely or not at all    . Pain Interference with Therapy Activities  Over the past 5 days, how often have you limited your participation in rehabilitation therapy sessions due to pain?   1. Rarely or not at all    . Pain Interference with Day-to-Day Activities  Over the past 5 days, have you limited your day-to-day activities (excluding rehabilitation therapy sessions) because of pain?  1. Rarely or not at all    . Pain intensity   Numeric Rating Scale (00-10): Please rate your worst pain over the last 5 days on a zero to ten scale, with zero being no pain and ten as the worst pain you can imagine. 04

## 2024-06-17 NOTE — PROGRESS NOTES
TCU Notice of Medicare Non-Coverage Note:     Met with patient/family to Introduce self and role.     Discussed Notice of Medicare Non-Coverage and last day of coverage as required for all patients with Medicare coverage during Skilled Nursing Facility (SNF) stays.Informed patient/family that Medicare covers stay until midnight of day before discharge. TCU does not bill for discharge date.    Last coverage day is 6/19/24. Discharge date expected 6/18/24.    Opportunity provided for questions and education provided regarding potential financial impact to patient.     Patient/family verbalize(s) understanding of discussion.     Pt declined copy of NOMNC. Hard copy placed in pt hard chart.     ALBERT Berkowitz   [facility]   Covering TCU SW  TCU  Ph: [phone]  Also available via [employer]

## 2024-06-17 NOTE — PROGRESS NOTES
"FABIOLA received phone call from Asha at [facility] (ph: [phone]) looking for an update on how pt is doing. FABIOAL provided brief update per chart review. Asha shared that pt lives in their [place] and was mostly independent with cares PTA to hospital. Asha noted that pt recently has been in and out of the hospital within the last few months. Asha reported that pt family was assisting with managing pt's oxygen concentrator and pt medications. [facility] staff were assisting pt with escorts to dining room for meal time and occasionally with transfers. Asha shared that they are not able to provide cares if pt is requiring Ax2 or lift for transfers but are able to add on some services if pt/family agreeable and pt is needing more assistance at discharge. Asha reported that if pt is needing home care, choice up to pt/family but they have a good relationship with [facility] for home PT/OT. SW to keep Asha updated on discharge plan.     ADDENDUM 1305: Per IDT rounds, pt will be medically ready to discharge back to [facility] tomorrow.     FABIOLA met with pt at bedside to discuss discharge plan and ability to discharge back to [facility] tomorrow. Pt agreeable to discharge plan and noting that she would like to go back to [facility] as soon as possible. Pt reported that pt daughter would provide transportation but that she would need to discuss timing with her. SW to attempt to stop back to see if pt has transport time later today. Pt had no further questions or concerns for SW at this time. FABIOLA offered to call pt daughter but pt declined noting that she did not believe her daughter would have any questions.     FABIOLA spoke with Asha at [facility]. Asha confirmed that they could accept pt back tomorrow and \"the sooner, the better\" on timing. Asha requested that pt return to them sometime within business hours but before 1400 would be preferable. Asha requested that discharge orders be faxed to them at " [phone] tomorrow.     FABIOLA spoke with Danuta in Intake at [facility] who confirmed that they were working with pt prior to admission but had to discharge her when pt went to TCU. Danuta reported that they could accept pt back and were providing pt with PT/OT services. Danuta requested that FABIOLA send initial home care referral and also fax discharge orders to them at [phone]. Danuta reported that someone would call to confirm that they could accept pt.     FABIOLA faxed referral to [facility].     ADDENDUM 1436: FABIOLA received phone call from Toy in Intake at [facility] confirming that they received home care referral and would just need discharge paperwork tomorrow. FABIOLA confirmed that discharge orders would be sent tomorrow.     [facility] info added to AVS.     [facility]  Nursing line: [phone]    Asha, ph: [phone]  Fax: [phone]     [facility] (PT/OT)  Phone: [phone]  Fax: [phone]     [facility]  Phone  [phone]  Fax  [phone]      ALBERT Berkowitz   [facility]   Covering TCU SW  TCU FABIOLA Ph: [phone]

## 2024-06-17 NOTE — CARE PLAN
Care Coordination:    Admitting diagnosis: RLL consolidation, recurrent Pneumonia; Sepsis; Physical deconditioning    Date of admission: 6/14/2024      Discharge Date: 06/18/2024  Discharge Location: Assisted Living Facility- [facility] in [facility] ([phone])   Transportation: Family provides transportation - daughter will arrive at 10am.     PCP:  Gomez Louis  [address] / [facility] [address]  [phone]    Homecare:  [facility] - Per daughter, wants to stay with [facility]. Cld [facility] [phone]  Lines/Drains/Skin:  PIV - to be pulled at discharge           Equipment Needs:  O2 via NC at 2 LPM.     Medications:  Patient self adminiters meds and daughter sets them up.       Questions:      Follow up appointments:      Communication:  6/17: Talked to Asha with [facility]. Per Asha, daughter will be transporting her back to the facility. Family is managing meds and oxygen. Oxygen through [facility].   Patient has a concentrator in the room and only had portable when continuous, which she is no longer on continuous. Orders to be faxed to [phone]  6/17: cld daughter, Barbara. She will be here at 1030am.

## 2024-06-17 NOTE — PROGRESS NOTES
Brief Medicine Note    Contacted by nursing regarding EKG completed.     Today's vital signs, medications, and nursing notes were reviewed. Labs pending this morning, will review when available.     /82 (BP Location: Right arm)   Pulse 92   Temp 97.7  F (36.5  C) (Oral)   Resp 18   Ht 1.524 m (5')   Wt 54.3 kg (119 lb 9.6 oz)   LMP  (LMP Unknown)   SpO2 94%   BMI 23.36 kg/m        A/P:  Qtc prolongation  - Qtc calculates to ~513ms  - hold: seroquel, donezapil and zofran for now (not recently needing zofran)  - check lytes  - repeat EKG in a.m   - monitor for cardiopulm sx    Polly Wilson PA-C  [facility]  Contact information available via [facility] Paging/Directory    Addendum:  Patient seen in preparation for discharge, doing well in general, patient advised I can speak to her daughter denia Bob about her medications  - prepare for discharge to [facility] tomorrow

## 2024-06-17 NOTE — PHARMACY-CONSULT NOTE
Inpatient Pharmacy Consult     Pharmacy was consulted to review patient medication list for medications that may contribute to QTc prolongation. The current medication list was reviewed. Medications with risk of QTc prolongation identified include the following:     Donepezil: known risk of QTc prolongation. Patient has been taking this medication for around a year from chart review with only one EKG showing a Qtc >500  Ondansetron: known risk of QTc prolongation. Patient has not used any doses since arrival at TCU. If a medication is needed for nausea, scopolamine patches do not increase risk of QTc prolongation.   Olanzapine, fluoxetine and pantoprazole have a moderate risk of QTc prolongation. From chart review, it appears patient has been on fluoxetine and pantoprazole for at least year.    Thank you,   Yu Parekh, MonicaD

## 2024-06-17 NOTE — TELEPHONE ENCOUNTER
[employer] message sent to pt to see how she is doing at [facility].     Agueda Dodd RN

## 2024-06-17 NOTE — PLAN OF CARE
Goal Outcome Evaluation:      Plan of Care Reviewed With: patient          Outcome Evaluation: Patient alert with intermittent confusion. Able to make needs known, call light within reach. Patient denies SOB, chest pain, and nausea. C/o abdominal pain, requested pain medication. Patient on 2 L of oxygen via NC overnight, sats >90%. Patient has dyspnea on exertion. Patient Ax1 with walker overnight, per report, patient is Mod I in room with walker. Continent of bowel and bladder, BM this shift, voiding without difficulties, patient up to the bathroom this shift. Bruising on bilateral forearms. Left PIV patent and SL. patient sleeping comfortably overnight, no acute changes.    Continue with plan of care       Lab needed

## 2024-06-17 NOTE — PLAN OF CARE
Goal Outcome Evaluation:  Plan of Care Reviewed With: patient  Overall Patient Progress: no change    Outcome Evaluation: No acute changes this shift.    RN Shift: 1500 - 1930    Vital Signs: Temp: 97.8  F (36.6  C) Temp src: Oral BP: (!) 163/80 Pulse: 96   Resp: 18 SpO2: 97 % O2 Device: None (Room air)      CMS/Neuro: A&O x4 - some confusion at times     Cardio/Resp: Denies SOB or chest pain     Output: Continent of B/B - up to toilet     Activity: Assist x 1 - SBA      Pain: 3/10 - Denies need for intervention     LDA: L PIV forearm - Patent, Currently SL  >>> Flushed and placed new green cap on       Caitlyn Smith, RN, BSN, PHN

## 2024-06-17 NOTE — DISCHARGE INSTRUCTIONS
Primary Care Provider  You are scheduled to see Dr. Louis on 6/21/2024 at 9:20am.    Address [address] [place], [place] [id number]     Phone   [phone]      Home Health  [facility]   Resuming PT/OT services  Phone: [phone]  [facility] should be calling you after discharge to schedule the first visit. If you do not hear from them within 48 hours of discharging you should call them at the phone number listed above.     
100% of the time/able to follow multistep instructions

## 2024-06-17 NOTE — PLAN OF CARE
AOx4, some confusion, SBA /MOD for ADLs. Cont of B&B. LBM 6/17.  Diet reg /Thin/ Meds whole.  Patient denies pain, VSS at RA. No SOB and no Chest Pain reported.  No Acute event noted during shift. Will continue to monitor.

## 2024-06-18 ENCOUNTER — PRE VISIT (OUTPATIENT)
Dept: CARDIOLOGY | Facility: CLINIC | Age: 79
End: 2024-06-18
Payer: MEDICARE

## 2024-06-18 ENCOUNTER — TELEPHONE (OUTPATIENT)
Dept: FAMILY MEDICINE | Facility: CLINIC | Age: 79
End: 2024-06-18

## 2024-06-18 VITALS
RESPIRATION RATE: 18 BRPM | DIASTOLIC BLOOD PRESSURE: 59 MMHG | SYSTOLIC BLOOD PRESSURE: 115 MMHG | WEIGHT: 119.6 LBS | HEART RATE: 80 BPM | TEMPERATURE: 98.2 F | BODY MASS INDEX: 23.48 KG/M2 | HEIGHT: 60 IN | OXYGEN SATURATION: 93 %

## 2024-06-18 DIAGNOSIS — J18.9 PNEUMONIA: Primary | ICD-10-CM

## 2024-06-18 DIAGNOSIS — R94.31 PROLONGED QT INTERVAL: Primary | ICD-10-CM

## 2024-06-18 LAB
ATRIAL RATE - MUSE: 100 BPM
DIASTOLIC BLOOD PRESSURE - MUSE: NORMAL MMHG
INTERPRETATION ECG - MUSE: NORMAL
P AXIS - MUSE: 94 DEGREES
PR INTERVAL - MUSE: 162 MS
QRS DURATION - MUSE: 70 MS
QT - MUSE: 402 MS
QTC - MUSE: 518 MS
R AXIS - MUSE: 90 DEGREES
SYSTOLIC BLOOD PRESSURE - MUSE: NORMAL MMHG
T AXIS - MUSE: 18 DEGREES
VENTRICULAR RATE- MUSE: 100 BPM

## 2024-06-18 PROCEDURE — 93005 ELECTROCARDIOGRAM TRACING: CPT

## 2024-06-18 PROCEDURE — 250N000013 HC RX MED GY IP 250 OP 250 PS 637: Performed by: PHYSICIAN ASSISTANT

## 2024-06-18 PROCEDURE — 93010 ELECTROCARDIOGRAM REPORT: CPT | Performed by: INTERNAL MEDICINE

## 2024-06-18 PROCEDURE — 250N000013 HC RX MED GY IP 250 OP 250 PS 637: Performed by: INTERNAL MEDICINE

## 2024-06-18 PROCEDURE — 99316 NF DSCHRG MGMT 30 MIN+: CPT | Performed by: PHYSICIAN ASSISTANT

## 2024-06-18 PROCEDURE — 022N000001 HC SNF RUG CODE OPNP

## 2024-06-18 RX ADMIN — ASPIRIN 81 MG: 81 TABLET, COATED ORAL at 08:35

## 2024-06-18 RX ADMIN — FLUOXETINE HYDROCHLORIDE 40 MG: 20 CAPSULE ORAL at 08:35

## 2024-06-18 RX ADMIN — Medication 1 CAPSULE: at 08:35

## 2024-06-18 RX ADMIN — LEVOTHYROXINE SODIUM 50 MCG: 25 TABLET ORAL at 08:34

## 2024-06-18 RX ADMIN — PANTOPRAZOLE SODIUM 40 MG: 40 TABLET, DELAYED RELEASE ORAL at 08:35

## 2024-06-18 RX ADMIN — UMECLIDINIUM 1 PUFF: 62.5 AEROSOL, POWDER ORAL at 08:34

## 2024-06-18 RX ADMIN — FERROUS GLUCONATE 324 MG: 324 TABLET ORAL at 08:35

## 2024-06-18 RX ADMIN — Medication 50 MCG: at 08:34

## 2024-06-18 RX ADMIN — CALCIUM POLYCARBOPHIL 1250 MG: 625 TABLET, FILM COATED ORAL at 08:35

## 2024-06-18 RX ADMIN — Medication 12.5 MG: at 08:35

## 2024-06-18 RX ADMIN — POTASSIUM & SODIUM PHOSPHATES POWDER PACK 280-160-250 MG 1 PACKET: 280-160-250 PACK at 08:34

## 2024-06-18 RX ADMIN — BUSPIRONE HYDROCHLORIDE 15 MG: 15 TABLET ORAL at 08:34

## 2024-06-18 ASSESSMENT — ACTIVITIES OF DAILY LIVING (ADL)
ADLS_ACUITY_SCORE: 42

## 2024-06-18 NOTE — PLAN OF CARE
AOx4, SBA/Walker and A1 for cares. Patient denies pain and SOB , No chest pain noted. Dicharged Teaching completed with daughter and Patient. All Participant voice understanding. All Discharged Document faxed to [facility] . Patient left with all personal belongings and copy of discharged summary.

## 2024-06-18 NOTE — PLAN OF CARE
Goal Outcome Evaluation:      Plan of Care Reviewed With: patient    Overall Patient Progress: no changeOverall Patient Progress: no change    FOCUS/GOAL     Bowel management, Bladder management, Medication management, Wound care management, Medical management, Mobility, Skin integrity, and Safety management     ASSESSMENT, INTERVENTIONS AND CONTINUING PLAN FOR GOAL:     Pt is A&OX4, calm, & cooperative with care. Intermittent confusion. Denied CP, SOB, & n/v. On 2L O2 via NC at night & did overnight oximetry this shift. Pt transfers SBA with walker. Continent for both B&B; uses the bathroom. Takes meds whole with thin liquid. PIV to the L upper forearm intact & patent. Pt is scheduled to discharge to [facility] today. Pt slept well for most of the shift & no other acute concern. Able to make needs known & call light within reach. Continue with plan of care.

## 2024-06-18 NOTE — TELEPHONE ENCOUNTER
She is not diabetic  No need for Blood sugar check    Jaradiance was started by her Cardiology for CHF    She should be on Lasix at 40 mg, based on her Cardiology recommendation    For any other recommendations regarding her weight  Please Contact CORE team, she does follow up with CORE team and Cardiology for CHF    Thanks

## 2024-06-18 NOTE — TELEPHONE ENCOUNTER
Asha from Assisted Living is calling asking for clarification on orders that they received from TCU.   Patient just came back today. She was hospitalized from - for pneumonia, then discharged to TCU from -today.    Orders from TCU that need to be clarified:  -Blood glucose monitorin times a day before meals and at bedtime until you follow-up with primary care.  Asha is not entirely sure why these were being done. Patient is not diabetic. She was taking Jardiance which was discontinued. If PCP wants blood sugar levels checked, [facility] does not do this. Family would need to be willing/able to do it and provider would need to send orders for blood testing supplies to pharmacy. Okay to discontinue this order?    -Daily weight. If PCP would like a daily weight, they can do this, but would need parameters for when they need to notify provider.   They don't have any parameters.  Pt was on Lasix for CHF previously, but currently not taking the lasix.     She would like to request that PCP discuss orders for tubigrips with pt's daughter at patient's upcoming appointment on .  Per Asha, pt was using tubigrips all the time. They would be changed out at night with a new pair and patient would wearing them all day for edema. Pt's daughter did not want them on currently, but asked them to be re-started after appt.      Pt has lymphedema appt on  to see if she qualifies for lymphedema wraps, so tubigrips might just be for short duration anyway.    Anything medication related, the daughter is managing. Daughter sets up meds for patient. They don't do anything with that.    Julienne Paris RN

## 2024-06-18 NOTE — CARE PLAN
MDS Pain Assessment    The following is the pain interview as conducted by the TCU RN caring for the patient on June 18, 2024. This assessment is required by the [facility] for all patients in [place] SNF (Skilled Nursing Facilities).     . Pain Presence  Have you had pain or hurting at any time in the last 5 days?   0. No    . Pain Frequency  How much of the time have you experienced pain or hurting over the last 5 days?   1. Rarely or not at all    . Pain Effect on Sleep  Over the past 5 days, how much of the time has pain made it hard for you to sleep at night?   1. Rarely or not at all    . Pain Interference with Therapy Activities  Over the past 5 days, how often have you limited your participation in rehabilitation therapy sessions due to pain?   1. Rarely or not at all    . Pain Interference with Day-to-Day Activities  Over the past 5 days, have you limited your day-to-day activities (excluding rehabilitation therapy sessions) because of pain?  1. Rarely or not at all    . Pain intensity   Numeric Rating Scale (00-10): Please rate your worst pain over the last 5 days on a zero to ten scale, with zero being no pain and ten as the worst pain you can imagine. 00

## 2024-06-18 NOTE — DISCHARGE SUMMARY
[facility]  Hospitalist Discharge Summary      Date of Admission:  6/14/2024  Date of Discharge:  6/18/2024 10:45 AM  Discharging Provider: Polly Wilson PA-C  Discharge Service: Hospitalist Service    Discharge Diagnoses   Physical deconditioning  Hx of cardiogenic shock (5/2024)  RLL consolidation, recurrent Pneumonia  Sepsis, present on admission  Leukocytosis  Trace Left Pleural Effusion   HFrEF (EF 10-15%)  COPD  Chronic Hypoxic Respiratory Failure   Mild Hyponatremia  Elevated Troponin  Chronic Back Pain and Neck Pain  Normocytic Anemia  CKD Stage 3  Dementia  HTN  Hypothyroidism  Anxiety, depression  GERD    Clinically Significant Risk Factors          Follow-ups Needed After Discharge   Follow-up Appointments     Follow Up ([facility])      Follow up with primary care provider, Gomez Louis, within 7 days for   hospital follow- up.  The following labs/tests are recommended: CBC, BMP,   phosphorus.      Follow up with as scheduled w/ Dr. Chanel 7/10/24 of cardiology    Appointments on [place] and/or [facility] (with [facility] or [facility]   provider or service). Call [phone] if you haven't heard regarding   these appointments within 7 days of discharge.            Unresulted Labs Ordered in the Past 30 Days of this Admission       No orders found from 5/15/2024 to 6/15/2024.            Discharge Disposition   Discharged to assisted living  Condition at discharge: Stable    Hospital Course   In brief:     Idalia Bob is an 79F w/ PMH of COPD, HFrEF (EF 10-15%), PsA PNA (12/2023), HTN, cardiogenic shock during 5/2024 admission, dementia, CKD, HTN, depression, and anxiety who was admitted to [facility] 6/9/2024 with PNA. [facility]abil[facility] then [facility].  She did well with therapies and met criteria to discharge on 6/18/24.     -------------------------------------------------------    Extended, problem-based course:      Recurrent PNA,  RLL  Etiology likely silent aspiration with large hiatal hernia. Admitted with sepsis, started on Zosyn, see hospital notes for details. Last CXR 6/13 with stable chronic-appearing pulmonary opacities, small L pleural effusion, hiatal hernia. VSS on transfer to TCU 6/14. Video swallow 5/21 without aspiration.   - ID followed at [facility]: completed course of Zosyn through 6/17 and on Probiotic during that time   - SLP followed at TCU, see note for full details  - Regular diet   - follow up with GI (referral placed) for hiatal hernia      HFrEF 2/2 NICM, HTN  TTE 5/2024 EF 10-15%. BNP 10,435 6/12 12,428 (6/9). Follows [facility] with [facility], last seen 6/4/14. PTA on lasix, losartan, and Jardiance both of which were held during admission due to infection   - losartan resumed at 12.5mg every day and tolerating  - held Jardiance given low glucose, message sent to cardiology  - Continue ASA  - Follow up with cards 7/10 as planned  - Daily weights,  monitor I&Os  - follow with cardiology (message sent to cardiology NP and RN)    Qtc prolongation  Discussed with patient and her daughter on 6/17. 6/17 EKG Qtc calculates to ~513ms, on 6/18 Qtc was 480ms.   - recommended consider hold seroquel  - patient's daughter notes significant improvement on olanzapine, suggested trialing 1/2 dose (2.5mg daily)  - hold: donezapil and zofran for now (not recently needing zofran)  - check lytes with PCP  - repeat EKG with PCP  - monitor for cardiopulm sx     COPD, chronic hypoxic respiratory failure: BL 2L O2 at night and with activity. PTA on Pulmicort and albuterol. No e/p acute exacerbation during recent admission. VSS on BL O2 needs  - Continue PTA meds  - Needs OP pulmonology follow up, scheduled 9/17    Borderline glucose - jardiance held at TCU since 6/14     Other Medical Issues:  Physical deconditioning: In the setting of the above. PT, OT consulted  Large Hiatal Hernia: Per discharge summary, need OP follow up with general surgery or  thoracic surgery. This has not been set up at time of discharge and suggestion was to follow up with PCP to further discuss - GI referral placed   Chronic pain: Back and neck. Continue Tylenol, Lidoderm patch, prn oxycodone   Chronic normocytic anemia: BL hgb 8.5-10 and stable. Monitor   CKD III: BL Cr 0.9-1.2 and sable. Trend   Dementia: see notes regarding Qtc for Donepezil and Zyprexa. PT, OT consulted as above  Hypothyroidism: TSH 1.36. Continue PTA synthroid    Anxiety, depression: Continue PTA buspar and prozac   GERD: Cont ine PPI         Consultations This Hospital Stay   SPEECH LANGUAGE PATH ADULT IP CONSULT  PHYSICAL THERAPY ADULT IP CONSULT  PHARMACY IP CONSULT  PHYSICAL THERAPY ADULT IP CONSULT  OCCUPATIONAL THERAPY ADULT IP CONSULT    Code Status   No CPR- Do NOT Intubate    Time Spent on this Encounter   IPolly PA-C, personally saw the patient today and spent greater than 30 minutes discharging this patient.       KEITH Yanes [facility]ARE UNIT [address] [address]  Phone: [phone]  ______________________________________________________________________    Physical Exam   Vital Signs: Temp: 97.4  F (36.3  C) Temp src: Oral BP: 128/75 Pulse: 98   Resp: 18 SpO2: 97 % O2 Device: Nasal cannula Oxygen Delivery: 2 LPM  Weight: 119 lbs 9.6 oz  GENERAL: Alert and oriented x 3. NAD. Sitting upright at the edge of the bed and appears comfortable. Cooperative.   HEENT: Anicteric sclera. Mucous membranes moist. NC. AT. Pupils equal and round  CV: RRR. S1, S2. No murmurs appreciated.   RESPIRATORY: Effort normal on RA. Lungs CTAB with no wheezing, rales, rhonchi.   GI: Abdomen soft, NABS. No tenderness.   NEUROLOGICAL: No focal deficits. Moves all extremities.  CN 2-12 grossly intact.  EXTREMITIES: No pitting peripheral edema. Intact bilateral pedal pulses.   SKIN: No jaundice. No rashes on exposed skin.  BACK: no CVA  tenderness, no spinous tenderness         Primary Care Physician   Gomez Louis    Discharge Orders      Adult GI  Referral - Consult Only      Adult Pulmonary Medicine  Referral      Reason for your hospital stay    Sepsis due to pneumonia with associated physical deconditioning     Activity    Your activity upon discharge: activity as tolerated and no driving     Monitor and record    blood glucose 4 times a day, before meals and at bedtime until you follow up with your primary, bring a record to your primary care provider  weight every day  Check your temperature if you have a fever     When to contact your care team    Call your primary doctor or return to the ER if you have any of the following: temperature greater than 100.4,  increased shortness of breath, increased drainage, increased swelling, increased pain, inability to pass urine or any new or concerning symptoms.     Follow Up ([facility])    Follow up with primary care provider, Gomez Louis, within 7 days for hospital follow- up.  The following labs/tests are recommended: CBC, BMP, phosphorus.      Follow up with as scheduled w/ Dr. Chanel 7/10/24 of cardiology    Appointments on [place] and/or [facility] (with [facility] or [facility] provider or service). Call [phone] if you haven't heard regarding these appointments within 7 days of discharge.     Physical Therapy Adult Consult    Evaluate and treat as clinically indicated.    Reason:  Physical deconditioning     Occupational Therapy Adult Consult    Evaluate and treat as clinically indicated.    Reason:  Physical deconditioning and assistance with ADLs     Diet    Follow this diet upon discharge: Orders Placed This Encounter      Snacks/Supplements Adult: Other; Please allow pt/RN to order snacks/supplements PRN; Between Meals      Regular Diet Adult       Significant Results and Procedures   Most Recent 3 CBC's:  Recent Labs   Lab Test 06/17/24  1119 06/14/24  0700  06/13/24  0722   WBC 7.9 7.6 9.6   HGB 9.1* 9.3* 10.6*   MCV 87 88 87    323 364     Most Recent 3 BMP's:  Recent Labs   Lab Test 06/17/24  1119 06/14/24  1713 06/13/24  2203 06/13/24  0722 06/12/24  1315     --   --  139 137   POTASSIUM 4.1  --  4.6 3.4 3.3*   CHLORIDE 107  --   --  107 106   CO2 24  --   --  21* 19*   BUN 7.4*  --   --  8.2 9.6   CR 0.98* 1.08*  --  0.84 0.95   ANIONGAP 9  --   --  11 12   AYUSH 8.2*  --   --  8.5* 8.2*   GLC 88  --   --  91 119*     Most Recent 2 LFT's:  Recent Labs   Lab Test 06/09/24  1846 05/19/24  0448   AST 17 16   ALT 9 19   ALKPHOS 71 70   BILITOTAL 0.6 0.6     Most Recent 3 BNP's:  Recent Labs   Lab Test 06/12/24  1624 06/09/24  1846 06/04/24  1041 05/15/24  0929 05/14/24  1549   NTBNPI 10,435* 12,428*  --  26,696*  --    NTBNP  --   --  24,306*  --  31,556*     Most Recent TSH and T4:  Recent Labs   Lab Test 05/14/24  1549   TSH 1.36     Most Recent 6 glucoses:  Recent Labs   Lab Test 06/17/24  1119 06/13/24  0722 06/12/24  1315 06/11/24  0711 06/10/24  2028 06/10/24  1344   GLC 88 91 119* 78 114* 88       Discharge Medications   Current Discharge Medication List        CONTINUE these medications which have CHANGED    Details   donepezil (ARICEPT) 10 MG tablet Hold off on taking this medication until you follow up with your primary care provider  Qty: 90 tablet, Refills: 3    Associated Diagnoses: Mild vascular dementia, unspecified whether behavioral, psychotic, or mood disturbance or anxiety (H)      OLANZapine (ZYPREXA) 2.5 MG tablet Decrease dose to once daily    Associated Diagnoses: Agitation           CONTINUE these medications which have NOT CHANGED    Details   acetaminophen (TYLENOL) 325 MG tablet Take 325-650 mg by mouth every 6 hours as needed for mild pain      albuterol (PROVENTIL) (2.5 MG/3ML) 0.083% neb solution Take 1 vial (2.5 mg) by nebulization 2 times daily as needed (COPD)  Qty: 90 mL, Refills: 5    Associated Diagnoses: Pulmonary  emphysema, unspecified emphysema type (H)      aspirin 81 MG EC tablet Take 1 tablet (81 mg) by mouth daily  Qty: 30 tablet, Refills: 0    Associated Diagnoses: Acute on chronic congestive heart failure, unspecified heart failure type (H)      busPIRone (BUSPAR) 15 MG tablet Take 15 mg by mouth 2 times daily    Associated Diagnoses: AMRIT (generalized anxiety disorder)      calcium carbonate (TUMS) 500 MG chewable tablet Take 1 tablet (500 mg) by mouth 4 times daily as needed for heartburn    Associated Diagnoses: Gastroesophageal reflux disease, unspecified whether esophagitis present      Calcium Polycarbophil (FIBER) 625 MG tablet Take 2 tablets by mouth daily      diphenhydrAMINE-zinc acetate (BENADRYL) 2-0.1 % external cream Apply topically 3 times daily as needed for itching    Associated Diagnoses: Itching      ferrous gluconate (FERGON) 324 (38 Fe) MG tablet Take 1 tablet (324 mg) by mouth daily (with breakfast)  Qty: 90 tablet, Refills: 3    Associated Diagnoses: Other iron deficiency anemia      FLUoxetine (PROZAC) 40 MG capsule Take 1 capsule (40 mg) by mouth daily    Associated Diagnoses: Pulmonary emphysema, unspecified emphysema type (H)      Fluticasone-Umeclidin-Vilanterol (TRELEGY ELLIPTA) 200-62.5-25 MCG/ACT oral inhaler Inhale 1 puff into the lungs daily      hyoscyamine (LEVSIN) 0.125 MG tablet TAKE 1 TABLET (125 MCG) BY MOUTH EVERY 4 HOURS AS NEEDED FOR CRAMPING OR DIARRHEA  Qty: 30 tablet, Refills: 1    Associated Diagnoses: Abdominal cramps      ipratropium - albuterol 0.5 mg/2.5 mg/3 mL (DUONEB) 0.5-2.5 (3) MG/3ML neb solution Take 1 vial (3 mLs) by nebulization every 6 hours as needed for shortness of breath, wheezing or cough    Associated Diagnoses: Pneumonia of right lower lobe due to infectious organism      lactobacillus rhamnosus, GG, (CULTURELL) capsule Take 1 capsule by mouth daily      latanoprost (XALATAN) 0.005 % ophthalmic solution Place 1 drop into both eyes at bedtime     Associated Diagnoses: Dry eyes      levothyroxine (SYNTHROID/LEVOTHROID) 50 MCG tablet Take 1 tablet (50 mcg) by mouth daily    Associated Diagnoses: Hypothyroidism, unspecified type      losartan (COZAAR) 25 MG tablet Take 0.5 tablets (12.5 mg) by mouth daily  Qty: 30 tablet, Refills: 0    Associated Diagnoses: Acute on chronic congestive heart failure, unspecified heart failure type (H)      melatonin 1 MG TABS tablet Take 1 tablet (1 mg) by mouth nightly as needed for sleep    Associated Diagnoses: Insomnia, unspecified type      oxyCODONE (ROXICODONE) 5 MG tablet 1/2 tab at bedtime as needed for severe pain. ( Need to last for one month )  Qty: 15 tablet, Refills: 0    Associated Diagnoses: Back pain, unspecified back location, unspecified back pain laterality, unspecified chronicity      pantoprazole (PROTONIX) 40 MG EC tablet Take 1 tablet (40 mg) by mouth daily  Qty: 90 tablet, Refills: 3    Associated Diagnoses: Gastroesophageal reflux disease, unspecified whether esophagitis present      senna-docusate (SENOKOT-S/PERICOLACE) 8.6-50 MG tablet Take 1 tablet by mouth 2 times daily as needed for constipation    Associated Diagnoses: Constipation, unspecified constipation type      triamcinolone (KENALOG) 0.1 % external cream Apply topically 2 times daily as needed for irritation    Associated Diagnoses: Itching      vitamin D3 (CHOLECALCIFEROL) 50 mcg (2000 units) tablet Take 1 tablet (50 mcg) by mouth daily    Associated Diagnoses: Age-related osteoporosis without current pathological fracture           STOP taking these medications       empagliflozin (JARDIANCE) 10 MG TABS tablet Comments:   Reason for Stopping:         piperacillin-tazobactam (ZOSYN) 3-0.375 GM vial Comments:   Reason for Stopping:             Allergies   Allergies   Allergen Reactions    Penicillins Itching     Has tolerated ceftriaxone, piperacillin, and amoxicillin

## 2024-06-18 NOTE — PROGRESS NOTES
Discharge Plan     Discharge Date: 6/18/2024  Discharge Disposition:  Pt home      [facility]  Nursing line: [phone]    Asha, ph: [phone]  Fax: [phone]  *Discharge orders faxed at 0935     Discharge Services:  Home PT/OT through [facility]     ORDERS AND DISCHARGE SUMMARY  FAXED [phone] at 0937     [facility] (PT/OT)  Phone: [phone]  Fax: [phone]     [facility]  Phone  [phone]  Fax  [phone]    Discharge Supplies: All DME supplied by PT/OT      Discharge Transportation: Pt daughter providing transport around 1100.        ALBERT Berkowitz   [facility]   Covering TCU SW  TCU SW Ph: [phone]

## 2024-06-19 ENCOUNTER — TELEPHONE (OUTPATIENT)
Dept: PULMONOLOGY | Facility: CLINIC | Age: 79
End: 2024-06-19
Payer: MEDICARE

## 2024-06-19 ENCOUNTER — PATIENT OUTREACH (OUTPATIENT)
Dept: CARE COORDINATION | Facility: CLINIC | Age: 79
End: 2024-06-19
Payer: MEDICARE

## 2024-06-19 ENCOUNTER — MEDICAL CORRESPONDENCE (OUTPATIENT)
Dept: HEALTH INFORMATION MANAGEMENT | Facility: CLINIC | Age: 79
End: 2024-06-19
Payer: MEDICARE

## 2024-06-19 LAB
ATRIAL RATE - MUSE: 94 BPM
DIASTOLIC BLOOD PRESSURE - MUSE: NORMAL MMHG
INTERPRETATION ECG - MUSE: NORMAL
P AXIS - MUSE: 47 DEGREES
PR INTERVAL - MUSE: 158 MS
QRS DURATION - MUSE: 68 MS
QT - MUSE: 386 MS
QTC - MUSE: 482 MS
R AXIS - MUSE: 78 DEGREES
SYSTOLIC BLOOD PRESSURE - MUSE: NORMAL MMHG
T AXIS - MUSE: 48 DEGREES
VENTRICULAR RATE- MUSE: 94 BPM

## 2024-06-19 NOTE — PROGRESS NOTES
Clinic Care Coordination Contact  Care Coordination Clinician Chart Review    Situation: Patient chart reviewed by care coordinator.    Background: Clinic Care Coordination Referral received from inpatient care team for transition handoff communication following hospital admission.    Assessment: Upon chart review, patient is not a candidate for Primary Care Clinic Care Coordination enrollment due to reason stated below:  The patient lives in a group home and we do not have a phone number for the facility.    Plan/Recommendations: Clinic Care Coordination Referral/order cancelled. RN/SW CC will perform no further monitoring/outreaches at this time and will remain available as needed. If new needs arise, a new Care Coordination Referral may be placed.    Mukul Carvalho MSN, RN, PHN, CCM   Primary Care Clinical RN Care Coordinator  [facility]  6/19/2024   9:25 AM  [email or web address]  Office: [phone]

## 2024-06-19 NOTE — TELEPHONE ENCOUNTER
Left Voicemail (1st Attempt) for the patient to call back and schedule the following:    Appointment type: CXR  Provider: ELIJAH  Return date: 09/17/2024  Specialty phone number: [phone]  Additional appointment(s) needed: N/A  Additonal Notes: N/A    Cardiac

## 2024-06-19 NOTE — TELEPHONE ENCOUNTER
Called Asah back      Relayed message, Asha will connect with pt's daughter who manages medications.  Pt has a visit with PCP on 6/21 and with Cards on 7/10      Chante RN    Triage Nurse  [facility]

## 2024-06-20 ENCOUNTER — TELEPHONE (OUTPATIENT)
Dept: FAMILY MEDICINE | Facility: CLINIC | Age: 79
End: 2024-06-20
Payer: MEDICARE

## 2024-06-20 ENCOUNTER — MYC MEDICAL ADVICE (OUTPATIENT)
Dept: FAMILY MEDICINE | Facility: CLINIC | Age: 79
End: 2024-06-20
Payer: MEDICARE

## 2024-06-20 DIAGNOSIS — I50.9 ACUTE ON CHRONIC CONGESTIVE HEART FAILURE, UNSPECIFIED HEART FAILURE TYPE (H): Primary | ICD-10-CM

## 2024-06-20 NOTE — TELEPHONE ENCOUNTER
Left detailed VM on confidential line approving requesting home care orders    Albina Hawkins RN  [facility]

## 2024-06-20 NOTE — TELEPHONE ENCOUNTER
Home Care is calling regarding an established patient with [facility].       Requesting orders from: Gomez Louis  Provider is following patient: Yes  Is this a 60-day recertification request?  No    Orders Requested    Physical Therapy  Request for initial certification (first set of orders)   Frequency: five visits for 4 weeks      Verbal orders given.  Home Care will send orders for provider to sign.  Confirmed ok to leave a detailed message with call back.  Contact information confirmed and updated as needed.    Albina Hawkins RN

## 2024-06-20 NOTE — TELEPHONE ENCOUNTER
Home Care is calling regarding an established patient with [facility].       Requesting orders from: Gomez Louis  Provider is following patient: Yes  Is this a 60-day recertification request?  No    Orders Requested    Occupational Therapy  Request for initial certification (first set of orders)   Frequency:  1x/wk for 1 wks  then 2x/wk for 1 wks  Then 1 x/wk for 1 week      Christiana says if they can get order approved in time, they are hoping to see this patient tomorrow, therefore she is requesting I route this high priority to PCP.    Information was gathered and will be sent to provider for review.  RN will contact Home Care with information after provider review.  Confirmed ok to leave a detailed message with call back.  Contact information confirmed and updated as needed.    Tere Dacosta RN

## 2024-06-21 ENCOUNTER — OFFICE VISIT (OUTPATIENT)
Dept: FAMILY MEDICINE | Facility: CLINIC | Age: 79
End: 2024-06-21
Payer: MEDICARE

## 2024-06-21 VITALS
HEIGHT: 60 IN | OXYGEN SATURATION: 94 % | BODY MASS INDEX: 23.36 KG/M2 | TEMPERATURE: 98.2 F | HEART RATE: 98 BPM | SYSTOLIC BLOOD PRESSURE: 138 MMHG | RESPIRATION RATE: 24 BRPM | DIASTOLIC BLOOD PRESSURE: 88 MMHG

## 2024-06-21 DIAGNOSIS — F01.A0 MILD VASCULAR DEMENTIA, UNSPECIFIED WHETHER BEHAVIORAL, PSYCHOTIC, OR MOOD DISTURBANCE OR ANXIETY (H): ICD-10-CM

## 2024-06-21 DIAGNOSIS — N18.30 STAGE 3 CHRONIC KIDNEY DISEASE, UNSPECIFIED WHETHER STAGE 3A OR 3B CKD (H): ICD-10-CM

## 2024-06-21 DIAGNOSIS — I50.22 CHRONIC SYSTOLIC CONGESTIVE HEART FAILURE (H): Primary | ICD-10-CM

## 2024-06-21 DIAGNOSIS — D50.8 OTHER IRON DEFICIENCY ANEMIA: ICD-10-CM

## 2024-06-21 DIAGNOSIS — R60.0 LOWER EXTREMITY EDEMA: ICD-10-CM

## 2024-06-21 DIAGNOSIS — R45.1 AGITATION: ICD-10-CM

## 2024-06-21 PROBLEM — F11.20 CONTINUOUS OPIOID DEPENDENCE (H): Status: ACTIVE | Noted: 2024-06-21

## 2024-06-21 LAB
ANION GAP SERPL CALCULATED.3IONS-SCNC: 12 MMOL/L (ref 7–15)
BASOPHILS # BLD AUTO: 0.1 10E3/UL (ref 0–0.2)
BASOPHILS NFR BLD AUTO: 1 %
BUN SERPL-MCNC: 8.2 MG/DL (ref 8–23)
CALCIUM SERPL-MCNC: 9.1 MG/DL (ref 8.8–10.2)
CHLORIDE SERPL-SCNC: 106 MMOL/L (ref 98–107)
CREAT SERPL-MCNC: 0.82 MG/DL (ref 0.51–0.95)
DEPRECATED HCO3 PLAS-SCNC: 20 MMOL/L (ref 22–29)
EGFRCR SERPLBLD CKD-EPI 2021: 72 ML/MIN/1.73M2
EOSINOPHIL # BLD AUTO: 0.5 10E3/UL (ref 0–0.7)
EOSINOPHIL NFR BLD AUTO: 5 %
ERYTHROCYTE [DISTWIDTH] IN BLOOD BY AUTOMATED COUNT: 22.7 % (ref 10–15)
GLUCOSE SERPL-MCNC: 80 MG/DL (ref 70–99)
HCT VFR BLD AUTO: 34.8 % (ref 35–47)
HGB BLD-MCNC: 10 G/DL (ref 11.7–15.7)
IMM GRANULOCYTES # BLD: 0.2 10E3/UL
IMM GRANULOCYTES NFR BLD: 2 %
LYMPHOCYTES # BLD AUTO: 1.3 10E3/UL (ref 0.8–5.3)
LYMPHOCYTES NFR BLD AUTO: 13 %
MCH RBC QN AUTO: 24.3 PG (ref 26.5–33)
MCHC RBC AUTO-ENTMCNC: 28.7 G/DL (ref 31.5–36.5)
MCV RBC AUTO: 85 FL (ref 78–100)
MONOCYTES # BLD AUTO: 0.6 10E3/UL (ref 0–1.3)
MONOCYTES NFR BLD AUTO: 6 %
NEUTROPHILS # BLD AUTO: 7.1 10E3/UL (ref 1.6–8.3)
NEUTROPHILS NFR BLD AUTO: 73 %
NRBC # BLD AUTO: 0 10E3/UL
NRBC BLD AUTO-RTO: 0 /100
PHOSPHATE SERPL-MCNC: 2.9 MG/DL (ref 2.5–4.5)
PLATELET # BLD AUTO: 488 10E3/UL (ref 150–450)
POTASSIUM SERPL-SCNC: 3.9 MMOL/L (ref 3.4–5.3)
RBC # BLD AUTO: 4.12 10E6/UL (ref 3.8–5.2)
SODIUM SERPL-SCNC: 138 MMOL/L (ref 135–145)
WBC # BLD AUTO: 9.7 10E3/UL (ref 4–11)

## 2024-06-21 PROCEDURE — 99215 OFFICE O/P EST HI 40 MIN: CPT | Performed by: FAMILY MEDICINE

## 2024-06-21 PROCEDURE — 85025 COMPLETE CBC W/AUTO DIFF WBC: CPT | Performed by: FAMILY MEDICINE

## 2024-06-21 PROCEDURE — G2211 COMPLEX E/M VISIT ADD ON: HCPCS | Performed by: FAMILY MEDICINE

## 2024-06-21 PROCEDURE — 80048 BASIC METABOLIC PNL TOTAL CA: CPT | Performed by: FAMILY MEDICINE

## 2024-06-21 PROCEDURE — 36415 COLL VENOUS BLD VENIPUNCTURE: CPT | Performed by: FAMILY MEDICINE

## 2024-06-21 PROCEDURE — 84100 ASSAY OF PHOSPHORUS: CPT | Performed by: FAMILY MEDICINE

## 2024-06-21 RX ORDER — DONEPEZIL HYDROCHLORIDE 10 MG/1
TABLET, FILM COATED ORAL
Qty: 90 TABLET | Refills: 3 | Status: SHIPPED | OUTPATIENT
Start: 2024-06-21 | End: 2024-07-10

## 2024-06-21 RX ORDER — POTASSIUM CHLORIDE 1500 MG/1
40 TABLET, EXTENDED RELEASE ORAL DAILY
COMMUNITY
Start: 2024-06-21 | End: 2024-07-10

## 2024-06-21 RX ORDER — OLANZAPINE 2.5 MG/1
TABLET, FILM COATED ORAL
Qty: 180 TABLET | Refills: 3 | Status: SHIPPED | OUTPATIENT
Start: 2024-06-21 | End: 2024-07-10

## 2024-06-21 RX ORDER — FUROSEMIDE 40 MG
TABLET ORAL
COMMUNITY
Start: 2024-06-21 | End: 2024-07-10

## 2024-06-21 NOTE — TELEPHONE ENCOUNTER
Patient confirmed scheduled appointment:  Date: 09/17/2024  Time: 12:00pm  Visit type: CXR  Provider: ELIJAH  Location: [facility]  Testing/imaging: N/A  Additional notes: N/A

## 2024-06-21 NOTE — PROGRESS NOTES
Assessment & Plan     Chronic systolic congestive heart failure (H)    Patient was admitted to the hospital on May 15, 2024 for congestive heart failure.  She had an echo which showed ejection fraction 10 to 15%.  Since that time she has been doing better, she has been also admitted for pneumonia.  She is fully recovered.  She was at the TCU for rehab.  Currently, she is back home, she is doing better.  She continues to follow with cardiology.  She has been using her furosemide as needed for increased lower extremity edema.    - CBC with platelets and differential; Future  - Basic metabolic panel  (Ca, Cl, CO2, Creat, Gluc, K, Na, BUN); Future  - Phosphorus; Future  - CBC with platelets and differential  - Basic metabolic panel  (Ca, Cl, CO2, Creat, Gluc, K, Na, BUN)  - Phosphorus    Other iron deficiency anemia  CBC today.    Agitation  Use at bedtime and bid as needed.  - OLANZapine (ZYPREXA) 2.5 MG tablet; One tab at bedtime,, may increase bid    Mild vascular dementia, unspecified whether behavioral, psychotic, or mood disturbance or anxiety (H)  May decrease dose to 5 mg at bedtime, then stop if no changes in mood, behavior.  - donepezil (ARICEPT) 10 MG tablet; Hold off on taking this medication until you follow up with your primary care provider    Lower extremity edema  Take Furosemide with Potassium as needed.  Keep leg elevated  JEROMY , compression.    Stage 3 chronic kidney disease, unspecified whether stage 3a or 3b CKD (H)  BMP today.      Over 45 minutes spent reviewing chart, reviewing test results, talking with and examining patient, formulating plan, and documentation on the day of the encounter.  Reviewing her discharges summaries , up dating her medications  Filling FMLA form for her daughter.         Pato Friedman is a 79 year old, presenting for the following health issues:  TCU Follow Up    Patient denies short of breath denies cough.  Weight remains stable.  She has been having  increased lower leg, has not been taking her furosemide.    She continues to do home therapy, physical therapy and Occupational Therapy.        6/21/2024     9:13 AM   Additional Questions   Roomed by margarita horner ma   Accompanied by son in law Crawford         6/21/2024   Forms   Any forms needing to be completed Yes          6/21/2024     9:13 AM   Patient Reported Additional Medications   Patient reports taking the following new medications none     HPI         Hospital Follow-up Visit:    Hospital/Nursing Home/IP Rehab Facility:  [facility]  Date of Admission: 6/14/24  Date of Discharge: 6/18/24  Reason(s) for Admission: hiatal hernia  Was the patient in the ICU or did the patient experience delirium during hospitalization?  No  Do you have any other stressors you would like to discuss with your provider? OTHER: TCU provider suggested patient stop taking Donepezil and cutting back on Zyprexa. Opinion on wrapping leg with [place] hose as her legs are swelling. TCU stopped Jardiance and waiting to hear from cardiac team if she should restart it.    Problems taking medications regularly:  None  Medication changes since discharge: None  Problems adhering to non-medication therapy:  None    Summary of hospitalization:  [facility] discharge summary reviewed  Diagnostic Tests/Treatments reviewed. follow up with cardiology, Pulmonology,  PT and OT.  Other Healthcare Providers Involved in Patient s Care:         Homecare, PT and OT  Update since discharge: improved.         Plan of care communicated with patient and family             Review of Systems  Constitutional, HEENT, cardiovascular, pulmonary, GI, , musculoskeletal, neuro, skin, endocrine and psych systems are negative, except as otherwise noted.      Objective    /88 (BP Location: Right arm, Patient Position: Chair, Cuff Size: Adult Regular)   Pulse 98   Temp 98.2  F (36.8  C) (Oral)   Resp 24   Ht 1.524 m (5')   LMP   (LMP Unknown)   SpO2 94%   BMI 23.36 kg/m    Body mass index is 23.36 kg/m .  Physical Exam   GENERAL: alert and no distress  NECK: no adenopathy, no asymmetry, masses, or scars  RESP: lungs clear to auscultation - no rales, rhonchi or wheezes  CV: regular rate and rhythm, normal S1 S2, no S3 or S4, no murmur, click or rub, no peripheral edema  ABDOMEN: soft, nontender, no hepatosplenomegaly, no masses and bowel sounds normal  MS: +3  edema to bilaterally  No calf tender, no redness.  PSYCH: mentation appears normal, affect normal/bright    Orders Placed This Encounter   Procedures    Basic metabolic panel  (Ca, Cl, CO2, Creat, Gluc, K, Na, BUN)    Phosphorus    CBC with platelets and differential    CBC with platelets and differential            Signed Electronically by: Gomez Louis MD

## 2024-06-22 DIAGNOSIS — D50.8 OTHER IRON DEFICIENCY ANEMIA: Primary | ICD-10-CM

## 2024-06-22 RX ORDER — FERROUS GLUCONATE 324(38)MG
324 TABLET ORAL
Qty: 90 TABLET | Refills: 3 | Status: SHIPPED | OUTPATIENT
Start: 2024-06-22 | End: 2024-09-11 | Stop reason: ALTCHOICE

## 2024-06-25 NOTE — TELEPHONE ENCOUNTER
Completed faxed as requested     Called spoke with patients daughter to inform     Copy made for chart     Marquita Garduno    [facility]

## 2024-06-27 ENCOUNTER — TELEPHONE (OUTPATIENT)
Dept: FAMILY MEDICINE | Facility: CLINIC | Age: 79
End: 2024-06-27
Payer: MEDICARE

## 2024-06-27 NOTE — TELEPHONE ENCOUNTER
Home Care is calling regarding an established patient with [facility].       Requesting orders from: Gomez Louis  Provider is following patient: Yes  Is this a 60-day recertification request?  No    Orders Requested    Physical Therapy  Request for discontinuation of care   Goals have been met/progressing.      Occupational Therapy  Request for discontinuation of care   Goals have been met/progressing.      Effective today, 06/27/24 as patient will be doing outpatient therapy      Verbal orders given.  Home Care will send orders for provider to sign.    Brielle Hendrickson RN

## 2024-06-28 ENCOUNTER — THERAPY VISIT (OUTPATIENT)
Dept: PHYSICAL THERAPY | Facility: CLINIC | Age: 79
End: 2024-06-28
Attending: NURSE PRACTITIONER
Payer: MEDICARE

## 2024-06-28 ENCOUNTER — TELEPHONE (OUTPATIENT)
Dept: FAMILY MEDICINE | Facility: CLINIC | Age: 79
End: 2024-06-28

## 2024-06-28 ENCOUNTER — MYC MEDICAL ADVICE (OUTPATIENT)
Dept: FAMILY MEDICINE | Facility: CLINIC | Age: 79
End: 2024-06-28

## 2024-06-28 DIAGNOSIS — R60.0 LOWER EXTREMITY EDEMA: Primary | ICD-10-CM

## 2024-06-28 DIAGNOSIS — I89.0 LYMPHEDEMA: ICD-10-CM

## 2024-06-28 DIAGNOSIS — I50.9 ACUTE ON CHRONIC CONGESTIVE HEART FAILURE, UNSPECIFIED HEART FAILURE TYPE (H): ICD-10-CM

## 2024-06-28 PROCEDURE — 97161 PT EVAL LOW COMPLEX 20 MIN: CPT | Mod: GP | Performed by: PHYSICAL THERAPIST

## 2024-06-28 PROCEDURE — 97535 SELF CARE MNGMENT TRAINING: CPT | Mod: GP | Performed by: PHYSICAL THERAPIST

## 2024-06-28 PROCEDURE — 97140 MANUAL THERAPY 1/> REGIONS: CPT | Mod: GP | Performed by: PHYSICAL THERAPIST

## 2024-06-28 RX ORDER — LOSARTAN POTASSIUM 25 MG/1
12.5 TABLET ORAL DAILY
Qty: 30 TABLET | Refills: 0 | Status: SHIPPED | OUTPATIENT
Start: 2024-06-28 | End: 2024-07-10

## 2024-06-28 NOTE — TELEPHONE ENCOUNTER
Forms received from [employer] :cert and plan of care(cert:06/19/2024-08/17/2024 order-[id number] for .  Forms placed in provider 'sign me' folder.  Please fax forms to [phone] after completion.                   Home health order -[id number] Hold services/hospitalized

## 2024-06-28 NOTE — TELEPHONE ENCOUNTER
RN replied to patient via [facility]. See message for details.     Miki Schmid RN, BSN, PHN  [facility]: [place]

## 2024-06-28 NOTE — PROGRESS NOTES
PHYSICAL THERAPY EVALUATION  Type of Visit: Evaluation        Fall Risk Screen:  Fall screen completed by: PT  Have you fallen 2 or more times in the past year?: No  Have you fallen and had an injury in the past year?: No  Is patient a fall risk?: Yes  Fall screen comments: Balance variable, multiple repeat hospitalizations    Subjective   Pt has been seeing home care, recent hospitalization for cardiac overload. Had a TCU stay, then discharged to home care.         Presenting condition or subjective complaint: Referred by hospital die to swelling in legs  Date of onset: 06/04/24    Relevant medical history: Arthritis; COPD; Emphysema; Heart problems; Kidney disease; Osteoporosis; Smoking   Dates & types of surgery: No surgeries    Prior diagnostic imaging/testing results:       Prior therapy history for the same diagnosis, illness or injury: No      Prior Level of Function  Transfers: Assistive equipment  Ambulation: Assistive equipment  ADL: Assistive equipment, Assistive equipment and person  IADL: Laundry, Medication management  FROYLAN assists with all meals, and light housekeeping, has assistance/ transportation to all meals in w/c. Pt completes meds from pillboxes daughter sets up for her.  Can have assist if needed for dressing/ safety      Living Environment  Social support: With a significant other or spouse   Type of home: Assisted Living   Stairs to enter the home: No       Ramp: No   Stairs inside the home: No       Help at home: Self Cares (home health aide/personal care attendant, family, etc); Home management tasks (cooking, cleaning); Medication and/or finances; Home and Yard maintenance tasks; Assist for driving and community activities; Meals on wheels/meal service; Emergency call system  Equipment owned: Walker with wheels; Standard wheelchair     Employment: No    Hobbies/Interests:      Patient goals for therapy: Increase mobility to change pants which is difficult when lega are swollen    Pain  assessment: Pain denied     Objective       EDEMA EVALUATION  Additional history:  Body part affected by edema: Lega  If cancer related, treatment:    If not cancer related, problems with veins or cause of swelling: Possile related to heart failure  Distance able to walk: Not far - maybe 50 feetnapprox  Time able to stand: Linited - ten minutes  Sensation problems in hands/feet: No    Edema etiology:  Cardiac edema    FUNCTIONAL SCALES  LLIS: 44    Cognitive Status Examination  Orientation: Oriented to person, place and time   Level of Consciousness: Alert  Follows Commands and Answers Questions: 100% of the time  Personal Safety and Judgement:  Not formally assessed   Memory:  Not formally assessed    EDEMA  Skin Condition: Dryness, Intact, Non-pitting  Scar: No  Capillary Refill: Symmetrical  Radial Pulse:   Dorsal Pedal Pulse:  unable to palpate  Stemmer Sign: +  Ulceration: No; small are of scabbing to left leg at anterior shin from scratching. Not actively open, scabbed over    GIRTH MEASUREMENTS: Refer to separate girth measurement flowsheet.     VOLUME LE  Right LE (mL) 3089.95 mL   Left LE (mL) 3698.34 mL   LE Volume Comparison LLE volume greater than RLE volume   % Difference    Head/Neck Volume     Trunk Volume    Genital Volume       RANGE OF MOTION: LE ROM WFL  Mild deficits related to strength  STRENGTH:  Globally decreased post hospitalization, pt will require ongoing PT/ OT services  POSTURE:  increased kyphosis of thoracic spine, forward head positin  PALPATION: non-tender to palpation of LE  ACTIVITIES OF DAILY LIVING: Mod I, does have ALS support for transportation to meals and for housekeeping  BED MOBILITY: SBA  TRANSFERS: Contact Guard, SBA  GAIT/LOCOMOTION: Gait with 4WW, limited to 100 ft, step through but slowed gait speed with decreased step length bilaterally  BALANCE:  Not formally tested, impairments noted   SENSATION:  Denies symptoms of neuropathy  VASCULAR:  spider veins and varicose  areas    Assessment & Plan   CLINICAL IMPRESSIONS  Medical Diagnosis: Acute on chronic congestive heart failure, unspecified heart failure type, Lymphedema    Treatment Diagnosis: Lymphedema   Impression/Assessment: Patient is a 79 year old female with BLE edema complaints.  The following significant findings have been identified: Edema and Instability. These impairments interfere with their ability to perform self care tasks, recreational activities, household chores, household mobility, and community mobility as compared to previous level of function.     Clinical Decision Making (Complexity):  Clinical Presentation: Stable/Uncomplicated  Clinical Presentation Rationale: based on medical and personal factors listed in PT evaluation  Clinical Decision Making (Complexity): Low complexity    PLAN OF CARE  Treatment Interventions:  Interventions: Manual Therapy, Therapeutic Exercise, Self-Care/Home Management, Gradient Compression Bandaging    Long Term Goals     PT Goal 1  Goal Identifier: Lymphedema Home Program  Goal Description: Pt with assist from assisted living staff with be able to utilize compression, increase hydration and complete skin care to best manage swelling to decrease symptoms.  Goal Progress: Instructions provided via AVS for application of compression garments when available, pt educated on skin care and elevation of LE  Target Date: 07/28/24  PT Goal 2  Goal Identifier: Compression  Goal Description: Pt will obtain appropriate BLE compression garments to manage and reduce BLE edema to prevent progression or skin break down.  Goal Progress: Garments ordered during today's sessin  Target Date: 08/27/24      Frequency of Treatment: 1x visit  Duration of Treatment: 90 days    Recommended Referrals to Other Professionals: Occupational Therapy, Physical Therapy (Return to home care?)  Education Assessment:   Learner/Method: Patient;Family;Listening;Reading;Demonstration    Risks and benefits of  evaluation/treatment have been explained.   Patient/Family/caregiver agrees with Plan of Care.     Evaluation Time:     PT Eval, Low Complexity Minutes (39541): 17     Signing Clinician: GAVIN Sotomayor [facility]                                                                                   OUTPATIENT PHYSICAL THERAPY      PLAN OF TREATMENT FOR OUTPATIENT REHABILITATION   Patient's Last Name, First Name, Idalia Zuñiga YOB: 1945   Provider's Name   [facility]   Medical Record No.  [id number]     Onset Date: 06/04/24  Start of Care Date: 06/28/24     Medical Diagnosis:  Acute on chronic congestive heart failure, unspecified heart failure type, Lymphedema      PT Treatment Diagnosis:  Lymphedema Plan of Treatment  Frequency/Duration: 1x visit/ 90 days    Certification date from 06/28/24 to 09/25/24         See note for plan of treatment details and functional goals     Subha Olmstead, PT                         I CERTIFY THE NEED FOR THESE SERVICES FURNISHED UNDER        THIS PLAN OF TREATMENT AND WHILE UNDER MY CARE     (Physician attestation of this document indicates review and certification of the therapy plan).              Referring Provider:  Jojo Aguilar    Initial Assessment  See Epic Evaluation- Start of Care Date: 06/28/24          DISCHARGE  Reason for Discharge: Return to home care services    Equipment Issued: Compression recommendations, HEP    Discharge Plan: Other services: Recommend home care PT/ OT.    Referring Provider:  Jojo Aguilar

## 2024-06-28 NOTE — PATIENT INSTRUCTIONS
PT lymphedema instructions:    Please place compression garments to both lower legs with transition compression sock first, then attaching velcro garments snugly over top.    Fold any sock back over top of the compression garment that is above the knee.    Compression socks should be on within 1 hour of getting out of bed, and remove within 1 hour of going to bed.

## 2024-07-01 DIAGNOSIS — Z53.9 DIAGNOSIS NOT YET DEFINED: Primary | ICD-10-CM

## 2024-07-01 PROCEDURE — G0180 MD CERTIFICATION HHA PATIENT: HCPCS | Performed by: FAMILY MEDICINE

## 2024-07-08 ENCOUNTER — MYC MEDICAL ADVICE (OUTPATIENT)
Dept: FAMILY MEDICINE | Facility: CLINIC | Age: 79
End: 2024-07-08
Payer: MEDICARE

## 2024-07-09 ENCOUNTER — TELEPHONE (OUTPATIENT)
Dept: FAMILY MEDICINE | Facility: CLINIC | Age: 79
End: 2024-07-09
Payer: MEDICARE

## 2024-07-09 DIAGNOSIS — M54.9 BACK PAIN, UNSPECIFIED BACK LOCATION, UNSPECIFIED BACK PAIN LATERALITY, UNSPECIFIED CHRONICITY: ICD-10-CM

## 2024-07-09 DIAGNOSIS — R60.0 LOWER EXTREMITY EDEMA: Primary | ICD-10-CM

## 2024-07-09 NOTE — ADDENDUM NOTE
Addended by: PHILLY HO on: 7/9/2024 03:24 PM     Modules accepted: Orders     Lumbar Puncture Discharge Instructions    What is a lumbar puncture?  · A lumbar puncture is a procedure where a needle is inserted into a space within the spinal column of the lower back. Your doctor will use the information from this test in your diagnosis and care.    · A lumbar puncture can be done:  · To obtain fluid for laboratory testing  · To measure and relieve spinal pressure  · To detect if blood is in the spinal fluid  · To detect a spinal blockage  · To instill antibiotics  · To instill anticancer drugs    Procedure  · You will be positioned on the x-ray table to allow the physician to use x-rays to guide the needle placement into the correct place in the spinal column. Most patients are positioned lying on their stomachs. Being very still during this procedure is very important.    · The skin will be cleansed with an antimicrobial solution. The physician will inject a local anesthetic agent to numb the area. Then a longer needle is inserted into the spinal column space. A piece of equipment called a manometer is attached to the needle to record spinal fluid pressure. A small amount of spinal fluid is placed into test tubes for lab testing. After the needle is withdrawn, a bandage is applied to the area.    After the procedure  · You will be instructed to lie flat on your back for 2-6 hours. Your blood pressure, pulse, and puncture site will be checked according to your doctor's orders.    · Once you are home, you will need to lie down and rest for the remainder of the day.    · Avoid strenuous activities such as heavy lifting(over 10 pounds) or athletics for the next 24 hours.    Diet  · You may resume your normal diet after the procedure.  · Drink plenty of fluids, preferably with caffeine during the next 12 hours  Drink at least one 5 oz glass every hour while your are awake.    Personal hygiene  · Tomorrow you may sponge bathe or shower as usual, but avoid bathtub bathing for 5 days.  You may  cleanse the puncture site with mild soap and water.  Apply a new bandage if there is irritation from clothing rubbing on the procedure site.  Avoid using lotions, powders or creams to the procedure site.          Additional Information  · Ice packs placed on the back for the first 24 hours, them warm, moist heat to help control discomfort at the puncture site.  · After lumbar puncture, some patients suffer from headaches. These headaches may be caused by leakage of spinal fluid at the puncture site. That is why it is important to follow your doctor's orders to lie flat and rest. Call your doctor if the headache is severe and unrelieved by acetaminophen (Tylenol) and rest in a dark quiet place.    Notify your doctor or call the Radiology Nursing Department   [phone]  Or   if you have any questions or concerns or develop any of the following symptoms:  · Increase in headache pain  · Drainage from the puncture site  · Temperature that remains above 101 degrees  · Redness or swelling around the puncture site  · Severe pain or weakness in your legs

## 2024-07-09 NOTE — TELEPHONE ENCOUNTER
Care Coordination referral placed per RN scope of practice.       Allison Christopher RN on 7/9/2024 at 3:24 PM

## 2024-07-09 NOTE — TELEPHONE ENCOUNTER
Caitlyn returning call. She states she checked with nursing and they stated they are currently unable to accept patient due to capacity.       Please advise.       Allison Christopher RN on 7/9/2024 at 12:05 PM

## 2024-07-09 NOTE — TELEPHONE ENCOUNTER
Routing to PCP    Home Care order needs to be RN due to lymphedema    RN re pended          RN called and spoke with  Caitlyn with [facility].    Finn antony has seen patient in the past.    Caitlyn will check capacity and call back.    Caitlyn stated due to lymphedema, Referral would need to state RN        ----- Message -----   From: Sonal Avalos   Sent: 7/9/2024  10:31 AM CDT   To: Gomez Louis MD; *   Subject: HOME HEALTH REFERRAL DENIAL- [facility]*       We received a home health referral for this patient  however, we are unable to accept it due to capacity. This referral will need to be sent to another  agency. If you have any further questions, you can contact me at [phone]. Thank you.       Sonal Quach Lvn   Clinical    [employer].       Miki Schmid, RN, BSN, PHN  [facility]

## 2024-07-10 ENCOUNTER — LAB (OUTPATIENT)
Dept: LAB | Facility: CLINIC | Age: 79
End: 2024-07-10
Payer: MEDICARE

## 2024-07-10 ENCOUNTER — PATIENT OUTREACH (OUTPATIENT)
Dept: CARE COORDINATION | Facility: CLINIC | Age: 79
End: 2024-07-10

## 2024-07-10 ENCOUNTER — OFFICE VISIT (OUTPATIENT)
Dept: CARDIOLOGY | Facility: CLINIC | Age: 79
End: 2024-07-10
Payer: MEDICARE

## 2024-07-10 VITALS
HEART RATE: 93 BPM | OXYGEN SATURATION: 95 % | BODY MASS INDEX: 22.93 KG/M2 | DIASTOLIC BLOOD PRESSURE: 78 MMHG | SYSTOLIC BLOOD PRESSURE: 128 MMHG | WEIGHT: 117.4 LBS

## 2024-07-10 DIAGNOSIS — I50.9 ACUTE ON CHRONIC CONGESTIVE HEART FAILURE, UNSPECIFIED HEART FAILURE TYPE (H): ICD-10-CM

## 2024-07-10 DIAGNOSIS — I50.9 ACUTE ON CHRONIC CONGESTIVE HEART FAILURE, UNSPECIFIED HEART FAILURE TYPE (H): Primary | ICD-10-CM

## 2024-07-10 DIAGNOSIS — N18.30 STAGE 3 CHRONIC KIDNEY DISEASE, UNSPECIFIED WHETHER STAGE 3A OR 3B CKD (H): ICD-10-CM

## 2024-07-10 DIAGNOSIS — F01.A0 MILD VASCULAR DEMENTIA, UNSPECIFIED WHETHER BEHAVIORAL, PSYCHOTIC, OR MOOD DISTURBANCE OR ANXIETY (H): ICD-10-CM

## 2024-07-10 DIAGNOSIS — R45.1 AGITATION: ICD-10-CM

## 2024-07-10 LAB
ANION GAP SERPL CALCULATED.3IONS-SCNC: 10 MMOL/L (ref 7–15)
BUN SERPL-MCNC: 18.8 MG/DL (ref 8–23)
CALCIUM SERPL-MCNC: 9.2 MG/DL (ref 8.8–10.2)
CHLORIDE SERPL-SCNC: 103 MMOL/L (ref 98–107)
CHOLEST SERPL-MCNC: 214 MG/DL
CREAT SERPL-MCNC: 1.15 MG/DL (ref 0.51–0.95)
CREAT UR-MCNC: 35.3 MG/DL
DEPRECATED HCO3 PLAS-SCNC: 25 MMOL/L (ref 22–29)
EGFRCR SERPLBLD CKD-EPI 2021: 48 ML/MIN/1.73M2
FASTING STATUS PATIENT QL REPORTED: NO
FASTING STATUS PATIENT QL REPORTED: NO
GLUCOSE SERPL-MCNC: 112 MG/DL (ref 70–99)
HDLC SERPL-MCNC: 92 MG/DL
LDLC SERPL CALC-MCNC: 100 MG/DL
MICROALBUMIN UR-MCNC: <12 MG/L
MICROALBUMIN/CREAT UR: NORMAL MG/G{CREAT}
NONHDLC SERPL-MCNC: 122 MG/DL
POTASSIUM SERPL-SCNC: 4.4 MMOL/L (ref 3.4–5.3)
SODIUM SERPL-SCNC: 138 MMOL/L (ref 135–145)
TRIGL SERPL-MCNC: 109 MG/DL

## 2024-07-10 PROCEDURE — 93010 ELECTROCARDIOGRAM REPORT: CPT | Performed by: INTERNAL MEDICINE

## 2024-07-10 PROCEDURE — 93005 ELECTROCARDIOGRAM TRACING: CPT

## 2024-07-10 PROCEDURE — 80061 LIPID PANEL: CPT | Performed by: PATHOLOGY

## 2024-07-10 PROCEDURE — 36415 COLL VENOUS BLD VENIPUNCTURE: CPT | Performed by: PATHOLOGY

## 2024-07-10 PROCEDURE — 82043 UR ALBUMIN QUANTITATIVE: CPT | Performed by: FAMILY MEDICINE

## 2024-07-10 PROCEDURE — G0463 HOSPITAL OUTPT CLINIC VISIT: HCPCS | Performed by: NURSE PRACTITIONER

## 2024-07-10 PROCEDURE — 99215 OFFICE O/P EST HI 40 MIN: CPT | Performed by: NURSE PRACTITIONER

## 2024-07-10 PROCEDURE — 80048 BASIC METABOLIC PNL TOTAL CA: CPT | Performed by: PATHOLOGY

## 2024-07-10 PROCEDURE — 99000 SPECIMEN HANDLING OFFICE-LAB: CPT | Performed by: PATHOLOGY

## 2024-07-10 RX ORDER — POTASSIUM CHLORIDE 1500 MG/1
40 TABLET, EXTENDED RELEASE ORAL DAILY
Qty: 90 TABLET | Refills: 3 | Status: SHIPPED | OUTPATIENT
Start: 2024-07-10 | End: 2024-07-15

## 2024-07-10 RX ORDER — DONEPEZIL HYDROCHLORIDE 5 MG/1
5 TABLET, FILM COATED ORAL AT BEDTIME
COMMUNITY
Start: 2024-07-10 | End: 2024-09-11

## 2024-07-10 RX ORDER — LOSARTAN POTASSIUM 25 MG/1
12.5 TABLET ORAL DAILY
Qty: 30 TABLET | Refills: 3 | Status: SHIPPED | OUTPATIENT
Start: 2024-07-10 | End: 2024-08-09 | Stop reason: ALTCHOICE

## 2024-07-10 RX ORDER — FUROSEMIDE 20 MG
40 TABLET ORAL DAILY
Qty: 90 TABLET | Refills: 3 | Status: SHIPPED | OUTPATIENT
Start: 2024-07-10 | End: 2024-07-15

## 2024-07-10 RX ORDER — OLANZAPINE 5 MG/1
TABLET ORAL
COMMUNITY
Start: 2024-07-10 | End: 2024-10-04

## 2024-07-10 ASSESSMENT — PAIN SCALES - GENERAL: PAINLEVEL: NO PAIN (0)

## 2024-07-10 NOTE — PROGRESS NOTES
[facility] Cardiology   CORE Clinic      HPI:   Ms. Idalia Bob is an 79yr old female with a history of CKD, HTN, GERD, COPD, CAD, and newly diagnosed NICM (LVEF 10-15% per TTE 5/2024) who presents to [facility] for evaluation and ongoing management of GDMT.      Her PCP advised that she present to the ED 5/15/24 due to JUANIS and elevated NTproBNP.  While in the ED, CXR showed bilateral pleural effusions and pulmonary edema, TTE showed LVEF 10-15% and moderately reduced RV function.  She started on IV lasix, and admitted to medicine.  She became hypotensive, required dobutamine --> dopamine, which was then stopped 5/21.  She ultimately diuresed to a weight of ~130#, and discharged 5/24 on losartan 12.5mg daily and jardiance 10mg daily.     Established in CORE on 6/4/24. At that time she reported feeling okay. Starting to have some LE edema and GUTIERREZ. Weights have remained stable, ~ 125-126 lb. Lasix increased to 40/20 mg BID and referred to lymphedema. Readmitted 6/9-6/14 for recurrent pneumonia.      Today, Ms. Bob presents to clinic with her daughter. She reports feeling relatively well. Breathing is comfortable. Denies cough, GUTIERREZ, fever, or any new or persistent sick symptoms. Daughter reports that Priya's weight is up ~ 5 lb in one week. Weight at discharge 113 lb, currently 118 lb. She also has worsening LE edema. She is following with lymphedema clinic and is wearing prescribed Velcro wraps. Priya currently has lasix 20 mg PRN and she has been taking lasix 20 mg daily x 4 days. Denies orthopnea, PND, abdominal distention.     Her facility does not offer salt-restricted foods.  She has not been checking her BPs regularly.  She otherwise denies chest pain, palpitations, dizziness, falls, headaches, acute vision changes, fevers, chills, cough, sore throat, and signs of bleeding.    Cardiac Medications   - ASA 81 mg daily   - Lasix 20 mg daily PRN   - Losartan 12.5 mg daily   - KCL 20 mEq daily       PAST MEDICAL  HISTORY:  Past Medical History:   Diagnosis Date    Chronic kidney disease (CKD) 01/10/2023    Chronic obstructive pulmonary disease (H) 01/10/2023    Dementia (H) 11/28/2023    GERD (gastroesophageal reflux disease) 03/15/2023    Hypertension 11/28/2023       FAMILY HISTORY:  No family history on file.    SOCIAL HISTORY:  Social History     Socioeconomic History    Marital status:    Tobacco Use    Smoking status: Former     Current packs/day: 1.00     Average packs/day: 1 pack/day for 40.0 years (40.0 ttl pk-yrs)     Types: Cigarettes     Passive exposure: Past    Smokeless tobacco: Never   Vaping Use    Vaping status: Never Used   Substance and Sexual Activity    Alcohol use: Not Currently    Drug use: Never    Sexual activity: Not Currently     Social Determinants of Health     Financial Resource Strain: Low Risk  (1/22/2024)    Financial Resource Strain     Within the past 12 months, have you or your family members you live with been unable to get utilities (heat, electricity) when it was really needed?: No   Food Insecurity: Low Risk  (1/22/2024)    Food Insecurity     Within the past 12 months, did you worry that your food would run out before you got money to buy more?: No     Within the past 12 months, did the food you bought just not last and you didn t have money to get more?: No   Transportation Needs: Low Risk  (1/22/2024)    Transportation Needs     Within the past 12 months, has lack of transportation kept you from medical appointments, getting your medicines, non-medical meetings or appointments, work, or from getting things that you need?: No   Social Connections: Socially Integrated (11/21/2023)    Received from [facility], [facility]    Social Connections     Frequency of Communication with Friends and Family: 0   Housing Stability: Low Risk  (1/22/2024)    Housing Stability     Do you have housing? : Yes     Are you  worried about losing your housing?: No       CURRENT MEDICATIONS:  Current Outpatient Medications   Medication Sig Dispense Refill    losartan (COZAAR) 25 MG tablet Take 0.5 tablets (12.5 mg) by mouth daily 30 tablet 0    acetaminophen (TYLENOL) 325 MG tablet Take 325-650 mg by mouth every 6 hours as needed for mild pain      albuterol (PROVENTIL) (2.5 MG/3ML) 0.083% neb solution Take 1 vial (2.5 mg) by nebulization 2 times daily as needed (COPD) 90 mL 5    aspirin 81 MG EC tablet Take 1 tablet (81 mg) by mouth daily 30 tablet 0    busPIRone (BUSPAR) 15 MG tablet Take 15 mg by mouth 2 times daily      calcium carbonate (TUMS) 500 MG chewable tablet Take 1 tablet (500 mg) by mouth 4 times daily as needed for heartburn      Calcium Polycarbophil (FIBER) 625 MG tablet Take 2 tablets by mouth daily      diphenhydrAMINE-zinc acetate (BENADRYL) 2-0.1 % external cream Apply topically 3 times daily as needed for itching      donepezil (ARICEPT) 10 MG tablet Hold off on taking this medication until you follow up with your primary care provider 90 tablet 3    ferrous gluconate (FERGON) 324 (38 Fe) MG tablet Take 1 tablet (324 mg) by mouth daily (with breakfast) 90 tablet 3    FLUoxetine (PROZAC) 40 MG capsule Take 1 capsule (40 mg) by mouth daily      Fluticasone-Umeclidin-Vilanterol (TRELEGY ELLIPTA) 200-62.5-25 MCG/ACT oral inhaler Inhale 1 puff into the lungs daily      furosemide (LASIX) 40 MG tablet Take one tab daily for 3- 5 days, then as needed for lower ext edema, ( take with Potassium )      hyoscyamine (LEVSIN) 0.125 MG tablet TAKE 1 TABLET (125 MCG) BY MOUTH EVERY 4 HOURS AS NEEDED FOR CRAMPING OR DIARRHEA 30 tablet 1    ipratropium - albuterol 0.5 mg/2.5 mg/3 mL (DUONEB) 0.5-2.5 (3) MG/3ML neb solution Take 1 vial (3 mLs) by nebulization every 6 hours as needed for shortness of breath, wheezing or cough      lactobacillus rhamnosus, GG, (CULTURELL) capsule Take 1 capsule by mouth daily      latanoprost (XALATAN)  0.005 % ophthalmic solution Place 1 drop into both eyes at bedtime      levothyroxine (SYNTHROID/LEVOTHROID) 50 MCG tablet Take 1 tablet (50 mcg) by mouth daily      melatonin 1 MG TABS tablet Take 1 tablet (1 mg) by mouth nightly as needed for sleep      OLANZapine (ZYPREXA) 2.5 MG tablet One tab at bedtime,, may increase bid 180 tablet 3    oxyCODONE (ROXICODONE) 5 MG tablet 1/2 tab at bedtime as needed for severe pain. ( Need to last for one month ) 15 tablet 0    pantoprazole (PROTONIX) 40 MG EC tablet Take 1 tablet (40 mg) by mouth daily 90 tablet 3    potassium chloride ER (K-TAB) 20 MEQ CR tablet Take 2 tab daily with Furosemide      senna-docusate (SENOKOT-S/PERICOLACE) 8.6-50 MG tablet Take 1 tablet by mouth 2 times daily as needed for constipation      triamcinolone (KENALOG) 0.1 % external cream Apply topically 2 times daily as needed for irritation      vitamin D3 (CHOLECALCIFEROL) 50 mcg (2000 units) tablet Take 1 tablet (50 mcg) by mouth daily       No current facility-administered medications for this visit.       ROS:   Refer to HPI    EXAM:  /78 (BP Location: Right arm, Patient Position: Chair, Cuff Size: Adult Small)   Pulse 93   Wt 53.3 kg (117 lb 6.4 oz)   LMP  (LMP Unknown)   SpO2 95%   BMI 22.93 kg/m     GENERAL: Appears comfortable, in no acute distress.   HEENT: Eye symmetrical, no discharge or icterus bilaterally. Mucous membranes moist and without lesions.  CV: RRR, +S1S2, no murmur, rub, or gallop. JVP mid neck at 90 degrees.   RESPIRATORY: Respirations regular, even, and unlabored. Lungs CTA throughout.   GI: Soft and non distended with normoactive bowel sounds present in all quadrants. No tenderness, rebound, guarding. No hepatomegaly.   EXTREMITIES: 2+ pitting BLE peripheral edema. 2+ bilateral pedal pulses.   NEUROLOGIC: Alert and oriented x 3. No focal deficits.   MUSCULOSKELETAL: No joint swelling or tenderness.   SKIN: No jaundice. No rashes or lesions.     Labs, reviewed  with patient in clinic today:  CBC RESULTS:  Lab Results   Component Value Date    WBC 9.7 2024    RBC 4.12 2024    HGB 10.0 (L) 2024    HCT 34.8 (L) 2024    MCV 85 2024    MCH 24.3 (L) 2024    MCHC 28.7 (L) 2024    RDW 22.7 (H) 2024     (H) 2024       CMP RESULTS:  Lab Results   Component Value Date     2024    POTASSIUM 3.9 2024    POTASSIUM 4.4 2024    CHLORIDE 106 2024    CO2 20 (L) 2024    ANIONGAP 12 2024    GLC 80 2024    GLC 97 2024     (H) 2024    BUN 8.2 2024    CR 0.82 2024    GFRESTIMATED 72 2024    AYUSH 9.1 2024    BILITOTAL 0.6 2024    ALBUMIN 3.7 2024    ALKPHOS 71 2024    ALT 9 2024    AST 17 2024        INR RESULTS:  Lab Results   Component Value Date    INR 1.06 2024       Lab Results   Component Value Date    MAG 1.8 2024     Lab Results   Component Value Date    NTBNPI 10,435 (H) 2024     Lab Results   Component Value Date    NTBNP 24,306 (H) 2024       Cardiac Diagnostics:    2024 Coronary CTA  IMPRESSION:  1.  Mild non-obstructive coronary artery disease.  2.  Total Agatston score 484 placing the patient in the 79th  percentile when compared to age and gender matched control group.  3.  Please review Radiology report for incidental noncardiac findings  that will follow separately.     FINDINGS:     CORONARY CALCIUM SCORE     Total Agatston calcium score: 483   Left main: 79  Left anterior descendin  Left circumflex: 69  Right coronary artery: 180   This places the patient in the lowest  percentile when compared to age  and gender matched control group.     CORONARY ANGIOGRAPHY     DOMINANCE: Right dominant system.   Normal coronary origins and course.     LEFT MAIN:   The left main arises normally from the left coronary cusp and has a  minimal (<25%) stenosis composed of calcified  plaque.     LEFT ANTERIOR DESCENDING:   The LAD is a moderate-sized type III vessel which supplies a  diminutive D1 and a small branching D2.   Proximal LAD: Mild (25-49%) stenosis composed of mixed plaque.  The remainder of the left anterior descending and its major diagonal  branches are patent with minimal luminal irregularities.     LEFT CIRCUMFLEX:   Proximal LCX:Minimal (<25%) stenosis composed of calcified plaque.  First marginal: Minimal (<25%) stenosis composed of calcified plaque.  The remainder of the left circumflex and its major marginal branches  are patent with minimal luminal irregularities.     RIGHT CORONARY ARTERY:   Proximal RCA: Minimal (<25%) stenosis stenosis composed of mixed  plaque.  Mid RCA: Mild (25-49%) stenosis composed of noncalcified plaque.  The remainder of the right coronary and the posterior descending  artery are patent with minimal luminal irregularities.     ADDITIONAL FINDINGS:      The proximal ascending aorta is normal in size.   There is mild calcification of the ascending and descending aorta.  There mild mitral annular calcification.  Central venous catheter is present with its tip in the distal SVC.  Normal pulmonary venous anatomy with all four pulmonary veins draining  into the left atrium.    The left atrial appendage is free of thrombus.  There is no left ventricular mass or thrombus.   Normal pericardial thickness. There is no pericardial effusion.  Please review Radiology report for incidental noncardiac findings that  will follow separately.     I have personally reviewed the examination and initial interpretation  and I agree with the findings.     5/15/2024 Echo  Interpretation Summary  Left ventricular function is decreased. The ejection fraction is 10-15%  (severely reduced). Severe diffuse hypokinesis is present.  Global right ventricular function is moderately reduced.  Moderate tricuspid insufficiency is present.  Estimated pulmonary artery systolic  pressure is 54 mmHg plus right atrial  pressure.  IVC diameter >2.1 cm collapsing <50% with sniff suggests a high RA pressure  estimated at 15 mmHg or greater.  Trivial pericardial effusion is present.     This study was compared with the study from 2/12/24: LV and RV function have  decreased significantly.    2/12/2024 Echo  Interpretation Summary  A large left pleural effusion is present.  Global and regional left ventricular function is normal with an EF of 55-60%.  Global right ventricular function is normal. The right ventricle is normal  size.  Moderate tricuspid insufficiency is present. The etiology is RA enlargement.  The estimated PA systolic pressure is 60 mmHg.  IVC diameter and respiratory changes fall into an intermediate range  suggesting an RA pressure of 8 mmHg.  There is no prior study for direct comparison.      Assessment and Plan:   Ms. Idalia Bob is an 79yr old female with a history of CKD, HTN, GERD, COPD, CAD, and newly diagnosed NICM (LVEF 10-15% per TTE 5/2024) who presents to [facility] for evaluation and ongoing management of GDMT.     Hypervolemic by exam with pitting edema, JVD, weight gain. Review of today's labs notable for rise in Cr, lytes stable. Instructed to take an additional 40 mg of lasix today and then continue lasix 40 mg daily x 5 days with KCL 40 mEq daily. Continue to monitor daily weights. RNCC will check in with patient on Monday to review weights, edema, and we will determine plan for lasix going forward. We will also resume jardiance 10 mg daily. BMP in 2 weeks. CORE in 2-4 weeks.      Newly diagnosed systolic heart failure secondary to NICM (LVEF 10-15% per TTE 5/2024)  HTN  Stage C, NYHA Class III  - ACEi/ARB/ARNi:  losartan 12.5 mg daily   - Afterload reduction:  n/a  - BB:  could consider initiation pending BP trends  - Aldosterone antagonist:  deferred while other medications are proritized  - SGLT2i:  start jardiance   - SCD ppx:  n/a  - Fluid status:  appears  hypervolemic, increase lasix to 40 mg daily      Nonobstructive CAD  Coronary CTA showed mild non-obstructive coronary artery disease.  Total Agatston score 484 placing the patient in the 79th percentile when compared to age and gender matched control group.    - continue aspirin 81mg daily  - BB as above  - not on statin, defer to primary cardiologist     COPD  No longer on day-time oxygen, just at night.     CKD 3  Baseline ~ 1.0  Cr 1.15  - BMP in 2 weeks    Prolonged QTc  Calculated QTc 516 ms by today's ECG. She is currently on Aricpet and Zyprexa. QTc stable. We will continue these medications at current doses but do not recommend dose increased and to avoid other QT prolonging medications.       Follow up:  - BMP in 2 weeks   - CORE in 2-4 weeks      A total of 50 minutes was spent on the day of the visit, which includes preparation for the visit (reviewing previous medical records, laboratories and investigations), in conjunction with the actual clinic visit with the patient, which includes obtaining a history and physical exam, creating and reviewing the care plan, patient education (and family if present), counseling, documenting clinical information in the electronic health record and care coordination.       Trena Chanel DNP, NP-C  Advance Heart Failure  07/10/24

## 2024-07-10 NOTE — Clinical Note
[facility] is able to accept patient and will start services by the end of the week.

## 2024-07-10 NOTE — PATIENT INSTRUCTIONS
CORE: Cardiomyopathy, Optimization, Rehabilitation, Education      Take your medicines every day, as directed    Changes/Recommendations made today:  Take an additional lasix 40 mg with extra potassium chloride 20 mEq this afternoon  Starting 7/11 take lasix 40 mg daily with potassium chloride 40 mEq daily   Continue daily weights  Start Jardiance 10 mg daily  JIMI Moreno will call Barbara on Monday to review weights and edema and will determine further diuretic plan at that time   Monitor Your Weight and Symptoms    Contact us if you:    Gain 2 pounds in one day or 5 pounds in one week  Feel more short of breath  Notice more leg swelling  Feel lightheadeded   Change your lifestyle    Limit Salt or Sodium:  2000 mg  Limit Fluids:  2000 mL or approximately 64 ounces  Eat a Heart Healthy Diet  Low in saturated fats  Stay Active:  Aim to move at least 150 minutes every  week         To Contact us    During Business Hours:  [phone], option # 1      After hours, weekends or holidays:   [phone], Option #4  Ask to speak to the On-Call Cardiologist. Inform them you are a CORE/heart failure patient at the [place].     Use [employer] allows you to communicate directly with your heart team through secure messaging.  [employer] can be accessed any time on your phone, computer, or tablet.  If you need assistance, we'd be happy to help!         Keep your Heart Appointments:    Labs in 2 weeks    CORE in 2-4 weeks

## 2024-07-10 NOTE — LETTER
7/10/2024      RE: Idalia Bob  [address]  [facility] [address]       Dear Colleague,    Thank you for the opportunity to participate in the care of your patient, Idalia Bob, at the [facility] [place] [facility]. Please see a copy of my visit note below.      [facility] Cardiology   [facility]      HPI:   Ms. Idalia Bob is an 79yr old female with a history of CKD, HTN, GERD, COPD, CAD, and newly diagnosed NICM (LVEF 10-15% per TTE 5/2024) who presents to [facility] for evaluation and ongoing management of GDMT.      Her PCP advised that she present to the ED 5/15/24 due to JUANIS and elevated NTproBNP.  While in the ED, CXR showed bilateral pleural effusions and pulmonary edema, TTE showed LVEF 10-15% and moderately reduced RV function.  She started on IV lasix, and admitted to medicine.  She became hypotensive, required dobutamine --> dopamine, which was then stopped 5/21.  She ultimately diuresed to a weight of ~130#, and discharged 5/24 on losartan 12.5mg daily and jardiance 10mg daily.     Established in [facility] on 6/4/24. At that time she reported feeling okay. Starting to have some LE edema and GUTIERREZ. Weights have remained stable, ~ 125-126 lb. Lasix increased to 40/20 mg BID and referred to lymphedema. Readmitted 6/9-6/14 for recurrent pneumonia.      Today, Ms. Bob presents to clinic with her daughter. She reports feeling relatively well. Breathing is comfortable. Denies cough, GUTIERREZ, fever, or any new or persistent sick symptoms. Daughter reports that Priya's weight is up ~ 5 lb in one week. Weight at discharge 113 lb, currently 118 lb. She also has worsening LE edema. She is following with lymphedema clinic and is wearing prescribed Velcro wraps. Priya currently has lasix 20 mg PRN and she has been taking lasix 20 mg daily x 4 days. Denies orthopnea, PND, abdominal distention.     Her facility does not offer salt-restricted  foods.  She has not been checking her BPs regularly.  She otherwise denies chest pain, palpitations, dizziness, falls, headaches, acute vision changes, fevers, chills, cough, sore throat, and signs of bleeding.    Cardiac Medications   - ASA 81 mg daily   - Lasix 20 mg daily PRN   - Losartan 12.5 mg daily   - KCL 20 mEq daily       PAST MEDICAL HISTORY:  Past Medical History:   Diagnosis Date    Chronic kidney disease (CKD) 01/10/2023    Chronic obstructive pulmonary disease (H) 01/10/2023    Dementia (H) 11/28/2023    GERD (gastroesophageal reflux disease) 03/15/2023    Hypertension 11/28/2023       FAMILY HISTORY:  No family history on file.    SOCIAL HISTORY:  Social History     Socioeconomic History    Marital status:    Tobacco Use    Smoking status: Former     Current packs/day: 1.00     Average packs/day: 1 pack/day for 40.0 years (40.0 ttl pk-yrs)     Types: Cigarettes     Passive exposure: Past    Smokeless tobacco: Never   Vaping Use    Vaping status: Never Used   Substance and Sexual Activity    Alcohol use: Not Currently    Drug use: Never    Sexual activity: Not Currently     Social Determinants of Health     Financial Resource Strain: Low Risk  (1/22/2024)    Financial Resource Strain     Within the past 12 months, have you or your family members you live with been unable to get utilities (heat, electricity) when it was really needed?: No   Food Insecurity: Low Risk  (1/22/2024)    Food Insecurity     Within the past 12 months, did you worry that your food would run out before you got money to buy more?: No     Within the past 12 months, did the food you bought just not last and you didn t have money to get more?: No   Transportation Needs: Low Risk  (1/22/2024)    Transportation Needs     Within the past 12 months, has lack of transportation kept you from medical appointments, getting your medicines, non-medical meetings or appointments, work, or from getting things that you need?: No   Social  Connections: Socially Integrated (11/21/2023)    Received from [facility], [employer] [facility]    Social Connections     Frequency of Communication with Friends and Family: 0   Housing Stability: Low Risk  (1/22/2024)    Housing Stability     Do you have housing? : Yes     Are you worried about losing your housing?: No       CURRENT MEDICATIONS:  Current Outpatient Medications   Medication Sig Dispense Refill    losartan (COZAAR) 25 MG tablet Take 0.5 tablets (12.5 mg) by mouth daily 30 tablet 0    acetaminophen (TYLENOL) 325 MG tablet Take 325-650 mg by mouth every 6 hours as needed for mild pain      albuterol (PROVENTIL) (2.5 MG/3ML) 0.083% neb solution Take 1 vial (2.5 mg) by nebulization 2 times daily as needed (COPD) 90 mL 5    aspirin 81 MG EC tablet Take 1 tablet (81 mg) by mouth daily 30 tablet 0    busPIRone (BUSPAR) 15 MG tablet Take 15 mg by mouth 2 times daily      calcium carbonate (TUMS) 500 MG chewable tablet Take 1 tablet (500 mg) by mouth 4 times daily as needed for heartburn      Calcium Polycarbophil (FIBER) 625 MG tablet Take 2 tablets by mouth daily      diphenhydrAMINE-zinc acetate (BENADRYL) 2-0.1 % external cream Apply topically 3 times daily as needed for itching      donepezil (ARICEPT) 10 MG tablet Hold off on taking this medication until you follow up with your primary care provider 90 tablet 3    ferrous gluconate (FERGON) 324 (38 Fe) MG tablet Take 1 tablet (324 mg) by mouth daily (with breakfast) 90 tablet 3    FLUoxetine (PROZAC) 40 MG capsule Take 1 capsule (40 mg) by mouth daily      Fluticasone-Umeclidin-Vilanterol (TRELEGY ELLIPTA) 200-62.5-25 MCG/ACT oral inhaler Inhale 1 puff into the lungs daily      furosemide (LASIX) 40 MG tablet Take one tab daily for 3- 5 days, then as needed for lower ext edema, ( take with Potassium )      hyoscyamine (LEVSIN) 0.125 MG tablet TAKE 1 TABLET (125 MCG) BY MOUTH EVERY 4 HOURS AS  NEEDED FOR CRAMPING OR DIARRHEA 30 tablet 1    ipratropium - albuterol 0.5 mg/2.5 mg/3 mL (DUONEB) 0.5-2.5 (3) MG/3ML neb solution Take 1 vial (3 mLs) by nebulization every 6 hours as needed for shortness of breath, wheezing or cough      lactobacillus rhamnosus, GG, (CULTURELL) capsule Take 1 capsule by mouth daily      latanoprost (XALATAN) 0.005 % ophthalmic solution Place 1 drop into both eyes at bedtime      levothyroxine (SYNTHROID/LEVOTHROID) 50 MCG tablet Take 1 tablet (50 mcg) by mouth daily      melatonin 1 MG TABS tablet Take 1 tablet (1 mg) by mouth nightly as needed for sleep      OLANZapine (ZYPREXA) 2.5 MG tablet One tab at bedtime,, may increase bid 180 tablet 3    oxyCODONE (ROXICODONE) 5 MG tablet 1/2 tab at bedtime as needed for severe pain. ( Need to last for one month ) 15 tablet 0    pantoprazole (PROTONIX) 40 MG EC tablet Take 1 tablet (40 mg) by mouth daily 90 tablet 3    potassium chloride ER (K-TAB) 20 MEQ CR tablet Take 2 tab daily with Furosemide      senna-docusate (SENOKOT-S/PERICOLACE) 8.6-50 MG tablet Take 1 tablet by mouth 2 times daily as needed for constipation      triamcinolone (KENALOG) 0.1 % external cream Apply topically 2 times daily as needed for irritation      vitamin D3 (CHOLECALCIFEROL) 50 mcg (2000 units) tablet Take 1 tablet (50 mcg) by mouth daily       No current facility-administered medications for this visit.       ROS:   Refer to HPI    EXAM:  /78 (BP Location: Right arm, Patient Position: Chair, Cuff Size: Adult Small)   Pulse 93   Wt 53.3 kg (117 lb 6.4 oz)   LMP  (LMP Unknown)   SpO2 95%   BMI 22.93 kg/m     GENERAL: Appears comfortable, in no acute distress.   HEENT: Eye symmetrical, no discharge or icterus bilaterally. Mucous membranes moist and without lesions.  CV: RRR, +S1S2, no murmur, rub, or gallop. JVP mid neck at 90 degrees.   RESPIRATORY: Respirations regular, even, and unlabored. Lungs CTA throughout.   GI: Soft and non distended with  normoactive bowel sounds present in all quadrants. No tenderness, rebound, guarding. No hepatomegaly.   EXTREMITIES: 2+ pitting BLE peripheral edema. 2+ bilateral pedal pulses.   NEUROLOGIC: Alert and oriented x 3. No focal deficits.   MUSCULOSKELETAL: No joint swelling or tenderness.   SKIN: No jaundice. No rashes or lesions.     Labs, reviewed with patient in clinic today:  CBC RESULTS:  Lab Results   Component Value Date    WBC 9.7 2024    RBC 4.12 2024    HGB 10.0 (L) 2024    HCT 34.8 (L) 2024    MCV 85 2024    MCH 24.3 (L) 2024    MCHC 28.7 (L) 2024    RDW 22.7 (H) 2024     (H) 2024       CMP RESULTS:  Lab Results   Component Value Date     2024    POTASSIUM 3.9 2024    POTASSIUM 4.4 2024    CHLORIDE 106 2024    CO2 20 (L) 2024    ANIONGAP 12 2024    GLC 80 2024    GLC 97 2024     (H) 2024    BUN 8.2 2024    CR 0.82 2024    GFRESTIMATED 72 2024    AYUSH 9.1 2024    BILITOTAL 0.6 2024    ALBUMIN 3.7 2024    ALKPHOS 71 2024    ALT 9 2024    AST 17 2024        INR RESULTS:  Lab Results   Component Value Date    INR 1.06 2024       Lab Results   Component Value Date    MAG 1.8 2024     Lab Results   Component Value Date    NTBNPI 10,435 (H) 2024     Lab Results   Component Value Date    NTBNP 24,306 (H) 2024       Cardiac Diagnostics:    2024 Coronary CTA  IMPRESSION:  1.  Mild non-obstructive coronary artery disease.  2.  Total Agatston score 484 placing the patient in the 79th  percentile when compared to age and gender matched control group.  3.  Please review Radiology report for incidental noncardiac findings  that will follow separately.     FINDINGS:     CORONARY CALCIUM SCORE     Total Agatston calcium score: 483   Left main: 79  Left anterior descendin  Left circumflex: 69  Right coronary  artery: 180   This places the patient in the lowest  percentile when compared to age  and gender matched control group.     CORONARY ANGIOGRAPHY     DOMINANCE: Right dominant system.   Normal coronary origins and course.     LEFT MAIN:   The left main arises normally from the left coronary cusp and has a  minimal (<25%) stenosis composed of calcified plaque.     LEFT ANTERIOR DESCENDING:   The LAD is a moderate-sized type III vessel which supplies a  diminutive D1 and a small branching D2.   Proximal LAD: Mild (25-49%) stenosis composed of mixed plaque.  The remainder of the left anterior descending and its major diagonal  branches are patent with minimal luminal irregularities.     LEFT CIRCUMFLEX:   Proximal LCX:Minimal (<25%) stenosis composed of calcified plaque.  First marginal: Minimal (<25%) stenosis composed of calcified plaque.  The remainder of the left circumflex and its major marginal branches  are patent with minimal luminal irregularities.     RIGHT CORONARY ARTERY:   Proximal RCA: Minimal (<25%) stenosis stenosis composed of mixed  plaque.  Mid RCA: Mild (25-49%) stenosis composed of noncalcified plaque.  The remainder of the right coronary and the posterior descending  artery are patent with minimal luminal irregularities.     ADDITIONAL FINDINGS:      The proximal ascending aorta is normal in size.   There is mild calcification of the ascending and descending aorta.  There mild mitral annular calcification.  Central venous catheter is present with its tip in the distal SVC.  Normal pulmonary venous anatomy with all four pulmonary veins draining  into the left atrium.    The left atrial appendage is free of thrombus.  There is no left ventricular mass or thrombus.   Normal pericardial thickness. There is no pericardial effusion.  Please review Radiology report for incidental noncardiac findings that  will follow separately.     I have personally reviewed the examination and initial  interpretation  and I agree with the findings.     5/15/2024 Echo  Interpretation Summary  Left ventricular function is decreased. The ejection fraction is 10-15%  (severely reduced). Severe diffuse hypokinesis is present.  Global right ventricular function is moderately reduced.  Moderate tricuspid insufficiency is present.  Estimated pulmonary artery systolic pressure is 54 mmHg plus right atrial  pressure.  IVC diameter >2.1 cm collapsing <50% with sniff suggests a high RA pressure  estimated at 15 mmHg or greater.  Trivial pericardial effusion is present.     This study was compared with the study from 2/12/24: LV and RV function have  decreased significantly.    2/12/2024 Echo  Interpretation Summary  A large left pleural effusion is present.  Global and regional left ventricular function is normal with an EF of 55-60%.  Global right ventricular function is normal. The right ventricle is normal  size.  Moderate tricuspid insufficiency is present. The etiology is RA enlargement.  The estimated PA systolic pressure is 60 mmHg.  IVC diameter and respiratory changes fall into an intermediate range  suggesting an RA pressure of 8 mmHg.  There is no prior study for direct comparison.      Assessment and Plan:   Ms. Idalia Bob is an 79yr old female with a history of CKD, HTN, GERD, COPD, CAD, and newly diagnosed NICM (LVEF 10-15% per TTE 5/2024) who presents to [facility] for evaluation and ongoing management of GDMT.     Hypervolemic by exam with pitting edema, JVD, weight gain. Review of today's labs notable for rise in Cr, lytes stable. Instructed to take an additional 40 mg of lasix today and then continue lasix 40 mg daily x 5 days with KCL 40 mEq daily. Continue to monitor daily weights. RNCC will check in with patient on Monday to review weights, edema, and we will determine plan for lasix going forward. We will also resume jardiance 10 mg daily. BMP in 2 weeks. C[facility] in 2-4 weeks.      Newly diagnosed systolic  heart failure secondary to NICM (LVEF 10-15% per TTE 5/2024)  HTN  Stage C, NYHA Class III  - ACEi/ARB/ARNi:  losartan 12.5 mg daily   - Afterload reduction:  n/a  - BB:  could consider initiation pending BP trends  - Aldosterone antagonist:  deferred while other medications are proritized  - SGLT2i:  start jardiance   - SCD ppx:  n/a  - Fluid status:  appears hypervolemic, increase lasix to 40 mg daily      Nonobstructive CAD  Coronary CTA showed mild non-obstructive coronary artery disease.  Total Agatston score 484 placing the patient in the 79th percentile when compared to age and gender matched control group.    - continue aspirin 81mg daily  - BB as above  - not on statin, defer to primary cardiologist     COPD  No longer on day-time oxygen, just at night.     CKD 3  Baseline ~ 1.0  Cr 1.15  - BMP in 2 weeks    Prolonged QTc  Calculated QTc 516 ms by today's ECG. She is currently on Aricpet and Zyprexa. QTc stable. We will continue these medications at current doses but do not recommend dose increased and to avoid other QT prolonging medications.       Follow up:  - BMP in 2 weeks   - [facility] in 2-4 weeks      A total of 50 minutes was spent on the day of the visit, which includes preparation for the visit (reviewing previous medical records, laboratories and investigations), in conjunction with the actual clinic visit with the patient, which includes obtaining a history and physical exam, creating and reviewing the care plan, patient education (and family if present), counseling, documenting clinical information in the electronic health record and care coordination.       Trena Chanel DNP, NP-C  Advance Heart Failure  07/10/24

## 2024-07-10 NOTE — LETTER
[address] [facility] [address]      This is a Home Care Order from PCP. Requesting agency to review and call back with either acceptance or denial for patient.     Documentation of Face to Face and Certification for Home Health Services     I attest that I saw or will see Idalia Bob on this date:  6/21/2024     This encounter with the patient was in whole, or in part, for medical condition, which is the primary reason for Home Health Care:       Patient Active Problem List   Diagnosis    Arthritis of knee    At risk for falls    Attention disturbance    Chronic obstructive pulmonary disease (H)    Dementia (H)    Frail elderly    GERD (gastroesophageal reflux disease)    Hypertension    Iron deficiency anemia    AMRIT (generalized anxiety disorder)    Hypokalemia    Community acquired pneumonia of left upper lobe of lung    Stage 3 chronic kidney disease, unspecified whether stage 3a or 3b CKD (H)    Acute on chronic congestive heart failure, unspecified heart failure type (H)    Acute kidney failure, unspecified (H)    Fever, unspecified fever cause    Pneumonia of right lower lobe due to infectious organism    Physical deconditioning    Continuous opioid dependence (H)      I certify that, based on my findings, the following services are medically necessary Home Health Services: Skilled Nursing Other lymphedema.      Additional services needed:        Further, I certify that my clinical findings support that this patient is homebound (i.e. absences from home require considerable and taxing effort and are for medical reasons or [employer] services or infrequently or of short duration when for other reasons) related to:Dyspnea on exertion that makes it unsafe to leave home without clinical deterioration  Patient has difficulty ambulating >100 ft     Based on the above findings, I certify that this patient is confined to the home and needs intermittent skilled nursing care, physical therapy and/or  speech therapy. The patient is under my care, and I have initiated the establishment of the plan of care. This patient will be followed by a physician who will periodically review the plan of care.        Physician/Provider to provide follow up care: Provider to follow patient: GOMEZ LOUIS [[id number]]        Please be aware that coverage of these services is subject to the terms and limitations of your health insurance plan.  Call member services at your health plan with any benefit or coverage questions.  _______________________________________________________________________  Authorized by:  Gomez Louis MD       PLEASE EVALUATE AND TREAT (Evaluation timeline is 24 - 48 hrs. Please call if there is need for a variance to this timeline).     Medications:         Current Outpatient Medications   Medication Sig Dispense Refill    losartan (COZAAR) 25 MG tablet Take 0.5 tablets (12.5 mg) by mouth daily 30 tablet 0    acetaminophen (TYLENOL) 325 MG tablet Take 325-650 mg by mouth every 6 hours as needed for mild pain        albuterol (PROVENTIL) (2.5 MG/3ML) 0.083% neb solution Take 1 vial (2.5 mg) by nebulization 2 times daily as needed (COPD) 90 mL 5    aspirin 81 MG EC tablet Take 1 tablet (81 mg) by mouth daily 30 tablet 0    busPIRone (BUSPAR) 15 MG tablet Take 15 mg by mouth 2 times daily        calcium carbonate (TUMS) 500 MG chewable tablet Take 1 tablet (500 mg) by mouth 4 times daily as needed for heartburn        Calcium Polycarbophil (FIBER) 625 MG tablet Take 2 tablets by mouth daily        diphenhydrAMINE-zinc acetate (BENADRYL) 2-0.1 % external cream Apply topically 3 times daily as needed for itching        donepezil (ARICEPT) 10 MG tablet Hold off on taking this medication until you follow up with your primary care provider 90 tablet 3    ferrous gluconate (FERGON) 324 (38 Fe) MG tablet Take 1 tablet (324 mg) by mouth daily (with breakfast) 90 tablet 3    FLUoxetine (PROZAC) 40 MG capsule Take 1  capsule (40 mg) by mouth daily        Fluticasone-Umeclidin-Vilanterol (TRELEGY ELLIPTA) 200-62.5-25 MCG/ACT oral inhaler Inhale 1 puff into the lungs daily        furosemide (LASIX) 40 MG tablet Take one tab daily for 3- 5 days, then as needed for lower ext edema, ( take with Potassium )        hyoscyamine (LEVSIN) 0.125 MG tablet TAKE 1 TABLET (125 MCG) BY MOUTH EVERY 4 HOURS AS NEEDED FOR CRAMPING OR DIARRHEA 30 tablet 1    ipratropium - albuterol 0.5 mg/2.5 mg/3 mL (DUONEB) 0.5-2.5 (3) MG/3ML neb solution Take 1 vial (3 mLs) by nebulization every 6 hours as needed for shortness of breath, wheezing or cough        lactobacillus rhamnosus, GG, (CULTURELL) capsule Take 1 capsule by mouth daily        latanoprost (XALATAN) 0.005 % ophthalmic solution Place 1 drop into both eyes at bedtime        levothyroxine (SYNTHROID/LEVOTHROID) 50 MCG tablet Take 1 tablet (50 mcg) by mouth daily        melatonin 1 MG TABS tablet Take 1 tablet (1 mg) by mouth nightly as needed for sleep        OLANZapine (ZYPREXA) 2.5 MG tablet One tab at bedtime,, may increase bid 180 tablet 3    oxyCODONE (ROXICODONE) 5 MG tablet 1/2 tab at bedtime as needed for severe pain. ( Need to last for one month ) 15 tablet 0    pantoprazole (PROTONIX) 40 MG EC tablet Take 1 tablet (40 mg) by mouth daily 90 tablet 3    potassium chloride ER (K-TAB) 20 MEQ CR tablet Take 2 tab daily with Furosemide        senna-docusate (SENOKOT-S/PERICOLACE) 8.6-50 MG tablet Take 1 tablet by mouth 2 times daily as needed for constipation        triamcinolone (KENALOG) 0.1 % external cream Apply topically 2 times daily as needed for irritation        vitamin D3 (CHOLECALCIFEROL) 50 mcg (2000 units) tablet Take 1 tablet (50 mcg) by mouth daily          Problems:      Patient Active Problem List   Diagnosis    Arthritis of knee    At risk for falls    Attention disturbance    Chronic obstructive pulmonary disease (H)    Dementia (H)    Frail elderly    GERD  (gastroesophageal reflux disease)    Hypertension    Iron deficiency anemia    AMRIT (generalized anxiety disorder)    Hypokalemia    Community acquired pneumonia of left upper lobe of lung    Stage 3 chronic kidney disease, unspecified whether stage 3a or 3b CKD (H)    Acute on chronic congestive heart failure, unspecified heart failure type (H)    Acute kidney failure, unspecified (H)    Fever, unspecified fever cause    Pneumonia of right lower lobe due to infectious organism    Physical deconditioning    Continuous opioid dependence (H)      Diet:  None        Code Status:    Code Status: Prior     Allergies:  Penicillins             Order  Home Care Referral [9046.001] (Order [id number])  Referral Details    Referred By  Referred To   Gomez Louis MD   [address] [place] [address]   Phone: [phone]   Fax: [phone]    Diagnoses: Lower extremity edema   Order: Home Care Referral            Gomez Louis MD   [address] [place] [address]   Phone: [phone]   Fax: [phone]    Diagnoses: Lower extremity edema   Order: Primary Care - Care Coordination Referral       Comment: Patient needing assistance with finding home care company. [facility] unable to accept patient for nursing services.     Patient Name  Idalia Bob MRN  [id number] Legal Sex  Female   1945     Patient Demographics    Address  [address] [place] [id number] Phone  [phone] (Home)  [phone] (Mobile) E-mail Address  [email or web address]     Base CPT Code (Reference Only)    Code CPT Chargeables   9046.001 9046      Existing Charges    Charge Line Charge Code Status Charge Trigger Charge Type   None          Order Information    Order Date/Time Release Date/Time Start Date/Time End Date/Time   24 11:49 AM None 2024 None     Order Details    Frequency Duration Priority Order Class   None None Routine: Next available opening  Home Care     Order  Providers    Authorizing Provider Encounter Provider   Fish Louis MD Taha, Mohammad, MD     ABN Associated with this Order    There is no ABN associated with this order.                    Ordering Provider's NPI: [id number]  Fish Louis    Home Care Referral [[id number]]    Electronically signed by: Fish Louis MD on 07/09/24 1149 Status: Active   Ordering user: Fish Louis MD 07/09/24 1149   Released by: Fish Louis MD 07/09/24 1149     Order History  Outpatient  Date/Time Action Taken User Additional Information   07/09/24 1140 Pend Katie Schmid RN    07/09/24 1149 Sign Fish Louis MD      Order Questions    Question Answer   Reason for Referral: Skilled Nursing   Note: Must select at least one of Skilled Nursing, Physical Therapy, and/or Speech Therapy in addition to any other services.   Skilled Nursing Eval and Treat for: Other   Other Reason for Referral: lymphedema.   Note: Symptoms such as weakness and fragility are non-covered   Is the patient homebound? Yes   Homebound Status (describe the functional limitations that support this patient is confined to his/her home. Medicaid recipients are not required to be homebound.): Dyspnea on exertion that makes it unsafe to leave home without clinical deterioration    Patient has difficulty ambulating >100 ft   I attest that I saw or will see the patient on this date: 6/21/2024   Provider to follow patient FISH LOUIS             Point of contact:    KARI Pierson, [place], [place], [place],   [facility]  [phone]

## 2024-07-10 NOTE — NURSING NOTE
Chief Complaint   Patient presents with    Follow Up     7/10/24 CORE- 79 year old female with New HFrEF 10-15% presents for hospital Follow-up with labs prior.       Vitals were taken, medications reconciled, and EKG was performed.    Chai Simpson, EMT  2:54 PM

## 2024-07-11 ENCOUNTER — MEDICAL CORRESPONDENCE (OUTPATIENT)
Dept: HEALTH INFORMATION MANAGEMENT | Facility: CLINIC | Age: 79
End: 2024-07-11

## 2024-07-11 ENCOUNTER — TELEPHONE (OUTPATIENT)
Dept: FAMILY MEDICINE | Facility: CLINIC | Age: 79
End: 2024-07-11
Payer: MEDICARE

## 2024-07-11 ENCOUNTER — MYC MEDICAL ADVICE (OUTPATIENT)
Dept: CARDIOLOGY | Facility: CLINIC | Age: 79
End: 2024-07-11
Payer: MEDICARE

## 2024-07-11 LAB
ATRIAL RATE - MUSE: 100 BPM
DIASTOLIC BLOOD PRESSURE - MUSE: NORMAL MMHG
INTERPRETATION ECG - MUSE: NORMAL
P AXIS - MUSE: 89 DEGREES
PR INTERVAL - MUSE: 168 MS
QRS DURATION - MUSE: 70 MS
QT - MUSE: 382 MS
QTC - MUSE: 492 MS
R AXIS - MUSE: 82 DEGREES
SYSTOLIC BLOOD PRESSURE - MUSE: NORMAL MMHG
T AXIS - MUSE: 61 DEGREES
VENTRICULAR RATE- MUSE: 100 BPM

## 2024-07-11 NOTE — TELEPHONE ENCOUNTER
Home Care is calling regarding an established patient with [facility].       Requesting orders from: Gomez Louis  Provider is following patient: Yes  Is this a 60-day recertification request?  No    Orders Requested    Skilled Nursing  Request for initial certification (first set of orders)   Frequency:  1x/wk for 4 wks    Fall risk, education on health conditions CHF, medication education    Physical Therapy  Request for initial evaluation and treatment (one time)     Occupational Therapy  Request for initial evaluation and treatment (one time)     Verbal orders given for PT/OT eval and treat.  Information was gathered for SN orders.  Provider review needed.  RN will contact Home Care with information after provider review.  Confirmed ok to leave a detailed message with call back.  Contact information confirmed and updated as needed.    Allison Christopher RN

## 2024-07-12 ENCOUNTER — TELEPHONE (OUTPATIENT)
Dept: FAMILY MEDICINE | Facility: CLINIC | Age: 79
End: 2024-07-12
Payer: MEDICARE

## 2024-07-12 NOTE — TELEPHONE ENCOUNTER
Called and left DVM relaying provider OK for requested home care orders.    Christine M Klisch, RN

## 2024-07-12 NOTE — TELEPHONE ENCOUNTER
Per call from Asha, the order didn't include the Velcro wraps and a new order is needed in morning and off at night. Verbal or fax is okay. Please reach out to Asha if any questions.

## 2024-07-12 NOTE — TELEPHONE ENCOUNTER
Patient Returning Call    Reason for call:  K-tab 20 Meq and Levsin 0.125 thi is regarding concerns about interaction wondering if pt can continue to take this medication?    Information relayed to patient:  via phone    Patient has additional questions:  No    What are your questions/concerns:  NA    Could we send this information to you in [facility] or would you prefer to receive a phone call?:   Patient would prefer a phone call   Okay to leave a detailed message?: Yes at [phone] Zoe CHENG

## 2024-07-12 NOTE — TELEPHONE ENCOUNTER
Called Lymphedema department, they will reach out to [facility] and clarify what they need.

## 2024-07-15 ENCOUNTER — MYC MEDICAL ADVICE (OUTPATIENT)
Dept: CARDIOLOGY | Facility: CLINIC | Age: 79
End: 2024-07-15
Payer: MEDICARE

## 2024-07-15 DIAGNOSIS — I50.9 ACUTE ON CHRONIC CONGESTIVE HEART FAILURE, UNSPECIFIED HEART FAILURE TYPE (H): ICD-10-CM

## 2024-07-15 RX ORDER — FUROSEMIDE 20 MG
TABLET ORAL
Qty: 100 TABLET | Refills: 3 | Status: SHIPPED | OUTPATIENT
Start: 2024-07-15

## 2024-07-15 RX ORDER — POTASSIUM CHLORIDE 1500 MG/1
TABLET, EXTENDED RELEASE ORAL
Qty: 100 TABLET | Refills: 3 | Status: SHIPPED | OUTPATIENT
Start: 2024-07-15

## 2024-07-15 RX ORDER — POTASSIUM CHLORIDE 1500 MG/1
20 TABLET, EXTENDED RELEASE ORAL DAILY
Qty: 90 TABLET | Refills: 3 | Status: SHIPPED | OUTPATIENT
Start: 2024-07-15 | End: 2024-07-15

## 2024-07-15 NOTE — TELEPHONE ENCOUNTER
It looks like med was taken off med list. Is it safe to assume that patient should stop taking this medication?    Thanks,  JIMI Del Rio  [facility]

## 2024-07-15 NOTE — TELEPHONE ENCOUNTER
Printed med list and placed in Dr Louis sign me folder.    GUDELIA Lopez  [facility]

## 2024-07-15 NOTE — TELEPHONE ENCOUNTER
Team,    I spoke with home care nurse and notified of provider note below. Please print new med list, have PCP sign, and please fax this to the [facility].     [facility]  Nursing line: [phone]    Asha, ph: [phone]  Fax: [phone]      Thanks,  JIMI Del Rio  [facility]

## 2024-07-15 NOTE — PROGRESS NOTES
Stop Levsin.  Continue with Potassium chloride, need  to take with Lasix.    Continue with Pantoprazole    Thanks

## 2024-07-15 NOTE — TELEPHONE ENCOUNTER
Routing message to provider.    AL facility calling to see if ok to take Levsin and K-tab due to potential interaction.    Please advise.    Christine M Klisch, RN

## 2024-07-15 NOTE — TELEPHONE ENCOUNTER
Date: 7/15/2024    Time of Call: 3:31 PM     Diagnosis:  heart failure      [ VORB ] Ordering provider: Trena Chanel NP    Order: Lets continue lasix 40 mg daily x 3 days (through Wed) and get a BMP in the next 3-5 days. Resume lasix 20 mg daily with KCL 20 meq daily on 7/18. I would advice daughter to increase back to lasix 40 mg daily and KCL 40 mEq daily if weight >/= 115 lb.      Order received by: Tiffanie Velasquez RN       Follow-up/additional notes: alanna Jimenez.  Discussed with Asha KRAUSE at [facility], she will arrange for labs on 7/18 instead of 7/25

## 2024-07-16 NOTE — TELEPHONE ENCOUNTER
Faxed signed med list to [facility] at fax [phone].    GUDELIA Lopez  [facility]

## 2024-07-17 ENCOUNTER — TELEPHONE (OUTPATIENT)
Dept: FAMILY MEDICINE | Facility: CLINIC | Age: 79
End: 2024-07-17
Payer: MEDICARE

## 2024-07-17 NOTE — TELEPHONE ENCOUNTER
Patient Quality Outreach    Patient is due for the following:   Physical Annual Wellness Visit      Topic Date Due    Hepatitis A Vaccine (1 of 2 - Risk 2-dose series) Never done    Diptheria Tetanus Pertussis (DTAP/TDAP/TD) Vaccine (2 - Td or Tdap) 02/03/2024    COVID-19 Vaccine (7 - 2023-24 season) 02/20/2024     Chronic Opioid Use -  Treatment Agreement (CSA), Urine Drug Screen, AMRIT-7, and PHQ-9    Next Steps:   Schedule a Annual Wellness Visit    Type of outreach:    Sent getupp message.      Questions for provider review:    None           Rosalie Norton CMA  Chart routed to Care Team.

## 2024-07-19 ENCOUNTER — TELEPHONE (OUTPATIENT)
Dept: FAMILY MEDICINE | Facility: CLINIC | Age: 79
End: 2024-07-19
Payer: MEDICARE

## 2024-07-19 NOTE — TELEPHONE ENCOUNTER
Forms received from [facility] (cert:07/11/2024-09/08/2024)-order-[id number] for .  Forms placed in provider 'sign me' folder.  Please fax forms to [phone] after completion.

## 2024-07-22 DIAGNOSIS — Z53.9 DIAGNOSIS NOT YET DEFINED: Primary | ICD-10-CM

## 2024-07-22 PROCEDURE — G0180 MD CERTIFICATION HHA PATIENT: HCPCS | Performed by: FAMILY MEDICINE

## 2024-07-26 DIAGNOSIS — I50.9 ACUTE ON CHRONIC CONGESTIVE HEART FAILURE, UNSPECIFIED HEART FAILURE TYPE (H): Primary | ICD-10-CM

## 2024-07-27 ENCOUNTER — MYC REFILL (OUTPATIENT)
Dept: FAMILY MEDICINE | Facility: CLINIC | Age: 79
End: 2024-07-27
Payer: MEDICARE

## 2024-07-27 DIAGNOSIS — M54.9 BACK PAIN, UNSPECIFIED BACK LOCATION, UNSPECIFIED BACK PAIN LATERALITY, UNSPECIFIED CHRONICITY: ICD-10-CM

## 2024-07-29 ENCOUNTER — TELEPHONE (OUTPATIENT)
Dept: FAMILY MEDICINE | Facility: CLINIC | Age: 79
End: 2024-07-29
Payer: MEDICARE

## 2024-07-29 RX ORDER — OXYCODONE HYDROCHLORIDE 5 MG/1
5 TABLET ORAL
Qty: 15 TABLET | Refills: 0 | Status: SHIPPED | OUTPATIENT
Start: 2024-07-29 | End: 2024-08-01

## 2024-07-29 NOTE — TELEPHONE ENCOUNTER
GAVIN Badillo with [facility] is calling regarding an established patient with [facility].       Requesting orders from: Gomez Louis  Provider is following patient: Yes  Is this a 60-day recertification request?  No    Orders Requested    Physical Therapy  Request for continuation of care with no increase or decrease in frequency  Frequency:  1x/wk for 5 wks  Lymphedema treatment and endurance training.        Verbal orders given.  Home Care will send orders for provider to sign.  Confirmed ok to leave a detailed message with call back.  Contact information confirmed and updated as needed.    Chel Meléndez RN

## 2024-07-31 ENCOUNTER — TELEPHONE (OUTPATIENT)
Dept: FAMILY MEDICINE | Facility: CLINIC | Age: 79
End: 2024-07-31
Payer: MEDICARE

## 2024-08-01 DIAGNOSIS — M54.9 BACK PAIN, UNSPECIFIED BACK LOCATION, UNSPECIFIED BACK PAIN LATERALITY, UNSPECIFIED CHRONICITY: ICD-10-CM

## 2024-08-01 RX ORDER — OXYCODONE HYDROCHLORIDE 5 MG/1
2.5 TABLET ORAL
Qty: 15 TABLET | Refills: 0 | Status: SHIPPED | OUTPATIENT
Start: 2024-08-01 | End: 2024-08-04

## 2024-08-09 ENCOUNTER — LAB (OUTPATIENT)
Dept: LAB | Facility: CLINIC | Age: 79
End: 2024-08-09
Attending: NURSE PRACTITIONER
Payer: MEDICARE

## 2024-08-09 ENCOUNTER — OFFICE VISIT (OUTPATIENT)
Dept: CARDIOLOGY | Facility: CLINIC | Age: 79
End: 2024-08-09
Attending: NURSE PRACTITIONER
Payer: MEDICARE

## 2024-08-09 VITALS
OXYGEN SATURATION: 98 % | WEIGHT: 111 LBS | SYSTOLIC BLOOD PRESSURE: 137 MMHG | HEART RATE: 83 BPM | DIASTOLIC BLOOD PRESSURE: 85 MMHG | BODY MASS INDEX: 21.68 KG/M2

## 2024-08-09 DIAGNOSIS — I10 HYPERTENSION, UNSPECIFIED TYPE: ICD-10-CM

## 2024-08-09 DIAGNOSIS — N18.30 STAGE 3 CHRONIC KIDNEY DISEASE, UNSPECIFIED WHETHER STAGE 3A OR 3B CKD (H): ICD-10-CM

## 2024-08-09 DIAGNOSIS — I42.8 NICM (NONISCHEMIC CARDIOMYOPATHY) (H): ICD-10-CM

## 2024-08-09 DIAGNOSIS — J44.9 CHRONIC OBSTRUCTIVE PULMONARY DISEASE, UNSPECIFIED COPD TYPE (H): ICD-10-CM

## 2024-08-09 DIAGNOSIS — I50.9 ACUTE ON CHRONIC CONGESTIVE HEART FAILURE, UNSPECIFIED HEART FAILURE TYPE (H): Primary | ICD-10-CM

## 2024-08-09 DIAGNOSIS — I50.9 ACUTE ON CHRONIC CONGESTIVE HEART FAILURE, UNSPECIFIED HEART FAILURE TYPE (H): ICD-10-CM

## 2024-08-09 LAB
ANION GAP SERPL CALCULATED.3IONS-SCNC: 11 MMOL/L (ref 7–15)
BUN SERPL-MCNC: 21.3 MG/DL (ref 8–23)
CALCIUM SERPL-MCNC: 9.5 MG/DL (ref 8.8–10.4)
CHLORIDE SERPL-SCNC: 101 MMOL/L (ref 98–107)
CREAT SERPL-MCNC: 1.17 MG/DL (ref 0.51–0.95)
EGFRCR SERPLBLD CKD-EPI 2021: 47 ML/MIN/1.73M2
GLUCOSE SERPL-MCNC: 92 MG/DL (ref 70–99)
HCO3 SERPL-SCNC: 25 MMOL/L (ref 22–29)
POTASSIUM SERPL-SCNC: 4.4 MMOL/L (ref 3.4–5.3)
SODIUM SERPL-SCNC: 137 MMOL/L (ref 135–145)

## 2024-08-09 PROCEDURE — 99215 OFFICE O/P EST HI 40 MIN: CPT | Performed by: NURSE PRACTITIONER

## 2024-08-09 PROCEDURE — G0463 HOSPITAL OUTPT CLINIC VISIT: HCPCS | Performed by: NURSE PRACTITIONER

## 2024-08-09 PROCEDURE — 36415 COLL VENOUS BLD VENIPUNCTURE: CPT | Performed by: PATHOLOGY

## 2024-08-09 PROCEDURE — 80048 BASIC METABOLIC PNL TOTAL CA: CPT | Performed by: PATHOLOGY

## 2024-08-09 RX ORDER — SACUBITRIL AND VALSARTAN 24; 26 MG/1; MG/1
1 TABLET, FILM COATED ORAL 2 TIMES DAILY
Qty: 180 TABLET | Refills: 3 | Status: SHIPPED | OUTPATIENT
Start: 2024-08-09 | End: 2024-08-27

## 2024-08-09 NOTE — PROGRESS NOTES
[facility] Cardiology   CORE Clinic      HPI:   Ms. Idalia Bob is an 79yr old female with a history of CKD, HTN, GERD, COPD, CAD, and newly diagnosed NICM (LVEF 10-15% per TTE 5/2024) who presents to [facility] for evaluation and ongoing management of GDMT.      Her PCP advised that she present to the ED 5/15/24 due to JUANIS and elevated NTproBNP.  While in the ED, CXR showed bilateral pleural effusions and pulmonary edema, TTE showed LVEF 10-15% and moderately reduced RV function.  She started on IV lasix, and admitted to medicine.  She became hypotensive, required dobutamine --> dopamine, which was then stopped 5/21.  She ultimately diuresed to a weight of ~130#, and discharged 5/24 on losartan 12.5mg daily and jardiance 10mg daily.     Established in CORE on 6/4/24. At that time she reported feeling okay. Starting to have some LE edema and GUTIERREZ. Weights have remained stable, ~ 125-126 lb. Lasix increased to 40/20 mg BID and referred to lymphedema. Readmitted 6/9-6/14 for recurrent pneumonia.      Today, Ms. Bob presents to clinic with her daughter. She reports feeling relatively well. Breathing is comfortable. Denies cough, GUTIERREZ, fever, or any new or persistent sick symptoms. Getting stronger per her daughter.  Weight at discharge 113 lb, currently 111 lb. No swelling in LE  She is following with lymphedema clinic and is wearing prescribed Velcro wraps. She has been taking lasix 20 mg daily Denies orthopnea, PND, abdominal distention.     Her facility does not offer salt-restricted foods.  She has not been checking her BPs regularly.  She otherwise denies chest pain, palpitations, dizziness, falls, headaches, acute vision changes, fevers, chills, cough, sore throat, and signs of bleeding.    Cardiac Medications   - ASA 81 mg daily   - Lasix 20 mg daily PRN   - Losartan 12.5 mg daily   - KCL 20 mEq daily       PAST MEDICAL HISTORY:  Past Medical History:   Diagnosis Date    Chronic kidney disease (CKD) 01/10/2023     Chronic obstructive pulmonary disease (H) 01/10/2023    Dementia (H) 11/28/2023    GERD (gastroesophageal reflux disease) 03/15/2023    Hypertension 11/28/2023       FAMILY HISTORY:  No family history on file.    SOCIAL HISTORY:  Social History     Socioeconomic History    Marital status:    Tobacco Use    Smoking status: Former     Current packs/day: 1.00     Average packs/day: 1 pack/day for 40.0 years (40.0 ttl pk-yrs)     Types: Cigarettes     Passive exposure: Past    Smokeless tobacco: Never   Vaping Use    Vaping status: Never Used   Substance and Sexual Activity    Alcohol use: Not Currently    Drug use: Never    Sexual activity: Not Currently     Social Determinants of Health     Financial Resource Strain: Low Risk  (1/22/2024)    Financial Resource Strain     Within the past 12 months, have you or your family members you live with been unable to get utilities (heat, electricity) when it was really needed?: No   Food Insecurity: Low Risk  (1/22/2024)    Food Insecurity     Within the past 12 months, did you worry that your food would run out before you got money to buy more?: No     Within the past 12 months, did the food you bought just not last and you didn t have money to get more?: No   Transportation Needs: Low Risk  (1/22/2024)    Transportation Needs     Within the past 12 months, has lack of transportation kept you from medical appointments, getting your medicines, non-medical meetings or appointments, work, or from getting things that you need?: No   Social Connections: Socially Integrated (11/21/2023)    [facility]    Social Connections     Frequency of Communication with Friends and Family: 0   Housing Stability: Low Risk  (1/22/2024)    Housing Stability     Do you have housing? : Yes     Are you worried about losing your housing?: No       CURRENT MEDICATIONS:  Current Outpatient Medications    Medication Sig Dispense Refill    acetaminophen (TYLENOL) 325 MG tablet Take 325-650 mg by mouth every 6 hours as needed for mild pain      aspirin 81 MG EC tablet Take 1 tablet (81 mg) by mouth daily 30 tablet 0    busPIRone (BUSPAR) 15 MG tablet Take 15 mg by mouth 2 times daily      calcium carbonate (TUMS) 500 MG chewable tablet Take 1 tablet (500 mg) by mouth 4 times daily as needed for heartburn      Calcium Polycarbophil (FIBER) 625 MG tablet Take 2 tablets by mouth daily      donepezil (ARICEPT) 5 MG tablet Take 1 tablet (5 mg) by mouth at bedtime      empagliflozin (JARDIANCE) 10 MG TABS tablet Take 1 tablet (10 mg) by mouth daily 90 tablet 1    ferrous gluconate (FERGON) 324 (38 Fe) MG tablet Take 1 tablet (324 mg) by mouth daily (with breakfast) 90 tablet 3    FLUoxetine (PROZAC) 40 MG capsule Take 1 capsule (40 mg) by mouth daily      Fluticasone-Umeclidin-Vilanterol (TRELEGY ELLIPTA) 200-62.5-25 MCG/ACT oral inhaler Inhale 1 puff into the lungs daily      furosemide (LASIX) 20 MG tablet Take 1 tablet (20 mg) by mouth daily. May also take 1 tablet (20 mg) daily as needed (for weight equal to or greater then 115 lbs). 100 tablet 3    lactobacillus rhamnosus, GG, (CULTURELL) capsule Take 1 capsule by mouth daily      latanoprost (XALATAN) 0.005 % ophthalmic solution Place 1 drop into both eyes at bedtime      levothyroxine (SYNTHROID/LEVOTHROID) 50 MCG tablet Take 1 tablet (50 mcg) by mouth daily      losartan (COZAAR) 25 MG tablet Take 0.5 tablets (12.5 mg) by mouth daily 30 tablet 3    OLANZapine (ZYPREXA) 5 MG tablet One tab at bedtime,, may increase bid      pantoprazole (PROTONIX) 40 MG EC tablet Take 1 tablet (40 mg) by mouth daily 90 tablet 3    potassium chloride caroline ER (KLOR-CON M20) 20 MEQ CR tablet Take 1 tablet (20 mEq) by mouth daily. May also take 1 tablet (20 mEq) daily as needed (if taking an extra dose of Lasix for weight gain equal to or greater than 115 lbs). 100 tablet 3     senna-docusate (SENOKOT-S/PERICOLACE) 8.6-50 MG tablet Take 1 tablet by mouth 2 times daily as needed for constipation      triamcinolone (KENALOG) 0.1 % external cream Apply topically 2 times daily as needed for irritation      vitamin D3 (CHOLECALCIFEROL) 50 mcg (2000 units) tablet Take 1 tablet (50 mcg) by mouth daily      albuterol (PROVENTIL) (2.5 MG/3ML) 0.083% neb solution Take 1 vial (2.5 mg) by nebulization 2 times daily as needed (COPD) (Patient not taking: Reported on 8/9/2024) 90 mL 5    diphenhydrAMINE-zinc acetate (BENADRYL) 2-0.1 % external cream Apply topically 3 times daily as needed for itching (Patient not taking: Reported on 8/9/2024)      ipratropium - albuterol 0.5 mg/2.5 mg/3 mL (DUONEB) 0.5-2.5 (3) MG/3ML neb solution Take 1 vial (3 mLs) by nebulization every 6 hours as needed for shortness of breath, wheezing or cough (Patient not taking: Reported on 8/9/2024)      melatonin 1 MG TABS tablet Take 1 tablet (1 mg) by mouth nightly as needed for sleep (Patient not taking: Reported on 8/9/2024)       No current facility-administered medications for this visit.       ROS:   Refer to HPI    EXAM:  /85 (BP Location: Right arm, Patient Position: Chair, Cuff Size: Adult Regular)   Pulse 83   Wt 50.3 kg (111 lb)   LMP  (LMP Unknown)   SpO2 98%   BMI 21.68 kg/m     GENERAL: Appears comfortable, in no acute distress.   HEENT: Eye symmetrical, no discharge or icterus bilaterally. Mucous membranes moist and without lesions.  CV: RRR, +S1S2, no murmur, rub, or gallop. JVP not elevated at 90 degrees.   RESPIRATORY: Respirations regular, even, and unlabored. Lungs CTA throughout.   GI: Soft and non distended with normoactive bowel sounds present in all quadrants. No tenderness, rebound, guarding. No hepatomegaly.   EXTREMITIES: no  BLE peripheral edema. 2+ bilateral pedal pulses.   NEUROLOGIC: Alert and oriented x 3. No focal deficits.   MUSCULOSKELETAL: No joint swelling or tenderness.   SKIN: No  jaundice. No rashes or lesions.     Labs, reviewed with patient in clinic today:  CBC RESULTS:  Lab Results   Component Value Date    WBC 9.7 2024    RBC 4.12 2024    HGB 10.0 (L) 2024    HCT 34.8 (L) 2024    MCV 85 2024    MCH 24.3 (L) 2024    MCHC 28.7 (L) 2024    RDW 22.7 (H) 2024     (H) 2024       CMP RESULTS:  Lab Results   Component Value Date     2024    POTASSIUM 4.4 2024    POTASSIUM 4.4 2024    CHLORIDE 101 2024    CHLORIDE 104 2024    CO2 25 2024    ANIONGAP 11 2024    GLC 92 2024    GLC 97 2024     (H) 2024    BUN 21.3 2024    CR 1.17 (H) 2024    GFRESTIMATED 47 (L) 2024    AYUSH 9.5 2024    BILITOTAL 0.6 2024    ALBUMIN 3.7 2024    ALKPHOS 71 2024    ALT 9 2024    AST 17 2024        INR RESULTS:  Lab Results   Component Value Date    INR 1.06 2024       Lab Results   Component Value Date    MAG 1.8 2024     Lab Results   Component Value Date    NTBNPI 10,435 (H) 2024     Lab Results   Component Value Date    NTBNP 24,306 (H) 2024       Cardiac Diagnostics:    2024 Coronary CTA  IMPRESSION:  1.  Mild non-obstructive coronary artery disease.  2.  Total Agatston score 484 placing the patient in the 79th  percentile when compared to age and gender matched control group.  3.  Please review Radiology report for incidental noncardiac findings  that will follow separately.     FINDINGS:     CORONARY CALCIUM SCORE     Total Agatston calcium score: 483   Left main: 79  Left anterior descendin  Left circumflex: 69  Right coronary artery: 180   This places the patient in the lowest  percentile when compared to age  and gender matched control group.     CORONARY ANGIOGRAPHY     DOMINANCE: Right dominant system.   Normal coronary origins and course.     LEFT MAIN:   The left main arises normally  from the left coronary cusp and has a  minimal (<25%) stenosis composed of calcified plaque.     LEFT ANTERIOR DESCENDING:   The LAD is a moderate-sized type III vessel which supplies a  diminutive D1 and a small branching D2.   Proximal LAD: Mild (25-49%) stenosis composed of mixed plaque.  The remainder of the left anterior descending and its major diagonal  branches are patent with minimal luminal irregularities.     LEFT CIRCUMFLEX:   Proximal LCX:Minimal (<25%) stenosis composed of calcified plaque.  First marginal: Minimal (<25%) stenosis composed of calcified plaque.  The remainder of the left circumflex and its major marginal branches  are patent with minimal luminal irregularities.     RIGHT CORONARY ARTERY:   Proximal RCA: Minimal (<25%) stenosis stenosis composed of mixed  plaque.  Mid RCA: Mild (25-49%) stenosis composed of noncalcified plaque.  The remainder of the right coronary and the posterior descending  artery are patent with minimal luminal irregularities.     ADDITIONAL FINDINGS:      The proximal ascending aorta is normal in size.   There is mild calcification of the ascending and descending aorta.  There mild mitral annular calcification.  Central venous catheter is present with its tip in the distal SVC.  Normal pulmonary venous anatomy with all four pulmonary veins draining  into the left atrium.    The left atrial appendage is free of thrombus.  There is no left ventricular mass or thrombus.   Normal pericardial thickness. There is no pericardial effusion.  Please review Radiology report for incidental noncardiac findings that  will follow separately.     I have personally reviewed the examination and initial interpretation  and I agree with the findings.     5/15/2024 Echo  Interpretation Summary  Left ventricular function is decreased. The ejection fraction is 10-15%  (severely reduced). Severe diffuse hypokinesis is present.  Global right ventricular function is moderately  reduced.  Moderate tricuspid insufficiency is present.  Estimated pulmonary artery systolic pressure is 54 mmHg plus right atrial  pressure.  IVC diameter >2.1 cm collapsing <50% with sniff suggests a high RA pressure  estimated at 15 mmHg or greater.  Trivial pericardial effusion is present.     This study was compared with the study from 2/12/24: LV and RV function have  decreased significantly.    2/12/2024 Echo  Interpretation Summary  A large left pleural effusion is present.  Global and regional left ventricular function is normal with an EF of 55-60%.  Global right ventricular function is normal. The right ventricle is normal  size.  Moderate tricuspid insufficiency is present. The etiology is RA enlargement.  The estimated PA systolic pressure is 60 mmHg.  IVC diameter and respiratory changes fall into an intermediate range  suggesting an RA pressure of 8 mmHg.  There is no prior study for direct comparison.      Assessment and Plan:   Ms. Idalia Bob is an 79yr old female with a history of CKD, HTN, GERD, COPD, CAD, and newly diagnosed NICM (LVEF 10-15% per TTE 5/2024) who [place] to [facility] for [place] and ongoing management of GDMT.     Today the patient appears to be euvolemic.  We discussed the benefit of switching over to Entresto from the losartan and the patient is in favor of doing this.  At future appointments we will continue to move closer to GDMT.  We should be able to add a beta-blocker on soon as her blood pressures have been more stable. Would consider carvedilol 6.25 mg BID         Newly diagnosed systolic heart failure secondary to NICM (LVEF 10-15% per TTE 5/2024)  HTN  Stage C, NYHA Class III  - ACEi/ARB/ARNi:  losartan 12.5 mg daily -   - Afterload reduction:  n/a  - BB:  could consider initiation pending BP trends  - Aldosterone antagonist:  deferred while other medications are proritized  - SGLT2i:  jardiance   - SCD ppx:  n/a  - Fluid status:  appears euvolemic     Nonobstructive  CAD  Coronary CTA showed mild non-obstructive coronary artery disease.  Total Agatston score 484 placing the patient in the 79th percentile when compared to age and gender matched control group.    - continue aspirin 81mg daily  - BB as above  - not on statin, defer to primary cardiologist     COPD  No longer on day-time oxygen, just at night.     CKD 3  Baseline ~ 1.0  Cr 1.17  - BMP in 2 weeks    Prolonged QTc  Calculated QTc 516 ms by today's ECG. She is currently on Aricpet and Zyprexa. QTc stable. We will continue these medications at current doses but do not recommend dose increased and to avoid other QT prolonging medications.       Follow up:  Switch from losartan to Entresto 24/26 mg twice a day  BMP in 2 weeks  CORE clinic on 27 August

## 2024-08-09 NOTE — PATIENT INSTRUCTIONS
Take your medicines every day, as directed    Changes made today:  We are running a test claim on a new medication, Entresto. I will let you know the cost of this medication.  STOP losartan, START Entresto 24-26 mg BID.   Monitor Your Weight and Symptoms    Contact us if you:    Gain 2 pounds in one day or 5 pounds in one week  Feel more short of breath  Notice more leg swelling  Feel lightheadeded   Change your lifestyle    Limit Salt or Sodium:  2000 mg  Limit Fluids:  2000 mL or approximately 64 ounces  Eat a Heart Healthy Diet  Low in saturated fats  Stay Active:  Aim to move at least 150 minutes every  week         To Contact us    During Business Hours:  [phone], option # 1      After hours, weekends or holidays:   [phone], Option #4  Ask to speak to the On-Call Cardiologist. Inform them you are a CORE/heart failure patient at the [facility].     [facility] allows you to communicate directly with your heart team through secure messaging.  [place] can be accessed any time on your phone, computer, or tablet.  If you need assistance, we'd be happy to help!         Keep your Heart Appointments:    Labs in 2 weeks    CORE with Hyun in [place] on 8/27, labs prior    CORE with [place] at [facility] [place]    CORE the week of 10/2-10/8    Referral for a heart failure cardiologist - they will call you to schedule         Heart Failure Support Group  Virtual meetings will continue in 2024 Please reach out if you would like to attend and we can get you the information you need to log in.     2024 dates:    Monday, August 5th, 1-2pm     Monday, September 9th, 1-2pm     Monday, October 7th, 1-2pm     Monday, November 4th, 1-2pm     Monday, December 2nd, 1-2pm     Follow the American Heart Association Diet and Lifestyle recommendations:  Limit saturated fat, trans fat, sodium, red meat, sweets and sugar-sweetened beverages. If you choose to eat red meat, compare labels and select the leanest cuts  available.  Aim for at least 150 minutes of moderate physical activity or 75 minutes of vigorous physical activity - or an equal combination of both - each week.     During business hours: [phone], press option # 1 to schedule an appointment or send a message to your care team     After hours, weekends or holidays: On Call Cardiologist- [phone]   option #4 and ask to speak to the on-call Cardiologist. Inform them you are a CORE/heart failure patient at the [facility].

## 2024-08-09 NOTE — NURSING NOTE
Chief Complaint   Patient presents with    Follow Up      Reason for visit: Return CORE, 79 year old female with HFrEF 10-15% presents for Follow-up with labs prior.       Vitals were taken, medications reconciled.     Juve Velasquez, Visit Facilitator    8:14 AM

## 2024-08-09 NOTE — LETTER
8/9/2024      RE: Idalia Bob  [address]  [facility] [address]       Dear Colleague,    Thank you for the opportunity to participate in the care of your patient, Idalia Bob, at the [facility] [place] at [facility]. Please see a copy of my visit note below.      [facility]   [facility]      HPI:   Ms. Idalia Bob is an 79yr old female with a history of CKD, HTN, GERD, COPD, CAD, and newly diagnosed NICM (LVEF 10-15% per TTE 5/2024) who presents to [facility] for evaluation and ongoing management of GDMT.      Her PCP advised that she present to the ED 5/15/24 due to JUANIS and elevated NTproBNP.  While in the ED, CXR showed bilateral pleural effusions and pulmonary edema, TTE showed LVEF 10-15% and moderately reduced RV function.  She started on IV lasix, and admitted to medicine.  She became hypotensive, required dobutamine --> dopamine, which was then stopped 5/21.  She ultimately diuresed to a weight of ~130#, and discharged 5/24 on losartan 12.5mg daily and jardiance 10mg daily.     Established in [facility] on 6/4/24. At that time she reported feeling okay. Starting to have some LE edema and GUTIERREZ. Weights have remained stable, ~ 125-126 lb. Lasix increased to 40/20 mg BID and referred to lymphedema. Readmitted 6/9-6/14 for recurrent pneumonia.      Today, Ms. Bob presents to clinic with her daughter. She reports feeling relatively well. Breathing is comfortable. Denies cough, GUTIERREZ, fever, or any new or persistent sick symptoms. Getting stronger per her daughter.  Weight at discharge 113 lb, currently 111 lb. No swelling in LE  She is following with lymphedema clinic and is wearing prescribed Velcro wraps. She has been taking lasix 20 mg daily Denies orthopnea, PND, abdominal distention.     Her facility does not offer salt-restricted foods.  She has not been checking her BPs regularly.  She otherwise denies chest pain,  palpitations, dizziness, falls, headaches, acute vision changes, fevers, chills, cough, sore throat, and signs of bleeding.    Cardiac Medications   - ASA 81 mg daily   - Lasix 20 mg daily PRN   - Losartan 12.5 mg daily   - KCL 20 mEq daily       PAST MEDICAL HISTORY:  Past Medical History:   Diagnosis Date     Chronic kidney disease (CKD) 01/10/2023     Chronic obstructive pulmonary disease (H) 01/10/2023     Dementia (H) 11/28/2023     GERD (gastroesophageal reflux disease) 03/15/2023     Hypertension 11/28/2023       FAMILY HISTORY:  No family history on file.    SOCIAL HISTORY:  Social History     Socioeconomic History     Marital status:    Tobacco Use     Smoking status: Former     Current packs/day: 1.00     Average packs/day: 1 pack/day for 40.0 years (40.0 ttl pk-yrs)     Types: Cigarettes     Passive exposure: Past     Smokeless tobacco: Never   Vaping Use     Vaping status: Never Used   Substance and Sexual Activity     Alcohol use: Not Currently     Drug use: Never     Sexual activity: Not Currently     Social Determinants of Health     Financial Resource Strain: Low Risk  (1/22/2024)    Financial Resource Strain      Within the past 12 months, have you or your family members you live with been unable to get utilities (heat, electricity) when it was really needed?: No   Food Insecurity: Low Risk  (1/22/2024)    Food Insecurity      Within the past 12 months, did you worry that your food would run out before you got money to buy more?: No      Within the past 12 months, did the food you bought just not last and you didn t have money to get more?: No   Transportation Needs: Low Risk  (1/22/2024)    Transportation Needs      Within the past 12 months, has lack of transportation kept you from medical appointments, getting your medicines, non-medical meetings or appointments, work, or from getting things that you need?: No   Social Connections: Socially Integrated (11/21/2023)    Received [facility], [facility]    Social Connections      Frequency of Communication with Friends and Family: 0   Housing Stability: Low Risk  (1/22/2024)    Housing Stability      Do you have housing? : Yes      Are you worried about losing your housing?: No       CURRENT MEDICATIONS:  Current Outpatient Medications   Medication Sig Dispense Refill     acetaminophen (TYLENOL) 325 MG tablet Take 325-650 mg by mouth every 6 hours as needed for mild pain       aspirin 81 MG EC tablet Take 1 tablet (81 mg) by mouth daily 30 tablet 0     busPIRone (BUSPAR) 15 MG tablet Take 15 mg by mouth 2 times daily       calcium carbonate (TUMS) 500 MG chewable tablet Take 1 tablet (500 mg) by mouth 4 times daily as needed for heartburn       Calcium Polycarbophil (FIBER) 625 MG tablet Take 2 tablets by mouth daily       donepezil (ARICEPT) 5 MG tablet Take 1 tablet (5 mg) by mouth at bedtime       empagliflozin (JARDIANCE) 10 MG TABS tablet Take 1 tablet (10 mg) by mouth daily 90 tablet 1     ferrous gluconate (FERGON) 324 (38 Fe) MG tablet Take 1 tablet (324 mg) by mouth daily (with breakfast) 90 tablet 3     FLUoxetine (PROZAC) 40 MG capsule Take 1 capsule (40 mg) by mouth daily       Fluticasone-Umeclidin-Vilanterol (TRELEGY ELLIPTA) 200-62.5-25 MCG/ACT oral inhaler Inhale 1 puff into the lungs daily       furosemide (LASIX) 20 MG tablet Take 1 tablet (20 mg) by mouth daily. May also take 1 tablet (20 mg) daily as needed (for weight equal to or greater then 115 lbs). 100 tablet 3     lactobacillus rhamnosus, GG, (CULTURELL) capsule Take 1 capsule by mouth daily       latanoprost (XALATAN) 0.005 % ophthalmic solution Place 1 drop into both eyes at bedtime       levothyroxine (SYNTHROID/LEVOTHROID) 50 MCG tablet Take 1 tablet (50 mcg) by mouth daily       losartan (COZAAR) 25 MG tablet Take 0.5 tablets (12.5 mg) by mouth daily 30 tablet 3     OLANZapine (ZYPREXA) 5 MG tablet One  tab at bedtime,, may increase bid       pantoprazole (PROTONIX) 40 MG EC tablet Take 1 tablet (40 mg) by mouth daily 90 tablet 3     potassium chloride caroline ER (KLOR-CON M20) 20 MEQ CR tablet Take 1 tablet (20 mEq) by mouth daily. May also take 1 tablet (20 mEq) daily as needed (if taking an extra dose of Lasix for weight gain equal to or greater than 115 lbs). 100 tablet 3     senna-docusate (SENOKOT-S/PERICOLACE) 8.6-50 MG tablet Take 1 tablet by mouth 2 times daily as needed for constipation       triamcinolone (KENALOG) 0.1 % external cream Apply topically 2 times daily as needed for irritation       vitamin D3 (CHOLECALCIFEROL) 50 mcg (2000 units) tablet Take 1 tablet (50 mcg) by mouth daily       albuterol (PROVENTIL) (2.5 MG/3ML) 0.083% neb solution Take 1 vial (2.5 mg) by nebulization 2 times daily as needed (COPD) (Patient not taking: Reported on 8/9/2024) 90 mL 5     diphenhydrAMINE-zinc acetate (BENADRYL) 2-0.1 % external cream Apply topically 3 times daily as needed for itching (Patient not taking: Reported on 8/9/2024)       ipratropium - albuterol 0.5 mg/2.5 mg/3 mL (DUONEB) 0.5-2.5 (3) MG/3ML neb solution Take 1 vial (3 mLs) by nebulization every 6 hours as needed for shortness of breath, wheezing or cough (Patient not taking: Reported on 8/9/2024)       melatonin 1 MG TABS tablet Take 1 tablet (1 mg) by mouth nightly as needed for sleep (Patient not taking: Reported on 8/9/2024)       No current facility-administered medications for this visit.       ROS:   Refer to HPI    EXAM:  /85 (BP Location: Right arm, Patient Position: Chair, Cuff Size: Adult Regular)   Pulse 83   Wt 50.3 kg (111 lb)   LMP  (LMP Unknown)   SpO2 98%   BMI 21.68 kg/m     GENERAL: Appears comfortable, in no acute distress.   HEENT: Eye symmetrical, no discharge or icterus bilaterally. Mucous membranes moist and without lesions.  CV: RRR, +S1S2, no murmur, rub, or gallop. JVP not elevated at 90 degrees.   RESPIRATORY:  Respirations regular, even, and unlabored. Lungs CTA throughout.   GI: Soft and non distended with normoactive bowel sounds present in all quadrants. No tenderness, rebound, guarding. No hepatomegaly.   EXTREMITIES: no  BLE peripheral edema. 2+ bilateral pedal pulses.   NEUROLOGIC: Alert and oriented x 3. No focal deficits.   MUSCULOSKELETAL: No joint swelling or tenderness.   SKIN: No jaundice. No rashes or lesions.     Labs, reviewed with patient in clinic today:  CBC RESULTS:  Lab Results   Component Value Date    WBC 9.7 06/21/2024    RBC 4.12 06/21/2024    HGB 10.0 (L) 06/21/2024    HCT 34.8 (L) 06/21/2024    MCV 85 06/21/2024    MCH 24.3 (L) 06/21/2024    MCHC 28.7 (L) 06/21/2024    RDW 22.7 (H) 06/21/2024     (H) 06/21/2024       CMP RESULTS:  Lab Results   Component Value Date     08/09/2024    POTASSIUM 4.4 08/09/2024    POTASSIUM 4.4 06/09/2024    CHLORIDE 101 08/09/2024    CHLORIDE 104 07/25/2024    CO2 25 08/09/2024    ANIONGAP 11 08/09/2024    GLC 92 08/09/2024    GLC 97 06/09/2024     (H) 02/11/2024    BUN 21.3 08/09/2024    CR 1.17 (H) 08/09/2024    GFRESTIMATED 47 (L) 08/09/2024    AYUSH 9.5 08/09/2024    BILITOTAL 0.6 06/09/2024    ALBUMIN 3.7 06/09/2024    ALKPHOS 71 06/09/2024    ALT 9 06/09/2024    AST 17 06/09/2024        INR RESULTS:  Lab Results   Component Value Date    INR 1.06 06/09/2024       Lab Results   Component Value Date    MAG 1.8 06/17/2024     Lab Results   Component Value Date    NTBNPI 10,435 (H) 06/12/2024     Lab Results   Component Value Date    NTBNP 24,306 (H) 06/04/2024       Cardiac Diagnostics:    5/22/2024 Coronary CTA  IMPRESSION:  1.  Mild non-obstructive coronary artery disease.  2.  Total Agatston score 484 placing the patient in the 79th  percentile when compared to age and gender matched control group.  3.  Please review Radiology report for incidental noncardiac findings  that will follow separately.     FINDINGS:     CORONARY CALCIUM SCORE      Total Agatston calcium score: 483   Left main: 79  Left anterior descendin  Left circumflex: 69  Right coronary artery: 180   This places the patient in the lowest  percentile when compared to age  and gender matched control group.     CORONARY ANGIOGRAPHY     DOMINANCE: Right dominant system.   Normal coronary origins and course.     LEFT MAIN:   The left main arises normally from the left coronary cusp and has a  minimal (<25%) stenosis composed of calcified plaque.     LEFT ANTERIOR DESCENDING:   The LAD is a moderate-sized type III vessel which supplies a  diminutive D1 and a small branching D2.   Proximal LAD: Mild (25-49%) stenosis composed of mixed plaque.  The remainder of the left anterior descending and its major diagonal  branches are patent with minimal luminal irregularities.     LEFT CIRCUMFLEX:   Proximal LCX:Minimal (<25%) stenosis composed of calcified plaque.  First marginal: Minimal (<25%) stenosis composed of calcified plaque.  The remainder of the left circumflex and its major marginal branches  are patent with minimal luminal irregularities.     RIGHT CORONARY ARTERY:   Proximal RCA: Minimal (<25%) stenosis stenosis composed of mixed  plaque.  Mid RCA: Mild (25-49%) stenosis composed of noncalcified plaque.  The remainder of the right coronary and the posterior descending  artery are patent with minimal luminal irregularities.     ADDITIONAL FINDINGS:      The proximal ascending aorta is normal in size.   There is mild calcification of the ascending and descending aorta.  There mild mitral annular calcification.  Central venous catheter is present with its tip in the distal SVC.  Normal pulmonary venous anatomy with all four pulmonary veins draining  into the left atrium.    The left atrial appendage is free of thrombus.  There is no left ventricular mass or thrombus.   Normal pericardial thickness. There is no pericardial effusion.  Please review Radiology report for incidental  noncardiac findings that  will follow separately.     I have personally reviewed the examination and initial interpretation  and I agree with the findings.     5/15/2024 Echo  Interpretation Summary  Left ventricular function is decreased. The ejection fraction is 10-15%  (severely reduced). Severe diffuse hypokinesis is present.  Global right ventricular function is moderately reduced.  Moderate tricuspid insufficiency is present.  Estimated pulmonary artery systolic pressure is 54 mmHg plus right atrial  pressure.  IVC diameter >2.1 cm collapsing <50% with sniff suggests a high RA pressure  estimated at 15 mmHg or greater.  Trivial pericardial effusion is present.     This study was compared with the study from 2/12/24: LV and RV function have  decreased significantly.    2/12/2024 Echo  Interpretation Summary  A large left pleural effusion is present.  Global and regional left ventricular function is normal with an EF of 55-60%.  Global right ventricular function is normal. The right ventricle is normal  size.  Moderate tricuspid insufficiency is present. The etiology is RA enlargement.  The estimated PA systolic pressure is 60 mmHg.  IVC diameter and respiratory changes fall into an intermediate range  suggesting an RA pressure of 8 mmHg.  There is no prior study for direct comparison.      Assessment and Plan:   Ms. Idalia Bob is an 79yr old female with a history of CKD, HTN, GERD, COPD, CAD, and newly diagnosed NICM (LVEF 10-15% per TTE 5/2024) who presents to [facility] for evaluation and ongoing management of GDMT.     Today the patient appears to be euvolemic.  We discussed the benefit of switching over to Entresto from the losartan and the patient is in favor of doing this.  At future appointments we will continue to move closer to GDMT.  We should be able to add a beta-blocker on soon as her blood pressures have been more stable. Would consider carvedilol 6.25 mg BID         Newly diagnosed systolic heart  failure secondary to NICM (LVEF 10-15% per TTE 5/2024)  HTN  Stage C, NYHA Class III  - ACEi/ARB/ARNi:  losartan 12.5 mg daily -   - Afterload reduction:  n/a  - BB:  could consider initiation pending BP trends  - Aldosterone antagonist:  deferred while other medications are proritized  - SGLT2i:  jardiance   - SCD ppx:  n/a  - Fluid status:  appears euvolemic     Nonobstructive CAD  Coronary CTA showed mild non-obstructive coronary artery disease.  Total Agatston score 484 placing the patient in the 79th percentile when compared to age and gender matched control group.    - continue aspirin 81mg daily  - BB as above  - not on statin, defer to primary cardiologist     COPD  No longer on day-time oxygen, just at night.     CKD 3  Baseline ~ 1.0  Cr 1.17  - BMP in 2 weeks    Prolonged QTc  Calculated QTc 516 ms by today's ECG. She is currently on Aricpet and Zyprexa. QTc stable. We will continue these medications at current doses but do not recommend dose increased and to avoid other QT prolonging medications.       Follow up:  Switch from losartan to Entresto 24/26 mg twice a day  BMP in 2 weeks  [facility] clinic on 27 August          Please do not hesitate to contact me if you have any questions/concerns.     Sincerely,     KIZZY Eaton CNP

## 2024-08-22 ENCOUNTER — TELEPHONE (OUTPATIENT)
Dept: FAMILY MEDICINE | Facility: CLINIC | Age: 79
End: 2024-08-22

## 2024-08-22 DIAGNOSIS — E55.9 VITAMIN D DEFICIENCY: ICD-10-CM

## 2024-08-22 DIAGNOSIS — M81.0 AGE-RELATED OSTEOPOROSIS WITHOUT CURRENT PATHOLOGICAL FRACTURE: Primary | ICD-10-CM

## 2024-08-22 NOTE — TELEPHONE ENCOUNTER
Routing to Dr. Louis    Please place new CAM order for prolia.    Patient is Due around 9/26    Will patient need labs??          Patient due for Prolia injection around 9/26    1. Please ask provider to put in a new CAM order for (Pre Auth) Prolia injection if one is not current and any lab orders if needed (it is up to the provider's discretion on how often labs are checked once Prolia injections have been started)    2. Please contact patient to schedule the injection with RN 2 weeks out    3. Ask MA to re-order med 1 week in advance    Make sure patient has NOT had any dental work involving the jaw bone (i.e teeth extraction) 1 month prior to injection     For questions about the cost, patient may contact our CONSUMER PRICE LINE at [phone] for an estimate.     CHECK STATUS PRIOR TO ADMINISTRATION- Chart Review > Referrals tab - Select the Referral to view the report    Miki Schmid RN

## 2024-08-26 NOTE — PROGRESS NOTES
[facility]  CARDIOLOGY CORE CLINIC    HPI:   Ms. Bob is a 79 year old female with a history including CKD, HTN, GERD, COPD, CAD, and newly diagnosed NICM (LVEF 10-15% per TTE 5/2024), dementia. Presents to clinic for follow-up CORE visit.    Patient most recently seen in clinic by KIZZY Nicole on 8/9/24. She appeared euvolemic and was switched from losartan to Entresto after this visit. Also discussed ongoing addition/titration of GDMT as tolerated.    Today in clinic, she presents with her daughter. Denies any current chest pain, palpitations, dizziness. She endorses chronic LE edema, which she typically wears lymphedema wraps for. However, she recently lost one and her daughter has ordered a new pair that have not arrived yet.     Her living facility does not offer salt-restricted foods. Patient and her daughter report that patient often forgets to eat and drink on her own. Patient's daughter assist with medication set-up at patient's facility, but there is not always someone there to make sure patient is eating and drinking. Weight 111 lbs at 8/9 visit. Weight today 108.     Patient's daughter also states that patient has been feeling lightheaded over the weekend. She has not been checking her BPs regularly, however they can request a BP check from facility staff. She started Entresto about 2 weeks ago, and didn't seem to notice any changes other than the recent dizziness. Patient does ambulate with a walker.    Current Cardiac Medications  ASA 81 mg daily  Jardiance 10 mg daily  Lasix 20 mg daily, additional 20 mg PRN  Potassium 20 mEq daily, and with any extra Lasix doses  Entresto 24-26 BID      PAST MEDICAL HISTORY:  Past Medical History:   Diagnosis Date    Chronic kidney disease (CKD) 01/10/2023    Chronic obstructive pulmonary disease (H) 01/10/2023    Dementia (H) 11/28/2023    GERD (gastroesophageal reflux disease) 03/15/2023    Hypertension 11/28/2023       FAMILY  HISTORY:  History reviewed. No pertinent family history.    SOCIAL HISTORY:  Social History     Socioeconomic History    Marital status:    Tobacco Use    Smoking status: Former     Current packs/day: 1.00     Average packs/day: 1 pack/day for 40.0 years (40.0 ttl pk-yrs)     Types: Cigarettes     Passive exposure: Past    Smokeless tobacco: Never   Vaping Use    Vaping status: Never Used   Substance and Sexual Activity    Alcohol use: Not Currently    Drug use: Never    Sexual activity: Not Currently     Social Determinants of Health     Financial Resource Strain: Low Risk  (1/22/2024)    Financial Resource Strain     Within the past 12 months, have you or your family members you live with been unable to get utilities (heat, electricity) when it was really needed?: No   Food Insecurity: Low Risk  (1/22/2024)    Food Insecurity     Within the past 12 months, did you worry that your food would run out before you got money to buy more?: No     Within the past 12 months, did the food you bought just not last and you didn t have money to get more?: No   Transportation Needs: Low Risk  (1/22/2024)    Transportation Needs     Within the past 12 months, has lack of transportation kept you from medical appointments, getting your medicines, non-medical meetings or appointments, work, or from getting things that you need?: No   Social Connections: Socially Integrated (11/21/2023)    Received from [facility] "[employer], [place]" & [facility], [facility] & [facility]    Social Connections     Frequency of Communication with Friends and Family: 0   Housing Stability: Low Risk  (1/22/2024)    Housing Stability     Do you have housing? : Yes     Are you worried about losing your housing?: No       CURRENT MEDICATIONS:  Current Outpatient Medications   Medication Sig Dispense Refill    acetaminophen (TYLENOL) 325 MG tablet Take 325-650 mg by mouth every 6 hours as needed for mild pain      aspirin 81 MG  EC tablet Take 1 tablet (81 mg) by mouth daily 30 tablet 0    busPIRone (BUSPAR) 15 MG tablet Take 15 mg by mouth 2 times daily      calcium carbonate (TUMS) 500 MG chewable tablet Take 1 tablet (500 mg) by mouth 4 times daily as needed for heartburn      Calcium Polycarbophil (FIBER) 625 MG tablet Take 2 tablets by mouth daily      donepezil (ARICEPT) 5 MG tablet Take 1 tablet (5 mg) by mouth at bedtime      empagliflozin (JARDIANCE) 10 MG TABS tablet Take 1 tablet (10 mg) by mouth daily 90 tablet 1    ferrous gluconate (FERGON) 324 (38 Fe) MG tablet Take 1 tablet (324 mg) by mouth daily (with breakfast) 90 tablet 3    FLUoxetine (PROZAC) 40 MG capsule Take 1 capsule (40 mg) by mouth daily      Fluticasone-Umeclidin-Vilanterol (TRELEGY ELLIPTA) 200-62.5-25 MCG/ACT oral inhaler Inhale 1 puff into the lungs daily      furosemide (LASIX) 20 MG tablet Take 1 tablet (20 mg) by mouth daily. May also take 1 tablet (20 mg) daily as needed (for weight equal to or greater then 115 lbs). 100 tablet 3    lactobacillus rhamnosus, GG, (CULTURELL) capsule Take 1 capsule by mouth daily      latanoprost (XALATAN) 0.005 % ophthalmic solution Place 1 drop into both eyes at bedtime      levothyroxine (SYNTHROID/LEVOTHROID) 50 MCG tablet Take 1 tablet (50 mcg) by mouth daily      OLANZapine (ZYPREXA) 5 MG tablet One tab at bedtime,, may increase bid      pantoprazole (PROTONIX) 40 MG EC tablet Take 1 tablet (40 mg) by mouth daily 90 tablet 3    potassium chloride caroline ER (KLOR-CON M20) 20 MEQ CR tablet Take 1 tablet (20 mEq) by mouth daily. May also take 1 tablet (20 mEq) daily as needed (if taking an extra dose of Lasix for weight gain equal to or greater than 115 lbs). 100 tablet 3    sacubitril-valsartan (ENTRESTO) 24-26 MG per tablet Take 0.5 tablets by mouth 2 times daily. 180 tablet 1    senna-docusate (SENOKOT-S/PERICOLACE) 8.6-50 MG tablet Take 1 tablet by mouth 2 times daily as needed for constipation      triamcinolone  (KENALOG) 0.1 % external cream Apply topically 2 times daily as needed for irritation      vitamin D3 (CHOLECALCIFEROL) 50 mcg (2000 units) tablet Take 1 tablet (50 mcg) by mouth daily      albuterol (PROVENTIL) (2.5 MG/3ML) 0.083% neb solution Take 1 vial (2.5 mg) by nebulization 2 times daily as needed (COPD) (Patient not taking: Reported on 8/9/2024) 90 mL 5    diphenhydrAMINE-zinc acetate (BENADRYL) 2-0.1 % external cream Apply topically 3 times daily as needed for itching (Patient not taking: Reported on 8/9/2024)      ipratropium - albuterol 0.5 mg/2.5 mg/3 mL (DUONEB) 0.5-2.5 (3) MG/3ML neb solution Take 1 vial (3 mLs) by nebulization every 6 hours as needed for shortness of breath, wheezing or cough (Patient not taking: Reported on 8/9/2024)      melatonin 1 MG TABS tablet Take 1 tablet (1 mg) by mouth nightly as needed for sleep (Patient not taking: Reported on 8/9/2024)       Current Facility-Administered Medications   Medication Dose Route Frequency Provider Last Rate Last Admin    [START ON 9/6/2024] denosumab (PROLIA) injection 60 mg  60 mg Subcutaneous Once            ROS:   Refer to HPI    EXAM:  BP (!) 86/57 (BP Location: Left arm, Patient Position: Chair, Cuff Size: Adult Regular)   Pulse 103   Wt 49 kg (108 lb)   LMP  (LMP Unknown)   SpO2 93%   BMI 21.09 kg/m    GENERAL: Appears comfortable, in no acute distress.   HEENT: Eye symmetrical, no discharge or icterus bilaterally. Mucous membranes moist and without lesions.  CV: RRR, +S1S2, no murmur, rub, or gallop.  RESPIRATORY: Respirations regular, even, and unlabored. Lungs CTA throughout.  GI: Soft and non distended with normoactive bowel sounds present in all quadrants. No tenderness, rebound, guarding. No hepatomegaly.   EXTREMITIES: 1+ peripheral edema. 2+ bilateral pedal pulses.   NEUROLOGIC: Alert and oriented x 3. No focal deficits.   MUSCULOSKELETAL: No joint swelling or tenderness.   SKIN: No jaundice. No rashes or lesions.      Labs:  CBC RESULTS:  Lab Results   Component Value Date    WBC 9.7 2024    RBC 4.12 2024    HGB 10.0 (L) 2024    HCT 34.8 (L) 2024    MCV 85 2024    MCH 24.3 (L) 2024    MCHC 28.7 (L) 2024    RDW 22.7 (H) 2024     (H) 2024       CMP RESULTS:  Lab Results   Component Value Date     2024    POTASSIUM 4.4 2024    POTASSIUM 4.4 2024    CHLORIDE 101 2024    CHLORIDE 104 2024    CO2 25 2024    ANIONGAP 11 2024    GLC 92 2024    GLC 97 2024     (H) 2024    BUN 21.3 2024    CR 1.17 (H) 2024    GFRESTIMATED 47 (L) 2024    AYUSH 9.5 2024    BILITOTAL 0.6 2024    ALBUMIN 3.7 2024    ALKPHOS 71 2024    ALT 9 2024    AST 17 2024        INR RESULTS:  Lab Results   Component Value Date    INR 1.06 2024       Lab Results   Component Value Date    MAG 1.8 2024     Lab Results   Component Value Date    NTBNPI 10,435 (H) 2024     Lab Results   Component Value Date    NTBNP 24,306 (H) 2024       Diagnostics:  2024 Coronary CTA  IMPRESSION:  1.  Mild non-obstructive coronary artery disease.  2.  Total Agatston score 484 placing the patient in the 79th  percentile when compared to age and gender matched control group.  3.  Please review Radiology report for incidental noncardiac findings  that will follow separately.     FINDINGS:     CORONARY CALCIUM SCORE     Total Agatston calcium score: 483   Left main: 79  Left anterior descendin  Left circumflex: 69  Right coronary artery: 180   This places the patient in the lowest  percentile when compared to age  and gender matched control group.     CORONARY ANGIOGRAPHY     DOMINANCE: Right dominant system.   Normal coronary origins and course.     LEFT MAIN:   The left main arises normally from the left coronary cusp and has a  minimal (<25%) stenosis composed of calcified  plaque.     LEFT ANTERIOR DESCENDING:   The LAD is a moderate-sized type III vessel which supplies a  diminutive D1 and a small branching D2.   Proximal LAD: Mild (25-49%) stenosis composed of mixed plaque.  The remainder of the left anterior descending and its major diagonal  branches are patent with minimal luminal irregularities.     LEFT CIRCUMFLEX:   Proximal LCX:Minimal (<25%) stenosis composed of calcified plaque.  First marginal: Minimal (<25%) stenosis composed of calcified plaque.  The remainder of the left circumflex and its major marginal branches  are patent with minimal luminal irregularities.     RIGHT CORONARY ARTERY:   Proximal RCA: Minimal (<25%) stenosis stenosis composed of mixed  plaque.  Mid RCA: Mild (25-49%) stenosis composed of noncalcified plaque.  The remainder of the right coronary and the posterior descending  artery are patent with minimal luminal irregularities.     ADDITIONAL FINDINGS:      The proximal ascending aorta is normal in size.   There is mild calcification of the ascending and descending aorta.  There mild mitral annular calcification.  Central venous catheter is present with its tip in the distal SVC.  Normal pulmonary venous anatomy with all four pulmonary veins draining  into the left atrium.    The left atrial appendage is free of thrombus.  There is no left ventricular mass or thrombus.   Normal pericardial thickness. There is no pericardial effusion.  Please review Radiology report for incidental noncardiac findings that  will follow separately.     I have personally reviewed the examination and initial interpretation  and I agree with the findings.     5/15/2024 Echo  Interpretation Summary  Left ventricular function is decreased. The ejection fraction is 10-15%  (severely reduced). Severe diffuse hypokinesis is present.  Global right ventricular function is moderately reduced.  Moderate tricuspid insufficiency is present.  Estimated pulmonary artery systolic  pressure is 54 mmHg plus right atrial  pressure.  IVC diameter >2.1 cm collapsing <50% with sniff suggests a high RA pressure  estimated at 15 mmHg or greater.  Trivial pericardial effusion is present.     This study was compared with the study from 2/12/24: LV and RV function have  decreased significantly.     2/12/2024 Echo  Interpretation Summary  A large left pleural effusion is present.  Global and regional left ventricular function is normal with an EF of 55-60%.  Global right ventricular function is normal. The right ventricle is normal  size.  Moderate tricuspid insufficiency is present. The etiology is RA enlargement.  The estimated PA systolic pressure is 60 mmHg.  IVC diameter and respiratory changes fall into an intermediate range  suggesting an RA pressure of 8 mmHg.  There is no prior study for direct comparison.      Assessment and Plan:   Ms. Bob is a 79 year old female with a history including CKD, HTN, GERD, COPD, CAD, and newly diagnosed NICM (LVEF 10-15% per TTE 5/2024).      # Chronic systolic heart failure/HFrEF (EF 10-15%), 2/2 NICM  # HTN  Stage C. NYHA Class III.  Primary Cardiologist: Dr. Pak  Fluid status: near euvolemic; Lasix 20 mg daily, additional 20 mg PRN  ACEi/ARB/ARNi/afterload reduction: Entresto 24-26 BID  BB: contraindicated due to hypotension  Aldosterone antagonist: contraindicated due to hypotension  SGLT2i: Jardiance 10 mg daily  NSAID use: contraindicated  - Potassium 20 mEq daily, and with any extra Lasix doses  - Follows with Lymphedema clinic  - Previously referred to [facility] by Primary Care     # Nonobstructive CAD  Coronary CTA with mild non-obstructive coronary artery disease.  Total Agatston score 484 placing the patient in the 79th percentile when compared to age and gender matched control group.  on 7/10/24.  - ASA 81 mg daily     # COPD  No longer on day-time oxygen, just at night.   - Establishing with Pulm in Sept     # CKD, stage 3  Baseline ~  1.1-1.2. Most recent Crt 1.17 on 8/9/24.  - BMP in 2 weeks with MD visit     # Prolonged QTc  On Aricept and Zyprexa. QTc has been stable. Do not recommend dose increase. Avoid other QT prolonging medications.       Follow up:  - Dr. Pak on 9/11  - CORE on 9/18 with labs prior    KIZZY Catherine, PETER-C  [facility] General Cardiology  Securely message with [employer]   Text page via [employer] Paging/Directory          Chart review time: 10 minutes  Visit time: 23 minutes  Chart completion/documentation: 5 minutes  Total time spent: 38 minutes       The longitudinal plan of care for the diagnosis(es)/condition(s) as documented were addressed during this visit. Due to the added complexity in care, I will continue to support Idalia in the subsequent management and with ongoing continuity of care.

## 2024-08-27 ENCOUNTER — OFFICE VISIT (OUTPATIENT)
Dept: CARDIOLOGY | Facility: CLINIC | Age: 79
End: 2024-08-27
Payer: MEDICARE

## 2024-08-27 VITALS
SYSTOLIC BLOOD PRESSURE: 86 MMHG | HEART RATE: 103 BPM | WEIGHT: 108 LBS | DIASTOLIC BLOOD PRESSURE: 57 MMHG | BODY MASS INDEX: 21.09 KG/M2 | OXYGEN SATURATION: 93 %

## 2024-08-27 DIAGNOSIS — I50.9 ACUTE ON CHRONIC CONGESTIVE HEART FAILURE, UNSPECIFIED HEART FAILURE TYPE (H): ICD-10-CM

## 2024-08-27 PROCEDURE — G2211 COMPLEX E/M VISIT ADD ON: HCPCS

## 2024-08-27 PROCEDURE — 99214 OFFICE O/P EST MOD 30 MIN: CPT

## 2024-08-27 RX ORDER — SACUBITRIL AND VALSARTAN 24; 26 MG/1; MG/1
0.5 TABLET, FILM COATED ORAL 2 TIMES DAILY
Qty: 180 TABLET | Refills: 1 | Status: SHIPPED | OUTPATIENT
Start: 2024-08-27 | End: 2024-09-11 | Stop reason: ALTCHOICE

## 2024-08-27 NOTE — NURSING NOTE
Chief Complaint   Patient presents with    RECHECK     Return CORE for Chronic CHF       Initial BP (!) 86/57 (BP Location: Left arm, Patient Position: Chair, Cuff Size: Adult Regular)   Pulse 103   Wt 49 kg (108 lb)   LMP  (LMP Unknown)   SpO2 93%   BMI 21.09 kg/m   Estimated body mass index is 21.09 kg/m  as calculated from the following:    Height as of 6/21/24: 1.524 m (5').    Weight as of this encounter: 49 kg (108 lb)..  BP completed using cuff size: regular    GERALDINE Young

## 2024-08-27 NOTE — PATIENT INSTRUCTIONS
Take your medicines every day, as directed    Changes made today:  Decrease Entresto to 1/2 tablet twice daily  Work on ensuring regular meals and hydration   Monitor Your Weight and Symptoms    Contact us if you:    Gain 2 pounds in one day or 5 pounds in one week  Feel more short of breath  Notice more leg swelling  Feel lightheadeded   Change your lifestyle    Limit Salt or Sodium:  2000 mg  Limit Fluids:  2000 mL or approximately 64 ounces  Eat a Heart Healthy Diet  Low in saturated fats  Stay Active:  Aim to move at least 150 minutes every  week         To Contact us    During Business Hours:  [phone], option # 1      After hours, weekends or holidays:   [phone], Option #4  Ask to speak to the On-Call Cardiologist. Inform them you are a heart failure patient at the [facility].     Use [employer] allows you to communicate directly with your heart team through secure messaging.  [employer] can be accessed any time on your phone, computer, or tablet.  If you need assistance, we'd be happy to help!         Keep your Heart Appointments:    Dr. Pak with labs on 9/11  CORE with labs on 9/18     Please consider attending our virtual support group which is held monthly. Please reach out to [employer] at [phone] for more information if you are interested in attending.     2024 dates:    Monday, February 5th , 1-2pm     Monday, March 4th , 1-2pm     Monday, April 1st, 1-2pm     Monday, May 6th, 1-2pm     Monday, June 3rd, 1-2pm     Monday, July 1st, 1-2pm     Monday, August 5th, 1-2pm     Monday, September 9th, 1-2pm     Monday, October 7th, 1-2pm     Monday, November 4th, 1-2pm     Monday, December 2nd, 1-2pm

## 2024-08-27 NOTE — LETTER
8/27/2024      RE: Idalia Bob  [address]  [facility] [address]       Dear Colleague,    Thank you for the opportunity to participate in the care of your patient, Idalia Bob, at the [facility] HEART [facility]INIC [facility]. Please see a copy of my visit note below.     [facility]  CARDIOLOGY CORE CLINIC    HPI:   Ms. Bob is a 79 year old female with a history including CKD, HTN, GERD, COPD, CAD, and newly diagnosed NICM (LVEF 10-15% per TTE 5/2024), dementia. Presents to clinic for follow-up CORE visit.    Patient most recently seen in clinic by KIZZY Nicole on 8/9/24. She appeared euvolemic and was switched from losartan to Entresto after this visit. Also discussed ongoing addition/titration of GDMT as tolerated.    Today in clinic, she presents with her daughter. Denies any current chest pain, palpitations, dizziness. She endorses chronic LE edema, which she typically wears lymphedema wraps for. However, she recently lost one and her daughter has ordered a new pair that have not arrived yet.     Her living facility does not offer salt-restricted foods. Patient and her daughter report that patient often forgets to eat and drink on her own. Patient's daughter assist with medication set-up at patient's facility, but there is not always someone there to make sure patient is eating and drinking. Weight 111 lbs at 8/9 visit. Weight today 108.     Patient's daughter also states that patient has been feeling lightheaded over the weekend. She has not been checking her BPs regularly, however they can request a BP check from facility staff. She started Entresto about 2 weeks ago, and didn't seem to notice any changes other than the recent dizziness. Patient does ambulate with a walker.    Current Cardiac Medications  ASA 81 mg daily  Jardiance 10 mg daily  Lasix 20 mg daily, additional 20 mg  PRN  Potassium 20 mEq daily, and with any extra Lasix doses  Entresto 24-26 BID      PAST MEDICAL HISTORY:  Past Medical History:   Diagnosis Date     Chronic kidney disease (CKD) 01/10/2023     Chronic obstructive pulmonary disease (H) 01/10/2023     Dementia (H) 11/28/2023     GERD (gastroesophageal reflux disease) 03/15/2023     Hypertension 11/28/2023       FAMILY HISTORY:  History reviewed. No pertinent family history.    SOCIAL HISTORY:  Social History     Socioeconomic History     Marital status:    Tobacco Use     Smoking status: Former     Current packs/day: 1.00     Average packs/day: 1 pack/day for 40.0 years (40.0 ttl pk-yrs)     Types: Cigarettes     Passive exposure: Past     Smokeless tobacco: Never   Vaping Use     Vaping status: Never Used   Substance and Sexual Activity     Alcohol use: Not Currently     Drug use: Never     Sexual activity: Not Currently     Social Determinants of Health     Financial Resource Strain: Low Risk  (1/22/2024)    Financial Resource Strain      Within the past 12 months, have you or your family members you live with been unable to get utilities (heat, electricity) when it was really needed?: No   Food Insecurity: Low Risk  (1/22/2024)    Food Insecurity      Within the past 12 months, did you worry that your food would run out before you got money to buy more?: No      Within the past 12 months, did the food you bought just not last and you didn t have money to get more?: No   Transportation Needs: Low Risk  (1/22/2024)    Transportation Needs      Within the past 12 months, has lack of transportation kept you from medical appointments, getting your medicines, non-medical meetings or appointments, work, or from getting things that you need?: No   Social Connections: Socially Integrated (11/21/2023)    Received from [facility], [facility]    Social Connections      Frequency of Communication with  Friends and Family: 0   Housing Stability: Low Risk  (1/22/2024)    Housing Stability      Do you have housing? : Yes      Are you worried about losing your housing?: No       CURRENT MEDICATIONS:  Current Outpatient Medications   Medication Sig Dispense Refill     acetaminophen (TYLENOL) 325 MG tablet Take 325-650 mg by mouth every 6 hours as needed for mild pain       aspirin 81 MG EC tablet Take 1 tablet (81 mg) by mouth daily 30 tablet 0     busPIRone (BUSPAR) 15 MG tablet Take 15 mg by mouth 2 times daily       calcium carbonate (TUMS) 500 MG chewable tablet Take 1 tablet (500 mg) by mouth 4 times daily as needed for heartburn       Calcium Polycarbophil (FIBER) 625 MG tablet Take 2 tablets by mouth daily       donepezil (ARICEPT) 5 MG tablet Take 1 tablet (5 mg) by mouth at bedtime       empagliflozin (JARDIANCE) 10 MG TABS tablet Take 1 tablet (10 mg) by mouth daily 90 tablet 1     ferrous gluconate (FERGON) 324 (38 Fe) MG tablet Take 1 tablet (324 mg) by mouth daily (with breakfast) 90 tablet 3     FLUoxetine (PROZAC) 40 MG capsule Take 1 capsule (40 mg) by mouth daily       Fluticasone-Umeclidin-Vilanterol (TRELEGY ELLIPTA) 200-62.5-25 MCG/ACT oral inhaler Inhale 1 puff into the lungs daily       furosemide (LASIX) 20 MG tablet Take 1 tablet (20 mg) by mouth daily. May also take 1 tablet (20 mg) daily as needed (for weight equal to or greater then 115 lbs). 100 tablet 3     lactobacillus rhamnosus, GG, (CULTURELL) capsule Take 1 capsule by mouth daily       latanoprost (XALATAN) 0.005 % ophthalmic solution Place 1 drop into both eyes at bedtime       levothyroxine (SYNTHROID/LEVOTHROID) 50 MCG tablet Take 1 tablet (50 mcg) by mouth daily       OLANZapine (ZYPREXA) 5 MG tablet One tab at bedtime,, may increase bid       pantoprazole (PROTONIX) 40 MG EC tablet Take 1 tablet (40 mg) by mouth daily 90 tablet 3     potassium chloride caroline ER (KLOR-CON M20) 20 MEQ CR tablet Take 1 tablet (20 mEq) by mouth daily.  May also take 1 tablet (20 mEq) daily as needed (if taking an extra dose of Lasix for weight gain equal to or greater than 115 lbs). 100 tablet 3     sacubitril-valsartan (ENTRESTO) 24-26 MG per tablet Take 0.5 tablets by mouth 2 times daily. 180 tablet 1     senna-docusate (SENOKOT-S/PERICOLACE) 8.6-50 MG tablet Take 1 tablet by mouth 2 times daily as needed for constipation       triamcinolone (KENALOG) 0.1 % external cream Apply topically 2 times daily as needed for irritation       vitamin D3 (CHOLECALCIFEROL) 50 mcg (2000 units) tablet Take 1 tablet (50 mcg) by mouth daily       albuterol (PROVENTIL) (2.5 MG/3ML) 0.083% neb solution Take 1 vial (2.5 mg) by nebulization 2 times daily as needed (COPD) (Patient not taking: Reported on 8/9/2024) 90 mL 5     diphenhydrAMINE-zinc acetate (BENADRYL) 2-0.1 % external cream Apply topically 3 times daily as needed for itching (Patient not taking: Reported on 8/9/2024)       ipratropium - albuterol 0.5 mg/2.5 mg/3 mL (DUONEB) 0.5-2.5 (3) MG/3ML neb solution Take 1 vial (3 mLs) by nebulization every 6 hours as needed for shortness of breath, wheezing or cough (Patient not taking: Reported on 8/9/2024)       melatonin 1 MG TABS tablet Take 1 tablet (1 mg) by mouth nightly as needed for sleep (Patient not taking: Reported on 8/9/2024)       Current Facility-Administered Medications   Medication Dose Route Frequency Provider Last Rate Last Admin     [START ON 9/6/2024] denosumab (PROLIA) injection 60 mg  60 mg Subcutaneous Once            ROS:   Refer to HPI    EXAM:  BP (!) 86/57 (BP Location: Left arm, Patient Position: Chair, Cuff Size: Adult Regular)   Pulse 103   Wt 49 kg (108 lb)   LMP  (LMP Unknown)   SpO2 93%   BMI 21.09 kg/m    GENERAL: Appears comfortable, in no acute distress.   HEENT: Eye symmetrical, no discharge or icterus bilaterally. Mucous membranes moist and without lesions.  CV: RRR, +S1S2, no murmur, rub, or gallop.  RESPIRATORY: Respirations regular,  even, and unlabored. Lungs CTA throughout.  GI: Soft and non distended with normoactive bowel sounds present in all quadrants. No tenderness, rebound, guarding. No hepatomegaly.   EXTREMITIES: 1+ peripheral edema. 2+ bilateral pedal pulses.   NEUROLOGIC: Alert and oriented x 3. No focal deficits.   MUSCULOSKELETAL: No joint swelling or tenderness.   SKIN: No jaundice. No rashes or lesions.     Labs:  CBC RESULTS:  Lab Results   Component Value Date    WBC 9.7 06/21/2024    RBC 4.12 06/21/2024    HGB 10.0 (L) 06/21/2024    HCT 34.8 (L) 06/21/2024    MCV 85 06/21/2024    MCH 24.3 (L) 06/21/2024    MCHC 28.7 (L) 06/21/2024    RDW 22.7 (H) 06/21/2024     (H) 06/21/2024       CMP RESULTS:  Lab Results   Component Value Date     08/09/2024    POTASSIUM 4.4 08/09/2024    POTASSIUM 4.4 06/09/2024    CHLORIDE 101 08/09/2024    CHLORIDE 104 07/25/2024    CO2 25 08/09/2024    ANIONGAP 11 08/09/2024    GLC 92 08/09/2024    GLC 97 06/09/2024     (H) 02/11/2024    BUN 21.3 08/09/2024    CR 1.17 (H) 08/09/2024    GFRESTIMATED 47 (L) 08/09/2024    AYUSH 9.5 08/09/2024    BILITOTAL 0.6 06/09/2024    ALBUMIN 3.7 06/09/2024    ALKPHOS 71 06/09/2024    ALT 9 06/09/2024    AST 17 06/09/2024        INR RESULTS:  Lab Results   Component Value Date    INR 1.06 06/09/2024       Lab Results   Component Value Date    MAG 1.8 06/17/2024     Lab Results   Component Value Date    NTBNPI 10,435 (H) 06/12/2024     Lab Results   Component Value Date    NTBNP 24,306 (H) 06/04/2024       Diagnostics:  5/22/2024 Coronary CTA  IMPRESSION:  1.  Mild non-obstructive coronary artery disease.  2.  Total Agatston score 484 placing the patient in the 79th  percentile when compared to age and gender matched control group.  3.  Please review Radiology report for incidental noncardiac findings  that will follow separately.     FINDINGS:     CORONARY CALCIUM SCORE     Total Agatston calcium score: 483   Left main: 79  Left anterior descending:  155  Left circumflex: 69  Right coronary artery: 180   This places the patient in the lowest  percentile when compared to age  and gender matched control group.     CORONARY ANGIOGRAPHY     DOMINANCE: Right dominant system.   Normal coronary origins and course.     LEFT MAIN:   The left main arises normally from the left coronary cusp and has a  minimal (<25%) stenosis composed of calcified plaque.     LEFT ANTERIOR DESCENDING:   The LAD is a moderate-sized type III vessel which supplies a  diminutive D1 and a small branching D2.   Proximal LAD: Mild (25-49%) stenosis composed of mixed plaque.  The remainder of the left anterior descending and its major diagonal  branches are patent with minimal luminal irregularities.     LEFT CIRCUMFLEX:   Proximal LCX:Minimal (<25%) stenosis composed of calcified plaque.  First marginal: Minimal (<25%) stenosis composed of calcified plaque.  The remainder of the left circumflex and its major marginal branches  are patent with minimal luminal irregularities.     RIGHT CORONARY ARTERY:   Proximal RCA: Minimal (<25%) stenosis stenosis composed of mixed  plaque.  Mid RCA: Mild (25-49%) stenosis composed of noncalcified plaque.  The remainder of the right coronary and the posterior descending  artery are patent with minimal luminal irregularities.     ADDITIONAL FINDINGS:      The proximal ascending aorta is normal in size.   There is mild calcification of the ascending and descending aorta.  There mild mitral annular calcification.  Central venous catheter is present with its tip in the distal SVC.  Normal pulmonary venous anatomy with all four pulmonary veins draining  into the left atrium.    The left atrial appendage is free of thrombus.  There is no left ventricular mass or thrombus.   Normal pericardial thickness. There is no pericardial effusion.  Please review Radiology report for incidental noncardiac findings that  will follow separately.     I have personally reviewed the  examination and initial interpretation  and I agree with the findings.     5/15/2024 Echo  Interpretation Summary  Left ventricular function is decreased. The ejection fraction is 10-15%  (severely reduced). Severe diffuse hypokinesis is present.  Global right ventricular function is moderately reduced.  Moderate tricuspid insufficiency is present.  Estimated pulmonary artery systolic pressure is 54 mmHg plus right atrial  pressure.  IVC diameter >2.1 cm collapsing <50% with sniff suggests a high RA pressure  estimated at 15 mmHg or greater.  Trivial pericardial effusion is present.     This study was compared with the study from 2/12/24: LV and RV function have  decreased significantly.     2/12/2024 Echo  Interpretation Summary  A large left pleural effusion is present.  Global and regional left ventricular function is normal with an EF of 55-60%.  Global right ventricular function is normal. The right ventricle is normal  size.  Moderate tricuspid insufficiency is present. The etiology is RA enlargement.  The estimated PA systolic pressure is 60 mmHg.  IVC diameter and respiratory changes fall into an intermediate range  suggesting an RA pressure of 8 mmHg.  There is no prior study for direct comparison.      Assessment and Plan:   Ms. Bob is a 79 year old female with a history including CKD, HTN, GERD, COPD, CAD, and newly diagnosed NICM (LVEF 10-15% per TTE 5/2024).      # Chronic systolic heart failure/HFrEF (EF 10-15%), 2/2 NICM  # HTN  Stage C. NYHA Class III.  Primary Cardiologist: Dr. Pak  Fluid status: near euvolemic; Lasix 20 mg daily, additional 20 mg PRN  ACEi/ARB/ARNi/afterload reduction: Entresto 24-26 BID  BB: contraindicated due to hypotension  Aldosterone antagonist: contraindicated due to hypotension  SGLT2i: Jardiance 10 mg daily  NSAID use: contraindicated  - Potassium 20 mEq daily, and with any extra Lasix doses  - Follows with Lymphedema clinic  - Previously referred to MTM by Primary  Care     # Nonobstructive CAD  Coronary CTA with mild non-obstructive coronary artery disease.  Total Agatston score 484 placing the patient in the 79th percentile when compared to age and gender matched control group.  on 7/10/24.  - ASA 81 mg daily     # COPD  No longer on day-time oxygen, just at night.   - Establishing with Pulm in Sept     # CKD, stage 3  Baseline ~ 1.1-1.2. Most recent Crt 1.17 on 8/9/24.  - BMP in 2 weeks with MD visit     # Prolonged QTc  On Aricept and Zyprexa. QTc has been stable. Do not recommend dose increase. Avoid other QT prolonging medications.       Follow up:  - Dr. Pak on 9/11  - CORE on 9/18 with labs prior    KIZZY Catherine, OSWALDOP-C  [facility] General Cardiology  Securely message with [employer]   Text page via [facility] Paging/Directory          Chart review time: 10 minutes  Visit time: 23 minutes  Chart completion/documentation: 5 minutes  Total time spent: 38 minutes       The longitudinal plan of care for the diagnosis(es)/condition(s) as documented were addressed during this visit. Due to the added complexity in care, I will continue to support Idalia in the subsequent management and with ongoing continuity of care.        Please do not hesitate to contact me if you have any questions/concerns.     Sincerely,     KIZZY Catherine CNP

## 2024-08-29 ENCOUNTER — TELEPHONE (OUTPATIENT)
Dept: CARDIOLOGY | Facility: CLINIC | Age: 79
End: 2024-08-29
Payer: MEDICARE

## 2024-08-29 NOTE — TELEPHONE ENCOUNTER
Health Call Center    Phone Message    May a detailed message be left on voicemail: yes     Reason for Call: Other: Corie was to call back with her Mothers b/p readings. Today her left arm was 81/57 right arm 85/57 pulse 109. The numbers do not seem to be improving even with the med adjustment. Please call her back to discuss.      Action Taken: Other: cardiology    Travel Screening: Not Applicable   Thank you!  [facility]      Date of Service:

## 2024-08-29 NOTE — TELEPHONE ENCOUNTER
RN called and spoke with patients daughter Barbara.    Consent to communicate on file.    RN scheduled annual wellness an prolia injections for 10/4.    RN confirmed that patient has no dental worked planned involving the jaw.    Per daughter.    Patient will have follow up lab completed at her facility.    Miki Schmid RN, BSN, PHN  [facility]

## 2024-08-29 NOTE — TELEPHONE ENCOUNTER
Per Hyun, Since her symptoms have resolved, let's seek the route of continuing half tab and encouraging hydration and PO intake (her sodium was a bit low) and monitor through weekend.     If she has any recurrence or worsening in symptoms, let's stop the half-tab and reassess.     Pt daughter agreeable to plan- would like facility take daily BP for 2 weeks. Call made to Nurse Saavedra at facility at [phone] and detailed VM left requesting daily BP somewhere between 2-4 hours after morning meds. Will encourage good hydration and oral intake- recheck after the weekend.    Caitlyn Oden RN

## 2024-08-29 NOTE — TELEPHONE ENCOUNTER
RN call to patient daughter. She states pt lowered entresto dosing Tuesday PM to 1/2 tab BID. She states mom BP today was 81-85 systolic, but she was feeling well, no lightheadedness or dizziness    Pt daughter states every morning pt has cup of coffee and not much else for fluids.     Encouragement to check BP twice daily when possible, and increase fluids as much as possible.     Note routed to provider for advisement    Caitlyn Oden RN

## 2024-09-03 NOTE — TELEPHONE ENCOUNTER
Contacted the facility staff who did have the readings    8/31 112/67  9/1  NO BP      WT: 108.6 lb  9/2 118/77  9/3 100/62         WT: 111.4 LB    Nurse states she noticed that patient was short of breath walking to the dining room.  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    Follow-up with Mellisa 9/11 and CORE 9/18 this month scheduled.  Also seeing pulmonology.    Cherry Doyle RN  Cardiology Care Coordinator  [facility]  [phone] option 1

## 2024-09-04 ENCOUNTER — MYC MEDICAL ADVICE (OUTPATIENT)
Dept: CARDIOLOGY | Facility: CLINIC | Age: 79
End: 2024-09-04

## 2024-09-04 ENCOUNTER — TELEPHONE (OUTPATIENT)
Dept: CARDIOLOGY | Facility: CLINIC | Age: 79
End: 2024-09-04

## 2024-09-04 NOTE — TELEPHONE ENCOUNTER
Health Call Center    Phone Message    May a detailed message be left on voicemail: yes     Reason for Call: Other: Pt's daughter, Barbara is on her way over to pt's house to adjust her meds and has some additional questions following the Mychart messages from today. Barbara is wondering if someone from the care team could give her a call to discuss. Please review. Thank you!     Action Taken:  [facility] CARDIOLOGY ADULT [facility] [[id number]]    Travel Screening: Not Applicable     Date of Service:

## 2024-09-04 NOTE — TELEPHONE ENCOUNTER
Left message for patient to call clinic.  [email or web address] message sent-please see [employer] message.  Hyun Avalos RN, APRN CNP Heikkila, Kari S, RN  Caller: Unspecified (6 days ago, 11:09 AM)  Cherry -    Can you call the daughter and relay what the facility said about SOB and weights/BP? Daughter does her medications. With the weight gain and observed SOB and lower BP yesterday, I think we should just hold the Entresto until her 9/11 visit with Dr. Pak. She should do 3 days of Lasix 40 mEq (2 tablets) with the extra potassium for those 3 days. Her labs should be re-checked with her Mellisa visit and I think that's all already scheduled.

## 2024-09-04 NOTE — TELEPHONE ENCOUNTER
Jessica Elkins APRN CNP Heikkila, Kari S, RN  Caller: Unspecified (6 days ago, 11:09 AM)  Cherry -    Can you call the daughter and relay what the facility said about SOB and weights/BP? Daughter does her medications. With the weight gain and observed SOB and lower BP yesterday, I think we should just hold the Entresto until her 9/11 visit with Dr. Pak. She should do 3 days of Lasix 40 mEq (2 tablets) with the extra potassium for those 3 days. Her labs should be re-checked with her Mellisa visit and I think that's all already scheduled.    Date: 9/4/2024    Time of Call: 12:11 PM     Diagnosis:  HF     [ VORB ] Ordering provider: jessica Elkins NP  Order: HOLD Entresto  Lasix 40 mg daily for 3 days  Extra potassium 20 meq (40 meq daily) for 3 days  Labs 9/11 with Tyra appt     Order received by: Cherry Doyle RN       Follow-up/additional notes: Left message for patients daughter to call clinic.  Mychart message sent Vivek KRAUSE

## 2024-09-05 ENCOUNTER — TELEPHONE (OUTPATIENT)
Dept: CARDIOLOGY | Facility: CLINIC | Age: 79
End: 2024-09-05
Payer: MEDICARE

## 2024-09-05 ENCOUNTER — MYC MEDICAL ADVICE (OUTPATIENT)
Dept: FAMILY MEDICINE | Facility: CLINIC | Age: 79
End: 2024-09-05
Payer: MEDICARE

## 2024-09-05 ENCOUNTER — PRE VISIT (OUTPATIENT)
Dept: CARDIOLOGY | Facility: CLINIC | Age: 79
End: 2024-09-05
Payer: MEDICARE

## 2024-09-05 DIAGNOSIS — I50.22 CHRONIC SYSTOLIC HEART FAILURE (H): Primary | ICD-10-CM

## 2024-09-05 DIAGNOSIS — J43.9 PULMONARY EMPHYSEMA, UNSPECIFIED EMPHYSEMA TYPE (H): Primary | ICD-10-CM

## 2024-09-05 NOTE — TELEPHONE ENCOUNTER
"----- Message from Julienne B sent at 9/5/2024  9:30 AM CDT -----  Hello,    This patient was scheduled incorrectly by the [facility]; I just entered an error form for their FYI.   She was scheduled as Return HF with Mellisa, but she's never seen a Cardiologist yet and should be New HF.  She's only see the HF APPs so far.    One of her daughters messaged in yesterday and was asking about virtual options.      Would you please call daughter Corie and let her know that because this is the first time her mom is seeing Dr. Pak, it will need to remain in person.  We may be able to do some virtual in the future, but they should discuss that with Dr. Pak as upcoming visit.     We are OK to keep appt where it it; but would you please change the visit type to \"New HF\" and change time to 60min - it will be an overbook, but that's ok for now.    Thanks!  David  "

## 2024-09-05 NOTE — TELEPHONE ENCOUNTER
9/5 Patient' daughter confirmed rescheduled appointment:  Date: 9/11/24  Time: 11:00 AM  Visit type: New Heart Failure  Provider: Dr. Pak  Location: [facility]  Testing/imaging: Lab prior at 10:30 AM  Additional notes: 9/5 switched visit type from RHF to NHF w/ Dr. Pak 9/11 Okay to overbook per David HOFFMAN. Pt's daughter is aware first appt needs to be in person. SHANIA

## 2024-09-10 NOTE — PROGRESS NOTES
I personally saw, examined and discussed this patient ; reviewing interim imaging, laboratories, consults, medications and nursie notes and agree with observations and plan outlined.     Plan:  Continue current medical plan  If M.D follow up necessary please book with either Dr. Hensley or Keven who have seen previously    Stop Entresto altogether.  Please start losartan 12.5mg once daily.  IV iron infusions; two infusions 1 week apart.  Please call our infusion center to scheduled this infusions. [phone] #3  Stop taking oral iron pill.   Please follow-up with Dr. Pak in 4 months with labs  Please see your Primary Care doctor in 7-10 days to review dementia medications.   Pulmonary appt you are scheduled for in optional in Dr. Pak's opinion.   Your EKG showed Sinus Rhythm  Please call if you weight is elevated 3lbs in 1 day or 5lbs over the week.  Or if Blood pressure at home is lower than 90 (top number) and you are dizzy or lightheaded.  Impression>  1 Acute and chronic congestive heart failure  2. NICM with EF 10-15%  3. COLD  4. CKD III  5. Opiod use   6. Dementia  7  Weight loss  8.  DNR, DNI    Patient has been off of Entresto x 1 week and will discontinue  Start Losartan 12.5mgs/day and observe careful for changes in blood pressure, weight and breathing  Parenteral iron replacement  DNR/DNI  Daughter and I discussed estimated 1 year mortality of 50% or greater  It is not clear why her EF dropped precipitously and with substantial magnitude.    Patient should be seeing internal medicine regularly to supervise and/or alter psychotropic medications.  Daughter would like patient to be seen regularly and agreed to alternate visits with internal medicine.  She has multiple [facility] clinic visits scheduled.  Would probably increase Losartan if BP and renal function stable.      79 year old female seen for uncertain reasons regarding complex medical history.  Patient has history of congestive heart  failure with severe depression of LVEF 10% dating subsequent to early 2024 hospitalization at which time EF was normal.  Non invasive testing demonstrated . non-obstructive CAD.  Patient on appropriate medical management  Earlier this year develop difficulty with eating, more confusion, some agitation associated with her pre-existing dementia and was start on multiple psychotropic medications with resumption of eating and improvement in awareness and orientation.  She was recently started on Entresto and subsequently developed malaise and complaints of lightheadness, decreased food and liquid intake. Entresto held resulting in some improvement.  Patient is DNRDNA.  Rhythm strip obtained today shows NSR with occasional PVC.      Alert, quiet, pale, basilar rales, likely atelectatic, S1, S2, soft systolic flow murmur at base, modest edema,      09/11/24   /76   Pulse 100   SpO2 93 % [1]       Wt Readings from Last 24 Encounters:   08/27/24 49 kg (108 lb)   08/09/24 50.3 kg (111 lb)   07/10/24 53.3 kg (117 lb 6.4 oz)   06/17/24 54.3 kg (119 lb 9.6 oz)   06/13/24 52.7 kg (116 lb 2.9 oz)   06/04/24 55.8 kg (123 lb 1.6 oz)   05/24/24 59.5 kg (131 lb 2.8 oz)   05/14/24 57.6 kg (127 lb)   03/26/24 57.6 kg (127 lb)   02/25/24 61.7 kg (136 lb 0.4 oz)   01/22/24 59.9 kg (132 lb)        Current Outpatient Medications   Medication Sig Dispense Refill    acetaminophen (TYLENOL) 325 MG tablet Take 325-650 mg by mouth every 6 hours as needed for mild pain      albuterol (PROVENTIL) (2.5 MG/3ML) 0.083% neb solution Take 1 vial (2.5 mg) by nebulization 2 times daily as needed (COPD) (Patient not taking: Reported on 8/9/2024) 90 mL 5    aspirin 81 MG EC tablet Take 1 tablet (81 mg) by mouth daily 30 tablet 0    busPIRone (BUSPAR) 15 MG tablet Take 15 mg by mouth 2 times daily      calcium carbonate (TUMS) 500 MG chewable tablet Take 1 tablet (500 mg) by mouth 4 times daily as needed for heartburn      Calcium Polycarbophil  (FIBER) 625 MG tablet Take 2 tablets by mouth daily      diphenhydrAMINE-zinc acetate (BENADRYL) 2-0.1 % external cream Apply topically 3 times daily as needed for itching (Patient not taking: Reported on 8/9/2024)      donepezil (ARICEPT) 5 MG tablet Take 1 tablet (5 mg) by mouth at bedtime      empagliflozin (JARDIANCE) 10 MG TABS tablet Take 1 tablet (10 mg) by mouth daily 90 tablet 1    ferrous gluconate (FERGON) 324 (38 Fe) MG tablet Take 1 tablet (324 mg) by mouth daily (with breakfast) 90 tablet 3    FLUoxetine (PROZAC) 40 MG capsule Take 1 capsule (40 mg) by mouth daily      Fluticasone-Umeclidin-Vilanterol (TRELEGY ELLIPTA) 200-62.5-25 MCG/ACT oral inhaler Inhale 1 puff into the lungs daily      furosemide (LASIX) 20 MG tablet Take 1 tablet (20 mg) by mouth daily. May also take 1 tablet (20 mg) daily as needed (for weight equal to or greater then 115 lbs). 100 tablet 3    ipratropium - albuterol 0.5 mg/2.5 mg/3 mL (DUONEB) 0.5-2.5 (3) MG/3ML neb solution Take 1 vial (3 mLs) by nebulization every 6 hours as needed for shortness of breath, wheezing or cough (Patient not taking: Reported on 8/9/2024)      lactobacillus rhamnosus, GG, (CULTURELL) capsule Take 1 capsule by mouth daily      latanoprost (XALATAN) 0.005 % ophthalmic solution Place 1 drop into both eyes at bedtime      levothyroxine (SYNTHROID/LEVOTHROID) 50 MCG tablet Take 1 tablet (50 mcg) by mouth daily      melatonin 1 MG TABS tablet Take 1 tablet (1 mg) by mouth nightly as needed for sleep (Patient not taking: Reported on 8/9/2024)      OLANZapine (ZYPREXA) 5 MG tablet One tab at bedtime,, may increase bid      pantoprazole (PROTONIX) 40 MG EC tablet Take 1 tablet (40 mg) by mouth daily 90 tablet 3    potassium chloride caroline ER (KLOR-CON M20) 20 MEQ CR tablet Take 1 tablet (20 mEq) by mouth daily. May also take 1 tablet (20 mEq) daily as needed (if taking an extra dose of Lasix for weight gain equal to or greater than 115 lbs). 100 tablet 3     sacubitril-valsartan (ENTRESTO) 24-26 MG per tablet Take 0.5 tablets by mouth 2 times daily. 180 tablet 1    senna-docusate (SENOKOT-S/PERICOLACE) 8.6-50 MG tablet Take 1 tablet by mouth 2 times daily as needed for constipation      triamcinolone (KENALOG) 0.1 % external cream Apply topically 2 times daily as needed for irritation      vitamin D3 (CHOLECALCIFEROL) 50 mcg (2000 units) tablet Take 1 tablet (50 mcg) by mouth daily       Current Facility-Administered Medications   Medication Dose Route Frequency Provider Last Rate Last Admin    denosumab (PROLIA) injection 60 mg  60 mg Subcutaneous Once                    Latest Reference Range & Units 09/11/24 10:48   Sodium 135 - 145 mmol/L 134 (L)   Potassium 3.4 - 5.3 mmol/L 3.6   Chloride 98 - 107 mmol/L 98   Carbon Dioxide (CO2) 22 - 29 mmol/L 22   Urea Nitrogen 8.0 - 23.0 mg/dL 17.2   Creatinine 0.51 - 0.95 mg/dL 1.22 (H)   GFR Estimate >60 mL/min/1.73m2 45 (L)   Calcium 8.8 - 10.4 mg/dL 10.1   Anion Gap 7 - 15 mmol/L 14   Magnesium 1.7 - 2.3 mg/dL 1.6 (L)   Phosphorus 2.5 - 4.5 mg/dL 3.9   Albumin 3.5 - 5.2 g/dL 3.5   Protein Total 6.4 - 8.3 g/dL 6.6   Alkaline Phosphatase 40 - 150 U/L 96   ALT 0 - 50 U/L <5   AST 0 - 45 U/L 13   Bilirubin Total <=1.2 mg/dL 0.2   Glucose 70 - 99 mg/dL 96   N-Terminal Pro Bnp 0 - 1,800 pg/mL 3,148 (H)   WBC 4.0 - 11.0 10e3/uL 9.7   Hemoglobin 11.7 - 15.7 g/dL 11.1 (L)   Hematocrit 35.0 - 47.0 % 36.7   Platelet Count 150 - 450 10e3/uL 417   RBC Count 3.80 - 5.20 10e6/uL 4.48   MCV 78 - 100 fL 82   MCH 26.5 - 33.0 pg 24.8 (L)   MCHC 31.5 - 36.5 g/dL 30.2 (L)   RDW 10.0 - 15.0 % 16.3 (H)     Notes           Component  Ref Range & Units 10:48 AM 3 mo ago 7 mo ago    Iron  37 - 145 ug/dL 22 Low  19 Low  26 Low     Iron Binding Capacity  240 - 430 ug/dL 195 Low  250 R    Iron Sat Index  15 - 46 % 11 Low  8 Low  R   Resulting Agency [facility]            Latest Reference Range & Units 08/09/24 07:42   Sodium 135 -  145 mmol/L 137   Potassium 3.4 - 5.3 mmol/L 4.4   Chloride 98 - 107 mmol/L 101   Carbon Dioxide (CO2) 22 - 29 mmol/L 25   Urea Nitrogen 8.0 - 23.0 mg/dL 21.3   Creatinine 0.51 - 0.95 mg/dL 1.17 (H)   GFR Estimate >60 mL/min/1.73m2 47 (L)   Calcium 8.8 - 10.4 mg/dL 9.5   Anion Gap 7 - 15 mmol/L 11       Name: JULIANO HEALY  MRN: [id number]  : 1945  Study Date: 05/15/2024 01:35 PM  Age: 79 yrs  Gender: Female  Patient [facility]: [facility]  Reason For Study: Heart Failure  Ordering Physician: LATRICE LEWIS  Performed By: Pily Stapleton RDCS     BSA: 1.5 m2  Height: 60 in  Weight: 127 lb  HR: 104  ______________________________________________________________________________  Procedure  Echocardiogram with two-dimensional, color and spectral Doppler performed.  Contrast Optison. Optison (NDC #7121-9820-85) given intravenously. Patient was  given 6 ml mixture of 3 ml Optison and 6 ml saline. 3 ml wasted.  ______________________________________________________________________________  Interpretation Summary  Left ventricular function is decreased. The ejection fraction is 10-15%  (severely reduced). Severe diffuse hypokinesis is present.  Global right ventricular function is moderately reduced.  Moderate tricuspid insufficiency is present.  Estimated pulmonary artery systolic pressure is 54 mmHg plus right atrial  pressure.  IVC diameter >2.1 cm collapsing <50% with sniff suggests a high RA pressure  estimated at 15 mmHg or greater.  Trivial pericardial effusion is present.     This study was compared with the study from 24: LV and RV function have  decreased significantly.  ______________________________________________________________________________  Left Ventricle  Left ventricular wall thickness is normal. Left ventricular size is normal.  Left ventricular function is decreased. The ejection fraction is 10-15%  (severely reduced). Left ventricular diastolic function is abnormal. Severe  diffuse  hypokinesis is present.     Right Ventricle  The right ventricle is normal size. Global right ventricular function is  moderately reduced.     Atria  The atria cannot be assessed. Lipomatous infiltration of the interatrial septm  is present.     Mitral Valve  Mild mitral annular calcification is present. Mild mitral insufficiency is  present.     Aortic Valve  Trileaflet aortic sclerosis without stenosis.     Tricuspid Valve  Moderate tricuspid insufficiency is present. The right ventricular systolic  pressure is approximated at 38.7 mmHg plus the right atrial pressure.  Estimated pulmonary artery systolic pressure is 54 mmHg plus right atrial  pressure.     Pulmonic Valve  Mild pulmonic insufficiency is present.     Vessels  Dilation of the inferior vena cava is present with abnormal respiratory  variation in diameter. IVC diameter >2.1 cm collapsing <50% with sniff  suggests a high RA pressure estimated at 15 mmHg or greater.     Pericardium  Prominent epicardial fat is noted. Trivial pericardial effusion is present.     Compared to Previous Study  This study was compared with the study from 24 . LV and RV function have  decreased significantly.     Attestation  I have personally viewed the imaging and agree with the interpretation and  report as documented by the fellow, Kyle Snyder, and/or edited by me.  ______________________________________________________________________________  MMode/2D Measurements & Calculations  IVSd: 0.97 cm  LVIDd: 5.0 cm  LVIDs: 4.4 cm  LVPWd: 0.98 cm  FS: 12.5 %  LV mass(C)d: 176.9 grams  LV mass(C)dI: 115.0 grams/m2  LVOT diam: 2.0 cm  LVOT area: 3.2 cm2     EF(MOD-bp): 21.2 %  RWT: 0.39  TAPSE: 0.86 cm     Doppler Measurements & Calculations  Ao V2 max: 121.1 cm/sec  Ao max P.9 mmHg  Ao V2 mean: 93.7 cm/sec  Ao mean PG: 3.9 mmHg  Ao V2 VTI: 19.1 cm  GENEVIEVE(I,D): 2.9 cm2  GENEVIEVE(V,D): 2.7 cm2  LV V1 max P.3 mmHg  LV V1 max: 104.0 cm/sec  LV V1 VTI: 17.2 cm     SV(LVOT): 54.6  ml  SI(LVOT): 35.5 ml/m2  PA acc time: 0.07 sec  PI end-d bryson: 132.6 cm/sec  TR max bryson: 309.4 cm/sec  TR max P.7 mmHg  AV Bryson Ratio (DI): 0.86  GENEVIEVE Index (cm2/m2): 1.9

## 2024-09-11 ENCOUNTER — OFFICE VISIT (OUTPATIENT)
Dept: CARDIOLOGY | Facility: CLINIC | Age: 79
End: 2024-09-11
Attending: INTERNAL MEDICINE
Payer: MEDICARE

## 2024-09-11 ENCOUNTER — LAB (OUTPATIENT)
Dept: LAB | Facility: CLINIC | Age: 79
End: 2024-09-11
Attending: NURSE PRACTITIONER
Payer: MEDICARE

## 2024-09-11 VITALS — DIASTOLIC BLOOD PRESSURE: 76 MMHG | HEART RATE: 100 BPM | SYSTOLIC BLOOD PRESSURE: 118 MMHG | OXYGEN SATURATION: 93 %

## 2024-09-11 DIAGNOSIS — M81.0 AGE-RELATED OSTEOPOROSIS WITHOUT CURRENT PATHOLOGICAL FRACTURE: ICD-10-CM

## 2024-09-11 DIAGNOSIS — D50.8 OTHER IRON DEFICIENCY ANEMIA: ICD-10-CM

## 2024-09-11 DIAGNOSIS — I50.9 ACUTE ON CHRONIC CONGESTIVE HEART FAILURE, UNSPECIFIED HEART FAILURE TYPE (H): ICD-10-CM

## 2024-09-11 DIAGNOSIS — E55.9 VITAMIN D DEFICIENCY: ICD-10-CM

## 2024-09-11 DIAGNOSIS — I50.22 CHRONIC SYSTOLIC HEART FAILURE (H): Primary | ICD-10-CM

## 2024-09-11 DIAGNOSIS — I50.22 CHRONIC SYSTOLIC HEART FAILURE (H): ICD-10-CM

## 2024-09-11 DIAGNOSIS — E83.42 HYPOMAGNESEMIA: Primary | ICD-10-CM

## 2024-09-11 DIAGNOSIS — F01.A0 MILD VASCULAR DEMENTIA, UNSPECIFIED WHETHER BEHAVIORAL, PSYCHOTIC, OR MOOD DISTURBANCE OR ANXIETY (H): ICD-10-CM

## 2024-09-11 LAB
ALBUMIN SERPL BCG-MCNC: 3.5 G/DL (ref 3.5–5.2)
ALP SERPL-CCNC: 96 U/L (ref 40–150)
ALT SERPL W P-5'-P-CCNC: <5 U/L (ref 0–50)
ANION GAP SERPL CALCULATED.3IONS-SCNC: 14 MMOL/L (ref 7–15)
AST SERPL W P-5'-P-CCNC: 13 U/L (ref 0–45)
BILIRUB SERPL-MCNC: 0.2 MG/DL
BUN SERPL-MCNC: 17.2 MG/DL (ref 8–23)
CALCIUM SERPL-MCNC: 10.1 MG/DL (ref 8.8–10.4)
CHLORIDE SERPL-SCNC: 98 MMOL/L (ref 98–107)
CREAT SERPL-MCNC: 1.22 MG/DL (ref 0.51–0.95)
EGFRCR SERPLBLD CKD-EPI 2021: 45 ML/MIN/1.73M2
ERYTHROCYTE [DISTWIDTH] IN BLOOD BY AUTOMATED COUNT: 16.3 % (ref 10–15)
GLUCOSE SERPL-MCNC: 96 MG/DL (ref 70–99)
HCO3 SERPL-SCNC: 22 MMOL/L (ref 22–29)
HCT VFR BLD AUTO: 36.7 % (ref 35–47)
HGB BLD-MCNC: 11.1 G/DL (ref 11.7–15.7)
IRON BINDING CAPACITY (ROCHE): 195 UG/DL (ref 240–430)
IRON SATN MFR SERPL: 11 % (ref 15–46)
IRON SERPL-MCNC: 22 UG/DL (ref 37–145)
MAGNESIUM SERPL-MCNC: 1.6 MG/DL (ref 1.7–2.3)
MCH RBC QN AUTO: 24.8 PG (ref 26.5–33)
MCHC RBC AUTO-ENTMCNC: 30.2 G/DL (ref 31.5–36.5)
MCV RBC AUTO: 82 FL (ref 78–100)
NT-PROBNP SERPL-MCNC: 3148 PG/ML (ref 0–1800)
PHOSPHATE SERPL-MCNC: 3.9 MG/DL (ref 2.5–4.5)
PLATELET # BLD AUTO: 417 10E3/UL (ref 150–450)
POTASSIUM SERPL-SCNC: 3.6 MMOL/L (ref 3.4–5.3)
PROT SERPL-MCNC: 6.6 G/DL (ref 6.4–8.3)
RBC # BLD AUTO: 4.48 10E6/UL (ref 3.8–5.2)
SODIUM SERPL-SCNC: 134 MMOL/L (ref 135–145)
VIT D+METAB SERPL-MCNC: 40 NG/ML (ref 20–50)
WBC # BLD AUTO: 9.7 10E3/UL (ref 4–11)

## 2024-09-11 PROCEDURE — 99000 SPECIMEN HANDLING OFFICE-LAB: CPT | Performed by: PATHOLOGY

## 2024-09-11 PROCEDURE — 84100 ASSAY OF PHOSPHORUS: CPT | Performed by: PATHOLOGY

## 2024-09-11 PROCEDURE — 80053 COMPREHEN METABOLIC PANEL: CPT | Performed by: PATHOLOGY

## 2024-09-11 PROCEDURE — 82306 VITAMIN D 25 HYDROXY: CPT | Performed by: FAMILY MEDICINE

## 2024-09-11 PROCEDURE — 83540 ASSAY OF IRON: CPT | Performed by: PATHOLOGY

## 2024-09-11 PROCEDURE — G0463 HOSPITAL OUTPT CLINIC VISIT: HCPCS | Performed by: INTERNAL MEDICINE

## 2024-09-11 PROCEDURE — 83880 ASSAY OF NATRIURETIC PEPTIDE: CPT | Performed by: PATHOLOGY

## 2024-09-11 PROCEDURE — 99215 OFFICE O/P EST HI 40 MIN: CPT | Performed by: INTERNAL MEDICINE

## 2024-09-11 PROCEDURE — 83550 IRON BINDING TEST: CPT | Performed by: PATHOLOGY

## 2024-09-11 PROCEDURE — 85027 COMPLETE CBC AUTOMATED: CPT | Performed by: PATHOLOGY

## 2024-09-11 PROCEDURE — 83735 ASSAY OF MAGNESIUM: CPT | Performed by: PATHOLOGY

## 2024-09-11 PROCEDURE — 36415 COLL VENOUS BLD VENIPUNCTURE: CPT | Performed by: PATHOLOGY

## 2024-09-11 RX ORDER — DIPHENHYDRAMINE HYDROCHLORIDE 50 MG/ML
50 INJECTION INTRAMUSCULAR; INTRAVENOUS
Start: 2024-09-18

## 2024-09-11 RX ORDER — ALBUTEROL SULFATE 0.83 MG/ML
2.5 SOLUTION RESPIRATORY (INHALATION)
OUTPATIENT
Start: 2024-09-18

## 2024-09-11 RX ORDER — ALBUTEROL SULFATE 90 UG/1
1-2 AEROSOL, METERED RESPIRATORY (INHALATION)
Start: 2024-09-18

## 2024-09-11 RX ORDER — EPINEPHRINE 1 MG/ML
0.3 INJECTION, SOLUTION, CONCENTRATE INTRAVENOUS EVERY 5 MIN PRN
OUTPATIENT
Start: 2024-09-18

## 2024-09-11 RX ORDER — MEPERIDINE HYDROCHLORIDE 25 MG/ML
25 INJECTION INTRAMUSCULAR; INTRAVENOUS; SUBCUTANEOUS EVERY 30 MIN PRN
OUTPATIENT
Start: 2024-09-18

## 2024-09-11 RX ORDER — LOSARTAN POTASSIUM 25 MG/1
12.5 TABLET ORAL DAILY
Qty: 45 TABLET | Refills: 3 | Status: SHIPPED | OUTPATIENT
Start: 2024-09-11

## 2024-09-11 RX ORDER — DONEPEZIL HYDROCHLORIDE 10 MG/1
10 TABLET, FILM COATED ORAL AT BEDTIME
Qty: 90 TABLET | Refills: 3 | Status: SHIPPED | OUTPATIENT
Start: 2024-09-11

## 2024-09-11 RX ORDER — HEPARIN SODIUM (PORCINE) LOCK FLUSH IV SOLN 100 UNIT/ML 100 UNIT/ML
5 SOLUTION INTRAVENOUS
OUTPATIENT
Start: 2024-09-18

## 2024-09-11 RX ORDER — METHYLPREDNISOLONE SODIUM SUCCINATE 125 MG/2ML
125 INJECTION, POWDER, LYOPHILIZED, FOR SOLUTION INTRAMUSCULAR; INTRAVENOUS
Start: 2024-09-18

## 2024-09-11 RX ORDER — HEPARIN SODIUM,PORCINE 10 UNIT/ML
5-20 VIAL (ML) INTRAVENOUS DAILY PRN
OUTPATIENT
Start: 2024-09-18

## 2024-09-11 RX ORDER — MAGNESIUM OXIDE 400 MG/1
400 TABLET ORAL AT BEDTIME
Qty: 90 TABLET | Refills: 3 | Status: SHIPPED | OUTPATIENT
Start: 2024-09-11

## 2024-09-11 NOTE — LETTER
9/11/2024      RE: Idalia JUSTIN Lisbeth  [address]  [facility] [address]       Dear Colleague,    Thank you for the opportunity to participate in the care of your patient, Idalia Bob, at the [facility] [place] at [facility]. Please see a copy of my visit note below.    I personally saw, examined and discussed this patient ; reviewing interim imaging, laboratories, consults, medications and nursie notes and agree with observations and plan outlined.     Plan:  Continue current medical plan  If M.D follow up necessary please book with either Dr. Hensley or Keven who have seen previously    Stop Entresto altogether.  Please start losartan 12.5mg once daily.  IV iron infusions; two infusions 1 week apart.  Please call our infusion center to scheduled this infusions. [phone] #3  Stop taking oral iron pill.   Please follow-up with Dr. Pak in 4 months with labs  Please see your Primary Care doctor in 7-10 days to review dementia medications.   Pulmonary appt you are scheduled for in optional in Dr. Pak's opinion.   Your EKG showed Sinus Rhythm  Please call if you weight is elevated 3lbs in 1 day or 5lbs over the week.  Or if Blood pressure at home is lower than 90 (top number) and you are dizzy or lightheaded.  Impression>  1 Acute and chronic congestive heart failure  2. NICM with EF 10-15%  3. COLD  4. CKD III  5. Opiod use   6. Dementia  7  Weight loss  8.  DNR, DNI    Patient has been off of Entresto x 1 week and will discontinue  Start Losartan 12.5mgs/day and observe careful for changes in blood pressure, weight and breathing  Parenteral iron replacement  DNR/DNI  Daughter and I discussed estimated 1 year mortality of 50% or greater  It is not clear why her EF dropped precipitously and with substantial magnitude.    Patient should be seeing internal medicine regularly to supervise and/or alter psychotropic  medications.  Daughter would like patient to be seen regularly and agreed to alternate visits with internal medicine.  She has multiple CORE clinic visits scheduled.  Would probably increase Losartan if BP and renal function stable.      79 year old female seen for uncertain reasons regarding complex medical history.  Patient has history of congestive heart failure with severe depression of LVEF 10% dating subsequent to early 2024 hospitalization at which time EF was normal.  Non invasive testing demonstrated . non-obstructive CAD.  Patient on appropriate medical management  Earlier this year develop difficulty with eating, more confusion, some agitation associated with her pre-existing dementia and was start on multiple psychotropic medications with resumption of eating and improvement in awareness and orientation.  She was recently started on Entresto and subsequently developed malaise and complaints of lightheadness, decreased food and liquid intake. Entresto held resulting in some improvement.  Patient is DNRDNA.  Rhythm strip obtained today shows NSR with occasional PVC.      Alert, quiet, pale, basilar rales, likely atelectatic, S1, S2, soft systolic flow murmur at base, modest edema,      09/11/24   /76   Pulse 100   SpO2 93 % [1]       Wt Readings from Last 24 Encounters:   08/27/24 49 kg (108 lb)   08/09/24 50.3 kg (111 lb)   07/10/24 53.3 kg (117 lb 6.4 oz)   06/17/24 54.3 kg (119 lb 9.6 oz)   06/13/24 52.7 kg (116 lb 2.9 oz)   06/04/24 55.8 kg (123 lb 1.6 oz)   05/24/24 59.5 kg (131 lb 2.8 oz)   05/14/24 57.6 kg (127 lb)   03/26/24 57.6 kg (127 lb)   02/25/24 61.7 kg (136 lb 0.4 oz)   01/22/24 59.9 kg (132 lb)        Current Outpatient Medications   Medication Sig Dispense Refill     acetaminophen (TYLENOL) 325 MG tablet Take 325-650 mg by mouth every 6 hours as needed for mild pain       albuterol (PROVENTIL) (2.5 MG/3ML) 0.083% neb solution Take 1 vial (2.5 mg) by nebulization 2 times daily as  needed (COPD) (Patient not taking: Reported on 8/9/2024) 90 mL 5     aspirin 81 MG EC tablet Take 1 tablet (81 mg) by mouth daily 30 tablet 0     busPIRone (BUSPAR) 15 MG tablet Take 15 mg by mouth 2 times daily       calcium carbonate (TUMS) 500 MG chewable tablet Take 1 tablet (500 mg) by mouth 4 times daily as needed for heartburn       Calcium Polycarbophil (FIBER) 625 MG tablet Take 2 tablets by mouth daily       diphenhydrAMINE-zinc acetate (BENADRYL) 2-0.1 % external cream Apply topically 3 times daily as needed for itching (Patient not taking: Reported on 8/9/2024)       donepezil (ARICEPT) 5 MG tablet Take 1 tablet (5 mg) by mouth at bedtime       empagliflozin (JARDIANCE) 10 MG TABS tablet Take 1 tablet (10 mg) by mouth daily 90 tablet 1     ferrous gluconate (FERGON) 324 (38 Fe) MG tablet Take 1 tablet (324 mg) by mouth daily (with breakfast) 90 tablet 3     FLUoxetine (PROZAC) 40 MG capsule Take 1 capsule (40 mg) by mouth daily       Fluticasone-Umeclidin-Vilanterol (TRELEGY ELLIPTA) 200-62.5-25 MCG/ACT oral inhaler Inhale 1 puff into the lungs daily       furosemide (LASIX) 20 MG tablet Take 1 tablet (20 mg) by mouth daily. May also take 1 tablet (20 mg) daily as needed (for weight equal to or greater then 115 lbs). 100 tablet 3     ipratropium - albuterol 0.5 mg/2.5 mg/3 mL (DUONEB) 0.5-2.5 (3) MG/3ML neb solution Take 1 vial (3 mLs) by nebulization every 6 hours as needed for shortness of breath, wheezing or cough (Patient not taking: Reported on 8/9/2024)       lactobacillus rhamnosus, GG, (CULTURELL) capsule Take 1 capsule by mouth daily       latanoprost (XALATAN) 0.005 % ophthalmic solution Place 1 drop into both eyes at bedtime       levothyroxine (SYNTHROID/LEVOTHROID) 50 MCG tablet Take 1 tablet (50 mcg) by mouth daily       melatonin 1 MG TABS tablet Take 1 tablet (1 mg) by mouth nightly as needed for sleep (Patient not taking: Reported on 8/9/2024)       OLANZapine (ZYPREXA) 5 MG tablet One  tab at bedtime,, may increase bid       pantoprazole (PROTONIX) 40 MG EC tablet Take 1 tablet (40 mg) by mouth daily 90 tablet 3     potassium chloride caroline ER (KLOR-CON M20) 20 MEQ CR tablet Take 1 tablet (20 mEq) by mouth daily. May also take 1 tablet (20 mEq) daily as needed (if taking an extra dose of Lasix for weight gain equal to or greater than 115 lbs). 100 tablet 3     sacubitril-valsartan (ENTRESTO) 24-26 MG per tablet Take 0.5 tablets by mouth 2 times daily. 180 tablet 1     senna-docusate (SENOKOT-S/PERICOLACE) 8.6-50 MG tablet Take 1 tablet by mouth 2 times daily as needed for constipation       triamcinolone (KENALOG) 0.1 % external cream Apply topically 2 times daily as needed for irritation       vitamin D3 (CHOLECALCIFEROL) 50 mcg (2000 units) tablet Take 1 tablet (50 mcg) by mouth daily       Current Facility-Administered Medications   Medication Dose Route Frequency Provider Last Rate Last Admin     denosumab (PROLIA) injection 60 mg  60 mg Subcutaneous Once                    Latest Reference Range & Units 09/11/24 10:48   Sodium 135 - 145 mmol/L 134 (L)   Potassium 3.4 - 5.3 mmol/L 3.6   Chloride 98 - 107 mmol/L 98   Carbon Dioxide (CO2) 22 - 29 mmol/L 22   Urea Nitrogen 8.0 - 23.0 mg/dL 17.2   Creatinine 0.51 - 0.95 mg/dL 1.22 (H)   GFR Estimate >60 mL/min/1.73m2 45 (L)   Calcium 8.8 - 10.4 mg/dL 10.1   Anion Gap 7 - 15 mmol/L 14   Magnesium 1.7 - 2.3 mg/dL 1.6 (L)   Phosphorus 2.5 - 4.5 mg/dL 3.9   Albumin 3.5 - 5.2 g/dL 3.5   Protein Total 6.4 - 8.3 g/dL 6.6   Alkaline Phosphatase 40 - 150 U/L 96   ALT 0 - 50 U/L <5   AST 0 - 45 U/L 13   Bilirubin Total <=1.2 mg/dL 0.2   Glucose 70 - 99 mg/dL 96   N-Terminal Pro Bnp 0 - 1,800 pg/mL 3,148 (H)   WBC 4.0 - 11.0 10e3/uL 9.7   Hemoglobin 11.7 - 15.7 g/dL 11.1 (L)   Hematocrit 35.0 - 47.0 % 36.7   Platelet Count 150 - 450 10e3/uL 417   RBC Count 3.80 - 5.20 10e6/uL 4.48   MCV 78 - 100 fL 82   MCH 26.5 - 33.0 pg 24.8 (L)   MCHC 31.5 - 36.5 g/dL 30.2  (L)   RDW 10.0 - 15.0 % 16.3 (H)     Notes           Component  Ref Range & Units 10:48 AM 3 mo ago 7 mo ago    Iron  37 - 145 ug/dL 22 Low  19 Low  26 Low     Iron Binding Capacity  240 - 430 ug/dL 195 Low  250 R    Iron Sat Index  15 - 46 % 11 Low  8 Low  R   Resulting Agency [facility]            Latest Reference Range & Units 24 07:42   Sodium 135 - 145 mmol/L 137   Potassium 3.4 - 5.3 mmol/L 4.4   Chloride 98 - 107 mmol/L 101   Carbon Dioxide (CO2) 22 - 29 mmol/L 25   Urea Nitrogen 8.0 - 23.0 mg/dL 21.3   Creatinine 0.51 - 0.95 mg/dL 1.17 (H)   GFR Estimate >60 mL/min/1.73m2 47 (L)   Calcium 8.8 - 10.4 mg/dL 9.5   Anion Gap 7 - 15 mmol/L 11       Name: JULIANO HEALY  MRN: [id number]  : 1945  Study Date: 05/15/2024 01:35 PM  Age: 79 yrs  Gender: Female  Patient Location: [facility]  Reason For Study: Heart Failure  Ordering Physician: LATRICE LEWIS  Performed By: Pily Stapleton RDCS     BSA: 1.5 m2  Height: 60 in  Weight: 127 lb  HR: 104  ______________________________________________________________________________  Procedure  Echocardiogram with two-dimensional, color and spectral Doppler performed.  Contrast Optison. Optison (NDC #7741-8874-69) given intravenously. Patient was  given 6 ml mixture of 3 ml Optison and 6 ml saline. 3 ml wasted.  ______________________________________________________________________________  Interpretation Summary  Left ventricular function is decreased. The ejection fraction is 10-15%  (severely reduced). Severe diffuse hypokinesis is present.  Global right ventricular function is moderately reduced.  Moderate tricuspid insufficiency is present.  Estimated pulmonary artery systolic pressure is 54 mmHg plus right atrial  pressure.  IVC diameter >2.1 cm collapsing <50% with sniff suggests a high RA pressure  estimated at 15 mmHg or greater.  Trivial pericardial effusion is present.     This study was compared with the study from  2/12/24: LV and RV function have  decreased significantly.  ______________________________________________________________________________  Left Ventricle  Left ventricular wall thickness is normal. Left ventricular size is normal.  Left ventricular function is decreased. The ejection fraction is 10-15%  (severely reduced). Left ventricular diastolic function is abnormal. Severe  diffuse hypokinesis is present.     Right Ventricle  The right ventricle is normal size. Global right ventricular function is  moderately reduced.     Atria  The atria cannot be assessed. Lipomatous infiltration of the interatrial septm  is present.     Mitral Valve  Mild mitral annular calcification is present. Mild mitral insufficiency is  present.     Aortic Valve  Trileaflet aortic sclerosis without stenosis.     Tricuspid Valve  Moderate tricuspid insufficiency is present. The right ventricular systolic  pressure is approximated at 38.7 mmHg plus the right atrial pressure.  Estimated pulmonary artery systolic pressure is 54 mmHg plus right atrial  pressure.     Pulmonic Valve  Mild pulmonic insufficiency is present.     Vessels  Dilation of the inferior vena cava is present with abnormal respiratory  variation in diameter. IVC diameter >2.1 cm collapsing <50% with sniff  suggests a high RA pressure estimated at 15 mmHg or greater.     Pericardium  Prominent epicardial fat is noted. Trivial pericardial effusion is present.     Compared to Previous Study  This study was compared with the study from 2/12/24 . LV and RV function have  decreased significantly.     Attestation  I have personally viewed the imaging and agree with the interpretation and  report as documented by the fellow, Kyle Snyder, and/or edited by me.  ______________________________________________________________________________  MMode/2D Measurements & Calculations  IVSd: 0.97 cm  LVIDd: 5.0 cm  LVIDs: 4.4 cm  LVPWd: 0.98 cm  FS: 12.5 %  LV mass(C)d: 176.9 grams  LV  mass(C)dI: 115.0 grams/m2  LVOT diam: 2.0 cm  LVOT area: 3.2 cm2     EF(MOD-bp): 21.2 %  RWT: 0.39  TAPSE: 0.86 cm     Doppler Measurements & Calculations  Ao V2 max: 121.1 cm/sec  Ao max P.9 mmHg  Ao V2 mean: 93.7 cm/sec  Ao mean PG: 3.9 mmHg  Ao V2 VTI: 19.1 cm  GENEVIEVE(I,D): 2.9 cm2  GENEVIEVE(V,D): 2.7 cm2  LV V1 max P.3 mmHg  LV V1 max: 104.0 cm/sec  LV V1 VTI: 17.2 cm     SV(LVOT): 54.6 ml  SI(LVOT): 35.5 ml/m2  PA acc time: 0.07 sec  PI end-d bryson: 132.6 cm/sec  TR max bryson: 309.4 cm/sec  TR max P.7 mmHg  AV Bryson Ratio (DI): 0.86  GENEVIEVE Index (cm2/m2): 1.9         Please do not hesitate to contact me if you have any questions/concerns.     Sincerely,     Espinoza Pak MD

## 2024-09-11 NOTE — NURSING NOTE
Chief Complaint   Patient presents with    New Patient     9/11/24 - Reason for visit: New HF, 79 year old female with HFrEF 10-15% presents for Follow-up with labs prior.       Vitals were taken and medications reconciled.    Chai Simpson, EMT  11:10 AM

## 2024-09-11 NOTE — PATIENT INSTRUCTIONS
"You were seen today in the Cardiovascular Clinic at the [facility].      Cardiology Providers you saw during your visit:  Dr. Espinoza Pak     Recommendations:   Stop Entresto altogether.  Please start losartan 12.5mg once daily.  IV iron infusions; two infusions 1 week apart.  Please call our infusion center to scheduled this infusions. [phone] #3  Stop taking oral iron pill.   Please follow-up with Dr. Pak in 4 months with labs  Please see your Primary Care doctor in 7-10 days to review dementia medications.   Pulmonary appt you are scheduled for in optional in Dr. Pak's opinion.   Your EKG showed Sinus Rhythm  Please call if you weight is elevated 3lbs in 1 day or 5lbs over the week.  Or if Blood pressure at home is lower than 90 (top number) and you are dizzy or lightheaded.        Eat a heart healthy, low sodium diet.  Get 20 to 30 minutes of aerobic exercise 4 to 5 times per week as tolerated. (Examples of aerobic exercise include: walking, bicycling, swimming, running).     Thank you for your visit today!   Please MyChart message or call if you have any questions or concerns.      During Business Hours:  [phone], option # 1 ([place])       After hours, weekends or holidays:   [phone], Option #4  Ask to speak to the On-Call Cardiologist. Inform them you are a heart failure patient at the [place].      Julienne Nunes RN BSN CHFN  Cardiology Care Coordinator - C.O.R.E. [facility]  Questions and [phone]  First press #1 for the [employer] and then press #4 for \"Your Care Team\" to reach us Cardiology Nurses.                "

## 2024-09-12 DIAGNOSIS — I50.22 CHRONIC SYSTOLIC HEART FAILURE (H): Primary | ICD-10-CM

## 2024-09-12 NOTE — CONFIDENTIAL NOTE
RECORDS RECEIVED FROM: internal /ce    DATE RECEIVED: 9/17/24   NOTES STATUS DETAILS   OFFICE NOTE from referring provider internal    Polly Wilson PA-C      DISCHARGE REPORT from the ER internal  6.14.24 luther   6.9.24 24 natalia   2.1.24 Darrell Silverman-   12.6.23 ej  11.28.23     MEDICATION LIST internal     IMAGING  (NEED IMAGES AND REPORTS)     CT SCAN internal  5.20.24, 5.18.24, 5.17.24,    CHEST XRAY (CXR) internal  scheduled 9/17/24   6.13.24, 6.10.24, 6.9.24,    TESTS     PULMONARY FUNCTION TESTING (PFT) internal  scheduled 9/17/24

## 2024-09-16 NOTE — TELEPHONE ENCOUNTER
Will route to Dr. Vaughn team to address last [employer] message question/concern  Cherry Doyle RN

## 2024-09-17 ENCOUNTER — PRE VISIT (OUTPATIENT)
Dept: PULMONOLOGY | Facility: CLINIC | Age: 79
End: 2024-09-17

## 2024-09-18 ENCOUNTER — LAB (OUTPATIENT)
Dept: LAB | Facility: CLINIC | Age: 79
End: 2024-09-18
Attending: NURSE PRACTITIONER
Payer: MEDICARE

## 2024-09-18 ENCOUNTER — OFFICE VISIT (OUTPATIENT)
Dept: CARDIOLOGY | Facility: CLINIC | Age: 79
End: 2024-09-18
Attending: NURSE PRACTITIONER
Payer: MEDICARE

## 2024-09-18 VITALS
DIASTOLIC BLOOD PRESSURE: 79 MMHG | SYSTOLIC BLOOD PRESSURE: 126 MMHG | HEART RATE: 90 BPM | BODY MASS INDEX: 21.09 KG/M2 | OXYGEN SATURATION: 93 % | WEIGHT: 108 LBS

## 2024-09-18 DIAGNOSIS — I50.9 ACUTE ON CHRONIC CONGESTIVE HEART FAILURE, UNSPECIFIED HEART FAILURE TYPE (H): ICD-10-CM

## 2024-09-18 DIAGNOSIS — I50.22 CHRONIC SYSTOLIC HEART FAILURE (H): ICD-10-CM

## 2024-09-18 DIAGNOSIS — E78.5 HYPERLIPIDEMIA LDL GOAL <100: Primary | ICD-10-CM

## 2024-09-18 LAB
ANION GAP SERPL CALCULATED.3IONS-SCNC: 10 MMOL/L (ref 7–15)
BUN SERPL-MCNC: 16.6 MG/DL (ref 8–23)
CALCIUM SERPL-MCNC: 10.4 MG/DL (ref 8.8–10.4)
CHLORIDE SERPL-SCNC: 100 MMOL/L (ref 98–107)
CREAT SERPL-MCNC: 1.13 MG/DL (ref 0.51–0.95)
EGFRCR SERPLBLD CKD-EPI 2021: 49 ML/MIN/1.73M2
GLUCOSE SERPL-MCNC: 83 MG/DL (ref 70–99)
HCO3 SERPL-SCNC: 27 MMOL/L (ref 22–29)
POTASSIUM SERPL-SCNC: 4.4 MMOL/L (ref 3.4–5.3)
SODIUM SERPL-SCNC: 137 MMOL/L (ref 135–145)

## 2024-09-18 PROCEDURE — 99214 OFFICE O/P EST MOD 30 MIN: CPT | Performed by: NURSE PRACTITIONER

## 2024-09-18 PROCEDURE — G0463 HOSPITAL OUTPT CLINIC VISIT: HCPCS | Performed by: NURSE PRACTITIONER

## 2024-09-18 PROCEDURE — 36415 COLL VENOUS BLD VENIPUNCTURE: CPT | Performed by: PATHOLOGY

## 2024-09-18 PROCEDURE — 80048 BASIC METABOLIC PNL TOTAL CA: CPT | Performed by: PATHOLOGY

## 2024-09-18 RX ORDER — ATORVASTATIN CALCIUM 10 MG/1
10 TABLET, FILM COATED ORAL DAILY
Qty: 90 TABLET | Refills: 3 | Status: SHIPPED | OUTPATIENT
Start: 2024-09-18

## 2024-09-18 ASSESSMENT — PAIN SCALES - GENERAL: PAINLEVEL: NO PAIN (0)

## 2024-09-18 NOTE — PROGRESS NOTES
[facility] Cardiology   CORE Clinic      HPI:   Ms. Idalia Bob is an 79yr old female with a history of CKD, HTN, GERD, COPD, CAD, and newly diagnosed NICM (LVEF 10-15% per TTE 5/2024) who [place] [facility] [place] ongoing management of GDMT.      Her PCP advised that she present to the ED 5/15/24 due to JUANIS and elevated NTproBNP.  While in the ED, CXR showed bilateral pleural effusions and pulmonary edema, TTE showed LVEF 10-15% and moderately reduced RV function.  She started on IV lasix, and admitted to medicine.  She became hypotensive, required dobutamine --> dopamine, which was then stopped 5/21.  She ultimately diuresed to a weight of ~130#, and discharged 5/24 on losartan 12.5mg daily and jardiance 10mg daily.     Established in CORE on 6/4/24. At that time she reported feeling okay. Starting to have some LE edema and GUTIERREZ. Weights have remained stable, ~ 125-126 lb. Lasix increased to 40/20 mg BID and referred to lymphedema. Readmitted 6/9-6/14 for recurrent pneumonia.      She has been followed by CORE clinic and last saw Dr. Pak on 9/11/24. At that time entresto was stopped and she was put back on losartan 12.5 mg daily.     Today, Ms. Bob presents to clinic with her son in law. She reports feeling well. Breathing is comfortable, denies acute SOB, orthopnea, PND. Denies cough, GUTIERREZ, fever, or any new or persistent sick symptoms. Weights have been relatively stable at 109-11 lb. Denies abdominal distention. Wearing compression stockings, LE edema mild.  She otherwise denies chest pain, palpitations, dizziness, falls, headaches, acute vision changes, fevers, chills, cough, sore throat, and signs of bleeding.  Appetite fair, family assisting with making sure she has low salt options in her assisted living.     Home BP: 90s-100s/60s  Weight: 109-111 lb    Cardiac Medications   - ASA 81 mg daily   - Lasix 20 mg daily PRN   - Losartan 12.5 mg daily   - Jardiance 10 mg daily   - KCL 20 mEq  daily       PAST MEDICAL HISTORY:  Past Medical History:   Diagnosis Date    Chronic kidney disease (CKD) 01/10/2023    Chronic obstructive pulmonary disease (H) 01/10/2023    Dementia (H) 11/28/2023    GERD (gastroesophageal reflux disease) 03/15/2023    Hypertension 11/28/2023       FAMILY HISTORY:  No family history on file.    SOCIAL HISTORY:  Social History     Socioeconomic History    Marital status:    Tobacco Use    Smoking status: Former     Current packs/day: 1.00     Average packs/day: 1 pack/day for 40.0 years (40.0 ttl pk-yrs)     Types: Cigarettes     Passive exposure: Past    Smokeless tobacco: Never   Vaping Use    Vaping status: Never Used   Substance and Sexual Activity    Alcohol use: Not Currently    Drug use: Never    Sexual activity: Not Currently     Social Determinants of Health     Financial Resource Strain: Low Risk  (1/22/2024)    Financial Resource Strain     Within the past 12 months, have you or your family members you live with been unable to get utilities (heat, electricity) when it was really needed?: No   Food Insecurity: Low Risk  (1/22/2024)    Food Insecurity     Within the past 12 months, did you worry that your food would run out before you got money to buy more?: No     Within the past 12 months, did the food you bought just not last and you didn t have money to get more?: No   Transportation Needs: Low Risk  (1/22/2024)    Transportation Needs     Within the past 12 months, has lack of transportation kept you from medical appointments, getting your medicines, non-medical meetings or appointments, work, or from getting things that you need?: No   Social Connections: Socially Integrated (11/21/2023)    Received from [facility], [facility]    Social Connections     Frequency of Communication with Friends and Family: 0   Housing Stability: Low Risk  (1/22/2024)    Housing Stability     Do you have housing?  : Yes     Are you worried about losing your housing?: No       CURRENT MEDICATIONS:  Current Outpatient Medications   Medication Sig Dispense Refill    acetaminophen (TYLENOL) 325 MG tablet Take 325-650 mg by mouth every 6 hours as needed for mild pain      albuterol (PROVENTIL) (2.5 MG/3ML) 0.083% neb solution Take 1 vial (2.5 mg) by nebulization 2 times daily as needed (COPD) 90 mL 5    aspirin 81 MG EC tablet Take 1 tablet (81 mg) by mouth daily 30 tablet 0    busPIRone (BUSPAR) 15 MG tablet Take 15 mg by mouth 2 times daily      calcium carbonate (TUMS) 500 MG chewable tablet Take 1 tablet (500 mg) by mouth 4 times daily as needed for heartburn      Calcium Polycarbophil (FIBER) 625 MG tablet Take 2 tablets by mouth daily      donepezil (ARICEPT) 10 MG tablet Take 1 tablet (10 mg) by mouth at bedtime. 90 tablet 3    empagliflozin (JARDIANCE) 10 MG TABS tablet Take 1 tablet (10 mg) by mouth daily 90 tablet 1    FLUoxetine (PROZAC) 40 MG capsule Take 1 capsule (40 mg) by mouth daily      Fluticasone-Umeclidin-Vilanterol (TRELEGY ELLIPTA) 200-62.5-25 MCG/ACT oral inhaler Inhale 1 puff into the lungs daily. 28 each 0    furosemide (LASIX) 20 MG tablet Take 1 tablet (20 mg) by mouth daily. May also take 1 tablet (20 mg) daily as needed (for weight equal to or greater then 115 lbs). 100 tablet 3    lactobacillus rhamnosus, GG, (CULTURELL) capsule Take 1 capsule by mouth daily      latanoprost (XALATAN) 0.005 % ophthalmic solution Place 1 drop into both eyes at bedtime      levothyroxine (SYNTHROID/LEVOTHROID) 50 MCG tablet Take 1 tablet (50 mcg) by mouth daily      losartan (COZAAR) 25 MG tablet Take 0.5 tablets (12.5 mg) by mouth daily. 45 tablet 3    magnesium oxide (MAG-OX) 400 MG tablet Take 1 tablet (400 mg) by mouth at bedtime. 90 tablet 3    OLANZapine (ZYPREXA) 5 MG tablet One tab at bedtime,, may increase bid      pantoprazole (PROTONIX) 40 MG EC tablet Take 1 tablet (40 mg) by mouth daily 90 tablet 3     potassium chloride caroline ER (KLOR-CON M20) 20 MEQ CR tablet Take 1 tablet (20 mEq) by mouth daily. May also take 1 tablet (20 mEq) daily as needed (if taking an extra dose of Lasix for weight gain equal to or greater than 115 lbs). 100 tablet 3    senna-docusate (SENOKOT-S/PERICOLACE) 8.6-50 MG tablet Take 1 tablet by mouth 2 times daily as needed for constipation      triamcinolone (KENALOG) 0.1 % external cream Apply topically 2 times daily as needed for irritation      vitamin D3 (CHOLECALCIFEROL) 50 mcg (2000 units) tablet Take 1 tablet (50 mcg) by mouth daily       Current Facility-Administered Medications   Medication Dose Route Frequency Provider Last Rate Last Admin    denosumab (PROLIA) injection 60 mg  60 mg Subcutaneous Once            ROS:   Refer to HPI    EXAM:  /79 (BP Location: Right arm, Patient Position: Chair, Cuff Size: Adult Regular)   Pulse 90   Wt 49 kg (108 lb)   LMP  (LMP Unknown)   SpO2 93%   BMI 21.09 kg/m     GENERAL: Appears comfortable, in no acute distress.   HEENT: Eye symmetrical, no discharge or icterus bilaterally. Mucous membranes moist and without lesions.  CV: RRR, +S1S2, no murmur, rub, or gallop. JVP mid neck at 90 degrees.   RESPIRATORY: Respirations regular, even, and unlabored. Lungs CTA throughout.   GI: Soft and non distended with normoactive bowel sounds present in all quadrants. No tenderness, rebound, guarding. No hepatomegaly.   EXTREMITIES: 2+ pitting BLE peripheral edema. 2+ bilateral pedal pulses.   NEUROLOGIC: Alert and oriented x 3. No focal deficits.   MUSCULOSKELETAL: No joint swelling or tenderness.   SKIN: No jaundice. No rashes or lesions.     Labs, reviewed with patient in clinic today:  CBC RESULTS:  Lab Results   Component Value Date    WBC 9.7 09/11/2024    RBC 4.48 09/11/2024    HGB 11.1 (L) 09/11/2024    HCT 36.7 09/11/2024    MCV 82 09/11/2024    MCH 24.8 (L) 09/11/2024    MCHC 30.2 (L) 09/11/2024    RDW 16.3 (H) 09/11/2024      2024       CMP RESULTS:  Lab Results   Component Value Date     (L) 2024    POTASSIUM 3.6 2024    POTASSIUM 4.4 2024    CHLORIDE 98 2024    CHLORIDE 104 2024    CO2 22 2024    ANIONGAP 14 2024    GLC 96 2024    GLC 97 2024     (H) 2024    BUN 17.2 2024    CR 1.22 (H) 2024    GFRESTIMATED 45 (L) 2024    AYUSH 10.1 2024    BILITOTAL 0.2 2024    ALBUMIN 3.5 2024    ALKPHOS 96 2024    ALT <5 2024    AST 13 2024        INR RESULTS:  Lab Results   Component Value Date    INR 1.06 2024       Lab Results   Component Value Date    MAG 1.6 (L) 2024     Lab Results   Component Value Date    NTBNPI 10,435 (H) 2024     Lab Results   Component Value Date    NTBNP 3,148 (H) 2024       Cardiac Diagnostics:    2024 Coronary CTA  IMPRESSION:  1.  Mild non-obstructive coronary artery disease.  2.  Total Agatston score 484 placing the patient in the 79th  percentile when compared to age and gender matched control group.  3.  Please review Radiology report for incidental noncardiac findings  that will follow separately.     FINDINGS:     CORONARY CALCIUM SCORE     Total Agatston calcium score: 483   Left main: 79  Left anterior descendin  Left circumflex: 69  Right coronary artery: 180   This places the patient in the lowest  percentile when compared to age  and gender matched control group.     CORONARY ANGIOGRAPHY     DOMINANCE: Right dominant system.   Normal coronary origins and course.     LEFT MAIN:   The left main arises normally from the left coronary cusp and has a  minimal (<25%) stenosis composed of calcified plaque.     LEFT ANTERIOR DESCENDING:   The LAD is a moderate-sized type III vessel which supplies a  diminutive D1 and a small branching D2.   Proximal LAD: Mild (25-49%) stenosis composed of mixed plaque.  The remainder of the left anterior descending and  its major diagonal  branches are patent with minimal luminal irregularities.     LEFT CIRCUMFLEX:   Proximal LCX:Minimal (<25%) stenosis composed of calcified plaque.  First marginal: Minimal (<25%) stenosis composed of calcified plaque.  The remainder of the left circumflex and its major marginal branches  are patent with minimal luminal irregularities.     RIGHT CORONARY ARTERY:   Proximal RCA: Minimal (<25%) stenosis stenosis composed of mixed  plaque.  Mid RCA: Mild (25-49%) stenosis composed of noncalcified plaque.  The remainder of the right coronary and the posterior descending  artery are patent with minimal luminal irregularities.     ADDITIONAL FINDINGS:      The proximal ascending aorta is normal in size.   There is mild calcification of the ascending and descending aorta.  There mild mitral annular calcification.  Central venous catheter is present with its tip in the distal SVC.  Normal pulmonary venous anatomy with all four pulmonary veins draining  into the left atrium.    The left atrial appendage is free of thrombus.  There is no left ventricular mass or thrombus.   Normal pericardial thickness. There is no pericardial effusion.  Please review Radiology report for incidental noncardiac findings that  will follow separately.     I have personally reviewed the examination and initial interpretation  and I agree with the findings.     5/15/2024 Echo  Interpretation Summary  Left ventricular function is decreased. The ejection fraction is 10-15%  (severely reduced). Severe diffuse hypokinesis is present.  Global right ventricular function is moderately reduced.  Moderate tricuspid insufficiency is present.  Estimated pulmonary artery systolic pressure is 54 mmHg plus right atrial  pressure.  IVC diameter >2.1 cm collapsing <50% with sniff suggests a high RA pressure  estimated at 15 mmHg or greater.  Trivial pericardial effusion is present.     This study was compared with the study from 2/12/24: LV and  RV function have  decreased significantly.    2/12/2024 Echo  Interpretation Summary  A large left pleural effusion is present.  Global and regional left ventricular function is normal with an EF of 55-60%.  Global right ventricular function is normal. The right ventricle is normal  size.  Moderate tricuspid insufficiency is present. The etiology is RA enlargement.  The estimated PA systolic pressure is 60 mmHg.  IVC diameter and respiratory changes fall into an intermediate range  suggesting an RA pressure of 8 mmHg.  There is no prior study for direct comparison.      Assessment and Plan:   Ms. Idalia Bob is an 79yr old female with a history of CKD, HTN, GERD, COPD, CAD, and newly diagnosed NICM (LVEF 10-15% per TTE 5/2024) who presents to [facility] [place] [place] for evaluation and ongoing management of GDMT.     Appears grossly euvolemic. Blood pressures are low stable. Review of today's labs demonstrate normal lytes, Cr stable at 1.13. We will keep losartan at 12.5 mg daily give SBPs at home 90-100s. Given CAC score and , recommend starting atorvastatin 10 mg daily, patient agreeable. Daughter previously inquired about increasing donepezil. We need to be cautious with this given known prolonged QTc. I am not opposed to increased, but should PCP make the increase would recommend EKG 1 week post dose increase.     Newly diagnosed systolic heart failure secondary to NICM (LVEF 10-15% per TTE 5/2024)  HTN  Stage C, NYHA Class III  - ACEi/ARB/ARNi:  losartan 12.5 mg daily   - Afterload reduction:  n/a  - BB:  could consider initiation pending BP trends  - Aldosterone antagonist:  deferred while other medications are prioritized, unlikely to tolerate given low BPs  - SGLT2i:  jardiance 10 mg daily   - SCD ppx:  n/a  - Fluid status:  euvolemic, lasix 20 mg daily, extra 20 mg PRN for weight gain/edema     Nonobstructive CAD  Coronary CTA showed mild non-obstructive coronary artery disease.  Total Agatston score 484 placing the  patient in the 79th percentile when compared to age and gender matched control group.    - continue aspirin 81mg daily  - BB as above  -  by FLP 7/2024  - not on statin, would recommend atorvastatin 10 mg daily      COPD  No longer on day-time oxygen, just at night.     CKD 3  Baseline ~ 1.0  Cr 1.13    Prolonged QTc  She is currently on Aricpet and Zyprexa. QTc stable. We will continue these medications at current doses but do not recommend dose increased and to avoid other QT prolonging medications.       Follow up:  - CORE 10/2/2024, currently scheduled for monthly CORE visits  - Dr. Pak 1/8/2024      A total of 40 minutes was spent on the day of the visit, which includes preparation for the visit (reviewing previous medical records, laboratories and investigations), in conjunction with the actual clinic visit with the patient, which includes obtaining a history and physical exam, creating and reviewing the care plan, patient education (and family if present), counseling, documenting clinical information in the electronic health record and care coordination.       Trena Chanel DNP, NP-C  Advance Heart Failure  9/18/2024

## 2024-09-18 NOTE — PATIENT INSTRUCTIONS
CORE: Cardiomyopathy, Optimization, Rehabilitation, Education      Take your medicines every day, as directed    Changes/Recommendations made today:  Please let me know if you'd like to increase the donepezil. If so, your PCP can make the dose adjustment/change the order but we will need to get an EKG in 1-2 weeks following dose change  Start atorvastatin 10 mg daily   Continue losartan 12.5 mg daily and Jardiance 10 mg daily, Lasix 20 mg daily   Labs are stable. We will repeat labs on 10/2 at follow up visit.    Monitor Your Weight and Symptoms    Contact us if you:    Gain 2 pounds in one day or 5 pounds in one week  Feel more short of breath  Notice more leg swelling  Feel lightheadeded   Change your lifestyle    Limit Salt or Sodium:  2000 mg  Limit Fluids:  2000 mL or approximately 64 ounces  Eat a Heart Healthy Diet  Low in saturated fats  Stay Active:  Aim to move at least 150 minutes every  week         To Contact us    During Business Hours:  [phone], option # 1      After hours, weekends or holidays:   [phone], Option #4  Ask to speak to the On-Call Cardiologist. Inform them you are a CORE/heart failure patient at the [facility].     Use [employer] allows you to communicate directly with your heart team through secure messaging.  [employer] can be accessed any time on your phone, computer, or tablet.  If you need assistance, we'd be happy to help!         Keep your Heart Appointments:    CORE with Trena Chanel NP 10/2/24

## 2024-09-18 NOTE — NURSING NOTE
Chief Complaint   Patient presents with    Follow Up     Reason for visit: New HF, 79 year old female with HFrEF 10-15% presents for Follow-up with labs prior.       Vitals were taken, medications reconciled.     Juve Velasquez, Clinic Assistant    1:05 PM

## 2024-09-18 NOTE — LETTER
9/18/2024      RE: Idalia Bob  [address]  [place] [address]       Dear Colleague,    Thank you for the opportunity to participate in the care of your patient, Idalia Bob, at the [facility] [place] [facility]. Please see a copy of my visit note below.      [facility]   [facility]      HPI:   Ms. Idalia Bob is an 79yr old female with a history of CKD, HTN, GERD, COPD, CAD, and newly diagnosed NICM (LVEF 10-15% per TTE 5/2024) who presents to [facility] [place] [facility] for evaluation and ongoing management of GDMT.      Her PCP advised that she present to the ED 5/15/24 due to JUANIS and elevated NTproBNP.  While in the ED, CXR showed bilateral pleural effusions and pulmonary edema, TTE showed LVEF 10-15% and moderately reduced RV function.  She started on IV lasix, and admitted to medicine.  She became hypotensive, required dobutamine --> dopamine, which was then stopped 5/21.  She ultimately diuresed to a weight of ~130#, and discharged 5/24 on losartan 12.5mg daily and jardiance 10mg daily.     Established in [facility] on 6/4/24. At that time she reported feeling okay. Starting to have some LE edema and GUTIERREZ. Weights have remained stable, ~ 125-126 lb. Lasix increased to 40/20 mg BID and referred to lymphedema. Readmitted 6/9-6/14 for recurrent pneumonia.      She has been followed by [facility] clinic and last saw Dr. Pak on 9/11/24. At that time entresto was stopped and she was put back on losartan 12.5 mg daily.     Today, Ms. Bob presents to clinic with her son in law. She reports feeling well. Breathing is comfortable, denies acute SOB, orthopnea, PND. Denies cough, GUTIERREZ, fever, or any new or persistent sick symptoms. Weights have been relatively stable at 109-11 lb. Denies abdominal distention. Wearing compression stockings, LE edema mild.  She otherwise denies chest pain, palpitations, dizziness, falls, headaches, acute vision  changes, fevers, chills, cough, sore throat, and signs of bleeding.  Appetite fair, family assisting with making sure she has low salt options in her assisted living.     Home BP: 90s-100s/60s  Weight: 109-111 lb    Cardiac Medications   - ASA 81 mg daily   - Lasix 20 mg daily PRN   - Losartan 12.5 mg daily   - Jardiance 10 mg daily   - KCL 20 mEq daily       PAST MEDICAL HISTORY:  Past Medical History:   Diagnosis Date     Chronic kidney disease (CKD) 01/10/2023     Chronic obstructive pulmonary disease (H) 01/10/2023     Dementia (H) 11/28/2023     GERD (gastroesophageal reflux disease) 03/15/2023     Hypertension 11/28/2023       FAMILY HISTORY:  No family history on file.    SOCIAL HISTORY:  Social History     Socioeconomic History     Marital status:    Tobacco Use     Smoking status: Former     Current packs/day: 1.00     Average packs/day: 1 pack/day for 40.0 years (40.0 ttl pk-yrs)     Types: Cigarettes     Passive exposure: Past     Smokeless tobacco: Never   Vaping Use     Vaping status: Never Used   Substance and Sexual Activity     Alcohol use: Not Currently     Drug use: Never     Sexual activity: Not Currently     Social Determinants of Health     Financial Resource Strain: Low Risk  (1/22/2024)    Financial Resource Strain      Within the past 12 months, have you or your family members you live with been unable to get utilities (heat, electricity) when it was really needed?: No   Food Insecurity: Low Risk  (1/22/2024)    Food Insecurity      Within the past 12 months, did you worry that your food would run out before you got money to buy more?: No      Within the past 12 months, did the food you bought just not last and you didn t have money to get more?: No   Transportation Needs: Low Risk  (1/22/2024)    Transportation Needs      Within the past 12 months, has lack of transportation kept you from medical appointments, getting your medicines, non-medical meetings or appointments, work, or from  getting things that you need?: No   Social Connections: Socially Integrated (11/21/2023)    [facility]    Social Connections      Frequency of Communication with Friends and Family: 0   Housing Stability: Low Risk  (1/22/2024)    Housing Stability      Do you have housing? : Yes      Are you worried about losing your housing?: No       CURRENT MEDICATIONS:  Current Outpatient Medications   Medication Sig Dispense Refill     acetaminophen (TYLENOL) 325 MG tablet Take 325-650 mg by mouth every 6 hours as needed for mild pain       albuterol (PROVENTIL) (2.5 MG/3ML) 0.083% neb solution Take 1 vial (2.5 mg) by nebulization 2 times daily as needed (COPD) 90 mL 5     aspirin 81 MG EC tablet Take 1 tablet (81 mg) by mouth daily 30 tablet 0     busPIRone (BUSPAR) 15 MG tablet Take 15 mg by mouth 2 times daily       calcium carbonate (TUMS) 500 MG chewable tablet Take 1 tablet (500 mg) by mouth 4 times daily as needed for heartburn       Calcium Polycarbophil (FIBER) 625 MG tablet Take 2 tablets by mouth daily       donepezil (ARICEPT) 10 MG tablet Take 1 tablet (10 mg) by mouth at bedtime. 90 tablet 3     empagliflozin (JARDIANCE) 10 MG TABS tablet Take 1 tablet (10 mg) by mouth daily 90 tablet 1     FLUoxetine (PROZAC) 40 MG capsule Take 1 capsule (40 mg) by mouth daily       Fluticasone-Umeclidin-Vilanterol (TRELEGY ELLIPTA) 200-62.5-25 MCG/ACT oral inhaler Inhale 1 puff into the lungs daily. 28 each 0     furosemide (LASIX) 20 MG tablet Take 1 tablet (20 mg) by mouth daily. May also take 1 tablet (20 mg) daily as needed (for weight equal to or greater then 115 lbs). 100 tablet 3     lactobacillus rhamnosus, GG, (CULTURELL) capsule Take 1 capsule by mouth daily       latanoprost (XALATAN) 0.005 % ophthalmic solution Place 1 drop into both eyes at bedtime       levothyroxine (SYNTHROID/LEVOTHROID) 50 MCG tablet Take 1 tablet (50  mcg) by mouth daily       losartan (COZAAR) 25 MG tablet Take 0.5 tablets (12.5 mg) by mouth daily. 45 tablet 3     magnesium oxide (MAG-OX) 400 MG tablet Take 1 tablet (400 mg) by mouth at bedtime. 90 tablet 3     OLANZapine (ZYPREXA) 5 MG tablet One tab at bedtime,, may increase bid       pantoprazole (PROTONIX) 40 MG EC tablet Take 1 tablet (40 mg) by mouth daily 90 tablet 3     potassium chloride caroline ER (KLOR-CON M20) 20 MEQ CR tablet Take 1 tablet (20 mEq) by mouth daily. May also take 1 tablet (20 mEq) daily as needed (if taking an extra dose of Lasix for weight gain equal to or greater than 115 lbs). 100 tablet 3     senna-docusate (SENOKOT-S/PERICOLACE) 8.6-50 MG tablet Take 1 tablet by mouth 2 times daily as needed for constipation       triamcinolone (KENALOG) 0.1 % external cream Apply topically 2 times daily as needed for irritation       vitamin D3 (CHOLECALCIFEROL) 50 mcg (2000 units) tablet Take 1 tablet (50 mcg) by mouth daily       Current Facility-Administered Medications   Medication Dose Route Frequency Provider Last Rate Last Admin     denosumab (PROLIA) injection 60 mg  60 mg Subcutaneous Once            ROS:   Refer to HPI    EXAM:  /79 (BP Location: Right arm, Patient Position: Chair, Cuff Size: Adult Regular)   Pulse 90   Wt 49 kg (108 lb)   LMP  (LMP Unknown)   SpO2 93%   BMI 21.09 kg/m     GENERAL: Appears comfortable, in no acute distress.   HEENT: Eye symmetrical, no discharge or icterus bilaterally. Mucous membranes moist and without lesions.  CV: RRR, +S1S2, no murmur, rub, or gallop. JVP mid neck at 90 degrees.   RESPIRATORY: Respirations regular, even, and unlabored. Lungs CTA throughout.   GI: Soft and non distended with normoactive bowel sounds present in all quadrants. No tenderness, rebound, guarding. No hepatomegaly.   EXTREMITIES: 2+ pitting BLE peripheral edema. 2+ bilateral pedal pulses.   NEUROLOGIC: Alert and oriented x 3. No focal deficits.   MUSCULOSKELETAL:  No joint swelling or tenderness.   SKIN: No jaundice. No rashes or lesions.     Labs, reviewed with patient in clinic today:  CBC RESULTS:  Lab Results   Component Value Date    WBC 9.7 2024    RBC 4.48 2024    HGB 11.1 (L) 2024    HCT 36.7 2024    MCV 82 2024    MCH 24.8 (L) 2024    MCHC 30.2 (L) 2024    RDW 16.3 (H) 2024     2024       CMP RESULTS:  Lab Results   Component Value Date     (L) 2024    POTASSIUM 3.6 2024    POTASSIUM 4.4 2024    CHLORIDE 98 2024    CHLORIDE 104 2024    CO2 22 2024    ANIONGAP 14 2024    GLC 96 2024    GLC 97 2024     (H) 2024    BUN 17.2 2024    CR 1.22 (H) 2024    GFRESTIMATED 45 (L) 2024    AYUSH 10.1 2024    BILITOTAL 0.2 2024    ALBUMIN 3.5 2024    ALKPHOS 96 2024    ALT <5 2024    AST 13 2024        INR RESULTS:  Lab Results   Component Value Date    INR 1.06 2024       Lab Results   Component Value Date    MAG 1.6 (L) 2024     Lab Results   Component Value Date    NTBNPI 10,435 (H) 2024     Lab Results   Component Value Date    NTBNP 3,148 (H) 2024       Cardiac Diagnostics:    2024 Coronary CTA  IMPRESSION:  1.  Mild non-obstructive coronary artery disease.  2.  Total Agatston score 484 placing the patient in the 79th  percentile when compared to age and gender matched control group.  3.  Please review Radiology report for incidental noncardiac findings  that will follow separately.     FINDINGS:     CORONARY CALCIUM SCORE     Total Agatston calcium score: 483   Left main: 79  Left anterior descendin  Left circumflex: 69  Right coronary artery: 180   This places the patient in the lowest  percentile when compared to age  and gender matched control group.     CORONARY ANGIOGRAPHY     DOMINANCE: Right dominant system.   Normal coronary origins and course.      LEFT MAIN:   The left main arises normally from the left coronary cusp and has a  minimal (<25%) stenosis composed of calcified plaque.     LEFT ANTERIOR DESCENDING:   The LAD is a moderate-sized type III vessel which supplies a  diminutive D1 and a small branching D2.   Proximal LAD: Mild (25-49%) stenosis composed of mixed plaque.  The remainder of the left anterior descending and its major diagonal  branches are patent with minimal luminal irregularities.     LEFT CIRCUMFLEX:   Proximal LCX:Minimal (<25%) stenosis composed of calcified plaque.  First marginal: Minimal (<25%) stenosis composed of calcified plaque.  The remainder of the left circumflex and its major marginal branches  are patent with minimal luminal irregularities.     RIGHT CORONARY ARTERY:   Proximal RCA: Minimal (<25%) stenosis stenosis composed of mixed  plaque.  Mid RCA: Mild (25-49%) stenosis composed of noncalcified plaque.  The remainder of the right coronary and the posterior descending  artery are patent with minimal luminal irregularities.     ADDITIONAL FINDINGS:      The proximal ascending aorta is normal in size.   There is mild calcification of the ascending and descending aorta.  There mild mitral annular calcification.  Central venous catheter is present with its tip in the distal SVC.  Normal pulmonary venous anatomy with all four pulmonary veins draining  into the left atrium.    The left atrial appendage is free of thrombus.  There is no left ventricular mass or thrombus.   Normal pericardial thickness. There is no pericardial effusion.  Please review Radiology report for incidental noncardiac findings that  will follow separately.     I have personally reviewed the examination and initial interpretation  and I agree with the findings.     5/15/2024 Echo  Interpretation Summary  Left ventricular function is decreased. The ejection fraction is 10-15%  (severely reduced). Severe diffuse hypokinesis is present.  Global right  ventricular function is moderately reduced.  Moderate tricuspid insufficiency is present.  Estimated pulmonary artery systolic pressure is 54 mmHg plus right atrial  pressure.  IVC diameter >2.1 cm collapsing <50% with sniff suggests a high RA pressure  estimated at 15 mmHg or greater.  Trivial pericardial effusion is present.     This study was compared with the study from 2/12/24: LV and RV function have  decreased significantly.    2/12/2024 Echo  Interpretation Summary  A large left pleural effusion is present.  Global and regional left ventricular function is normal with an EF of 55-60%.  Global right ventricular function is normal. The right ventricle is normal  size.  Moderate tricuspid insufficiency is present. The etiology is RA enlargement.  The estimated PA systolic pressure is 60 mmHg.  IVC diameter and respiratory changes fall into an intermediate range  suggesting an RA pressure of 8 mmHg.  There is no prior study for direct comparison.      Assessment and Plan:   Ms. Idalia Bob is an 79yr old female with a history of CKD, HTN, GERD, COPD, CAD, and newly diagnosed NICM (LVEF 10-15% per TTE 5/2024) who presents to [facility] for evaluation and ongoing management of GDMT.     Appears grossly euvolemic. Blood pressures are low stable. Review of today's labs demonstrate normal lytes, Cr stable at 1.13. We will keep losartan at 12.5 mg daily give SBPs at home 90-100s. Given CAC score and , recommend starting atorvastatin 10 mg daily, patient agreeable. Daughter previously inquired about increasing donepezil. We need to be cautious with this given known prolonged QTc. I am not opposed to increased, but should PCP make the increase would recommend EKG 1 week post dose increase.     Newly diagnosed systolic heart failure secondary to NICM (LVEF 10-15% per TTE 5/2024)  HTN  Stage C, NYHA Class III  - ACEi/ARB/ARNi:  losartan 12.5 mg daily   - Afterload reduction:  n/a  - BB:  could consider initiation  pending BP trends  - Aldosterone antagonist:  deferred while other medications are prioritized, unlikely to tolerate given low BPs  - SGLT2i:  jardiance 10 mg daily   - SCD ppx:  n/a  - Fluid status:  euvolemic, lasix 20 mg daily, extra 20 mg PRN for weight gain/edema     Nonobstructive CAD  Coronary CTA showed mild non-obstructive coronary artery disease.  Total Agatston score 484 placing the patient in the 79th percentile when compared to age and gender matched control group.    - continue aspirin 81mg daily  - BB as above  -  by FLP 7/2024  - not on statin, would recommend atorvastatin 10 mg daily      COPD  No longer on day-time oxygen, just at night.     CKD 3  Baseline ~ 1.0  Cr 1.13    Prolonged QTc  She is currently on Aricpet and Zyprexa. QTc stable. We will continue these medications at current doses but do not recommend dose increased and to avoid other QT prolonging medications.       Follow up:  - [facility] 10/2/2024, currently scheduled for monthly [facility] visits  - Dr. Pak 1/8/2024      A total of 40 minutes was spent on the day of the visit, which includes preparation for the visit (reviewing previous medical records, laboratories and investigations), in conjunction with the actual clinic visit with the patient, which includes obtaining a history and physical exam, creating and reviewing the care plan, patient education (and family if present), counseling, documenting clinical information in the electronic health record and care coordination.       Trena Chanel DNP, NP-C  Advance Heart Failure  9/18/2024      Please do not hesitate to contact me if you have any questions/concerns.     Sincerely,     KIZZY Cristina CNP

## 2024-09-25 ENCOUNTER — MYC MEDICAL ADVICE (OUTPATIENT)
Dept: FAMILY MEDICINE | Facility: CLINIC | Age: 79
End: 2024-09-25
Payer: MEDICARE

## 2024-09-25 DIAGNOSIS — I50.22 CHRONIC SYSTOLIC HEART FAILURE (H): Primary | ICD-10-CM

## 2024-09-25 DIAGNOSIS — R45.1 AGITATION: ICD-10-CM

## 2024-09-25 RX ORDER — OLANZAPINE 2.5 MG/1
2.5 TABLET, FILM COATED ORAL 2 TIMES DAILY
Qty: 60 TABLET | Refills: 5 | Status: SHIPPED | OUTPATIENT
Start: 2024-09-25

## 2024-09-25 NOTE — TELEPHONE ENCOUNTER
RN replied to patient via [employer]. See message for details.     Miki Schmid RN, BSN, PHN  [facility] [place]

## 2024-09-25 NOTE — TELEPHONE ENCOUNTER
Routing My Chart message to Dr. Louis    Patients daughter requesting refill Olanzapine.    Pended if appropriate.    Miki Schmid, RN, BSN, PHN  [facility]

## 2024-09-26 ENCOUNTER — MYC MEDICAL ADVICE (OUTPATIENT)
Dept: FAMILY MEDICINE | Facility: CLINIC | Age: 79
End: 2024-09-26
Payer: MEDICARE

## 2024-09-26 DIAGNOSIS — E03.9 HYPOTHYROIDISM, UNSPECIFIED TYPE: ICD-10-CM

## 2024-09-26 RX ORDER — LEVOTHYROXINE SODIUM 50 UG/1
50 TABLET ORAL DAILY
Qty: 90 TABLET | Refills: 1 | Status: SHIPPED | OUTPATIENT
Start: 2024-09-26

## 2024-09-29 SDOH — HEALTH STABILITY: PHYSICAL HEALTH: ON AVERAGE, HOW MANY MINUTES DO YOU ENGAGE IN EXERCISE AT THIS LEVEL?: 0 MIN

## 2024-09-29 SDOH — HEALTH STABILITY: PHYSICAL HEALTH: ON AVERAGE, HOW MANY DAYS PER WEEK DO YOU ENGAGE IN MODERATE TO STRENUOUS EXERCISE (LIKE A BRISK WALK)?: 0 DAYS

## 2024-09-29 ASSESSMENT — SOCIAL DETERMINANTS OF HEALTH (SDOH): HOW OFTEN DO YOU GET TOGETHER WITH FRIENDS OR RELATIVES?: MORE THAN THREE TIMES A WEEK

## 2024-10-02 ENCOUNTER — LAB (OUTPATIENT)
Dept: LAB | Facility: CLINIC | Age: 79
End: 2024-10-02
Payer: MEDICARE

## 2024-10-02 ENCOUNTER — INFUSION THERAPY VISIT (OUTPATIENT)
Dept: INFUSION THERAPY | Facility: CLINIC | Age: 79
End: 2024-10-02
Attending: INTERNAL MEDICINE
Payer: MEDICARE

## 2024-10-02 ENCOUNTER — OFFICE VISIT (OUTPATIENT)
Dept: CARDIOLOGY | Facility: CLINIC | Age: 79
End: 2024-10-02
Attending: NURSE PRACTITIONER
Payer: MEDICARE

## 2024-10-02 VITALS
SYSTOLIC BLOOD PRESSURE: 118 MMHG | RESPIRATION RATE: 18 BRPM | HEART RATE: 94 BPM | DIASTOLIC BLOOD PRESSURE: 67 MMHG | BODY MASS INDEX: 21.39 KG/M2 | WEIGHT: 109.5 LBS

## 2024-10-02 VITALS
HEART RATE: 97 BPM | WEIGHT: 109.5 LBS | DIASTOLIC BLOOD PRESSURE: 89 MMHG | OXYGEN SATURATION: 95 % | SYSTOLIC BLOOD PRESSURE: 139 MMHG | BODY MASS INDEX: 21.39 KG/M2

## 2024-10-02 DIAGNOSIS — I50.9 ACUTE ON CHRONIC CONGESTIVE HEART FAILURE, UNSPECIFIED HEART FAILURE TYPE (H): ICD-10-CM

## 2024-10-02 DIAGNOSIS — D50.9 ANEMIA, IRON DEFICIENCY: Primary | ICD-10-CM

## 2024-10-02 DIAGNOSIS — I50.22 CHRONIC SYSTOLIC HEART FAILURE (H): ICD-10-CM

## 2024-10-02 LAB
ANION GAP SERPL CALCULATED.3IONS-SCNC: 11 MMOL/L (ref 7–15)
BUN SERPL-MCNC: 18.8 MG/DL (ref 8–23)
CALCIUM SERPL-MCNC: 9.6 MG/DL (ref 8.8–10.4)
CHLORIDE SERPL-SCNC: 101 MMOL/L (ref 98–107)
CREAT SERPL-MCNC: 1.03 MG/DL (ref 0.51–0.95)
EGFRCR SERPLBLD CKD-EPI 2021: 55 ML/MIN/1.73M2
GLUCOSE SERPL-MCNC: 84 MG/DL (ref 70–99)
HCO3 SERPL-SCNC: 24 MMOL/L (ref 22–29)
POTASSIUM SERPL-SCNC: 3.9 MMOL/L (ref 3.4–5.3)
SODIUM SERPL-SCNC: 136 MMOL/L (ref 135–145)

## 2024-10-02 PROCEDURE — 80048 BASIC METABOLIC PNL TOTAL CA: CPT | Performed by: PATHOLOGY

## 2024-10-02 PROCEDURE — 258N000003 HC RX IP 258 OP 636: Performed by: INTERNAL MEDICINE

## 2024-10-02 PROCEDURE — 99215 OFFICE O/P EST HI 40 MIN: CPT | Performed by: NURSE PRACTITIONER

## 2024-10-02 PROCEDURE — G0463 HOSPITAL OUTPT CLINIC VISIT: HCPCS | Performed by: NURSE PRACTITIONER

## 2024-10-02 PROCEDURE — 250N000011 HC RX IP 250 OP 636: Mod: JZ | Performed by: INTERNAL MEDICINE

## 2024-10-02 PROCEDURE — 96365 THER/PROPH/DIAG IV INF INIT: CPT

## 2024-10-02 PROCEDURE — 36415 COLL VENOUS BLD VENIPUNCTURE: CPT | Performed by: PATHOLOGY

## 2024-10-02 RX ORDER — METOPROLOL SUCCINATE 25 MG/1
12.5 TABLET, EXTENDED RELEASE ORAL AT BEDTIME
Qty: 60 TABLET | Refills: 2 | Status: SHIPPED | OUTPATIENT
Start: 2024-10-02

## 2024-10-02 RX ORDER — OXYCODONE HYDROCHLORIDE 5 MG/1
TABLET ORAL
COMMUNITY
Start: 2024-08-25

## 2024-10-02 RX ORDER — MEPERIDINE HYDROCHLORIDE 25 MG/ML
25 INJECTION INTRAMUSCULAR; INTRAVENOUS; SUBCUTANEOUS EVERY 30 MIN PRN
Status: CANCELLED | OUTPATIENT
Start: 2024-10-09

## 2024-10-02 RX ORDER — HEPARIN SODIUM,PORCINE 10 UNIT/ML
5-20 VIAL (ML) INTRAVENOUS DAILY PRN
Status: CANCELLED | OUTPATIENT
Start: 2024-10-09

## 2024-10-02 RX ORDER — ALBUTEROL SULFATE 90 UG/1
1-2 INHALANT RESPIRATORY (INHALATION)
Status: CANCELLED
Start: 2024-10-09

## 2024-10-02 RX ORDER — METHYLPREDNISOLONE SODIUM SUCCINATE 125 MG/2ML
125 INJECTION INTRAMUSCULAR; INTRAVENOUS
Status: CANCELLED
Start: 2024-10-09

## 2024-10-02 RX ORDER — ALBUTEROL SULFATE 0.83 MG/ML
2.5 SOLUTION RESPIRATORY (INHALATION)
Status: CANCELLED | OUTPATIENT
Start: 2024-10-09

## 2024-10-02 RX ORDER — HEPARIN SODIUM (PORCINE) LOCK FLUSH IV SOLN 100 UNIT/ML 100 UNIT/ML
5 SOLUTION INTRAVENOUS
Status: CANCELLED | OUTPATIENT
Start: 2024-10-09

## 2024-10-02 RX ORDER — EPINEPHRINE 1 MG/ML
0.3 INJECTION, SOLUTION INTRAMUSCULAR; SUBCUTANEOUS EVERY 5 MIN PRN
Status: CANCELLED | OUTPATIENT
Start: 2024-10-09

## 2024-10-02 RX ORDER — DIPHENHYDRAMINE HYDROCHLORIDE 50 MG/ML
50 INJECTION INTRAMUSCULAR; INTRAVENOUS
Status: CANCELLED
Start: 2024-10-09

## 2024-10-02 RX ADMIN — FERRIC CARBOXYMALTOSE INJECTION 750 MG: 50 INJECTION, SOLUTION INTRAVENOUS at 14:42

## 2024-10-02 ASSESSMENT — PAIN SCALES - GENERAL: PAINLEVEL: NO PAIN (0)

## 2024-10-02 NOTE — LETTER
10/2/2024      RE: Idalia Bob  [address]  [facility] [address]       Dear Colleague,    Thank you for the opportunity to participate in the care of your patient, Idalia Bob, at the [facility] [place] [facility]. Please see a copy of my visit note below.      [facility] Card[facility]   [facility] Clinic      HPI:   Ms. Idalia Bob is an 79yr old female with a history of CKD, HTN, GERD, COPD, CAD, and newly diagnosed NICM (LVEF 10-15% per TTE 5/2024) who presents to [facility] for evaluation and ongoing management of GDMT.      Her PCP advised that she present to the ED 5/15/24 due to JUANIS and elevated NTproBNP.  While in the ED, CXR showed bilateral pleural effusions and pulmonary edema, TTE showed LVEF 10-15% and moderately reduced RV function.  She started on IV lasix, and admitted to medicine.  She became hypotensive, required dobutamine --> dopamine, which was then stopped 5/21.  She ultimately diuresed to a weight of ~130#, and discharged 5/24 on losartan 12.5mg daily and jardiance 10mg daily.     Established in CORE on 6/4/24. At that time she reported feeling okay. Starting to have some LE edema and GUTIERREZ. Weights have remained stable, ~ 125-126 lb. Lasix increased to 40/20 mg BID and referred to lymphedema. Readmitted 6/9-6/14 for recurrent pneumonia.      She has been followed by [facility] clinic Dr. Pak.     Today, Ms. Bob presents to clinic with her daughter. She reports feeling well. Breathing is comfortable, denies acute SOB, orthopnea, PND. Denies cough, GUTIERREZ, fever, or any new or persistent sick symptoms. Weights have been relatively stable at 109-112 lb. Denies abdominal distention. Wearing compression stockings, LE edema mild.  She otherwise denies chest pain, palpitations, dizziness, falls, headaches, acute vision changes, fevers, chills, cough, sore throat, and signs of bleeding.  Appetite fair, family assisting with  making sure she has low salt options in her assisted living. Getting IV iron infusion later today.     Home BP: 90s-110s/60s  HR 80s-110s  Weight: 109-111 lb    Cardiac Medications   - ASA 81 mg daily   - Lasix 20 mg daily PRN   - Losartan 12.5 mg daily   - Jardiance 10 mg daily   - Atorvastatin 10 mg daily   - KCL 20 mEq daily       PAST MEDICAL HISTORY:  Past Medical History:   Diagnosis Date     Chronic kidney disease (CKD) 01/10/2023     Chronic obstructive pulmonary disease (H) 01/10/2023     Dementia (H) 11/28/2023     GERD (gastroesophageal reflux disease) 03/15/2023     Hypertension 11/28/2023       FAMILY HISTORY:  No family history on file.    SOCIAL HISTORY:  Social History     Socioeconomic History     Marital status:    Tobacco Use     Smoking status: Former     Current packs/day: 1.00     Average packs/day: 1 pack/day for 40.0 years (40.0 ttl pk-yrs)     Types: Cigarettes     Passive exposure: Past     Smokeless tobacco: Never   Vaping Use     Vaping status: Never Used   Substance and Sexual Activity     Alcohol use: Not Currently     Drug use: Never     Sexual activity: Not Currently     Social Determinants of Health     Financial Resource Strain: Low Risk  (1/22/2024)    Financial Resource Strain      Within the past 12 months, have you or your family members you live with been unable to get utilities (heat, electricity) when it was really needed?: No   Food Insecurity: Low Risk  (1/22/2024)    Food Insecurity      Within the past 12 months, did you worry that your food would run out before you got money to buy more?: No      Within the past 12 months, did the food you bought just not last and you didn t have money to get more?: No   Transportation Needs: Low Risk  (1/22/2024)    Transportation Needs      Within the past 12 months, has lack of transportation kept you from medical appointments, getting your medicines, non-medical meetings or appointments, work, or from getting things that you  need?: No   Social Connections: Socially Integrated (11/21/2023)    [facility]    Social Connections      Frequency of Communication with Friends and Family: 0   Housing Stability: Low Risk  (1/22/2024)    Housing Stability      Do you have housing? : Yes      Are you worried about losing your housing?: No       CURRENT MEDICATIONS:  Current Outpatient Medications   Medication Sig Dispense Refill     acetaminophen (TYLENOL) 325 MG tablet Take 325-650 mg by mouth every 6 hours as needed for mild pain       albuterol (PROVENTIL) (2.5 MG/3ML) 0.083% neb solution Take 1 vial (2.5 mg) by nebulization 2 times daily as needed (COPD) 90 mL 5     aspirin 81 MG EC tablet Take 1 tablet (81 mg) by mouth daily 30 tablet 0     atorvastatin (LIPITOR) 10 MG tablet Take 1 tablet (10 mg) by mouth daily. 90 tablet 3     busPIRone (BUSPAR) 15 MG tablet Take 15 mg by mouth 2 times daily       calcium carbonate (TUMS) 500 MG chewable tablet Take 1 tablet (500 mg) by mouth 4 times daily as needed for heartburn       Calcium Polycarbophil (FIBER) 625 MG tablet Take 2 tablets by mouth daily       donepezil (ARICEPT) 10 MG tablet Take 1 tablet (10 mg) by mouth at bedtime. 90 tablet 3     empagliflozin (JARDIANCE) 10 MG TABS tablet Take 1 tablet (10 mg) by mouth daily 90 tablet 1     FLUoxetine (PROZAC) 40 MG capsule Take 1 capsule (40 mg) by mouth daily       Fluticasone-Umeclidin-Vilanterol (TRELEGY ELLIPTA) 200-62.5-25 MCG/ACT oral inhaler Inhale 1 puff into the lungs daily. 28 each 0     furosemide (LASIX) 20 MG tablet Take 1 tablet (20 mg) by mouth daily. May also take 1 tablet (20 mg) daily as needed (for weight equal to or greater then 115 lbs). 100 tablet 3     lactobacillus rhamnosus, GG, (CULTURELL) capsule Take 1 capsule by mouth daily       latanoprost (XALATAN) 0.005 % ophthalmic solution Place 1 drop into both eyes at bedtime        levothyroxine (SYNTHROID/LEVOTHROID) 50 MCG tablet TAKE 1 TABLET BY MOUTH ONCE DAILY. 90 tablet 1     losartan (COZAAR) 25 MG tablet Take 0.5 tablets (12.5 mg) by mouth daily. 45 tablet 3     magnesium oxide (MAG-OX) 400 MG tablet Take 1 tablet (400 mg) by mouth at bedtime. 90 tablet 3     OLANZapine (ZYPREXA) 2.5 MG tablet Take 1 tablet (2.5 mg) by mouth 2 times daily. 60 tablet 5     OLANZapine (ZYPREXA) 5 MG tablet One tab at bedtime,, may increase bid       pantoprazole (PROTONIX) 40 MG EC tablet Take 1 tablet (40 mg) by mouth daily 90 tablet 3     potassium chloride caroline ER (KLOR-CON M20) 20 MEQ CR tablet Take 1 tablet (20 mEq) by mouth daily. May also take 1 tablet (20 mEq) daily as needed (if taking an extra dose of Lasix for weight gain equal to or greater than 115 lbs). 100 tablet 3     senna-docusate (SENOKOT-S/PERICOLACE) 8.6-50 MG tablet Take 1 tablet by mouth 2 times daily as needed for constipation       triamcinolone (KENALOG) 0.1 % external cream Apply topically 2 times daily as needed for irritation       vitamin D3 (CHOLECALCIFEROL) 50 mcg (2000 units) tablet Take 1 tablet (50 mcg) by mouth daily       Current Facility-Administered Medications   Medication Dose Route Frequency Provider Last Rate Last Admin     denosumab (PROLIA) injection 60 mg  60 mg Subcutaneous Once            ROS:   Refer to HPI    EXAM:  /89 (BP Location: Right arm, Patient Position: Chair, Cuff Size: Adult Regular)   Pulse 97   Wt 49.7 kg (109 lb 8 oz)   LMP  (LMP Unknown)   SpO2 95%   BMI 21.39 kg/m       GENERAL: Appears comfortable, in no acute distress.   HEENT: Eye symmetrical, no discharge or icterus bilaterally. Mucous membranes moist and without lesions.  CV: RRR, +S1S2, no murmur, rub, or gallop. JVP mid neck at 90 degrees.   RESPIRATORY: Respirations regular, even, and unlabored. Lungs CTA throughout.   GI: Soft and non distended with normoactive bowel sounds present in all quadrants. No tenderness,  rebound, guarding. No hepatomegaly.   EXTREMITIES: 2+ pitting BLE peripheral edema. 2+ bilateral pedal pulses.   NEUROLOGIC: Alert and oriented x 3. No focal deficits.   MUSCULOSKELETAL: No joint swelling or tenderness.   SKIN: No jaundice. No rashes or lesions.     Labs, reviewed with patient in clinic today:  CBC RESULTS:  Lab Results   Component Value Date    WBC 9.7 2024    RBC 4.48 2024    HGB 11.1 (L) 2024    HCT 36.7 2024    MCV 82 2024    MCH 24.8 (L) 2024    MCHC 30.2 (L) 2024    RDW 16.3 (H) 2024     2024       CMP RESULTS:  Lab Results   Component Value Date     2024    POTASSIUM 4.4 2024    POTASSIUM 4.4 2024    CHLORIDE 100 2024    CHLORIDE 104 2024    CO2 27 2024    ANIONGAP 10 2024    GLC 83 2024    GLC 97 2024     (H) 2024    BUN 16.6 2024    CR 1.13 (H) 2024    GFRESTIMATED 49 (L) 2024    AYUSH 10.4 2024    BILITOTAL 0.2 2024    ALBUMIN 3.5 2024    ALKPHOS 96 2024    ALT <5 2024    AST 13 2024        INR RESULTS:  Lab Results   Component Value Date    INR 1.06 2024       Lab Results   Component Value Date    MAG 1.6 (L) 2024     Lab Results   Component Value Date    NTBNPI 10,435 (H) 2024     Lab Results   Component Value Date    NTBNP 3,148 (H) 2024       Cardiac Diagnostics:    2024 Coronary CTA  IMPRESSION:  1.  Mild non-obstructive coronary artery disease.  2.  Total Agatston score 484 placing the patient in the 79th  percentile when compared to age and gender matched control group.  3.  Please review Radiology report for incidental noncardiac findings  that will follow separately.     FINDINGS:     CORONARY CALCIUM SCORE     Total Agatston calcium score: 483   Left main: 79  Left anterior descendin  Left circumflex: 69  Right coronary artery: 180   This places the patient in  the lowest  percentile when compared to age  and gender matched control group.     CORONARY ANGIOGRAPHY     DOMINANCE: Right dominant system.   Normal coronary origins and course.     LEFT MAIN:   The left main arises normally from the left coronary cusp and has a  minimal (<25%) stenosis composed of calcified plaque.     LEFT ANTERIOR DESCENDING:   The LAD is a moderate-sized type III vessel which supplies a  diminutive D1 and a small branching D2.   Proximal LAD: Mild (25-49%) stenosis composed of mixed plaque.  The remainder of the left anterior descending and its major diagonal  branches are patent with minimal luminal irregularities.     LEFT CIRCUMFLEX:   Proximal LCX:Minimal (<25%) stenosis composed of calcified plaque.  First marginal: Minimal (<25%) stenosis composed of calcified plaque.  The remainder of the left circumflex and its major marginal branches  are patent with minimal luminal irregularities.     RIGHT CORONARY ARTERY:   Proximal RCA: Minimal (<25%) stenosis stenosis composed of mixed  plaque.  Mid RCA: Mild (25-49%) stenosis composed of noncalcified plaque.  The remainder of the right coronary and the posterior descending  artery are patent with minimal luminal irregularities.     ADDITIONAL FINDINGS:      The proximal ascending aorta is normal in size.   There is mild calcification of the ascending and descending aorta.  There mild mitral annular calcification.  Central venous catheter is present with its tip in the distal SVC.  Normal pulmonary venous anatomy with all four pulmonary veins draining  into the left atrium.    The left atrial appendage is free of thrombus.  There is no left ventricular mass or thrombus.   Normal pericardial thickness. There is no pericardial effusion.  Please review Radiology report for incidental noncardiac findings that  will follow separately.     I have personally reviewed the examination and initial interpretation  and I agree with the findings.      5/15/2024 Echo  Interpretation Summary  Left ventricular function is decreased. The ejection fraction is 10-15%  (severely reduced). Severe diffuse hypokinesis is present.  Global right ventricular function is moderately reduced.  Moderate tricuspid insufficiency is present.  Estimated pulmonary artery systolic pressure is 54 mmHg plus right atrial  pressure.  IVC diameter >2.1 cm collapsing <50% with sniff suggests a high RA pressure  estimated at 15 mmHg or greater.  Trivial pericardial effusion is present.     This study was compared with the study from 2/12/24: LV and RV function have  decreased significantly.    2/12/2024 Echo  Interpretation Summary  A large left pleural effusion is present.  Global and regional left ventricular function is normal with an EF of 55-60%.  Global right ventricular function is normal. The right ventricle is normal  size.  Moderate tricuspid insufficiency is present. The etiology is RA enlargement.  The estimated PA systolic pressure is 60 mmHg.  IVC diameter and respiratory changes fall into an intermediate range  suggesting an RA pressure of 8 mmHg.  There is no prior study for direct comparison.      Assessment and Plan:   Ms. Idalia Bob is an 79 yr old female with a history of CKD, HTN, GERD, COPD, CAD, and newly diagnosed NICM (LVEF 10-15% per TTE 5/2024) who presents to [facility] for evaluation and ongoing management of GDMT.     Appears grossly euvolemic. Blood pressures are low stable. Review of today's labs demonstrate normal lytes, Cr stable at 1.03. We will keep losartan at 12.5 mg daily give SBPs at home 90-100s. We will start metoprolol succinate 12.5 mg HS.   Daughter previously inquired about increasing donepezil. We need to be cautious with this given known prolonged QTc. I am not opposed to increase, but should be PCP who make the increase and would recommend EKG 1 week post dose increase.     Newly diagnosed systolic heart failure secondary to NICM (LVEF 10-15%  per TTE 5/2024)  HTN  Stage C, NYHA Class III  - ACEi/ARB/ARNi:  losartan 12.5 mg daily   - Afterload reduction:  n/a  - BB:  start metoprolol succinate 12.5 mg HS  - Aldosterone antagonist:  deferred while other medications are prioritized, unlikely to tolerate given low BPs  - SGLT2i:  jardiance 10 mg daily   - SCD ppx:  n/a  - Fluid status:  euvolemic, lasix 20 mg daily, extra 20 mg PRN for weight gain >/= 115 lb     Nonobstructive CAD  Coronary CTA showed mild non-obstructive coronary artery disease.  Total Agatston score 484 placing the patient in the 79th percentile when compared to age and gender matched control group.    - continue aspirin 81mg daily  - BB as above  -  by FLP 7/2024  - atorvastatin 10 mg daily      COPD  No longer on day-time oxygen, just at night.     CKD 3  Baseline ~ 1.0  Cr 1.03, stable    Prolonged QTc  She is currently on Aricpet and Zyprexa. QTc stable. We will continue these medications at current doses but do not recommend dose increased and to avoid other QT prolonging medications.       Follow up:  - [facility] 11/1/2024  - Dr. Pak 1/8/2024      A total of 40 minutes was spent on the day of the visit, which includes preparation for the visit (reviewing previous medical records, laboratories and investigations), in conjunction with the actual clinic visit with the patient, which includes obtaining a history and physical exam, creating and reviewing the care plan, patient education (and family if present), counseling, documenting clinical information in the electronic health record and care coordination.       Trena Chanel DNP, NP-C  Advance Heart Failure  10/2/24      Please do not hesitate to contact me if you have any questions/concerns.     Sincerely,     Trena Chanel, KIZZY CNP

## 2024-10-02 NOTE — PATIENT INSTRUCTIONS
CORE: Cardiomyopathy, Optimization, Rehabilitation, Education      Take your medicines every day, as directed    Changes/Recommendations made today:  Start metoprolol succinate 1/2 tablet at bedtime      Monitor Your Weight and Symptoms    Contact us if you:    Gain 2 pounds in one day or 5 pounds in one week  Feel more short of breath  Notice more leg swelling  Feel lightheadeded   Change your lifestyle    Limit Salt or Sodium:  2000 mg  Limit Fluids:  2000 mL or approximately 64 ounces  Eat a Heart Healthy Diet  Low in saturated fats  Stay Active:  Aim to move at least 150 minutes every  week         To Contact us    During Business Hours:  [phone], option # 1      After hours, weekends or holidays:   [phone], Option #4  Ask to speak to the On-Call Cardiologist. Inform them you are a CORE/heart failure patient at the [place].     [employer] allows you to communicate directly with your heart team through secure messaging.  [employer] can be accessed any time on your phone, computer, or tablet.  If you need assistance, we'd be happy to help!         Keep your Heart Appointments:    CORE in 4 weeks

## 2024-10-02 NOTE — NURSING NOTE
Chief Complaint   Patient presents with    Follow Up     New HF, 79 year old female with HFrEF 10-15% presents for Follow-up with labs prior.       Vitals were taken, medications reconciled.     Juve Velasquez, Clinic Assistant    12:17 PM

## 2024-10-02 NOTE — PATIENT INSTRUCTIONS
Dear Idalia Bob    Thank you for choosing [facility] ([facility] for your infusion.  The following information is a summary of our appointment as well as important reminders.      If you have any questions on your upcoming Specialty Infusion appointments, please call scheduling at [phone].  It was a pleasure taking care of you today.    Sincerely,    [facility]  [address] [place] [address]  [id number]  Phone:  [phone]

## 2024-10-02 NOTE — PROGRESS NOTES
Infusion Nursing Note:  Idalia JUSTIN Satjeremyusman presents today for 1st dose Injectafer.    Patient seen by provider today: No   present during visit today: Not Applicable.    Note: Injectafer infused over 15 minutes. Pt observed for 30 minutes post infusion.      Intravenous Access:  Peripheral IV placed by vascular access.    Treatment Conditions:  Not Applicable.      Post Infusion Assessment:  Patient tolerated infusion without incident.  Patient observed for 30 minutes post 1st dose Injectafer per protocol.  Blood return noted pre and post infusion.  Site patent and intact, free from redness, edema or discomfort.  No evidence of extravasations.  Access discontinued per protocol.       Discharge Plan:   Discharge instructions reviewed with: Patient.  Patient and/or family verbalized understanding of discharge instructions and all questions answered.  AVS to patient via [employer].  Patient will return 10/9 for next appointment.   Patient discharged in stable condition accompanied by: self and daughter.  Departure Mode: Ambulatory with walker.    Administrations This Visit       ferric carboxymaltose (INJECTAFER) 750 mg in sodium chloride 0.9 % 125 mL intermittent infusion       Admin Date  10/02/2024 Action  $New Bag Dose  750 mg Rate  500 mL/hr Route  Intravenous Documented By  Julieta Dunlap RN                  BP 99/65   Pulse 93   Resp 18   Wt 49.7 kg (109 lb 8 oz)   LMP  (LMP Unknown)   BMI 21.39 kg/m      Julieta Dunlap RN

## 2024-10-04 ENCOUNTER — OFFICE VISIT (OUTPATIENT)
Dept: FAMILY MEDICINE | Facility: CLINIC | Age: 79
End: 2024-10-04
Payer: MEDICARE

## 2024-10-04 ENCOUNTER — ALLIED HEALTH/NURSE VISIT (OUTPATIENT)
Dept: FAMILY MEDICINE | Facility: CLINIC | Age: 79
End: 2024-10-04
Payer: MEDICARE

## 2024-10-04 ENCOUNTER — TELEPHONE (OUTPATIENT)
Dept: FAMILY MEDICINE | Facility: CLINIC | Age: 79
End: 2024-10-04

## 2024-10-04 VITALS
RESPIRATION RATE: 19 BRPM | HEART RATE: 76 BPM | TEMPERATURE: 98.4 F | BODY MASS INDEX: 21.44 KG/M2 | WEIGHT: 109.8 LBS | DIASTOLIC BLOOD PRESSURE: 72 MMHG | SYSTOLIC BLOOD PRESSURE: 111 MMHG | OXYGEN SATURATION: 93 %

## 2024-10-04 DIAGNOSIS — Z00.00 ENCOUNTER FOR MEDICARE ANNUAL WELLNESS EXAM: Primary | ICD-10-CM

## 2024-10-04 DIAGNOSIS — R53.81 PHYSICAL DECONDITIONING: ICD-10-CM

## 2024-10-04 DIAGNOSIS — J43.8 OTHER EMPHYSEMA (H): ICD-10-CM

## 2024-10-04 DIAGNOSIS — M81.0 AGE-RELATED OSTEOPOROSIS WITHOUT CURRENT PATHOLOGICAL FRACTURE: ICD-10-CM

## 2024-10-04 DIAGNOSIS — F11.20 CONTINUOUS OPIOID DEPENDENCE (H): ICD-10-CM

## 2024-10-04 DIAGNOSIS — I50.22 CHRONIC SYSTOLIC HEART FAILURE (H): ICD-10-CM

## 2024-10-04 DIAGNOSIS — I10 PRIMARY HYPERTENSION: ICD-10-CM

## 2024-10-04 DIAGNOSIS — M81.0 AGE-RELATED OSTEOPOROSIS WITHOUT CURRENT PATHOLOGICAL FRACTURE: Primary | ICD-10-CM

## 2024-10-04 PROBLEM — R63.0 ANOREXIA SYMPTOM: Status: ACTIVE | Noted: 2024-04-18

## 2024-10-04 PROCEDURE — 99207 PR NO CHARGE NURSE ONLY: CPT

## 2024-10-04 PROCEDURE — G0438 PPPS, INITIAL VISIT: HCPCS | Performed by: FAMILY MEDICINE

## 2024-10-04 PROCEDURE — 96372 THER/PROPH/DIAG INJ SC/IM: CPT | Performed by: FAMILY MEDICINE

## 2024-10-04 ASSESSMENT — PATIENT HEALTH QUESTIONNAIRE - PHQ9
10. IF YOU CHECKED OFF ANY PROBLEMS, HOW DIFFICULT HAVE THESE PROBLEMS MADE IT FOR YOU TO DO YOUR WORK, TAKE CARE OF THINGS AT HOME, OR GET ALONG WITH OTHER PEOPLE: NOT DIFFICULT AT ALL
SUM OF ALL RESPONSES TO PHQ QUESTIONS 1-9: 1
SUM OF ALL RESPONSES TO PHQ QUESTIONS 1-9: 1

## 2024-10-04 ASSESSMENT — ANXIETY QUESTIONNAIRES: GAD7 TOTAL SCORE: INCOMPLETE

## 2024-10-04 NOTE — PROGRESS NOTES
Clinic Administered Medication Documentation        Prolia Documentation     Indication: Prolia  (denosumab) is a prescription medicine used to treat osteoporosis in patients who:   Are at high risk for fracture, meaning patients who have had a fracture related to osteoporosis, or who have multiple risk factors for fracture.  Cannot use another osteoporosis medicine or other osteoporosis medicines did not work well.  The timeline for early/late injections would be 4 weeks early and any time after the 6 month lien. If a patient receives their injection late, then the subsequent injection would be 6 months from the date that they actually received the injection.     When was the last injection?  2024  Was the last injection at least 6 months ago? Yes  Has the prior authorization been completed?  Yes  Is there an active order (written within the past 365 days, with administrations remaining, not ) in the chart?  Yes           GFR Estimate   Date Value Ref Range Status   10/02/2024 55 (L) >60 mL/min/1.73m2 Final       Comment:       eGFR calculated using  CKD-EPI equation.      Has patient had a GFR within the last 12 months? Yes   Is GFR under 30, or patient has a diagnosis of CKD4 or CKD5? Yes           Calcium   Date Value Ref Range Status   10/02/2024 9.6 8.8 - 10.4 mg/dL Final       Comment:       Reference intervals for this test were updated on 2024 to reflect our healthy population more accurately. There may be differences in the flagging of prior results with similar values performed with this method. Those prior results can be interpreted in the context of the updated reference intervals.      Is calcium result 8.5 or above? Yes   Was the calcium done after last Prolia injection? Yes   Is there a calcium order for 1 month post Prolia injection? Yes. Schedule patient for Calcium lab in next month.   Patient denies gastric bypass or parathyroid surgery in past 6 months? Yes - patient  denies.   Patient denies dental work in the past two months involving drilling into the bone, such as implants/extractions, oral surgery or a tooth extraction that has not healed yet?  Yes  Patient denies plans for an emergency tooth extraction within the next week? Yes     The following steps were completed to comply with the REMS program for Prolia:  Reviewed information in the Medication Guide, including the serious risks of Prolia  and the symptoms of each risk.  Advised patient to seek prompt medical attention if they have signs or symptoms of any of the serious risks.  Provided each patient a copy of the Medication Guide and Patient Guide.     Prior to injection, verified patient identity using patient's name and date of birth. Medication was administered. Please see MAR and medication order for additional information. Patient instructed to remain in clinic for 15 minutes, report any adverse reaction to staff immediately, and remain in clinic for 15 minutes and report any adverse reaction to staff immediately but patient declined.     Vial/Syringe: Single dose vial. Was entire vial of medication used? Yes  Was this medication supplied by the patient? No  Patient has no refills remaining.  Will pend for 5 month review  Christine M Klisch, RN

## 2024-10-04 NOTE — PROGRESS NOTES
Preventive Care Visit  [facility]  Gomez Louis MD, Family Medicine  Oct 4, 2024      Assessment & Plan     Encounter for Medicare annual wellness exam   Discussed diet, exercise, wellness and other preventive recommendations related to health maintenance.   Follow up as needed for acute issues.    Physical exam recommended in one year.   Fall risks precautions, continue to use her walkers.    She is a resident at a living assisting place,  She comes with her daughter today.    Overall, she has been doing well, no recent falls, no recent sickness.  She continues to follow-up with her cardiologist.  She continues to do physical therapy    Vaccines at later time.    Continuous opioid dependence (H)  As needed,     Chronic systolic heart failure (H)  Stable, continue to follow up with Cardiology  Weight remain stable.    Other emphysema (H)  Stable, uses oxygen at night.    Physical deconditioning  Continue to use walker  Continue with PT.    Primary hypertension  Controlled.    Age-related osteoporosis without current pathological fracture  Continue with Prolia, injections every 6 months, for one more year,   Then repeat DEXA scan.  Last DEXA scan was 08/2023      Patient weight remains stable.  She reports she does eat at least 2 big meals a day.    Patient has been advised of split billing requirements and indicates understanding: Yes        Counseling  Appropriate preventive services were addressed with this patient via screening, questionnaire, or discussion as appropriate for fall prevention, nutrition, physical activity, Tobacco-use cessation, social engagement, weight loss and cognition.  Checklist reviewing preventive services available has been given to the patient.  Reviewed patient's diet, addressing concerns and/or questions.   She is at risk for psychosocial distress and has been provided with information to reduce risk.   Updated plan of care.  Patient reported difficulty  with activities of daily living were addressed today.I have reviewed Opioid Use Disorder and Substance Use Disorder risk factors and made any needed referrals.       Work on weight loss  Regular exercise    Pato Friedman is a 79 year old, presenting for the following:  Physical        10/4/2024    10:20 AM   Additional Questions   Roomed by margarita horner ma   Accompanied by daughter simeon     Health Care Directive  Patient has a Health Care Directive on file  Advance care planning document is on file and is current.    HPI      Hypertension Follow-up    Do you check your blood pressure regularly outside of the clinic? Yes   Are you following a low salt diet? No  Are your blood pressures ever more than 140 on the top number (systolic) OR more   than 90 on the bottom number (diastolic), for example 140/90? No    Anxiety   How are you doing with your anxiety since your last visit? No change  Are you having other symptoms that might be associated with anxiety? No  Have you had a significant life event? No   Are you feeling depressed? No  Do you have any concerns with your use of alcohol or other drugs? No    Social History     Tobacco Use    Smoking status: Former     Current packs/day: 1.00     Average packs/day: 1 pack/day for 40.0 years (40.0 ttl pk-yrs)     Types: Cigarettes     Passive exposure: Past    Smokeless tobacco: Never   Vaping Use    Vaping status: Never Used   Substance Use Topics    Alcohol use: Not Currently    Drug use: Never         10/4/2024    10:14 AM   AMRIT-7 SCORE   Total Score Incomplete         10/4/2024    10:14 AM   PHQ   PHQ-9 Total Score 1   Q9: Thoughts of better off dead/self-harm past 2 weeks Not at all         9/29/2024   General Health   How would you rate your overall physical health? (!) FAIR   Feel stress (tense, anxious, or unable to sleep) Only a little      (!) STRESS CONCERN      9/29/2024   Nutrition   Diet: Regular (no restrictions)            9/29/2024   Exercise    Days per week of moderate/strenous exercise 0 days   Average minutes spent exercising at this level 0 min      (!) EXERCISE CONCERN      9/29/2024   Social Factors   Frequency of gathering with friends or relatives More than three times a week   Worry food won't last until get money to buy more No   Food not last or not have enough money for food? No   Do you have housing? (Housing is defined as stable permanent housing and does not include staying ouside in a car, in a tent, in an abandoned building, in an overnight shelter, or couch-surfing.) No   Are you worried about losing your housing? No   Lack of transportation? No   Unable to get utilities (heat,electricity)? No   Want help with housing or utility concern? No      (!) HOUSING CONCERN PRESENT      10/4/2024   Fall Risk   Reason Gait Speed Test Not Completed Patient declines            9/29/2024   Activities of Daily Living- Home Safety   Needs help with the following daily activites Transportation    Shopping    Preparing meals    Housework    Bathing    Laundry    Money management   Safety concerns in the home None of the above       Multiple values from one day are sorted in reverse-chronological order         9/29/2024   Dental   Dentist two times every year? Yes            9/29/2024   Hearing Screening   Hearing concerns? None of the above            9/29/2024   Driving Risk Screening   Patient/family members have concerns about driving (!) DECLINE            9/29/2024   General Alertness/Fatigue Screening   Have you been more tired than usual lately? No            9/29/2024   Urinary Incontinence Screening   Bothered by leaking urine in past 6 months No             Today's PHQ-9 Score:       10/4/2024    10:14 AM   PHQ-9 SCORE   PHQ-9 Total Score MyChart 1 (Minimal depression)   PHQ-9 Total Score 1         9/29/2024   Substance Use   Alcohol more than 3/day or more than 7/wk No   Do you have a current opioid prescription? (!) YES   How severe/bad is  pain from 1 to 10? 6/10   Do you use any other substances recreationally? No          Social History     Tobacco Use    Smoking status: Former     Current packs/day: 1.00     Average packs/day: 1 pack/day for 40.0 years (40.0 ttl pk-yrs)     Types: Cigarettes     Passive exposure: Past    Smokeless tobacco: Never   Vaping Use    Vaping status: Never Used   Substance Use Topics    Alcohol use: Not Currently    Drug use: Never              Fracture Risk Assessment Tool  Link to Frax Calculator  Use the information below to complete the Frax calculator  : 1945  Sex: female  Weight (kg): 49.8 kg (actual weight)  Height (cm): 0 cm  Previous Fragility Fracture:  No  History of parent with fractured hip:  No  Current Smoking:  No  Patient has been on glucocorticoids for more than 3 months (5mg/day or more): No  Rheumatoid Arthritis on Problem List:  No  Secondary Osteoporosis on Problem List:  No  Consumes 3 or more units of alcohol per day: No  Femoral Neck BMD (g/cm2)            Reviewed and updated as needed this visit by Provider                    Past Medical History:   Diagnosis Date    Chronic kidney disease (CKD) 01/10/2023    Chronic obstructive pulmonary disease (H) 01/10/2023    Dementia (H) 2023    GERD (gastroesophageal reflux disease) 03/15/2023    Hypertension 2023     Past Surgical History:   Procedure Laterality Date    ESOPHAGOSCOPY, GASTROSCOPY, DUODENOSCOPY (EGD), COMBINED N/A 2024    Procedure: ESOPHAGOGASTRODUODENOSCOPY, WITH BIOPSY;  Surgeon: Felton James MD;  Location:  GI    PICC DOUBLE LUMEN PLACEMENT Right 2024    Basilic Vein 5F DL 37 cm     OB History   No obstetric history on file.     Current providers sharing in care for this patient include:  Patient Care Team:  Gomez Louis MD as PCP - General (Family Medicine)  Gomez Louis MD as Assigned PCP  Subha Jansen PA-C as Physician Assistant (Gastroenterology)  Tiffanie Velasquez RN as  Specialty Care Coordinator (Cardiology)  Subha Olmstead, PT as Physical Therapist (Physical Therapy)  Subha Olmstead, PT as Physical Therapist (Physical Therapy)  Trena Chanel APRN CNP as Assigned Surgical Provider  Caroline Rubin, RN as Specialty Care Coordinator (Cardiology)  Alexus Braun APRN CNP as Nurse Practitioner (Cardiovascular Disease)  Espinoza Pak MD as Assigned Heart and Vascular Provider    The following health maintenance items are reviewed in Epic and correct as of today:  Health Maintenance   Topic Date Due    HF ACTION PLAN  Never done    AMRIT ASSESSMENT  Never done    SPIROMETRY  Never done    URINE DRUG SCREEN  Never done    CONTROLLED SUBSTANCE AGREEMENT FOR CHRONIC PAIN MANAGEMENT  Never done    HEPATITIS A IMMUNIZATION (1 of 2 - Risk 2-dose series) Never done    DTAP/TDAP/TD IMMUNIZATION (2 - Td or Tdap) 02/03/2024    MEDICARE ANNUAL WELLNESS VISIT  08/09/2024    INFLUENZA VACCINE (1) 09/01/2024    COVID-19 Vaccine (7 - 2024-25 season) 09/01/2024    ANNUAL REVIEW OF HM ORDERS  01/22/2025    BMP  04/02/2025    LIPID  07/10/2025    MICROALBUMIN  07/10/2025    ALT  09/11/2025    CBC  09/11/2025    HEMOGLOBIN  09/11/2025    FALL RISK ASSESSMENT  10/04/2025    PHQ-9  10/04/2025    GLUCOSE  10/02/2027    ADVANCE CARE PLANNING  10/04/2029    DEXA  08/16/2038    TSH W/FREE T4 REFLEX  Completed    COPD ACTION PLAN  Completed    PHQ-2 (once per calendar year)  Completed    Pneumococcal Vaccine: 65+ Years  Completed    URINALYSIS  Completed    ZOSTER IMMUNIZATION  Completed    RSV VACCINE  Completed    HPV IMMUNIZATION  Aged Out    MENINGITIS IMMUNIZATION  Aged Out    RSV MONOCLONAL ANTIBODY  Aged Out    LUNG CANCER SCREENING  Discontinued         Review of Systems  Constitutional, HEENT, cardiovascular, pulmonary, GI, , musculoskeletal, neuro, skin, endocrine and psych systems are negative, except as otherwise noted.     Objective    Exam  /72 (BP Location: Right arm,  Patient Position: Chair, Cuff Size: Adult Regular)   Pulse 76   Temp 98.4  F (36.9  C) (Oral)   Resp 19   Wt 49.8 kg (109 lb 12.8 oz)   LMP  (LMP Unknown)   SpO2 93%   BMI 21.44 kg/m     Estimated body mass index is 21.44 kg/m  as calculated from the following:    Height as of 6/21/24: 1.524 m (5').    Weight as of this encounter: 49.8 kg (109 lb 12.8 oz).    Physical Exam  GENERAL: alert and no distress  EYES: Eyes grossly normal to inspection, PERRL and conjunctivae and sclerae normal  HENT: ear canals and TM's normal, nose and mouth without ulcers or lesions  NECK: no adenopathy, no asymmetry, masses, or scars  RESP: lungs clear to auscultation - no rales, rhonchi or wheezes  CV: regular rate and rhythm, normal S1 S2, no S3 or S4, no murmur, click or rub, no peripheral edema  ABDOMEN: soft, nontender, no hepatosplenomegaly, no masses and bowel sounds normal  MS: no gross musculoskeletal defects noted, no edema  SKIN: no suspicious lesions or rashes  NEURO: Normal strength and tone, mentation intact and speech normal  PSYCH: mentation appears normal, affect normal/bright    No orders of the defined types were placed in this encounter.            10/4/2024   Mini Cog   Mini-Cog Not Completed (choose reason) Known dementia                 Signed Electronically by: Gomez Louis MD    Answers submitted by the patient for this visit:  Patient Health Questionnaire (Submitted on 10/4/2024)  If you checked off any problems, how difficult have these problems made it for you to do your work, take care of things at home, or get along with other people?: Not difficult at all  PHQ9 TOTAL SCORE: 1  Patient Health Questionnaire (G7) (Submitted on 10/4/2024)  AMRIT 7 TOTAL SCORE: Incomplete

## 2024-10-04 NOTE — PROGRESS NOTES
Clinic Administered Medication Documentation      Prolia Documentation    Indication: Prolia  (denosumab) is a prescription medicine used to treat osteoporosis in patients who:   Are at high risk for fracture, meaning patients who have had a fracture related to osteoporosis, or who have multiple risk factors for fracture.  Cannot use another osteoporosis medicine or other osteoporosis medicines did not work well.  The timeline for early/late injections would be 4 weeks early and any time after the 6 month lien. If a patient receives their injection late, then the subsequent injection would be 6 months from the date that they actually received the injection.    When was the last injection?  24  Was the last injection at least 6 months ago? Yes  Has the prior authorization been completed?  Yes  Is there an active order (written within the past 365 days, with administrations remaining, not ) in the chart?  Yes   GFR Estimate   Date Value Ref Range Status   10/02/2024 55 (L) >60 mL/min/1.73m2 Final     Comment:     eGFR calculated using  CKD-EPI equation.     Has patient had a GFR within the last 12 months? Yes   Is GFR under 30, or patient has a diagnosis of CKD4 or CKD5? Yes   Calcium   Date Value Ref Range Status   10/02/2024 9.6 8.8 - 10.4 mg/dL Final     Comment:     Reference intervals for this test were updated on 2024 to reflect our healthy population more accurately. There may be differences in the flagging of prior results with similar values performed with this method. Those prior results can be interpreted in the context of the updated reference intervals.     Is calcium result 8.5 or above? Yes   Was the calcium done after last Prolia injection? Yes   Is there a calcium order for 1 month post Prolia injection? Yes. Schedule patient for Calcium lab in next month.   Patient denies gastric bypass or parathyroid surgery in past 6 months? Yes - patient denies.   Patient denies dental work  in the past two months involving drilling into the bone, such as implants/extractions, oral surgery or a tooth extraction that has not healed yet?  Yes  Patient denies plans for an emergency tooth extraction within the next week? Yes    The following steps were completed to comply with the REMS program for Prolia:  Reviewed information in the Medication Guide, including the serious risks of Prolia  and the symptoms of each risk.  Advised patient to seek prompt medical attention if they have signs or symptoms of any of the serious risks.  Provided each patient a copy of the Medication Guide and Patient Guide.    Prior to injection, verified patient identity using patient's name and date of birth. Medication was administered. Please see MAR and medication order for additional information. Patient instructed to remain in clinic for 15 minutes, report any adverse reaction to staff immediately, and remain in clinic for 15 minutes and report any adverse reaction to staff immediately but patient declined.    Vial/Syringe: Single Dose Syringe  Was this medication supplied by the patient? No  Patient has no refills remaining.      Christine M Klisch, RN

## 2024-10-04 NOTE — TELEPHONE ENCOUNTER
See Allied Nurse Visit for documentation .    Christine M Klisch, RN          Clinic Administered Medication Documentation      Prolia Documentation    Indication: Prolia  (denosumab) is a prescription medicine used to treat osteoporosis in patients who:   Are at high risk for fracture, meaning patients who have had a fracture related to osteoporosis, or who have multiple risk factors for fracture.  Cannot use another osteoporosis medicine or other osteoporosis medicines did not work well.  The timeline for early/late injections would be 4 weeks early and any time after the 6 month lien. If a patient receives their injection late, then the subsequent injection would be 6 months from the date that they actually received the injection.    When was the last injection?  2024  Was the last injection at least 6 months ago? Yes  Has the prior authorization been completed?  Yes  Is there an active order (written within the past 365 days, with administrations remaining, not ) in the chart?  Yes   GFR Estimate   Date Value Ref Range Status   10/02/2024 55 (L) >60 mL/min/1.73m2 Final     Comment:     eGFR calculated using  CKD-EPI equation.     Has patient had a GFR within the last 12 months? Yes   Is GFR under 30, or patient has a diagnosis of CKD4 or CKD5? Yes   Calcium   Date Value Ref Range Status   10/02/2024 9.6 8.8 - 10.4 mg/dL Final     Comment:     Reference intervals for this test were updated on 2024 to reflect our healthy population more accurately. There may be differences in the flagging of prior results with similar values performed with this method. Those prior results can be interpreted in the context of the updated reference intervals.     Is calcium result 8.5 or above? Yes   Was the calcium done after last Prolia injection? Yes   Is there a calcium order for 1 month post Prolia injection? Yes. Schedule patient for Calcium lab in next month.   Patient denies gastric bypass or  parathyroid surgery in past 6 months? Yes - patient denies.   Patient denies dental work in the past two months involving drilling into the bone, such as implants/extractions, oral surgery or a tooth extraction that has not healed yet?  Yes  Patient denies plans for an emergency tooth extraction within the next week? Yes    The following steps were completed to comply with the REMS program for Prolia:  Reviewed information in the Medication Guide, including the serious risks of Prolia  and the symptoms of each risk.  Advised patient to seek prompt medical attention if they have signs or symptoms of any of the serious risks.  Provided each patient a copy of the Medication Guide and Patient Guide.    Prior to injection, verified patient identity using patient's name and date of birth. Medication was administered. Please see MAR and medication order for additional information. Patient instructed to remain in clinic for 15 minutes, report any adverse reaction to staff immediately, and remain in clinic for 15 minutes and report any adverse reaction to staff immediately but patient declined.    Vial/Syringe: Single dose vial. Was entire vial of medication used? Yes  Was this medication supplied by the patient? No  Patient has no refills remaining.  Will pend for 5 month review  Christine M Klisch, RN

## 2024-10-16 ENCOUNTER — TELEPHONE (OUTPATIENT)
Dept: FAMILY MEDICINE | Facility: CLINIC | Age: 79
End: 2024-10-16

## 2024-10-16 DIAGNOSIS — M81.0 AGE-RELATED OSTEOPOROSIS WITHOUT CURRENT PATHOLOGICAL FRACTURE: Primary | ICD-10-CM

## 2024-10-16 NOTE — TELEPHONE ENCOUNTER
Postponing this encounter-    Patient due for Prolia injection around 4/4/2025    1. Please ask provider to put in a new CAM order for (Pre Auth) Prolia injection if one is not current and any lab orders if needed (it is up to the provider's discretion on how often labs are checked once Prolia injections have been started)    2. Please contact patient to schedule the injection with RN 2 weeks out    3. Ask MA to re-order med 1 week in advance    Make sure patient has NOT had any dental work involving the jaw bone (i.e teeth extraction) 1 month prior to injection     For questions about the cost, patient may contact our CONSUMER PRICE LINE at [phone] for an estimate.     CHECK STATUS PRIOR TO ADMINISTRATION- Chart Review > Referrals tab - Select the Referral to view the report    Miki Schmid RN

## 2024-10-17 ENCOUNTER — INFUSION THERAPY VISIT (OUTPATIENT)
Dept: INFUSION THERAPY | Facility: CLINIC | Age: 79
End: 2024-10-17
Attending: INTERNAL MEDICINE
Payer: MEDICARE

## 2024-10-17 VITALS
HEART RATE: 76 BPM | SYSTOLIC BLOOD PRESSURE: 122 MMHG | RESPIRATION RATE: 18 BRPM | TEMPERATURE: 97.7 F | DIASTOLIC BLOOD PRESSURE: 59 MMHG | OXYGEN SATURATION: 97 %

## 2024-10-17 DIAGNOSIS — D50.9 ANEMIA, IRON DEFICIENCY: Primary | ICD-10-CM

## 2024-10-17 DIAGNOSIS — I50.22 CHRONIC SYSTOLIC HEART FAILURE (H): ICD-10-CM

## 2024-10-17 PROCEDURE — 258N000003 HC RX IP 258 OP 636: Performed by: INTERNAL MEDICINE

## 2024-10-17 PROCEDURE — 96365 THER/PROPH/DIAG IV INF INIT: CPT

## 2024-10-17 PROCEDURE — 250N000011 HC RX IP 250 OP 636: Mod: JZ | Performed by: INTERNAL MEDICINE

## 2024-10-17 RX ORDER — ALBUTEROL SULFATE 90 UG/1
1-2 INHALANT RESPIRATORY (INHALATION)
Status: CANCELLED
Start: 2024-10-23

## 2024-10-17 RX ORDER — ALBUTEROL SULFATE 0.83 MG/ML
2.5 SOLUTION RESPIRATORY (INHALATION)
Status: CANCELLED | OUTPATIENT
Start: 2024-10-23

## 2024-10-17 RX ORDER — HEPARIN SODIUM,PORCINE 10 UNIT/ML
5-20 VIAL (ML) INTRAVENOUS DAILY PRN
Status: CANCELLED | OUTPATIENT
Start: 2024-10-23

## 2024-10-17 RX ORDER — MEPERIDINE HYDROCHLORIDE 25 MG/ML
25 INJECTION INTRAMUSCULAR; INTRAVENOUS; SUBCUTANEOUS EVERY 30 MIN PRN
Status: CANCELLED | OUTPATIENT
Start: 2024-10-23

## 2024-10-17 RX ORDER — DIPHENHYDRAMINE HYDROCHLORIDE 50 MG/ML
50 INJECTION INTRAMUSCULAR; INTRAVENOUS
Status: CANCELLED
Start: 2024-10-23

## 2024-10-17 RX ORDER — HEPARIN SODIUM (PORCINE) LOCK FLUSH IV SOLN 100 UNIT/ML 100 UNIT/ML
5 SOLUTION INTRAVENOUS
Status: CANCELLED | OUTPATIENT
Start: 2024-10-23

## 2024-10-17 RX ORDER — EPINEPHRINE 1 MG/ML
0.3 INJECTION, SOLUTION INTRAMUSCULAR; SUBCUTANEOUS EVERY 5 MIN PRN
Status: CANCELLED | OUTPATIENT
Start: 2024-10-23

## 2024-10-17 RX ORDER — METHYLPREDNISOLONE SODIUM SUCCINATE 125 MG/2ML
125 INJECTION INTRAMUSCULAR; INTRAVENOUS
Status: CANCELLED
Start: 2024-10-23

## 2024-10-17 RX ADMIN — FERRIC CARBOXYMALTOSE INJECTION 750 MG: 50 INJECTION, SOLUTION INTRAVENOUS at 15:45

## 2024-10-17 NOTE — PATIENT INSTRUCTIONS
Dear Idalia Bob    Thank you for choosing [facility] ([facility] for your infusion.  The following information is a summary of our appointment as well as important reminders.          If you are a transplant patient and require transplant education, please click on this link: [email or web address].    If you have any questions on your upcoming Specialty Infusion appointments, please call scheduling at [phone].  It was a pleasure taking care of you today.    Sincerely,    [facility]  [address] [place] [address]  [address]  Phone:  [phone]

## 2024-10-17 NOTE — PROGRESS NOTES
Infusion Nursing Note:  Idalia JUSTIN Lisbeth presents today for Injectafer.    Patient seen by provider today: No   present during visit today: Not Applicable.    Note: Per orders, 2/2 Injectafer infused over 15 minutes and flushed with NS. Observed for 30 minutes post infusion.  Administrations This Visit       ferric carboxymaltose (INJECTAFER) 750 mg in sodium chloride 0.9 % 125 mL intermittent infusion       Admin Date  10/17/2024 Action  $New Bag Dose  750 mg Rate  500 mL/hr Route  Intravenous Documented By  Agueda uPlido, RN                     Intravenous Access:  Peripheral IV placed.    Treatment Conditions:  None.      Post Infusion Assessment:  Patient tolerated infusion without incident.  Blood return noted pre and post infusion.  Site patent and intact, free from redness, edema or discomfort.  No evidence of extravasations.  Access discontinued per protocol.       Discharge Plan:   Discharge instructions reviewed with: Patient and daughter.  Patient and/or family verbalized understanding of discharge instructions and all questions answered.  AVS to patient via MYCHART.  Patient will return to her provider as needed for next appointment.   Patient discharged in stable condition accompanied by: daughter.  Departure Mode: Ambulatory.    /59 (BP Location: Left arm, Patient Position: Sitting, Cuff Size: Adult Regular)   Pulse 76   Temp 97.7  F (36.5  C) (Oral)   Resp 18   LMP  (LMP Unknown)   SpO2 97%      Agueda Pulido RN

## 2024-10-24 DIAGNOSIS — I50.22 CHRONIC SYSTOLIC HEART FAILURE (H): Primary | ICD-10-CM

## 2024-11-01 ENCOUNTER — OFFICE VISIT (OUTPATIENT)
Dept: CARDIOLOGY | Facility: CLINIC | Age: 79
End: 2024-11-01
Attending: NURSE PRACTITIONER
Payer: MEDICARE

## 2024-11-01 ENCOUNTER — LAB (OUTPATIENT)
Dept: LAB | Facility: CLINIC | Age: 79
End: 2024-11-01
Payer: MEDICARE

## 2024-11-01 VITALS
SYSTOLIC BLOOD PRESSURE: 125 MMHG | BODY MASS INDEX: 22.65 KG/M2 | HEART RATE: 69 BPM | WEIGHT: 116 LBS | DIASTOLIC BLOOD PRESSURE: 76 MMHG | OXYGEN SATURATION: 97 %

## 2024-11-01 DIAGNOSIS — I50.22 CHRONIC SYSTOLIC HEART FAILURE (H): ICD-10-CM

## 2024-11-01 DIAGNOSIS — I50.22 CHRONIC SYSTOLIC HEART FAILURE (H): Primary | ICD-10-CM

## 2024-11-01 LAB
ANION GAP SERPL CALCULATED.3IONS-SCNC: 9 MMOL/L (ref 7–15)
BUN SERPL-MCNC: 16.7 MG/DL (ref 8–23)
CALCIUM SERPL-MCNC: 7.9 MG/DL (ref 8.8–10.4)
CHLORIDE SERPL-SCNC: 104 MMOL/L (ref 98–107)
CREAT SERPL-MCNC: 1.03 MG/DL (ref 0.51–0.95)
EGFRCR SERPLBLD CKD-EPI 2021: 55 ML/MIN/1.73M2
GLUCOSE SERPL-MCNC: 77 MG/DL (ref 70–99)
HCO3 SERPL-SCNC: 26 MMOL/L (ref 22–29)
POTASSIUM SERPL-SCNC: 4.6 MMOL/L (ref 3.4–5.3)
SODIUM SERPL-SCNC: 139 MMOL/L (ref 135–145)

## 2024-11-01 PROCEDURE — 80048 BASIC METABOLIC PNL TOTAL CA: CPT | Performed by: PATHOLOGY

## 2024-11-01 PROCEDURE — 99214 OFFICE O/P EST MOD 30 MIN: CPT | Performed by: NURSE PRACTITIONER

## 2024-11-01 PROCEDURE — G0463 HOSPITAL OUTPT CLINIC VISIT: HCPCS | Performed by: NURSE PRACTITIONER

## 2024-11-01 PROCEDURE — 36415 COLL VENOUS BLD VENIPUNCTURE: CPT | Performed by: PATHOLOGY

## 2024-11-01 ASSESSMENT — PAIN SCALES - GENERAL: PAINLEVEL_OUTOF10: NO PAIN (0)

## 2024-11-01 NOTE — NURSING NOTE
Chief Complaint   Patient presents with    Follow Up     : Return CORE, HFrEF, labs prior       Vitals were taken, medications reconciled.     Juve Velasquez, Clinic Assistant    1:31 PM

## 2024-11-01 NOTE — LETTER
11/1/2024      RE: Idalia Bob  [address]  [facility] [address]       Dear Colleague,    Thank you for the opportunity to participate in the care of your patient, Idalia Bob, at the [facility] [place] [facility]. Please see a copy of my visit note below.      [facility]   [facility]      HPI:   Ms. Idalia Bob is an 79yr old female with a history of CKD, HTN, GERD, COPD, CAD, and newly diagnosed NICM (LVEF 10-15% per TTE 5/2024) who presents to [facility] for evaluation and ongoing management of GDMT.      Her PCP advised that she present to the ED 5/15/24 due to JUANIS and elevated NTproBNP.  While in the ED, CXR showed bilateral pleural effusions and pulmonary edema, TTE showed LVEF 10-15% and moderately reduced RV function.  She started on IV lasix, and admitted to medicine.  She became hypotensive, required dobutamine --> dopamine, which was then stopped 5/21.  She ultimately diuresed to a weight of ~130#, and discharged 5/24 on losartan 12.5mg daily and jardiance 10mg daily.     Established in [facility] on 6/4/24. At that time she reported feeling okay. Starting to have some LE edema and GUTIERREZ. Weights have remained stable, ~ 125-126 lb. Lasix increased to 40/20 mg BID and referred to lymphedema. Readmitted 6/9-6/14 for recurrent pneumonia.      She has been followed by [facility] and Dr. Pak.     Today, Ms. Bob presents to clinic with her daughter. She reports feeling well. Breathing is comfortable, denies acute SOB, orthopnea, PND. Denies cough, GUTIERREZ, fever, or any new or persistent sick symptoms. Weights have been relatively stable at 109-112 lb. Denies abdominal distention. Wearing compression stockings, LE edema mild.  She otherwise denies chest pain, palpitations, dizziness, falls, headaches, acute vision changes, fevers, chills, cough, sore throat, and signs of bleeding. Appetite fair, family assisting  with making sure she has low salt options in her assisted living.     Home BP: 90s-110s/60s  HR 80s-110s  Weight: 115-117 lb    Cardiac Medications   - ASA 81 mg daily   - Lasix 20 mg daily PRN   - Losartan 12.5 mg daily   - Jardiance 10 mg daily   - Atorvastatin 10 mg daily   - KCL 20 mEq daily   - Metoprolol succinate 12.5 mg HS      PAST MEDICAL HISTORY:  Past Medical History:   Diagnosis Date     Chronic kidney disease (CKD) 01/10/2023     Chronic obstructive pulmonary disease (H) 01/10/2023     Dementia (H) 11/28/2023     GERD (gastroesophageal reflux disease) 03/15/2023     Hypertension 11/28/2023       FAMILY HISTORY:  No family history on file.    SOCIAL HISTORY:  Social History     Socioeconomic History     Marital status:    Tobacco Use     Smoking status: Former     Current packs/day: 1.00     Average packs/day: 1 pack/day for 40.0 years (40.0 ttl pk-yrs)     Types: Cigarettes     Passive exposure: Past     Smokeless tobacco: Never   Vaping Use     Vaping status: Never Used   Substance and Sexual Activity     Alcohol use: Not Currently     Drug use: Never     Sexual activity: Not Currently     Social Determinants of Health     Financial Resource Strain: Low Risk  (1/22/2024)    Financial Resource Strain      Within the past 12 months, have you or your family members you live with been unable to get utilities (heat, electricity) when it was really needed?: No   Food Insecurity: Low Risk  (1/22/2024)    Food Insecurity      Within the past 12 months, did you worry that your food would run out before you got money to buy more?: No      Within the past 12 months, did the food you bought just not last and you didn t have money to get more?: No   Transportation Needs: Low Risk  (1/22/2024)    Transportation Needs      Within the past 12 months, has lack of transportation kept you from medical appointments, getting your medicines, non-medical meetings or appointments, work, or from getting things that you  need?: No   Social Connections: Socially Integrated (11/21/2023)    [facility] from [facility]    Social Connections      Frequency of Communication with Friends and Family: 0   Housing Stability: Low Risk  (1/22/2024)    Housing Stability      Do you have housing? : Yes      Are you worried about losing your housing?: No       CURRENT MEDICATIONS:  Current Outpatient Medications   Medication Sig Dispense Refill     acetaminophen (TYLENOL) 325 MG tablet Take 325-650 mg by mouth every 6 hours as needed for mild pain       albuterol (PROVENTIL) (2.5 MG/3ML) 0.083% neb solution Take 1 vial (2.5 mg) by nebulization 2 times daily as needed (COPD) 90 mL 5     aspirin 81 MG EC tablet Take 1 tablet (81 mg) by mouth daily 30 tablet 0     atorvastatin (LIPITOR) 10 MG tablet Take 1 tablet (10 mg) by mouth daily. 90 tablet 3     busPIRone (BUSPAR) 15 MG tablet Take 15 mg by mouth 2 times daily       calcium carbonate (TUMS) 500 MG chewable tablet Take 1 tablet (500 mg) by mouth 4 times daily as needed for heartburn       Calcium Polycarbophil (FIBER) 625 MG tablet Take 2 tablets by mouth daily       donepezil (ARICEPT) 10 MG tablet Take 1 tablet (10 mg) by mouth at bedtime. 90 tablet 3     empagliflozin (JARDIANCE) 10 MG TABS tablet Take 1 tablet (10 mg) by mouth daily 90 tablet 1     FLUoxetine (PROZAC) 40 MG capsule Take 1 capsule (40 mg) by mouth daily       Fluticasone-Umeclidin-Vilanterol (TRELEGY ELLIPTA) 200-62.5-25 MCG/ACT oral inhaler Inhale 1 puff into the lungs daily. 28 each 0     furosemide (LASIX) 20 MG tablet Take 1 tablet (20 mg) by mouth daily. May also take 1 tablet (20 mg) daily as needed (for weight equal to or greater then 115 lbs). 100 tablet 3     lactobacillus rhamnosus, GG, (CULTURELL) capsule Take 1 capsule by mouth daily       latanoprost (XALATAN) 0.005 % ophthalmic solution Place 1 drop into both eyes at bedtime        levothyroxine (SYNTHROID/LEVOTHROID) 50 MCG tablet TAKE 1 TABLET BY MOUTH ONCE DAILY. 90 tablet 1     losartan (COZAAR) 25 MG tablet Take 0.5 tablets (12.5 mg) by mouth daily. 45 tablet 3     magnesium oxide (MAG-OX) 400 MG tablet Take 1 tablet (400 mg) by mouth at bedtime. 90 tablet 3     metoprolol succinate ER (TOPROL XL) 25 MG 24 hr tablet Take 0.5 tablets (12.5 mg) by mouth at bedtime. 60 tablet 2     OLANZapine (ZYPREXA) 2.5 MG tablet Take 1 tablet (2.5 mg) by mouth 2 times daily. 60 tablet 5     oxyCODONE (ROXICODONE) 5 MG tablet TAKE 0.5 TABLETS (2.5 MG) BY MOUTH NIGHTLY AS NEEDED FOR PAIN       pantoprazole (PROTONIX) 40 MG EC tablet Take 1 tablet (40 mg) by mouth daily 90 tablet 3     potassium chloride caroline ER (KLOR-CON M20) 20 MEQ CR tablet Take 1 tablet (20 mEq) by mouth daily. May also take 1 tablet (20 mEq) daily as needed (if taking an extra dose of Lasix for weight gain equal to or greater than 115 lbs). 100 tablet 3     senna-docusate (SENOKOT-S/PERICOLACE) 8.6-50 MG tablet Take 1 tablet by mouth 2 times daily as needed for constipation       triamcinolone (KENALOG) 0.1 % external cream Apply topically 2 times daily as needed for irritation       vitamin D3 (CHOLECALCIFEROL) 50 mcg (2000 units) tablet Take 1 tablet (50 mcg) by mouth daily       No current facility-administered medications for this visit.       ROS:   Refer to HPI    EXAM:  LMP  (LMP Unknown)      GENERAL: Appears comfortable, in no acute distress.   HEENT: Eye symmetrical, no discharge or icterus bilaterally. Mucous membranes moist and without lesions.  CV: RRR, +S1S2, no murmur, rub, or gallop. JVP mid neck at 90 degrees.   RESPIRATORY: Respirations regular, even, and unlabored. Lungs CTA throughout.   GI: Soft and non distended with normoactive bowel sounds present in all quadrants. No tenderness, rebound, guarding. No hepatomegaly.   EXTREMITIES: 2+ pitting BLE peripheral edema. 2+ bilateral pedal pulses.   NEUROLOGIC: Alert and  oriented x 3. No focal deficits.   MUSCULOSKELETAL: No joint swelling or tenderness.   SKIN: No jaundice. No rashes or lesions.     Labs, reviewed with patient in clinic today:  CBC RESULTS:  Lab Results   Component Value Date    WBC 9.7 2024    RBC 4.48 2024    HGB 11.1 (L) 2024    HCT 36.7 2024    MCV 82 2024    MCH 24.8 (L) 2024    MCHC 30.2 (L) 2024    RDW 16.3 (H) 2024     2024       CMP RESULTS:  Lab Results   Component Value Date     10/02/2024    POTASSIUM 3.9 10/02/2024    POTASSIUM 4.4 2024    CHLORIDE 101 10/02/2024    CHLORIDE 104 2024    CO2 24 10/02/2024    ANIONGAP 11 10/02/2024    GLC 84 10/02/2024    GLC 97 2024     (H) 2024    BUN 18.8 10/02/2024    CR 1.03 (H) 10/02/2024    GFRESTIMATED 55 (L) 10/02/2024    AYUSH 9.6 10/02/2024    BILITOTAL 0.2 2024    ALBUMIN 3.5 2024    ALKPHOS 96 2024    ALT <5 2024    AST 13 2024        INR RESULTS:  Lab Results   Component Value Date    INR 1.06 2024       Lab Results   Component Value Date    MAG 1.6 (L) 2024     Lab Results   Component Value Date    NTBNPI 10,435 (H) 2024     Lab Results   Component Value Date    NTBNP 3,148 (H) 2024       Cardiac Diagnostics:    2024 Coronary CTA  IMPRESSION:  1.  Mild non-obstructive coronary artery disease.  2.  Total Agatston score 484 placing the patient in the 79th  percentile when compared to age and gender matched control group.  3.  Please review Radiology report for incidental noncardiac findings  that will follow separately.     FINDINGS:     CORONARY CALCIUM SCORE     Total Agatston calcium score: 483   Left main: 79  Left anterior descendin  Left circumflex: 69  Right coronary artery: 180   This places the patient in the lowest  percentile when compared to age  and gender matched control group.     CORONARY ANGIOGRAPHY     DOMINANCE: Right dominant  system.   Normal coronary origins and course.     LEFT MAIN:   The left main arises normally from the left coronary cusp and has a  minimal (<25%) stenosis composed of calcified plaque.     LEFT ANTERIOR DESCENDING:   The LAD is a moderate-sized type III vessel which supplies a  diminutive D1 and a small branching D2.   Proximal LAD: Mild (25-49%) stenosis composed of mixed plaque.  The remainder of the left anterior descending and its major diagonal  branches are patent with minimal luminal irregularities.     LEFT CIRCUMFLEX:   Proximal LCX:Minimal (<25%) stenosis composed of calcified plaque.  First marginal: Minimal (<25%) stenosis composed of calcified plaque.  The remainder of the left circumflex and its major marginal branches  are patent with minimal luminal irregularities.     RIGHT CORONARY ARTERY:   Proximal RCA: Minimal (<25%) stenosis stenosis composed of mixed  plaque.  Mid RCA: Mild (25-49%) stenosis composed of noncalcified plaque.  The remainder of the right coronary and the posterior descending  artery are patent with minimal luminal irregularities.     ADDITIONAL FINDINGS:      The proximal ascending aorta is normal in size.   There is mild calcification of the ascending and descending aorta.  There mild mitral annular calcification.  Central venous catheter is present with its tip in the distal SVC.  Normal pulmonary venous anatomy with all four pulmonary veins draining  into the left atrium.    The left atrial appendage is free of thrombus.  There is no left ventricular mass or thrombus.   Normal pericardial thickness. There is no pericardial effusion.  Please review Radiology report for incidental noncardiac findings that  will follow separately.     I have personally reviewed the examination and initial interpretation  and I agree with the findings.     5/15/2024 Echo  Interpretation Summary  Left ventricular function is decreased. The ejection fraction is 10-15%  (severely reduced). Severe  diffuse hypokinesis is present.  Global right ventricular function is moderately reduced.  Moderate tricuspid insufficiency is present.  Estimated pulmonary artery systolic pressure is 54 mmHg plus right atrial  pressure.  IVC diameter >2.1 cm collapsing <50% with sniff suggests a high RA pressure  estimated at 15 mmHg or greater.  Trivial pericardial effusion is present.     This study was compared with the study from 2/12/24: LV and RV function have  decreased significantly.    2/12/2024 Echo  Interpretation Summary  A large left pleural effusion is present.  Global and regional left ventricular function is normal with an EF of 55-60%.  Global right ventricular function is normal. The right ventricle is normal  size.  Moderate tricuspid insufficiency is present. The etiology is RA enlargement.  The estimated PA systolic pressure is 60 mmHg.  IVC diameter and respiratory changes fall into an intermediate range  suggesting an RA pressure of 8 mmHg.  There is no prior study for direct comparison.      Assessment and Plan:   Ms. Idalia Bob is an 79 yr old female with a history of CKD, HTN, GERD, COPD, CAD, and newly diagnosed NICM (LVEF 10-15% per TTE 5/2024) who presents to [facility] for evaluation and ongoing management of GDMT.     Appears grossly euvolemic. Blood pressures are low stable. Review of today's labs demonstrate normal lytes, Cr stable at 1.03. We will keep losartan at 12.5 mg daily given SBPs at home 90-100s.   Daughter previously inquired about increasing donepezil. We need to be cautious with this given known prolonged QTc. I am not opposed to increase, but should be PCP who make the increase and would recommend EKG 1 week post dose increase.     Newly diagnosed systolic heart failure secondary to NICM (LVEF 10-15% per TTE 5/2024)  HTN  Stage C, NYHA Class III  - ACEi/ARB/ARNi:  losartan 12.5 mg daily   - Afterload reduction:  n/a  - BB:  metoprolol succinate 12.5 mg HS  - Aldosterone antagonist:   deferred while other medications are prioritized, unlikely to tolerate given low BPs  - SGLT2i:  jardiance 10 mg daily   - SCD ppx:  n/a  - Fluid status:  euvolemic, lasix 20 mg daily, extra 20 mg PRN for weight gain >/= 117 lb     Nonobstructive CAD  Coronary CTA showed mild non-obstructive coronary artery disease.  Total Agatston score 484 placing the patient in the 79th percentile when compared to age and gender matched control group.    - continue aspirin 81mg daily  - BB as above  -  by FLP 7/2024  - atorvastatin 10 mg daily      COPD  No longer on day-time oxygen, just at night.     CKD 3  Baseline ~ 1.0  Cr 1.03, stable    Prolonged QTc  She is currently on Aricpet and Zyprexa. QTc stable. We will continue these medications at current doses but do not recommend dose increased and to avoid other QT prolonging medications.       Follow up:  - Dr. Pak 1/8/2024  - [facility] 3 months      A total of 40 minutes was spent on the day of the visit, which includes preparation for the visit (reviewing previous medical records, laboratories and investigations), in conjunction with the actual clinic visit with the patient, which includes obtaining a history and physical exam, creating and reviewing the care plan, patient education (and family if present), counseling, documenting clinical information in the electronic health record and care coordination.       Trena Chanel DNP, NP-C  Advance Heart Failure  11/1/2024      Please do not hesitate to contact me if you have any questions/concerns.     Sincerely,     KIZZY Cristina CNP

## 2024-11-01 NOTE — PROGRESS NOTES
[facility]   [facility]      HPI:   Ms. Idalia Bob is an 79yr old female with a history of CKD, HTN, GERD, COPD, CAD, and newly diagnosed NICM (LVEF 10-15% per TTE 5/2024) who presents to [facility] for evaluation and ongoing management of GDMT.      Her PCP advised that she present to the ED 5/15/24 due to JUANIS and elevated NTproBNP.  While in the ED, CXR showed bilateral pleural effusions and pulmonary edema, TTE showed LVEF 10-15% and moderately reduced RV function.  She started on IV lasix, and admitted to medicine.  She became hypotensive, required dobutamine --> dopamine, which was then stopped 5/21.  She ultimately diuresed to a weight of ~130#, and discharged 5/24 on losartan 12.5mg daily and jardiance 10mg daily.     Established in C[facility] on 6/4/24. At that time she reported feeling okay. Starting to have some LE edema and GUTIERREZ. Weights have remained stable, ~ 125-126 lb. Lasix increased to 40/20 mg BID and referred to lymphedema. Readmitted 6/9-6/14 for recurrent pneumonia.      She has been followed by [facility] clinic and Dr. Pak.     Today, Ms. Bob presents to clinic with her daughter. She reports feeling well. Breathing is comfortable, denies acute SOB, orthopnea, PND. Denies cough, GUTIERREZ, fever, or any new or persistent sick symptoms. Weights have been relatively stable at 109-112 lb. Denies abdominal distention. Wearing compression stockings, LE edema mild.  She otherwise denies chest pain, palpitations, dizziness, falls, headaches, acute vision changes, fevers, chills, cough, sore throat, and signs of bleeding. Appetite fair, family assisting with making sure she has low salt options in her assisted living.     Home BP: 90s-110s/60s  HR 80s-110s  Weight: 115-117 lb    Cardiac Medications   - ASA 81 mg daily   - Lasix 20 mg daily PRN   - Losartan 12.5 mg daily   - Jardiance 10 mg daily   - Atorvastatin 10 mg daily   - KCL 20 mEq daily   - Metoprolol succinate 12.5 mg HS      PAST MEDICAL  HISTORY:  Past Medical History:   Diagnosis Date    Chronic kidney disease (CKD) 01/10/2023    Chronic obstructive pulmonary disease (H) 01/10/2023    Dementia (H) 11/28/2023    GERD (gastroesophageal reflux disease) 03/15/2023    Hypertension 11/28/2023       FAMILY HISTORY:  No family history on file.    SOCIAL HISTORY:  Social History     Socioeconomic History    Marital status:    Tobacco Use    Smoking status: Former     Current packs/day: 1.00     Average packs/day: 1 pack/day for 40.0 years (40.0 ttl pk-yrs)     Types: Cigarettes     Passive exposure: Past    Smokeless tobacco: Never   Vaping Use    Vaping status: Never Used   Substance and Sexual Activity    Alcohol use: Not Currently    Drug use: Never    Sexual activity: Not Currently     Social Determinants of Health     Financial Resource Strain: Low Risk  (1/22/2024)    Financial Resource Strain     Within the past 12 months, have you or your family members you live with been unable to get utilities (heat, electricity) when it was really needed?: No   Food Insecurity: Low Risk  (1/22/2024)    Food Insecurity     Within the past 12 months, did you worry that your food would run out before you got money to buy more?: No     Within the past 12 months, did the food you bought just not last and you didn t have money to get more?: No   Transportation Needs: Low Risk  (1/22/2024)    Transportation Needs     Within the past 12 months, has lack of transportation kept you from medical appointments, getting your medicines, non-medical meetings or appointments, work, or from getting things that you need?: No   Social Connections: Socially Integrated (11/21/2023)    Received from [facility], [facility]    Social Connections     Frequency of Communication with Friends and Family: 0   Housing Stability: Low Risk  (1/22/2024)    Housing Stability     Do you have housing? : Yes     Are you  worried about losing your housing?: No       CURRENT MEDICATIONS:  Current Outpatient Medications   Medication Sig Dispense Refill    acetaminophen (TYLENOL) 325 MG tablet Take 325-650 mg by mouth every 6 hours as needed for mild pain      albuterol (PROVENTIL) (2.5 MG/3ML) 0.083% neb solution Take 1 vial (2.5 mg) by nebulization 2 times daily as needed (COPD) 90 mL 5    aspirin 81 MG EC tablet Take 1 tablet (81 mg) by mouth daily 30 tablet 0    atorvastatin (LIPITOR) 10 MG tablet Take 1 tablet (10 mg) by mouth daily. 90 tablet 3    busPIRone (BUSPAR) 15 MG tablet Take 15 mg by mouth 2 times daily      calcium carbonate (TUMS) 500 MG chewable tablet Take 1 tablet (500 mg) by mouth 4 times daily as needed for heartburn      Calcium Polycarbophil (FIBER) 625 MG tablet Take 2 tablets by mouth daily      donepezil (ARICEPT) 10 MG tablet Take 1 tablet (10 mg) by mouth at bedtime. 90 tablet 3    empagliflozin (JARDIANCE) 10 MG TABS tablet Take 1 tablet (10 mg) by mouth daily 90 tablet 1    FLUoxetine (PROZAC) 40 MG capsule Take 1 capsule (40 mg) by mouth daily      Fluticasone-Umeclidin-Vilanterol (TRELEGY ELLIPTA) 200-62.5-25 MCG/ACT oral inhaler Inhale 1 puff into the lungs daily. 28 each 0    furosemide (LASIX) 20 MG tablet Take 1 tablet (20 mg) by mouth daily. May also take 1 tablet (20 mg) daily as needed (for weight equal to or greater then 115 lbs). 100 tablet 3    lactobacillus rhamnosus, GG, (CULTURELL) capsule Take 1 capsule by mouth daily      latanoprost (XALATAN) 0.005 % ophthalmic solution Place 1 drop into both eyes at bedtime      levothyroxine (SYNTHROID/LEVOTHROID) 50 MCG tablet TAKE 1 TABLET BY MOUTH ONCE DAILY. 90 tablet 1    losartan (COZAAR) 25 MG tablet Take 0.5 tablets (12.5 mg) by mouth daily. 45 tablet 3    magnesium oxide (MAG-OX) 400 MG tablet Take 1 tablet (400 mg) by mouth at bedtime. 90 tablet 3    metoprolol succinate ER (TOPROL XL) 25 MG 24 hr tablet Take 0.5 tablets (12.5 mg) by mouth at  bedtime. 60 tablet 2    OLANZapine (ZYPREXA) 2.5 MG tablet Take 1 tablet (2.5 mg) by mouth 2 times daily. 60 tablet 5    oxyCODONE (ROXICODONE) 5 MG tablet TAKE 0.5 TABLETS (2.5 MG) BY MOUTH NIGHTLY AS NEEDED FOR PAIN      pantoprazole (PROTONIX) 40 MG EC tablet Take 1 tablet (40 mg) by mouth daily 90 tablet 3    potassium chloride caroline ER (KLOR-CON M20) 20 MEQ CR tablet Take 1 tablet (20 mEq) by mouth daily. May also take 1 tablet (20 mEq) daily as needed (if taking an extra dose of Lasix for weight gain equal to or greater than 115 lbs). 100 tablet 3    senna-docusate (SENOKOT-S/PERICOLACE) 8.6-50 MG tablet Take 1 tablet by mouth 2 times daily as needed for constipation      triamcinolone (KENALOG) 0.1 % external cream Apply topically 2 times daily as needed for irritation      vitamin D3 (CHOLECALCIFEROL) 50 mcg (2000 units) tablet Take 1 tablet (50 mcg) by mouth daily       No current facility-administered medications for this visit.       ROS:   Refer to HPI    EXAM:  LMP  (LMP Unknown)      GENERAL: Appears comfortable, in no acute distress.   HEENT: Eye symmetrical, no discharge or icterus bilaterally. Mucous membranes moist and without lesions.  CV: RRR, +S1S2, no murmur, rub, or gallop. JVP mid neck at 90 degrees.   RESPIRATORY: Respirations regular, even, and unlabored. Lungs CTA throughout.   GI: Soft and non distended with normoactive bowel sounds present in all quadrants. No tenderness, rebound, guarding. No hepatomegaly.   EXTREMITIES: 2+ pitting BLE peripheral edema. 2+ bilateral pedal pulses.   NEUROLOGIC: Alert and oriented x 3. No focal deficits.   MUSCULOSKELETAL: No joint swelling or tenderness.   SKIN: No jaundice. No rashes or lesions.     Labs, reviewed with patient in clinic today:  CBC RESULTS:  Lab Results   Component Value Date    WBC 9.7 09/11/2024    RBC 4.48 09/11/2024    HGB 11.1 (L) 09/11/2024    HCT 36.7 09/11/2024    MCV 82 09/11/2024    MCH 24.8 (L) 09/11/2024    MCHC 30.2 (L)  2024    RDW 16.3 (H) 2024     2024       CMP RESULTS:  Lab Results   Component Value Date     10/02/2024    POTASSIUM 3.9 10/02/2024    POTASSIUM 4.4 2024    CHLORIDE 101 10/02/2024    CHLORIDE 104 2024    CO2 24 10/02/2024    ANIONGAP 11 10/02/2024    GLC 84 10/02/2024    GLC 97 2024     (H) 2024    BUN 18.8 10/02/2024    CR 1.03 (H) 10/02/2024    GFRESTIMATED 55 (L) 10/02/2024    AYUSH 9.6 10/02/2024    BILITOTAL 0.2 2024    ALBUMIN 3.5 2024    ALKPHOS 96 2024    ALT <5 2024    AST 13 2024        INR RESULTS:  Lab Results   Component Value Date    INR 1.06 2024       Lab Results   Component Value Date    MAG 1.6 (L) 2024     Lab Results   Component Value Date    NTBNPI 10,435 (H) 2024     Lab Results   Component Value Date    NTBNP 3,148 (H) 2024       Cardiac Diagnostics:    2024 Coronary CTA  IMPRESSION:  1.  Mild non-obstructive coronary artery disease.  2.  Total Agatston score 484 placing the patient in the 79th  percentile when compared to age and gender matched control group.  3.  Please review Radiology report for incidental noncardiac findings  that will follow separately.     FINDINGS:     CORONARY CALCIUM SCORE     Total Agatston calcium score: 483   Left main: 79  Left anterior descendin  Left circumflex: 69  Right coronary artery: 180   This places the patient in the lowest  percentile when compared to age  and gender matched control group.     CORONARY ANGIOGRAPHY     DOMINANCE: Right dominant system.   Normal coronary origins and course.     LEFT MAIN:   The left main arises normally from the left coronary cusp and has a  minimal (<25%) stenosis composed of calcified plaque.     LEFT ANTERIOR DESCENDING:   The LAD is a moderate-sized type III vessel which supplies a  diminutive D1 and a small branching D2.   Proximal LAD: Mild (25-49%) stenosis composed of mixed plaque.  The  remainder of the left anterior descending and its major diagonal  branches are patent with minimal luminal irregularities.     LEFT CIRCUMFLEX:   Proximal LCX:Minimal (<25%) stenosis composed of calcified plaque.  First marginal: Minimal (<25%) stenosis composed of calcified plaque.  The remainder of the left circumflex and its major marginal branches  are patent with minimal luminal irregularities.     RIGHT CORONARY ARTERY:   Proximal RCA: Minimal (<25%) stenosis stenosis composed of mixed  plaque.  Mid RCA: Mild (25-49%) stenosis composed of noncalcified plaque.  The remainder of the right coronary and the posterior descending  artery are patent with minimal luminal irregularities.     ADDITIONAL FINDINGS:      The proximal ascending aorta is normal in size.   There is mild calcification of the ascending and descending aorta.  There mild mitral annular calcification.  Central venous catheter is present with its tip in the distal SVC.  Normal pulmonary venous anatomy with all four pulmonary veins draining  into the left atrium.    The left atrial appendage is free of thrombus.  There is no left ventricular mass or thrombus.   Normal pericardial thickness. There is no pericardial effusion.  Please review Radiology report for incidental noncardiac findings that  will follow separately.     I have personally reviewed the examination and initial interpretation  and I agree with the findings.     5/15/2024 Echo  Interpretation Summary  Left ventricular function is decreased. The ejection fraction is 10-15%  (severely reduced). Severe diffuse hypokinesis is present.  Global right ventricular function is moderately reduced.  Moderate tricuspid insufficiency is present.  Estimated pulmonary artery systolic pressure is 54 mmHg plus right atrial  pressure.  IVC diameter >2.1 cm collapsing <50% with sniff suggests a high RA pressure  estimated at 15 mmHg or greater.  Trivial pericardial effusion is present.     This study  was compared with the study from 2/12/24: LV and RV function have  decreased significantly.    2/12/2024 Echo  Interpretation Summary  A large left pleural effusion is present.  Global and regional left ventricular function is normal with an EF of 55-60%.  Global right ventricular function is normal. The right ventricle is normal  size.  Moderate tricuspid insufficiency is present. The etiology is RA enlargement.  The estimated PA systolic pressure is 60 mmHg.  IVC diameter and respiratory changes fall into an intermediate range  suggesting an RA pressure of 8 mmHg.  There is no prior study for direct comparison.      Assessment and Plan:   Ms. Idalia Bob is an 79 yr old female with a history of CKD, HTN, GERD, COPD, CAD, and newly diagnosed NICM (LVEF 10-15% per TTE 5/2024) who presents to [facility] for evaluation and ongoing management of GDMT.     Appears grossly euvolemic. Blood pressures are low stable. Review of today's labs demonstrate normal lytes, Cr stable at 1.03. We will keep losartan at 12.5 mg daily given SBPs at home 90-100s.   Daughter previously inquired about increasing donepezil. We need to be cautious with this given known prolonged QTc. I am not opposed to increase, but should be PCP who make the increase and would recommend EKG 1 week post dose increase.     Newly diagnosed systolic heart failure secondary to NICM (LVEF 10-15% per TTE 5/2024)  HTN  Stage C, NYHA Class III  - ACEi/ARB/ARNi:  losartan 12.5 mg daily   - Afterload reduction:  n/a  - BB:  metoprolol succinate 12.5 mg HS  - Aldosterone antagonist:  deferred while other medications are prioritized, unlikely to tolerate given low BPs  - SGLT2i:  jardiance 10 mg daily   - SCD ppx:  n/a  - Fluid status:  euvolemic, lasix 20 mg daily, extra 20 mg PRN for weight gain >/= 117 lb     Nonobstructive CAD  Coronary CTA showed mild non-obstructive coronary artery disease.  Total Agatston score 484 placing the patient in the 79th percentile when  compared to age and gender matched control group.    - continue aspirin 81mg daily  - BB as above  -  by FLP 7/2024  - atorvastatin 10 mg daily      COPD  No longer on day-time oxygen, just at night.     CKD 3  Baseline ~ 1.0  Cr 1.03, stable    Prolonged QTc  She is currently on Aricpet and Zyprexa. QTc stable. We will continue these medications at current doses but do not recommend dose increased and to avoid other QT prolonging medications.       Follow up:  - Dr. Pak 1/8/2024  - CORE 3 months      A total of 40 minutes was spent on the day of the visit, which includes preparation for the visit (reviewing previous medical records, laboratories and investigations), in conjunction with the actual clinic visit with the patient, which includes obtaining a history and physical exam, creating and reviewing the care plan, patient education (and family if present), counseling, documenting clinical information in the electronic health record and care coordination.       Trena Chanel DNP, NP-C  Advance Heart Failure  11/1/2024

## 2024-11-01 NOTE — PATIENT INSTRUCTIONS
[facility]: Cardiomyopathy, Optimization, Rehabilitation, Education      Take your medicines every day, as directed    Changes/Recommendations made today:  No medication changes      Monitor Your Weight and Symptoms    Contact us if you:    Gain 2 pounds in one day or 5 pounds in one week  Feel more short of breath  Notice more leg swelling  Feel lightheadeded   Change your lifestyle    Limit Salt or Sodium:  2000 mg  Limit Fluids:  2000 mL or approximately 64 ounces  Eat a Heart Healthy Diet  Low in saturated fats  Stay Active:  Aim to move at least 150 minutes every  week         To Contact us    During Business Hours:  [phone], option # 1      After hours, weekends or holidays:   [phone], Option #4  Ask to speak to the On-Call Cardiologist. Inform them you are a CORE/heart failure patient at the [facility].     Use [employer] allows you to communicate directly with your heart team through secure messaging.  [employer] can be accessed any time on your phone, computer, or tablet.  If you need assistance, we'd be happy to help!         Keep your Heart Appointments:    CORE in 3 months

## 2024-11-18 DIAGNOSIS — J43.9 PULMONARY EMPHYSEMA, UNSPECIFIED EMPHYSEMA TYPE (H): ICD-10-CM

## 2024-11-18 RX ORDER — FLUOXETINE 40 MG/1
40 CAPSULE ORAL DAILY
Qty: 90 CAPSULE | Refills: 3 | Status: SHIPPED | OUTPATIENT
Start: 2024-11-18

## 2024-12-02 ENCOUNTER — MYC MEDICAL ADVICE (OUTPATIENT)
Dept: FAMILY MEDICINE | Facility: CLINIC | Age: 79
End: 2024-12-02
Payer: MEDICARE

## 2024-12-02 DIAGNOSIS — J43.9 PULMONARY EMPHYSEMA, UNSPECIFIED EMPHYSEMA TYPE (H): ICD-10-CM

## 2024-12-05 NOTE — PROGRESS NOTES
[facility] Cardiology   [facility]      HPI:   Ms. Idalia Bob is an 79yr old female with a history of CKD, HTN, GERD, COPD, CAD, and newly diagnosed NICM (LVEF 10-15% per TTE 5/2024) who presents to [facility] for evaluation and ongoing management of GDMT.      Her PCP advised that she present to the ED 5/15/24 due to JUANIS and elevated NTproBNP.  While in the ED, CXR showed bilateral pleural effusions and pulmonary edema, TTE showed LVEF 10-15% and moderately reduced RV function.  She started on IV lasix, and admitted to medicine.  She became hypotensive, required dobutamine --> dopamine, which was then stopped 5/21.  She ultimately diuresed to a weight of ~130#, and discharged 5/24 on losartan 12.5mg daily and jardiance 10mg daily.     Established in [facility] on 6/4/24. At that time she reported feeling okay. Starting to have some LE edema and GUTIERREZ. Weights have remained stable, ~ 125-126 lb. Lasix increased to 40/20 mg BID and referred to lymphedema. Readmitted 6/9-6/14 for recurrent pneumonia.      She has been followed by [facility] Dr. Pak.     Today, Ms. Bob presents to clinic with her daughter. She reports feeling well. Breathing is comfortable, denies acute SOB, orthopnea, PND. Denies cough, GUTIERREZ, fever, or any new or persistent sick symptoms. Weights have been relatively stable at 109-112 lb. Denies abdominal distention. Wearing compression stockings, LE edema mild.  She otherwise denies chest pain, palpitations, dizziness, falls, headaches, acute vision changes, fevers, chills, cough, sore throat, and signs of bleeding.  Appetite fair, family assisting with making sure she has low salt options in her assisted living. Getting IV iron infusion later today.     Home BP: 90s-110s/60s  HR 80s-110s  Weight: 109-111 lb    Cardiac Medications   - ASA 81 mg daily   - Lasix 20 mg daily PRN   - Losartan 12.5 mg daily   - Jardiance 10 mg daily   - Atorvastatin 10 mg daily   - KCL 20 mEq daily       PAST MEDICAL  HISTORY:  Past Medical History:   Diagnosis Date    Chronic kidney disease (CKD) 01/10/2023    Chronic obstructive pulmonary disease (H) 01/10/2023    Dementia (H) 11/28/2023    GERD (gastroesophageal reflux disease) 03/15/2023    Hypertension 11/28/2023       FAMILY HISTORY:  No family history on file.    SOCIAL HISTORY:  Social History     Socioeconomic History    Marital status:    Tobacco Use    Smoking status: Former     Current packs/day: 1.00     Average packs/day: 1 pack/day for 40.0 years (40.0 ttl pk-yrs)     Types: Cigarettes     Passive exposure: Past    Smokeless tobacco: Never   Vaping Use    Vaping status: Never Used   Substance and Sexual Activity    Alcohol use: Not Currently    Drug use: Never    Sexual activity: Not Currently     Social Determinants of Health     Financial Resource Strain: Low Risk  (1/22/2024)    Financial Resource Strain     Within the past 12 months, have you or your family members you live with been unable to get utilities (heat, electricity) when it was really needed?: No   Food Insecurity: Low Risk  (1/22/2024)    Food Insecurity     Within the past 12 months, did you worry that your food would run out before you got money to buy more?: No     Within the past 12 months, did the food you bought just not last and you didn t have money to get more?: No   Transportation Needs: Low Risk  (1/22/2024)    Transportation Needs     Within the past 12 months, has lack of transportation kept you from medical appointments, getting your medicines, non-medical meetings or appointments, work, or from getting things that you need?: No   Social Connections: Socially Integrated (11/21/2023)    Received from [facility], [facility]    Social Connections     Frequency of Communication with Friends and Family: 0   Housing Stability: Low Risk  (1/22/2024)    Housing Stability     Do you have housing? : Yes     Are you  worried about losing your housing?: No       CURRENT MEDICATIONS:  Current Outpatient Medications   Medication Sig Dispense Refill    acetaminophen (TYLENOL) 325 MG tablet Take 325-650 mg by mouth every 6 hours as needed for mild pain      albuterol (PROVENTIL) (2.5 MG/3ML) 0.083% neb solution Take 1 vial (2.5 mg) by nebulization 2 times daily as needed (COPD) 90 mL 5    aspirin 81 MG EC tablet Take 1 tablet (81 mg) by mouth daily 30 tablet 0    atorvastatin (LIPITOR) 10 MG tablet Take 1 tablet (10 mg) by mouth daily. 90 tablet 3    busPIRone (BUSPAR) 15 MG tablet Take 15 mg by mouth 2 times daily      calcium carbonate (TUMS) 500 MG chewable tablet Take 1 tablet (500 mg) by mouth 4 times daily as needed for heartburn      Calcium Polycarbophil (FIBER) 625 MG tablet Take 2 tablets by mouth daily      donepezil (ARICEPT) 10 MG tablet Take 1 tablet (10 mg) by mouth at bedtime. 90 tablet 3    empagliflozin (JARDIANCE) 10 MG TABS tablet Take 1 tablet (10 mg) by mouth daily 90 tablet 1    FLUoxetine (PROZAC) 40 MG capsule Take 1 capsule (40 mg) by mouth daily      Fluticasone-Umeclidin-Vilanterol (TRELEGY ELLIPTA) 200-62.5-25 MCG/ACT oral inhaler Inhale 1 puff into the lungs daily. 28 each 0    furosemide (LASIX) 20 MG tablet Take 1 tablet (20 mg) by mouth daily. May also take 1 tablet (20 mg) daily as needed (for weight equal to or greater then 115 lbs). 100 tablet 3    lactobacillus rhamnosus, GG, (CULTURELL) capsule Take 1 capsule by mouth daily      latanoprost (XALATAN) 0.005 % ophthalmic solution Place 1 drop into both eyes at bedtime      levothyroxine (SYNTHROID/LEVOTHROID) 50 MCG tablet TAKE 1 TABLET BY MOUTH ONCE DAILY. 90 tablet 1    losartan (COZAAR) 25 MG tablet Take 0.5 tablets (12.5 mg) by mouth daily. 45 tablet 3    magnesium oxide (MAG-OX) 400 MG tablet Take 1 tablet (400 mg) by mouth at bedtime. 90 tablet 3    OLANZapine (ZYPREXA) 2.5 MG tablet Take 1 tablet (2.5 mg) by mouth 2 times daily. 60 tablet 5     OLANZapine (ZYPREXA) 5 MG tablet One tab at bedtime,, may increase bid      pantoprazole (PROTONIX) 40 MG EC tablet Take 1 tablet (40 mg) by mouth daily 90 tablet 3    potassium chloride caroline ER (KLOR-CON M20) 20 MEQ CR tablet Take 1 tablet (20 mEq) by mouth daily. May also take 1 tablet (20 mEq) daily as needed (if taking an extra dose of Lasix for weight gain equal to or greater than 115 lbs). 100 tablet 3    senna-docusate (SENOKOT-S/PERICOLACE) 8.6-50 MG tablet Take 1 tablet by mouth 2 times daily as needed for constipation      triamcinolone (KENALOG) 0.1 % external cream Apply topically 2 times daily as needed for irritation      vitamin D3 (CHOLECALCIFEROL) 50 mcg (2000 units) tablet Take 1 tablet (50 mcg) by mouth daily       Current Facility-Administered Medications   Medication Dose Route Frequency Provider Last Rate Last Admin    denosumab (PROLIA) injection 60 mg  60 mg Subcutaneous Once            ROS:   Refer to HPI    EXAM:  /89 (BP Location: Right arm, Patient Position: Chair, Cuff Size: Adult Regular)   Pulse 97   Wt 49.7 kg (109 lb 8 oz)   LMP  (LMP Unknown)   SpO2 95%   BMI 21.39 kg/m       GENERAL: Appears comfortable, in no acute distress.   HEENT: Eye symmetrical, no discharge or icterus bilaterally. Mucous membranes moist and without lesions.  CV: RRR, +S1S2, no murmur, rub, or gallop. JVP mid neck at 90 degrees.   RESPIRATORY: Respirations regular, even, and unlabored. Lungs CTA throughout.   GI: Soft and non distended with normoactive bowel sounds present in all quadrants. No tenderness, rebound, guarding. No hepatomegaly.   EXTREMITIES: 2+ pitting BLE peripheral edema. 2+ bilateral pedal pulses.   NEUROLOGIC: Alert and oriented x 3. No focal deficits.   MUSCULOSKELETAL: No joint swelling or tenderness.   SKIN: No jaundice. No rashes or lesions.     Labs, reviewed with patient in clinic today:  CBC RESULTS:  Lab Results   Component Value Date    WBC 9.7 09/11/2024    RBC 4.48  2024    HGB 11.1 (L) 2024    HCT 36.7 2024    MCV 82 2024    MCH 24.8 (L) 2024    MCHC 30.2 (L) 2024    RDW 16.3 (H) 2024     2024       CMP RESULTS:  Lab Results   Component Value Date     2024    POTASSIUM 4.4 2024    POTASSIUM 4.4 2024    CHLORIDE 100 2024    CHLORIDE 104 2024    CO2 27 2024    ANIONGAP 10 2024    GLC 83 2024    GLC 97 2024     (H) 2024    BUN 16.6 2024    CR 1.13 (H) 2024    GFRESTIMATED 49 (L) 2024    AYUSH 10.4 2024    BILITOTAL 0.2 2024    ALBUMIN 3.5 2024    ALKPHOS 96 2024    ALT <5 2024    AST 13 2024        INR RESULTS:  Lab Results   Component Value Date    INR 1.06 2024       Lab Results   Component Value Date    MAG 1.6 (L) 2024     Lab Results   Component Value Date    NTBNPI 10,435 (H) 2024     Lab Results   Component Value Date    NTBNP 3,148 (H) 2024       Cardiac Diagnostics:    2024 Coronary CTA  IMPRESSION:  1.  Mild non-obstructive coronary artery disease.  2.  Total Agatston score 484 placing the patient in the 79th  percentile when compared to age and gender matched control group.  3.  Please review Radiology report for incidental noncardiac findings  that will follow separately.     FINDINGS:     CORONARY CALCIUM SCORE     Total Agatston calcium score: 483   Left main: 79  Left anterior descendin  Left circumflex: 69  Right coronary artery: 180   This places the patient in the lowest  percentile when compared to age  and gender matched control group.     CORONARY ANGIOGRAPHY     DOMINANCE: Right dominant system.   Normal coronary origins and course.     LEFT MAIN:   The left main arises normally from the left coronary cusp and has a  minimal (<25%) stenosis composed of calcified plaque.     LEFT ANTERIOR DESCENDING:   The LAD is a moderate-sized type III vessel  which supplies a  diminutive D1 and a small branching D2.   Proximal LAD: Mild (25-49%) stenosis composed of mixed plaque.  The remainder of the left anterior descending and its major diagonal  branches are patent with minimal luminal irregularities.     LEFT CIRCUMFLEX:   Proximal LCX:Minimal (<25%) stenosis composed of calcified plaque.  First marginal: Minimal (<25%) stenosis composed of calcified plaque.  The remainder of the left circumflex and its major marginal branches  are patent with minimal luminal irregularities.     RIGHT CORONARY ARTERY:   Proximal RCA: Minimal (<25%) stenosis stenosis composed of mixed  plaque.  Mid RCA: Mild (25-49%) stenosis composed of noncalcified plaque.  The remainder of the right coronary and the posterior descending  artery are patent with minimal luminal irregularities.     ADDITIONAL FINDINGS:      The proximal ascending aorta is normal in size.   There is mild calcification of the ascending and descending aorta.  There mild mitral annular calcification.  Central venous catheter is present with its tip in the distal SVC.  Normal pulmonary venous anatomy with all four pulmonary veins draining  into the left atrium.    The left atrial appendage is free of thrombus.  There is no left ventricular mass or thrombus.   Normal pericardial thickness. There is no pericardial effusion.  Please review Radiology report for incidental noncardiac findings that  will follow separately.     I have personally reviewed the examination and initial interpretation  and I agree with the findings.     5/15/2024 Echo  Interpretation Summary  Left ventricular function is decreased. The ejection fraction is 10-15%  (severely reduced). Severe diffuse hypokinesis is present.  Global right ventricular function is moderately reduced.  Moderate tricuspid insufficiency is present.  Estimated pulmonary artery systolic pressure is 54 mmHg plus right atrial  pressure.  IVC diameter >2.1 cm collapsing <50% with  sniff suggests a high RA pressure  estimated at 15 mmHg or greater.  Trivial pericardial effusion is present.     This study was compared with the study from 2/12/24: LV and RV function have  decreased significantly.    2/12/2024 Echo  Interpretation Summary  A large left pleural effusion is present.  Global and regional left ventricular function is normal with an EF of 55-60%.  Global right ventricular function is normal. The right ventricle is normal  size.  Moderate tricuspid insufficiency is present. The etiology is RA enlargement.  The estimated PA systolic pressure is 60 mmHg.  IVC diameter and respiratory changes fall into an intermediate range  suggesting an RA pressure of 8 mmHg.  There is no prior study for direct comparison.      Assessment and Plan:   Ms. Idalia Bob is an 79 yr old female with a history of CKD, HTN, GERD, COPD, CAD, and newly diagnosed NICM (LVEF 10-15% per TTE 5/2024) who presents to [facility] for evaluation and ongoing management of GDMT.     Appears grossly euvolemic. Blood pressures are low stable. Review of today's labs demonstrate normal lytes, Cr stable at 1.03. We will keep losartan at 12.5 mg daily give SBPs at home 90-100s. We will start metoprolol succinate 12.5 mg HS.   Daughter previously inquired about increasing donepezil. We need to be cautious with this given known prolonged QTc. I am not opposed to increase, but should be PCP who make the increase and would recommend EKG 1 week post dose increase.     Newly diagnosed systolic heart failure secondary to NICM (LVEF 10-15% per TTE 5/2024)  HTN  Stage C, NYHA Class III  - ACEi/ARB/ARNi:  losartan 12.5 mg daily   - Afterload reduction:  n/a  - BB:  start metoprolol succinate 12.5 mg HS  - Aldosterone antagonist:  deferred while other medications are prioritized, unlikely to tolerate given low BPs  - SGLT2i:  jardiance 10 mg daily   - SCD ppx:  n/a  - Fluid status:  euvolemic, lasix 20 mg daily, extra 20 mg PRN for weight  Leg edema gain >/= 115 lb     Nonobstructive CAD  Coronary CTA showed mild non-obstructive coronary artery disease.  Total Agatston score 484 placing the patient in the 79th percentile when compared to age and gender matched control group.    - continue aspirin 81mg daily  - BB as above  -  by FLP 7/2024  - atorvastatin 10 mg daily      COPD  No longer on day-time oxygen, just at night.     CKD 3  Baseline ~ 1.0  Cr 1.03, stable    Prolonged QTc  She is currently on Aricpet and Zyprexa. QTc stable. We will continue these medications at current doses but do not recommend dose increased and to avoid other QT prolonging medications.       Follow up:  - [facility] 11/1/2024  - Dr. Pak 1/8/2024      A total of 40 minutes was spent on the day of the visit, which includes preparation for the visit (reviewing previous medical records, laboratories and investigations), in conjunction with the actual clinic visit with the patient, which includes obtaining a history and physical exam, creating and reviewing the care plan, patient education (and family if present), counseling, documenting clinical information in the electronic health record and care coordination.       Trena Chanel, BINU, NP-C  Advance Heart Failure  10/2/24     BPH (benign prostatic hyperplasia)

## 2025-01-07 ENCOUNTER — MYC REFILL (OUTPATIENT)
Dept: FAMILY MEDICINE | Facility: CLINIC | Age: 80
End: 2025-01-07
Payer: MEDICARE

## 2025-01-07 DIAGNOSIS — F41.1 GAD (GENERALIZED ANXIETY DISORDER): ICD-10-CM

## 2025-01-07 RX ORDER — BUSPIRONE HYDROCHLORIDE 15 MG/1
15 TABLET ORAL 2 TIMES DAILY
Qty: 60 TABLET | Refills: 3 | Status: SHIPPED | OUTPATIENT
Start: 2025-01-07

## 2025-01-23 DIAGNOSIS — I50.22 CHRONIC SYSTOLIC HEART FAILURE (H): Primary | ICD-10-CM

## 2025-02-07 ENCOUNTER — LAB (OUTPATIENT)
Dept: LAB | Facility: CLINIC | Age: 80
End: 2025-02-07
Attending: NURSE PRACTITIONER
Payer: MEDICARE

## 2025-02-07 DIAGNOSIS — D50.8 OTHER IRON DEFICIENCY ANEMIA: ICD-10-CM

## 2025-02-07 DIAGNOSIS — I50.22 CHRONIC SYSTOLIC HEART FAILURE (H): ICD-10-CM

## 2025-02-07 DIAGNOSIS — I50.9 ACUTE ON CHRONIC CONGESTIVE HEART FAILURE, UNSPECIFIED HEART FAILURE TYPE (H): ICD-10-CM

## 2025-02-07 LAB
ALBUMIN SERPL BCG-MCNC: 3.8 G/DL (ref 3.5–5.2)
ALP SERPL-CCNC: 95 U/L (ref 40–150)
ALT SERPL W P-5'-P-CCNC: <5 U/L (ref 0–50)
ANION GAP SERPL CALCULATED.3IONS-SCNC: 14 MMOL/L (ref 7–15)
AST SERPL W P-5'-P-CCNC: 12 U/L (ref 0–45)
BILIRUB SERPL-MCNC: 0.4 MG/DL
BUN SERPL-MCNC: 17.2 MG/DL (ref 8–23)
CALCIUM SERPL-MCNC: 9.4 MG/DL (ref 8.8–10.4)
CHLORIDE SERPL-SCNC: 97 MMOL/L (ref 98–107)
CREAT SERPL-MCNC: 1.26 MG/DL (ref 0.51–0.95)
EGFRCR SERPLBLD CKD-EPI 2021: 43 ML/MIN/1.73M2
ERYTHROCYTE [DISTWIDTH] IN BLOOD BY AUTOMATED COUNT: 15.4 % (ref 10–15)
FERRITIN SERPL-MCNC: 1323 NG/ML (ref 11–328)
GLUCOSE SERPL-MCNC: 129 MG/DL (ref 70–99)
HCO3 SERPL-SCNC: 24 MMOL/L (ref 22–29)
HCT VFR BLD AUTO: 39.9 % (ref 35–47)
HGB BLD-MCNC: 12.6 G/DL (ref 11.7–15.7)
IRON BINDING CAPACITY (ROCHE): 163 UG/DL (ref 240–430)
IRON SATN MFR SERPL: 14 % (ref 15–46)
IRON SERPL-MCNC: 23 UG/DL (ref 37–145)
MCH RBC QN AUTO: 27.6 PG (ref 26.5–33)
MCHC RBC AUTO-ENTMCNC: 31.6 G/DL (ref 31.5–36.5)
MCV RBC AUTO: 87 FL (ref 78–100)
NT-PROBNP SERPL-MCNC: 1996 PG/ML (ref 0–1800)
PLATELET # BLD AUTO: 353 10E3/UL (ref 150–450)
POTASSIUM SERPL-SCNC: 4.3 MMOL/L (ref 3.4–5.3)
PROT SERPL-MCNC: 7.3 G/DL (ref 6.4–8.3)
RBC # BLD AUTO: 4.57 10E6/UL (ref 3.8–5.2)
SODIUM SERPL-SCNC: 135 MMOL/L (ref 135–145)
WBC # BLD AUTO: 16 10E3/UL (ref 4–11)

## 2025-02-07 PROCEDURE — 83550 IRON BINDING TEST: CPT | Performed by: PATHOLOGY

## 2025-02-07 PROCEDURE — 36415 COLL VENOUS BLD VENIPUNCTURE: CPT | Performed by: PATHOLOGY

## 2025-02-07 PROCEDURE — 85027 COMPLETE CBC AUTOMATED: CPT | Performed by: PATHOLOGY

## 2025-02-07 PROCEDURE — 80053 COMPREHEN METABOLIC PANEL: CPT | Performed by: PATHOLOGY

## 2025-02-07 PROCEDURE — 83540 ASSAY OF IRON: CPT | Performed by: PATHOLOGY

## 2025-02-07 PROCEDURE — 83880 ASSAY OF NATRIURETIC PEPTIDE: CPT | Performed by: PATHOLOGY

## 2025-02-07 PROCEDURE — 82728 ASSAY OF FERRITIN: CPT | Performed by: PATHOLOGY

## 2025-02-08 ENCOUNTER — OFFICE VISIT (OUTPATIENT)
Dept: URGENT CARE | Facility: URGENT CARE | Age: 80
End: 2025-02-08
Payer: MEDICARE

## 2025-02-08 VITALS
TEMPERATURE: 97.9 F | OXYGEN SATURATION: 91 % | BODY MASS INDEX: 24.02 KG/M2 | HEART RATE: 85 BPM | SYSTOLIC BLOOD PRESSURE: 112 MMHG | DIASTOLIC BLOOD PRESSURE: 67 MMHG | WEIGHT: 123 LBS | RESPIRATION RATE: 18 BRPM

## 2025-02-08 DIAGNOSIS — R79.9 ABNORMAL FINDING OF BLOOD CHEMISTRY, UNSPECIFIED: ICD-10-CM

## 2025-02-08 DIAGNOSIS — N18.30 STAGE 3 CHRONIC KIDNEY DISEASE, UNSPECIFIED WHETHER STAGE 3A OR 3B CKD (H): ICD-10-CM

## 2025-02-08 DIAGNOSIS — D72.829 LEUKOCYTOSIS, UNSPECIFIED TYPE: ICD-10-CM

## 2025-02-08 DIAGNOSIS — N30.00 ACUTE CYSTITIS WITHOUT HEMATURIA: Primary | ICD-10-CM

## 2025-02-08 DIAGNOSIS — R81 GLYCOSURIA: ICD-10-CM

## 2025-02-08 DIAGNOSIS — R30.0 DYSURIA: ICD-10-CM

## 2025-02-08 LAB
ALBUMIN UR-MCNC: 30 MG/DL
APPEARANCE UR: ABNORMAL
BACTERIA #/AREA URNS HPF: ABNORMAL /HPF
BASOPHILS # BLD AUTO: 0 10E3/UL (ref 0–0.2)
BASOPHILS NFR BLD AUTO: 0 %
BILIRUB UR QL STRIP: NEGATIVE
COLOR UR AUTO: YELLOW
EOSINOPHIL # BLD AUTO: 0.2 10E3/UL (ref 0–0.7)
EOSINOPHIL NFR BLD AUTO: 1 %
ERYTHROCYTE [DISTWIDTH] IN BLOOD BY AUTOMATED COUNT: 15.3 % (ref 10–15)
EST. AVERAGE GLUCOSE BLD GHB EST-MCNC: 123 MG/DL
GLUCOSE BLD-MCNC: 127 MG/DL (ref 60–99)
GLUCOSE UR STRIP-MCNC: >=1000 MG/DL
HBA1C MFR BLD: 5.9 % (ref 0–5.6)
HCT VFR BLD AUTO: 39 % (ref 35–47)
HGB BLD-MCNC: 12.2 G/DL (ref 11.7–15.7)
HGB UR QL STRIP: NEGATIVE
IMM GRANULOCYTES # BLD: 0.7 10E3/UL
IMM GRANULOCYTES NFR BLD: 4 %
KETONES UR STRIP-MCNC: NEGATIVE MG/DL
LEUKOCYTE ESTERASE UR QL STRIP: ABNORMAL
LYMPHOCYTES # BLD AUTO: 1.1 10E3/UL (ref 0.8–5.3)
LYMPHOCYTES NFR BLD AUTO: 8 %
MCH RBC QN AUTO: 27.1 PG (ref 26.5–33)
MCHC RBC AUTO-ENTMCNC: 31.3 G/DL (ref 31.5–36.5)
MCV RBC AUTO: 87 FL (ref 78–100)
MONOCYTES # BLD AUTO: 1.1 10E3/UL (ref 0–1.3)
MONOCYTES NFR BLD AUTO: 7 %
MUCOUS THREADS #/AREA URNS LPF: PRESENT /LPF
NEUTROPHILS # BLD AUTO: 11.7 10E3/UL (ref 1.6–8.3)
NEUTROPHILS NFR BLD AUTO: 79 %
NITRATE UR QL: NEGATIVE
PH UR STRIP: 5.5 [PH] (ref 5–7)
PLATELET # BLD AUTO: 373 10E3/UL (ref 150–450)
RBC # BLD AUTO: 4.5 10E6/UL (ref 3.8–5.2)
RBC #/AREA URNS AUTO: ABNORMAL /HPF
SP GR UR STRIP: 1.02 (ref 1–1.03)
SQUAMOUS #/AREA URNS AUTO: ABNORMAL /LPF
UROBILINOGEN UR STRIP-ACNC: 0.2 E.U./DL
WBC # BLD AUTO: 14.7 10E3/UL (ref 4–11)
WBC #/AREA URNS AUTO: ABNORMAL /HPF
WBC CLUMPS #/AREA URNS HPF: PRESENT /HPF

## 2025-02-08 PROCEDURE — 36415 COLL VENOUS BLD VENIPUNCTURE: CPT | Performed by: PHYSICIAN ASSISTANT

## 2025-02-08 PROCEDURE — 82947 ASSAY GLUCOSE BLOOD QUANT: CPT | Performed by: PHYSICIAN ASSISTANT

## 2025-02-08 PROCEDURE — 81001 URINALYSIS AUTO W/SCOPE: CPT | Performed by: PHYSICIAN ASSISTANT

## 2025-02-08 PROCEDURE — 99215 OFFICE O/P EST HI 40 MIN: CPT | Performed by: PHYSICIAN ASSISTANT

## 2025-02-08 PROCEDURE — 85025 COMPLETE CBC W/AUTO DIFF WBC: CPT | Performed by: PHYSICIAN ASSISTANT

## 2025-02-08 PROCEDURE — 83036 HEMOGLOBIN GLYCOSYLATED A1C: CPT | Mod: GZ | Performed by: PHYSICIAN ASSISTANT

## 2025-02-08 RX ORDER — CEPHALEXIN 500 MG/1
500 CAPSULE ORAL EVERY 12 HOURS
Qty: 14 CAPSULE | Refills: 0 | Status: SHIPPED | OUTPATIENT
Start: 2025-02-08 | End: 2025-02-15

## 2025-02-08 ASSESSMENT — ENCOUNTER SYMPTOMS
HEADACHES: 0
RHINORRHEA: 0
FATIGUE: 0
VOMITING: 0
POLYDIPSIA: 0
PALPITATIONS: 0
SHORTNESS OF BREATH: 0
FEVER: 0
NAUSEA: 0
MUSCULOSKELETAL NEGATIVE: 1
COUGH: 0
ADENOPATHY: 0
RESPIRATORY NEGATIVE: 1
CHILLS: 0
NEUROLOGICAL NEGATIVE: 1
FLANK PAIN: 0
CONSTITUTIONAL NEGATIVE: 1
MYALGIAS: 0
SORE THROAT: 0
DIZZINESS: 0
GASTROINTESTINAL NEGATIVE: 1
HEMATURIA: 0
DYSURIA: 0
ACTIVITY CHANGE: 0
CARDIOVASCULAR NEGATIVE: 1
DIARRHEA: 0
NECK STIFFNESS: 0
NECK PAIN: 0
LIGHT-HEADEDNESS: 0
ABDOMINAL PAIN: 0
ENDOCRINE NEGATIVE: 1
WEAKNESS: 0
FREQUENCY: 0

## 2025-02-08 NOTE — PROGRESS NOTES
Chief Complaint:    Chief Complaint   Patient presents with    Urgent Care    UTI     Patient received [employer] message stating she could possibly have a UTI from blood work to come in and get checked      ASSESSMENT  1. Acute cystitis without hematuria    2. Dysuria    3. Stage 3 chronic kidney disease, unspecified whether stage 3a or 3b CKD (H)    4. Glycosuria    5. Abnormal finding of blood chemistry, unspecified    6. Leukocytosis, unspecified type         PLAN    Urinalysis discussed with patient  We will call with culture results if resistant.  Estimated Creatinine Clearance: 27.4 mL/min (A) (based on SCr of 1.26 mg/dL (H)).  Rx for keflex today.  Dosing is Q 12 hours due to CKD per up to date.  With glycosuria did check A1C sand this was fine at 5.9  Blood glucose was 127 in clinic today.  CBC shows improving leukocytosis  Follow up with PCP in 1 week if symptoms are not improving.  Worrisome symptoms discussed with instructions to go to the ED.  Patient verbalized understanding and agreed with this plan.    48 minutes was spent by me on the date of the encounter doing chart review, history and exam, documentation and further activities per the note.      Labs:     Results for orders placed or performed in visit on 02/08/25   UA Macroscopic with reflex to Microscopic and Culture     Status: Abnormal    Specimen: Urine, Midstream   Result Value Ref Range    Color Urine Yellow Colorless, Straw, Light Yellow, Yellow    Appearance Urine Slightly Cloudy (A) Clear    Glucose Urine >=1000 (A) Negative mg/dL    Bilirubin Urine Negative Negative    Ketones Urine Negative Negative mg/dL    Specific Gravity Urine 1.020 1.003 - 1.035    Blood Urine Negative Negative    pH Urine 5.5 5.0 - 7.0    Protein Albumin Urine 30 (A) Negative mg/dL    Urobilinogen Urine 0.2 0.2, 1.0 E.U./dL    Nitrite Urine Negative Negative    Leukocyte Esterase Urine Small (A) Negative   UA Microscopic with Reflex to Culture     Status: Abnormal    Result Value Ref Range    Bacteria Urine Moderate (A) None Seen /HPF    RBC Urine 0-2 0-2 /HPF /HPF    WBC Urine 5-10 (A) 0-5 /HPF /HPF    Squamous Epithelials Urine Few (A) None Seen /LPF    WBC Clumps Urine Present (A) None Seen /HPF    Mucus Urine Present (A) None Seen /LPF    Narrative    Urine Culture not indicated   Glucose, whole blood     Status: Abnormal   Result Value Ref Range    Glucose Whole Blood 127 (H) 60 - 99 mg/dL   CBC with platelets and differential     Status: Abnormal   Result Value Ref Range    WBC Count 14.7 (H) 4.0 - 11.0 10e3/uL    RBC Count 4.50 3.80 - 5.20 10e6/uL    Hemoglobin 12.2 11.7 - 15.7 g/dL    Hematocrit 39.0 35.0 - 47.0 %    MCV 87 78 - 100 fL    MCH 27.1 26.5 - 33.0 pg    MCHC 31.3 (L) 31.5 - 36.5 g/dL    RDW 15.3 (H) 10.0 - 15.0 %    Platelet Count 373 150 - 450 10e3/uL    % Neutrophils 79 %    % Lymphocytes 8 %    % Monocytes 7 %    % Eosinophils 1 %    % Basophils 0 %    % Immature Granulocytes 4 %    Absolute Neutrophils 11.7 (H) 1.6 - 8.3 10e3/uL    Absolute Lymphocytes 1.1 0.8 - 5.3 10e3/uL    Absolute Monocytes 1.1 0.0 - 1.3 10e3/uL    Absolute Eosinophils 0.2 0.0 - 0.7 10e3/uL    Absolute Basophils 0.0 0.0 - 0.2 10e3/uL    Absolute Immature Granulocytes 0.7 (H) <=0.4 10e3/uL    Narrative    Result confirmed by repeat test    Hemoglobin A1c     Status: Abnormal   Result Value Ref Range    Estimated Average Glucose 123 (H) <117 mg/dL    Hemoglobin A1C 5.9 (H) 0.0 - 5.6 %   CBC with platelets and differential     Status: Abnormal    Narrative    The following orders were created for panel order CBC with platelets and differential.  Procedure                               Abnormality         Status                     ---------                               -----------         ------                     CBC with platelets and d...[[id number]]  Abnormal            Final result                 Please view results for these tests on the individual orders.       Problem  history    Patient Active Problem List   Diagnosis    Arthritis of knee    At risk for falls    Attention disturbance    Chronic obstructive pulmonary disease (H)    Dementia (H)    Frail elderly    GERD (gastroesophageal reflux disease)    Hypertension    Anemia, iron deficiency    AMRIT (generalized anxiety disorder)    Hypokalemia    Community acquired pneumonia of left upper lobe of lung    Stage 3 chronic kidney disease, unspecified whether stage 3a or 3b CKD (H)    Acute on chronic congestive heart failure, unspecified heart failure type (H)    Acute kidney failure, unspecified (H)    Fever, unspecified fever cause    Pneumonia of right lower lobe due to infectious organism    Physical deconditioning    Continuous opioid dependence (H)    Chronic systolic heart failure (H)    Anorexia symptom    Age-related osteoporosis without current pathological fracture       Current Meds    Current Outpatient Medications:     acetaminophen (TYLENOL) 325 MG tablet, Take 325-650 mg by mouth every 6 hours as needed for mild pain, Disp: , Rfl:     albuterol (PROVENTIL) (2.5 MG/3ML) 0.083% neb solution, Take 1 vial (2.5 mg) by nebulization 2 times daily as needed (COPD), Disp: 90 mL, Rfl: 5    aspirin 81 MG EC tablet, Take 1 tablet (81 mg) by mouth daily, Disp: 30 tablet, Rfl: 0    atorvastatin (LIPITOR) 10 MG tablet, Take 1 tablet (10 mg) by mouth daily., Disp: 90 tablet, Rfl: 3    busPIRone (BUSPAR) 15 MG tablet, Take 1 tablet (15 mg) by mouth 2 times daily., Disp: 60 tablet, Rfl: 3    calcium carbonate (TUMS) 500 MG chewable tablet, Take 1 tablet (500 mg) by mouth 4 times daily as needed for heartburn, Disp: , Rfl:     Calcium Polycarbophil (FIBER) 625 MG tablet, Take 2 tablets by mouth daily, Disp: , Rfl:     cephALEXin (KEFLEX) 500 MG capsule, Take 1 capsule (500 mg) by mouth every 12 hours for 7 days., Disp: 14 capsule, Rfl: 0    donepezil (ARICEPT) 10 MG tablet, Take 1 tablet (10 mg) by mouth at bedtime., Disp: 90 tablet,  Rfl: 3    empagliflozin (JARDIANCE) 10 MG TABS tablet, Take 1 tablet (10 mg) by mouth daily, Disp: 90 tablet, Rfl: 1    FLUoxetine (PROZAC) 40 MG capsule, Take 1 capsule (40 mg) by mouth daily., Disp: 90 capsule, Rfl: 3    Fluticasone-Umeclidin-Vilanterol (TRELEGY ELLIPTA) 200-62.5-25 MCG/ACT oral inhaler, Inhale 1 puff into the lungs daily., Disp: 28 each, Rfl: 11    furosemide (LASIX) 20 MG tablet, Take 1 tablet (20 mg) by mouth daily as needed (take for weight > or equal to 130 lb)., Disp: 100 tablet, Rfl: 3    lactobacillus rhamnosus, GG, (CULTURELL) capsule, Take 1 capsule by mouth daily, Disp: , Rfl:     latanoprost (XALATAN) 0.005 % ophthalmic solution, Place 1 drop into both eyes at bedtime, Disp: , Rfl:     levothyroxine (SYNTHROID/LEVOTHROID) 50 MCG tablet, TAKE 1 TABLET BY MOUTH ONCE DAILY., Disp: 90 tablet, Rfl: 1    losartan (COZAAR) 25 MG tablet, Take 0.5 tablets (12.5 mg) by mouth daily., Disp: 45 tablet, Rfl: 3    magnesium oxide (MAG-OX) 400 MG tablet, Take 1 tablet (400 mg) by mouth at bedtime., Disp: 90 tablet, Rfl: 3    metoprolol succinate ER (TOPROL XL) 25 MG 24 hr tablet, Take 0.5 tablets (12.5 mg) by mouth at bedtime., Disp: 60 tablet, Rfl: 2    OLANZapine (ZYPREXA) 2.5 MG tablet, Take 1 tablet (2.5 mg) by mouth 2 times daily., Disp: 60 tablet, Rfl: 5    oxyCODONE (ROXICODONE) 5 MG tablet, TAKE 0.5 TABLETS (2.5 MG) BY MOUTH NIGHTLY AS NEEDED FOR PAIN, Disp: , Rfl:     pantoprazole (PROTONIX) 40 MG EC tablet, Take 1 tablet (40 mg) by mouth daily, Disp: 90 tablet, Rfl: 3    potassium chloride caroline ER (KLOR-CON M20) 20 MEQ CR tablet, Take 1 tablet (20 mEq) by mouth daily as needed (take with dose of lasix)., Disp: 100 tablet, Rfl: 3    senna-docusate (SENOKOT-S/PERICOLACE) 8.6-50 MG tablet, Take 1 tablet by mouth 2 times daily as needed for constipation, Disp: , Rfl:     triamcinolone (KENALOG) 0.1 % external cream, Apply topically 2 times daily as needed for irritation, Disp: , Rfl:     vitamin  D3 (CHOLECALCIFEROL) 50 mcg (2000 units) tablet, Take 1 tablet (50 mcg) by mouth daily, Disp: , Rfl:     Allergies  Allergies   Allergen Reactions    Penicillins Itching     Has tolerated ceftriaxone, piperacillin, and amoxicillin       SUBJECTIVE    HPI:  Idalia Bob is a 80 year old female who is here for UTI testing.  Patient recently was seen and advised that her white cell count was high and was advised to go to a [facility] lab for urine testing. She denies any dysuria, frequency, urgency, or flank pain.      ROS:      Review of Systems   Constitutional: Negative.  Negative for activity change, chills, fatigue and fever.   HENT:  Negative for congestion, ear pain, rhinorrhea and sore throat.    Respiratory: Negative.  Negative for cough and shortness of breath.    Cardiovascular: Negative.  Negative for chest pain and palpitations.   Gastrointestinal: Negative.  Negative for abdominal pain, diarrhea, nausea and vomiting.   Endocrine: Negative.  Negative for polydipsia and polyuria.   Genitourinary:  Negative for dysuria, flank pain, frequency, hematuria, pelvic pain, urgency, vaginal discharge and vaginal pain.   Musculoskeletal: Negative.  Negative for myalgias, neck pain and neck stiffness.   Allergic/Immunologic: Negative for immunocompromised state.   Neurological: Negative.  Negative for dizziness, weakness, light-headedness and headaches.   Hematological:  Negative for adenopathy.       Family History   No family history on file.     Social History  Social History     Socioeconomic History    Marital status:      Spouse name: Not on file    Number of children: Not on file    Years of education: Not on file    Highest education level: Not on file   Occupational History    Not on file   Tobacco Use    Smoking status: Former     Current packs/day: 1.00     Average packs/day: 1 pack/day for 40.0 years (40.0 ttl pk-yrs)     Types: Cigarettes     Passive exposure: Past    Smokeless tobacco: Never    Vaping Use    Vaping status: Never Used   Substance and Sexual Activity    Alcohol use: Not Currently    Drug use: Never    Sexual activity: Not Currently   Other Topics Concern    Not on file   Social History Narrative    Not on file     Social Drivers of Health     Financial Resource Strain: Low Risk  (9/29/2024)    Financial Resource Strain     Within the past 12 months, have you or your family members you live with been unable to get utilities (heat, electricity) when it was really needed?: No   Food Insecurity: Low Risk  (9/29/2024)    Food Insecurity     Within the past 12 months, did you worry that your food would run out before you got money to buy more?: No     Within the past 12 months, did the food you bought just not last and you didn t have money to get more?: No   Transportation Needs: Low Risk  (9/29/2024)    Transportation Needs     Within the past 12 months, has lack of transportation kept you from medical appointments, getting your medicines, non-medical meetings or appointments, work, or from getting things that you need?: No   Physical Activity: Inactive (9/29/2024)    Exercise Vital Sign     Days of Exercise per Week: 0 days     Minutes of Exercise per Session: 0 min   Stress: No Stress Concern Present (9/29/2024)    [employer] - Occupational Stress Questionnaire     Feeling of Stress : Only a little   Social Connections: Unknown (9/29/2024)    Social Connection and Isolation Panel [NHANES]     Frequency of Communication with Friends and Family: Not on file     Frequency of Social Gatherings with Friends and Family: More than three times a week     Attends [employer] Services: Not on file     Active Member of Clubs or Organizations: Not on file     Attends Club or Organization Meetings: Not on file     Marital Status: Not on file   Interpersonal Safety: Not on file   Housing Stability: High Risk (9/29/2024)    Housing Stability     Do you have housing? : No     Are  you worried about losing your housing?: No           OBJECTIVE     Vital signs noted and reviewed by Salbador Rivera PA-C  /67   Pulse 85   Temp 97.9  F (36.6  C) (Tympanic)   Resp 18   Wt 55.8 kg (123 lb)   LMP  (LMP Unknown)   SpO2 (!) 91%   BMI 24.02 kg/m       Physical Exam  Vitals and nursing note reviewed.   Constitutional:       General: She is not in acute distress.     Appearance: Normal appearance. She is well-developed. She is not ill-appearing, toxic-appearing or diaphoretic.   HENT:      Head: Normocephalic and atraumatic.      Right Ear: Tympanic membrane and external ear normal.      Left Ear: Tympanic membrane and external ear normal.   Eyes:      Pupils: Pupils are equal, round, and reactive to light.   Cardiovascular:      Rate and Rhythm: Normal rate and regular rhythm.      Heart sounds: Normal heart sounds. No murmur heard.     No friction rub. No gallop.   Pulmonary:      Effort: Pulmonary effort is normal. No respiratory distress.      Breath sounds: Normal breath sounds. No wheezing or rales.   Chest:      Chest wall: No tenderness.   Abdominal:      General: Bowel sounds are normal. There is no distension.      Palpations: Abdomen is soft. Abdomen is not rigid. There is no mass.      Tenderness: There is no abdominal tenderness. There is no guarding or rebound. Negative signs include Estrada's sign and McBurney's sign.   Musculoskeletal:      Cervical back: Normal range of motion and neck supple.   Lymphadenopathy:      Cervical: No cervical adenopathy.   Skin:     General: Skin is warm and dry.   Neurological:      Mental Status: She is alert and oriented to person, place, and time.      Cranial Nerves: No cranial nerve deficit.      Deep Tendon Reflexes: Reflexes are normal and symmetric.   Psychiatric:         Behavior: Behavior normal. Behavior is cooperative.         Thought Content: Thought content normal.         Judgment: Judgment normal.             Salbador Rivera  KEITH  2/8/2025, 1:43 PM

## 2025-02-09 ENCOUNTER — PATIENT OUTREACH (OUTPATIENT)
Dept: CARE COORDINATION | Facility: CLINIC | Age: 80
End: 2025-02-09
Payer: MEDICARE

## 2025-02-09 DIAGNOSIS — D50.9 ANEMIA, IRON DEFICIENCY: Primary | ICD-10-CM

## 2025-02-09 DIAGNOSIS — I50.9 ACUTE ON CHRONIC CONGESTIVE HEART FAILURE, UNSPECIFIED HEART FAILURE TYPE (H): ICD-10-CM

## 2025-02-09 NOTE — PROGRESS NOTES
Anemia Management Note - Enrollment    SUBJECTIVE/OBJECTIVE:    Referred by Trena Chanel NP  on 2025  Primary Diagnosis: Iron Def Anemia D50.9  Secondary Diagnosis: Heart Failure I50.9  Hgb goal range: 9-10    Epo/Darbo: NA. Hgb >10.0    Iron regimen: NA. Elevated Ferritin levels.  Hgb <12.0.     Labs : 2026- Epic and Letters  RX/TX plans : NA    *[facility].  Fax [phone]  -Labs are drawn on .     Recent YOANA use, transfusion, IV iron: Venofer 2024  Hx of HTN, Heart Failure    Contact: OK to speak with Corie Urban (daughter) and Marianna Bob (daughter) regarding Scheduling and Medical Information per consent to communicate dated 2024        Latest Ref Rng & Units 2024   Anemia   Hemoglobin 11.7 - 15.7 g/dL 10.6  9.3  9.1  10.0  11.1  12.6  12.2    TSAT 15 - 46 %     11  14     Ferritin 11 - 328 ng/mL      1,323       BP Readings from Last 3 Encounters:   25 112/67   25 99/69   24 125/76     Wt Readings from Last 2 Encounters:   25 55.8 kg (123 lb)   25 56.5 kg (124 lb 9.6 oz)     Current Outpatient Medications   Medication Sig Dispense Refill    acetaminophen (TYLENOL) 325 MG tablet Take 325-650 mg by mouth every 6 hours as needed for mild pain      albuterol (PROVENTIL) (2.5 MG/3ML) 0.083% neb solution Take 1 vial (2.5 mg) by nebulization 2 times daily as needed (COPD) 90 mL 5    aspirin 81 MG EC tablet Take 1 tablet (81 mg) by mouth daily 30 tablet 0    atorvastatin (LIPITOR) 10 MG tablet Take 1 tablet (10 mg) by mouth daily. 90 tablet 3    busPIRone (BUSPAR) 15 MG tablet Take 1 tablet (15 mg) by mouth 2 times daily. 60 tablet 3    calcium carbonate (TUMS) 500 MG chewable tablet Take 1 tablet (500 mg) by mouth 4 times daily as needed for heartburn      Calcium Polycarbophil (FIBER) 625 MG tablet Take 2 tablets by mouth daily      cephALEXin (KEFLEX) 500 MG  capsule Take 1 capsule (500 mg) by mouth every 12 hours for 7 days. 14 capsule 0    donepezil (ARICEPT) 10 MG tablet Take 1 tablet (10 mg) by mouth at bedtime. 90 tablet 3    empagliflozin (JARDIANCE) 10 MG TABS tablet Take 1 tablet (10 mg) by mouth daily 90 tablet 1    FLUoxetine (PROZAC) 40 MG capsule Take 1 capsule (40 mg) by mouth daily. 90 capsule 3    Fluticasone-Umeclidin-Vilanterol (TRELEGY ELLIPTA) 200-62.5-25 MCG/ACT oral inhaler Inhale 1 puff into the lungs daily. 28 each 11    furosemide (LASIX) 20 MG tablet Take 1 tablet (20 mg) by mouth daily as needed (take for weight > or equal to 130 lb). 100 tablet 3    lactobacillus rhamnosus, GG, (CULTURELL) capsule Take 1 capsule by mouth daily      latanoprost (XALATAN) 0.005 % ophthalmic solution Place 1 drop into both eyes at bedtime      levothyroxine (SYNTHROID/LEVOTHROID) 50 MCG tablet TAKE 1 TABLET BY MOUTH ONCE DAILY. 90 tablet 1    losartan (COZAAR) 25 MG tablet Take 0.5 tablets (12.5 mg) by mouth daily. 45 tablet 3    magnesium oxide (MAG-OX) 400 MG tablet Take 1 tablet (400 mg) by mouth at bedtime. 90 tablet 3    metoprolol succinate ER (TOPROL XL) 25 MG 24 hr tablet Take 0.5 tablets (12.5 mg) by mouth at bedtime. 60 tablet 2    OLANZapine (ZYPREXA) 2.5 MG tablet Take 1 tablet (2.5 mg) by mouth 2 times daily. 60 tablet 5    oxyCODONE (ROXICODONE) 5 MG tablet TAKE 0.5 TABLETS (2.5 MG) BY MOUTH NIGHTLY AS NEEDED FOR PAIN      pantoprazole (PROTONIX) 40 MG EC tablet Take 1 tablet (40 mg) by mouth daily 90 tablet 3    potassium chloride caroline ER (KLOR-CON M20) 20 MEQ CR tablet Take 1 tablet (20 mEq) by mouth daily as needed (take with dose of lasix). 100 tablet 3    senna-docusate (SENOKOT-S/PERICOLACE) 8.6-50 MG tablet Take 1 tablet by mouth 2 times daily as needed for constipation      triamcinolone (KENALOG) 0.1 % external cream Apply topically 2 times daily as needed for irritation      vitamin D3 (CHOLECALCIFEROL) 50 mcg (2000 units) tablet Take 1  tablet (50 mcg) by mouth daily         ASSESSMENT:  Hgb At goal   Ferritin: Elevated (>1000ng/mL)  TSat: not at goal (>30%) but ferritin >1000ng/mL.  PO iron not indicated at this time per anemia protocol.    Iron regimen recommended: NA.  Elevated Ferritin.  Hgb >12.0  Recommended YOANA regimen: NA  Blood Pressure: Hx of HTN.    Goals Addressed                      This Visit's Progress      Medical (pt-stated)         Goal Statement: I will actively work with my care teams to manage my anemia.  Date Goal set: 2/13/2025  Barriers: transportation  Strengths: support, coping, motivation, health awareness, and involvement with care team  Date to Achieve By: ongoing  Patient expressed understanding of goal: Yes   Action steps to achieve this goal:  I will take my anemia medication as prescribed.  I will receive my anemia injection(s)/infusion(s) as prescribed.  I will complete lab checks as recommended by my care team.  I will reach out to my nurse care coordinator with any follow-up questions.  Goal Statement: I will improve management of my anemia so I can avoid blood transfusions.  Continue to have good energy levels.  Date Goal set: 2/13/2025  Barriers: transportation  Strengths: support, coping, health awareness, and involvement with care team  Date to Achieve By: ongoing  Patient expressed understanding of goal: Yes   Action steps to achieve this goal:  I will discuss goals of treatment with my care team.  I will identify ways to preserve energy.  I will work with my care team to manage treatment schedule based on my planned activities.                PLAN:  1. Spoke with Barbara (daughter) for enrollment in Anemia Management Service.  2. Discussed: anemia overview, monitoring service, and goal hemoglobin range and rationale and risks of YOANA blood clots, stroke, and increase in blood pressure  3. Dose location: in clinic TBD  4. Labs: Assisted Living Facility.   5. Pharmacy: NA    New Anemia Referral.  Does not meet  criteria for YOANA or IV Iron at this time.  Will call [facility] this week to review Anemia Services.  Labs are due again in one month.   2/13/25; Spoke with Barbara.  Explained that based on current labs, Idalia does not qualify for IV Iron.  Anemia Services will monitor labs monthly.  If levels drop, we will re evaluate the need for Iron Infusions.  Lab orders faxed to [facility]'s assisted living facility. Barbara agreed with this plan.  Labs will be drawn again on 2/20/25 and then monthly.    Next call date:  2/21/25    Ignacia Randall RN   Anemia Services  [facility]  [email or web address]   Office : [phone]  Fax: [phone]

## 2025-02-09 NOTE — LETTER
PHYSICIAN ORDERS      DATE & TIME ISSUED: 2025 9:40 AM  PATIENT NAME: Idalia Bob   : 1945     [facility] MR# [if applicable]: [id number]     Primary Diagnosis: Iron Def Anemia D50.9  Secondary Diagnosis: Heart Failure I50.9    Additional Standing Lab Orders.  Start the week of 25. Order Expires on 2026.  -Hgb/hematocrit Monthly  -Ferritin Monthly  -Iron/Iron Binding Monthly    Any questions please call: Anemia Services at [phone].   Fax results to [phone].     Trena Chanel DNP, NP-C  Advance Heart Failure

## 2025-02-18 ENCOUNTER — TELEPHONE (OUTPATIENT)
Dept: FAMILY MEDICINE | Facility: CLINIC | Age: 80
End: 2025-02-18
Payer: MEDICARE

## 2025-02-18 ENCOUNTER — MYC MEDICAL ADVICE (OUTPATIENT)
Dept: FAMILY MEDICINE | Facility: CLINIC | Age: 80
End: 2025-02-18
Payer: MEDICARE

## 2025-02-18 DIAGNOSIS — R45.1 AGITATION: ICD-10-CM

## 2025-02-18 NOTE — TELEPHONE ENCOUNTER
Pt sent in requesting medication to be filled   OlanzapineTablet  2.5mg  taken 2 times a day - fill for 90 day supply    Medication pended -sent to [facility]

## 2025-02-19 RX ORDER — OLANZAPINE 2.5 MG/1
2.5 TABLET, FILM COATED ORAL 2 TIMES DAILY
Qty: 180 TABLET | Refills: 1 | Status: SHIPPED | OUTPATIENT
Start: 2025-02-19

## 2025-02-20 ENCOUNTER — PATIENT OUTREACH (OUTPATIENT)
Dept: CARE COORDINATION | Facility: CLINIC | Age: 80
End: 2025-02-20
Payer: MEDICARE

## 2025-02-20 NOTE — PROGRESS NOTES
Anemia Management Note - Reminder     Follow-up with anemia management service:    Lab appts on 2/10 & 2/21 were canceled. Lab appt has not been rescheduled.  Will check-in in 1-2 weeks.         Latest Ref Rng & Units 6/13/2024 6/14/2024 6/17/2024 6/21/2024 9/11/2024 2/7/2025 2/8/2025   Anemia   Hemoglobin 11.7 - 15.7 g/dL 10.6  9.3  9.1  10.0  11.1  12.6  12.2    TSAT 15 - 46 %     11  14     Ferritin 11 - 328 ng/mL      1,323         Orders needed to be renewed (for next follow-up date) in EPIC: None       Follow-up call date: 3/6/25    Ignacia Randall RN   Anemia Services  [facility]  [email or web address]   Office : [phone]  Fax: [phone]

## 2025-02-21 ENCOUNTER — TELEPHONE (OUTPATIENT)
Dept: FAMILY MEDICINE | Facility: CLINIC | Age: 80
End: 2025-02-21

## 2025-02-21 NOTE — TELEPHONE ENCOUNTER
Forms received from [facility]  Respiratory -annual oxygen renewal  for .  Forms placed in provider 'sign me' folder.  Please fax forms to [phone] after completion.

## 2025-02-24 ENCOUNTER — MEDICAL CORRESPONDENCE (OUTPATIENT)
Dept: HEALTH INFORMATION MANAGEMENT | Facility: CLINIC | Age: 80
End: 2025-02-24

## 2025-02-25 ENCOUNTER — OFFICE VISIT (OUTPATIENT)
Dept: FAMILY MEDICINE | Facility: CLINIC | Age: 80
End: 2025-02-25
Payer: MEDICARE

## 2025-02-25 ENCOUNTER — MYC MEDICAL ADVICE (OUTPATIENT)
Dept: CARDIOLOGY | Facility: CLINIC | Age: 80
End: 2025-02-25

## 2025-02-25 VITALS
OXYGEN SATURATION: 92 % | RESPIRATION RATE: 16 BRPM | TEMPERATURE: 98 F | HEIGHT: 61 IN | HEART RATE: 81 BPM | SYSTOLIC BLOOD PRESSURE: 93 MMHG | WEIGHT: 124.8 LBS | BODY MASS INDEX: 23.56 KG/M2 | DIASTOLIC BLOOD PRESSURE: 65 MMHG

## 2025-02-25 DIAGNOSIS — I48.91 NEW ONSET A-FIB (H): ICD-10-CM

## 2025-02-25 DIAGNOSIS — J44.9 CHRONIC OBSTRUCTIVE PULMONARY DISEASE, UNSPECIFIED COPD TYPE (H): ICD-10-CM

## 2025-02-25 DIAGNOSIS — J96.21 ACUTE ON CHRONIC RESPIRATORY FAILURE WITH HYPOXIA (H): ICD-10-CM

## 2025-02-25 DIAGNOSIS — N18.32 CHRONIC KIDNEY DISEASE, STAGE 3B (H): ICD-10-CM

## 2025-02-25 DIAGNOSIS — R94.31 PROLONGED QT INTERVAL: ICD-10-CM

## 2025-02-25 DIAGNOSIS — F11.20 CONTINUOUS OPIOID DEPENDENCE (H): ICD-10-CM

## 2025-02-25 DIAGNOSIS — I50.9 CHRONIC CONGESTIVE HEART FAILURE, UNSPECIFIED HEART FAILURE TYPE (H): ICD-10-CM

## 2025-02-25 DIAGNOSIS — Z00.00 ENCOUNTER FOR MEDICARE ANNUAL WELLNESS EXAM: Primary | ICD-10-CM

## 2025-02-25 DIAGNOSIS — I50.9 ACUTE ON CHRONIC CONGESTIVE HEART FAILURE, UNSPECIFIED HEART FAILURE TYPE (H): ICD-10-CM

## 2025-02-25 DIAGNOSIS — F03.A0 MILD DEMENTIA, UNSPECIFIED DEMENTIA TYPE, UNSPECIFIED WHETHER BEHAVIORAL, PSYCHOTIC, OR MOOD DISTURBANCE OR ANXIETY (H): ICD-10-CM

## 2025-02-25 PROCEDURE — 1125F AMNT PAIN NOTED PAIN PRSNT: CPT | Performed by: FAMILY MEDICINE

## 2025-02-25 PROCEDURE — G0439 PPPS, SUBSEQ VISIT: HCPCS | Performed by: FAMILY MEDICINE

## 2025-02-25 PROCEDURE — 3074F SYST BP LT 130 MM HG: CPT | Performed by: FAMILY MEDICINE

## 2025-02-25 PROCEDURE — 3078F DIAST BP <80 MM HG: CPT | Performed by: FAMILY MEDICINE

## 2025-02-25 SDOH — HEALTH STABILITY: PHYSICAL HEALTH
ON AVERAGE, HOW MANY DAYS PER WEEK DO YOU ENGAGE IN MODERATE TO STRENUOUS EXERCISE (LIKE A BRISK WALK)?: PATIENT DECLINED

## 2025-02-25 SDOH — HEALTH STABILITY: PHYSICAL HEALTH: ON AVERAGE, HOW MANY MINUTES DO YOU ENGAGE IN EXERCISE AT THIS LEVEL?: PATIENT DECLINED

## 2025-02-25 ASSESSMENT — SOCIAL DETERMINANTS OF HEALTH (SDOH): HOW OFTEN DO YOU GET TOGETHER WITH FRIENDS OR RELATIVES?: THREE TIMES A WEEK

## 2025-02-25 ASSESSMENT — ANXIETY QUESTIONNAIRES
8. IF YOU CHECKED OFF ANY PROBLEMS, HOW DIFFICULT HAVE THESE MADE IT FOR YOU TO DO YOUR WORK, TAKE CARE OF THINGS AT HOME, OR GET ALONG WITH OTHER PEOPLE?: NOT DIFFICULT AT ALL
5. BEING SO RESTLESS THAT IT IS HARD TO SIT STILL: NOT AT ALL
3. WORRYING TOO MUCH ABOUT DIFFERENT THINGS: NOT AT ALL
2. NOT BEING ABLE TO STOP OR CONTROL WORRYING: NOT AT ALL
GAD7 TOTAL SCORE: 0
6. BECOMING EASILY ANNOYED OR IRRITABLE: NOT AT ALL
4. TROUBLE RELAXING: NOT AT ALL
GAD7 TOTAL SCORE: 0
7. FEELING AFRAID AS IF SOMETHING AWFUL MIGHT HAPPEN: NOT AT ALL
1. FEELING NERVOUS, ANXIOUS, OR ON EDGE: NOT AT ALL
GAD7 TOTAL SCORE: 0
IF YOU CHECKED OFF ANY PROBLEMS ON THIS QUESTIONNAIRE, HOW DIFFICULT HAVE THESE PROBLEMS MADE IT FOR YOU TO DO YOUR WORK, TAKE CARE OF THINGS AT HOME, OR GET ALONG WITH OTHER PEOPLE: NOT DIFFICULT AT ALL
7. FEELING AFRAID AS IF SOMETHING AWFUL MIGHT HAPPEN: NOT AT ALL

## 2025-02-25 ASSESSMENT — PAIN SCALES - GENERAL: PAINLEVEL_OUTOF10: MODERATE PAIN (6)

## 2025-02-25 NOTE — PROGRESS NOTES
Preventive Care Visit  [facility]  Fernando Medina MD, Family Medicine  Feb 25, 2025      Assessment & Plan     Encounter for Medicare annual wellness exam   Will check TDAP in pharmacy    Chronic obstructive pulmonary disease (H)   Stable on trelegy, no longer on day-time oxygen, just at night.     Acute on chronic respiratory failure with hypoxia (H)   - resolved    CHF  Following with cardiology; was asked to stop taking lasix daily, change to 20 mg PRN for weight greater than or equal to 130 lbs. Take potassium chloride 20 mEq PRN with each dose of lasix.     Episodes of a-fib (H)   Noted oon telemetry during hospitalization in 2023/2024, asymptomatic possibly due to illness    Continuous opioid dependence (H)    Been on oxycodone sparingly at night as needed for pain    Mild dementia, unspecified dementia type, unspecified whether behavioral, psychotic, or mood disturbance or anxiety (H)    -  stable    Chronic kidney disease, stage 3b (H)   -  stable      Patient has been advised of split billing requirements and indicates understanding: Yes    Counseling  Appropriate preventive services were addressed with this patient via screening, questionnaire, or discussion as appropriate for fall prevention, nutrition, physical activity, Tobacco-use cessation, social engagement, weight loss and cognition.  Checklist reviewing preventive services available has been given to the patient.  Reviewed patient's diet, addressing concerns and/or questions.   The patient was instructed to see the dentist every 6 months.   Updated plan of care.  Patient reported difficulty with activities of daily living were addressed today.I have reviewed Opioid Use Disorder and Substance Use Disorder risk factors and made any needed referrals.       See Patient Instructions    Pato Friedman is a 80 year old, presenting for the following:  Andrés Friedman is a 78 yo FV with dementia, copd, ckd3, emery, htn,  gerd, jeffery,       Concerns    Functional capacity:    Living arrangement: lives with  is Assisted Living    Ambulation; uses walker    Vision: uses glasses    Hearing: Good    Bladder control:  control is good    Memory: some mild dementia    Driving: Does not; [employer]     ADLS: Need help with: some ADLs     Transportation: daughter   Shopping:daughter     Preparing meals; Have [prepared meals   Bathing: Daughter helps   Laundry daughter   Medication administration ; Daughter sets up   Money management    Toileting;independent   Feeding: independent    Dressing: independent         2/25/2025     2:13 PM   Additional Questions   Roomed by haim Pennington ma   Accompanied by 1     HPI    Answers submitted by the patient for this visit:  Patient Health Questionnaire (G7) (Submitted on 2/25/2025)  AMRIT 7 TOTAL SCORE: 0    Health Care Directive  Patient has a Health Care Directive on file  Discussed advance care planning with patient.      2/25/2025   General Health   How would you rate your overall physical health? (!) POOR: due CHF   Feel stress (tense, anxious, or unable to sleep) Only a little   (!) STRESS CONCERN      2/25/2025   Nutrition   Diet: Regular (no restrictions)         2/25/2025   Exercise   Days per week of moderate/strenous exercise Patient declined   Average minutes spent exercising at this level Patient declined         2/25/2025   Social Factors   Frequency of gathering with friends or relatives Three times a week   Worry food won't last until get money to buy more No   Food not last or not have enough money for food? No   Do you have housing? (Housing is defined as stable permanent housing and does not include staying ouside in a car, in a tent, in an abandoned building, in an overnight shelter, or couch-surfing.) Yes   Are you worried about losing your housing? No   Lack of transportation? No   Unable to get utilities (heat,electricity)? No         2/25/2025   Fall Risk   Fallen 2 or more  times in the past year? No   Trouble with walking or balance? Yes   Reason Gait Speed Test Not Completed Unable to complete test, patient reported symptoms          2/25/2025   Activities of Daily Living- Home Safety   Needs help with the following daily activites Transportation    Shopping    Preparing meals    Housework    Bathing    Laundry    Money management   Safety concerns in the home None of the above       Multiple values from one day are sorted in reverse-chronological order         2/25/2025   Dental   Dentist two times every year? (!) NO         2/25/2025   Hearing Screening   Hearing concerns? None of the above         2/25/2025   Driving Risk Screening   Patient/family members have concerns about driving (!) DECLINE         2/25/2025   General Alertness/Fatigue Screening   Have you been more tired than usual lately? No         2/25/2025   Urinary Incontinence Screening   Bothered by leaking urine in past 6 months No     Today's PHQ-2 Score:       2/25/2025     2:20 PM   PHQ-2 ( 1999 [employer])   Q1: Little interest or pleasure in doing things 0   Q2: Feeling down, depressed or hopeless 0   PHQ-2 Score 0           2/25/2025   Substance Use   Alcohol more than 3/day or more than 7/wk Not Applicable   Do you have a current opioid prescription? (!) YES   How severe/bad is pain from 1 to 10? 8/10   Do you use any other substances recreationally? No          No data to display              Low Risk (0-3)  Moderate Risk (4-7)  High Risk (>8)  Social History     Tobacco Use    Smoking status: Former     Current packs/day: 1.00     Average packs/day: 1 pack/day for 40.0 years (40.0 ttl pk-yrs)     Types: Cigarettes     Passive exposure: Past    Smokeless tobacco: Never   Vaping Use    Vaping status: Never Used   Substance Use Topics    Alcohol use: Not Currently    Drug use: Never        Mammogram Screening - Mammography discussed and declined      Fracture Risk Assessment Tool: Has osteoporosis and is on  treatment  Use the information below to complete the Frax calculator  : 1945  Sex: female  Weight (kg): 56.6 kg (actual weight)  Height (cm): 154 cm  Previous Fragility Fracture:  No  History of parent with fractured hip:  Yes  Current Smoking:  No  Patient has been on glucocorticoids for more than 3 months (5mg/day or more): No  Rheumatoid Arthritis on Problem List:  No  Secondary Osteoporosis on Problem List:  No  Consumes 3 or more units of alcohol per day: No  Femoral Neck BMD (g/cm2)    Reviewed and updated as needed this visit by Provider                  Patient Active Problem List   Diagnosis    Arthritis of knee    At risk for falls    Attention disturbance    Chronic obstructive pulmonary disease (H)    Dementia (H)    Frail elderly    GERD (gastroesophageal reflux disease)    Hypertension    Anemia, iron deficiency    AMRIT (generalized anxiety disorder)    Hypokalemia    Community acquired pneumonia of left upper lobe of lung    Chronic kidney disease, stage 3b (H)    Acute on chronic congestive heart failure, unspecified heart failure type (H)    Acute kidney failure, unspecified (H)    Fever, unspecified fever cause    Pneumonia of right lower lobe due to infectious organism    Physical deconditioning    Continuous opioid dependence (H)    Chronic systolic heart failure (H)    Anorexia symptom    Age-related osteoporosis without current pathological fracture    Acute on chronic respiratory failure with hypoxia (H)    New onset a-fib (H)     Past Surgical History:   Procedure Laterality Date    ESOPHAGOSCOPY, GASTROSCOPY, DUODENOSCOPY (EGD), COMBINED N/A 2024    Procedure: ESOPHAGOGASTRODUODENOSCOPY, WITH BIOPSY;  Surgeon: Felton James MD;  Location:  GI    PICC DOUBLE LUMEN PLACEMENT Right 2024    Basilic Vein 5F DL 37 cm       Social History     Tobacco Use    Smoking status: Former     Current packs/day: 1.00     Average packs/day: 1 pack/day for 40.0 years (40.0  ttl pk-yrs)     Types: Cigarettes     Passive exposure: Past    Smokeless tobacco: Never   Substance Use Topics    Alcohol use: Not Currently     No family history on file.      Current providers sharing in care for this patient include:  Patient Care Team:  Gomez Louis MD as PCP - General (Family Medicine)  Gomez Louis MD as Assigned PCP  Subha Jansen PA-C as Physician Assistant (Gastroenterology)  Tiffanie Velasquez, RN as Specialty Care Coordinator (Cardiology)  Subha Olmstead, PT as Physical Therapist (Physical Therapy)  Subha Olmstead, PT as Physical Therapist (Physical Therapy)  Trena Chanel APRN CNP as Assigned Surgical Provider  Caroline Rubin, RN as Specialty Care Coordinator (Cardiology)  Alexus Braun APRN CNP as Nurse Practitioner (Cardiovascular Disease)  Espinoza Pak MD as Assigned Heart and Vascular Provider  Julienne Nunes, RN as Specialty Care Coordinator (Cardiology)  Ignacia Randall, RN as Specialty Care Coordinator (Nurse)    The following health maintenance items are reviewed in Epic and correct as of today:  Health Maintenance   Topic Date Due    HF ACTION PLAN  Never done    SPIROMETRY  Never done    URINE DRUG SCREEN  Never done    CONTROLLED SUBSTANCE AGREEMENT FOR CHRONIC PAIN MANAGEMENT  Never done    HEPATITIS A IMMUNIZATION (1 of 2 - Risk 2-dose series) Never done    DTAP/TDAP/TD IMMUNIZATION (2 - Td or Tdap) 02/03/2024    ANNUAL REVIEW OF HM ORDERS  01/22/2025    COVID-19 Vaccine (8 - 2024-25 season) 04/11/2025    LIPID  07/10/2025    MICROALBUMIN  07/10/2025    BMP  08/07/2025    MEDICARE ANNUAL WELLNESS VISIT  10/04/2025    PHQ-9  10/04/2025    ALT  02/07/2026    CBC  02/08/2026    HEMOGLOBIN  02/08/2026    AMRIT ASSESSMENT  02/25/2026    FALL RISK ASSESSMENT  02/25/2026    ADVANCE CARE PLANNING  10/04/2029    DEXA  08/16/2038    TSH W/FREE T4 REFLEX  Completed    COPD ACTION PLAN  Completed    PHQ-2 (once per calendar year)  Completed     "INFLUENZA VACCINE  Completed    Pneumococcal Vaccine: 50+ Years  Completed    URINALYSIS  Completed    ZOSTER IMMUNIZATION  Completed    RSV VACCINE  Completed    HPV IMMUNIZATION  Aged Out    MENINGITIS IMMUNIZATION  Aged Out    LUNG CANCER SCREENING  Discontinued     Review of Systems  Mild MCI, neuro, ENT, endocrine, pulmonary, cardiac, gastrointestinal, genitourinary, musculoskeletal, integument and psychiatric systems are negative, except as otherwise noted.     Objective    Exam  BP 93/65   Pulse 81   Temp 98  F (36.7  C) (Temporal)   Resp 16   Ht 1.54 m (5' 0.63\")   Wt 56.6 kg (124 lb 12.8 oz)   LMP  (LMP Unknown)   SpO2 92%   BMI 23.87 kg/m     Estimated body mass index is 23.87 kg/m  as calculated from the following:    Height as of this encounter: 1.54 m (5' 0.63\").    Weight as of this encounter: 56.6 kg (124 lb 12.8 oz).    Physical Exam  GENERAL: alert and no distress  EYES: Eyes grossly normal to inspection, hhas glasses  HENT: ear canals and TM's normal, nose and mouth without ulcers or lesions  NECK: no adenopathy, no asymmetry, masses, or scars  RESP: lungs clear to auscultation - no rales, rhonchi or wheezes  CV: regular rate and rhythm, no murmur, click or rub, no peripheral edema  ABDOMEN: soft, nontender, no masses and bowel sounds normal  MS: no gross musculoskeletal defects noted, no edema  SKIN: no suspicious lesions or rashes  NEURO: Feeble looking, mentation mild MCI and speech slow  PSYCH: mentation slowed memory, affect normal/bright         2/25/2025   Mini Cog   Clock Draw Score 0 Abnormal   3 Item Recall 2 objects recalled   Mini Cog Total Score 2   Has some dementia     Signed Electronically by: Fernando Medina MD    "

## 2025-02-25 NOTE — PATIENT INSTRUCTIONS
Patient Education   Preventive Care Advice   This is general advice given by our system to help you stay healthy. However, your care team may have specific advice just for you. Please talk to your care team about your preventive care needs.  Nutrition  Eat 5 or more servings of fruits and vegetables each day.  Try wheat bread, brown rice and whole grain pasta (instead of white bread, rice, and pasta).  Get enough calcium and vitamin D. Check the label on foods and aim for 100% of the RDA (recommended daily allowance).  Lifestyle  Exercise at least 150 minutes each week  (30 minutes a day, 5 days a week).  Do muscle strengthening activities 2 days a week. These help control your weight and prevent disease.  No smoking.  Wear sunscreen to prevent skin cancer.  Have a dental exam and cleaning every 6 months.  Yearly exams  See your health care team every year to talk about:  Any changes in your health.  Any medicines your care team has prescribed.  Preventive care, family planning, and ways to prevent chronic diseases.  Shots (vaccines)   HPV shots (up to age 26), if you've never had them before.  Hepatitis B shots (up to age 59), if you've never had them before.  COVID-19 shot: Get this shot when it's due.  Flu shot: Get a flu shot every year.  Tetanus shot: Get a tetanus shot every 10 years.  Pneumococcal, hepatitis A, and RSV shots: Ask your care team if you need these based on your risk.  Shingles shot (for age 50 and up)  General health tests  Diabetes screening:  Starting at age 35, Get screened for diabetes at least every 3 years.  If you are younger than age 35, ask your care team if you should be screened for diabetes.  Cholesterol test: At age 39, start having a cholesterol test every 5 years, or more often if advised.  Bone density scan (DEXA): At age 50, ask your care team if you should have this scan for osteoporosis (brittle bones).  Hepatitis C: Get tested at least once in your life.  STIs (sexually  transmitted infections)  Before age 24: Ask your care team if you should be screened for STIs.  After age 24: Get screened for STIs if you're at risk. You are at risk for STIs (including HIV) if:  You are sexually active with more than one person.  You don't use condoms every time.  You or a partner was diagnosed with a sexually transmitted infection.  If you are at risk for HIV, ask about PrEP medicine to prevent HIV.  Get tested for HIV at least once in your life, whether you are at risk for HIV or not.  Cancer screening tests  Cervical cancer screening: If you have a cervix, begin getting regular cervical cancer screening tests starting at age 21.  Breast cancer scan (mammogram): If you've ever had breasts, begin having regular mammograms starting at age 40. This is a scan to check for breast cancer.  Colon cancer screening: It is important to start screening for colon cancer at age 45.  Have a colonoscopy test every 10 years (or more often if you're at risk) Or, ask your provider about stool tests like a FIT test every year or Cologuard test every 3 years.  To learn more about your testing options, visit:   .  For help making a decision, visit:   https://bit.ly/xj20492.  Prostate cancer screening test: If you have a prostate, ask your care team if a prostate cancer screening test (PSA) at age 55 is right for you.  Lung cancer screening: If you are a current or former smoker ages 50 to 80, ask your care team if ongoing lung cancer screenings are right for you.  For informational purposes only. Not to replace the advice of your health care provider. Copyright   2023 [facility]. All rights reserved. Clinically reviewed by the [facility]. [employer] [id number] - REV 01/24.  Learning About Activities of Daily Living  What are activities of daily living?     Activities of daily living (ADLs) are the basic self-care tasks you do every day. These include eating, bathing, dressing,  and moving around.  As you age, and if you have health problems, you may find that it's harder to do some of these tasks. If so, your doctor can suggest ideas that may help.  To measure what kind of help you may need, your doctor will ask how well you are able to do ADLs. Let your doctor know if there are any tasks that you are having trouble doing. This is an important first step to getting help. And when you have the help you need, you can stay as independent as possible.  How will a doctor assess your ADLs?  Asking about ADLs is part of a routine health checkup your doctor will likely do as you age. Your health check might be done in a doctor's office, in your home, or at a hospital. The goal is to find out if you are having any problems that could make it hard to care for yourself or that make it unsafe for you to be on your own.  To measure your ADLs, your doctor will ask how hard it is for you to do routine tasks. Your doctor may also want to know if you have changed the way you do a task because of a health problem. Your doctor may watch how you:  Walk back and forth.  Keep your balance while you stand or walk.  Move from sitting to standing or from a bed to a chair.  Button or unbutton a shirt or sweater.  Remove and put on your shoes.  It's common to feel a little worried or anxious if you find you can't do all the things you used to be able to do. Talking with your doctor about ADLs is a way to make sure you're as safe as possible and able to care for yourself as well as you can. You may want to bring a caregiver, friend, or family member to your checkup. They can help you talk to your doctor.  Follow-up care is a key part of your treatment and safety. Be sure to make and go to all appointments, and call your doctor if you are having problems. It's also a good idea to know your test results and keep a list of the medicines you take.  Current as of: October 24, 2023  Content Version: 14.3    2024 [facility]  [employer].   Care instructions adapted under license by your healthcare professional. If you have questions about a medical condition or this instruction, always ask your healthcare professional. [facility] [employer] disclaims any warranty or liability for your use of this information.    Preventing Falls: Care Instructions  Injuries and health problems such as trouble walking or poor eyesight can increase your risk of falling. So can some medicines. But there are things you can do to help prevent falls. You can exercise to get stronger. You can also arrange your home to make it safer.    Talk to your doctor about the medicines you take. Ask if any of them increase the risk of falls and whether they can be changed or stopped.   Try to exercise regularly. It can help improve your strength and balance. This can help lower your risk of falling.         Practice fall safety and prevention.   Wear low-heeled shoes that fit well and give your feet good support. Talk to your doctor if you have foot problems that make this hard.  Carry a cellphone or wear a medical alert device that you can use to call for help.  Use stepladders instead of chairs to reach high objects. Don't climb if you're at risk for falls. Ask for help, if needed.  Wear the correct eyeglasses, if you need them.        Make your home safer.   Remove rugs, cords, clutter, and furniture from walkways.  Keep your house well lit. Use night-lights in hallways and bathrooms.  Install and use sturdy handrails on stairways.  Wear nonskid footwear, even inside. Don't walk barefoot or in socks without shoes.        Be safe outside.   Use handrails, curb cuts, and ramps whenever possible.  Keep your hands free by using a shoulder bag or backpack.  Try to walk in well-lit areas. Watch out for uneven ground, changes in pavement, and debris.  Be careful in the winter. Walk on the grass or gravel when sidewalks are slippery. Use de-icer on steps and walkways.  "Add non-slip devices to shoes.    Put grab bars and nonskid mats in your shower or tub and near the toilet. Try to use a shower chair or bath bench when bathing.   Get into a tub or shower by putting in your weaker leg first. Get out with your strong side first. Have a phone or medical alert device in the bathroom with you.   Where can you learn more?  Go to https://www.[employer]/patiented  Enter G117 in the search box to learn more about \"Preventing Falls: Care Instructions.\"  Current as of: July 31, 2024  Content Version: 14.3    2024 [employer].   Care instructions adapted under license by your healthcare professional. If you have questions about a medical condition or this instruction, always ask your healthcare professional. [employer] disclaims any warranty or liability for your use of this information.    Chronic Pain: Care Instructions  Your Care Instructions     Chronic pain is pain that lasts a long time (months or even years) and may or may not have a clear cause. It is different from acute pain, which usually does have a clear cause--like an injury or illness--and gets better over time. Chronic pain:  Lasts over time but may vary from day to day.  Does not go away despite efforts to end it.  May disrupt your sleep and lead to fatigue.  May cause depression or anxiety.  May make your muscles tense, causing more pain.  Can disrupt your work, hobbies, home life, and relationships with friends and family.  Chronic pain is a very real condition. It is not just in your head. Treatment can help and usually includes several methods used together, such as medicines, physical therapy, exercise, and other treatments. Learning how to relax and changing negative thought patterns can also help you cope.  Chronic pain is complex. Taking an active role in your treatment will help you better manage your pain. Tell your doctor if you have trouble dealing with your pain. You may have to try " several things before you find what works best for you.  Follow-up care is a key part of your treatment and safety. Be sure to make and go to all appointments, and call your doctor if you are having problems. It's also a good idea to know your test results and keep a list of the medicines you take.  How can you care for yourself at home?  Pace yourself. Break up large jobs into smaller tasks. Save harder tasks for days when you have less pain, or go back and forth between hard tasks and easier ones. Take rest breaks.  Relax, and reduce stress. Relaxation techniques such as deep breathing or meditation can help.  Keep moving. Gentle, daily exercise can help reduce pain over the long run. Try low- or no-impact exercises such as walking, swimming, and stationary biking. Do stretches to stay flexible.  Try heat, cold packs, and massage.  Get enough sleep. Chronic pain can make you tired and drain your energy. Talk with your doctor if you have trouble sleeping because of pain.  Think positive. Your thoughts can affect your pain level. Do things that you enjoy to distract yourself when you have pain instead of focusing on the pain. See a movie, read a book, listen to music, or spend time with a friend.  If you think you are depressed, talk to your doctor about treatment.  Keep a daily pain diary. Record how your moods, thoughts, sleep patterns, activities, and medicine affect your pain. You may find that your pain is worse during or after certain activities or when you are feeling a certain emotion. Having a record of your pain can help you and your doctor find the best ways to treat your pain.  Take pain medicines exactly as directed.  If the doctor gave you a prescription medicine for pain, take it as prescribed.  If you are not taking a prescription pain medicine, ask your doctor if you can take an over-the-counter medicine.  Reducing constipation caused by pain medicine  Talk to your doctor about a laxative. If a  "laxative doesn't work, your doctor may suggest a prescription medicine.  Include fruits, vegetables, beans, and whole grains in your diet each day. These foods are high in fiber.  If your doctor recommends it, get more exercise. Walking is a good choice. Bit by bit, increase the amount you walk every day. Try for at least 30 minutes on most days of the week.  Schedule time each day for a bowel movement. A daily routine may help. Take your time and do not strain when having a bowel movement.  When should you call for help?   Call your doctor now or seek immediate medical care if:    Your pain gets worse or is out of control.     You feel down or blue, or you do not enjoy things like you once did. You may be depressed, which is common in people with chronic pain. Depression can be treated.     You have vomiting or cramps for more than 2 hours.   Watch closely for changes in your health, and be sure to contact your doctor if:    You cannot sleep because of pain.     You are very worried or anxious about your pain.     You have trouble taking your pain medicine.     You have any concerns about your pain medicine.     You have trouble with bowel movements, such as:  No bowel movement in 3 days.  Blood in the anal area, in your stool, or on the toilet paper.  Diarrhea for more than 24 hours.   Where can you learn more?  Go to https://www.[employer].net/patiented  Enter N004 in the search box to learn more about \"Chronic Pain: Care Instructions.\"  Current as of: July 31, 2024  Content Version: 14.3    2024 [employer].   Care instructions adapted under license by your healthcare professional. If you have questions about a medical condition or this instruction, always ask your healthcare professional. [employer] disclaims any warranty or liability for your use of this information.       "

## 2025-02-25 NOTE — LETTER
Opioid / Opioid Plus Controlled Substance Agreement    This is an agreement between you and your provider about the safe and appropriate use of controlled substance/opioids prescribed by your care team. Controlled substances are medicines that can cause physical and mental dependence (abuse).    There are strict laws about having and using these medicines. We here at [facility] are committing to working with you in your efforts to get better. To support you in this work, we ll help you schedule regular office appointments for medicine refills. If we must cancel or change your appointment for any reason, we ll make sure you have enough medicine to last until your next appointment.     As a Provider, I will:  Listen carefully to your concerns and treat you with respect.   Recommend a treatment plan that I believe is in your best interest. This plan may involve therapies other than opioid pain medication.   Talk with you often about the possible benefits, and the risk of harm of any medicine that we prescribe for you.   Provide a plan on how to taper (discontinue or go off) using this medicine if the decision is made to stop its use.    As a Patient, I understand that opioid(s):   Are a controlled substance prescribed by my care team to help me function or work and manage my condition(s).   Are strong medicines and can cause serious side effects such as:  Drowsiness, which can seriously affect my driving ability  A lower breathing rate, enough to cause death  Harm to my thinking ability   Depression   Abuse of and addiction to this medicine  Need to be taken exactly as prescribed. Combining opioids with certain medicines or chemicals (such as illegal drugs, sedatives, sleeping pills, and benzodiazepines) can be dangerous or even fatal. If I stop opioids suddenly, I may have severe withdrawal symptoms.  Do not work for all types of pain nor for all patients. If they re not helpful, I may be asked to stop  them.    {Benzo / Stimulant (Optional):348991}    The risks, benefits and side effects of these medicine(s) were explained to me. I agree that:  I will take part in other treatments as advised by my care team. This may be psychiatry or counseling, physical therapy, behavioral therapy, group treatment or a referral to a specialist.     I will keep all my appointments. I understand that this is part of the monitoring of opioids. My care team may require an office visit for EVERY opioid/controlled substance refill. If I miss appointments or don t follow instructions, my care team may stop my medicine.    I will take my medicines as prescribed. I will not change the dose or schedule unless my care team tells me to. There will be no refills if I run out early.     I may be asked to come to the clinic and complete a urine drug test or complete a pill count at any time. If I don t give a urine sample or participate in a pill count, the care team may stop my medicine.    I will only receive prescriptions from this clinic for chronic pain. If I am treated by another provider for acute pain issues, I will tell them that I am taking opioid pain medication for chronic pain and that I have a treatment agreement with this provider. I will inform my [facility] care team within one business day if I am given a prescription for any pain medication by another healthcare provider. My [facility] care team can contact other providers and pharmacists about my use of any medicines.    It is up to me to make sure that I don t run out of my medicines on weekends or holidays. If my care team is willing to refill my opioid prescription without a visit, I must request refills only during office hours. Refills may take up to 3 business days to process. I will use one pharmacy to fill all my opioid and other controlled substance prescriptions. I will notify the clinic about any changes to my insurance or medication  availability.    I am responsible for my prescriptions. If the medicine/prescription is lost, stolen or destroyed, it will not be replaced. I also agree not to share controlled substance medicines with anyone.    I am aware I should not use any illegal or recreational drugs. I agree not to drink alcohol unless my care team says I can.       If I enroll in the [place] [facility] Cannabis program, I will tell my care team prior to my next refill.     I will tell my care team right away if I become pregnant, have a new medical problem treated outside of my regular clinic, or have a change in my medications.    I understand that this medicine can affect my thinking, judgment and reaction time. Alcohol and drugs affect the brain and body, which can affect the safety of my driving. Being under the influence of alcohol or drugs can affect my decision-making, behaviors, personal safety, and the safety of others. Driving while impaired (DWI) can occur if a person is driving, operating, or in physical control of a car, motorcycle, boat, snowmobile, ATV, motorbike, off-road vehicle, or any other motor vehicle ([place] Statute 169A.20). I understand the risk if I choose to drive or operate any vehicle or machinery.    I understand that if I do not follow any of the conditions above, my prescriptions or treatment may be stopped or changed.          Opioids  What You Need to Know    What are opioids?   Opioids are pain medicines that must be prescribed by a doctor. They are also known as narcotics.     Examples are:   morphine (MS Contin, Jaylyn)  oxycodone (Oxycontin)  oxycodone and acetaminophen (Percocet)  hydrocodone and acetaminophen (Vicodin, Norco)   fentanyl patch (Duragesic)   hydromorphone (Dilaudid)   methadone  codeine (Tylenol #3)     What do opioids do well?   Opioids are best for severe short-term pain such as after a surgery or injury. They may work well for cancer pain. They may help some people with long-lasting  (chronic) pain.     What do opioids NOT do well?   Opioids never get rid of pain entirely, and they don t work well for most patients with chronic pain. Opioids don t reduce swelling, one of the causes of pain.                                    Other ways to manage chronic pain and improve function include:     Treat the health problem that may be causing pain  Anti-inflammation medicines, which reduce swelling and tenderness, such as ibuprofen (Advil, Motrin) or naproxen (Aleve)  Acetaminophen (Tylenol)  Antidepressants and anti-seizure medicines, especially for nerve pain  Topical treatments such as patches or creams  Injections or nerve blocks  Chiropractic or osteopathic treatment  Acupuncture, massage, deep breathing, meditation, visual imagery, aromatherapy  Use heat or ice at the pain site  Physical therapy   Exercise  Stop smoking  Take part in therapy       Risks and side effects     Talk to your doctor before you start or decide to keep taking opioids. Possible side effects include:    Lowering your breathing rate enough to cause death  Overdose, including death, especially if taking higher than prescribed doses  Worse depression symptoms; less pleasure in things you usually enjoy  Feeling tired or sluggish  Slower thoughts or cloudy thinking  Being more sensitive to pain over time; pain is harder to control  Trouble sleeping or restless sleep  Changes in hormone levels (for example, less testosterone)  Changes in sex drive or ability to have sex  Constipation  Unsafe driving  Itching and sweating  Dizziness  Nausea, throwing up and dry mouth    What else should I know about opioids?    Opioids may lead to dependence, tolerance, or addiction.    Dependence means that if you stop or reduce the medicine too quickly, you will have withdrawal symptoms. These include loose poop (diarrhea), jitters, flu-like symptoms, nervousness and tremors. Dependence is not the same as addiction.                      Tolerance means needing higher doses over time to get the same effect. This may increase the chance of serious side effects.    Addiction is when people improperly use a substance that harms their body, their mind or their relations with others. Use of opiates can cause a relapse of addiction if you have a history of drug or alcohol abuse.    People who have used opioids for a long time may have a lower quality of life, worse depression, higher levels of pain and more visits to doctors.    You can overdose on opioids. Take these steps to lower your risk of overdose:    Recognize the signs:  Signs of overdose include decrease or loss of consciousness (blackout), slowed breathing, trouble waking up and blue lips. If someone is worried about overdose, they should call 911.    Talk to your doctor about Narcan (naloxone).   If you are at risk for overdose, you may be given a prescription for Narcan. This medicine very quickly reverses the effects of opioids.   If you overdose, a friend or family member can give you Narcan while waiting for the ambulance. They need to know the signs of overdose and how to give Narcan.     Don't use alcohol or street drugs.   Taking them with opioids can cause death.    Do not take any of these medicines unless your doctor says it s OK. Taking these with opioids can cause death:  Benzodiazepines, such as lorazepam (Ativan), alprazolam (Xanax) or diazepam (Valium)  Muscle relaxers, such as cyclobenzaprine (Flexeril)  Sleeping pills like zolpidem (Ambien)   Other opioids      How to keep you and other people safe while taking opioids:    Never share your opioids with others.  Opioid medicines are regulated by the Drug Enforcement Agency ([employer]). Selling or sharing medications is a criminal act.    2. Be sure to store opioids in a secure place, locked up if possible. Young children can easily swallow them and overdose.    3. When you are traveling with your medicines, keep them in the  original bottles. If you use a pill box, be sure you also carry a copy of your medicine list from your clinic or pharmacy.    4. Safe disposal of opioids    Most pharmacies have places to get rid of medicine, called disposal kiosks. Medicine disposal options are also available in every [facility]. Search your county and  medication disposal  to find more options. You can find more details at:  [email or web address]     I agree that my provider, clinic care team, and pharmacy may work with any city, state or federal law enforcement agency that investigates the misuse, sale, or other diversion of my controlled medicine. I will allow my provider to discuss my care with, or share a copy of, this agreement with any other treating provider, pharmacy or emergency room where I receive care.    I have read this agreement and have asked questions about anything I did not understand.    _______________________________________________________  Patient Signature - Idalia Bob _____________________                   Date     _______________________________________________________  Provider Signature - Fernando Medina MD   _____________________                   Date     _______________________________________________________  Witness Signature (required if provider not present while patient signing)   _____________________                   Date

## 2025-02-27 PROBLEM — R94.31 PROLONGED QT INTERVAL: Status: ACTIVE | Noted: 2025-02-27

## 2025-02-27 PROBLEM — J96.21 ACUTE ON CHRONIC RESPIRATORY FAILURE WITH HYPOXIA (H): Status: RESOLVED | Noted: 2025-02-25 | Resolved: 2025-02-27

## 2025-03-04 NOTE — TELEPHONE ENCOUNTER
RN noted there is no active CAM order in place for Prolia. Patient due 4/4/2025.     Routing to provider to please place CAM orders in, and advise if any labs are needed prior to appointment. Please re-route to team to further assist with scheduling Prolia injection once orders placed.     Chel Meléndez, JAMESN RN  [facility]

## 2025-03-04 NOTE — TELEPHONE ENCOUNTER
Routing to Loretta KRAUSE triage.    Patient due for Prolia 4/4//2024.    Appears patient has new PCP Dr. Medina.    Miki Schmid, RN, BSN, PHN  [facility]

## 2025-03-05 NOTE — TELEPHONE ENCOUNTER
Called patient. Left voice message to return call at [phone].    When patient returns call, please clarify when she began Prolia injections as per our records she had been receiving injection from ATI[facility] Saavedra. Patient also needs assistance with booking Prolia injection on or after 4/4.    JAMES MorrisonN RN  [facility]

## 2025-03-05 NOTE — TELEPHONE ENCOUNTER
Patient's daughter, Barbara called back (signed consent to communicate PHI on file)   Patient last had Prolia shot through [facility] PCP on 10/4/25  Will be due for Prolia next on 4/4/25  Appointment scheduled with [facility] Loretta KRAUSE on 4/7/25    Patient gets labs done by multiple providers and outside of [facility]  Are labs up to date or any further labs needed prior to prolia?    Encounter can be closed if no further labs needed     Inga Suggs RN  [facility]

## 2025-03-05 NOTE — TELEPHONE ENCOUNTER
I put in the order for Prolia, when patient comes let us know when she started the Prolia; record indicate that she had been receiving injection from [facility]

## 2025-03-11 ENCOUNTER — MEDICAL CORRESPONDENCE (OUTPATIENT)
Dept: HEALTH INFORMATION MANAGEMENT | Facility: CLINIC | Age: 80
End: 2025-03-11
Payer: MEDICARE

## 2025-03-13 ENCOUNTER — TELEPHONE (OUTPATIENT)
Dept: FAMILY MEDICINE | Facility: CLINIC | Age: 80
End: 2025-03-13
Payer: MEDICARE

## 2025-03-13 NOTE — TELEPHONE ENCOUNTER
Order/Referral Request  (Durable Medical Equitment DME/ Certification of Medical Necessity CMN)  A Forms, Order/Orders, Prescription/Medication Request, and Request for Medical Information came in via Fax to be addressed by the provider:  What is on the order: [facility], [facility]   High Pressure Port o2 System E and Oxygen Concentrator     What Group/Provider/Facility is requesting:   [facility], [address] [address]  Phone: [phone]  Fax: [phone]  [email or web address]      Has the patient been seen by the provider for this: N/A Last office visit with the ordering provider: 2/25/2025  Does patient have an appointment scheduled?: No    Received at [facility]  Additional comments:   The Forms, Order/Orders, Prescription/Medication Request, and Request for Medical Information has been started for the provider and placed at their desk. Miroslava Rosen,   **Please send the encounter back to the care team when the request is completed.     135

## 2025-03-14 ENCOUNTER — MEDICAL CORRESPONDENCE (OUTPATIENT)
Dept: HEALTH INFORMATION MANAGEMENT | Facility: CLINIC | Age: 80
End: 2025-03-14
Payer: MEDICARE

## 2025-03-18 NOTE — TELEPHONE ENCOUNTER
The Form has been completed by the provider, confirmed faxed to the fax number on the form and listed below and a copy has been sent to be added to the chart. Miroslava Rosen,

## 2025-03-24 ENCOUNTER — PATIENT OUTREACH (OUTPATIENT)
Dept: CARE COORDINATION | Facility: CLINIC | Age: 80
End: 2025-03-24
Payer: MEDICARE

## 2025-03-24 NOTE — PROGRESS NOTES
Anemia Management Note - Follow Up      SUBJECTIVE/OBJECTIVE:    Referred by Trena Chanel NP  on 2025  Primary Diagnosis: Iron Def Anemia D50.9  Secondary Diagnosis: Heart Failure I50.9  Hgb goal range: 9-10     Epo/Darbo: NA. Hgb >10.0    Iron regimen: NA. Elevated Ferritin levels.  Hgb <12.0.      Labs : 2026- Epic and Letters  RX/TX plans : NA     *[facility].  Fax [phone]  -Labs are drawn on .      Recent YOANA use, transfusion, IV iron: Venofer 2024  Hx of HTN, Heart Failure     Contact: OK to speak with Corie Holtyvettear (daughter) and Marianna Bob (daughter) regarding Scheduling and Medical Information per consent to communicate dated 2024           Latest Ref Rng & Units 2024   Anemia   Hemoglobin 11.7 - 15.7 g/dL 10.6  9.3  9.1  10.0  11.1  12.6  12.2    TSAT 15 - 46 %     11  14     Ferritin 11 - 328 ng/mL      1,323       BP Readings from Last 3 Encounters:   25 93/65   25 112/67   25 99/69     Wt Readings from Last 2 Encounters:   25 56.6 kg (124 lb 12.8 oz)   25 55.8 kg (123 lb)       Labs from 3/20/25;   Hgb 12.0  TSAT 22%  Ferritin 671    ASSESSMENT:    Hgb:at goal - continue to monitor  TSat: not at goal (>30%) but ferritin >500ng/mL.  IV iron not indicated at this time per anemia protocol. Ferritin: At goal (>100ng/mL)   Goals Addressed                      This Visit's Progress      Medical (pt-stated)   On track      Goal Statement: I will actively work with my care teams to manage my anemia.  Date Goal set: 2025  Barriers: transportation  Strengths: support, coping, motivation, health awareness, and involvement with care team  Date to Achieve By: ongoing  Patient expressed understanding of goal: Yes   Action steps to achieve this goal:  I will take my anemia medication as prescribed.  I will receive my anemia injection(s)/infusion(s) as  prescribed.  I will complete lab checks as recommended by my care team.  I will reach out to my nurse care coordinator with any follow-up questions.  Goal Statement: I will improve management of my anemia so I can avoid blood transfusions.  Continue to have good energy levels.  Date Goal set: 2/13/2025  Barriers: transportation  Strengths: support, coping, health awareness, and involvement with care team  Date to Achieve By: ongoing  Patient expressed understanding of goal: Yes   Action steps to achieve this goal:  I will discuss goals of treatment with my care team.  I will identify ways to preserve energy.  I will work with my care team to manage treatment schedule based on my planned activities.                PLAN:  Recheck labs in one month. Hgb 12.0, No IV Iron ordered.     Orders needed to be renewed (for next follow-up date) in LETTERS - for external labs: None    Iron labs due:  End of April 2025    Plan discussed with:  No call, chart review       NEXT FOLLOW-UP DATE:  4/24/25    Ignacia Randall RN   Anemia Services  [facility]  [email or web address]   Office : [phone]  Fax: [phone]

## 2025-03-25 ENCOUNTER — MEDICAL CORRESPONDENCE (OUTPATIENT)
Dept: HEALTH INFORMATION MANAGEMENT | Facility: CLINIC | Age: 80
End: 2025-03-25
Payer: MEDICARE

## 2025-03-26 DIAGNOSIS — E03.9 HYPOTHYROIDISM, UNSPECIFIED TYPE: ICD-10-CM

## 2025-03-26 RX ORDER — LEVOTHYROXINE SODIUM 50 UG/1
50 TABLET ORAL DAILY
Qty: 90 TABLET | Refills: 2 | Status: SHIPPED | OUTPATIENT
Start: 2025-03-26

## 2025-03-28 ENCOUNTER — HOSPITAL ENCOUNTER (OUTPATIENT)
Facility: CLINIC | Age: 80
Setting detail: OBSERVATION
Discharge: HOME | End: 2025-03-29
Attending: INTERNAL MEDICINE
Payer: MEDICARE

## 2025-03-28 ENCOUNTER — APPOINTMENT (OUTPATIENT)
Dept: GENERAL RADIOLOGY | Facility: CLINIC | Age: 80
End: 2025-03-28
Payer: MEDICARE

## 2025-03-28 DIAGNOSIS — K92.1 MELENA: ICD-10-CM

## 2025-03-28 DIAGNOSIS — K92.0 HEMATEMESIS: Primary | ICD-10-CM

## 2025-03-28 DIAGNOSIS — K92.1 BLACK TARRY STOOLS: ICD-10-CM

## 2025-03-28 DIAGNOSIS — R45.1 AGITATION: ICD-10-CM

## 2025-03-28 DIAGNOSIS — K92.0 COFFEE GROUND EMESIS: ICD-10-CM

## 2025-03-28 DIAGNOSIS — K21.9 GASTROESOPHAGEAL REFLUX DISEASE, UNSPECIFIED WHETHER ESOPHAGITIS PRESENT: ICD-10-CM

## 2025-03-28 DIAGNOSIS — Z87.11 HX OF GASTRIC ULCER: ICD-10-CM

## 2025-03-28 LAB
ABO + RH BLD: NORMAL
ALBUMIN SERPL BCG-MCNC: 3.8 G/DL (ref 3.5–5.2)
ALP SERPL-CCNC: 91 U/L (ref 40–150)
ALT SERPL W P-5'-P-CCNC: 7 U/L (ref 0–50)
ANION GAP SERPL CALCULATED.3IONS-SCNC: 11 MMOL/L (ref 7–15)
AST SERPL W P-5'-P-CCNC: 18 U/L (ref 0–45)
BASOPHILS # BLD AUTO: 0.1 10E3/UL (ref 0–0.2)
BASOPHILS NFR BLD AUTO: 1 %
BILIRUB SERPL-MCNC: 0.3 MG/DL
BLD GP AB SCN SERPL QL: NEGATIVE
BUN SERPL-MCNC: 28.7 MG/DL (ref 8–23)
CALCIUM SERPL-MCNC: 9.4 MG/DL (ref 8.8–10.4)
CHLORIDE SERPL-SCNC: 99 MMOL/L (ref 98–107)
CREAT SERPL-MCNC: 1.63 MG/DL (ref 0.51–0.95)
EGFRCR SERPLBLD CKD-EPI 2021: 31 ML/MIN/1.73M2
EOSINOPHIL # BLD AUTO: 0.4 10E3/UL (ref 0–0.7)
EOSINOPHIL NFR BLD AUTO: 4 %
ERYTHROCYTE [DISTWIDTH] IN BLOOD BY AUTOMATED COUNT: 17.9 % (ref 10–15)
GLUCOSE SERPL-MCNC: 110 MG/DL (ref 70–99)
HCO3 SERPL-SCNC: 23 MMOL/L (ref 22–29)
HCT VFR BLD AUTO: 39.3 % (ref 35–47)
HGB BLD-MCNC: 11.8 G/DL (ref 11.7–15.7)
IMM GRANULOCYTES # BLD: 0.2 10E3/UL
IMM GRANULOCYTES NFR BLD: 2 %
LIPASE SERPL-CCNC: 18 U/L (ref 13–60)
LYMPHOCYTES # BLD AUTO: 1.3 10E3/UL (ref 0.8–5.3)
LYMPHOCYTES NFR BLD AUTO: 13 %
MCH RBC QN AUTO: 26 PG (ref 26.5–33)
MCHC RBC AUTO-ENTMCNC: 30 G/DL (ref 31.5–36.5)
MCV RBC AUTO: 87 FL (ref 78–100)
MONOCYTES # BLD AUTO: 0.8 10E3/UL (ref 0–1.3)
MONOCYTES NFR BLD AUTO: 7 %
NEUTROPHILS # BLD AUTO: 7.7 10E3/UL (ref 1.6–8.3)
NEUTROPHILS NFR BLD AUTO: 74 %
NRBC # BLD AUTO: 0 10E3/UL
NRBC BLD AUTO-RTO: 0 /100
PLATELET # BLD AUTO: 280 10E3/UL (ref 150–450)
POTASSIUM SERPL-SCNC: 4.9 MMOL/L (ref 3.4–5.3)
PROT SERPL-MCNC: 6.6 G/DL (ref 6.4–8.3)
RBC # BLD AUTO: 4.53 10E6/UL (ref 3.8–5.2)
SODIUM SERPL-SCNC: 133 MMOL/L (ref 135–145)
SODIUM UR-SCNC: 43 MMOL/L
SPECIMEN EXP DATE BLD: NORMAL
WBC # BLD AUTO: 10.5 10E3/UL (ref 4–11)

## 2025-03-28 PROCEDURE — 96374 THER/PROPH/DIAG INJ IV PUSH: CPT | Performed by: INTERNAL MEDICINE

## 2025-03-28 PROCEDURE — 258N000003 HC RX IP 258 OP 636: Performed by: PHYSICIAN ASSISTANT

## 2025-03-28 PROCEDURE — 250N000011 HC RX IP 250 OP 636

## 2025-03-28 PROCEDURE — 71046 X-RAY EXAM CHEST 2 VIEWS: CPT

## 2025-03-28 PROCEDURE — 99207 PR APP CREDIT; MD BILLING SHARED VISIT: CPT | Mod: FS | Performed by: PHYSICIAN ASSISTANT

## 2025-03-28 PROCEDURE — 99285 EMERGENCY DEPT VISIT HI MDM: CPT | Mod: FS | Performed by: INTERNAL MEDICINE

## 2025-03-28 PROCEDURE — 71046 X-RAY EXAM CHEST 2 VIEWS: CPT | Mod: 26 | Performed by: RADIOLOGY

## 2025-03-28 PROCEDURE — 99222 1ST HOSP IP/OBS MODERATE 55: CPT | Mod: AI

## 2025-03-28 PROCEDURE — 250N000013 HC RX MED GY IP 250 OP 250 PS 637: Performed by: PHYSICIAN ASSISTANT

## 2025-03-28 PROCEDURE — 86850 RBC ANTIBODY SCREEN: CPT

## 2025-03-28 PROCEDURE — 80053 COMPREHEN METABOLIC PANEL: CPT

## 2025-03-28 PROCEDURE — 84300 ASSAY OF URINE SODIUM: CPT

## 2025-03-28 PROCEDURE — 85025 COMPLETE CBC W/AUTO DIFF WBC: CPT

## 2025-03-28 PROCEDURE — 999N000248 HC STATISTIC IV INSERT WITH US BY RN

## 2025-03-28 PROCEDURE — 96361 HYDRATE IV INFUSION ADD-ON: CPT

## 2025-03-28 PROCEDURE — 86900 BLOOD TYPING SEROLOGIC ABO: CPT

## 2025-03-28 PROCEDURE — 85014 HEMATOCRIT: CPT

## 2025-03-28 PROCEDURE — 99285 EMERGENCY DEPT VISIT HI MDM: CPT | Mod: 25 | Performed by: INTERNAL MEDICINE

## 2025-03-28 PROCEDURE — 93010 ELECTROCARDIOGRAM REPORT: CPT

## 2025-03-28 PROCEDURE — G0378 HOSPITAL OBSERVATION PER HR: HCPCS

## 2025-03-28 PROCEDURE — 36415 COLL VENOUS BLD VENIPUNCTURE: CPT

## 2025-03-28 PROCEDURE — 83690 ASSAY OF LIPASE: CPT

## 2025-03-28 PROCEDURE — 93005 ELECTROCARDIOGRAM TRACING: CPT | Performed by: INTERNAL MEDICINE

## 2025-03-28 RX ORDER — ONDANSETRON 4 MG/1
4 TABLET, ORALLY DISINTEGRATING ORAL EVERY 6 HOURS PRN
Status: DISCONTINUED | OUTPATIENT
Start: 2025-03-28 | End: 2025-03-29 | Stop reason: HOSPADM

## 2025-03-28 RX ORDER — FUROSEMIDE 20 MG/1
20 TABLET ORAL DAILY PRN
Status: DISCONTINUED | OUTPATIENT
Start: 2025-03-28 | End: 2025-03-28

## 2025-03-28 RX ORDER — SODIUM CHLORIDE, SODIUM LACTATE, POTASSIUM CHLORIDE, CALCIUM CHLORIDE 600; 310; 30; 20 MG/100ML; MG/100ML; MG/100ML; MG/100ML
INJECTION, SOLUTION INTRAVENOUS CONTINUOUS
Status: DISCONTINUED | OUTPATIENT
Start: 2025-03-28 | End: 2025-03-28

## 2025-03-28 RX ORDER — ASPIRIN 81 MG/1
81 TABLET ORAL DAILY
Status: DISCONTINUED | OUTPATIENT
Start: 2025-03-28 | End: 2025-03-29 | Stop reason: HOSPADM

## 2025-03-28 RX ORDER — MAGNESIUM OXIDE 400 MG/1
400 TABLET ORAL AT BEDTIME
Status: DISCONTINUED | OUTPATIENT
Start: 2025-03-28 | End: 2025-03-29 | Stop reason: HOSPADM

## 2025-03-28 RX ORDER — ACETAMINOPHEN 650 MG/1
650 SUPPOSITORY RECTAL EVERY 4 HOURS PRN
Status: DISCONTINUED | OUTPATIENT
Start: 2025-03-28 | End: 2025-03-29 | Stop reason: HOSPADM

## 2025-03-28 RX ORDER — FLUTICASONE FUROATE AND VILANTEROL 100; 25 UG/1; UG/1
1 POWDER RESPIRATORY (INHALATION) DAILY
Status: DISCONTINUED | OUTPATIENT
Start: 2025-03-28 | End: 2025-03-29 | Stop reason: HOSPADM

## 2025-03-28 RX ORDER — DONEPEZIL HYDROCHLORIDE 10 MG/1
10 TABLET, FILM COATED ORAL AT BEDTIME
Status: DISCONTINUED | OUTPATIENT
Start: 2025-03-28 | End: 2025-03-29 | Stop reason: HOSPADM

## 2025-03-28 RX ORDER — SODIUM CHLORIDE, SODIUM LACTATE, POTASSIUM CHLORIDE, CALCIUM CHLORIDE 600; 310; 30; 20 MG/100ML; MG/100ML; MG/100ML; MG/100ML
INJECTION, SOLUTION INTRAVENOUS CONTINUOUS
Status: ACTIVE | OUTPATIENT
Start: 2025-03-28 | End: 2025-03-29

## 2025-03-28 RX ORDER — ALBUTEROL SULFATE 0.83 MG/ML
2.5 SOLUTION RESPIRATORY (INHALATION) 2 TIMES DAILY PRN
Status: DISCONTINUED | OUTPATIENT
Start: 2025-03-28 | End: 2025-03-29 | Stop reason: HOSPADM

## 2025-03-28 RX ORDER — POTASSIUM CHLORIDE 750 MG/1
20 TABLET, EXTENDED RELEASE ORAL DAILY PRN
Status: DISCONTINUED | OUTPATIENT
Start: 2025-03-28 | End: 2025-03-28

## 2025-03-28 RX ORDER — LEVOTHYROXINE SODIUM 50 UG/1
50 TABLET ORAL
Status: DISCONTINUED | OUTPATIENT
Start: 2025-03-29 | End: 2025-03-29 | Stop reason: HOSPADM

## 2025-03-28 RX ORDER — ATORVASTATIN CALCIUM 10 MG/1
10 TABLET, FILM COATED ORAL DAILY
Status: DISCONTINUED | OUTPATIENT
Start: 2025-03-29 | End: 2025-03-29 | Stop reason: HOSPADM

## 2025-03-28 RX ORDER — LIDOCAINE 4 G/G
1 PATCH TOPICAL DAILY PRN
Status: DISCONTINUED | OUTPATIENT
Start: 2025-03-28 | End: 2025-03-29 | Stop reason: HOSPADM

## 2025-03-28 RX ORDER — LACTOBACILLUS RHAMNOSUS GG 10B CELL
1 CAPSULE ORAL DAILY
Status: DISCONTINUED | OUTPATIENT
Start: 2025-03-29 | End: 2025-03-29 | Stop reason: HOSPADM

## 2025-03-28 RX ORDER — LATANOPROST 50 UG/ML
1 SOLUTION/ DROPS OPHTHALMIC AT BEDTIME
Status: DISCONTINUED | OUTPATIENT
Start: 2025-03-28 | End: 2025-03-29 | Stop reason: HOSPADM

## 2025-03-28 RX ORDER — FLUOXETINE HYDROCHLORIDE 40 MG/1
40 CAPSULE ORAL DAILY
Status: DISCONTINUED | OUTPATIENT
Start: 2025-03-29 | End: 2025-03-29 | Stop reason: HOSPADM

## 2025-03-28 RX ORDER — BUSPIRONE HYDROCHLORIDE 15 MG/1
15 TABLET ORAL 2 TIMES DAILY
Status: DISCONTINUED | OUTPATIENT
Start: 2025-03-29 | End: 2025-03-29 | Stop reason: HOSPADM

## 2025-03-28 RX ORDER — OLANZAPINE 2.5 MG/1
2.5 TABLET, FILM COATED ORAL 2 TIMES DAILY
Status: DISCONTINUED | OUTPATIENT
Start: 2025-03-28 | End: 2025-03-29 | Stop reason: HOSPADM

## 2025-03-28 RX ORDER — FERROUS GLUCONATE 324(38)MG
324 TABLET ORAL
COMMUNITY
Start: 2025-03-17

## 2025-03-28 RX ORDER — ONDANSETRON 2 MG/ML
4 INJECTION INTRAMUSCULAR; INTRAVENOUS EVERY 6 HOURS PRN
Status: DISCONTINUED | OUTPATIENT
Start: 2025-03-28 | End: 2025-03-29 | Stop reason: HOSPADM

## 2025-03-28 RX ORDER — PANTOPRAZOLE SODIUM 40 MG/1
40 TABLET, DELAYED RELEASE ORAL
Status: DISCONTINUED | OUTPATIENT
Start: 2025-03-28 | End: 2025-03-28

## 2025-03-28 RX ORDER — ACETAMINOPHEN 325 MG/1
650 TABLET ORAL EVERY 4 HOURS PRN
Status: DISCONTINUED | OUTPATIENT
Start: 2025-03-28 | End: 2025-03-29 | Stop reason: HOSPADM

## 2025-03-28 RX ADMIN — SODIUM CHLORIDE, POTASSIUM CHLORIDE, SODIUM LACTATE AND CALCIUM CHLORIDE: 600; 310; 30; 20 INJECTION, SOLUTION INTRAVENOUS at 20:23

## 2025-03-28 RX ADMIN — LATANOPROST 1 DROP: 50 SOLUTION OPHTHALMIC at 22:14

## 2025-03-28 RX ADMIN — OLANZAPINE 2.5 MG: 2.5 TABLET, FILM COATED ORAL at 22:15

## 2025-03-28 RX ADMIN — DONEPEZIL HYDROCHLORIDE 10 MG: 10 TABLET, FILM COATED ORAL at 22:15

## 2025-03-28 RX ADMIN — MAGNESIUM OXIDE TAB 400 MG (241.3 MG ELEMENTAL MG) 400 MG: 400 (241.3 MG) TAB at 22:15

## 2025-03-28 RX ADMIN — OXYCODONE HYDROCHLORIDE 2.5 MG: 5 TABLET ORAL at 22:20

## 2025-03-28 RX ADMIN — PANTOPRAZOLE SODIUM 40 MG: 40 INJECTION, POWDER, FOR SOLUTION INTRAVENOUS at 18:20

## 2025-03-28 RX ADMIN — LIDOCAINE 4% 1 PATCH: 40 PATCH TOPICAL at 20:27

## 2025-03-28 RX ADMIN — ACETAMINOPHEN 650 MG: 325 TABLET, FILM COATED ORAL at 20:27

## 2025-03-28 ASSESSMENT — ACTIVITIES OF DAILY LIVING (ADL)
ADLS_ACUITY_SCORE: 58

## 2025-03-28 NOTE — ED TRIAGE NOTES
Vomitted black tar two times since yesterday. Hx of gastric ulcer.      Triage Assessment (Adult)       Row Name 03/28/25 7114          Triage Assessment    Airway WDL WDL        Respiratory WDL    Respiratory WDL WDL        Skin Circulation/Temperature WDL    Skin Circulation/Temperature WDL WDL        Cardiac WDL    Cardiac WDL WDL        Peripheral/Neurovascular WDL    Peripheral Neurovascular WDL WDL        Cognitive/Neuro/Behavioral WDL    Cognitive/Neuro/Behavioral WDL WDL

## 2025-03-28 NOTE — ED PROVIDER NOTES
[place] EMERGENCY DEPARTMENT ([facility])    3/28/25       ED PROVIDER NOTE   History     Chief Complaint   Patient presents with    Vomiting     The history is provided by the patient and medical records. No  was used.     Idalia Bob is a 80 year old female with a history dementia, gastric ulcer, COPD, CKD3, AMRIT, hypertension, GERD, and Iron Deficiency Anemia who presents to the ED for vomiting for the past day.  She states that in the past 24 to 36 hours, she has had about 3 black emesis.  She also reports that in that timeframe, her stools have been black in color.  She denies any abdominal pain.  She denies any anticoagulation use.  She denies any alcohol or tobacco use.  She takes about 400 mg of ibuprofen daily for back pain.  She states that her symptoms currently are similar to when she had a bleeding gastric ulcer about 2-1/2 years ago when she lived in [place].  At that time, she did not have any black emesis but had black stools.  She does take a daily PPI.  She denies fever or chills otherwise.    Past Medical History  Past Medical History:   Diagnosis Date    Chronic kidney disease (CKD) 01/10/2023    Chronic obstructive pulmonary disease (H) 01/10/2023    Dementia (H) 11/28/2023    GERD (gastroesophageal reflux disease) 03/15/2023    Hypertension 11/28/2023     Past Surgical History:   Procedure Laterality Date    ESOPHAGOSCOPY, GASTROSCOPY, DUODENOSCOPY (EGD), COMBINED N/A 02/07/2024    Procedure: ESOPHAGOGASTRODUODENOSCOPY, WITH BIOPSY;  Surgeon: Felton James MD;  Location:  GI    PICC DOUBLE LUMEN PLACEMENT Right 05/17/2024    Basilic Vein 5F DL 37 cm     acetaminophen (TYLENOL) 325 MG tablet  albuterol (PROVENTIL) (2.5 MG/3ML) 0.083% neb solution  aspirin 81 MG EC tablet  atorvastatin (LIPITOR) 10 MG tablet  busPIRone (BUSPAR) 15 MG tablet  calcium carbonate (TUMS) 500 MG chewable tablet  Calcium Polycarbophil (FIBER) 625 MG  tablet  donepezil (ARICEPT) 10 MG tablet  empagliflozin (JARDIANCE) 10 MG TABS tablet  FLUoxetine (PROZAC) 40 MG capsule  Fluticasone-Umeclidin-Vilanterol (TRELEGY ELLIPTA) 200-62.5-25 MCG/ACT oral inhaler  furosemide (LASIX) 20 MG tablet  lactobacillus rhamnosus, GG, (CULTURELL) capsule  latanoprost (XALATAN) 0.005 % ophthalmic solution  levothyroxine (SYNTHROID/LEVOTHROID) 50 MCG tablet  losartan (COZAAR) 25 MG tablet  magnesium oxide (MAG-OX) 400 MG tablet  metoprolol succinate ER (TOPROL XL) 25 MG 24 hr tablet  OLANZapine (ZYPREXA) 2.5 MG tablet  oxyCODONE (ROXICODONE) 5 MG tablet  pantoprazole (PROTONIX) 40 MG EC tablet  potassium chloride caroline ER (KLOR-CON M20) 20 MEQ CR tablet  senna-docusate (SENOKOT-S/PERICOLACE) 8.6-50 MG tablet  triamcinolone (KENALOG) 0.1 % external cream  vitamin D3 (CHOLECALCIFEROL) 50 mcg (2000 units) tablet      Allergies   Allergen Reactions    Penicillins Itching     Has tolerated ceftriaxone, piperacillin, and amoxicillin     Family History  No family history on file.  Social History   Social History     Tobacco Use    Smoking status: Former     Current packs/day: 1.00     Average packs/day: 1 pack/day for 40.0 years (40.0 ttl pk-yrs)     Types: Cigarettes     Passive exposure: Past    Smokeless tobacco: Never   Vaping Use    Vaping status: Never Used   Substance Use Topics    Alcohol use: Not Currently    Drug use: Never      Past medical history, past surgical history, medications, allergies, family history, and social history were reviewed with the patient. No additional pertinent items.     A medically appropriate review of systems was performed with pertinent positives and negatives noted in the HPI, and all other systems negative.    Physical Exam   BP: 93/64  Pulse: 85  Temp: 98  F (36.7  C)  Resp: 20  Height: 152.4 cm (5')  SpO2: 92 %    Vitals:    03/28/25 1708 03/28/25 1709 03/28/25 1821 03/28/25 2000   BP: 130/60  107/58 105/61   Pulse: 81  74 78   Resp: 18      Temp:  98.8  F (37.1  C)   98.5  F (36.9  C)   TempSrc: Oral   Oral   SpO2: (!) 90%  95% (!) 90%   Height:  1.524 m (5')        Physical Exam  Constitutional:       General: She is not in acute distress.     Appearance: Normal appearance. She is normal weight. She is not ill-appearing, toxic-appearing or diaphoretic.   HENT:      Mouth/Throat:      Mouth: Mucous membranes are moist.      Pharynx: Oropharynx is clear.   Cardiovascular:      Rate and Rhythm: Normal rate and regular rhythm.   Pulmonary:      Effort: Pulmonary effort is normal. No respiratory distress.      Breath sounds: Normal breath sounds.   Abdominal:      General: Bowel sounds are normal. There is no distension.      Palpations: Abdomen is soft.      Tenderness: There is no abdominal tenderness. There is no guarding or rebound.   Skin:     General: Skin is warm.      Coloration: Skin is not pale.   Neurological:      Mental Status: She is alert. Mental status is at baseline.   Psychiatric:         Mood and Affect: Mood normal.         Behavior: Behavior normal.         ED Course, Procedures, & Data     ED Course as of 03/28/25 2123   Fri Mar 28, 2025   1655 GI luminal paged     Procedures            EKG Interpretation:      Interpreted by Demetria Griffin PA-C  Time reviewed: 1715  Symptoms at time of EKG: None   Rhythm: normal sinus   Rate: Normal  Axis: Normal  Ectopy: none  Conduction: normal  ST Segments/ T Waves: No acute ischemic changes and Non-specific ST-T wave changes  Q Waves: none  Comparison to prior: Unchanged from 07/10/24    Clinical Impression: no acute changes           Results for orders placed or performed during the hospital encounter of 03/28/25   XR Chest 2 Views     Status: None    Narrative    EXAM: XR CHEST 2 VIEWS  LOCATION: [facility]  DATE: 3/28/2025    INDICATION: hypoxia  COMPARISON: PA and lateral views the chest 6/10/2024      Impression    IMPRESSION:     Top normal size of  the cardiac silhouette. There is a moderate to large hiatal hernia overlying the lower thoracic spine with an air-fluid level. Mild mass effect on the medial lower lobes, greater on the right. Moderate atheromatous calcifications of   the thoracic aorta.    Unchanged focal platelike atelectasis along the superior left major fissure. A focal airspace opacity along the lateral costal pleura in the right base has cleared with only minimal interstitial scarring in this location. No new airspace opacities.    No pleural effusion or pneumothorax.    Reciprocal thoracic spine curvature, disc space narrowing and small marginal osteophytes.   Comprehensive metabolic panel     Status: Abnormal   Result Value Ref Range    Sodium 133 (L) 135 - 145 mmol/L    Potassium 4.9 3.4 - 5.3 mmol/L    Carbon Dioxide (CO2) 23 22 - 29 mmol/L    Anion Gap 11 7 - 15 mmol/L    Urea Nitrogen 28.7 (H) 8.0 - 23.0 mg/dL    Creatinine 1.63 (H) 0.51 - 0.95 mg/dL    GFR Estimate 31 (L) >60 mL/min/1.73m2    Calcium 9.4 8.8 - 10.4 mg/dL    Chloride 99 98 - 107 mmol/L    Glucose 110 (H) 70 - 99 mg/dL    Alkaline Phosphatase 91 40 - 150 U/L    AST 18 0 - 45 U/L    ALT 7 0 - 50 U/L    Protein Total 6.6 6.4 - 8.3 g/dL    Albumin 3.8 3.5 - 5.2 g/dL    Bilirubin Total 0.3 <=1.2 mg/dL   Lipase     Status: Normal   Result Value Ref Range    Lipase 18 13 - 60 U/L   CBC with platelets and differential     Status: Abnormal   Result Value Ref Range    WBC Count 10.5 4.0 - 11.0 10e3/uL    RBC Count 4.53 3.80 - 5.20 10e6/uL    Hemoglobin 11.8 11.7 - 15.7 g/dL    Hematocrit 39.3 35.0 - 47.0 %    MCV 87 78 - 100 fL    MCH 26.0 (L) 26.5 - 33.0 pg    MCHC 30.0 (L) 31.5 - 36.5 g/dL    RDW 17.9 (H) 10.0 - 15.0 %    Platelet Count 280 150 - 450 10e3/uL    % Neutrophils 74 %    % Lymphocytes 13 %    % Monocytes 7 %    % Eosinophils 4 %    % Basophils 1 %    % Immature Granulocytes 2 %    NRBCs per 100 WBC 0 <1 /100    Absolute Neutrophils 7.7 1.6 - 8.3 10e3/uL    Absolute  Lymphocytes 1.3 0.8 - 5.3 10e3/uL    Absolute Monocytes 0.8 0.0 - 1.3 10e3/uL    Absolute Eosinophils 0.4 0.0 - 0.7 10e3/uL    Absolute Basophils 0.1 0.0 - 0.2 10e3/uL    Absolute Immature Granulocytes 0.2 <=0.4 10e3/uL    Absolute NRBCs 0.0 10e3/uL   Sodium random urine     Status: None   Result Value Ref Range    Sodium Urine mmol/L 43 mmol/L   EKG 12-lead, tracing only     Status: None (Preliminary result)   Result Value Ref Range    Systolic Blood Pressure  mmHg    Diastolic Blood Pressure  mmHg    Ventricular Rate 81 BPM    Atrial Rate 81 BPM    IL Interval 162 ms    QRS Duration 74 ms     ms    QTc 462 ms    P Axis 83 degrees    R AXIS 91 degrees    T Axis 53 degrees    Interpretation ECG       Sinus rhythm  Rightward axis  Pulmonary disease pattern  Abnormal ECG     Adult Type and Screen     Status: None   Result Value Ref Range    ABO/RH(D) O POS     Antibody Screen Negative Negative    SPECIMEN EXPIRATION DATE [id number]    CBC with platelets differential     Status: Abnormal    Narrative    The following orders were created for panel order CBC with platelets differential.  Procedure                               Abnormality         Status                     ---------                               -----------         ------                     CBC with platelets and ...[[id number]]  Abnormal            Final result                 Please view results for these tests on the individual orders.   ABO/Rh type and screen     Status: None    Narrative    The following orders were created for panel order ABO/Rh type and screen.  Procedure                               Abnormality         Status                     ---------                               -----------         ------                     Adult Type and Screen[[id number]]                           Final result                 Please view results for these tests on the individual orders.     Medications   albuterol (PROVENTIL) neb solution 2.5  mg (has no administration in time range)   aspirin EC tablet 81 mg ( Oral Automatically Held 3/31/25 0800)   atorvastatin (LIPITOR) tablet 10 mg (has no administration in time range)   levothyroxine (SYNTHROID/LEVOTHROID) tablet 50 mcg (has no administration in time range)   empagliflozin (JARDIANCE) tablet 10 mg (has no administration in time range)   busPIRone (BUSPAR) tablet 15 mg (has no administration in time range)   donepezil (ARICEPT) tablet 10 mg (has no administration in time range)   FLUoxetine (PROzac) capsule 40 mg (has no administration in time range)   magnesium oxide (MAG-OX) tablet 400 mg (has no administration in time range)   OLANZapine (zyPREXA) tablet 2.5 mg (has no administration in time range)   metoprolol succinate ER (TOPROL-XL) 24 hr half-tab 12.5 mg (has no administration in time range)   losartan (COZAAR) half-tab 12.5 mg ( Oral Automatically Held 4/1/25 0800)   latanoprost (XALATAN) 0.005 % ophthalmic solution 1 drop (has no administration in time range)   lactobacillus rhamnosus (GG) (CULTURELL) capsule 1 capsule (has no administration in time range)   fluticasone-vilanterol (BREO ELLIPTA) 100-25 MCG/ACT inhaler 1 puff (has no administration in time range)     And   umeclidinium (INCRUSE ELLIPTA) 62.5 MCG/ACT inhaler 1 puff (has no administration in time range)   ondansetron (ZOFRAN ODT) ODT tab 4 mg (has no administration in time range)     Or   ondansetron (ZOFRAN) injection 4 mg (has no administration in time range)   pantoprazole (PROTONIX) IV push injection 40 mg (has no administration in time range)   acetaminophen (TYLENOL) tablet 650 mg (650 mg Oral $Given 3/28/25 2027)     Or   acetaminophen (TYLENOL) Suppository 650 mg ( Rectal See Alternative 3/28/25 2027)   Lidocaine (LIDOCARE) 4 % Patch 1 patch (1 patch Transdermal $Patch/Med Applied 3/28/25 2027)   lactated ringers infusion ( Intravenous $New Bag 3/28/25 2023)   pantoprazole (PROTONIX) IV push injection 40 mg (40 mg  Intravenous $Given 3/28/25 4150)     Labs Ordered and Resulted from Time of ED Arrival to Time of ED Departure   COMPREHENSIVE METABOLIC PANEL - Abnormal       Result Value    Sodium 133 (*)     Potassium 4.9      Carbon Dioxide (CO2) 23      Anion Gap 11      Urea Nitrogen 28.7 (*)     Creatinine 1.63 (*)     GFR Estimate 31 (*)     Calcium 9.4      Chloride 99      Glucose 110 (*)     Alkaline Phosphatase 91      AST 18      ALT 7      Protein Total 6.6      Albumin 3.8      Bilirubin Total 0.3     CBC WITH PLATELETS AND DIFFERENTIAL - Abnormal    WBC Count 10.5      RBC Count 4.53      Hemoglobin 11.8      Hematocrit 39.3      MCV 87      MCH 26.0 (*)     MCHC 30.0 (*)     RDW 17.9 (*)     Platelet Count 280      % Neutrophils 74      % Lymphocytes 13      % Monocytes 7      % Eosinophils 4      % Basophils 1      % Immature Granulocytes 2      NRBCs per 100 WBC 0      Absolute Neutrophils 7.7      Absolute Lymphocytes 1.3      Absolute Monocytes 0.8      Absolute Eosinophils 0.4      Absolute Basophils 0.1      Absolute Immature Granulocytes 0.2      Absolute NRBCs 0.0     LIPASE - Normal    Lipase 18     SODIUM RANDOM URINE    Sodium Urine mmol/L 43     HELICOBACTER PYLORI ANTIGEN STOOL   TYPE AND SCREEN, ADULT    ABO/RH(D) O POS      Antibody Screen Negative      SPECIMEN EXPIRATION DATE [id number]     ABO/RH TYPE AND SCREEN     XR Chest 2 Views   Final Result   IMPRESSION:       Top normal size of the cardiac silhouette. There is a moderate to large hiatal hernia overlying the lower thoracic spine with an air-fluid level. Mild mass effect on the medial lower lobes, greater on the right. Moderate atheromatous calcifications of    the thoracic aorta.      Unchanged focal platelike atelectasis along the superior left major fissure. A focal airspace opacity along the lateral costal pleura in the right base has cleared with only minimal interstitial scarring in this location. No new airspace opacities.      No  pleural effusion or pneumothorax.      Reciprocal thoracic spine curvature, disc space narrowing and small marginal osteophytes.             Critical care was not performed.     Medical Decision Making  The patient's presentation was of high complexity (an acute health issue posing potential threat to life or bodily function).    The patient's evaluation involved:  review of external note(s) from 1 sources (Previous outpatient encounters)  ordering and/or review of 3+ test(s) in this encounter (see separate area of note for details)    The patient's management necessitated high risk (a decision regarding hospitalization).    Assessment & Plan    Idalia is an 80-year-old female who presented to the ED with complaints of black emesis and black tarry stools for the past 24 hours.  Supple vital signs on presentation.  O2 saturations at 90% and appeared to be patient's baseline when previous vital signs at outpatient visits were reviewed.  Patient in no acute respiratory distress and without any shortness of air symptoms.  No abdominal tenderness or acute abdomen signs on exam.  No episodes of emesis or black tarry stools during ED evaluation and workup.  Stable hemoglobin 11.8 at this time.  Stable creatinine at 1.63.  Labs otherwise within normal limits.  No abdominal imaging obtained.  Chest x-ray notable for moderate to large hiatal hernia overlying the lower thoracic spine with an air-fluid level with mild mass effect on the lower or medial lobes greater on the right.  Based on patient's age, reported symptoms, and history of bleeding gastric ulcers requiring intervention, recommend admission to medicine with trending of hemoglobin and GI consult as needed for intervention or further evaluation with a scope.  Discussed lab and imaging results with the patient as well as the recommendation for which they voiced agreeability and understanding.  Discussed patient case with triage hospitalist who is agreeable to  admission.  Patient was otherwise understanding and all questions were answered to the best of ED provider ability prior to transfer of care to the general medicine service.    I have reviewed the nursing notes. I have reviewed the findings, diagnosis, plan and need for follow up with the patient.    New Prescriptions    No medications on file       Final diagnoses:   Coffee ground emesis   Black tarry stools   Hx of gastric ulcer       Demetria Griffin PA-C    [facility] EMERGENCY DEPARTMENT  3/28/2025     Demetria Griffin PA-C  03/28/25 3004    --    ED Attending Physician Attestation    I Hamilton Croft MD, MD, cared for this patient with the Advanced Practice Provider (SAY). I personally provided a substantive portion of the care for this patient, including approving the care plan for the number and complexity of problems addressed and taking responsibility related to the risk of complications and/or morbidity or mortality of patient management. Please see the SAY's documentation for full details.    Summary of HPI, PE, ED Course   Patient is a 80 year old female evaluated in the emergency department for h/o gastric ulcer with black emesis and stools for two days, now slightly dizzy. Exam and ED course notable for BP ok, Hgb stable. After the completion of care in the emergency department, the patient was admitted to inpatient.      Hamilton Croft MD, MD  Emergency Medicine         Hamilton Croft MD  03/29/25 7103

## 2025-03-29 VITALS
HEART RATE: 68 BPM | SYSTOLIC BLOOD PRESSURE: 146 MMHG | DIASTOLIC BLOOD PRESSURE: 59 MMHG | RESPIRATION RATE: 16 BRPM | OXYGEN SATURATION: 100 % | HEIGHT: 60 IN | TEMPERATURE: 97.5 F | BODY MASS INDEX: 24.37 KG/M2

## 2025-03-29 LAB
ALBUMIN SERPL BCG-MCNC: 2.9 G/DL (ref 3.5–5.2)
ALP SERPL-CCNC: 66 U/L (ref 40–150)
ALT SERPL W P-5'-P-CCNC: <5 U/L (ref 0–50)
ANION GAP SERPL CALCULATED.3IONS-SCNC: 11 MMOL/L (ref 7–15)
AST SERPL W P-5'-P-CCNC: 12 U/L (ref 0–45)
ATRIAL RATE - MUSE: 81 BPM
BILIRUB SERPL-MCNC: 0.2 MG/DL
BUN SERPL-MCNC: 21.9 MG/DL (ref 8–23)
CALCIUM SERPL-MCNC: 8.7 MG/DL (ref 8.8–10.4)
CHLORIDE SERPL-SCNC: 103 MMOL/L (ref 98–107)
CREAT SERPL-MCNC: 1.34 MG/DL (ref 0.51–0.95)
DIASTOLIC BLOOD PRESSURE - MUSE: NORMAL MMHG
EGFRCR SERPLBLD CKD-EPI 2021: 40 ML/MIN/1.73M2
ERYTHROCYTE [DISTWIDTH] IN BLOOD BY AUTOMATED COUNT: 17.8 % (ref 10–15)
GLUCOSE SERPL-MCNC: 72 MG/DL (ref 70–99)
HCO3 SERPL-SCNC: 23 MMOL/L (ref 22–29)
HCT VFR BLD AUTO: 35.8 % (ref 35–47)
HGB BLD-MCNC: 10.2 G/DL (ref 11.7–15.7)
HGB BLD-MCNC: 11.9 G/DL (ref 11.7–15.7)
INTERPRETATION ECG - MUSE: NORMAL
MAGNESIUM SERPL-MCNC: 1.9 MG/DL (ref 1.7–2.3)
MCH RBC QN AUTO: 26 PG (ref 26.5–33)
MCHC RBC AUTO-ENTMCNC: 28.5 G/DL (ref 31.5–36.5)
MCV RBC AUTO: 91 FL (ref 78–100)
P AXIS - MUSE: 83 DEGREES
PLATELET # BLD AUTO: 241 10E3/UL (ref 150–450)
POTASSIUM SERPL-SCNC: 4.5 MMOL/L (ref 3.4–5.3)
PR INTERVAL - MUSE: 162 MS
PROT SERPL-MCNC: 4.8 G/DL (ref 6.4–8.3)
QRS DURATION - MUSE: 74 MS
QT - MUSE: 398 MS
QTC - MUSE: 462 MS
R AXIS - MUSE: 91 DEGREES
RBC # BLD AUTO: 3.92 10E6/UL (ref 3.8–5.2)
SODIUM SERPL-SCNC: 137 MMOL/L (ref 135–145)
SYSTOLIC BLOOD PRESSURE - MUSE: NORMAL MMHG
T AXIS - MUSE: 53 DEGREES
VENTRICULAR RATE- MUSE: 81 BPM
WBC # BLD AUTO: 7 10E3/UL (ref 4–11)

## 2025-03-29 PROCEDURE — 99239 HOSP IP/OBS DSCHRG MGMT >30: CPT | Mod: FS | Performed by: STUDENT IN AN ORGANIZED HEALTH CARE EDUCATION/TRAINING PROGRAM

## 2025-03-29 PROCEDURE — G0378 HOSPITAL OBSERVATION PER HR: HCPCS

## 2025-03-29 PROCEDURE — 36415 COLL VENOUS BLD VENIPUNCTURE: CPT | Performed by: PHYSICIAN ASSISTANT

## 2025-03-29 PROCEDURE — 250N000011 HC RX IP 250 OP 636: Performed by: PHYSICIAN ASSISTANT

## 2025-03-29 PROCEDURE — 96376 TX/PRO/DX INJ SAME DRUG ADON: CPT

## 2025-03-29 PROCEDURE — 250N000013 HC RX MED GY IP 250 OP 250 PS 637: Performed by: PHYSICIAN ASSISTANT

## 2025-03-29 PROCEDURE — 85018 HEMOGLOBIN: CPT

## 2025-03-29 PROCEDURE — 83735 ASSAY OF MAGNESIUM: CPT

## 2025-03-29 PROCEDURE — 85027 COMPLETE CBC AUTOMATED: CPT | Performed by: PHYSICIAN ASSISTANT

## 2025-03-29 PROCEDURE — 96361 HYDRATE IV INFUSION ADD-ON: CPT

## 2025-03-29 PROCEDURE — 80053 COMPREHEN METABOLIC PANEL: CPT | Performed by: PHYSICIAN ASSISTANT

## 2025-03-29 PROCEDURE — 36415 COLL VENOUS BLD VENIPUNCTURE: CPT

## 2025-03-29 PROCEDURE — 99207 PR APP CREDIT; MD BILLING SHARED VISIT: CPT

## 2025-03-29 PROCEDURE — 99222 1ST HOSP IP/OBS MODERATE 55: CPT | Mod: GC | Performed by: INTERNAL MEDICINE

## 2025-03-29 RX ORDER — PANTOPRAZOLE SODIUM 40 MG/1
40 TABLET, DELAYED RELEASE ORAL 2 TIMES DAILY
Qty: 90 TABLET | Refills: 0 | Status: SHIPPED | OUTPATIENT
Start: 2025-03-29

## 2025-03-29 RX ADMIN — LEVOTHYROXINE SODIUM 50 MCG: 0.05 TABLET ORAL at 08:12

## 2025-03-29 RX ADMIN — BUSPIRONE HYDROCHLORIDE 15 MG: 15 TABLET ORAL at 08:13

## 2025-03-29 RX ADMIN — ACETAMINOPHEN 650 MG: 325 TABLET, FILM COATED ORAL at 10:11

## 2025-03-29 RX ADMIN — FLUOXETINE HYDROCHLORIDE 40 MG: 40 CAPSULE ORAL at 08:13

## 2025-03-29 RX ADMIN — Medication 1 CAPSULE: at 08:12

## 2025-03-29 RX ADMIN — ATORVASTATIN CALCIUM 10 MG: 10 TABLET, FILM COATED ORAL at 08:13

## 2025-03-29 RX ADMIN — OXYCODONE HYDROCHLORIDE 2.5 MG: 5 TABLET ORAL at 10:11

## 2025-03-29 RX ADMIN — PANTOPRAZOLE SODIUM 40 MG: 40 INJECTION, POWDER, FOR SOLUTION INTRAVENOUS at 08:13

## 2025-03-29 RX ADMIN — OLANZAPINE 2.5 MG: 2.5 TABLET, FILM COATED ORAL at 08:13

## 2025-03-29 RX ADMIN — EMPAGLIFLOZIN 10 MG: 10 TABLET, FILM COATED ORAL at 08:13

## 2025-03-29 ASSESSMENT — ACTIVITIES OF DAILY LIVING (ADL)
ADLS_ACUITY_SCORE: 35
ADLS_ACUITY_SCORE: 37
ADLS_ACUITY_SCORE: 35
ADLS_ACUITY_SCORE: 35
ADLS_ACUITY_SCORE: 37
DEPENDENT_IADLS:: CLEANING;COOKING;LAUNDRY;SHOPPING;MEAL PREPARATION;MEDICATION MANAGEMENT;TRANSPORTATION
ADLS_ACUITY_SCORE: 37
ADLS_ACUITY_SCORE: 35
ADLS_ACUITY_SCORE: 35
ADLS_ACUITY_SCORE: 37
ADLS_ACUITY_SCORE: 35
ADLS_ACUITY_SCORE: 35
ADLS_ACUITY_SCORE: 37

## 2025-03-29 NOTE — CARE PLAN
Discharge to: [facility]  Transportation: daughter  Time: 1430  Prescriptions: outside pharmacy  Belongings: with patient  Access: removed  Care plan and education discontinued: yes  Paperwork: reviewed with patient. All questions answered

## 2025-03-29 NOTE — PROGRESS NOTES
Observation goals  PRIOR TO DISCHARGE        Comments:   -diagnostic tests and consults completed and resulted: in progress  -vital signs normal or at patient baseline: met  -EGD performed: not yet  -bleeding stopped or stable: in progress  -hgb stable: met     Nurse to notify provider when observation goals have been met and patient is ready for discharge.

## 2025-03-29 NOTE — MEDICATION SCRIBE - ADMISSION MEDICATION HISTORY
Medication Scribe Admission Medication History    Admission medication history is complete. The information provided in this note is only as accurate as the sources available at the time of the update.    Information Source(s): Patient and Family member via in-person    Pertinent Information: Barbara (daughter) reports that she is taking olanzapine 1.25 MG two times a day.  Patient denies taking any other medications. Dispense report and outside medication reconciliation list have been reviewed.     Changes made to PTA medication list:  Added:   ferrous gluconate (FERGON) 324 (38 Fe) MG tablet (last dispense 03/17/25)  Deleted: None  Changed:   OLANZapine (ZYPREXA) 2.5 MG tablet.  Take 1 tablet (2.5 mg) by mouth 2 times daily ---> OLANZapine (ZYPREXA) 2.5 MG tablet.  Take 1/2 tablet (1.25 mg) by mouth 2 times daily  Calcium Polycarbophil (FIBER) 625 MG tablet. Take 2 tablets by mouth daily. ----> Calcium Polycarbophil (FIBER) 625 MG tablet. Take 1 tablet by mouth daily.    Allergies reviewed with patient and updates made in EHR: yes    Medication History Completed By: Christos Corbett 3/28/2025 10:09 PM    Current Facility-Administered Medications for the 3/28/25 encounter (Hospital Encounter)   Medication    denosumab (PROLIA) injection 60 mg     PTA Med List   Medication Sig Last Dose/Taking    acetaminophen (TYLENOL) 325 MG tablet Take 325-650 mg by mouth every 6 hours as needed for mild pain 3/26/2025    albuterol (PROVENTIL) (2.5 MG/3ML) 0.083% neb solution Take 1 vial (2.5 mg) by nebulization 2 times daily as needed (COPD) Taking As Needed    aspirin 81 MG EC tablet Take 1 tablet (81 mg) by mouth daily 3/28/2025 Morning    atorvastatin (LIPITOR) 10 MG tablet Take 1 tablet (10 mg) by mouth daily. 3/28/2025 Morning    busPIRone (BUSPAR) 15 MG tablet Take 1 tablet (15 mg) by mouth 2 times daily. 3/28/2025 Morning    calcium carbonate (TUMS) 500 MG chewable tablet Take 1 tablet (500 mg) by mouth 4 times daily as  needed for heartburn Taking As Needed    Calcium Polycarbophil (FIBER) 625 MG tablet Take 1 tablet by mouth daily. 3/27/2025 Evening    donepezil (ARICEPT) 10 MG tablet Take 1 tablet (10 mg) by mouth at bedtime. 3/27/2025 Bedtime    empagliflozin (JARDIANCE) 10 MG TABS tablet Take 1 tablet (10 mg) by mouth daily. 3/28/2025 Morning    ferrous gluconate (FERGON) 324 (38 Fe) MG tablet Take 324 mg by mouth daily (with breakfast). 3/27/2025 at  4:30 PM    FLUoxetine (PROZAC) 40 MG capsule Take 1 capsule (40 mg) by mouth daily. 3/27/2025 Bedtime    Fluticasone-Umeclidin-Vilanterol (TRELEGY ELLIPTA) 200-62.5-25 MCG/ACT oral inhaler Inhale 1 puff into the lungs daily. 3/28/2025 Morning    furosemide (LASIX) 20 MG tablet Take 1 tablet (20 mg) by mouth daily as needed (take for weight > or equal to 130 lb). Taking As Needed    lactobacillus rhamnosus, GG, (CULTURELL) capsule Take 1 capsule by mouth daily 3/27/2025 Evening    latanoprost (XALATAN) 0.005 % ophthalmic solution Place 1 drop into both eyes at bedtime 3/27/2025 Bedtime    levothyroxine (SYNTHROID/LEVOTHROID) 50 MCG tablet TAKE 1 TABLET BY MOUTH EVERY DAY 3/28/2025 Morning    losartan (COZAAR) 25 MG tablet Take 0.5 tablets (12.5 mg) by mouth daily. 3/28/2025 Morning    magnesium oxide (MAG-OX) 400 MG tablet Take 1 tablet (400 mg) by mouth at bedtime. 3/27/2025 Bedtime    metoprolol succinate ER (TOPROL XL) 25 MG 24 hr tablet Take 0.5 tablets (12.5 mg) by mouth at bedtime. 3/27/2025 Bedtime    OLANZapine (ZYPREXA) 2.5 MG tablet Take 1 tablet (2.5 mg) by mouth 2 times daily. (Patient taking differently: Take 2.5 mg by mouth 2 times daily. 1/2 tablet (1.25 MG) 2 times daily) 3/28/2025 Morning    oxyCODONE (ROXICODONE) 5 MG tablet TAKE 0.5 TABLETS (2.5 MG) BY MOUTH NIGHTLY AS NEEDED FOR PAIN 3/22/2025    pantoprazole (PROTONIX) 40 MG EC tablet Take 1 tablet (40 mg) by mouth daily 3/27/2025 at  4:30 PM    potassium chloride caroline ER (KLOR-CON M20) 20 MEQ CR tablet Take 1  tablet (20 mEq) by mouth daily as needed (take with dose of lasix). Taking As Needed    senna-docusate (SENOKOT-S/PERICOLACE) 8.6-50 MG tablet Take 1 tablet by mouth 2 times daily as needed for constipation Taking As Needed    triamcinolone (KENALOG) 0.1 % external cream Apply topically 2 times daily as needed for irritation Taking As Needed    vitamin D3 (CHOLECALCIFEROL) 50 mcg (2000 units) tablet Take 1 tablet (50 mcg) by mouth daily 3/27/2025 at  4:30 PM

## 2025-03-29 NOTE — DISCHARGE SUMMARY
"[facility]  Hospitalist Discharge Summary      Date of Admission:  3/28/2025  Date of Discharge:  3/29/2025  Discharging Provider: Jeff Lopez PA-C  Discharge Service: Hospitalist Service    Discharge Diagnoses   See hospital course below.     Clinically Significant Risk Factors          Follow-ups Needed After Discharge   Follow-up Appointments       Hospital Follow-up with Existing Primary Care Provider (PCP)      Please see details below         Schedule Primary Care visit within: 7 Days               Unresulted Labs Ordered in the Past 30 Days of this Admission       No orders found for last 31 day(s).          Discharge Disposition   Discharged to home  Condition at discharge: Stable    Hospital Course   Idalia Bob is a 80 year old female admitted on 3/28/2025 for management of a GIB. She has history significant for past gastric ulcer, GERD, HFrEF, dementia, COPD, CKD 3, hypertension.     #Hematemesis and melena  #Hx UGIB from bleeding gastric ulcers seen on EGD 2022  #GERD  #Hiatal hernia  Presented w/ 2 days of \"chunky\" black emesis and 3 small tarry black stools. No notable abdominal pain, fevers, chills, worsened lightheadedness, worsened SOB (has lightheadedness and SOB at baseline likely related to HF and COPD).  Resides at assisted living facility, they manage her meds. Not on blood thinners. Hx of significant alcohol use, but none recently. Possibly utilizing ibuprofen for back pain, has been clarified with her daughter and facility that she should utilize apap. On daily PPI.  Previous workup has included EGD x2-- 12/2022 EGD done for black stools, showed bleeding gastric ulcers, unclear of any procedural intervention; 2/2024 EGD done (not for concerns of bleeding) with large hiatal hernia and possible GAVE/gastritis. On admission, CXR w/ hiatal hernia. Labs notable for hgb 11.8, Cr 1.6, BUN 28.7, mild hyponatremia.   - GI consult, appreciate " input. No need for scope, okay for discharge.   - IV PPI BID while here. Transition to BID pantoprazole on discharge x4 weeks.   - H pylori stool antigen ordered but unable to be collected before discharge  - HOLD PTA asa 81mg while here. Resume on discharge given GI w/o c/f acute bleed.      #JUANIS  #CKD  BL Cr 1.1- 1.2. On admission, Cr elevated to 1.63. Suspect prerenal etiology given poor fluid intake PTA (drinks less than 1 glass water per day), furosemide for HF, and GI losses as above. S/p 750 ml LR over 10 hours on admission with improvement in kidney function.   - HOLD PTA losartan and PRN lasix in setting of JUANIS. Resume on discharge.   - Avoid nephrotoxins, renally dose meds as appropriate    # Mild acute hyponatremia, resolved  Hx of mild recurrent hyponatremia. 133 today, now WNL.    # Mildly prolonged QTc  Qtc 462 3/28, historically more prolonged. Multiple medication culprits.     # HFrEF - EF 10-15%  # NICM  # Hypertension  # Moderate tricuspid insufficiency.   Diagnosed last May. Sees [facility]. Last ECHO 5/15/2024 with EF 10-15% with severe diffuse hypokinesis and moderately reduced RV function. Moderate tricuspid insufficiency as well.   - cautiously used fluids as above  - PTA statin  - PTA Jardiance 10 mg daily  - PTA metoprolol 12.5 mg nightly  - hold PTA aspirin 81 mg daily  - hold PTA losartan 12.5 mg daily with JUANIS, resume on discharge  - hold PTA PRN Lasix 20 mg for weight >/= 130 lb (currently 124 lb) and PRN potassium replacement for when taking Lasix. -- dose these as needed.     # Dementia   Memory impairments have been in place for about 5 years. She seems to be able to have fair recall of recent history. Can be slow to think of her response. Per daughter patient is at baseline. Resides at [facility].   - PTA Aricept    # Chronic deconditioning - uses walker to ambulate. Lives at [facility].   # Chronic low back pain - was on oxycodone for a short period last year, now mostly uses acetaminophen  #  COPD - PTA inhaler sub Breo Ellipta, PRN albuterol neb  # Hypothyroidism - PTA levothyroxine  # Anxiety and depression - PTA buspar, Fluoxetine, Zyprexa  # MARYURI - formally diagnosed but does not use CPAP  # Raynaud's    Consultations This Hospital Stay   NURSING TO CONSULT FOR VASCULAR ACCESS CARE IP CONSULT  GI LUMINAL ADULT IP CONSULT  CARE MANAGEMENT / SOCIAL WORK IP CONSULT    Code Status   Prior    Time Spent on this Encounter   IJeff PA-C, personally saw the patient today and spent greater than 30 minutes discharging this patient.       Jeff Lopez PA-C  [facility] [address] OBSERVATION  [address] [address]  Phone: [phone]  Fax: [phone]  ______________________________________________________________________    Physical Exam   Vital Signs: Temp: 97.5  F (36.4  C) Temp src: Oral BP: (!) 146/59 Pulse: 68   Resp: 16 SpO2: 100 % O2 Device: Nasal cannula Oxygen Delivery: 1 LPM  Weight: 0 lbs 0 oz  General Appearance: NAD, comfortable appearing.   Respiratory: Lungs CTAB. Breathing comfortably on room air.   Cardiovascular: RRR, no murmur appreciated.   GI: No abdominal pain, soft to palpation.   Skin: Warm and dry.   Other: Pleasant and cooperative. Somewhat forgetful, but overall alert and oriented.        Primary Care Physician   Fernando Medina    Discharge Orders      CBC with platelets     Primary Care - Care Coordination Referral      Reason for your hospital stay    You were admitted following several bouts of dark stools and dark vomit, which was concerning for a possible GI bleed. Reassuringly, your vitals remained stable and your hemoglobin (the lab we use to check if you could be bleeding) was stable as well without significant drop. Out GI team saw you and did not feel you needed to have a scope (or EGD) done to evaluate for an active bleed.     It is very important that you start to take your pantoprazole twice daily for 4 weeks. It  is also very important for you to follow up with your Primary Care Provider within 1 week with repeat labs to make sure that your hemoglobin is stable.     If you experience dark black stool or vomit, frankly bloody stool or vomit, severe abdominal pain, new lightheadedness, chest pain, shortness of breath, you should be evaluated by a medical provider immediately.     Activity    Your activity upon discharge: activity as tolerated     Diet    Follow this diet upon discharge: Current Diet:Orders Placed This Encounter      NPO for Procedure/Surgery per Anesthesia Guidelines Except for: Meds; Clear liquids before procedure/surgery: ADULT (Age GREATER than or Equal to 18 years) - Clear liquids 2 hours before procedure/surgery     Hospital Follow-up with Existing Primary Care Provider (PCP)    Please see details below            Significant Results and Procedures   Most Recent 3 CBC's:  Recent Labs   Lab Test 03/29/25  1123 03/29/25  0610 03/28/25  1702 02/08/25  1421   WBC  --  7.0 10.5 14.7*   HGB 11.9 10.2* 11.8 12.2   MCV  --  91 87 87   PLT  --  241 280 373     Most Recent 3 BMP's:  Recent Labs   Lab Test 03/29/25  0610 03/28/25  1702 02/07/25  1236    133* 135   POTASSIUM 4.5 4.9 4.3   CHLORIDE 103 99 97*   CO2 23 23 24   BUN 21.9 28.7* 17.2   CR 1.34* 1.63* 1.26*   ANIONGAP 11 11 14   AYUSH 8.7* 9.4 9.4   GLC 72 110* 129*     Most Recent 2 LFT's:  Recent Labs   Lab Test 03/29/25  0610 03/28/25  1702   AST 12 18   ALT <5 7   ALKPHOS 66 91   BILITOTAL 0.2 0.3     Most Recent 3 INR's:  Recent Labs   Lab Test 06/09/24  1846 05/15/24  0929   INR 1.06 2.34*     7-Day Micro Results       No results found for the last 168 hours.          Most Recent Urinalysis:  Recent Labs   Lab Test 02/08/25  1352   COLOR Yellow   APPEARANCE Slightly Cloudy*   URINEGLC >=1000*   URINEBILI Negative   URINEKETONE Negative   SG 1.020   UBLD Negative   URINEPH 5.5   PROTEIN 30*   UROBILINOGEN 0.2   NITRITE Negative   LEUKEST Small*    RBCU 0-2   WBCU 5-10*   ,   Results for orders placed or performed during the hospital encounter of 03/28/25   XR Chest 2 Views    Narrative    EXAM: XR CHEST 2 VIEWS  LOCATION: [facility]  DATE: 3/28/2025    INDICATION: hypoxia  COMPARISON: PA and lateral views the chest 6/10/2024      Impression    IMPRESSION:     Top normal size of the cardiac silhouette. There is a moderate to large hiatal hernia overlying the lower thoracic spine with an air-fluid level. Mild mass effect on the medial lower lobes, greater on the right. Moderate atheromatous calcifications of   the thoracic aorta.    Unchanged focal platelike atelectasis along the superior left major fissure. A focal airspace opacity along the lateral costal pleura in the right base has cleared with only minimal interstitial scarring in this location. No new airspace opacities.    No pleural effusion or pneumothorax.    Reciprocal thoracic spine curvature, disc space narrowing and small marginal osteophytes.       Discharge Medications   Discharge Medication List as of 3/29/2025  1:26 PM        CONTINUE these medications which have CHANGED    Details   pantoprazole (PROTONIX) 40 MG EC tablet Take 1 tablet (40 mg) by mouth 2 times daily., Disp-90 tablet, R-0, E-Prescribe           CONTINUE these medications which have NOT CHANGED    Details   acetaminophen (TYLENOL) 325 MG tablet Take 325-650 mg by mouth every 6 hours as needed for mild pain, Historical      albuterol (PROVENTIL) (2.5 MG/3ML) 0.083% neb solution Take 1 vial (2.5 mg) by nebulization 2 times daily as needed (COPD), Disp-90 mL, R-5, E-Prescribe      aspirin 81 MG EC tablet Take 1 tablet (81 mg) by mouth daily, Disp-30 tablet, R-0, E-Prescribe      atorvastatin (LIPITOR) 10 MG tablet Take 1 tablet (10 mg) by mouth daily., Disp-90 tablet, R-3, E-Prescribe      busPIRone (BUSPAR) 15 MG tablet Take 1 tablet (15 mg) by mouth 2 times daily., Disp-60 tablet,  R-3, E-Prescribe      calcium carbonate (TUMS) 500 MG chewable tablet Take 1 tablet (500 mg) by mouth 4 times daily as needed for heartburn, Transitional      Calcium Polycarbophil (FIBER) 625 MG tablet Take 1 tablet by mouth daily., Historical      donepezil (ARICEPT) 10 MG tablet Take 1 tablet (10 mg) by mouth at bedtime., Disp-90 tablet, R-3, E-PrescribeDose increased      empagliflozin (JARDIANCE) 10 MG TABS tablet Take 1 tablet (10 mg) by mouth daily., Disp-90 tablet, R-1, E-Prescribe      ferrous gluconate (FERGON) 324 (38 Fe) MG tablet Take 324 mg by mouth daily (with breakfast)., Historical      FLUoxetine (PROZAC) 40 MG capsule Take 1 capsule (40 mg) by mouth daily., Disp-90 capsule, R-3, E-Prescribe      Fluticasone-Umeclidin-Vilanterol (TRELEGY ELLIPTA) 200-62.5-25 MCG/ACT oral inhaler Inhale 1 puff into the lungs daily., Disp-28 each, R-11, E-Prescribe      furosemide (LASIX) 20 MG tablet Take 1 tablet (20 mg) by mouth daily as needed (take for weight > or equal to 130 lb)., Disp-100 tablet, R-3, E-Prescribe      lactobacillus rhamnosus, GG, (CULTURELL) capsule Take 1 capsule by mouth daily, Historical      latanoprost (XALATAN) 0.005 % ophthalmic solution Place 1 drop into both eyes at bedtime, Transitional      levothyroxine (SYNTHROID/LEVOTHROID) 50 MCG tablet TAKE 1 TABLET BY MOUTH EVERY DAY, Disp-90 tablet, R-2, E-Prescribe      losartan (COZAAR) 25 MG tablet Take 0.5 tablets (12.5 mg) by mouth daily., Disp-45 tablet, R-3, E-Prescribe      magnesium oxide (MAG-OX) 400 MG tablet Take 1 tablet (400 mg) by mouth at bedtime., Disp-90 tablet, R-3, E-Prescribe      metoprolol succinate ER (TOPROL XL) 25 MG 24 hr tablet Take 0.5 tablets (12.5 mg) by mouth at bedtime., Disp-60 tablet, R-2, E-Prescribe      OLANZapine (ZYPREXA) 2.5 MG tablet Take 1 tablet (2.5 mg) by mouth 2 times daily., Disp-180 tablet, R-1, E-Prescribe      oxyCODONE (ROXICODONE) 5 MG tablet TAKE 0.5 TABLETS (2.5 MG) BY MOUTH NIGHTLY AS  NEEDED FOR PAIN, Historical      potassium chloride caroline ER (KLOR-CON M20) 20 MEQ CR tablet Take 1 tablet (20 mEq) by mouth daily as needed (take with dose of lasix)., Disp-100 tablet, R-3, E-Prescribe      senna-docusate (SENOKOT-S/PERICOLACE) 8.6-50 MG tablet Take 1 tablet by mouth 2 times daily as needed for constipation, Transitional      triamcinolone (KENALOG) 0.1 % external cream Apply topically 2 times daily as needed for irritationTransitional      vitamin D3 (CHOLECALCIFEROL) 50 mcg (2000 units) tablet Take 1 tablet (50 mcg) by mouth daily, Transitional           Allergies   Allergies   Allergen Reactions    Penicillins Itching     Has tolerated ceftriaxone, piperacillin, and amoxicillin

## 2025-03-29 NOTE — PLAN OF CARE
BP (!) 147/69 (BP Location: Right arm, Patient Position: Supine)   Pulse 72   Temp 97.7  F (36.5  C) (Oral)   Resp 18   Ht 1.524 m (5')   LMP  (LMP Unknown)   SpO2 93%   BMI 24.37 kg/m       Assumed cares: 4335-9597    Neuro: A&Ox4.   Cardiac: afebrile on tele, SR, HR-60's. VSS.   Respiratory: Sating at 96% on 2 L NC. Denies SOB and chest pain.   GI/:  denies n/v/d. Adequate urine output via bedside commode. BM X 0 on this shift.   Diet/appetite: NPO  Activity:  Assist of 1, bed alarm on for safety.   Pain: At acceptable level on current regimen.   Skin: No new deficits noted.  LDA's: right PIV infusing continuous LR at 75 ml/hr.     Plan:  pt need stool sample for H. Pylori rule out. Continue with POC. Notify primary team with changes.   Problem: Adult Inpatient Plan of Care  Goal: Plan of Care Review  Description: The Plan of Care Review/Shift note should be completed every shift.  The Outcome Evaluation is a brief statement about your assessment that the patient is improving, declining, or no change.  This information will be displayed automatically on your shiftnote.  Outcome: Progressing  Flowsheets (Taken 3/29/2025 0400)  Plan of Care Reviewed With: patient  Overall Patient Progress: improving   Goal Outcome Evaluation:      Plan of Care Reviewed With: patient    Overall Patient Progress: improvingOverall Patient Progress: improving

## 2025-03-29 NOTE — CONSULTS
Care Management Initial Consult    General Information  Assessment completed with: Patient, VM-chart review, Family, Idalia and daughter Barbara Bob  Type of CM/SW Visit: Initial Assessment    Primary Care Provider verified and updated as needed: Yes (Fernando Medina [phone] [address], [place] [id number])   Readmission within the last 30 days: no previous admission in last 30 days      Reason for Consult: discharge planning, utilization management concerns (elevated readmission risk score)  Advance Care Planning: Advance Care Planning Reviewed: present on chart  HCA - Daughter Barbara Bob; 1st Alternate HCA - Daughter Corie Bob     Communication Assessment  Patient's communication style: spoken language (English or Bilingual)    Hearing Difficulty or Deaf: no   Wear Glasses or Blind: yes    Cognitive  Cognitive/Neuro/Behavioral: WDL                      Living Environment:   People in home: spouse, facility resident     Current living Arrangements: assisted living  Name of Facility: [facility]   Able to return to prior arrangements: yes     Family/Social Support:  Care provided by: self, child(yany), other (see comments) (facility staff)  Provides care for: no one, unable/limited ability to care for self  Marital Status:   Support system: Children, Facility resident(s)/Staff,   Michael       Description of Support System: Supportive, Involved    Support Assessment: Adequate family and caregiver support, Adequate social supports, Patient communicates needs well met    Current Resources:   Patient receiving home care services: No (Lymphedema wraps provided by facility staff)      Community Resources: Housekeeping/Chore Agency  Equipment currently used at home: grab bar, tub/shower, grab bar, toilet, walker, standard, wheelchair, manual, shower chair  Supplies currently used at home: Oxygen Tubing/Supplies (Provided by [facility])    Employment/Financial:  Employment Status: retired        Financial Concerns: none   Referral to Financial Worker: No     Does the patient's insurance plan have a 3 day qualifying hospital stay waiver?  Yes     Which insurance plan 3 day waiver is available? ACO REACH    Will the waiver be used for post-acute placement? Undetermined at this time    Lifestyle & Psychosocial Needs:  Social Drivers of Health     Food Insecurity: Low Risk  (3/29/2025)    Food Insecurity     Within the past 12 months, did you worry that your food would run out before you got money to buy more?: No     Within the past 12 months, did the food you bought just not last and you didn t have money to get more?: No   Depression: Not at risk (2/25/2025)    PHQ-2     PHQ-2 Score: 0   Housing Stability: Low Risk  (3/29/2025)    Housing Stability     Do you have housing? : Yes     Are you worried about losing your housing?: No   Tobacco Use: Medium Risk (2/25/2025)    Patient History     Smoking Tobacco Use: Former     Smokeless Tobacco Use: Never     Passive Exposure: Past   Financial Resource Strain: Low Risk  (3/29/2025)    Financial Resource Strain     Within the past 12 months, have you or your family members you live with been unable to get utilities (heat, electricity) when it was really needed?: No   Alcohol Use: Not on file   Transportation Needs: Low Risk  (3/29/2025)    Transportation Needs     Within the past 12 months, has lack of transportation kept you from medical appointments, getting your medicines, non-medical meetings or appointments, work, or from getting things that you need?: No   Physical Activity: Patient Declined (2/25/2025)    Exercise Vital Sign     Days of Exercise per Week: Patient declined     Minutes of Exercise per Session: Patient declined   Interpersonal Safety: Low Risk  (2/25/2025)    Interpersonal Safety     Do you feel physically and emotionally safe where you currently live?: Yes     Within the  "past 12 months, have you been hit, slapped, kicked or otherwise physically hurt by someone?: Not on file     Within the past 12 months, have you been humiliated or emotionally abused in other ways by your partner or ex-partner?: Not on file   Stress: No Stress Concern Present (2/25/2025)    [employer] [place] [employer] - Occupational Stress Questionnaire     Feeling of Stress : Only a little   Social Connections: Unknown (2/25/2025)    Social Connection and Isolation Panel [NHANES]     Frequency of Communication with Friends and Family: Not on file     Frequency of Social Gatherings with Friends and Family: Three times a week     Attends Hindu Services: Not on file     Active Member of Clubs or Organizations: Not on file     Attends Club or Organization Meetings: Not on file     Marital Status: Not on file   Health Literacy: Not on file     Functional Status:  Prior to admission patient needed assistance:   Dependent ADLs:: Ambulation-walker, Bathing, Wheelchair-with assist  Dependent IADLs:: Cleaning, Cooking, Laundry, Shopping, Meal Preparation, Medication Management, Transportation     Mental Health Status:  Mental Health Status: No Current Concerns       Chemical Dependency Status:  Chemical Dependency Status: No Current Concerns           Values/Beliefs:  Spiritual, Cultural Beliefs, Hindu Practices, Values that affect care: no          Values/Beliefs Comment: Kalee    Discussed  Partnership in Safe Discharge Planning  document with patient/family: No    Additional Information:    Per chart review: \"Idalia Bob is a 80 year old female admitted on 3/28/2025 for management of a GIB. She has history significant for past gastric ulcer, GERD, HFrEF, dementia, COPD, CKD 3, hypertension.\" (Irma Silva PA-C; 3/28/2025)    Care Management/Social Work Consult placed due to patient's complex medical history and elevated unplanned readmission risk score and for discharge planning. SW performed " chart review to begin assessment.     0970 FABIOLA met with Idalia at bedside to complete assessment, confirm/update information in previous assessment (6/10/2024), and review the Medicare Outpatient Observation Notice (MOON)FABIOLA introduced self and role, and explained the reason for the visit. Idalia agreed to speak with FABIOLA HICKS provided unsigned MOON document; explained it, then addressed questions and concerns.    0940 Idalia signed TINOCO document acknowledging its receipt. Pt  declined copy of signed document for pt records    Idalia confirmed that she lives at [facility]. Staff assist her with light housekeeping, laundry, medication management, and provide her meals. Per chart review Idalia had been receiving OT/PT but she was unable to confirm if she was still receiving it. She gave SW verbal permission to speak with her daughter Barbara for clarification.    1008 SW called Barbara Bob ([phone])  to review Medicare document and get clarification on cares at [facility]. FABIOLA introduced self and role, and explained the reason for the call. Barbara agreed to speak with FABIOLA    SW reviewed the document with Barbara who voiced understanding.  She confirmed that [facility] staff provide lymphedema wraps daily. Idalia dresses herself, but Barbara or her sister Corie assist her with showering. Staff measures her weight and BP daily due to her heart failure. The sisters assist her with any shopping needs and provide snacks for her to keep in her apartment, in addition to her meal. She confirmed that facility staff perform all the services and that Idalia does not currently have services provided by an outside home care agency. She expressed preference for [facility] for home care should it be recommended    Barbara expressed concern that Idalia has not eaten in almost 24 hours and was still NPO due to upcoming procedure. She expressed frustration that no one could tell her when the procedure  would be performed. SW agreed to contact the GI team to ask them to provide an update to family.    SW provided support via active and reflective listening and responding to feelings; normalized and validated feelings and experiences; and provided a supportive non-anxious presence. No additional questions or concerns were reported. SW provided availability and contact information and the call was ended.    FABIOLA faxed TINOCO to [facility] ([phone]) and placed TINOCO in Idalia's chart.            [facility]  Nursing line: [phone]    Fax: [phone]    [facility]  Phone  [phone]  Fax  [phone] 1147 FABOILA sent Fetch It message to GI Attending MD Perez asking for an update for family on timing of procedure.    Next Steps:    Follow for discharge recommedations      SW will continue to follow as needed.    EDITH Lafleur, LGSW  ED/OBS   [facility]  Phone: [phone]  Pager: [phone]  Fax: [phone]     After hours VocAireon and After Hours Vocera Shannon     On-call pager, [phone], 4:00 pm to 8:30 pm

## 2025-03-29 NOTE — PROGRESS NOTES
Brief Progress Note:    80 year old female with a history dementia, gastric ulcer, COPD, CKD3, AMRIT, hypertension, GERD, and Iron Deficiency Anemia who presents to the ED for reported coffee ground emesis, and black stool X 1 day.  She takes about 400 mg of ibuprofen daily for back pain     VSS. Initial labs: BUN 28, Cr 1.6, Hb 11.8 from baseline 12.2, otherwise unremarkable    Plan:  -IVFs given Elliott on CKD  -PPI IV BID  -CBC BID  -CLD for now, NPO at midnight  -Send HP stool Ag    Luminal team to see in AM

## 2025-03-29 NOTE — CARE PLAN
PRIMARY DIAGNOSIS: GI BLEED    OUTPATIENT/OBSERVATION GOALS TO BE MET BEFORE DISCHARGE  Orthostatic performed: N/A    Stable Hgb Yes.   Recent Labs   Lab Test 03/29/25  0610 03/28/25  1702 02/08/25  1421   HGB 10.2* 11.8 12.2       Resolved or declined bleeding episodes: No,  without any bleeding Last episode: yesterday    Appropriate testing complete: No    Cleared for discharge by consultants (if involved): No    Safe discharge environment identified: No    Discharge Planner Nurse   Safe discharge environment identified: No  Barriers to discharge: Yes       Entered by: Mat Toth RN 03/29/2025 9:22 AM     Please review provider order for any additional goals.   Nurse to notify provider when observation goals have been met and patient is ready for discharge.      -diagnostic tests and consults completed and resulted in progress  -vital signs normal or at patient baseline met  -EGD performed not met  -bleeding stopped or stable in progress   -hgb stable in progress

## 2025-03-29 NOTE — CARE PLAN
-diagnostic tests and consults completed and resulted in progress  -vital signs normal or at patient baseline met  -EGD performed not met  -bleeding stopped or stable in progress   -hgb stable met

## 2025-03-29 NOTE — H&P
"[facility]    History and Physical - Hospitalist Service, GOLD TEAM        Date of Admission:  3/28/2025    Assessment & Plan   Idalia Bob is a 80 year old female admitted on 3/28/2025 for management of a GIB. She has history significant for past gastric ulcer, GERD, HFrEF, dementia, COPD, CKD 3, hypertension.     # Hematemesis and melena  # Hx UGIB from bleeding gastric ulcers seen on EGD 2022  # GERD  # Hiatal hernia  Presents with 2 \"chunky\" black emeses and 3 small tarry black stools in the last 2 days. No abdominal pain, fevers, chills, dizziness or lightheadedness out of the norm, or SOB out of the norm in context of her HF and COPD. It was previously thought that she uses ibuprofen for back pain, but upon clarification with daughter she should only be using acetaminophen. Medications should be regulated at her assisted living facility. She is not on blood thinners, has history of significant alcohol use but none recently. She does take a daily PPI.   -- 12/2022 EGD done for black stools, showed bleeding gastric ulcers, unclear of any procedural intervention.   -- 2//2024 EGD done (not for concerns of bleeding) with large hiatal hernia and possible GAVE/gastritis.   -- today CXR showed hiatal hernia  -- hgb 11.8, but likely hemoconcentrated  -- BUN 28.7  Plan:   - admit to observation  - GI consult, will see formally in am, appreciate their following recs tonight which we implemented   - NPO at [facility], CLD in meantime   - IV PPI BID   - CBC BID   - check h pylori stool antigen  - hold PTA aspirin 81 mg daily (last dose this morning)  - nursing to monitor stools closely    # JUANIS on CKD 3  Baseline creatinine 1.1-1.2. Seems to have started being elevated about 1 year ago. Patient is on PRN Lasix as below for HF. She does not drink very much fluid - less than 1 glass per day. Creat 1.6 today, likely prerenal with GI losses. Dry on exam.   - judicious maintenance " fluids (given HF below) of LR at 75 mL/hr x 12 hrs  - avoid nephrotoxins  - hold PTA losartan and PRN Lasix    # Mild acute hyponatremia  Hx of mild recurrent hyponatremia. 133 today.   - CTM    # Mildly prolonged QTc  Qtc 462 today, historically more prolonged. Multiple medication culprits.   - consider repeat EKG in morning    # HFrEF - EF 10-15%  # NICM  # Hypertension  # Moderate tricuspid insufficiency.   Diagnosed last May. Sees [facility]. Last ECHO 5/15/2024 with EF 10-15% with severe diffuse hypokinesis and moderately reduced RV function. Moderate tricuspid insufficiency as well.   - cautiously using fluids as above  - PTA statin  - PTA Jardiance 10 mg daily  - PTA metoprolol 12.5 mg nightly  - hold PTA aspirin 81 mg daily  - hold PTA losartan 12.5 mg daily with JUANIS.   - hold PTA PRN Lasix 20 mg for weight >/= 130 lb (currently 124 lb) and PRN potassium replacement for when taking Lasix. -- dose these as needed.     # Dementia   Memory impairments have been in place for about 5 years. She seems to be able to have fair recall of recent history. Can be slow to think of her response. Per daughter patient is at baseline. Resides at [facility].   - PTA Aricept    # Chronic deconditioning - uses walker to ambulate. Lives at [facility].   # Chronic low back pain - was on oxycodone for a short period last year, now mostly uses acetaminophen  # COPD - PTA inhaler sub Breo Ellipta, PRN albuterol neb  # Hypothyroidism - PTA levothyroxine  # Anxiety and depression - PTA buspar, Fluoxetine, Zyprexa  # MARYURI - formally diagnosed but does not use CPAP  # Raynaud's       Observation Goals: -diagnostic tests and consults completed and resulted, -vital signs normal or at patient baseline, -EGD performed, -bleeding stopped or stable, -hgb stable, Nurse to notify provider when observation goals have been met and patient is ready for discharge.  Diet: Clear Liquid Diet  NPO for Procedure/Surgery per Anesthesia Guidelines Except for: Meds;  "Clear liquids before procedure/surgery: ADULT (Age GREATER than or Equal to 18 years) - Clear liquids 2 hours before procedure/surgery  DVT Prophylaxis: Pneumatic Compression Devices  Ruiz Catheter: Not present  Lines: None     Cardiac Monitoring: None  Code Status: No CPR- Do NOT Intubate    Clinically Significant Risk Factors Present on Admission         # Hyponatremia: Lowest Na = 133 mmol/L in last 2 days, will monitor as appropriate         # Drug Induced Platelet Defect: home medication list includes an antiplatelet medication   # Hypertension: Noted on problem list    # Chronic heart failure with reduced ejection fraction: last echo with EF <40%    # Dementia: noted on problem list           # Financial/Environmental Concerns:           Disposition Plan     Medically Ready for Discharge: Anticipated Tomorrow         The patient's care was discussed with the Attending Physician, Dr. Yates, Bedside Nurse, Patient, and Patient's Family.    Irma Silva PA-C  Hospitalist Service, [facility]  Securely message with Vocera (more info)  Text page via [facility] Paging/Directory   See signed in provider for up to date coverage information    ______________________________________________________________________    Chief Complaint   Black stools and vomit    History is obtained from the patient and patient's daughter.     History of Present Illness   Idalia Bob is a 80 year old female who is seen for a medical evaluation. Presents with 2 \"chunky\" black emeses and 3 small tarry black stools in the last 2 days. No abdominal pain, fevers, chills, dizziness or lightheadedness out of the norm, or SOB out of the norm in context of her HF and COPD. It was previously thought that she uses ibuprofen for back pain, but upon clarification with daughter she should only be using acetaminophen. Medications should be regulated at her assisted living facility. She is not " on blood thinners, has history of significant alcohol use but none recently. She does take a daily PPI. Denies recent CP, SOB, abdominal pain, cough, LE swelling.       Past Medical History    Past Medical History:   Diagnosis Date    Chronic kidney disease (CKD) 01/10/2023    Chronic obstructive pulmonary disease (H) 01/10/2023    Dementia (H) 11/28/2023    GERD (gastroesophageal reflux disease) 03/15/2023    Hypertension 11/28/2023       Past Surgical History   Past Surgical History:   Procedure Laterality Date    ESOPHAGOSCOPY, GASTROSCOPY, DUODENOSCOPY (EGD), COMBINED N/A 02/07/2024    Procedure: ESOPHAGOGASTRODUODENOSCOPY, WITH BIOPSY;  Surgeon: Felton James MD;  Location:  GI    PICC DOUBLE LUMEN PLACEMENT Right 05/17/2024    Basilic Vein 5F DL 37 cm       Prior to Admission Medications   Prior to Admission Medications   Prescriptions Last Dose Informant Patient Reported? Taking?   Calcium Polycarbophil (FIBER) 625 MG tablet   Yes No   Sig: Take 2 tablets by mouth daily   FLUoxetine (PROZAC) 40 MG capsule   No No   Sig: Take 1 capsule (40 mg) by mouth daily.   Fluticasone-Umeclidin-Vilanterol (TRELEGY ELLIPTA) 200-62.5-25 MCG/ACT oral inhaler   No No   Sig: Inhale 1 puff into the lungs daily.   OLANZapine (ZYPREXA) 2.5 MG tablet   No No   Sig: Take 1 tablet (2.5 mg) by mouth 2 times daily.   acetaminophen (TYLENOL) 325 MG tablet   Yes No   Sig: Take 325-650 mg by mouth every 6 hours as needed for mild pain   albuterol (PROVENTIL) (2.5 MG/3ML) 0.083% neb solution   No No   Sig: Take 1 vial (2.5 mg) by nebulization 2 times daily as needed (COPD)   aspirin 81 MG EC tablet   No No   Sig: Take 1 tablet (81 mg) by mouth daily   atorvastatin (LIPITOR) 10 MG tablet   No No   Sig: Take 1 tablet (10 mg) by mouth daily.   busPIRone (BUSPAR) 15 MG tablet   No No   Sig: Take 1 tablet (15 mg) by mouth 2 times daily.   calcium carbonate (TUMS) 500 MG chewable tablet   No No   Sig: Take 1 tablet (500 mg) by  mouth 4 times daily as needed for heartburn   Patient not taking: Reported on 2/25/2025   donepezil (ARICEPT) 10 MG tablet   No No   Sig: Take 1 tablet (10 mg) by mouth at bedtime.   empagliflozin (JARDIANCE) 10 MG TABS tablet   No No   Sig: Take 1 tablet (10 mg) by mouth daily.   furosemide (LASIX) 20 MG tablet   No No   Sig: Take 1 tablet (20 mg) by mouth daily as needed (take for weight > or equal to 130 lb).   lactobacillus rhamnosus, GG, (CULTURELL) capsule   Yes No   Sig: Take 1 capsule by mouth daily   latanoprost (XALATAN) 0.005 % ophthalmic solution   No No   Sig: Place 1 drop into both eyes at bedtime   levothyroxine (SYNTHROID/LEVOTHROID) 50 MCG tablet   No No   Sig: TAKE 1 TABLET BY MOUTH EVERY DAY   losartan (COZAAR) 25 MG tablet   No No   Sig: Take 0.5 tablets (12.5 mg) by mouth daily.   magnesium oxide (MAG-OX) 400 MG tablet   No No   Sig: Take 1 tablet (400 mg) by mouth at bedtime.   metoprolol succinate ER (TOPROL XL) 25 MG 24 hr tablet   No No   Sig: Take 0.5 tablets (12.5 mg) by mouth at bedtime.   oxyCODONE (ROXICODONE) 5 MG tablet   Yes No   Sig: TAKE 0.5 TABLETS (2.5 MG) BY MOUTH NIGHTLY AS NEEDED FOR PAIN   pantoprazole (PROTONIX) 40 MG EC tablet   No No   Sig: Take 1 tablet (40 mg) by mouth daily   potassium chloride caroline ER (KLOR-CON M20) 20 MEQ CR tablet   No No   Sig: Take 1 tablet (20 mEq) by mouth daily as needed (take with dose of lasix).   senna-docusate (SENOKOT-S/PERICOLACE) 8.6-50 MG tablet   No No   Sig: Take 1 tablet by mouth 2 times daily as needed for constipation   triamcinolone (KENALOG) 0.1 % external cream   No No   Sig: Apply topically 2 times daily as needed for irritation   vitamin D3 (CHOLECALCIFEROL) 50 mcg (2000 units) tablet   No No   Sig: Take 1 tablet (50 mcg) by mouth daily      Facility-Administered Medications Last Administration Doses Remaining   denosumab (PROLIA) injection 60 mg None recorded            Review of Systems    The 5 point Review of Systems is  negative other than noted in the HPI.     Physical Exam   Vital Signs: Temp: 98.5  F (36.9  C) Temp src: Oral BP: 105/61 Pulse: 78   Resp: 18 SpO2: (!) 90 % O2 Device: None (Room air)    Weight: 0 lbs 0 oz    GENERAL: adult female seen resting reclined in bed. NAD.   NEURO / PSYCH: Alert, converses appropriately. Slow to respond at times. No focal deficits. Moves all extremities.   HEENT: Anicteric sclera.   CV: RRR. S1, S2. No murmurs appreciated.  2+ right radial pulse.  RESPIRATORY: Effort normal. Lungs CTAB with no wheezing, rales, rhonchi.   GI: Abdomen soft and non distended with bowel sounds rare. No tenderness or guarding to palpation.   MSK: no gross deformities  EXTREMITIES: nonedematous  SKIN: No jaundice. No rashes or lesions to exposed areas.       Medical Decision Making       75 MINUTES SPENT BY ME on the date of service doing chart review, history, exam, documentation & further activities per the note.      Data     I have personally reviewed the following data over the past 24 hrs:    10.5  \   11.8   / 280     133 (L) 99 28.7 (H) /  110 (H)   4.9 23 1.63 (H) \     ALT: 7 AST: 18 AP: 91 TBILI: 0.3   ALB: 3.8 TOT PROTEIN: 6.6 LIPASE: 18

## 2025-03-29 NOTE — PLAN OF CARE
Goal Outcome Evaluation:      Plan of Care Reviewed With: family (daughter Barbara Bob)    Overall Patient Progress: improvingOverall Patient Progress: improving    Outcome Evaluation: Discharge back to [facility]    Mirela Chaudhari, EDITH, SW  ED/OBS   [facility]  Phone: [phone]  Pager: [phone]  Fax: [phone]     After hours ShopReply and After Hours Vocera [place]     On-call pager, [phone], 4:00 pm to 8:30 pm

## 2025-03-29 NOTE — CONSULTS
GASTROENTEROLOGY CONSULTATION      Date of Admission:  3/28/2025           Reason for Consultation:   We were asked by Dr. Tong of medicine to evaluate this patient with coffee-ground emesis and melena.           ASSESSMENT AND RECOMMENDATIONS:   Coffee-ground emesis  Melena  Chronic anemia  Differential for presentation including esophagitis, gastritis, peptic ulcer disease, AVMs.  Regardless of the underlying etiology, I think the probability that an endoscopy would be therapeutic at this point is low.  Without having a bowel movement over the past couple of days and with her hemoglobin at her baseline, my suspicion that she is actively bleeding at this time is quite low.  I think endoscopy would still be reasonable.  There would be some potential diagnostic benefit to it.  However, patient declines endoscopy at this time.  With the relatively low likelihood that it would be therapeutic and her significant comorbidities, I do not think that that is unreasonable decision.  I would recommend switching her PPI.  If she has a clear indication for aspirin, then this is reasonable to continue.  If no clear indication, then would recommend discontinuing.  - Start pantoprazole 40 mg p.o. twice daily  - Okay from GI perspective for diet  - Okay to discharge from GI perspective      Thank you for involving us in this patient's care. Please do not hesitate to contact the GI service with any questions or concerns.  GI will sign off at this time.    Pt care plan discussed with Dr. Hanks, GI staff physician.    Doug Perez  Gastroenterology Fellow, PGY4   -------------------------------------------------------------------------------------------------------------------           History of Present Illness:   Idalia Bob is a 80 year old female with a history of HFrEF with an ejection fraction of 10-15%, dementia, CKD, COPD.  She presented with 1-2 days of emesis.  She describes it as black watery emesis.  She  also notes that she had a couple of black bowel movements.  She states that the nausea and emesis has resolved.  She has not had an episode of emesis for over 24 hours.  She has not had a bowel movement for approximately 48 hours.  She denies any abdominal pain.  Denies any NSAID use.  States that she was using Tylenol for pain.    She does not want an endoscopy.  Is not currently concerned as her symptoms have resolved and she feels well.             Previous Endoscopy:   2/1/24 EGD  Component Collected Lab   Upper GI Endoscopy 02/07/2024  9:49 AM [address] [place] [address] [phone]     Endoscopy Department  _______________________________________________________________________________  Patient Name: Idalia Bob         Procedure Date: 2/7/2024 9:49 AM  MRN: [id number]                       Account Number: [id number]  YOB: 1945              Admit Type: Inpatient  Age: 79                               Room: [place] [address]  Gender: Female                        Note Status: Finalized  Attending MD: BLAYNE BURKETT MD,   Total Sedation Time:  _______________________________________________________________________________     Procedure:             Upper GI endoscopy  Indications:           Generalized abdominal pain, Early satiety, Nausea,                         Weight loss, diarrhea  Providers:             BLAYNE BURKETT MD, Ting Belle RN  Referring MD:            Medicines:             Midazolam 2 mg IV, Fentanyl 100 micrograms IV,                         Benzocaine spray  Complications:         No immediate complications.  _______________________________________________________________________________  Procedure:             Pre-Anesthesia Assessment:                         - Prior to the procedure, a History and Physical was                         performed, and patient medications and allergies  were                         reviewed. The patient is competent. The risks and                         benefits of the procedure and the sedation options and                         risks were discussed with the patient. All questions                         were answered and informed consent was obtained.                         Patient identification and proposed procedure were                         verified by the physician and the nurse in the                         pre-procedure area in the endoscopy suite. Mental                         Status Examination: alert and oriented. Airway                         Examination: normal oropharyngeal airway and neck                         mobility. Respiratory Examination: clear to                         auscultation. CV Examination: normal. Prophylactic                         Antibiotics: The patient does not require prophylactic                         antibiotics. Prior Anticoagulants: The patient has                         taken no anticoagulant or antiplatelet agents. ASA                         Grade Assessment: III - A patient with severe systemic                         disease. After reviewing the risks and benefits, the                         patient was deemed in satisfactory condition to                         undergo the procedure. The anesthesia plan was to use                         moderate sedation / analgesia (conscious sedation).                         Immediately prior to administration of medications,                         the patient was re-assessed for adequacy to receive                         sedatives. The heart rate, respiratory rate, oxygen                         saturations, blood pressure, adequacy of pulmonary                         ventilation, and response to care were monitored                         throughout the procedure. The physical status of the                         patient was re-assessed after the  procedure.                         After obtaining informed consent, the endoscope was                         passed under direct vision. Throughout the procedure,                         the patient's blood pressure, pulse, and oxygen                         saturations were monitored continuously. The Endoscope                         was introduced through the mouth, and advanced to the                         second part of duodenum. The upper GI endoscopy was                         accomplished without difficulty. The patient tolerated                         the procedure well.                                                                                   Findings:       The gastroesophageal flap valve was visualized endoscopically and       classified as Hill Grade IV (no fold, wide open lumen, hiatal hernia       present).       Esophagogastric landmarks were identified: the Z-line was found at 34       cm, the gastroesophageal junction was found at 34 cm and the site of the       diaphragmatic hiatus was found at 42 cm from the incisors.       An 8 cm hiatal hernia was present.       The examined esophagus was otherwise normal.       Localized moderate mucosal changes characterized by congestion and       erythema with mild luminal deformity were found in the gastric antrum.       Biopsies were taken with a cold forceps for histology. Verification of       patient identification for the specimen was done. Estimated blood loss       was minimal.       The exam of the stomach was otherwise normal.       Biopsies were taken with a cold forceps in the gastric body for       Helicobacter pylori testing. Verification of patient identification for       the specimen was done. Estimated blood loss was minimal.       The duodenal bulb, first portion of the duodenum, second portion of the       duodenum and area of the papilla were normal.       Biopsies for histology were taken with a cold forceps in the  duodenal       bulb and in the second portion of the duodenum for evaluation of celiac       disease. Verification of patient identification for the specimen was       done. Estimated blood loss was minimal.                                                                                   Moderate Sedation:       Moderate (conscious) sedation was administered by the nurse and       supervised by the endoscopist. The patient's oxygen saturation, heart       rate, blood pressure and response to care were monitored. Total       physician intraservice time was 34 minutes.  Impression:            - Large 8 cm hiatal hernia. This could contribute to                         symptoms of nausea and early satiety.                         - Gastroesophageal flap valve classified as Hill Grade                         IV (no fold, wide open lumen, hiatal hernia present).                         - Otherwise normal esophagus.                         - Congestion and erythema with mild luminal deformity                         mucosa in the antrum. Biopsied. This could represent                         mild gastric antral vascular ectasia though other                         causes such as gastritis or dysplasia/neoplasia should                         also be evaluated on biopsies.                         - Exam of the stomach was otherwise normal. No                         evidence of gastric outlet obstruction.                         - Biopsies were taken with a cold forceps for                         Helicobacter pylori testing.                         - Normal duodenal bulb, first portion of the duodenum,                         second portion of the duodenum and area of the papilla.                         - Biopsies were taken with a cold forceps for                         evaluation of celiac disease.  Recommendation:        - [facility] for [facility] care.                         - Advance diet as  tolerated.                         - Continue present medications.                         - Await pathology results.                         - Continue with proton pump inhibitor therapy and                         antiemetics. If symptoms persist, could consider                         surgical consult for the hiatal hernia though unclear                         that she is an optimal surgical candidate given                         multiple medical co-morbidities.     Findings:       The perianal and digital rectal examinations were normal.        Multiple small-mouthed diverticula were found in the sigmoid colon.        An area of mildly congested mucosa was found in the rectum, in the        sigmoid colon and in the descending colon.        Biopsies were taken with a cold forceps from left colon and rectum for        histology, evalaute for microscopic colitis or proctitis. Verification        of patient identification for the specimen was done. Estimated blood        loss was minimal.        A localized area of flat, well circumscribed 3cm area moderately altered        vascular, erythematous and granular mucosa was found at 5 cm proximal to        the anus. Biopsies were taken with a cold forceps for histology.        Verification of patient identification for the specimen was done.        Estimated blood loss was minimal.        No additional abnormalities were found on retroflexion.     Flex sig                                                                                   Moderate Sedation:        See the other procedure note for documentation of moderate sedation with        intraservice time.   Impression:            - Diverticulosis in the sigmoid colon.                          - Congested mucosa in the rectum, in the sigmoid colon                          and in the descending colon.                          - Biopsies were taken with a cold forceps from left                          colon and  rectum histology, evalaute for microscopic                          colitis or proctitis..                          - Altered vascular, erythematous and granular mucosa                          at 5 cm proximal to the anus. Biopsied to evalaute for                          inflammation, adenoma, dysplasia. It is possible this                          finding is related to the mucous in the stool.   Recommendation:        - Return patient to [facility] for ongoing care.                          - Advance diet as tolerated.                          - Await pathology results.                          - If the rectal lesion is noted to be adenoma or have                          dysplasia then I recommend consideration of full                          colonoscopy with EMR or ESD of the rectal lesion. This                          could be done as an outpatient, hospital setting with                          moderate sedation.                          - Try adding a powder fiber supplement such as                          psyllium. If you use a commercial product that                          contains psyllium, follow the package directions. If                          you are not used to taking psyllium, it is best to                          begin with a low dose (such as 1/2 tsp. in an 8 oz.                          glass of water once a day), then gradually increase                          the dose as needed. It is very safe and not absorbed                          so you can increase over time up to approximately 1                          tablespoon twice daily.            Physical Exam:   BP (!) 146/59 (BP Location: Right arm)   Pulse 68   Temp 97.5  F (36.4  C) (Oral)   Resp 16   Ht 1.524 m (5')   LMP  (LMP Unknown)   SpO2 100%   BMI 24.37 kg/m    Wt:   Wt Readings from Last 2 Encounters:   02/25/25 56.6 kg (124 lb 12.8 oz)   02/08/25 55.8 kg (123 lb)      Constitutional: No acute distress  Eyes:  Sclera anicteric  Ears/nose/mouth/throat: Hearing intact on gross exam  CV: Extremities wwp. No appreciable LE edema  Respiratory: Unlabored breathing  Abdomen: Nondistended, no hepatosplenomegaly, nontender, no peritoneal signs. Declines rectal exam  Skin: warm, perfused, no jaundice  Neuro: Answering questions appropriately  Psych: Normal affect  MSK: In bed. Moves all 4 extremities spontaneously     LABS:  BMP  Recent Labs   Lab 03/29/25  0610 03/28/25  1702    133*   POTASSIUM 4.5 4.9   CHLORIDE 103 99   AYUSH 8.7* 9.4   CO2 23 23   BUN 21.9 28.7*   CR 1.34* 1.63*   GLC 72 110*     CBC  Recent Labs   Lab 03/29/25  1123 03/29/25  0610 03/28/25  1702   WBC  --  7.0 10.5   RBC  --  3.92 4.53   HGB 11.9 10.2* 11.8   HCT  --  35.8 39.3   MCV  --  91 87   MCH  --  26.0* 26.0*   MCHC  --  28.5* 30.0*   RDW  --  17.8* 17.9*   PLT  --  241 280     INRNo lab results found in last 7 days.  LFTs  Recent Labs   Lab 03/29/25  0610 03/28/25  1702   ALKPHOS 66 91   AST 12 18   ALT <5 7   BILITOTAL 0.2 0.3   PROTTOTAL 4.8* 6.6   ALBUMIN 2.9* 3.8      PANC  Recent Labs   Lab 03/28/25  1702   LIPASE 18       IMAGING:          Past Medical History:   Reviewed and edited as appropriate  Past Medical History:   Diagnosis Date    Chronic kidney disease (CKD) 01/10/2023    Chronic obstructive pulmonary disease (H) 01/10/2023    Dementia (H) 11/28/2023    GERD (gastroesophageal reflux disease) 03/15/2023    Hypertension 11/28/2023            Past Surgical History:   Reviewed and edited as appropriate   Past Surgical History:   Procedure Laterality Date    ESOPHAGOSCOPY, GASTROSCOPY, DUODENOSCOPY (EGD), COMBINED N/A 02/07/2024    Procedure: ESOPHAGOGASTRODUODENOSCOPY, WITH BIOPSY;  Surgeon: Felton Jaems MD;  Location: [facility]    PICC DOUBLE LUMEN PLACEMENT Right 05/17/2024    Basilic Vein 5F DL 37 cm            Family History:   Reviewed and edited as appropriate  No family history on file.  No known history of  colorectal cancer, liver disease, or inflammatory bowel disease.         Allergies:   Reviewed and edited as appropriate     Allergies   Allergen Reactions    Penicillins Itching     Has tolerated ceftriaxone, piperacillin, and amoxicillin              Review of Systems:     A complete 10 point review of systems was performed and is negative except as noted in the HPI

## 2025-03-31 ENCOUNTER — PATIENT OUTREACH (OUTPATIENT)
Dept: CARE COORDINATION | Facility: CLINIC | Age: 80
End: 2025-03-31
Payer: MEDICARE

## 2025-03-31 NOTE — PROGRESS NOTES
"Clinic Care Coordination Contact  Transitions of Care Outreach  Chief Complaint   Patient presents with    Clinic Care Coordination - Post Hospital       Most Recent Admission Date: 3/28/2025   Most Recent Admission Diagnosis: Coffee ground emesis - K92.0  Hx of gastric ulcer - Z87.11  Black tarry stools - K92.1     Most Recent Discharge Date: 3/29/2025   Most Recent Discharge Diagnosis: Coffee ground emesis - K92.0  Black tarry stools - K92.1  Hx of gastric ulcer - Z87.11  Hematemesis - K92.0  Melena - K92.1  Agitation - R45.1  Gastroesophageal reflux disease, unspecified whether esophagitis present - K21.9     Transitions of Care Assessment  CC RN spoke with Barbaraella dtr whom is on her signed consent to communicate.   CC RN contacted patients daughter, introduced self, role, care coordination program and reason for call.    Discharge Assessment  How are you doing now that you are home?: \"She is doing pretty good.\" All concerning sx have resolved.  How are your symptoms? (Red Flag symptoms escalate to triage hotline per guidelines): Improved  Do you know how to contact your clinic care team if you have future questions or changes to your health status? : Yes  Does the patient have their discharge instructions? : Yes  Does the patient have questions regarding their discharge instructions? : No  Were you started on any new medications or were there changes to any of your previous medications? : Yes  Does the patient have all of their medications?: Yes  Do you have questions regarding any of your medications? : No  Do you have all of your needed medical supplies or equipment (DME)?  (i.e. oxygen tank, CPAP, cane, etc.): Yes         Post-op (Clinicians Only)  Did the patient have surgery or a procedure: No  Fever: No  Chills: No  Eating & Drinking: eating and drinking without complaints/concerns  PO Intake: regular diet  Bowel Function:  (dtr says they are monitoring her stool closely)  Date of last BM:  (Dtr " thinks it was yesterday.)  Urinary Status: voiding without complaint/concerns    CCRN Explained and offered Care Coordination support to eligible patients: Yes    Barbara accepted? No    Follow up Plan     Discharge Follow-Up  Discharge follow up appointment scheduled in alignment with recommended follow up timeframe or Transitions of Risk Category? (Low = within 30 days; Moderate= within 14 days; High= within 7 days): No  Patient's follow up appointment not scheduled: Patient accepted scheduling support. Appt scheduled/requested per protocol.    Future Appointments   Date Time Provider Department Center   4/7/2025  2:00 PM [facility]   5/7/2025 11:00 AM [id number]   5/7/2025 12:00 PM  LAB [facility]   5/7/2025 12:20 PM Mariana Giles PA-C [facility]       Outpatient Plan as outlined on AVS reviewed with patient.    For any urgent concerns, please contact our 24 hour nurse triage line: [phone] ([phone])     Barbara declined Care Coordination support  Ingris Yen, RN

## 2025-04-06 ENCOUNTER — HEALTH MAINTENANCE LETTER (OUTPATIENT)
Age: 80
End: 2025-04-06

## 2025-04-07 ENCOUNTER — ALLIED HEALTH/NURSE VISIT (OUTPATIENT)
Dept: FAMILY MEDICINE | Facility: CLINIC | Age: 80
End: 2025-04-07
Payer: MEDICARE

## 2025-04-07 DIAGNOSIS — M81.0 AGE-RELATED OSTEOPOROSIS WITHOUT CURRENT PATHOLOGICAL FRACTURE: Primary | ICD-10-CM

## 2025-04-07 PROCEDURE — 99207 PR NO CHARGE NURSE ONLY: CPT

## 2025-04-07 PROCEDURE — 96372 THER/PROPH/DIAG INJ SC/IM: CPT | Performed by: FAMILY MEDICINE

## 2025-04-07 NOTE — PATIENT INSTRUCTIONS
You received your Prolia injection today  Your next Injection is due in 6 months (around 10/7/2025)  If you plan on having any dental work done within the next 6 months, please let your dentist know that you are on this medication.  Make sure you do not have any dental work completed involving the jaw bone within 2 month prior to your scheduled injection    Please take Calcium and Vitamin D supplement over the counter, as your last lab result was low for Calcium.

## 2025-04-07 NOTE — PROGRESS NOTES
Clinic Administered Medication Documentation    Patient's first Prolia injection was 15 years ago ATIYA Batista.    Per TE from 4.4:  The GRF is at baseline  Calcium is slightly below lower limit; she is on calcium supplement not sure whether taking regularly as prescribed.   She can still get her Prolia, but should take the calcium supplement and Vit D supplement regularly to ensure her calcium stays in normal range.     Relayed provider's message as written above.    Prolia Documentation    Indication: Prolia  (denosumab) is a prescription medicine used to treat osteoporosis in patients who:   Are at high risk for fracture, meaning patients who have had a fracture related to osteoporosis, or who have multiple risk factors for fracture.  Cannot use another osteoporosis medicine or other osteoporosis medicines did not work well.  The timeline for early/late injections would be 4 weeks early and any time after the 6 month lien. If a patient receives their injection late, then the subsequent injection would be 6 months from the date that they actually received the injection.    When was the last injection?  10/4/2024  Was the last injection at least 6 months ago? Yes  Has the prior authorization been completed?  Yes  Is there an active order (written within the past 365 days, with administrations remaining, not ) in the chart?  Yes   GFR Estimate   Date Value Ref Range Status   2025 40 (L) >60 mL/min/1.73m2 Final     Comment:     eGFR calculated using  CKD-EPI equation.     Has patient had a GFR within the last 12 months? Yes   Is GFR under 30, or patient has a diagnosis of CKD4 or CKD5? No   Patient denies gastric bypass or parathyroid surgery in past 6 months? Yes - patient denies.   Patient denies undergoing any dental procedures involving drilling into the bone, such as implants, extractions, or oral surgery, within the past two months that have not yet healed?  Yes - patient denies  Patient denies  plans for an emergency tooth extraction within the next week? Yes    The following steps were completed to comply with the REMS program for Prolia:  Reviewed information in the Medication Guide, including the serious risks of Prolia  and the symptoms of each risk.  Advised patient to seek prompt medical attention if they have signs or symptoms of any of the serious risks.  Provided each patient a copy of the Medication Guide and Patient Guide.    Prior to injection, verified patient identity using patient's name and date of birth. Medication was administered. Please see MAR and medication order for additional information. Patient instructed to remain in clinic for 15 minutes and report any adverse reaction to staff immediately.    Vial/Syringe: Syringe  Was this medication supplied by the patient? No  Verified that the patient has administrations remaining in their prescription.    JAMES MorrisonN JIMI  [facility]

## 2025-04-10 NOTE — PROGRESS NOTES
"Date of Admission:  3/28/2025  Date of Discharge:  3/29/2025  Discharging Provider: Jeff Lopez PA-C  Discharge Service: Hospitalist Service         Hospital Course  Idalia Bob is a 80 year old female admitted on 3/28/2025 for management of a GIB. She has history significant for past gastric ulcer, GERD, HFrEF, dementia, COPD, CKD 3, hypertension.      #Hematemesis and melena  #Hx UGIB from bleeding gastric ulcers seen on EGD 2022  #GERD  #Hiatal hernia  Presented w/ 2 days of \"chunky\" black emesis and 3 small tarry black stools. No notable abdominal pain, fevers, chills, worsened lightheadedness, worsened SOB (has lightheadedness and SOB at baseline likely related to HF and COPD).  Resides at assisted living facility, they manage her meds. Not on blood thinners. Hx of significant alcohol use, but none recently. Possibly utilizing ibuprofen for back pain, has been clarified with her daughter and facility that she should utilize apap. On daily PPI.  Previous workup has included EGD x2-- 12/2022 EGD done for black stools, showed bleeding gastric ulcers, unclear of any procedural intervention; 2/2024 EGD done (not for concerns of bleeding) with large hiatal hernia and possible GAVE/gastritis. On admission, CXR w/ hiatal hernia. Labs notable for hgb 11.8, Cr 1.6, BUN 28.7, mild hyponatremia.   - GI consult, appreciate input. No need for scope, okay for discharge.   - IV PPI BID while here. Transition to BID pantoprazole on discharge x4 weeks.   - H pylori stool antigen ordered but unable to be collected before discharge  - HOLD PTA asa 81mg while here. Resume on discharge given GI w/o c/f acute bleed.      #JUANIS  #CKD  BL Cr 1.1- 1.2. On admission, Cr elevated to 1.63. Suspect prerenal etiology given poor fluid intake PTA (drinks less than 1 glass water per day), furosemide for HF, and GI losses as above. S/p 750 ml LR over 10 hours on admission with improvement in kidney function.   - HOLD PTA losartan and " PRN lasix in setting of JUANIS. Resume on discharge.   - Avoid nephrotoxins, renally dose meds as appropriate     # Mild acute hyponatremia, resolved  Hx of mild recurrent hyponatremia. 133 today, now WNL.     # Mildly prolonged QTc  Qtc 462 3/28, historically more prolonged. Multiple medication culprits.      # HFrEF - EF 10-15%  # NICM  # Hypertension  # Moderate tricuspid insufficiency.   Diagnosed last May. Sees [facility]. Last ECHO 5/15/2024 with EF 10-15% with severe diffuse hypokinesis and moderately reduced RV function. Moderate tricuspid insufficiency as well.   - cautiously used fluids as above  - PTA statin  - PTA Jardiance 10 mg daily  - PTA metoprolol 12.5 mg nightly  - hold PTA aspirin 81 mg daily  - hold PTA losartan 12.5 mg daily with JUANIS, resume on discharge  - hold PTA PRN Lasix 20 mg for weight >/= 130 lb (currently 124 lb) and PRN potassium replacement for when taking Lasix. -- dose these as needed.      # Dementia   Memory impairments have been in place for about 5 years. She seems to be able to have fair recall of recent history. Can be slow to think of her response. Per daughter patient is at baseline. Resides at [facility].   - PTA Aricept     # Chronic deconditioning - uses walker to ambulate. Lives at [facility].   # Chronic low back pain - was on oxycodone for a short period last year, now mostly uses acetaminophen  # COPD - PTA inhaler sub Breo Ellipta, PRN albuterol neb  # Hypothyroidism - PTA levothyroxine  # Anxiety and depression - PTA buspar, Fluoxetine, Zyprexa  # MARYURI - formally diagnosed but does not use CPAP  # Raynaud's

## 2025-04-11 ENCOUNTER — VIRTUAL VISIT (OUTPATIENT)
Dept: FAMILY MEDICINE | Facility: CLINIC | Age: 80
End: 2025-04-11
Payer: MEDICARE

## 2025-04-11 DIAGNOSIS — K92.2 GASTROINTESTINAL HEMORRHAGE, UNSPECIFIED GASTROINTESTINAL HEMORRHAGE TYPE: ICD-10-CM

## 2025-04-11 DIAGNOSIS — K21.01 GASTROESOPHAGEAL REFLUX DISEASE WITH ESOPHAGITIS AND HEMORRHAGE: ICD-10-CM

## 2025-04-11 DIAGNOSIS — F41.1 GAD (GENERALIZED ANXIETY DISORDER): ICD-10-CM

## 2025-04-11 DIAGNOSIS — F11.20 CONTINUOUS OPIOID DEPENDENCE (H): ICD-10-CM

## 2025-04-11 DIAGNOSIS — N18.32 CHRONIC KIDNEY DISEASE, STAGE 3B (H): ICD-10-CM

## 2025-04-11 DIAGNOSIS — Z09 HOSPITAL DISCHARGE FOLLOW-UP: Primary | ICD-10-CM

## 2025-04-11 PROCEDURE — 98006 SYNCH AUDIO-VIDEO EST MOD 30: CPT | Performed by: FAMILY MEDICINE

## 2025-04-11 PROCEDURE — 1111F DSCHRG MED/CURRENT MED MERGE: CPT | Mod: 95 | Performed by: FAMILY MEDICINE

## 2025-04-11 NOTE — PROGRESS NOTES
"Idalia is a 80 year old who is being evaluated via a billable video visit.    How would you like to obtain your AVS? MyChart  If the video visit is dropped, the invitation should be resent by: Text to cell phone: [phone]  Will anyone else be joining your video visit? No    Assessment & Plan     Hospital discharge follow-up   Symptoms have resolved, stool color is now normal    Gastrointestinal hemorrhage, unspecified gastrointestinal hemorrhage type   On Protonix 40 mg twice daily for 1 month then will revert to daily    Gastroesophageal reflux disease with esophagitis and hemorrhage   - Protonix 40 mg BID    Continuous opioid dependence (H)    -  was on oxycodone for a short period last year, now mostly uses acetaminophen    Chronic kidney disease, stage 3b (H)   - stable      MED REC REQUIRED  Post Medication Reconciliation Status: discharge medications reconciled, continue medications without change    See Patient Instructions    Subjective   Idalia is a 80 year old, presenting for the following health issues:  Hospital F/U        4/11/2025     9:51 AM   Additional Questions   Roomed by MP   Accompanied by daughter-consent to communicate on file         4/11/2025     9:51 AM   Patient Reported Additional Medications   Patient reports taking the following new medications None per patient       Video Start Time:  2:01 PM    HPI          3/31/2025   Post Discharge Outreach   How are you doing now that you are home? \"She is doing pretty good.\" All concerning sx have resolved.   How are your symptoms? (Red Flag symptoms escalate to triage hotline per guidelines) Improved   Does the patient have their discharge instructions?  Yes   Does the patient have questions regarding their discharge instructions?  No   Were you started on any new medications or were there changes to any of your previous medications?  Yes   Does the patient have all of their medications? Yes   Do you have questions regarding any of your " medications?  No   Do you have all of your needed medical supplies or equipment (DME)?  (i.e. oxygen tank, CPAP, cane, etc.) Yes       Hospital Follow-up Visit:    Hospital/Nursing Home/IP Rehab Facility: [facility]  Most Recent Admission Date: 3/28/2025   Most Recent Admission Diagnosis: Coffee ground emesis - K92.0  Hx of gastric ulcer - Z87.11  Black tarry stools - K92.1     Most Recent Discharge Date: 3/29/2025   Most Recent Discharge Diagnosis: Coffee ground emesis - K92.0  Black tarry stools - K92.1  Hx of gastric ulcer - Z87.11  Hematemesis - K92.0  Melena - K92.1  Agitation - R45.1  Gastroesophageal reflux disease, unspecified whether esophagitis present - K21.9   Was the patient in the ICU or did the patient experience delirium during hospitalization?  No  Do you have any other stressors you would like to discuss with your provider? No    Problems taking medications regularly:  None  Medication changes since discharge: None  Problems adhering to non-medication therapy:  None    Summary of hospitalization:  [facility] discharge summary reviewed      Date of Admission:  3/28/2025  Date of Discharge:  3/29/2025  Discharging Provider: Jeff Lopez PA-C  Discharge Service: Hospitalist Service         Hospital Course  Idalia Bob is a 80 year old female admitted on 3/28/2025 for management of a GIB. She has history significant for past gastric ulcer, GERD, HFrEF, dementia, COPD, CKD 3, hypertension.      #Hematemesis and melena  #Hx UGIB from bleeding gastric ulcers seen on EGD 2022  #GERD  #Hiatal hernia   On BID pantoprazole on discharge x4 wee she looks great okay okay thank you have a nice weekend okay thanks bye ks.  Multiple  - H pylori stool antigen ordered but unable to be collected before discharge  That will be at the end of this month then we will come down on the pantoprazole to once a day      #JUANIS  #CKD  BL Cr 1.1- 1.2. On  admission, Cr elevated to 1.63. Suspect prerenal etiology given poor fluid intake PTA (drinks less than 1 glass water per day), furosemide for HF, and GI losses as above. S/p 750 ml LR over 10 hours on admission with improvement in kidney function.   - HOLD PTA losartan and PRN lasix in setting of JUANIS. Resume on discharge.   - Avoid nephrotoxins, renally dose meds as appropriate     # Mild acute hyponatremia, resolved  Hx of mild recurrent hyponatremia. 133 today, now WNL.     # Mildly prolonged QTc  Qtc 462 3/28, historically more prolonged. Multiple medication culprits.      # HFrEF - EF 10-15%  # NICM  # Hypertension  # Moderate tricuspid insufficiency.   Diagnosed last May. Sees [facility]. Last ECHO 5/15/2024 with EF 10-15% with severe diffuse hypokinesis and moderately reduced RV function. Moderate tricuspid insufficiency as well.   Seeing cardiology     # Dementia   Memory impairments have been in place for about 5 years. She seems to be able to have fair recall of recent history. - PTA Aricept     # Chronic deconditioning - uses walker to ambulate. Lives at [facility].   # Chronic low back pain - was on oxycodone for a short period last year, now mostly uses acetaminophen  # COPD - PTA inhaler sub Breo Ellipta, PRN albuterol neb  # Hypothyroidism - PTA levothyroxine  # Anxiety and depression - PTA buspar, Fluoxetine, Zyprexa  # MARYURI - formally diagnosed but does not use CPAP  # Raynaud's     Diagnostic Tests/Treatments reviewed.  Follow up needed: none  Other Healthcare Providers Involved in Patient s Care:         None  Update since discharge: improved.     Plan of care communicated with patient and family       Objective       Vitals:  No vitals were obtained today due to virtual visit.    Physical Exam       Video-Visit Details    Type of service:  Video Visit   Video End Time: 2:13 PM  Originating Location (pt. Location): Home  Distant Location (provider location):  On-site  Platform used for Video Visit:  Ran    Signed Electronically by: Fernando Medina MD

## 2025-04-12 RX ORDER — BUSPIRONE HYDROCHLORIDE 15 MG/1
15 TABLET ORAL 2 TIMES DAILY
Qty: 180 TABLET | Refills: 0 | Status: SHIPPED | OUTPATIENT
Start: 2025-04-12

## 2025-04-14 ENCOUNTER — TELEPHONE (OUTPATIENT)
Dept: FAMILY MEDICINE | Facility: CLINIC | Age: 80
End: 2025-04-14
Payer: MEDICARE

## 2025-04-14 ENCOUNTER — MEDICAL CORRESPONDENCE (OUTPATIENT)
Dept: HEALTH INFORMATION MANAGEMENT | Facility: CLINIC | Age: 80
End: 2025-04-14
Payer: MEDICARE

## 2025-04-14 NOTE — TELEPHONE ENCOUNTER
Nathalia calling from pt assisted living facility.      She had faxed paperwork this morning for PCP to fill.  They need an ok for pt to be able to move into memory care.   Family wants pt to move into memory care and Nathalia RN states pt has been declining as well and would benefit from memory care services      Fax [phone]        JIMI Sharif    Triage Nurse  [facility]

## 2025-04-14 NOTE — TELEPHONE ENCOUNTER
Form received     Will be placed at the provider's desk to address the request. Miroslava Rosen,

## 2025-04-14 NOTE — TELEPHONE ENCOUNTER
Daughter called regarding forms.  Informed her that nothing has been received yet.  Advised her to have the forms faxed to [phone].    Daughter asked if forms can be completed as soon as possible.    Cindy Tristan,

## 2025-04-20 DIAGNOSIS — K21.9 GASTROESOPHAGEAL REFLUX DISEASE, UNSPECIFIED WHETHER ESOPHAGITIS PRESENT: ICD-10-CM

## 2025-04-22 RX ORDER — PANTOPRAZOLE SODIUM 40 MG/1
40 TABLET, DELAYED RELEASE ORAL DAILY
Qty: 90 TABLET | Refills: 3 | Status: SHIPPED | OUTPATIENT
Start: 2025-04-22

## 2025-04-23 ENCOUNTER — PATIENT OUTREACH (OUTPATIENT)
Dept: CARE COORDINATION | Facility: CLINIC | Age: 80
End: 2025-04-23
Payer: MEDICARE

## 2025-04-23 NOTE — PROGRESS NOTES
Anemia Management Note - Discharge    Referred by Trena Chanel NP on 02/07/2025     Hgb has been stable since initial referral with no intervention.    Closing Anemia Services.   No treatment for Anemia needed.         Latest Ref Rng & Units 6/17/2024 6/21/2024 9/11/2024 2/7/2025 2/8/2025 3/28/2025 3/29/2025   Anemia   Hemoglobin 11.7 - 15.7 g/dL 9.1  10.0  11.1  12.6  12.2  11.8  11.9     10.2    TSAT 15 - 46 %   11  14       Ferritin 11 - 328 ng/mL    1,323           Multiple values from one day are sorted in reverse-chronological order           Anemia labs discontinued/outside lab/clinic notified (if applicable), Rx discontinued/Therapy Plans cancelled, removed from active patient list, patient and/or caregiver and referring provider notified as appropriate.     Ignacia Randall RN   Anemia Services  [facility]  [email or web address]   Office : [phone]  Fax: [phone]

## 2025-05-01 DIAGNOSIS — I50.9 ACUTE ON CHRONIC CONGESTIVE HEART FAILURE, UNSPECIFIED HEART FAILURE TYPE (H): Primary | ICD-10-CM

## 2025-05-07 ENCOUNTER — MYC MEDICAL ADVICE (OUTPATIENT)
Dept: CARDIOLOGY | Facility: CLINIC | Age: 80
End: 2025-05-07

## 2025-05-07 ENCOUNTER — ANCILLARY PROCEDURE (OUTPATIENT)
Dept: CARDIOLOGY | Facility: CLINIC | Age: 80
End: 2025-05-07
Attending: NURSE PRACTITIONER
Payer: MEDICARE

## 2025-05-07 ENCOUNTER — RESULTS FOLLOW-UP (OUTPATIENT)
Dept: FAMILY MEDICINE | Facility: CLINIC | Age: 80
End: 2025-05-07

## 2025-05-07 ENCOUNTER — LAB (OUTPATIENT)
Dept: LAB | Facility: CLINIC | Age: 80
End: 2025-05-07
Attending: NURSE PRACTITIONER
Payer: MEDICARE

## 2025-05-07 VITALS
OXYGEN SATURATION: 90 % | HEART RATE: 82 BPM | SYSTOLIC BLOOD PRESSURE: 124 MMHG | DIASTOLIC BLOOD PRESSURE: 82 MMHG | WEIGHT: 125.4 LBS | BODY MASS INDEX: 24.49 KG/M2

## 2025-05-07 DIAGNOSIS — I50.9 ACUTE ON CHRONIC CONGESTIVE HEART FAILURE, UNSPECIFIED HEART FAILURE TYPE (H): ICD-10-CM

## 2025-05-07 LAB
ANION GAP SERPL CALCULATED.3IONS-SCNC: 13 MMOL/L (ref 7–15)
BUN SERPL-MCNC: 21.9 MG/DL (ref 8–23)
CALCIUM SERPL-MCNC: 10 MG/DL (ref 8.8–10.4)
CHLORIDE SERPL-SCNC: 100 MMOL/L (ref 98–107)
CREAT SERPL-MCNC: 1.32 MG/DL (ref 0.51–0.95)
EGFRCR SERPLBLD CKD-EPI 2021: 40 ML/MIN/1.73M2
GLUCOSE SERPL-MCNC: 82 MG/DL (ref 70–99)
HCO3 SERPL-SCNC: 25 MMOL/L (ref 22–29)
LVEF ECHO: NORMAL
POTASSIUM SERPL-SCNC: 4.4 MMOL/L (ref 3.4–5.3)
SODIUM SERPL-SCNC: 138 MMOL/L (ref 135–145)

## 2025-05-07 PROCEDURE — 36415 COLL VENOUS BLD VENIPUNCTURE: CPT | Performed by: PATHOLOGY

## 2025-05-07 PROCEDURE — 93306 TTE W/DOPPLER COMPLETE: CPT | Mod: GC | Performed by: INTERNAL MEDICINE

## 2025-05-07 PROCEDURE — 3079F DIAST BP 80-89 MM HG: CPT | Performed by: PHYSICIAN ASSISTANT

## 2025-05-07 PROCEDURE — 80048 BASIC METABOLIC PNL TOTAL CA: CPT | Performed by: PATHOLOGY

## 2025-05-07 PROCEDURE — 3074F SYST BP LT 130 MM HG: CPT | Performed by: PHYSICIAN ASSISTANT

## 2025-05-07 PROCEDURE — G0463 HOSPITAL OUTPT CLINIC VISIT: HCPCS | Performed by: PHYSICIAN ASSISTANT

## 2025-05-07 PROCEDURE — 99214 OFFICE O/P EST MOD 30 MIN: CPT | Mod: 25 | Performed by: PHYSICIAN ASSISTANT

## 2025-05-07 PROCEDURE — 1126F AMNT PAIN NOTED NONE PRSNT: CPT | Performed by: PHYSICIAN ASSISTANT

## 2025-05-07 ASSESSMENT — PAIN SCALES - GENERAL: PAINLEVEL_OUTOF10: NO PAIN (0)

## 2025-05-07 NOTE — NURSING NOTE
Chief Complaint   Patient presents with    Follow Up     RETURN [facility]       Vitals were taken, medications reconciled.    NABILA Arita    12:22 PM

## 2025-05-07 NOTE — LETTER
5/7/2025      RE: Idalia Bob  [address]  [facility] [address]       Dear Colleague,    Thank you for the opportunity to participate in the care of your patient, Idalia Bob, at the [facility] [place] at [facility]. Please see a copy of my visit note below.      [facility] Cardiology   [facility] Clinic      HPI:   Ms. Idalia Bob is an 80yr old female with a history of CKD, HTN, GERD, COPD, CAD, and newly diagnosed NICM (LVEF 10-15% per TTE 5/2024) who presents to CORE appointment.     Her PCP advised that she present to the ED 5/15/24 due to JUANIS and elevated NTproBNP.  While in the ED, CXR showed bilateral pleural effusions and pulmonary edema, TTE showed LVEF 10-15% and moderately reduced RV function.  She started on IV lasix, and admitted to medicine.  She became hypotensive, required dobutamine --> dopamine, which was then stopped 5/21.  She ultimately diuresed to a weight of ~130#, and discharged 5/24 on losartan 12.5mg daily and jardiance 10mg daily.     Established in CORE on 6/4/24.  She has been followed by CORE clinic and Dr. Pak. Most recenty she was admitted from 3/28-3/28/25 for upper GIB - no scope. Heart failure was stable, but some meds were geld d/t bleeding. planned to resume lasix when it was needed.    Today, Ms. Bob presents to clinic with her daughter. She reports feeling okay since leaving [facility]- she has gotten much better. She walked into clnic fine with her walker without needing a rest. LE edema is much improved- she does lymphedema wraps every other day which has made a huge difference as have the water pills. Not sure about abdominal edema or other fluid retnetion. She does have a cough. No orthopnea or PND. .She does get some lightheadedness sporadically, mostly position changes. Appetite fair. No chest pain. No palpitations.     Home BP: 93/11520/72.   HR 80s-110s  Weight:  125-127.    Cardiac Medications   - ASA 81 mg daily   - Lasix 20 mg daily PRN- hasn't taken since she was here last- takes for it for weight >130  - KCL 20 mEq daily PRN if she takes lasix   - Losartan 12.5 mg daily   - Jardiance 10 mg daily   - Atorvastatin 10 mg daily   - Metoprolol succinate 12.5 mg HS      PAST MEDICAL HISTORY:  Past Medical History:   Diagnosis Date     Chronic kidney disease (CKD) 01/10/2023     Chronic obstructive pulmonary disease (H) 01/10/2023     Dementia (H) 11/28/2023     GERD (gastroesophageal reflux disease) 03/15/2023     Hypertension 11/28/2023       FAMILY HISTORY:  No family history on file.    SOCIAL HISTORY:  Social History     Socioeconomic History     Marital status:    Tobacco Use     Smoking status: Former     Current packs/day: 1.00     Average packs/day: 1 pack/day for 40.0 years (40.0 ttl pk-yrs)     Types: Cigarettes     Passive exposure: Past     Smokeless tobacco: Never   Vaping Use     Vaping status: Never Used   Substance and Sexual Activity     Alcohol use: Not Currently     Drug use: Never     Sexual activity: Not Currently     Social Determinants of Health     Financial Resource Strain: Low Risk  (1/22/2024)    Financial Resource Strain      Within the past 12 months, have you or your family members you live with been unable to get utilities (heat, electricity) when it was really needed?: No   Food Insecurity: Low Risk  (1/22/2024)    Food Insecurity      Within the past 12 months, did you worry that your food would run out before you got money to buy more?: No      Within the past 12 months, did the food you bought just not last and you didn t have money to get more?: No   Transportation Needs: Low Risk  (1/22/2024)    Transportation Needs      Within the past 12 months, has lack of transportation kept you from medical appointments, getting your medicines, non-medical meetings or appointments, work, or from getting things that you need?: No   Social  Connections: Socially Integrated (11/21/2023)    Received from [facility], [facility]    Social Connections      Frequency of Communication with Friends and Family: 0   Housing Stability: Low Risk  (1/22/2024)    Housing Stability      Do you have housing? : Yes      Are you worried about losing your housing?: No       CURRENT MEDICATIONS:  Current Outpatient Medications   Medication Sig Dispense Refill     acetaminophen (TYLENOL) 325 MG tablet Take 325-650 mg by mouth every 6 hours as needed for mild pain       albuterol (PROVENTIL) (2.5 MG/3ML) 0.083% neb solution Take 1 vial (2.5 mg) by nebulization 2 times daily as needed (COPD) 90 mL 5     aspirin 81 MG EC tablet Take 1 tablet (81 mg) by mouth daily 30 tablet 0     atorvastatin (LIPITOR) 10 MG tablet Take 1 tablet (10 mg) by mouth daily. 90 tablet 3     busPIRone (BUSPAR) 15 MG tablet TAKE 1 TABLET BY MOUTH TWICE A  tablet 0     calcium carbonate (TUMS) 500 MG chewable tablet Take 1 tablet (500 mg) by mouth 4 times daily as needed for heartburn       Calcium Polycarbophil (FIBER) 625 MG tablet Take 1 tablet by mouth daily.       donepezil (ARICEPT) 10 MG tablet Take 1 tablet (10 mg) by mouth at bedtime. 90 tablet 3     empagliflozin (JARDIANCE) 10 MG TABS tablet Take 1 tablet (10 mg) by mouth daily. 90 tablet 1     ferrous gluconate (FERGON) 324 (38 Fe) MG tablet Take 324 mg by mouth daily (with breakfast).       FLUoxetine (PROZAC) 40 MG capsule Take 1 capsule (40 mg) by mouth daily. 90 capsule 3     Fluticasone-Umeclidin-Vilanterol (TRELEGY ELLIPTA) 200-62.5-25 MCG/ACT oral inhaler Inhale 1 puff into the lungs daily. 28 each 11     furosemide (LASIX) 20 MG tablet Take 1 tablet (20 mg) by mouth daily as needed (take for weight > or equal to 130 lb). 100 tablet 3     lactobacillus rhamnosus, GG, (CULTURELL) capsule Take 1 capsule by mouth daily       latanoprost (XALATAN) 0.005 %  ophthalmic solution Place 1 drop into both eyes at bedtime       levothyroxine (SYNTHROID/LEVOTHROID) 50 MCG tablet TAKE 1 TABLET BY MOUTH EVERY DAY 90 tablet 2     losartan (COZAAR) 25 MG tablet Take 0.5 tablets (12.5 mg) by mouth daily. 45 tablet 3     magnesium oxide (MAG-OX) 400 MG tablet Take 1 tablet (400 mg) by mouth at bedtime. 90 tablet 3     metoprolol succinate ER (TOPROL XL) 25 MG 24 hr tablet Take 0.5 tablets (12.5 mg) by mouth at bedtime. 60 tablet 2     OLANZapine (ZYPREXA) 2.5 MG tablet Take 1 tablet (2.5 mg) by mouth 2 times daily. (Patient taking differently: Take 2.5 mg by mouth 2 times daily. 1/2 tablet (1.25 MG) 2 times daily) 180 tablet 1     oxyCODONE (ROXICODONE) 5 MG tablet TAKE 0.5 TABLETS (2.5 MG) BY MOUTH NIGHTLY AS NEEDED FOR PAIN       pantoprazole (PROTONIX) 40 MG EC tablet TAKE 1 TABLET BY MOUTH EVERY DAY 90 tablet 3     potassium chloride caroline ER (KLOR-CON M20) 20 MEQ CR tablet Take 1 tablet (20 mEq) by mouth daily as needed (take with dose of lasix). 100 tablet 3     senna-docusate (SENOKOT-S/PERICOLACE) 8.6-50 MG tablet Take 1 tablet by mouth 2 times daily as needed for constipation       triamcinolone (KENALOG) 0.1 % external cream Apply topically 2 times daily as needed for irritation       vitamin D3 (CHOLECALCIFEROL) 50 mcg (2000 units) tablet Take 1 tablet (50 mcg) by mouth daily       Current Facility-Administered Medications   Medication Dose Route Frequency Provider Last Rate Last Admin     denosumab (PROLIA) injection 60 mg  60 mg Subcutaneous Q6 Months    60 mg at 04/07/25 1408       ROS:   Refer to HPI    EXAM:  /82 (BP Location: Right arm, Patient Position: Chair, Cuff Size: Adult Small)   Pulse 82   Wt 56.9 kg (125 lb 6.4 oz)   LMP  (LMP Unknown)   SpO2 (!) 90%   BMI 24.49 kg/m     /82 (BP Location: Right arm, Patient Position: Chair, Cuff Size: Adult Small)   Pulse 82   Wt 56.9 kg (125 lb 6.4 oz)   LMP  (LMP Unknown)   SpO2 (!) 90%   BMI 24.49  kg/m     GENERAL: Appears comfortable, in no acute distress.   HEENT: Eye symmetrical, no discharge or icterus bilaterally.   CV: tachycardic, +S1S2, no murmur, rub, or gallop. JVP < 6 cm  RESPIRATORY: Respirations regular, even, and unlabored. Lungs CTA throughout.   GI: Soft and non distended   EXTREMITIES: no peripheral edema.   NEUROLOGIC: Alert and interacting appropriately   SKIN: No jaundice.    Labs, reviewed with patient in clinic today:  CBC RESULTS:  Lab Results   Component Value Date    WBC 7.0 03/29/2025    RBC 3.92 03/29/2025    HGB 11.9 03/29/2025    HCT 35.8 03/29/2025    MCV 91 03/29/2025    MCH 26.0 (L) 03/29/2025    MCHC 28.5 (L) 03/29/2025    RDW 17.8 (H) 03/29/2025     03/29/2025       CMP RESULTS:  Lab Results   Component Value Date     05/07/2025    POTASSIUM 4.4 05/07/2025    POTASSIUM 4.4 06/09/2024    CHLORIDE 100 05/07/2025    CHLORIDE 104 07/25/2024    CO2 25 05/07/2025    ANIONGAP 13 05/07/2025    GLC 82 05/07/2025    GLC 97 06/09/2024     (H) 02/11/2024    BUN 21.9 05/07/2025    CR 1.32 (H) 05/07/2025    GFRESTIMATED 40 (L) 05/07/2025    AYUSH 10.0 05/07/2025    BILITOTAL 0.2 03/29/2025    ALBUMIN 2.9 (L) 03/29/2025    ALKPHOS 66 03/29/2025    ALT <5 03/29/2025    AST 12 03/29/2025        INR RESULTS:  Lab Results   Component Value Date    INR 1.06 06/09/2024       Lab Results   Component Value Date    MAG 1.9 03/29/2025     Lab Results   Component Value Date    NTBNPI 10,435 (H) 06/12/2024     Lab Results   Component Value Date    NTBNP 1,996 (H) 02/07/2025       Cardiac Diagnostics:    5/22/2024 Coronary CTA  IMPRESSION:  1.  Mild non-obstructive coronary artery disease.  2.  Total Agatston score 484 placing the patient in the 79th  percentile when compared to age and gender matched control group.  3.  Please review Radiology report for incidental noncardiac findings  that will follow separately.     FINDINGS:     CORONARY CALCIUM SCORE     Total Agatston calcium  score: 483   Left main: 79  Left anterior descendin  Left circumflex: 69  Right coronary artery: 180   This places the patient in the lowest  percentile when compared to age  and gender matched control group.     CORONARY ANGIOGRAPHY     DOMINANCE: Right dominant system.   Normal coronary origins and course.     LEFT MAIN:   The left main arises normally from the left coronary cusp and has a  minimal (<25%) stenosis composed of calcified plaque.     LEFT ANTERIOR DESCENDING:   The LAD is a moderate-sized type III vessel which supplies a  diminutive D1 and a small branching D2.   Proximal LAD: Mild (25-49%) stenosis composed of mixed plaque.  The remainder of the left anterior descending and its major diagonal  branches are patent with minimal luminal irregularities.     LEFT CIRCUMFLEX:   Proximal LCX:Minimal (<25%) stenosis composed of calcified plaque.  First marginal: Minimal (<25%) stenosis composed of calcified plaque.  The remainder of the left circumflex and its major marginal branches  are patent with minimal luminal irregularities.     RIGHT CORONARY ARTERY:   Proximal RCA: Minimal (<25%) stenosis stenosis composed of mixed  plaque.  Mid RCA: Mild (25-49%) stenosis composed of noncalcified plaque.  The remainder of the right coronary and the posterior descending  artery are patent with minimal luminal irregularities.     ADDITIONAL FINDINGS:      The proximal ascending aorta is normal in size.   There is mild calcification of the ascending and descending aorta.  There mild mitral annular calcification.  Central venous catheter is present with its tip in the distal SVC.  Normal pulmonary venous anatomy with all four pulmonary veins draining  into the left atrium.    The left atrial appendage is free of thrombus.  There is no left ventricular mass or thrombus.   Normal pericardial thickness. There is no pericardial effusion.  Please review Radiology report for incidental noncardiac findings that  will  follow separately.     I have personally reviewed the examination and initial interpretation  and I agree with the findings.     5/15/2024 Echo  Interpretation Summary  Left ventricular function is decreased. The ejection fraction is 10-15%  (severely reduced). Severe diffuse hypokinesis is present.  Global right ventricular function is moderately reduced.  Moderate tricuspid insufficiency is present.  Estimated pulmonary artery systolic pressure is 54 mmHg plus right atrial  pressure.  IVC diameter >2.1 cm collapsing <50% with sniff suggests a high RA pressure  estimated at 15 mmHg or greater.  Trivial pericardial effusion is present.     This study was compared with the study from 2/12/24: LV and RV function have  decreased significantly.    2/12/2024 Echo  Interpretation Summary  A large left pleural effusion is present.  Global and regional left ventricular function is normal with an EF of 55-60%.  Global right ventricular function is normal. The right ventricle is normal  size.  Moderate tricuspid insufficiency is present. The etiology is RA enlargement.  The estimated PA systolic pressure is 60 mmHg.  IVC diameter and respiratory changes fall into an intermediate range  suggesting an RA pressure of 8 mmHg.  There is no prior study for direct comparison.      Assessment and Plan:   Ms. Idalia Bob is an 80 yr old female with a history of CKD, HTN, GERD, COPD, CAD, and newly diagnosed NICM (LVEF 10-15% per TTE 5/2024) who presents to CORE for evaluation and ongoing management of GDMT.     She appears euvolemic. Labs are stable. O2 mildly low, but at her baseline for the last 3 months. This gets trended at her place of living as well. We discussed the risks/benefits of adding aldactone today- they would like to decline this.    Newly diagnosed systolic heart failure secondary to NICM (LVEF 10-15% per TTE 5/2024)  HTN  Stage C, NYHA Class III  - ACEi/ARB/ARNi:  losartan 12.5 mg daily - didn't tolerate higher  dose. Also did not tolerate entresto.   - BB:  metoprolol succinate 12.5 mg HS, could try uptitration of this if needed  - Aldosterone antagonist:  offered today, they declined given her propensity for dehydration. Note she has had soft bps in the past as well.   - SGLT2i:  jardiance 10 mg daily   - SCD ppx:  defer at this time, repeat TTE today is pending  - Fluid status: euvolemic, continue lasix 20 mg PRN and kcl 20 meq PRN     Nonobstructive CAD  Coronary CTA showed mild non-obstructive coronary artery disease.  Total Agatston score 484 placing the patient in the 79th percentile when compared to age and gender matched control group.    - Continue aspirin 81mg daily  - BB as above  - atorvastatin 10 mg daily      COPD  No longer on day-time oxygen, just at night. O2 is 90% which appears to be her baseline for the last few months     CKD 3  JAC  Baseline ~ 1.0  Cr 1.32, stable at her baseline  - Repeat BMP in 2 weeks  - Refer to anemia clinic     Prolonged QTc  As per prior notes: She is currently on Aricpet and Zyprexa. QTc stable. We will continue these medications at current doses but do not recommend dose increased and to avoid other QT prolonging medications.       Follow up:  - CORE 3 months   - Cardiologist in 6 months    - I spent 6 minutes spent on the day of service reviewing prior records and results in preparation for the appointment, 16 minutes face to face with the patient and an additional 9 minutes coordinating care and completing documentation on the day of service      Mariana Giles PA-C  Advance Heart Failure        Please do not hesitate to contact me if you have any questions/concerns.     Sincerely,     Mariana Giles PA-C

## 2025-05-07 NOTE — PROGRESS NOTES
[facility]   CORE Clinic      HPI:   Ms. Idalia Bob is an 80yr old female with a history of CKD, HTN, GERD, COPD, CAD, and newly diagnosed NICM (LVEF 10-15% per TTE 5/2024) who presents to CORE appointment.     Her PCP advised that she present to the ED 5/15/24 due to JUANIS and elevated NTproBNP.  While in the ED, CXR showed bilateral pleural effusions and pulmonary edema, TTE showed LVEF 10-15% and moderately reduced RV function.  She started on IV lasix, and admitted to medicine.  She became hypotensive, required dobutamine --> dopamine, which was then stopped 5/21.  She ultimately diuresed to a weight of ~130#, and discharged 5/24 on losartan 12.5mg daily and jardiance 10mg daily.     Established in CORE on 6/4/24.  She has been followed by CORE clinic and Dr. Pak. Most recenty she was admitted from 3/28-3/28/25 for upper GIB - no scope. Heart failure was stable, but some meds were geld d/t bleeding. planned to resume lasix when it was needed.    Today, Ms. Bob presents to clinic with her daughter. She reports feeling okay since leaving [facility]- she has gotten much better. She walked into clnic fine with her walker without needing a rest. LE edema is much improved- she does lymphedema wraps every other day which has made a huge difference as have the water pills. Not sure about abdominal edema or other fluid retnetion. She does have a cough. No orthopnea or PND. .She does get some lightheadedness sporadically, mostly position changes. Appetite fair. No chest pain. No palpitations.     Home BP: 93/32157/72.   HR 80s-110s  Weight: 125-127.    Cardiac Medications   - ASA 81 mg daily   - Lasix 20 mg daily PRN- hasn't taken since she was here last- takes for it for weight >130  - KCL 20 mEq daily PRN if she takes lasix   - Losartan 12.5 mg daily   - Jardiance 10 mg daily   - Atorvastatin 10 mg daily   - Metoprolol succinate 12.5 mg HS      PAST MEDICAL HISTORY:  Past Medical History:    Diagnosis Date    Chronic kidney disease (CKD) 01/10/2023    Chronic obstructive pulmonary disease (H) 01/10/2023    Dementia (H) 11/28/2023    GERD (gastroesophageal reflux disease) 03/15/2023    Hypertension 11/28/2023       FAMILY HISTORY:  No family history on file.    SOCIAL HISTORY:  Social History     Socioeconomic History    Marital status:    Tobacco Use    Smoking status: Former     Current packs/day: 1.00     Average packs/day: 1 pack/day for 40.0 years (40.0 ttl pk-yrs)     Types: Cigarettes     Passive exposure: Past    Smokeless tobacco: Never   Vaping Use    Vaping status: Never Used   Substance and Sexual Activity    Alcohol use: Not Currently    Drug use: Never    Sexual activity: Not Currently     Social Determinants of Health     Financial Resource Strain: Low Risk  (1/22/2024)    Financial Resource Strain     Within the past 12 months, have you or your family members you live with been unable to get utilities (heat, electricity) when it was really needed?: No   Food Insecurity: Low Risk  (1/22/2024)    Food Insecurity     Within the past 12 months, did you worry that your food would run out before you got money to buy more?: No     Within the past 12 months, did the food you bought just not last and you didn t have money to get more?: No   Transportation Needs: Low Risk  (1/22/2024)    Transportation Needs     Within the past 12 months, has lack of transportation kept you from medical appointments, getting your medicines, non-medical meetings or appointments, work, or from getting things that you need?: No   Social Connections: Socially Integrated (11/21/2023)    Received from [email or web address], [facility] & [facility]    Social Connections     Frequency of Communication with Friends and Family: 0   Housing Stability: Low Risk  (1/22/2024)    Housing Stability     Do you have housing? : Yes     Are you worried about losing your housing?: No        CURRENT MEDICATIONS:  Current Outpatient Medications   Medication Sig Dispense Refill    acetaminophen (TYLENOL) 325 MG tablet Take 325-650 mg by mouth every 6 hours as needed for mild pain      albuterol (PROVENTIL) (2.5 MG/3ML) 0.083% neb solution Take 1 vial (2.5 mg) by nebulization 2 times daily as needed (COPD) 90 mL 5    aspirin 81 MG EC tablet Take 1 tablet (81 mg) by mouth daily 30 tablet 0    atorvastatin (LIPITOR) 10 MG tablet Take 1 tablet (10 mg) by mouth daily. 90 tablet 3    busPIRone (BUSPAR) 15 MG tablet TAKE 1 TABLET BY MOUTH TWICE A  tablet 0    calcium carbonate (TUMS) 500 MG chewable tablet Take 1 tablet (500 mg) by mouth 4 times daily as needed for heartburn      Calcium Polycarbophil (FIBER) 625 MG tablet Take 1 tablet by mouth daily.      donepezil (ARICEPT) 10 MG tablet Take 1 tablet (10 mg) by mouth at bedtime. 90 tablet 3    empagliflozin (JARDIANCE) 10 MG TABS tablet Take 1 tablet (10 mg) by mouth daily. 90 tablet 1    ferrous gluconate (FERGON) 324 (38 Fe) MG tablet Take 324 mg by mouth daily (with breakfast).      FLUoxetine (PROZAC) 40 MG capsule Take 1 capsule (40 mg) by mouth daily. 90 capsule 3    Fluticasone-Umeclidin-Vilanterol (TRELEGY ELLIPTA) 200-62.5-25 MCG/ACT oral inhaler Inhale 1 puff into the lungs daily. 28 each 11    furosemide (LASIX) 20 MG tablet Take 1 tablet (20 mg) by mouth daily as needed (take for weight > or equal to 130 lb). 100 tablet 3    lactobacillus rhamnosus, GG, (CULTURELL) capsule Take 1 capsule by mouth daily      latanoprost (XALATAN) 0.005 % ophthalmic solution Place 1 drop into both eyes at bedtime      levothyroxine (SYNTHROID/LEVOTHROID) 50 MCG tablet TAKE 1 TABLET BY MOUTH EVERY DAY 90 tablet 2    losartan (COZAAR) 25 MG tablet Take 0.5 tablets (12.5 mg) by mouth daily. 45 tablet 3    magnesium oxide (MAG-OX) 400 MG tablet Take 1 tablet (400 mg) by mouth at bedtime. 90 tablet 3    metoprolol succinate ER (TOPROL XL) 25 MG 24 hr tablet  Take 0.5 tablets (12.5 mg) by mouth at bedtime. 60 tablet 2    OLANZapine (ZYPREXA) 2.5 MG tablet Take 1 tablet (2.5 mg) by mouth 2 times daily. (Patient taking differently: Take 2.5 mg by mouth 2 times daily. 1/2 tablet (1.25 MG) 2 times daily) 180 tablet 1    oxyCODONE (ROXICODONE) 5 MG tablet TAKE 0.5 TABLETS (2.5 MG) BY MOUTH NIGHTLY AS NEEDED FOR PAIN      pantoprazole (PROTONIX) 40 MG EC tablet TAKE 1 TABLET BY MOUTH EVERY DAY 90 tablet 3    potassium chloride caroline ER (KLOR-CON M20) 20 MEQ CR tablet Take 1 tablet (20 mEq) by mouth daily as needed (take with dose of lasix). 100 tablet 3    senna-docusate (SENOKOT-S/PERICOLACE) 8.6-50 MG tablet Take 1 tablet by mouth 2 times daily as needed for constipation      triamcinolone (KENALOG) 0.1 % external cream Apply topically 2 times daily as needed for irritation      vitamin D3 (CHOLECALCIFEROL) 50 mcg (2000 units) tablet Take 1 tablet (50 mcg) by mouth daily       Current Facility-Administered Medications   Medication Dose Route Frequency Provider Last Rate Last Admin    denosumab (PROLIA) injection 60 mg  60 mg Subcutaneous Q6 Months    60 mg at 04/07/25 1408       ROS:   Refer to HPI    EXAM:  /82 (BP Location: Right arm, Patient Position: Chair, Cuff Size: Adult Small)   Pulse 82   Wt 56.9 kg (125 lb 6.4 oz)   LMP  (LMP Unknown)   SpO2 (!) 90%   BMI 24.49 kg/m     /82 (BP Location: Right arm, Patient Position: Chair, Cuff Size: Adult Small)   Pulse 82   Wt 56.9 kg (125 lb 6.4 oz)   LMP  (LMP Unknown)   SpO2 (!) 90%   BMI 24.49 kg/m     GENERAL: Appears comfortable, in no acute distress.   HEENT: Eye symmetrical, no discharge or icterus bilaterally.   CV: tachycardic, +S1S2, no murmur, rub, or gallop. JVP < 6 cm  RESPIRATORY: Respirations regular, even, and unlabored. Lungs CTA throughout.   GI: Soft and non distended   EXTREMITIES: no peripheral edema.   NEUROLOGIC: Alert and interacting appropriately   SKIN: No jaundice.    Labs,  reviewed with patient in clinic today:  CBC RESULTS:  Lab Results   Component Value Date    WBC 7.0 2025    RBC 3.92 2025    HGB 11.9 2025    HCT 35.8 2025    MCV 91 2025    MCH 26.0 (L) 2025    MCHC 28.5 (L) 2025    RDW 17.8 (H) 2025     2025       CMP RESULTS:  Lab Results   Component Value Date     2025    POTASSIUM 4.4 2025    POTASSIUM 4.4 2024    CHLORIDE 100 2025    CHLORIDE 104 2024    CO2 25 2025    ANIONGAP 13 2025    GLC 82 2025    GLC 97 2024     (H) 2024    BUN 21.9 2025    CR 1.32 (H) 2025    GFRESTIMATED 40 (L) 2025    AYUSH 10.0 2025    BILITOTAL 0.2 2025    ALBUMIN 2.9 (L) 2025    ALKPHOS 66 2025    ALT <5 2025    AST 12 2025        INR RESULTS:  Lab Results   Component Value Date    INR 1.06 2024       Lab Results   Component Value Date    MAG 1.9 2025     Lab Results   Component Value Date    NTBNPI 10,435 (H) 2024     Lab Results   Component Value Date    NTBNP 1,996 (H) 2025       Cardiac Diagnostics:    2024 Coronary CTA  IMPRESSION:  1.  Mild non-obstructive coronary artery disease.  2.  Total Agatston score 484 placing the patient in the 79th  percentile when compared to age and gender matched control group.  3.  Please review Radiology report for incidental noncardiac findings  that will follow separately.     FINDINGS:     CORONARY CALCIUM SCORE     Total Agatston calcium score: 483   Left main: 79  Left anterior descendin  Left circumflex: 69  Right coronary artery: 180   This places the patient in the lowest  percentile when compared to age  and gender matched control group.     CORONARY ANGIOGRAPHY     DOMINANCE: Right dominant system.   Normal coronary origins and course.     LEFT MAIN:   The left main arises normally from the left coronary cusp and has a  minimal  (<25%) stenosis composed of calcified plaque.     LEFT ANTERIOR DESCENDING:   The LAD is a moderate-sized type III vessel which supplies a  diminutive D1 and a small branching D2.   Proximal LAD: Mild (25-49%) stenosis composed of mixed plaque.  The remainder of the left anterior descending and its major diagonal  branches are patent with minimal luminal irregularities.     LEFT CIRCUMFLEX:   Proximal LCX:Minimal (<25%) stenosis composed of calcified plaque.  First marginal: Minimal (<25%) stenosis composed of calcified plaque.  The remainder of the left circumflex and its major marginal branches  are patent with minimal luminal irregularities.     RIGHT CORONARY ARTERY:   Proximal RCA: Minimal (<25%) stenosis stenosis composed of mixed  plaque.  Mid RCA: Mild (25-49%) stenosis composed of noncalcified plaque.  The remainder of the right coronary and the posterior descending  artery are patent with minimal luminal irregularities.     ADDITIONAL FINDINGS:      The proximal ascending aorta is normal in size.   There is mild calcification of the ascending and descending aorta.  There mild mitral annular calcification.  Central venous catheter is present with its tip in the distal SVC.  Normal pulmonary venous anatomy with all four pulmonary veins draining  into the left atrium.    The left atrial appendage is free of thrombus.  There is no left ventricular mass or thrombus.   Normal pericardial thickness. There is no pericardial effusion.  Please review Radiology report for incidental noncardiac findings that  will follow separately.     I have personally reviewed the examination and initial interpretation  and I agree with the findings.     5/15/2024 Echo  Interpretation Summary  Left ventricular function is decreased. The ejection fraction is 10-15%  (severely reduced). Severe diffuse hypokinesis is present.  Global right ventricular function is moderately reduced.  Moderate tricuspid insufficiency is  present.  Estimated pulmonary artery systolic pressure is 54 mmHg plus right atrial  pressure.  IVC diameter >2.1 cm collapsing <50% with sniff suggests a high RA pressure  estimated at 15 mmHg or greater.  Trivial pericardial effusion is present.     This study was compared with the study from 2/12/24: LV and RV function have  decreased significantly.    2/12/2024 Echo  Interpretation Summary  A large left pleural effusion is present.  Global and regional left ventricular function is normal with an EF of 55-60%.  Global right ventricular function is normal. The right ventricle is normal  size.  Moderate tricuspid insufficiency is present. The etiology is RA enlargement.  The estimated PA systolic pressure is 60 mmHg.  IVC diameter and respiratory changes fall into an intermediate range  suggesting an RA pressure of 8 mmHg.  There is no prior study for direct comparison.      Assessment and Plan:   Ms. Idalia Bob is an 80 yr old female with a history of CKD, HTN, GERD, COPD, CAD, and newly diagnosed NICM (LVEF 10-15% per TTE 5/2024) who presents to [facility] for evaluation and ongoing management of GDMT.     She appears euvolemic. Labs are stable. O2 mildly low, but at her baseline for the last 3 months. This gets trended at her place of living as well. We discussed the risks/benefits of adding aldactone today- they would like to decline this.    Newly diagnosed systolic heart failure secondary to NICM (LVEF 10-15% per TTE 5/2024)  HTN  Stage C, NYHA Class III  - ACEi/ARB/ARNi:  losartan 12.5 mg daily - didn't tolerate higher dose. Also did not tolerate entresto.   - BB:  metoprolol succinate 12.5 mg HS, could try uptitration of this if needed  - Aldosterone antagonist:  offered today, they declined given her propensity for dehydration. Note she has had soft bps in the past as well.   - SGLT2i:  jardiance 10 mg daily   - SCD ppx:  defer at this time, repeat TTE today is pending  - Fluid status: euvolemic, continue  lasix 20 mg PRN and kcl 20 meq PRN     Nonobstructive CAD  Coronary CTA showed mild non-obstructive coronary artery disease.  Total Agatston score 484 placing the patient in the 79th percentile when compared to age and gender matched control group.    - Continue aspirin 81mg daily  - BB as above  - atorvastatin 10 mg daily      COPD  No longer on day-time oxygen, just at night. O2 is 90% which appears to be her baseline for the last few months     CKD 3  JAC  Baseline ~ 1.0  Cr 1.32, stable at her baseline  - Repeat BMP in 2 weeks  - Refer to anemia clinic     Prolonged QTc  As per prior notes: She is currently on Aricpet and Zyprexa. QTc stable. We will continue these medications at current doses but do not recommend dose increased and to avoid other QT prolonging medications.       Follow up:  - CORE 3 months   - Cardiologist in 6 months    - I spent 6 minutes spent on the day of service reviewing prior records and results in preparation for the appointment, 16 minutes face to face with the patient and an additional 9 minutes coordinating care and completing documentation on the day of service      Mariana Giles PA-C  Advance Heart Failure

## 2025-05-07 NOTE — PATIENT INSTRUCTIONS
Take your medicines every day, as directed     Changes made today:    - No medication changes    - We will call you (Barbara) when your ECHO is back    Monitor Your Weight and Symptoms     Contact us if you:     Gain 2 pounds in one day or 5 pounds in one week  Feel more short of breath  Notice more leg swelling  Feel lightheadeded    Change your lifestyle     Limit Salt or Sodium:  2000 mg  Limit Fluids:  2000 mL or approximately 64 ounces  Eat a Heart Healthy Diet  Low in saturated fats  Stay Active:  Aim to move at least 150 minutes every  week            To Contact us     During Business Hours:  [phone], option # 1      After hours, weekends or holidays:   [phone], Option #4  Ask to speak to the On-Call Cardiologist. Inform them you are a CORE/heart failure patient at the [place].       Use [employer] allows you to communicate directly with your heart team through secure messaging.  [employer] can be accessed any time on your phone, computer, or tablet.  If you need assistance, we'd be happy to help!             Keep your Heart Appointments:     - Trena or Vannessa in 3 months    - Heart Failure Cardiologist in 6 months      Please consider attending our virtual support group which is held monthly. Please reach out to Memo at [phone] for more information if you are interested in attending.

## 2025-06-02 LAB
ATRIAL RATE - MUSE: 81 BPM
DIASTOLIC BLOOD PRESSURE - MUSE: NORMAL MMHG
INTERPRETATION ECG - MUSE: NORMAL
P AXIS - MUSE: 83 DEGREES
PR INTERVAL - MUSE: 162 MS
QRS DURATION - MUSE: 74 MS
QT - MUSE: 398 MS
QTC - MUSE: 462 MS
R AXIS - MUSE: 91 DEGREES
SYSTOLIC BLOOD PRESSURE - MUSE: NORMAL MMHG
T AXIS - MUSE: 53 DEGREES
VENTRICULAR RATE- MUSE: 81 BPM

## 2025-06-09 ENCOUNTER — MYC MEDICAL ADVICE (OUTPATIENT)
Dept: CARDIOLOGY | Facility: CLINIC | Age: 80
End: 2025-06-09
Payer: MEDICARE

## 2025-06-11 ENCOUNTER — RESULTS FOLLOW-UP (OUTPATIENT)
Dept: CARDIOLOGY | Facility: CLINIC | Age: 80
End: 2025-06-11

## 2025-06-11 ENCOUNTER — ANCILLARY PROCEDURE (OUTPATIENT)
Dept: CARDIOLOGY | Facility: CLINIC | Age: 80
End: 2025-06-11
Attending: PHYSICIAN ASSISTANT
Payer: MEDICARE

## 2025-06-11 DIAGNOSIS — I50.9 ACUTE ON CHRONIC CONGESTIVE HEART FAILURE, UNSPECIFIED HEART FAILURE TYPE (H): ICD-10-CM

## 2025-06-11 DIAGNOSIS — I31.39 PERICARDIAL EFFUSION: ICD-10-CM

## 2025-06-11 LAB — LVEF ECHO: NORMAL

## 2025-06-11 PROCEDURE — 93325 DOPPLER ECHO COLOR FLOW MAPG: CPT | Performed by: INTERNAL MEDICINE

## 2025-06-11 PROCEDURE — 93321 DOPPLER ECHO F-UP/LMTD STD: CPT | Performed by: INTERNAL MEDICINE

## 2025-06-11 PROCEDURE — 93308 TTE F-UP OR LMTD: CPT | Performed by: INTERNAL MEDICINE

## 2025-07-05 ENCOUNTER — MYC MEDICAL ADVICE (OUTPATIENT)
Dept: FAMILY MEDICINE | Facility: CLINIC | Age: 80
End: 2025-07-05
Payer: MEDICARE

## 2025-07-05 DIAGNOSIS — F41.1 GAD (GENERALIZED ANXIETY DISORDER): ICD-10-CM

## 2025-07-07 RX ORDER — BUSPIRONE HYDROCHLORIDE 15 MG/1
15 TABLET ORAL 2 TIMES DAILY
Qty: 180 TABLET | Refills: 1 | Status: SHIPPED | OUTPATIENT
Start: 2025-07-07

## 2025-08-11 ENCOUNTER — MYC MEDICAL ADVICE (OUTPATIENT)
Dept: CARDIOLOGY | Facility: CLINIC | Age: 80
End: 2025-08-11
Payer: MEDICARE

## 2025-08-11 DIAGNOSIS — I50.9 ACUTE ON CHRONIC CONGESTIVE HEART FAILURE, UNSPECIFIED HEART FAILURE TYPE (H): Primary | ICD-10-CM

## 2025-08-14 ENCOUNTER — OFFICE VISIT (OUTPATIENT)
Dept: FAMILY MEDICINE | Facility: CLINIC | Age: 80
End: 2025-08-14
Payer: MEDICARE

## 2025-08-14 ENCOUNTER — ANCILLARY PROCEDURE (OUTPATIENT)
Dept: GENERAL RADIOLOGY | Facility: CLINIC | Age: 80
End: 2025-08-14
Attending: FAMILY MEDICINE
Payer: MEDICARE

## 2025-08-14 VITALS
HEART RATE: 84 BPM | WEIGHT: 132 LBS | BODY MASS INDEX: 25.91 KG/M2 | HEIGHT: 60 IN | RESPIRATION RATE: 20 BRPM | OXYGEN SATURATION: 90 % | DIASTOLIC BLOOD PRESSURE: 68 MMHG | TEMPERATURE: 97.8 F | SYSTOLIC BLOOD PRESSURE: 100 MMHG

## 2025-08-14 DIAGNOSIS — R05.1 ACUTE COUGH: ICD-10-CM

## 2025-08-14 DIAGNOSIS — J44.1 COPD EXACERBATION (H): Primary | ICD-10-CM

## 2025-08-14 RX ORDER — AZITHROMYCIN 250 MG/1
TABLET, FILM COATED ORAL
Qty: 6 TABLET | Refills: 0 | Status: SHIPPED | OUTPATIENT
Start: 2025-08-14 | End: 2025-08-19

## 2025-08-14 RX ORDER — PREDNISONE 20 MG/1
TABLET ORAL
Qty: 10 TABLET | Refills: 0 | Status: SHIPPED | OUTPATIENT
Start: 2025-08-14

## 2025-08-14 ASSESSMENT — ENCOUNTER SYMPTOMS
ORTHOPNEA: 0
SORE THROAT: 0
HEMOPTYSIS: 0
SYNCOPE: 0
COUGH: 1
NECK PAIN: 1
FEVER: 0
CLAUDICATION: 1
VOMITING: 0
PND: 0
LEG PAIN: 0
RHINORRHEA: 1
SWOLLEN GLANDS: 0
WHEEZING: 1
SPUTUM PRODUCTION: 1
HEADACHES: 1
ABDOMINAL PAIN: 0

## 2025-08-18 ENCOUNTER — TELEPHONE (OUTPATIENT)
Dept: CARDIOLOGY | Facility: CLINIC | Age: 80
End: 2025-08-18
Payer: MEDICARE

## 2025-08-21 ENCOUNTER — LAB (OUTPATIENT)
Dept: LAB | Facility: CLINIC | Age: 80
End: 2025-08-21
Attending: PHYSICIAN ASSISTANT
Payer: MEDICARE

## 2025-08-21 DIAGNOSIS — I50.9 ACUTE ON CHRONIC CONGESTIVE HEART FAILURE, UNSPECIFIED HEART FAILURE TYPE (H): ICD-10-CM

## 2025-08-21 LAB
ANION GAP SERPL CALCULATED.3IONS-SCNC: 15 MMOL/L (ref 7–15)
BUN SERPL-MCNC: 26.5 MG/DL (ref 8–23)
CALCIUM SERPL-MCNC: 9.7 MG/DL (ref 8.8–10.4)
CHLORIDE SERPL-SCNC: 97 MMOL/L (ref 98–107)
CREAT SERPL-MCNC: 1.4 MG/DL (ref 0.51–0.95)
EGFRCR SERPLBLD CKD-EPI 2021: 38 ML/MIN/1.73M2
GLUCOSE SERPL-MCNC: 103 MG/DL (ref 70–99)
HCO3 SERPL-SCNC: 25 MMOL/L (ref 22–29)
POTASSIUM SERPL-SCNC: 5 MMOL/L (ref 3.4–5.3)
SODIUM SERPL-SCNC: 137 MMOL/L (ref 135–145)

## 2025-08-26 ENCOUNTER — MYC REFILL (OUTPATIENT)
Dept: CARDIOLOGY | Facility: CLINIC | Age: 80
End: 2025-08-26
Payer: MEDICARE

## 2025-08-26 DIAGNOSIS — I50.9 ACUTE ON CHRONIC CONGESTIVE HEART FAILURE, UNSPECIFIED HEART FAILURE TYPE (H): ICD-10-CM

## 2025-09-02 DIAGNOSIS — I50.9 ACUTE ON CHRONIC CONGESTIVE HEART FAILURE, UNSPECIFIED HEART FAILURE TYPE (H): ICD-10-CM

## 2025-09-03 DIAGNOSIS — E83.42 HYPOMAGNESEMIA: ICD-10-CM

## 2025-09-03 RX ORDER — MAGNESIUM OXIDE 400 MG/1
400 TABLET ORAL AT BEDTIME
Qty: 90 TABLET | Refills: 0 | Status: SHIPPED | OUTPATIENT
Start: 2025-09-03

## 2025-09-04 ENCOUNTER — LAB (OUTPATIENT)
Dept: LAB | Facility: CLINIC | Age: 80
End: 2025-09-04
Payer: MEDICARE

## 2025-09-04 ENCOUNTER — MYC MEDICAL ADVICE (OUTPATIENT)
Dept: CARDIOLOGY | Facility: CLINIC | Age: 80
End: 2025-09-04

## 2025-09-04 DIAGNOSIS — I50.9 ACUTE ON CHRONIC CONGESTIVE HEART FAILURE, UNSPECIFIED HEART FAILURE TYPE (H): ICD-10-CM

## 2025-09-04 DIAGNOSIS — E78.5 HYPERLIPIDEMIA LDL GOAL <100: ICD-10-CM

## 2025-09-04 LAB
ANION GAP SERPL CALCULATED.3IONS-SCNC: 12 MMOL/L (ref 7–15)
BUN SERPL-MCNC: 15.1 MG/DL (ref 8–23)
CALCIUM SERPL-MCNC: 9.8 MG/DL (ref 8.8–10.4)
CHLORIDE SERPL-SCNC: 101 MMOL/L (ref 98–107)
CREAT SERPL-MCNC: 1.4 MG/DL (ref 0.51–0.95)
EGFRCR SERPLBLD CKD-EPI 2021: 38 ML/MIN/1.73M2
GLUCOSE SERPL-MCNC: 98 MG/DL (ref 70–99)
HCO3 SERPL-SCNC: 26 MMOL/L (ref 22–29)
POTASSIUM SERPL-SCNC: 5 MMOL/L (ref 3.4–5.3)
SODIUM SERPL-SCNC: 139 MMOL/L (ref 135–145)

## 2025-09-04 RX ORDER — ATORVASTATIN CALCIUM 10 MG/1
10 TABLET, FILM COATED ORAL DAILY
Qty: 90 TABLET | Refills: 3 | Status: SHIPPED | OUTPATIENT
Start: 2025-09-04

## 2025-09-04 RX ORDER — LOSARTAN POTASSIUM 25 MG/1
12.5 TABLET ORAL DAILY
Qty: 45 TABLET | Refills: 3 | Status: SHIPPED | OUTPATIENT
Start: 2025-09-04

## 2025-09-04 RX ORDER — METOPROLOL SUCCINATE 25 MG/1
12.5 TABLET, EXTENDED RELEASE ORAL AT BEDTIME
Qty: 60 TABLET | Refills: 2 | Status: SHIPPED | OUTPATIENT
Start: 2025-09-04

## (undated) RX ORDER — NITROGLYCERIN 0.4 MG/1
TABLET SUBLINGUAL
Status: DISPENSED
Start: 2024-05-22

## (undated) RX ORDER — FENTANYL CITRATE 50 UG/ML
INJECTION, SOLUTION INTRAMUSCULAR; INTRAVENOUS
Status: DISPENSED
Start: 2024-02-07

## (undated) RX ORDER — IVABRADINE 5 MG/1
TABLET, FILM COATED ORAL
Status: DISPENSED
Start: 2024-05-22

## (undated) RX ORDER — METOPROLOL TARTRATE 1 MG/ML
INJECTION, SOLUTION INTRAVENOUS
Status: DISPENSED
Start: 2024-05-22